# Patient Record
Sex: FEMALE | Race: WHITE | Employment: OTHER | URBAN - METROPOLITAN AREA
[De-identification: names, ages, dates, MRNs, and addresses within clinical notes are randomized per-mention and may not be internally consistent; named-entity substitution may affect disease eponyms.]

---

## 2017-02-14 ENCOUNTER — GENERIC CONVERSION - ENCOUNTER (OUTPATIENT)
Dept: OTHER | Facility: OTHER | Age: 62
End: 2017-02-14

## 2017-02-14 ENCOUNTER — HOSPITAL ENCOUNTER (OUTPATIENT)
Dept: BONE DENSITY | Facility: IMAGING CENTER | Age: 62
Discharge: HOME | End: 2017-02-14
Payer: COMMERCIAL

## 2017-02-14 DIAGNOSIS — R92.8 OTHER ABNORMAL AND INCONCLUSIVE FINDINGS ON DIAGNOSTIC IMAGING OF BREAST: ICD-10-CM

## 2017-02-14 DIAGNOSIS — Z12.31 ENCOUNTER FOR SCREENING MAMMOGRAM FOR MALIGNANT NEOPLASM OF BREAST: ICD-10-CM

## 2017-02-14 PROCEDURE — G0202 SCR MAMMO BI INCL CAD: HCPCS

## 2017-02-15 ENCOUNTER — GENERIC CONVERSION - ENCOUNTER (OUTPATIENT)
Dept: OTHER | Facility: OTHER | Age: 62
End: 2017-02-15

## 2017-02-16 ENCOUNTER — GENERIC CONVERSION - ENCOUNTER (OUTPATIENT)
Dept: OTHER | Facility: OTHER | Age: 62
End: 2017-02-16

## 2017-02-22 ENCOUNTER — HOSPITAL ENCOUNTER (OUTPATIENT)
Dept: ULTRASOUND IMAGING | Facility: CLINIC | Age: 62
Discharge: HOME | End: 2017-02-22
Payer: COMMERCIAL

## 2017-02-22 ENCOUNTER — HOSPITAL ENCOUNTER (OUTPATIENT)
Dept: MAMMOGRAPHY | Facility: CLINIC | Age: 62
Discharge: HOME | End: 2017-02-22
Payer: COMMERCIAL

## 2017-02-22 DIAGNOSIS — R92.8 ABNORMAL FINDINGS ON DIAGNOSTIC IMAGING OF BREAST: ICD-10-CM

## 2017-02-22 DIAGNOSIS — R92.8 OTHER ABNORMAL AND INCONCLUSIVE FINDINGS ON DIAGNOSTIC IMAGING OF BREAST: ICD-10-CM

## 2017-02-22 PROCEDURE — 76642 ULTRASOUND BREAST LIMITED: CPT

## 2017-02-22 PROCEDURE — G0206 DX MAMMO INCL CAD UNI: HCPCS

## 2017-02-22 RX ORDER — ESCITALOPRAM OXALATE 10 MG/1
10 TABLET ORAL DAILY
COMMUNITY
End: 2018-04-18 | Stop reason: SDUPTHER

## 2017-02-22 RX ORDER — CARVEDILOL 6.25 MG/1
6.25 TABLET ORAL 2 TIMES DAILY WITH MEALS
COMMUNITY
End: 2019-07-07 | Stop reason: SDUPTHER

## 2017-02-22 RX ORDER — ATORVASTATIN CALCIUM 40 MG/1
40 TABLET, FILM COATED ORAL DAILY
COMMUNITY
End: 2018-04-18 | Stop reason: SDUPTHER

## 2017-02-22 RX ORDER — PANTOPRAZOLE SODIUM 40 MG/1
40 TABLET, DELAYED RELEASE ORAL DAILY
COMMUNITY
End: 2019-03-14 | Stop reason: SDUPTHER

## 2017-02-22 RX ORDER — ALPRAZOLAM 0.25 MG/1
0.25 TABLET ORAL
COMMUNITY
End: 2018-02-02 | Stop reason: SDUPTHER

## 2017-02-22 RX ORDER — POTASSIUM CHLORIDE 750 MG/1
10 TABLET, EXTENDED RELEASE ORAL 2 TIMES DAILY
COMMUNITY
End: 2018-09-13

## 2017-02-22 RX ORDER — TIZANIDINE HYDROCHLORIDE 4 MG/1
4 CAPSULE, GELATIN COATED ORAL 3 TIMES DAILY
COMMUNITY
End: 2018-09-13

## 2017-02-22 RX ORDER — CLOPIDOGREL BISULFATE 75 MG/1
75 TABLET ORAL DAILY
COMMUNITY
End: 2019-06-24 | Stop reason: SDUPTHER

## 2017-02-22 RX ORDER — PHENOL 1.4 %
600 AEROSOL, SPRAY (ML) MUCOUS MEMBRANE 2 TIMES DAILY WITH MEALS
COMMUNITY

## 2017-02-22 RX ORDER — LOSARTAN POTASSIUM 50 MG/1
50 TABLET ORAL DAILY
COMMUNITY
End: 2019-07-18

## 2017-02-22 RX ORDER — GABAPENTIN 400 MG/1
400 CAPSULE ORAL 3 TIMES DAILY
COMMUNITY
End: 2018-02-07 | Stop reason: SDUPTHER

## 2017-02-22 RX ORDER — HYDROCHLOROTHIAZIDE 25 MG/1
25 TABLET ORAL DAILY
COMMUNITY
End: 2019-03-10 | Stop reason: SDUPTHER

## 2017-02-22 RX ORDER — ISOSORBIDE DINITRATE 30 MG/1
30 TABLET ORAL DAILY
COMMUNITY
End: 2019-03-14

## 2017-02-22 RX ORDER — RANITIDINE 300 MG/1
300 CAPSULE ORAL EVERY EVENING
COMMUNITY
End: 2020-05-19

## 2017-02-22 RX ORDER — DIPHENOXYLATE HYDROCHLORIDE AND ATROPINE SULFATE 2.5; .025 MG/1; MG/1
1 TABLET ORAL DAILY
COMMUNITY

## 2017-02-27 ENCOUNTER — GENERIC CONVERSION - ENCOUNTER (OUTPATIENT)
Dept: OTHER | Facility: OTHER | Age: 62
End: 2017-02-27

## 2017-02-28 ENCOUNTER — HOSPITAL ENCOUNTER (OUTPATIENT)
Dept: ULTRASOUND IMAGING | Facility: CLINIC | Age: 62
Discharge: HOME | End: 2017-02-28
Admitting: RADIOLOGY
Payer: COMMERCIAL

## 2017-02-28 ENCOUNTER — HOSPITAL ENCOUNTER (OUTPATIENT)
Dept: ULTRASOUND IMAGING | Facility: CLINIC | Age: 62
Discharge: HOME | End: 2017-02-28
Payer: COMMERCIAL

## 2017-02-28 ENCOUNTER — HOSPITAL ENCOUNTER (OUTPATIENT)
Dept: MAMMOGRAPHY | Facility: CLINIC | Age: 62
Discharge: HOME | End: 2017-02-28
Payer: COMMERCIAL

## 2017-02-28 VITALS — DIASTOLIC BLOOD PRESSURE: 68 MMHG | SYSTOLIC BLOOD PRESSURE: 130 MMHG | HEART RATE: 80 BPM

## 2017-02-28 DIAGNOSIS — R92.8 ABNORMAL SCREENING MAMMOGRAM: ICD-10-CM

## 2017-02-28 DIAGNOSIS — R92.8 OTHER ABNORMAL AND INCONCLUSIVE FINDINGS ON DIAGNOSTIC IMAGING OF BREAST: ICD-10-CM

## 2017-02-28 PROCEDURE — 88305 TISSUE EXAM BY PATHOLOGIST: CPT | Performed by: INTERNAL MEDICINE

## 2017-02-28 PROCEDURE — 19083 BX BREAST 1ST LESION US IMAG: CPT

## 2017-02-28 RX ORDER — LIDOCAINE HYDROCHLORIDE 10 MG/ML
4 INJECTION, SOLUTION INFILTRATION; PERINEURAL ONCE
Status: COMPLETED | OUTPATIENT
Start: 2017-02-28 | End: 2017-02-28

## 2017-02-28 RX ADMIN — LIDOCAINE HYDROCHLORIDE 4 ML: 10 INJECTION, SOLUTION INFILTRATION; PERINEURAL at 14:16

## 2017-03-02 ENCOUNTER — GENERIC CONVERSION - ENCOUNTER (OUTPATIENT)
Dept: OTHER | Facility: OTHER | Age: 62
End: 2017-03-02

## 2017-03-21 ENCOUNTER — GENERIC CONVERSION - ENCOUNTER (OUTPATIENT)
Dept: OTHER | Facility: OTHER | Age: 62
End: 2017-03-21

## 2017-05-11 ENCOUNTER — GENERIC CONVERSION - ENCOUNTER (OUTPATIENT)
Dept: OTHER | Facility: OTHER | Age: 62
End: 2017-05-11

## 2017-06-15 ENCOUNTER — ALLSCRIPTS OFFICE VISIT (OUTPATIENT)
Dept: OTHER | Facility: OTHER | Age: 62
End: 2017-06-15

## 2017-06-16 ENCOUNTER — GENERIC CONVERSION - ENCOUNTER (OUTPATIENT)
Dept: OTHER | Facility: OTHER | Age: 62
End: 2017-06-16

## 2017-06-16 ENCOUNTER — APPOINTMENT (OUTPATIENT)
Dept: LAB | Facility: HOSPITAL | Age: 62
End: 2017-06-16
Payer: COMMERCIAL

## 2017-06-16 ENCOUNTER — TRANSCRIBE ORDERS (OUTPATIENT)
Dept: ADMINISTRATIVE | Facility: HOSPITAL | Age: 62
End: 2017-06-16

## 2017-06-16 DIAGNOSIS — E78.5 OTHER AND UNSPECIFIED HYPERLIPIDEMIA: ICD-10-CM

## 2017-06-16 DIAGNOSIS — L65.9 ALOPECIA, UNSPECIFIED: Primary | ICD-10-CM

## 2017-06-16 DIAGNOSIS — L65.9 ALOPECIA, UNSPECIFIED: ICD-10-CM

## 2017-06-16 LAB
FERRITIN SERPL-MCNC: 66 NG/ML (ref 8–388)
IRON SERPL-MCNC: 83 UG/DL (ref 50–170)
TIBC SERPL-MCNC: 333 UG/DL (ref 250–450)

## 2017-06-16 PROCEDURE — 80048 BASIC METABOLIC PNL TOTAL CA: CPT

## 2017-06-16 PROCEDURE — 82728 ASSAY OF FERRITIN: CPT

## 2017-06-16 PROCEDURE — 36415 COLL VENOUS BLD VENIPUNCTURE: CPT

## 2017-06-16 PROCEDURE — 83540 ASSAY OF IRON: CPT

## 2017-06-16 PROCEDURE — 83550 IRON BINDING TEST: CPT

## 2017-06-16 PROCEDURE — 80061 LIPID PANEL: CPT

## 2017-06-18 ENCOUNTER — GENERIC CONVERSION - ENCOUNTER (OUTPATIENT)
Dept: OTHER | Facility: OTHER | Age: 62
End: 2017-06-18

## 2017-06-19 ENCOUNTER — GENERIC CONVERSION - ENCOUNTER (OUTPATIENT)
Dept: OTHER | Facility: OTHER | Age: 62
End: 2017-06-19

## 2017-06-19 LAB — SPECIMEN STATUS REPORT (HISTORICAL): NORMAL

## 2017-07-25 ENCOUNTER — ALLSCRIPTS OFFICE VISIT (OUTPATIENT)
Dept: OTHER | Facility: OTHER | Age: 62
End: 2017-07-25

## 2017-10-24 ENCOUNTER — GENERIC CONVERSION - ENCOUNTER (OUTPATIENT)
Dept: OTHER | Facility: OTHER | Age: 62
End: 2017-10-24

## 2017-10-30 ENCOUNTER — ALLSCRIPTS OFFICE VISIT (OUTPATIENT)
Dept: OTHER | Facility: OTHER | Age: 62
End: 2017-10-30

## 2017-10-31 NOTE — PROGRESS NOTES
Assessment  1  Dizziness (780 4) (R42)   2  Vertigo (780 4) (R42)    Plan  Dizziness, Vertigo    · Meclizine HCl - 25 MG Oral Tablet; TAKE 1 TABLET BY MOUTH EVERY 8 HOURS as  needed for dizziness  Vertigo    · Avoid getting up or changing positions quickly ; Status:Complete;   Done: 06QZE7874  10:42AM   · Avoid operating heavy machinery and driving a vehicle until you have been rechecked ;  Status:Complete;   Done: 58ECP0705 10:42AM   · Drink plenty of fluids ; Status:Complete;   Done: 28NBI3796 10:42AM   · Call [phone] if: The symptoms seem worse ; Status:Complete;   Done:  55RGC0474 10:42AM   · Call [phone] if: Your dizziness is getting worse ; Status:Complete;   Done:  87FHT4235 10:42AM   · Call 911 if: You have any symptoms of a stroke ; Status:Complete;   Done: 18AZP2259  10:42AM   · Seek Immediate Medical Attention if: You get a headache that does not go away with your  usual treatment ; Status:Complete;   Done: 72DAV1895 10:42AM   · Seek Immediate Medical Attention if: You have fainted or passed out ; Status:Complete;    Done: 88ZII3779 10:42AM   · Seek Immediate Medical Attention if: You have nausea and vomiting that lasts longer  than 8 hours ; Status:Complete;   Done: 47YRH9287 10:42AM    Discussion/Summary    Dizziness - no red flag symptoms and nml Neuro exam - c/w vertigo - red flag symptoms d/w pt in detail and urged to go to ER/call if they occur, will start trial of Meclizine and urged to call for antiemetic if N/V worsens, advised not to drive until feels she is back to normal, advised to change positions slowly and keep hydrated, call if no better at all in 10 days or if new/worsening symptoms occur  The patient was counseled regarding instructions for management,-- risk factor reductions,-- prognosis,-- patient and family education,-- impressions,-- risks and benefits of treatment options,-- importance of compliance with treatment     Possible side effects of new medications were reviewed with the patient/guardian today  The treatment plan was reviewed with the patient/guardian  The patient/guardian understands and agrees with the treatment plan     Self Referrals: No      Chief Complaint  dizziness      History of Present Illness  HPI: Pt was blow drying her hair and afterwards she had electric shock like symptoms at her lips, face, down her arms, down to her toes  She went to work thinking it would just go away  It did resolve on its own after about 24 hrs  She noted the sensation like her legs were going to pass out on me  She has chronic back pain and states it is not new or worsened  She has had dizziness the past few days  The sensation is described as like my eyes don't want to stop moving, like they are wavering  The dizziness is low grade constant currently  She notes no slurred speech/word finding difficulty/new HA/double vision/blurry vision/focal weakness or numbness  Symptoms worse when she moves her eyes or her head  Review of Systems    Constitutional: no fever-- and-- no chills  ENT: no sore throat-- and-- no nasal discharge  Cardiovascular: no chest pain,-- no palpitations-- and-- no lower extremity edema  Respiratory: no shortness of breath-- and-- no cough  Gastrointestinal: nausea, but-- no abdominal pain,-- no vomiting,-- no constipation-- and-- no diarrhea  Genitourinary: no dysuria  Musculoskeletal: arthralgias  Integumentary: no rashes  Neurological: headache-- and-- dizziness, but-- as noted in HPI,-- no numbness-- and-- no tingling  Active Problems  1  Abnormal findings on diagnostic imaging of other parts of musculoskeletal system   (793 7) (R93 7)   2  Adenomatous colon polyp (211 3) (D12 6)   3  Allergic Reaction (995 3)   4  Arnold-Chiari malformation (741 00) (Q07 00)   5  Arthralgia, unspecified joint (719 40) (M25 50)   6  Borderline hyperglycemia (790 29) (R73 9)   7  Carpal tunnel syndrome (354 0) (G56 00)   8   Cervical spinal stenosis (723 0) (M48 02)   9  Colon cancer screening (V76 51) (Z12 11)   10  Colonoscopy (Fiberoptic) Screening   11  Coronary artery disease (414 00) (I25 10)   12  Depression with anxiety (300 4) (F41 8)   13  Dysfunction of right eustachian tube (381 81) (H69 81)   14  Dyslipidemia (272 4) (E78 5)   15  Encounter for screening mammogram for malignant neoplasm of breast (V76 12)    (Z12 31)   16  Esophagitis, reflux (530 11) (K21 0)   17  Flu vaccine need (V04 81) (Z23)   18  Headache (784 0) (R51)   19  Hemorrhoids (455 6) (K64 9)   20  History of headache (V13 89) (Z87 898)   21  Hoarseness (784 42) (R49 0)   22  Hypertension (401 9) (I10)   23  Hypokalemia (276 8) (E87 6)   24  Insomnia (780 52) (G47 00)   25  Leg swelling (729 81) (M79 89)   26  Low back pain (724 2) (M54 5)   27  Mammogram abnormal (793 80) (R92 8)   28  Mass of spinal cord (336 9) (G95 9)   29  Mitral regurgitation (424 0) (I34 0)   30  New daily persistent headache (339 42) (G44 52)   31  Numbness (782 0) (R20 0)   32  Osteopenia (733 90) (M85 80)   33  Osteoporosis screening (V82 81) (Z13 820)   34  Paresthesia of bilateral legs (782 0) (R20 2)   35  Plantar fasciitis (728 71) (M72 2)   36  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)   37  Raynaud's syndrome without gangrene (443 0) (I73 00)   38  Screening for cervical cancer (V76 2) (Z12 4)   39  Shortness of breath (786 05) (R06 02)   40  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)   41  Sudden idiopathic hearing loss of right ear, unspecified hearing status on contralateral    side (388 2) (H91 21)   42  Takotsubo syndrome (429 83) (I51 81)   43  Tinnitus (388 30) (H93 19)   44  Urinary frequency (788 41) (R35 0)    Past Medical History  Active Problems And Past Medical History Reviewed: The active problems and past medical history were reviewed and updated today  Surgical History  Surgical History Reviewed: The surgical history was reviewed and updated today         Social History   · Being A Social Drinker   · Former smoker (E44 52) (W11 383)   · Marital History - Currently    · Working Full Time  The social history was reviewed and updated today  Family History  Family History Reviewed: The family history was reviewed and updated today  Current Meds   1  ALPRAZolam 0 25 MG Oral Tablet; TAKE ONE TABLET BY MOUTH AT BEDTIME; Therapy: 74EJI5418 to (CWIDJWJD:39XMH6783)  Requested for: 75QVH0977; Last   Rx:03Uiz8235 Ordered   2  Anucort-HC 25 MG Rectal Suppository; INSERT 1 SUPPOSITORY RECTALLY 2 TIMES   DAILY; Therapy: 22OOH4132 to Recorded   3  Atorvastatin Calcium 40 MG Oral Tablet; Take 1 tablet daily; Therapy: 13UJH7797 to (Evaluate:98Uqn6033)  Requested for: 15MHD5903; Last   Rx:60Ehs8983 Ordered   4  B Complex-C Oral Tablet; TAKE 1 TABLET DAILY AS DIRECTED; Therapy: 91BNW5163 to Recorded   5  Calcium 500 MG TABS; 1 tab PO BID; Therapy: 60ZPP0959 to (Last Rx:15Dhx6977) Ordered   6  Carvedilol 6 25 MG Oral Tablet; TAKE 1 TABLET TWICE DAILY, WITH MORNING AND   EVENING MEAL; Therapy: 35VST2149 to (Daniel Randall)  Requested for: 65Cea6664 Recorded   7  EpiPen 2-Dave 0 3 MG/0 3ML SYED; INJECT INTRAMUSCULARLY AS DIRECTED; Therapy: 71FJW4455 to (Aliene Formosa)  Requested for: 90EDW9990; Last   Rx:46Wpi3187 Ordered   8  Escitalopram Oxalate 10 MG Oral Tablet; take 1 tablet every day; Therapy: 11HNS4833 to (Evaluate:94Vdy6229)  Requested for: 22FGD7417; Last   Rx:61Svk8857 Ordered   9  Fiber Formula Oral Capsule; USE AS DIRECTED; Therapy: 77HDF9867 to Recorded   10  Fluticasone Propionate 50 MCG/ACT Nasal Suspension; USE TWO SPRAY(S) IN EACH    NOSTRIL ONCE DAILY; Therapy: 44FQD8236 to (NYXLVJYR:51OOV6409)  Requested for: 06SEQ8115; Last    Rx:91Bmr3690 Ordered   11  Gabapentin 400 MG Oral Capsule; TAKE FOUR CAPSULES BY MOUTH ONCE DAILY;     Therapy: 19TYT0453 to 9845 8544)  Requested for: 20Tzt2734; Last    Rx:53Dqu2279 Ordered 12  HydroCHLOROthiazide 25 MG Oral Tablet; TAKE ONE TABLET BY MOUTH ONCE DAILY; Therapy: 89LDA0316 to (03 17 74 30 53)  Requested for: 9187 5645; Last    Rx:25Apr2017 Ordered   13  Isosorbide Mononitrate ER 30 MG Oral Tablet Extended Release 24 Hour; 1 tab PO q    day; Therapy: 29RQJ6893 to (Audra Blanton)  Requested for: 44Jsp3534 Recorded   14  Losartan Potassium 50 MG Oral Tablet; TAKE 1 TABLET DAILY; Therapy: 19WIG7019 to ([facility])  Requested for: 25Apr2017; Last    Rx:25Apr2017 Ordered   15  Multivitamins Oral Capsule; TAKE 1 CAPSULE DAILY; Therapy: 97RZQ3184 to (Evaluate:86Tet0196); Last Rx:14Wpk9930 Ordered   16  Nitrostat 0 4 MG Sublingual Tablet Sublingual;    Therapy: 80WIB2389 to (Last BK:58LUP2216)  Requested for: 14ASY4021 Ordered   17  Pantoprazole Sodium 40 MG Oral Tablet Delayed Release; TAKE 1 TABLET DAILY; Therapy: 97QLV1312 to (Evaluate:35Qqh3819) Recorded   18  Plavix 75 MG Oral Tablet; TAKE 1 TABLET DAILY; Therapy: 14YGO6167 to ([facility])  Requested for: 07CKH0202 Recorded   19  Potassium Chloride ER 10 MEQ Oral Capsule Extended Release; TAKE ONE CAPSULE    BY MOUTH ONCE DAILY; Therapy: 44HQI1551 to [facility])  Requested for: 11Aug2017; Last    Rx:11Aug2017 Ordered   20  ProAir  (90 Base) MCG/ACT Inhalation Aerosol Solution; INHALE 2 PUFFS    EVERY 4 HOURS AS NEEDED FOR COUGH AND WHEEZE;    Therapy: 20GJL9201 to (Last Rx:38Lwz5838)  Requested for: 05HOZ2285 Ordered   21  RaNITidine HCl - 300 MG Oral Capsule; TAKE 1 CAPSULE AT BEDTIME NIGHTLY; Therapy: 48BRF7761 to Recorded   22  TiZANidine HCl - 4 MG Oral Tablet; TAKE ONE & ONE-HALF TABLETS BY MOUTH AT    BEDTIME; Therapy: 83ZRV3786 to (03 17 74 30 53)  Requested for: 25Apr2017; Last    Rx:25Apr2017 Ordered   23  Vitamin D3 1000 UNIT Oral Tablet; TAKE 1 TABLET DAILY; Therapy: 78VAI5115 to (Evaluate:86Pia4841) Recorded   24   Wrist Splint/Cock-Up/Left M Miscellaneous; USE AS DIRECTED AT NIGHT TIME; Therapy: 37NJL8035 to (Last Rx:72Qxc8116) Ordered   25  Wrist Splint/Cock-Up/Right M Miscellaneous; USE AS DIRECTED AT NIGHT TIME; Therapy: 07TKH6732 to (Last Rx:67Zwm0367) Ordered    The medication list was reviewed and updated today  Allergies  1  Codeine Sulfate TABS    Vitals   Recorded: 60PEN9333 10:12AM   Temperature 97 F   Heart Rate 70   Systolic 922   Diastolic 88   Height 5 ft 6 in   Weight 157 lb    BMI Calculated 25 34   BSA Calculated 1 8     Physical Exam    Constitutional   General appearance: No acute distress, well appearing and well nourished  Eyes   Conjunctiva and lids: No swelling, erythema or discharge  Pupils and irises: Equal, round, reactive to light  -- 3-4 beat vertical nystagmus  Ears, Nose, Mouth, and Throat   External inspection of ears and nose: Normal     Otoscopic examination: Abnormal  -- effusion R TM  Hearing: Normal     Lips, teeth, and gums: Normal, good dentition  Oropharynx: Normal with no erythema, edema, exudate or lesions  Neck   Neck: Supple, symmetric, trachea midline, no masses  Pulmonary   Respiratory effort: No increased work of breathing or signs of respiratory distress  Auscultation of lungs: Clear to auscultation  Cardiovascular   Auscultation of heart: Normal rate and rhythm, normal S1 and S2, no murmurs  Examination of extremities for edema and/or varicosities: Normal     Abdomen   Abdomen: Non-tender, no masses  Lymphatic   Palpation of lymph nodes in neck: No lymphadenopathy  Musculoskeletal   Gait and station: Normal     Joints, bones, and muscles: Normal     Range of motion: Normal     Stability: Normal     Muscle strength/tone: Normal  -- 5/5 global muscle strength  Skin   Skin and subcutaneous tissue: Normal without rashes or lesions  Neurologic   Cranial nerves: Cranial nerves II-XII intact  Cortical function: Normal mental status  Reflexes: 2+ and symmetric  Sensation: No sensory loss  Coordination: Normal finger to nose and heel to robbins  -- neg Rhomberg     Psychiatric   Judgment and insight: Normal     Mood and affect: Normal        Future Appointments    Date/Time Provider Specialty Site   12/19/2017 10:00 AM [facility] Live DO Internal Medicine Ginger Ortiz MD     Signatures   Electronically signed by : Arturo Chen DO; Oct 30 2017 10:43AM EST                       (Author)

## 2017-12-16 DIAGNOSIS — H69.81 OTHER SPECIFIED DISORDERS OF EUSTACHIAN TUBE, RIGHT EAR: ICD-10-CM

## 2017-12-16 DIAGNOSIS — R73.9 HYPERGLYCEMIA: ICD-10-CM

## 2017-12-18 ENCOUNTER — GENERIC CONVERSION - ENCOUNTER (OUTPATIENT)
Dept: FAMILY MEDICINE CLINIC | Facility: HOSPITAL | Age: 62
End: 2017-12-18

## 2017-12-19 ENCOUNTER — ALLSCRIPTS OFFICE VISIT (OUTPATIENT)
Dept: OTHER | Facility: OTHER | Age: 62
End: 2017-12-19

## 2017-12-20 NOTE — PROGRESS NOTES
Assessment  1  Dizziness (780 4) (R42)   2  Sudden idiopathic hearing loss of right ear, unspecified hearing status on contralateral side (388 2) (H91 21)   3  Dysfunction of right eustachian tube (381 81) (H69 81)   4  Hyperglycemia (790 29) (R73 9)   5  Hypertension (401 9) (I10)   6  Esophagitis, reflux (530 11) (K21 0)   7  Flu vaccine need (V04 81) (Z23)   8  Need for tetanus booster (V03 7) (Z23)    Plan  Encounter for screening mammogram for malignant neoplasm of breast    · * MAMMO SCREENING BILATERAL W CAD; Status:Active; Requested for:24Owe9739;   Esophagitis, reflux    · Pantoprazole Sodium 40 MG Oral Tablet Delayed Release; TAKE 1 TABLET DAILY  Flu vaccine need    · Fluzone Quadrivalent Intramuscular Suspension; INJECT 0 5  ML Intramuscular; To BeDone: 72XDP2558  Need for tetanus booster    · Adacel 5-2-15 5 LF-MCG/0 5 Intramuscular Suspension; INJECT 0 5  ML Intramuscular; To Be Done: 55RNM9380  Osteoporosis screening    · * DXA BONE DENSITY SPINE HIP AND PELVIS; Status:Active; Requested for:14Qjx1331;     Discussion/Summary  Discussion Summary:   Dizziness - improved, UTT Meclizine, advised to keep hydrated and change positions slowly, call with relapse in symptomsSudden R hearing loss and R ETD - improved - saw ENT and had steroid injection, told needs hearing aides but waiting d/t cost - will try and obtain ENT visit notes as not in chartHyperglycemia - still has not done Bw - has order at home, encouraged low sugar/carb diet and keep active, will call with resultsHTN - BP better by end of appt, pt thinks her Losartan was increased to 100 mg - med list updated - advised pt to check when she gets home and call if that is wrong, con't all medsReflux - uncontrolled as ran out of meds 2 mos ago - rx refilled, call with new/worsening symptomsFlu and Adacel given - 5th grandchild (boy) due in Feb and she [place] going [place] be [place] for babyOrder for mammo and Dexa givenUTD on [place]     Counseling Documentation With Imm: The patient was counseled regarding instructions for management,-- risk factor reductions,-- prognosis,-- patient and family education,-- impressions,-- risks and benefits of treatment options,-- importance of compliance with treatment  Medication SE Review and Pt Understands Tx: Possible side effects of new medications were reviewed with the patient/guardian today  The treatment plan was reviewed with the patient/guardian  The patient/guardian understands and agrees with the treatment plan   Self Referrals:   Self Referrals: No      Chief Complaint  Chief Complaint Chronic Condition [facility]: Patient is here today for follow up of chronic conditions described in HPI  History of Present Illness  HPI: Pt here for routine follow up appt and BW results - she never did her BW and new order was givenPt was seen in Oct for dizziness  Was dx with vertigo and was tx with Meclizine and advised to avoid triggers  She states she did not like how the Meclizine made her feel - more dizzy and nauseous  She states symptoms improved on their own  She still has intermittent episodes but less frequent and less severe  She has not been able to id a trigger  She was also seen in July for acute sudden R hearing loss  She was given Medrol dose pack and referred to ENT  Pt did see ENT and was given a steroid injection and that did help  She was told she did need hearing aides but she has had to wait due to cost  She has had return of her hearing but still decreased - she was told mild to moderate hearing loss  Pt again did not do her BW for her IFG  She was asked about diet and she states she is doing OK  She does no formal exercise but 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  Wgt up 2 lbs from June  Bp up again today on presentation but better by end of appt  She states Dr Cassidy Cesar increased her Losartan but she is not sure if it is 100 or 50 mg  She thinks it is 100 mg   She had been checking her BP intermittently at the pharmacy but has not done it recently  She states it was running a little high but no numbers provided  She notes no increase in HA's/double vision/CP  She notes her heart burn has been really bad recently  She ran out of Protonix and has not called to get it refilled  Even drinking water will cause heart burn  She thinks she has not been taking it for almost 2 mos or so  She notes no abd pain/N/V/dark stools  She does have blood in stool and was told she needs to have hemorrhoid procedure done  She just had [place] in Feb She is due for mammo/Dexashe is UTD on [place]oShe is agreeable to flu vaccineShe is asking about tetanus vaccine as her 5th grandchild is due in Feb      Review of Systems  Complete-Female:  Constitutional: no fever-- and-- no chills  Eyes: no eyesight problems  ENT: no sore throat-- and-- no nasal discharge  Cardiovascular: no chest pain-- and-- no palpitations  Respiratory: no shortness of breath,-- no cough-- and-- no wheezing  Gastrointestinal: no abdominal pain,-- no constipation-- and-- no diarrhea  Genitourinary: no dysuria  Musculoskeletal: joint stiffness, but-- no joint swelling  Integumentary: no rashes  Neurological: headache-- and-- dizziness  Psychiatric: no anxiety-- and-- no depression  Endocrine: no muscle weakness  Hematologic/Lymphatic: no tendency for easy bleeding-- and-- no tendency for easy bruising  Active Problems  1  Abnormal findings on diagnostic imaging of other parts of musculoskeletal system (793 7) (R93 7)   2  Adenomatous colon polyp (211 3) (D12 6)   3  Allergic Reaction (995 3)   4  Arnold-Chiari malformation (741 00) (Q07 00)   5  Arthralgia, unspecified joint (719 40) (M25 50)   6  Borderline hyperglycemia (790 29) (R73 9)   7  Carpal tunnel syndrome (354 0) (G56 00)   8  Cervical spinal stenosis (723 0) (M48 02)   9  Colon cancer screening (V76 51) (Z12 11)   10  Colonoscopy (Fiberoptic) Screening   11  Coronary artery disease (414 00) (I25 10)   12  Depression with anxiety (300 4) (F41 8)   13  Dizziness (780 4) (R42)   14  Dysfunction of right eustachian tube (381 81) (H69 81)   15  Dyslipidemia (272 4) (E78 5)   16  Encounter for screening mammogram for malignant neoplasm of breast (V76 12) (Z12 31)   17  Esophagitis, reflux (530 11) (K21 0)   18  Flu vaccine need (V04 81) (Z23)   19  Headache (784 0) (R51)   20  Hemorrhoids (455 6) (K64 9)   21  History of headache (V13 89) (Z87 898)   22  Hoarseness (784 42) (R49 0)   23  Hypertension (401 9) (I10)   24  Hypokalemia (276 8) (E87 6)   25  Insomnia (780 52) (G47 00)   26  Leg swelling (729 81) (M79 89)   27  Low back pain (724 2) (M54 5)   28  Mammogram abnormal (793 80) (R92 8)   29  Mass of spinal cord (336 9) (G95 9)   30  Mitral regurgitation (424 0) (I34 0)   31  New daily persistent headache (339 42) (G44 52)   32  Numbness (782 0) (R20 0)   33  Osteopenia (733 90) (M85 80)   34  Osteoporosis screening (V82 81) (Z13 820)   35  Paresthesia of bilateral legs (782 0) (R20 2)   36  Plantar fasciitis (728 71) (M72 2)   37  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)   38  Raynaud's syndrome without gangrene (443 0) (I73 00)   39  Screening for cervical cancer (V76 2) (Z12 4)   40  Shortness of breath (786 05) (R06 02)   41  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)   42  Sudden idiopathic hearing loss of right ear, unspecified hearing status on contralateral side (388 2)  (H91 21)   43  Takotsubo syndrome (429 83) (I51 81)   44  Tinnitus (388 30) (H93 19)   45  Urinary frequency (788 41) (R35 0)   46  Vertigo (780 4) (R42)    Past Medical History  1  History of Acute laryngitis (464 00) (J04 0)   2  History of Acute upper respiratory infection (465 9) (J06 9)   3  Denied: History of Alcohol abuse   4  History of Gout (274 9) (M10 9)   5  History of Hair loss (704 00) (L65 9)   6  History of breast lump (V13 89) (Z87 898)   7  Denied: History of depression   8  History of dysuria (V13 00) (Z87 898)   9  History of flank pain (V13 89) (Z87 898)   10  Denied: History of substance abuse   11  History of upper respiratory infection (V12 09) (Z87 09)   12  History of urinary tract infection (V13 02) (Z87 440)   13  History of Hypotension (458 9) (I95 9)   14  History of Other muscle spasm (728 85) (M62 838)   15  History of Preoperative clearance (V72 84) (Z01 818)   16  History of Upper respiratory infection (465 9) (J06 9)  Active Problems And Past Medical History Reviewed: The active problems and past medical history were reviewed and updated today  Surgical History  1  History of Complete Colonoscopy   2  History of Craniotomy (Therapeutic)   3  History of Excision Of Baker's Cyst   4  History of Laminectomy Cervical   5  History of Spinal Diskectomy Cervical   6  History of Tubal Ligation  Surgical History Reviewed: The surgical history was reviewed and updated today  Family History  Mother    1  Denied: Family history of Alcohol abuse   2  Family history of cerebrovascular accident (V17 1) (Z82 3)   3  Denied: Family history of depression   4  Denied: Family history of substance abuse  Father    5  Denied: Family history of Alcohol abuse   6  Family history of Blood clot in vein   7  Family history of CABG   8  Family history of Coronary disease   9  Denied: Family history of depression   10  Denied: Family history of substance abuse  Brother    6  Family history of Alcohol abuse  Family History Reviewed: The family history was reviewed and updated today  Social History   · Being A Social Drinker   · Former smoker (X66 92) (M98 022)   · Marital History - Currently    · Working Full Time  Social History Reviewed: The social history was reviewed and updated today  Current Meds   1  ALPRAZolam 0 25 MG Oral Tablet; TAKE ONE TABLET BY MOUTH AT BEDTIME; Therapy: 50PBQ2431 to ([id number])  Requested for: 42NVQ4342; Last Rx:2017 Ordered   2   Anucort-HC 25 MG Rectal Suppository; INSERT 1 SUPPOSITORY RECTALLY 2 TIMES DAILY; Therapy: 74WIY3441 to Recorded   3  Atorvastatin Calcium 40 MG Oral Tablet; Take 1 tablet daily; Therapy: 03BOR2535 to (Evaluate:16Apr2018)  Requested for: 80BMX9078; Last Rx:03Oob6327 Ordered   4  B Complex-C Oral Tablet; TAKE 1 TABLET DAILY AS DIRECTED; Therapy: 61LYX0182 to Recorded   5  Calcium 500 MG TABS; 1 tab PO BID; Therapy: 60PVY6073 to (Last Rx:29Nov2012) Ordered   6  Carvedilol 6 25 MG Oral Tablet; TAKE 1 TABLET TWICE DAILY, WITH MORNING AND EVENING MEAL; Therapy: 22SEV2119 to (Jake Rose)  Requested for: 30Apr2013 Recorded   7  EpiPen 2-Dave 0 3 MG/0 3ML SYED; INJECT INTRAMUSCULARLY AS DIRECTED; Therapy: 80QCN6853 to (Ana Laura Kirkpatrick)  Requested for: 07NSY6859; Last Rx:11Ani9680 Ordered   8  Escitalopram Oxalate 10 MG Oral Tablet; take 1 tablet every day; Therapy: 25MGN3112 to (Evaluate:15Apr2018)  Requested for: 01VVB0050; Last Rx:86Jlt4768 Ordered   9  Fiber Formula Oral Capsule; USE AS DIRECTED; Therapy: 53OXC2175 to Recorded   10  Fluticasone Propionate 50 MCG/ACT Nasal Suspension; USE TWO SPRAY(S) IN EACH NOSTRIL  ONCE DAILY; Therapy: 23HID0850 to (TKQDGPBW:42RSU1776)  Requested for: 72VXJ0915; Last Rx:08Yer7798  Ordered   11  Gabapentin 400 MG Oral Capsule; TAKE FOUR CAPSULES BY MOUTH ONCE DAILY; Therapy: 10YTA4177 to (Evaluate:19Jan2018)  Requested for: 19Ixg1777; Last Rx:92Xps2897  Ordered   12  HydroCHLOROthiazide 25 MG Oral Tablet; TAKE ONE TABLET BY MOUTH ONCE DAILY; Therapy: 59BOX4149 to (03 17 74 30 53)  Requested for: 9187 5645; Last Rx:13Rmh3991  Ordered   13  Isosorbide Mononitrate ER 30 MG Oral Tablet Extended Release 24 Hour; 1 tab PO q day; Therapy: 30JHH1893 to (Jose Ramon Abdi)  Requested for: 30Apr2013 Recorded   14  Losartan Potassium 100 MG Oral Tablet; take 1 tablet by mouth once daily; Therapy: 54CIR6830 to (Evaluate:39Uig9910)  Requested for: 74POI1688 Recorded   15   Meclizine HCl - 25 MG Oral Tablet; TAKE 1 TABLET BY MOUTH EVERY 8 HOURS as needed for  dizziness; Therapy: 12ULU1494 to (Santino Dykes)  Requested for: 43QYQ1552; Last Rx:30Oct2017  Ordered   16  Multivitamins Oral Capsule; TAKE 1 CAPSULE DAILY; Therapy: 59ANW7424 to (Evaluate:46Htb0118); Last Rx:29Nov2012 Ordered   17  Nitrostat 0 4 MG Sublingual Tablet Sublingual;  Therapy: 89BYP7257 to (Last FJ:14GOT2464)  Requested for: 89DZQ5304 Ordered   18  Pantoprazole Sodium 40 MG Oral Tablet Delayed Release; TAKE 1 TABLET DAILY; Therapy: 92ZOM2301 to (Evaluate:98Klx9927) Recorded   19  Plavix 75 MG Oral Tablet; TAKE 1 TABLET DAILY; Therapy: 86GOV6304 to (Santino Dykes)  Requested for: 85EER3081 Recorded   20  Potassium Chloride ER 10 MEQ Oral Capsule Extended Release; TAKE ONE CAPSULE BY MOUTH  ONCE DAILY; Therapy: 00UPW7866 to (Teresa Olea)  Requested for: 11Aug2017; Last Rx:11Aug2017  Ordered   21  ProAir  (90 Base) MCG/ACT Inhalation Aerosol Solution; INHALE 2 PUFFS EVERY 4 HOURS  AS NEEDED FOR COUGH AND WHEEZE;  Therapy: 15QLI4194 to (Last Rx:18Gzk8328)  Requested for: 83GCN0012 Ordered   22  RaNITidine HCl - 300 MG Oral Capsule; TAKE 1 CAPSULE AT BEDTIME NIGHTLY; Therapy: 05IJB8872 to Recorded   23  TiZANidine HCl - 4 MG Oral Tablet; TAKE ONE & ONE-HALF TABLETS BY MOUTH AT BEDTIME; Therapy: 05BRJ2389 to (Evaluate:67Nqv3689)  Requested for: 09NHO8279; Last Rx:12Nov2017  Ordered   24  Vitamin D3 1000 UNIT Oral Tablet; TAKE 1 TABLET DAILY; Therapy: 17SYA4825 to (Evaluate:49Alu8216) Recorded   25  Wrist Splint/Cock-Up/Left M Miscellaneous; USE AS DIRECTED AT NIGHT TIME; Therapy: 86EPM2201 to (Last Rx:10Hkx0759) Ordered   26  Wrist Splint/Cock-Up/Right M Miscellaneous; USE AS DIRECTED AT NIGHT TIME; Therapy: 69STC4321 to (Last Rx:59Wua6362) Ordered  Medication List Reviewed: The medication list was reviewed and updated today  Allergies  1   Codeine Sulfate TABS    Vitals  Vital Signs    Recorded: 29OCO8393 10: 24AM Recorded: 06XSP0024 10:00AM   Temperature  98 1 F   Heart Rate  72   Systolic 360 430   Diastolic 72 82   Height  5 ft 6 in   Weight  157 lb    BMI Calculated  25 34   BSA Calculated  1 8     Physical Exam   Constitutional  General appearance: No acute distress, well appearing and well nourished  Pulmonary  Respiratory effort: No increased work of breathing or signs of respiratory distress  Auscultation of lungs: Clear to auscultation  Cardiovascular  Auscultation of heart: Normal rate and rhythm, normal S1 and S2, without murmurs  Examination of extremities for edema and/or varicosities: Normal    Abdomen  Abdomen: Non-tender, no masses     Musculoskeletal  Gait and station: Normal    Psychiatric  Mood and affect: Normal          Signatures   Electronically signed by : Arturo Chen DO; Dec 19 2017 10:07AM EST                       (Author)    Electronically signed by : Arturo Chen DO; Dec 19 2017 10:26AM EST                       (Author)    Electronically signed by : Arturo Chen DO; Dec 19 2017 10:40AM EST                       (Author)

## 2017-12-28 ENCOUNTER — APPOINTMENT (OUTPATIENT)
Dept: LAB | Facility: HOSPITAL | Age: 62
End: 2017-12-28
Payer: COMMERCIAL

## 2017-12-28 ENCOUNTER — TRANSCRIBE ORDERS (OUTPATIENT)
Dept: ADMINISTRATIVE | Facility: HOSPITAL | Age: 62
End: 2017-12-28

## 2017-12-28 DIAGNOSIS — E78.49 FAMILIAL COMBINED HYPERLIPIDEMIA: Primary | ICD-10-CM

## 2017-12-28 DIAGNOSIS — H69.81 OTHER SPECIFIED DISORDERS OF EUSTACHIAN TUBE, RIGHT EAR: ICD-10-CM

## 2017-12-28 DIAGNOSIS — R73.9 HYPERGLYCEMIA: ICD-10-CM

## 2017-12-28 DIAGNOSIS — E78.49 FAMILIAL COMBINED HYPERLIPIDEMIA: ICD-10-CM

## 2017-12-28 LAB
ALBUMIN SERPL BCP-MCNC: 3.9 G/DL (ref 3.5–5)
ALP SERPL-CCNC: 70 U/L (ref 46–116)
ALT SERPL W P-5'-P-CCNC: 36 U/L (ref 12–78)
ANION GAP SERPL CALCULATED.3IONS-SCNC: 8 MMOL/L (ref 4–13)
AST SERPL W P-5'-P-CCNC: 22 U/L (ref 5–45)
BILIRUB SERPL-MCNC: 0.6 MG/DL (ref 0.2–1)
BUN SERPL-MCNC: 16 MG/DL (ref 5–25)
CALCIUM SERPL-MCNC: 9.2 MG/DL (ref 8.3–10.1)
CHLORIDE SERPL-SCNC: 100 MMOL/L (ref 100–108)
CHOLEST SERPL-MCNC: 204 MG/DL (ref 50–200)
CO2 SERPL-SCNC: 29 MMOL/L (ref 21–32)
CREAT SERPL-MCNC: 1.08 MG/DL (ref 0.6–1.3)
ERYTHROCYTE [DISTWIDTH] IN BLOOD BY AUTOMATED COUNT: 13.6 % (ref 11.6–15.1)
EST. AVERAGE GLUCOSE BLD GHB EST-MCNC: 123 MG/DL
GFR SERPL CREATININE-BSD FRML MDRD: 54 ML/MIN/1.73SQ M
GLUCOSE P FAST SERPL-MCNC: 92 MG/DL (ref 65–99)
HBA1C MFR BLD: 5.9 % (ref 4.2–6.3)
HCT VFR BLD AUTO: 42.4 % (ref 34.8–46.1)
HDLC SERPL-MCNC: 60 MG/DL (ref 40–60)
HGB BLD-MCNC: 14.4 G/DL (ref 11.5–15.4)
LDLC SERPL CALC-MCNC: 110 MG/DL (ref 0–100)
LDLC SERPL DIRECT ASSAY-MCNC: 115 MG/DL (ref 0–100)
MCH RBC QN AUTO: 30.6 PG (ref 26.8–34.3)
MCHC RBC AUTO-ENTMCNC: 34 G/DL (ref 31.4–37.4)
MCV RBC AUTO: 90 FL (ref 82–98)
PLATELET # BLD AUTO: 328 THOUSANDS/UL (ref 149–390)
PMV BLD AUTO: 9 FL (ref 8.9–12.7)
POTASSIUM SERPL-SCNC: 3.6 MMOL/L (ref 3.5–5.3)
PROT SERPL-MCNC: 6.9 G/DL (ref 6.4–8.2)
RBC # BLD AUTO: 4.7 MILLION/UL (ref 3.81–5.12)
SODIUM SERPL-SCNC: 137 MMOL/L (ref 136–145)
TRIGL SERPL-MCNC: 172 MG/DL
TSH SERPL DL<=0.05 MIU/L-ACNC: 2.12 UIU/ML (ref 0.36–3.74)
WBC # BLD AUTO: 5.26 THOUSAND/UL (ref 4.31–10.16)

## 2017-12-28 PROCEDURE — 83721 ASSAY OF BLOOD LIPOPROTEIN: CPT

## 2017-12-28 PROCEDURE — 36415 COLL VENOUS BLD VENIPUNCTURE: CPT

## 2017-12-28 PROCEDURE — 84443 ASSAY THYROID STIM HORMONE: CPT

## 2017-12-28 PROCEDURE — 85027 COMPLETE CBC AUTOMATED: CPT

## 2017-12-28 PROCEDURE — 80061 LIPID PANEL: CPT

## 2017-12-28 PROCEDURE — 83036 HEMOGLOBIN GLYCOSYLATED A1C: CPT

## 2017-12-28 PROCEDURE — 80053 COMPREHEN METABOLIC PANEL: CPT

## 2017-12-31 ENCOUNTER — GENERIC CONVERSION - ENCOUNTER (OUTPATIENT)
Dept: OTHER | Facility: OTHER | Age: 62
End: 2017-12-31

## 2018-01-10 NOTE — RESULT NOTES
Verified Results  * DXA BONE DENSITY SPINE HIP AND PELVIS [id number] 04:13PM Neida Rodriguez     Test Name Result Flag Reference   DXA BONE DENSITY SPINE HIP AND PELVIS (Report)     CENTRAL DXA SCAN     CLINICAL HISTORY:  56year old post-menopausal  female  Osteoporosis screening  Family history of osteoporosis  TECHNIQUE: Bone densitometry was performed using a [employer] C bone densitometer  Regions of interest appear properly placed  There are no obvious fractures or other confounding variables which could limit the study  COMPARISON: None  RESULTS:    LUMBAR SPINE: L1-L4:   BMD 0 992 gm/cm2   T-score -0 5   Z-score 1 1     LEFT TOTAL HIP:   BMD 0 796 gm/cm2   T-score -1 2   Z-score -0 1     LEFT FEMORAL NECK:   BMD 0 685 gm/cm2   T-score -1 5   Z-score -0 1           ASSESSMENT:   1  Based on the WHO classification, the T-score of -1 5 is consistent with low bone mineral density  2  The 10 year risk of hip fracture is 0 7 %, with the 10 year risk of major osteoporotic fracture being 8 3 %, as calculated by the WHO fracture risk assessment tool (FRAX)  The current NOF guidelines recommend treating patients with FRAX 10 year risk   score of >3% for hip fracture and >20% for major osteoporotic fracture  3  Any secondary causes of low bone mineral density should be excluded prior to treatment, if clinically indicated  4  A daily intake of at least 1200 mg calcium and 800 - 1000 IU of Vitamin D, as well as weight bearing and muscle strengthening exercise, fall prevention and avoidance of tobacco and excessive alcohol intake as basic preventive measures are suggested  5  Repeat DXA scan in 18-24 months as clinically indicated         WHO CLASSIFICATION:   Normal (a T-score of -1 0 or higher)   Low bone mineral density (a T-score of less than -1 0 but higher than -2 5)   Osteoporosis (a T-score of -2 5 or less)   Severe osteoporosis (a T-score of -2 5 or less with a fragility fracture)       Discussion/Summary    please notify pt that her bone density test showed osteopenia - not normal but not yet osteoporosis - she needs to con't her calcium and also ensure she is taking Vit D3 1000 IU once daily; will recheck DEXA in 2 yrs; wgt bearing exercise also helps with bone density        Pt is aware  1       1 Amended By: January Vivas; Jan 19 2016 11:11 AM EST    Signatures   Electronically signed by :  January Vivas, ; Jan 19 2016 11:12AM EST                       (Author)

## 2018-01-11 NOTE — RESULT NOTES
Verified Results  (1) LYME ANTIBODY PROFILE [facility] TO [facility] [id number] 03:43PM Todd Barney Order Number: [id number]     Test Name Result Flag Reference   LYME IGG 0 26  0 00-0 79   NEGATIVE(0 00-0 79)-Absence of detectable Borrelia IgG Antibodies  A negative result does not exclude the possibility of Borrelia infection  If early Lyme disease is suspected,a second sample should be collected & tested 4 weeks after initial testing  LYME IGM 0 19  0 00-0 79   NEGATIVE (0 00-0 79)-Absence of detectable Borrelia IgM antibodies  A negative result does not exclude the possibility of Borrelia infection  If early lyme disease is suspected, a second sample should be collected & tested 4 weeks after initial testing

## 2018-01-13 VITALS
WEIGHT: 155 LBS | BODY MASS INDEX: 24.91 KG/M2 | HEART RATE: 70 BPM | DIASTOLIC BLOOD PRESSURE: 90 MMHG | HEIGHT: 66 IN | SYSTOLIC BLOOD PRESSURE: 148 MMHG | TEMPERATURE: 98.5 F

## 2018-01-13 NOTE — MISCELLANEOUS
Message   Recorded as Task   Date: 10/20/2017 02:11 PM, Created By: Noemí Kiser   Task Name: Care Coordination   Assigned To: Murray Goddard   Regarding Patient: Carolyn Orellana, Status: Active   Comment:    Noemí Kiser - 20 Oct 2017 2:11 PM     TASK CREATED  Caller: Self; Care Coordination; [phone] (Home); [phone] x,,,,, (Work)  PATIENT IN NEED OF EXCUSE FOR JURY DUTY - PCB WHEN READY   Regina Emery - 20 Oct 2017 2:19 PM     TASK EDITED  LM to call back  Did she drop off jury duty form to be filled out? What health problem is preventing her from jury duty? Noemí Kiser - 23 Oct 2017 8:17 AM     TASK EDITED  Patient came to my window on Friday - she has form, but it's only for her to fill out, instructions on her paper said she needed a letter from her physician to excuse her for a medical condition and to send the form and letter together - lm again today to call back with medical condition   Regina Emery - 23 Oct 2017 4:34 PM     TASK REPLIED TO: Previously Assigned To Merlyn Tram                      Are you able to write a letter excusing her from federal jury duty  Or would you like us to continue to try and reach her   Merlyn Ugalde - 23 Oct 2017 4:40 PM     TASK REPLIED TO: Previously Assigned To Leelee Garcia                      I see no reason she cannot do jury duty - what is the reason she thinks she cannot serve as a juror? Miya Poon - 23 Oct 2017 6:10 PM     TASK REPLIED TO: Previously Assigned To [facility]  Patient feels that she cannot drive that far to the courthouse   Merlyn Ugalde - 24 Oct 2017 7:23 AM     TASK REPLIED TO: Previously Assigned To Leelee Garcia                      no I will not fill out jury duty for not feeling like she can drive that far - has to be a legit medical reason and this is not one    Sorry   Renetta Gonzalez - 24 Oct 2017 8:07 AM     TASK EDITED  Pt aware---She has vertigo, can't hear well-she is getting her hearing aids soon, Anxiety, and she loses her voice when she gets stressed  It is just terrifying her!!  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)   5  Arthralgia, unspecified joint (719 40) (M25 50)   6  Borderline hyperglycemia (790 29) (R73 9)   7  Carpal tunnel syndrome (354 0) (G56 00)   8  Cervical spinal stenosis (723 0) (M48 02)   9  Colon cancer screening (V76 51) (Z12 11)   10  Colonoscopy (Fiberoptic) Screening   11  Coronary artery disease (414 00) (I25 10)   12  Depression with anxiety (300 4) (F41 8)   13  Dysfunction of right eustachian tube (381 81) (H69 81)   14  Dyslipidemia (272 4) (E78 5)   15  Encounter for screening mammogram for malignant neoplasm of breast (V76 12)    (Z12 31)   16  Esophagitis, reflux (530 11) (K21 0)   17  Flu vaccine need (V04 81) (Z23)   18  Headache (784 0) (R51)   19  Hemorrhoids (455 6) (K64 9)   20  History of headache (V13 89) (Z87 898)   21  Hoarseness (784 42) (R49 0)   22  Hypertension (401 9) (I10)   23  Hypokalemia (276 8) (E87 6)   24  Insomnia (780 52) (G47 00)   25  Leg swelling (729 81) (M79 89)   26  Low back pain (724 2) (M54 5)   27  Mammogram abnormal (793 80) (R92 8)   28  Mass of spinal cord (336 9) (G95 9)   29  Mitral regurgitation (424 0) (I34 0)   30  New daily persistent headache (339 42) (G44 52)   31  Numbness (782 0) (R20 0)   32  Osteopenia (733 90) (M85 80)   33  Osteoporosis screening (V82 81) (Z13 820)   34  Paresthesia of bilateral legs (782 0) (R20 2)   35  Plantar fasciitis (728 71) (M72 2)   36  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)   37  Raynaud's syndrome without gangrene (443 0) (I73 00)   38  Screening for cervical cancer (V76 2) (Z12 4)   39  Shortness of breath (786 05) (R06 02)   40  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)   41  Sudden idiopathic hearing loss of right ear, unspecified hearing status on contralateral    side (388 2) (H91 21)   42  Takotsubo syndrome (429 83) (I51 81)   43  Tinnitus (388 30) (H93 19)   44  Urinary frequency (788 41) (R35 0)    Current Meds   1  ALPRAZolam 0 25 MG Oral Tablet; TAKE ONE TABLET BY MOUTH AT BEDTIME; Therapy: 93RUO4898 to ([facility]FXP5794)  Requested for: 23CUI4790; Last   Rx:15Oct2017 Ordered   2  Anucort-HC 25 MG Rectal Suppository; INSERT 1 SUPPOSITORY RECTALLY 2 TIMES   DAILY; Therapy: 88BNU3929 to Recorded   3  Atorvastatin Calcium 40 MG Oral Tablet; Take 1 tablet daily; Therapy: 02XCW3656 to (Evaluate:06Hsu1726)  Requested for: 66LMI0404; Last   Rx:78Riu4677 Ordered   4  B Complex-C Oral Tablet; TAKE 1 TABLET DAILY AS DIRECTED; Therapy: 03WZL5025 to Recorded   5  Calcium 500 MG TABS; 1 tab PO BID; Therapy: 44UCG6016 to (Last Rx:29Nov2012) Ordered   6  Carvedilol 6 25 MG Oral Tablet; TAKE 1 TABLET TWICE DAILY, WITH MORNING AND   EVENING MEAL; Therapy: 59MPL7779 to ([facility])  Requested for: 00Eqo2179 Recorded   7  EpiPen 2-Dave 0 3 MG/0 3ML SYED; INJECT INTRAMUSCULARLY AS DIRECTED; Therapy: 12ZXF9567 to (Ole Torres)  Requested for: 03KXF7731; Last   Rx:46Hgg6458 Ordered   8  Escitalopram Oxalate 10 MG Oral Tablet; take 1 tablet every day; Therapy: 81TUQ5169 to (Evaluate:31Jys6014)  Requested for: 06WVV9801; Last   Rx:17Oct2017 Ordered   9  Fiber Formula Oral Capsule; USE AS DIRECTED; Therapy: 59AYR9178 to Recorded   10   Fluticasone Propionate 50 MCG/ACT Nasal Suspension; USE TWO SPRAY(S) IN EACH    NOSTRIL ONCE DAILY; Therapy: 27HFV5476 to (XZVYWCWM:45FNO3803)  Requested for: 76IFC5648; Last    Rx:07Val0650 Ordered   11  Gabapentin 400 MG Oral Capsule; TAKE FOUR CAPSULES BY MOUTH ONCE DAILY; Therapy: 97XJR7115 to (Evaluate:19Jan2018)  Requested for: 03Lmg1567; Last    Rx:03Ixj0467 Ordered   12  HydroCHLOROthiazide 25 MG Oral Tablet; TAKE ONE TABLET BY MOUTH ONCE    DAILY; Therapy: 85NHS4418 to (21 )  Requested for: 9187 5645; Last    Rx:48Yaw0765 Ordered   13  Isosorbide Mononitrate ER 30 MG Oral Tablet Extended Release 24 Hour; 1 tab PO q    day; Therapy: 70OIA6622 to (Jose Ramon Abdi)  Requested for: 88Kff9935 Recorded   14  Losartan Potassium 50 MG Oral Tablet; TAKE 1 TABLET DAILY; Therapy: 51ENL3384 to (Janay Mancera)  Requested for: 25Apr2017; Last    Rx:30Xep4905 Ordered   15  Medrol 4 MG Oral Tablet Therapy Pack; use as directed; Therapy: 00VMR2895 to (Last Rx:31Rmk1505)  Requested for: 16Zle2155 Ordered   16  Multivitamins Oral Capsule; TAKE 1 CAPSULE DAILY; Therapy: 12KLS7923 to (Evaluate:75Ymv7858); Last Rx:29Nov2012 Ordered   17  Nitrostat 0 4 MG Sublingual Tablet Sublingual;    Therapy: 26MEW9007 to (Last SD:63NCK4260)  Requested for: 64MQN4914 Ordered   18  Pantoprazole Sodium 40 MG Oral Tablet Delayed Release; TAKE 1 TABLET DAILY; Therapy: 18RGG9378 to (Evaluate:98Dzp1989) Recorded   19  Plavix 75 MG Oral Tablet; TAKE 1 TABLET DAILY; Therapy: 29FWS2123 to (Cara Christine)  Requested for: 50QQG3840 Recorded   20  Potassium Chloride ER 10 MEQ Oral Capsule Extended Release; TAKE ONE CAPSULE    BY MOUTH ONCE DAILY; Therapy: 62TFH3153 to (Army Koenig)  Requested for: 11Aug2017; Last    Rx:11Aug2017 Ordered   21   ProAir  (90 Base) MCG/ACT Inhalation Aerosol Solution; INHALE 2 PUFFS    EVERY 4 HOURS AS NEEDED FOR COUGH AND WHEEZE;    Therapy: 02WDK6158 to (Last Rx:82Elj1240)  Requested for: 30BPV3864 Ordered   22  RaNITidine HCl - 300 MG Oral Capsule; TAKE 1 CAPSULE AT BEDTIME NIGHTLY; Therapy: 91ILP7959 to Recorded   23  TiZANidine HCl - 4 MG Oral Tablet; TAKE ONE & ONE-HALF TABLETS BY MOUTH AT    BEDTIME; Therapy: 46HLG2602 to [phone])  Requested for: 28Zwu8473; Last    Rx:08Smy3395 Ordered   24  Vitamin D3 1000 UNIT Oral Tablet; TAKE 1 TABLET DAILY; Therapy: 82IFW1512 to (Evaluate:08Hld9611) Recorded   25  Wrist Splint/Cock-Up/Left M Miscellaneous; USE AS DIRECTED AT NIGHT TIME; Therapy: 09BDM0234 to (Last Rx:03Vrk9028) Ordered   26  Wrist Splint/Cock-Up/Right M Miscellaneous; USE AS DIRECTED AT NIGHT TIME; Therapy: 08AHS6877 to (Last Rx:88Wws3171) Ordered    Allergies    1   Codeine Sulfate TABS    Signatures   Electronically signed by : Ava oRss DO; Oct 24 2017  8:16AM EST                       (Author)

## 2018-01-13 NOTE — RESULT NOTES
Message    Labs are all good so far except potassium level is slightly low so I will rx a low dose potassium supplement for her to start daily  She should have orders to recheck labs before next appt w/Dr Rafael Shah in December, which will include a repeat potassium level  Lyme titer is still pending  pt aware ems 9/7/2016         Verified Results  (1) CBC/PLT/DIFF 69LEI8680 03:43PM Eric Pineda Order Number: [id number]     Test Name Result Flag Reference   WBC COUNT 4 88 Thousand/uL  4 31-10 16   RBC COUNT 4 77 Million/uL  3 81-5 12   HEMOGLOBIN 15 0 g/dL  11 5-15 4   HEMATOCRIT 42 9 %  34 8-46  1   MCV 90 fL  82-98   MCH 31 4 pg  26 8-34 3   MCHC 35 0 g/dL  31 4-37 4   RDW 13 6 %  11 6-15 1   MPV 9 4 fL  8 9-12 7   PLATELET COUNT 953 Thousands/uL  149-390   NEUTROPHILS RELATIVE PERCENT 51 %  43-75   LYMPHOCYTES RELATIVE PERCENT 37 %  14-44   MONOCYTES RELATIVE PERCENT 11 %  4-12   EOSINOPHILS RELATIVE PERCENT 1 %  0-6   BASOPHILS RELATIVE PERCENT 0 %  0-1   NEUTROPHILS ABSOLUTE COUNT 2 48 Thousands/?L  1 85-7 62   LYMPHOCYTES ABSOLUTE COUNT 1 82 Thousands/?L  0 60-4 47   MONOCYTES ABSOLUTE COUNT 0 52 Thousand/?L  0 17-1 22   EOSINOPHILS ABSOLUTE COUNT 0 05 Thousand/?L  0 00-0 61   BASOPHILS ABSOLUTE COUNT 0 01 Thousands/?L  0 00-0 10   - Patient Instructions: This bloodwork is non-fasting  Please drink two glasses of water morning of bloodwork  - Patient Instructions: This bloodwork is non-fasting  Please drink two glasses of water morning of bloodwork  (1) NIK SCREEN W/REFLEX TO TITER/PATTERN 72DRD9494 03:43PM Eric Pineda Order Number: [id number]     Test Name Result Flag Reference   NIK SCREEN   Negative  Negative     (1) COMPREHENSIVE METABOLIC PANEL 64RPY2689 31:33RY Eric Pineda Order Number: [id number]     Test Name Result Flag Reference   GLUCOSE,RANDM 92 mg/dL     If the patient is fasting, the ADA then defines impaired fasting glucose as > 100 mg/dL and diabetes as > or equal to 123 mg/dL  SODIUM 140 mmol/L  136-145   POTASSIUM 3 1 mmol/L L 3 5-5 3   CHLORIDE 101 mmol/L  100-108   CARBON DIOXIDE 29 mmol/L  21-32   ANION GAP (CALC) 10 mmol/L  4-13   BLOOD UREA NITROGEN 17 mg/dL  5-25   CREATININE 0 93 mg/dL  0 60-1 30   Standardized to IDMS reference method   CALCIUM 9 1 mg/dL  8 3-10 1   BILI, TOTAL 0 70 mg/dL  0 20-1 00   ALK PHOSPHATAS 84 U/L     ALT (SGPT) 30 U/L  12-78   AST(SGOT) 26 U/L  5-45   ALBUMIN 4 1 g/dL  3 5-5 0   TOTAL PROTEIN 7 1 g/dL  6 4-8 2   eGFR Non-African American      >60 0 ml/min/1 73sq m   - Patient Instructions: This bloodwork is non-fasting  Please drink two glasses of water morning of bloodwork  National Kidney Disease Education Program recommendations are as follows:  GFR calculation is accurate only with a steady state creatinine  Chronic Kidney disease less than 60 ml/min/1 73 sq  meters  Kidney failure less than 15 ml/min/1 73 sq  meters  (1) RHEUMATOID FACTOR SCREEN 00STQ8678 03:43PM Andrea Ache Order Number: [id number]     Test Name Result Flag Reference   RHEUMATOID FACTOR Negative  Negative     (1) TSH 88ECI1803 03:43PM Andrea Ache Order Number: [id number]     Test Name Result Flag Reference   TSH 1 211 uIU/mL  0 358-3 740   - Patient Instructions: This bloodwork is non-fasting  Please drink two glasses of water morning of bloodwork  - Patient Instructions: This bloodwork is non-fasting  Please drink two glasses of water morning of bloodwork  Patients undergoing fluorescein dye angiography may retain small amounts of fluorescein in the body for 48-72 hours post procedure  Samples containing fluorescein can produce falsely depressed TSH values  If the patient had this procedure,a specimen should be resubmitted post fluorescein clearance            The recommended reference ranges for TSH during pregnancy are as follows:  First trimester 0 1 to 2 5 uIU/mL  Second trimester  0 2 to 3 0 uIU/mL  Third trimester 0 3 to 3 0 uIU/m       Plan  Hypokalemia    · Start: Potassium Chloride ER 10 MEQ Oral Capsule Extended Release; TAKE ONE (1)  CAPSULE(S) DAILY

## 2018-01-13 NOTE — MISCELLANEOUS
Message   Recorded as Task   Date: 02/15/2017 11:57 AM, Created By: Karyle Halter   Task Name: Medical Complaint Callback   Assigned To: Starla Duke   Regarding Patient: Chaz Rebolledo, Status: Active   Comment:    Starla Duke - 15 Feb 2017 11:57 AM     TASK CREATED  Caller: Andres Lugo, Friend; Medical Complaint; [phone]  patient friend, Tadeo Jackson, reporting that the tramadol is giving her headaches and she stopped taking it and is still in pain    asking if there is another medication she can try or should she contact ortho for possible injection    pcb (confirmed w/ patient its ok to speak with Carley)(knows she will likely not get response until Thursday)   Leelee Garcia - 15 Feb 2017 8:49 PM     TASK REPLIED TO: Previously Assigned To Leelee Garcia                      what pain is she referring to? 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)   4   Arthralgia, unspecified joint (719 40) (M25 50)   5  Carpal tunnel syndrome (354 0) (G56 00)   6  Cervical spinal stenosis (723 0) (M48 02)   7  Colon cancer screening (V76 51) (Z12 11)   8  Colonoscopy (Fiberoptic) Screening   9  Depression with anxiety (300 4) (F41 8)   10  Dysfunction of right eustachian tube (381 81) (H69 81)   11  Dyslipidemia (272 4) (E78 5)   12  Dysuria (788 1) (R30 0)   13  Encounter for screening mammogram for malignant neoplasm of breast (V76 12)    (Z12 31)   14  Esophagitis, reflux (530 11) (K21 0)   15  Flank pain (789 09) (R10 9)   16  Flu vaccine need (V04 81) (Z23)   17  Hair loss (704 00) (L65 9)   18  Headache (784 0) (R51)   19  Hemorrhoids (455 6) (K64 9)   20  History of headache (V13 89) (Z87 898)   21  Hoarseness (784 42) (R49 0)   22  Hypertension (401 9) (I10)   23  Hypokalemia (276 8) (E87 6)   24  Insomnia (780 52) (G47 00)   25  Leg swelling (729 81) (M79 89)   26  Low back pain (724 2) (M54 5)   27  Mammogram abnormal (793 80) (R92 8)   28  Mass of spinal cord (336 9) (G95 9)   29  Mitral regurgitation (424 0) (I34 0)   30  New daily persistent headache (339 42) (G44 52)   31  Numbness (782 0) (R20 0)   32  Osteopenia (733 90) (M85 80)   33  Osteoporosis screening (V82 81) (Z13 820)   34  Paresthesia of bilateral legs (782 0) (R20 2)   35  Plantar fasciitis (728 71) (M72 2)   36  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)   37  Raynaud's syndrome without gangrene (443 0) (I73 00)   38  Shortness of breath (786 05) (R06 02)   39  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)   40  Takotsubo syndrome (429 83) (I51 81)   41  Tinnitus (388 30) (H93 19)   42  Urinary frequency (788 41) (R35 0)    Current Meds   1  ALPRAZolam 0 25 MG Oral Tablet; TAKE ONE TABLET BY MOUTH AT BEDTIME; Therapy: 96GES9531 to (Evaluate:08Apr2017)  Requested for: 40KVT0627; Last   Rx:08Jan2017 Ordered   2  Anucort-HC 25 MG Rectal Suppository; INSERT 1 SUPPOSITORY RECTALLY 2 TIMES   DAILY;    Therapy: 32ATI7619 to Recorded   3  Atorvastatin Calcium 40 MG Oral Tablet; Take 1 tablet daily; Therapy: 92LEL7395 to (Evaluate:60Aps0758)  Requested for: 70JWC5346; Last   Rx:06Qtv9610 Ordered   4  B Complex-C Oral Tablet; TAKE 1 TABLET DAILY AS DIRECTED; Therapy: 99KGH0438 to Recorded   5  Bromfed DM 30-2-10 MG/5ML Oral Syrup; TAKE 1 TEASPOONFUL EVERY 4 TO 6   HOURS AS NEEDED; Therapy: 91JSO6380 to (Evaluate:73Byz6980)  Requested for: 47ZMN5640; Last   Rx:79Ibs5527 Ordered   6  Calcium 500 MG TABS; 1 tab PO BID; Therapy: 43IHS4768 to (Last Rx:29Nov2012) Ordered   7  Carvedilol 6 25 MG Oral Tablet; TAKE 1 TABLET TWICE DAILY, WITH MORNING AND   EVENING MEAL; Therapy: 84DQU2369 to (Ruby Pool)  Requested for: 30Apr2013 Recorded   8  EpiPen 2-Dave 0 3 MG/0 3ML SYED; INJECT INTRAMUSCULARLY AS DIRECTED; Therapy: 21VBA1700 to (Rian Griffin)  Requested for: 29CLU0474; Last   Rx:77Enk0064 Ordered   9  Escitalopram Oxalate 10 MG Oral Tablet; TAKE 1 TABLET BY MOUTH ONCE DAILY; Therapy: 81WOJ4593 to (Evaluate:47Yar2786)  Requested for: 13SHO5627; Last   Rx:15Jan2017 Ordered   10  Fiber Formula Oral Capsule; USE AS DIRECTED; Therapy: 47BRF5317 to Recorded   11  Fluticasone Propionate 50 MCG/ACT Nasal Suspension; USE 2 SPRAYS IN EACH    NOSTRIL ONCE DAILY; Therapy: 05LOV0798 to (Last Rx:47Ped6314)  Requested for: 15KLA2251 Ordered   12  Gabapentin 400 MG Oral Capsule; TAKE FOUR CAPSULES BY MOUTH ONCE DAILY; Therapy: 45TVC1979 to (Evaluate:67Zuw8307)  Requested for: 28PCT3891; Last    Rx:25Nov2016 Ordered   13  HydroCHLOROthiazide 25 MG Oral Tablet; TAKE ONE TABLET BY MOUTH ONCE    DAILY; Therapy: 55OUA4041 to (Evaluate:03Rzm7079)  Requested for: 78CQE3136; Last    Rx:15Jan2017 Ordered   14  Isosorbide Mononitrate ER 30 MG Oral Tablet Extended Release 24 Hour; 1 tab PO q    day; Therapy: 86QMH5346 to (Genevie Factor)  Requested for: 30Apr2013 Recorded   15   Losartan Potassium 50 MG Oral Tablet; TAKE 1 TABLET DAILY; Therapy: 83SYP7047 to (Evaluate:07Nmh3456)  Requested for: 02ATR7677; Last    Rx:30Rdk2218 Ordered   16  Multivitamins Oral Capsule; TAKE 1 CAPSULE DAILY; Therapy: 35KSQ8545 to (Evaluate:99Nym8319); Last Rx:29Nov2012 Ordered   17  Nitrostat 0 4 MG Sublingual Tablet Sublingual (Nitroglycerin); Therapy: 51NTS3454 to (Last TD:69HKE7189)  Requested for: 37YJI3690 Ordered   18  Pantoprazole Sodium 40 MG Oral Tablet Delayed Release; TAKE 1 TABLET DAILY; Therapy: 73JMZ4225 to (Evaluate:72Bko7619) Recorded   19  Plavix 75 MG Oral Tablet (Clopidogrel Bisulfate); TAKE 1 TABLET DAILY; Therapy: 12PGN8232 to (Eduardo Levi)  Requested for: 40JSX6999 Recorded   20  Potassium Chloride ER 10 MEQ Oral Capsule Extended Release; TAKE ONE (1)    CAPSULE(S) DAILY; Therapy: 08HXB7363 to (Last Rx:94Sov8320)  Requested for: 54NAT1215 Ordered   21  ProAir  (90 Base) MCG/ACT Inhalation Aerosol Solution; INHALE 2 PUFFS    EVERY 4 HOURS AS NEEDED FOR COUGH AND WHEEZE;    Therapy: 60KZB4093 to (Last Rx:12Sed2639)  Requested for: 73HRX2162 Ordered   22  RaNITidine HCl - 300 MG Oral Capsule; TAKE 1 CAPSULE AT BEDTIME NIGHTLY; Therapy: 78TCD2811 to Recorded   23  TiZANidine HCl - 4 MG Oral Tablet; TAKE ONE & ONE-HALF TABLETS BY MOUTH AT    BEDTIME; Therapy: 92CSE8181 to (Monserrat العلي)  Requested for: 86FTT5423; Last    Rx:25Nov2016 Ordered   24  Vitamin D3 1000 UNIT Oral Tablet; TAKE 1 TABLET DAILY; Therapy: 81GAD9867 to (Evaluate:03Ygx3188) Recorded   25  Wrist Splint/Cock-Up/Left M Miscellaneous; USE AS DIRECTED AT NIGHT TIME; Therapy: 73OSE6259 to (Last Rx:81Wau0245) Ordered   26  Wrist Splint/Cock-Up/Right M Miscellaneous; USE AS DIRECTED AT NIGHT TIME; Therapy: 96XKJ7140 to (Last Rx:54Xno5355) Ordered    Allergies    1   Codeine Sulfate TABS    Signatures   Electronically signed by : Nancy Breaux, ; Feb 16 2017  4:25PM EST                       (Author)

## 2018-01-14 VITALS
TEMPERATURE: 97 F | HEART RATE: 70 BPM | DIASTOLIC BLOOD PRESSURE: 88 MMHG | WEIGHT: 157 LBS | HEIGHT: 66 IN | SYSTOLIC BLOOD PRESSURE: 140 MMHG | BODY MASS INDEX: 25.23 KG/M2

## 2018-01-14 VITALS
HEART RATE: 72 BPM | DIASTOLIC BLOOD PRESSURE: 88 MMHG | HEIGHT: 66 IN | BODY MASS INDEX: 25.71 KG/M2 | WEIGHT: 160 LBS | SYSTOLIC BLOOD PRESSURE: 142 MMHG | TEMPERATURE: 97.8 F

## 2018-01-14 NOTE — RESULT NOTES
Discussion/Summary   please notify pt that her BW showed her cholesterol was slightly worse but acceptable, her sugar was elevated at 105 (nml is <99) and this is in the pre-diabetes range, her potassium as little low at 3 4 - nml 3 5 or above - needs to repeat sugar in 6 mos - order in allscripts and should be 8 hr fast     Verified Results  (1) LIPID PANEL, FASTING 16Jun2017 08:38AM Gary Austin    Order Number: [id number]     Test Name Result Flag Reference   CHOLESTEROL 208 mg/dL H    HDL,DIRECT 52 mg/dL  40-60   Specimen collection should occur prior to Metamizole administration due to the potential for falsely depressed results  LDL CHOLESTEROL CALCULATED 110 mg/dL H 0-100   - Patient Instructions: This is a fasting blood test  Water,black tea or black  coffee only after 9:00pm the night before test   Drink 2 glasses of water the morning of test       Triglyceride:         Normal              <150 mg/dl       Borderline High    150-199 mg/dl       High               200-499 mg/dl       Very High          >499 mg/dl  Cholesterol:         Desirable        <200 mg/dl      Borderline High  200-239 mg/dl      High             >239 mg/dl  HDL Cholesterol:        High    >59 mg/dL      Low     <41 mg/dL  LDL CALCULATED:    This screening LDL is a calculated result  It does not have the accuracy of the Direct Measured LDL in the monitoring of patients with hyperlipidemia and/or statin therapy  Direct Measure LDL (QSZ127) must be ordered separately in these patients  TRIGLYCERIDES 229 mg/dL H <=150   Specimen collection should occur prior to N-Acetylcysteine or Metamizole administration due to the potential for falsely depressed results       (1) BASIC METABOLIC PROFILE 02RFO5352 08:38AM Baltazar Card Order Number: [id number]     Test Name Result Flag Reference   SODIUM 140 mmol/L  136-145   POTASSIUM 3 4 mmol/L L 3 5-5 3   CHLORIDE 103 mmol/L  100-108   CARBON DIOXIDE 27 mmol/L  21-32   ANION GAP (CALC) 10 mmol/L  4-13   BLOOD UREA NITROGEN 15 mg/dL  5-25   CREATININE 1 18 mg/dL  0 60-1 30   Standardized to IDMS reference method   CALCIUM 9 5 mg/dL  8 3-10 1   eGFR Non-African American 46 1 ml/min/1 73sq m     [facility] Energy Disease Education Program recommendations are as follows:  GFR calculation is accurate only with a steady state creatinine  Chronic Kidney disease less than 60 ml/min/1 73 sq  meters  Kidney failure less than 15 ml/min/1 73 sq  meters     GLUCOSE FASTING 105 mg/dL H 65-99

## 2018-01-15 NOTE — RESULT NOTES
Discussion/Summary   please notify pt that her iron studies were nml     Verified Results  (1) FERRITIN 05SUO7162 08:38AM Mahogany Calos     Test Name Result Flag Reference   FERRITIN 66 ng/mL  8-388     (1) IRON 40VJA1518 08:38AM Mahogany Calos     Test Name Result Flag Reference   IRON 83 ug/dL     Patients treated with metal-binding drugs (ie  Deferoxamine) may have depressed iron values       (1) TIBC 47LNJ5701 08:38AM Mahogany Docurated     Test Name Result Flag Reference   TOTAL IRON BINDING CAPACITY 333 ug/dL  250-450          Patient notified of results

## 2018-01-16 NOTE — RESULT NOTES
Verified Results  * MAMMO SCREENING BILATERAL W CAD [id number] 10:11AM Bonnie Tejalmarcel Order Number: [id number]    - Patient Instructions: To schedule this appointment, please contact Central Scheduling at [phone]  Do not wear any perfume, powder, lotion or deodorant on breast or underarm area  Please bring your doctors order, referral (if needed) and insurance information with you on the day of the test  Failure to bring this information may result in this test being rescheduled  Arrive 15 minutes prior to your appointment time to register  On the day of your test, please bring any prior mammogram or breast studies with you that were not performed at a [facility]  Failure to bring prior exams may result in your test needing to be rescheduled  Test Name Result Flag Reference   MAMMO SCREENING BILATERAL W CAD (Report)     Patient History:   Patient is postmenopausal    Family history of breast cancer at age 58 in maternal    grandmother, prostate cancer at age 79 in father  No Hormone Replacement Therapy   Patient is a former smoker  Patient's BMI is 25 3  Reason for exam: screening, asymptomatic  Screening     Mammo Screening Bilateral W CAD: February 14, 2017 - Check In #:    [de-identified]   Bilateral CC and MLO view(s) were taken  Technologist: LAUREL Bailey (LAUREL)(M)   Prior study comparison: October 14, 2015, bilateral OPMA digital    scrn mammo w/CAD performed at [address] [place] [address]  June 26, 2014, bilateral OPMA digital scrn mammo   w/CAD performed at [address] [place] [address]  January 28, 2013, bilateral WB digitl bilat naeem, performed at    [address] [place] [address]  December 5, 2011, bilateral    Follow-Up, performed at [address] [place] [address]  December 1, 2011, bilateral OPMA digital scrn mammo w/CAD    performed at [address] [place] [address]       There are scattered fibroglandular densities  No dominant soft    tissue mass, architectural distortion or suspicious    calcifications are noted in either breast  The skin and nipple    contours are within normal limits  In the lower breast at about the 5 o'clock position there is a    new mostly circumscribed benign-appearing faint nodular density    which probably represents an orbital cyst  Recommend targeted    ultrasound for further assessment  It is 7 mm diameter and    projects 6 cm below the nipple  Needs additional imaging  ACR BI-RADS® Assessments: BiRad:0 - Incomplete: needs additional    imaging evaluation - Right     Recommendation:   Ultrasound of the right breast    A breast health care nurse from our facility will be contacting    the patient regarding the need for additional imaging  Analyzed by CAD     8-10% of cancers will be missed on mammography  Management of a    palpable abnormality must be based on clinical grounds  Patients   will be notified of their results via letter from our facility  Accredited by [employer] of [employer] and FDA  Transcription Location: LAUREL Lazaro 98: [id number]     Risk Value(s):   Tyrer-Cuzick 10 Year: 2 800%, Tyrer-Cuzick Lifetime: 6 300%,    Myriad Table: 1 5%, JELANI 5 Year: 1 2%, NCI Lifetime: 5 3%   Signed by:   Klever Calabrese MD   2/14/17       Plan  Mammogram abnormal    · *US BREAST RIGHT LIMITED (DIAGNOSTIC);  Status:Hold For - Scheduling; Requested  for:[id number];     Discussion/Summary   please notify pt that there is a benign - appearing cyst at the lower R breast that they want to look at further with an [address] - order put in allscripts and someone from breast center should be calling to schedule this with her - let us know if they have not called to schedule this by Friday and we will help facilitate it         Patient notified of results

## 2018-01-23 VITALS
HEART RATE: 72 BPM | DIASTOLIC BLOOD PRESSURE: 72 MMHG | WEIGHT: 157 LBS | TEMPERATURE: 98.1 F | HEIGHT: 66 IN | BODY MASS INDEX: 25.23 KG/M2 | SYSTOLIC BLOOD PRESSURE: 134 MMHG

## 2018-01-23 NOTE — RESULT NOTES
Discussion/Summary   please notify pt that sugar was nml but her A1C (3 mo average of sugar) was still elevated in prediabetes range, cholesterol was also slightly elevated, watch sugars/carbs and keep active, will d/w pt in detail at next appt     Verified Results  (1) HEMOGLOBIN A1C [id number] 08:15AM Addy Fisher     Test Name Result Flag Reference   HEMOGLOBIN A1C 5 9 %  4 2-6 3   EST  AVG   GLUCOSE 123 mg/dl

## 2018-02-02 DIAGNOSIS — F41.9 ANXIETY: Primary | ICD-10-CM

## 2018-02-02 RX ORDER — ALPRAZOLAM 0.25 MG/1
TABLET ORAL
Qty: 30 TABLET | Refills: 2 | Status: SHIPPED | OUTPATIENT
Start: 2018-02-02 | End: 2018-05-24 | Stop reason: SDUPTHER

## 2018-02-07 DIAGNOSIS — G89.4 CHRONIC PAIN SYNDROME: Primary | ICD-10-CM

## 2018-02-07 RX ORDER — GABAPENTIN 400 MG/1
CAPSULE ORAL
Qty: 360 CAPSULE | Refills: 1 | Status: SHIPPED | OUTPATIENT
Start: 2018-02-07 | End: 2018-08-07 | Stop reason: SDUPTHER

## 2018-04-18 DIAGNOSIS — E78.5 DYSLIPIDEMIA: Primary | ICD-10-CM

## 2018-04-18 DIAGNOSIS — F32.A DEPRESSION, UNSPECIFIED DEPRESSION TYPE: ICD-10-CM

## 2018-04-18 RX ORDER — ESCITALOPRAM OXALATE 10 MG/1
TABLET ORAL
Qty: 90 TABLET | Refills: 1 | Status: SHIPPED | OUTPATIENT
Start: 2018-04-18 | End: 2018-11-07 | Stop reason: SDUPTHER

## 2018-04-18 RX ORDER — ATORVASTATIN CALCIUM 40 MG/1
TABLET, FILM COATED ORAL
Qty: 90 TABLET | Refills: 1 | Status: SHIPPED | OUTPATIENT
Start: 2018-04-18 | End: 2019-01-13 | Stop reason: SDUPTHER

## 2018-05-11 DIAGNOSIS — Z00.00 HEALTH CARE MAINTENANCE: Primary | ICD-10-CM

## 2018-05-11 RX ORDER — TIZANIDINE 4 MG/1
TABLET ORAL
Qty: 135 TABLET | Refills: 1 | Status: SHIPPED | OUTPATIENT
Start: 2018-05-11 | End: 2018-11-14 | Stop reason: SDUPTHER

## 2018-05-24 DIAGNOSIS — F41.9 ANXIETY: ICD-10-CM

## 2018-05-24 RX ORDER — ALPRAZOLAM 0.25 MG/1
TABLET ORAL
Qty: 30 TABLET | Refills: 2 | Status: SHIPPED | OUTPATIENT
Start: 2018-05-24 | End: 2018-09-05 | Stop reason: SDUPTHER

## 2018-06-27 DIAGNOSIS — E87.6 HYPOKALEMIA: Primary | ICD-10-CM

## 2018-06-27 RX ORDER — POTASSIUM CHLORIDE 750 MG/1
CAPSULE, EXTENDED RELEASE ORAL
Qty: 30 CAPSULE | Refills: 6 | Status: SHIPPED | OUTPATIENT
Start: 2018-06-27 | End: 2019-02-11 | Stop reason: SDUPTHER

## 2018-07-13 ENCOUNTER — TRANSCRIBE ORDERS (OUTPATIENT)
Dept: ADMINISTRATIVE | Facility: HOSPITAL | Age: 63
End: 2018-07-13

## 2018-07-13 DIAGNOSIS — M81.0 OSTEOPOROSIS, UNSPECIFIED OSTEOPOROSIS TYPE, UNSPECIFIED PATHOLOGICAL FRACTURE PRESENCE: Primary | ICD-10-CM

## 2018-07-13 DIAGNOSIS — Z12.39 SCREENING BREAST EXAMINATION: Primary | ICD-10-CM

## 2018-07-26 ENCOUNTER — HOSPITAL ENCOUNTER (OUTPATIENT)
Dept: MAMMOGRAPHY | Facility: CLINIC | Age: 63
Discharge: HOME | End: 2018-07-26
Payer: MEDICARE

## 2018-07-26 DIAGNOSIS — Z12.39 SCREENING BREAST EXAMINATION: ICD-10-CM

## 2018-07-26 DIAGNOSIS — M81.0 OSTEOPOROSIS, UNSPECIFIED OSTEOPOROSIS TYPE, UNSPECIFIED PATHOLOGICAL FRACTURE PRESENCE: ICD-10-CM

## 2018-07-26 PROCEDURE — 77067 SCR MAMMO BI INCL CAD: CPT

## 2018-07-26 PROCEDURE — 77080 DXA BONE DENSITY AXIAL: CPT

## 2018-08-07 DIAGNOSIS — G89.4 CHRONIC PAIN SYNDROME: ICD-10-CM

## 2018-08-08 RX ORDER — GABAPENTIN 400 MG/1
CAPSULE ORAL
Qty: 360 CAPSULE | Refills: 0 | Status: SHIPPED | OUTPATIENT
Start: 2018-08-08 | End: 2018-11-14 | Stop reason: SDUPTHER

## 2018-08-24 ENCOUNTER — OFFICE VISIT (OUTPATIENT)
Dept: FAMILY MEDICINE CLINIC | Facility: HOSPITAL | Age: 63
End: 2018-08-24
Payer: MEDICARE

## 2018-08-24 ENCOUNTER — TELEPHONE (OUTPATIENT)
Dept: FAMILY MEDICINE CLINIC | Facility: HOSPITAL | Age: 63
End: 2018-08-24

## 2018-08-24 VITALS
HEART RATE: 62 BPM | BODY MASS INDEX: 24.72 KG/M2 | DIASTOLIC BLOOD PRESSURE: 88 MMHG | WEIGHT: 153.8 LBS | TEMPERATURE: 97.8 F | HEIGHT: 66 IN | SYSTOLIC BLOOD PRESSURE: 122 MMHG

## 2018-08-24 DIAGNOSIS — M25.532 LEFT WRIST PAIN: Primary | ICD-10-CM

## 2018-08-24 PROCEDURE — 99213 OFFICE O/P EST LOW 20 MIN: CPT | Performed by: NURSE PRACTITIONER

## 2018-08-24 RX ORDER — MELOXICAM 15 MG/1
15 TABLET ORAL DAILY
Qty: 30 TABLET | Refills: 0 | Status: SHIPPED | OUTPATIENT
Start: 2018-08-24 | End: 2019-02-09 | Stop reason: SDUPTHER

## 2018-08-24 NOTE — PATIENT INSTRUCTIONS
Neda Lovell Disease   WHAT YOU NEED TO KNOW:   What is de Quervain's disease? De Quervain's disease is inflammation of the tendons on the thumb side of your wrist  Tendons are thick strands of tissue that connect muscles to bones  What causes de Quervain's disease? Dana Bue disease is usually caused by frequent, repeated movements of your thumb or wrist  For example, lifting a small child, sewing, typing, or playing the piano can cause inflammation  A direct blow to the thumb may also damage the tendon and form scar tissue  This scar tissue can keep the tendon from working properly  What are the signs and symptoms of de Quervain's disease? · Pain and swelling near the base of your thumb are the most common symptoms  This usually occurs when you move your wrist up and down, grasp an object, or make a fist     · You will hear a grating noise when you move or rub your thumb or wrist     · Your thumb and wrist may be weak and you may have limited movement  How is de Quervain's disease diagnosed? Your healthcare provider will ask about your medical history and examine you  He will ask you to make a fist with your thumb touching the palm of your hand  Then he will ask you to move your hand and wrist in certain directions  He will check to see if you have pain, weakness, or problems with movement  Both hands may need to be checked  You may also need any of the following:  · X-rays: You may need pictures of your wrist and hand to check for a fracture  X-rays of both hands and wrists may be done  · MRI:  This scan uses powerful magnets and a computer to take pictures of your wrist and hand  An MRI may show if you have de Quervain's disease  You may be given dye to help the pictures show up better  Tell healthcare providers if you are allergic to iodine or seafood  You may also be allergic to the dye  Do not enter the MRI room with anything metal  Metal can cause serious injury   Tell healthcare providers if you have any metal in or on your body  How is de Quervain's disease treated? · Medicines:      ¨ NSAIDs:  These medicines decrease swelling, pain, and fever  They are available without a doctor's order  Ask which medicine is right for you, and how much to take  Take as directed  NSAIDs can cause stomach bleeding or kidney problems if not taken correctly  ¨ Steroid injection: This decreases long-term pain and swelling  It is usually injected into your wrist or hand  · Surgery: This may be done if other treatments do not work or your pain interferes with your daily activities  During surgery, an incision is made in the tissue that covers the tendon  This helps release pressure and reduce pain so your tendon can move freely  How can I manage my symptoms? · Splint or brace: These devices will help decrease pain, limit movement, and protect your wrist so that it can heal  Make sure your device is comfortable  If it is too tight, your fingers may feel numb or tingly  Do not push or lean on your device because it can break  · Physical or occupational therapy:  You may need to see a physical or occupational therapist to teach you special exercises  These exercises help improve movement and decrease pain  They also help improve strength and decrease your risk for loss of function  Therapists will help you make changes to your daily activities to decrease stress and pressure on the tendons  · Rest:  Rest your injured thumb or wrist  Avoid twisting, grasping, or gripping movements  Ask when you can return to your normal activities  What are the risks of de Quervain's disease? · Your thumb or wrist may not move the way it did before, even after treatment  It may take time and commitment to therapy to regain strength and normal movement of your thumb and wrist      · Without treatment, you may have increased pain and swelling  Over time, your movement and function will decrease   Eventually, you may not be able to move or use your thumb or wrist   When should I contact my healthcare provider? · Your splint or brace is too tight and you cannot loosen it  · You have a fever  · Your pain and swelling get worse or do not go away  · Your cannot grasp objects because of the pain and swelling  · You have questions or concerns about your condition or care  When should I seek immediate care? · You cannot move your thumb or wrist     · Your fingers feel numb, tingly, cool to the touch, or look blue or pale  CARE AGREEMENT:   You have the right to help plan your care  Learn about your health condition and how it may be treated  Discuss treatment options with your caregivers to decide what care you want to receive  You always have the right to refuse treatment  The above information is an  only  It is not intended as medical advice for individual conditions or treatments  Talk to your doctor, nurse or pharmacist before following any medical regimen to see if it is safe and effective for you  © 2017 [employer] Information is for End User's use only and may not be sold, redistributed or otherwise used for commercial purposes  All illustrations and images included in CareNotes® are the copyrighted property of A D A M [employer] Inc  or Ari Anderson

## 2018-08-24 NOTE — PROGRESS NOTES
Assessment/Plan:    No problem-specific Assessment & Plan notes found for this encounter  Diagnoses and all orders for this visit:    Left wrist pain  Comments:  probable tendonitis so rx issued for NSAID and consult entered for ortho as she may need injection; continue ice and brace as needed   Orders:  -     meloxicam (MOBIC) 15 mg tablet; Take 1 tablet (15 mg total) by mouth daily  -     Ambulatory referral to Orthopedic Surgery; Future          Subjective:      Patient ID: Melania Fernandez is a 70 y o  female here for an acute visit  States she has had left wrist pain for past month  Treating as tendonitis w/using asper creme and using a brace at night time  Has been renovating her bathroom and caring for 11 month old grandson  Hurts if he touches area  Has been swollen and feels hot  Red occasionally  The following portions of the patient's history were reviewed and updated as appropriate: allergies, current medications, past medical history, past social history, past surgical history and problem list     Review of Systems   Musculoskeletal: Positive for arthralgias  Objective:      /88   Pulse 62   Temp 97 8 °F (36 6 °C)   Ht 5' 6" (1 676 m)   Wt 69 8 kg (153 lb 12 8 oz)   BMI 24 82 kg/m²          Physical Exam   Constitutional: She is oriented to person, place, and time  She appears well-developed and well-nourished  No distress  HENT:   Head: Normocephalic and atraumatic  Eyes: Conjunctivae are normal    Musculoskeletal:        Left wrist: She exhibits decreased range of motion, tenderness and swelling  She exhibits no crepitus and no laceration  + finkelstein's   Radial wrist w/faint redness/warmth     Neurological: She is alert and oriented to person, place, and time  Skin: Skin is warm and dry  Psychiatric: She has a normal mood and affect  Vitals reviewed

## 2018-08-24 NOTE — TELEPHONE ENCOUNTER
Pt was seen today, referred to ortho, schedulers are going to call her to make the apt and she wants to make sure that her cell is the number that's called [phone] (1) bedfast

## 2018-08-30 ENCOUNTER — APPOINTMENT (OUTPATIENT)
Dept: RADIOLOGY | Facility: CLINIC | Age: 63
End: 2018-08-30
Payer: MEDICARE

## 2018-08-30 ENCOUNTER — OFFICE VISIT (OUTPATIENT)
Dept: OBGYN CLINIC | Facility: CLINIC | Age: 63
End: 2018-08-30
Payer: MEDICARE

## 2018-08-30 VITALS
SYSTOLIC BLOOD PRESSURE: 149 MMHG | HEIGHT: 66 IN | DIASTOLIC BLOOD PRESSURE: 79 MMHG | WEIGHT: 152.8 LBS | BODY MASS INDEX: 24.56 KG/M2 | HEART RATE: 56 BPM

## 2018-08-30 DIAGNOSIS — M65.4 DE QUERVAIN'S DISEASE (TENOSYNOVITIS): ICD-10-CM

## 2018-08-30 DIAGNOSIS — M25.532 LEFT WRIST PAIN: ICD-10-CM

## 2018-08-30 PROCEDURE — 99203 OFFICE O/P NEW LOW 30 MIN: CPT | Performed by: ORTHOPAEDIC SURGERY

## 2018-08-30 PROCEDURE — 73110 X-RAY EXAM OF WRIST: CPT

## 2018-08-30 PROCEDURE — 20605 DRAIN/INJ JOINT/BURSA W/O US: CPT | Performed by: ORTHOPAEDIC SURGERY

## 2018-08-30 RX ORDER — LIDOCAINE HYDROCHLORIDE 10 MG/ML
0.5 INJECTION, SOLUTION INFILTRATION; PERINEURAL
Status: COMPLETED | OUTPATIENT
Start: 2018-08-30 | End: 2018-08-30

## 2018-08-30 RX ORDER — BETAMETHASONE SODIUM PHOSPHATE AND BETAMETHASONE ACETATE 3; 3 MG/ML; MG/ML
6 INJECTION, SUSPENSION INTRA-ARTICULAR; INTRALESIONAL; INTRAMUSCULAR; SOFT TISSUE
Status: COMPLETED | OUTPATIENT
Start: 2018-08-30 | End: 2018-08-30

## 2018-08-30 RX ADMIN — BETAMETHASONE SODIUM PHOSPHATE AND BETAMETHASONE ACETATE 6 MG: 3; 3 INJECTION, SUSPENSION INTRA-ARTICULAR; INTRALESIONAL; INTRAMUSCULAR; SOFT TISSUE at 10:25

## 2018-08-30 RX ADMIN — LIDOCAINE HYDROCHLORIDE 0.5 ML: 10 INJECTION, SOLUTION INFILTRATION; PERINEURAL at 10:25

## 2018-08-30 NOTE — PROGRESS NOTES
Assessment:     1  De Quervain's disease (tenosynovitis)          Plan:     Problem List Items Addressed This Visit        Musculoskeletal and Integument    De Quervain's disease (tenosynovitis)     Injection offered and given  Discussed diagnosis and treatment  Recommended avoiding aggravating activities as much as possible  F/u prn  Relevant Orders    XR wrist 3+ vw left    Medium joint arthrocentesis           Patient ID: Rakesh Epps is a 70 y o  female  Chief Complaint:  L wrist pain    HPI:  71 y/o F with 4 week Hx of pain in L wrist   RHD  Pain around radial styloid  Aggreivated by certain motions such as doing hair  Started a bathroom remodel 4 weeks ago  Has a brace which helps but can't wear it all the time  Started Meloxicam 1 week ago which helps  Red and swollen and tender  Unable to wear her watch on that wrist   Wanting more relief  Medical intake reviewed          Allergy:  Allergies   Allergen Reactions    Codeine Itching    Medical Tape Rash       Medications:  all current active meds have been reviewed    Past Medical History:  Past Medical History:   Diagnosis Date    Breast lump     last assessed: Jan 22, 2013     Gout     last assessed: Nov 26, 2012     Hair loss     last assessed: Dec 15, 2016     Hypotension     last assessed: Nov 10, 2014        Past Surgical History:  Past Surgical History:   Procedure Laterality Date    CERVICAL DISCECTOMY      Spinal     CERVICAL LAMINECTOMY      C1 with chiari decompression     COLONOSCOPY      5 yrs - onset: May 31, 2011     CRANIOTOMY      POPLITEAL SYNOVIAL CYST EXCISION      TUBAL LIGATION         Family History:  Family History   Problem Relation Age of Onset    Stroke Mother     Other Father         blood clot in vein & CABG     Heart disease Father     Alcohol abuse Brother        Social History:  History   Alcohol Use    Yes     Comment: social      History   Drug Use No     History   Smoking Status    Former Smoker   Smokeless Tobacco    Never Used           ROS:  Review of Systems   Constitutional: Negative  HENT: Negative  Eyes: Negative  Respiratory: Negative  Gastrointestinal: Negative  Endocrine: Negative  Genitourinary: Negative  Musculoskeletal: Positive for arthralgias  Skin: Negative  Allergic/Immunologic: Negative  Neurological: Negative  Hematological: Negative  Psychiatric/Behavioral: Negative  Objective:  BP Readings from Last 1 Encounters:   08/30/18 149/79      Wt Readings from Last 1 Encounters:   08/30/18 69 3 kg (152 lb 12 8 oz)        BMI:   Estimated body mass index is 24 66 kg/m² as calculated from the following:    Height as of this encounter: 5' 6" (1 676 m)  Weight as of this encounter: 69 3 kg (152 lb 12 8 oz)  EXAM:   Physical Exam   Constitutional: She is oriented to person, place, and time  She appears well-developed and well-nourished  No distress  HENT:   Head: Normocephalic and atraumatic  Eyes: Right eye exhibits no discharge  Left eye exhibits no discharge  Neck: Normal range of motion  Neck supple  No tracheal deviation present  Cardiovascular: Normal rate and regular rhythm  Pulmonary/Chest: Effort normal and breath sounds normal  No respiratory distress  She exhibits no tenderness  Abdominal: Soft  She exhibits no distension  There is no tenderness  Neurological: She is alert and oriented to person, place, and time  Skin: Skin is warm and dry  She is not diaphoretic  No erythema  Psychiatric: She has a normal mood and affect  Her behavior is normal    Vitals reviewed  Right Hand Exam   Right hand exam is normal     Tenderness   The patient is experiencing no tenderness  Range of Motion   The patient has normal right wrist ROM  Muscle Strength   The patient has normal right wrist strength      Other   Erythema: absent  Sensation: normal  Pulse: present      Left Hand Exam     Tenderness   The patient is experiencing tenderness in the radial area  Range of Motion   The patient has normal left wrist ROM  Muscle Strength   The patient has normal left wrist strength  Tests   Surekha Lazaro: positive    Other   Erythema: absent  Sensation: normal  Pulse: present    Comments:  Painful nodule over radial styloid              Radiographs:  I have personally reviewed pertinent films in PACS and my interpretation is no bony abnormality  Medium joint arthrocentesis  Date/Time: 8/30/2018 10:25 AM  Consent given by: patient  Timeout: Immediately prior to procedure a time out was called to verify the correct patient, procedure, equipment, support staff and site/side marked as required   Supporting Documentation  Indications: pain   Procedure Details  Location: wrist - Wrist joint: L 1st extensor compartment    Preparation: Patient was prepped and draped in the usual sterile fashion  Needle size: 22 G  Ultrasound guidance: no  Medications administered: 0 5 mL lidocaine 1 %; 6 mg betamethasone acetate-betamethasone sodium phosphate 6 (3-3) mg/mL    Patient tolerance: patient tolerated the procedure well with no immediate complications  Dressing:  Sterile dressing applied

## 2018-08-30 NOTE — ASSESSMENT & PLAN NOTE
Injection offered and given  Discussed diagnosis and treatment  Recommended avoiding aggravating activities as much as possible  F/u prn

## 2018-09-05 DIAGNOSIS — F41.9 ANXIETY: ICD-10-CM

## 2018-09-05 RX ORDER — ALPRAZOLAM 0.25 MG/1
TABLET ORAL
Qty: 30 TABLET | Refills: 2 | Status: SHIPPED | OUTPATIENT
Start: 2018-09-05 | End: 2018-12-30 | Stop reason: SDUPTHER

## 2018-09-06 NOTE — TELEPHONE ENCOUNTER
Please notify pt that I refilled her alprazolam but she has not been seen since Dec 2017 - needs appt before further refills will be given    TY

## 2018-09-13 ENCOUNTER — OFFICE VISIT (OUTPATIENT)
Dept: FAMILY MEDICINE CLINIC | Facility: HOSPITAL | Age: 63
End: 2018-09-13
Payer: MEDICARE

## 2018-09-13 VITALS
HEIGHT: 66 IN | DIASTOLIC BLOOD PRESSURE: 78 MMHG | SYSTOLIC BLOOD PRESSURE: 120 MMHG | HEART RATE: 79 BPM | TEMPERATURE: 98.9 F | WEIGHT: 151 LBS | BODY MASS INDEX: 24.27 KG/M2

## 2018-09-13 DIAGNOSIS — M65.4 DE QUERVAIN'S DISEASE (TENOSYNOVITIS): ICD-10-CM

## 2018-09-13 DIAGNOSIS — I10 ESSENTIAL HYPERTENSION: ICD-10-CM

## 2018-09-13 DIAGNOSIS — K64.2 THIRD DEGREE HEMORRHOIDS: Primary | ICD-10-CM

## 2018-09-13 DIAGNOSIS — R73.9 HYPERGLYCEMIA: ICD-10-CM

## 2018-09-13 DIAGNOSIS — F41.8 DEPRESSION WITH ANXIETY: ICD-10-CM

## 2018-09-13 PROBLEM — L65.9 HAIR LOSS: Status: RESOLVED | Noted: 2018-09-13 | Resolved: 2018-09-13

## 2018-09-13 PROBLEM — N63.0 BREAST LUMP: Status: RESOLVED | Noted: 2018-09-13 | Resolved: 2018-09-13

## 2018-09-13 PROBLEM — K60.2 ANAL FISSURE: Status: ACTIVE | Noted: 2018-02-20

## 2018-09-13 PROBLEM — D12.6 ADENOMATOUS COLON POLYP: Status: ACTIVE | Noted: 2017-06-15

## 2018-09-13 PROBLEM — M10.9 GOUT: Status: RESOLVED | Noted: 2018-09-13 | Resolved: 2018-09-13

## 2018-09-13 PROBLEM — I25.10 CORONARY ARTERY DISEASE: Status: ACTIVE | Noted: 2017-06-15

## 2018-09-13 PROBLEM — H91.21 SUDDEN IDIOPATHIC HEARING LOSS OF RIGHT EAR: Status: ACTIVE | Noted: 2017-07-25

## 2018-09-13 PROCEDURE — 99214 OFFICE O/P EST MOD 30 MIN: CPT | Performed by: INTERNAL MEDICINE

## 2018-09-13 RX ORDER — FLUTICASONE PROPIONATE 50 MCG
SPRAY, SUSPENSION (ML) NASAL
COMMUNITY
Start: 2018-09-11 | End: 2019-12-05 | Stop reason: SDUPTHER

## 2018-09-13 RX ORDER — MELATONIN
1000 DAILY
COMMUNITY

## 2018-09-13 NOTE — ASSESSMENT & PLAN NOTE
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

## 2018-09-13 NOTE — PROGRESS NOTES
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    Depression with anxiety  Mood at goal - con't current meds, Xanax use appropriate qhs, call when meds needed, call with new/worse mood       Diagnoses and all orders for this visit:    Third degree hemorrhoids    De Quervain's disease (tenosynovitis)    Depression with anxiety    Essential hypertension    Hyperglycemia  -     Glucose, fasting  -     Hemoglobin A1C    Other orders  -     fluticasone (FLONASE) 50 mcg/act nasal spray;   -     cholecalciferol (VITAMIN D3) 1,000 units tablet; Take 1,000 Units by mouth daily      Mammo - July 2018    Dexa - July 2018 - Osteopenia    [place] Feb 2017 - 5 yrs    PAP March 2017 - Dr Nicol Barfield    Subjective:      Patient ID: Joshua Hernandez is a 70 y o  female  HPI  Pt here for follow up appt    She has had issues with hemorrhoids recently and has seen colorectal and had a hemorrhoidectomy  She has had improvement in symptoms and was told to f/u prn  She notes intermittent rectal bleeding but no pain  She has been following with Dr Emelyn Harden for Dahlia Fu' tenosynovitis and had joint injection done the end of Aug   She has had benefit with the injection  She was told to f/u prn  She states her depression and anxiety have been "pretty good"  She is on Lexapro daily and uses Xanax every night more to help her relax and sleep  She notes no sedation/fatigue/grogginess the next day  BP at goal today and meds were reviewed and no changes have occurred    She denies missing doses of meds or SE with the meds  She does check her BP outside the office - usually 120's or below  She notes no frequent Ha's/double vision/CP  She has intermittent dizziness if she looks up  She is down 6 lbs from Dec 2017  BW from Dec was reviewed briefly - needs FBS/A1C checked  Has been more active babysitting and 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  Mammo 7/18    Dexa 7/18 - Osteopenia    [place] 2/17 - 5 yrs    PAP 3/17 - Dr Rich Herr    Fasting labs 12/17    Review of Systems   Constitutional: Negative for chills and fever  HENT: Negative for congestion and sore throat  Eyes: Negative for pain and discharge  Respiratory: Negative for cough, shortness of breath and wheezing  Cardiovascular: Negative for chest pain, palpitations and leg swelling  Gastrointestinal: Positive for blood in stool  Negative for abdominal pain, constipation, diarrhea and nausea  Endocrine: Negative for polydipsia and polyuria  Genitourinary: Negative for difficulty urinating and dysuria  Musculoskeletal: Positive for back pain  Negative for neck pain  Skin: Negative for rash and wound  Neurological: Positive for light-headedness  Negative for syncope and headaches  Hematological: Negative for adenopathy  Psychiatric/Behavioral: Negative for behavioral problems, confusion and dysphoric mood  The patient is not nervous/anxious  Objective:    /78   Pulse 79   Temp 98 9 °F (37 2 °C)   Ht 5' 6" (1 676 m)   Wt 68 5 kg (151 lb)   BMI 24 37 kg/m²      Physical Exam   Constitutional: She appears well-developed and well-nourished  No distress  HENT:   Head: Normocephalic and atraumatic  Eyes: Conjunctivae are normal  Right eye exhibits no discharge  Left eye exhibits no discharge  Neck: Neck supple  No tracheal deviation present  Cardiovascular: Normal rate, regular rhythm and normal heart sounds  Exam reveals no friction rub  No murmur heard    Pulmonary/Chest: Effort normal and breath sounds normal  No respiratory distress  She has no wheezes  She has no rales  Abdominal: Soft  She exhibits no distension  There is no tenderness  There is no guarding  Musculoskeletal: She exhibits no edema  Neurological: She is alert  She exhibits normal muscle tone  Skin: Skin is warm  No rash noted  Psychiatric: She has a normal mood and affect  Her behavior is normal    Nursing note and vitals reviewed

## 2018-09-13 NOTE — ASSESSMENT & PLAN NOTE
S/p hemorrhoidectomy with colorectal - still with intermittent bleeding but no pain - call with new/worse symptoms

## 2018-09-13 NOTE — ASSESSMENT & PLAN NOTE
Mood at goal - con't current meds, Xanax use appropriate qhs, call when meds needed, call with new/worse mood

## 2018-11-07 DIAGNOSIS — F32.A DEPRESSION, UNSPECIFIED DEPRESSION TYPE: ICD-10-CM

## 2018-11-07 RX ORDER — ESCITALOPRAM OXALATE 10 MG/1
10 TABLET ORAL DAILY
Qty: 90 TABLET | Refills: 1 | Status: SHIPPED | OUTPATIENT
Start: 2018-11-07 | End: 2018-11-08 | Stop reason: SDUPTHER

## 2018-11-08 DIAGNOSIS — F32.A DEPRESSION, UNSPECIFIED DEPRESSION TYPE: ICD-10-CM

## 2018-11-08 RX ORDER — ESCITALOPRAM OXALATE 20 MG/1
10 TABLET ORAL DAILY
Qty: 30 TABLET | Refills: 3 | Status: SHIPPED | OUTPATIENT
Start: 2018-11-08 | End: 2019-06-24 | Stop reason: SDUPTHER

## 2018-11-14 DIAGNOSIS — Z00.00 HEALTH CARE MAINTENANCE: ICD-10-CM

## 2018-11-14 DIAGNOSIS — G89.4 CHRONIC PAIN SYNDROME: ICD-10-CM

## 2018-11-14 RX ORDER — TIZANIDINE 4 MG/1
TABLET ORAL
Qty: 135 TABLET | Refills: 1 | Status: SHIPPED | OUTPATIENT
Start: 2018-11-14 | End: 2019-09-01 | Stop reason: SDUPTHER

## 2018-11-14 RX ORDER — GABAPENTIN 400 MG/1
CAPSULE ORAL
Qty: 360 CAPSULE | Refills: 0 | Status: SHIPPED | OUTPATIENT
Start: 2018-11-14 | End: 2019-02-24 | Stop reason: SDUPTHER

## 2018-12-30 DIAGNOSIS — F41.9 ANXIETY: ICD-10-CM

## 2018-12-30 RX ORDER — ALPRAZOLAM 0.25 MG/1
TABLET ORAL
Qty: 30 TABLET | Refills: 2 | Status: SHIPPED | OUTPATIENT
Start: 2018-12-30 | End: 2019-06-24 | Stop reason: SDUPTHER

## 2019-01-13 DIAGNOSIS — I20.9 ANGINAL PAIN (HCC): Primary | ICD-10-CM

## 2019-01-13 DIAGNOSIS — E78.5 DYSLIPIDEMIA: ICD-10-CM

## 2019-01-13 RX ORDER — ATORVASTATIN CALCIUM 40 MG/1
TABLET, FILM COATED ORAL
Qty: 90 TABLET | Refills: 1 | Status: SHIPPED | OUTPATIENT
Start: 2019-01-13 | End: 2019-07-12 | Stop reason: SDUPTHER

## 2019-01-14 RX ORDER — ISOSORBIDE MONONITRATE 30 MG/1
TABLET, EXTENDED RELEASE ORAL
Qty: 90 TABLET | Refills: 2 | Status: SHIPPED | OUTPATIENT
Start: 2019-01-14 | End: 2019-10-27 | Stop reason: SDUPTHER

## 2019-02-08 ENCOUNTER — APPOINTMENT (OUTPATIENT)
Dept: LAB | Facility: HOSPITAL | Age: 64
End: 2019-02-08
Payer: MEDICARE

## 2019-02-08 ENCOUNTER — OFFICE VISIT (OUTPATIENT)
Dept: OBGYN CLINIC | Facility: CLINIC | Age: 64
End: 2019-02-08
Payer: MEDICARE

## 2019-02-08 VITALS
SYSTOLIC BLOOD PRESSURE: 120 MMHG | DIASTOLIC BLOOD PRESSURE: 60 MMHG | WEIGHT: 151.2 LBS | BODY MASS INDEX: 24.3 KG/M2 | HEIGHT: 66 IN

## 2019-02-08 DIAGNOSIS — M65.4 DE QUERVAIN'S DISEASE (TENOSYNOVITIS): Primary | ICD-10-CM

## 2019-02-08 LAB
EST. AVERAGE GLUCOSE BLD GHB EST-MCNC: 123 MG/DL
GLUCOSE P FAST SERPL-MCNC: 114 MG/DL (ref 65–99)
HBA1C MFR BLD: 5.9 % (ref 4.2–6.3)

## 2019-02-08 PROCEDURE — 83036 HEMOGLOBIN GLYCOSYLATED A1C: CPT | Performed by: INTERNAL MEDICINE

## 2019-02-08 PROCEDURE — 99213 OFFICE O/P EST LOW 20 MIN: CPT | Performed by: ORTHOPAEDIC SURGERY

## 2019-02-08 PROCEDURE — 82947 ASSAY GLUCOSE BLOOD QUANT: CPT | Performed by: INTERNAL MEDICINE

## 2019-02-08 PROCEDURE — 36415 COLL VENOUS BLD VENIPUNCTURE: CPT | Performed by: INTERNAL MEDICINE

## 2019-02-08 PROCEDURE — 20605 DRAIN/INJ JOINT/BURSA W/O US: CPT | Performed by: ORTHOPAEDIC SURGERY

## 2019-02-08 RX ORDER — LIDOCAINE HYDROCHLORIDE 10 MG/ML
2 INJECTION, SOLUTION INFILTRATION; PERINEURAL
Status: COMPLETED | OUTPATIENT
Start: 2019-02-08 | End: 2019-02-08

## 2019-02-08 RX ORDER — BETAMETHASONE SODIUM PHOSPHATE AND BETAMETHASONE ACETATE 3; 3 MG/ML; MG/ML
6 INJECTION, SUSPENSION INTRA-ARTICULAR; INTRALESIONAL; INTRAMUSCULAR; SOFT TISSUE
Status: COMPLETED | OUTPATIENT
Start: 2019-02-08 | End: 2019-02-08

## 2019-02-08 RX ADMIN — BETAMETHASONE SODIUM PHOSPHATE AND BETAMETHASONE ACETATE 6 MG: 3; 3 INJECTION, SUSPENSION INTRA-ARTICULAR; INTRALESIONAL; INTRAMUSCULAR; SOFT TISSUE at 15:30

## 2019-02-08 RX ADMIN — LIDOCAINE HYDROCHLORIDE 2 ML: 10 INJECTION, SOLUTION INFILTRATION; PERINEURAL at 15:30

## 2019-02-08 NOTE — ASSESSMENT & PLAN NOTE
23-year-old female with left de Quervain tenosynovitis  She was given an injection today  This was her 2nd injection  She understands icing, anti-inflammatories, and brace wear  She will follow up the future as needed  She understands the next step would be surgical release

## 2019-02-08 NOTE — PROGRESS NOTES
Assessment:     1  De Quervain's disease (tenosynovitis)          Plan:     Problem List Items Addressed This Visit        Musculoskeletal and Integument    De Quervain's disease (tenosynovitis) - Primary     58-year-old female with left de Quervain tenosynovitis  She was given an injection today  This was her 2nd injection  She understands icing, anti-inflammatories, and brace wear  She will follow up the future as needed  She understands the next step would be surgical release  Patient ID: Deondre Simmons is a 70 y o  female  Chief Complaint:  F/u L wrist    Subjective:  58-year-old female with left wrist de Quervain tenosynovitis  Approximately five months ago she was given a cortisone injection  She had about three months of significant pain relief following the injection  She overall is almost worse than she was before her 1st visit with me  She does care for her grandson who was just a little over a year old  She has difficulty wearing her brace when she walks his him because the brace does cause him pain when she picks him up with that on  She tries to wear it as much as possible otherwise  She is interested in another injection today  She has only had one  Allergy:  Allergies   Allergen Reactions    Codeine Itching    Medical Tape Rash       Medications:  all current active meds have been reviewed    ROS:  Review of Systems   Musculoskeletal: Positive for arthralgias  All other systems reviewed and are negative  Objective:  BP Readings from Last 1 Encounters:   02/08/19 120/60      Wt Readings from Last 1 Encounters:   02/08/19 68 6 kg (151 lb 3 2 oz)        Exam:   Physical Exam  Left Hand Exam     Tests   Finkelstein: positive    Comments:  Swelling and tenderness over the radiostyloid with a mobile mass  No other tenderness or swelling throughout  Sensation light touch intact throughout  No wrist instability              Medium joint arthrocentesis  Date/Time: 2/8/2019 3:30 PM  Consent given by: patient  Timeout: Immediately prior to procedure a time out was called to verify the correct patient, procedure, equipment, support staff and site/side marked as required   Procedure Details  Location: wrist - Wrist joint: 1st extensor compartment- L    Preparation: Patient was prepped and draped in the usual sterile fashion  Needle size: 25 G  Approach: volar  Medications administered: 2 mL lidocaine 1 %; 6 mg betamethasone acetate-betamethasone sodium phosphate 6 (3-3) mg/mL    Patient tolerance: patient tolerated the procedure well with no immediate complications  Dressing:  Sterile dressing applied

## 2019-02-09 DIAGNOSIS — M25.532 LEFT WRIST PAIN: ICD-10-CM

## 2019-02-11 DIAGNOSIS — E87.6 HYPOKALEMIA: ICD-10-CM

## 2019-02-11 RX ORDER — MELOXICAM 15 MG/1
TABLET ORAL
Qty: 30 TABLET | Refills: 0 | Status: SHIPPED | OUTPATIENT
Start: 2019-02-11 | End: 2019-08-29

## 2019-02-11 RX ORDER — POTASSIUM CHLORIDE 750 MG/1
CAPSULE, EXTENDED RELEASE ORAL
Qty: 30 CAPSULE | Refills: 6 | Status: SHIPPED | OUTPATIENT
Start: 2019-02-11 | End: 2019-08-29 | Stop reason: SDUPTHER

## 2019-02-11 NOTE — TELEPHONE ENCOUNTER
Pt has been in to see Dr Rose Jamil for injections in her wrist and has been taking the Meloxicam on an as needed basis   Not sure if you want to refill it at this point or defer to Dr Rose Jamil

## 2019-02-11 NOTE — TELEPHONE ENCOUNTER
I will refill this time but if the need continues for wrist pain while she is under Dr Saumya Nava care, I would request future refills from her

## 2019-02-11 NOTE — TELEPHONE ENCOUNTER
Please call patient and see why she needs this refill of meloxicam? I rx'd it in August for wrist pain

## 2019-02-24 DIAGNOSIS — G89.4 CHRONIC PAIN SYNDROME: ICD-10-CM

## 2019-02-24 RX ORDER — GABAPENTIN 400 MG/1
CAPSULE ORAL
Qty: 360 CAPSULE | Refills: 0 | Status: SHIPPED | OUTPATIENT
Start: 2019-02-24 | End: 2019-06-02 | Stop reason: SDUPTHER

## 2019-03-07 DIAGNOSIS — G89.4 CHRONIC PAIN SYNDROME: ICD-10-CM

## 2019-03-07 RX ORDER — GABAPENTIN 400 MG/1
CAPSULE ORAL
Qty: 360 CAPSULE | Refills: 0 | Status: CANCELLED | OUTPATIENT
Start: 2019-03-07

## 2019-03-10 DIAGNOSIS — I10 ESSENTIAL HYPERTENSION: Primary | ICD-10-CM

## 2019-03-10 RX ORDER — HYDROCHLOROTHIAZIDE 25 MG/1
TABLET ORAL
Qty: 90 TABLET | Refills: 1 | Status: SHIPPED | OUTPATIENT
Start: 2019-03-10 | End: 2019-08-29 | Stop reason: SDUPTHER

## 2019-03-14 ENCOUNTER — OFFICE VISIT (OUTPATIENT)
Dept: FAMILY MEDICINE CLINIC | Facility: HOSPITAL | Age: 64
End: 2019-03-14
Payer: MEDICARE

## 2019-03-14 ENCOUNTER — APPOINTMENT (OUTPATIENT)
Dept: LAB | Facility: HOSPITAL | Age: 64
End: 2019-03-14
Payer: MEDICARE

## 2019-03-14 VITALS
HEIGHT: 66 IN | TEMPERATURE: 98 F | SYSTOLIC BLOOD PRESSURE: 162 MMHG | DIASTOLIC BLOOD PRESSURE: 82 MMHG | BODY MASS INDEX: 24.27 KG/M2 | HEART RATE: 66 BPM | WEIGHT: 151 LBS

## 2019-03-14 DIAGNOSIS — E78.5 DYSLIPIDEMIA: ICD-10-CM

## 2019-03-14 DIAGNOSIS — R10.13 EPIGASTRIC ABDOMINAL PAIN: ICD-10-CM

## 2019-03-14 DIAGNOSIS — R63.4 WEIGHT LOSS: ICD-10-CM

## 2019-03-14 DIAGNOSIS — I25.10 CORONARY ARTERY DISEASE INVOLVING NATIVE CORONARY ARTERY OF NATIVE HEART WITHOUT ANGINA PECTORIS: ICD-10-CM

## 2019-03-14 DIAGNOSIS — I10 ESSENTIAL HYPERTENSION: ICD-10-CM

## 2019-03-14 DIAGNOSIS — K21.00 ESOPHAGITIS, REFLUX: ICD-10-CM

## 2019-03-14 DIAGNOSIS — R73.01 IFG (IMPAIRED FASTING GLUCOSE): Primary | ICD-10-CM

## 2019-03-14 DIAGNOSIS — R73.01 IFG (IMPAIRED FASTING GLUCOSE): ICD-10-CM

## 2019-03-14 PROBLEM — R73.9 HYPERGLYCEMIA: Status: RESOLVED | Noted: 2017-12-19 | Resolved: 2019-03-14

## 2019-03-14 LAB
ANION GAP SERPL CALCULATED.3IONS-SCNC: 7 MMOL/L (ref 4–13)
BUN SERPL-MCNC: 21 MG/DL (ref 5–25)
CALCIUM SERPL-MCNC: 9 MG/DL (ref 8.3–10.1)
CHLORIDE SERPL-SCNC: 107 MMOL/L (ref 100–108)
CO2 SERPL-SCNC: 29 MMOL/L (ref 21–32)
CREAT SERPL-MCNC: 1.21 MG/DL (ref 0.6–1.3)
GFR SERPL CREATININE-BSD FRML MDRD: 47 ML/MIN/1.73SQ M
GLUCOSE SERPL-MCNC: 89 MG/DL (ref 65–140)
POTASSIUM SERPL-SCNC: 4.1 MMOL/L (ref 3.5–5.3)
SODIUM SERPL-SCNC: 143 MMOL/L (ref 136–145)

## 2019-03-14 PROCEDURE — 99214 OFFICE O/P EST MOD 30 MIN: CPT | Performed by: INTERNAL MEDICINE

## 2019-03-14 PROCEDURE — 36415 COLL VENOUS BLD VENIPUNCTURE: CPT

## 2019-03-14 PROCEDURE — 80048 BASIC METABOLIC PNL TOTAL CA: CPT

## 2019-03-14 PROCEDURE — 93000 ELECTROCARDIOGRAM COMPLETE: CPT | Performed by: INTERNAL MEDICINE

## 2019-03-14 RX ORDER — PANTOPRAZOLE SODIUM 40 MG/1
40 TABLET, DELAYED RELEASE ORAL 2 TIMES DAILY
Qty: 60 TABLET | Refills: 2 | Status: SHIPPED | OUTPATIENT
Start: 2019-03-14 | End: 2019-12-05

## 2019-03-14 NOTE — ASSESSMENT & PLAN NOTE
Bp very elevated today on presentation but pt reports very low readings at home - will con't current regimen and re-check in 4 wks - con't checking BP at home and bring home cuff and readings to next appt, call if home readings consistently > 140/90

## 2019-03-14 NOTE — ASSESSMENT & PLAN NOTE
Due for complete BW in the summer - order given, con't current meds and f/u as per Cardio, ECG w/o ischemic changes today

## 2019-03-14 NOTE — ASSESSMENT & PLAN NOTE
Has some typical GERD symptoms - advised to avoid triggers and stay upright after eating/drinking, con't current Ranitidine, increase Protonix to bid, may need GI/EGD if no better - re-eval in 4 wks, call with new/worse symtpoms

## 2019-03-14 NOTE — PROGRESS NOTES
Assessment/Plan:    IFG (impaired fasting glucose)  Encouraged low sugar/carb diet and start regular exercise, recheck BW in 6 mo - order given    Essential hypertension  Bp very elevated today on presentation but pt reports very low readings at home - will con't current regimen and re-check in 4 wks - con't checking BP at home and bring home cuff and readings to next appt, call if home readings consistently > 140/90    Esophagitis, reflux  Has some typical GERD symptoms - advised to avoid triggers and stay upright after eating/drinking, con't current Ranitidine, increase Protonix to bid, may need GI/EGD if no better - re-eval in 4 wks, call with new/worse symtpoms    Coronary artery disease  Due for complete BW in the summer - order given, con't current meds and f/u as per Cardio, ECG w/o ischemic changes today    Dyslipidemia  Order for FLP given, con't current statin for now       Diagnoses and all orders for this visit:    IFG (impaired fasting glucose)  -     CBC and differential; Future  -     Comprehensive metabolic panel; Future  -     Hemoglobin A1C; Future  -     Lipid panel; Future  -     TSH, 3rd generation with Free T4 reflex; Future    Essential hypertension  -     POCT ECG  -     CBC and differential; Future  -     Comprehensive metabolic panel; Future  -     Hemoglobin A1C; Future  -     Lipid panel; Future  -     TSH, 3rd generation with Free T4 reflex; Future    Epigastric abdominal pain  Comments:  Mild tenderness epigastric region otherwise nml exam - start with CT A/P and increase in PPI to bid, con't H2 blocker q day, re-eval in 4 wks, call with new/worse symptoms/F/C/blood in stool, may need GI/EGD if not better  Orders:  -     CT abdomen pelvis w contrast; Future  -     Basic metabolic panel; Future  -     pantoprazole (PROTONIX) 40 mg tablet; Take 1 tablet (40 mg total) by mouth 2 (two) times a day  -     CBC and differential; Future  -     Comprehensive metabolic panel;  Future  - Hemoglobin A1C; Future  -     Lipid panel; Future  -     TSH, 3rd generation with Free T4 reflex; Future    Weight loss  Comments:  Pt states wgt loss was intentional with dietary changes although the past few days she notes dec appetite with her abd discomfort - check CT A/p w/contrast, con't H2 blocker and increase PPI, call with  New/worse symptoms, re-eval in 4 wks  Orders:  -     CT abdomen pelvis w contrast; Future  -     Basic metabolic panel; Future  -     pantoprazole (PROTONIX) 40 mg tablet; Take 1 tablet (40 mg total) by mouth 2 (two) times a day  -     CBC and differential; Future  -     Comprehensive metabolic panel; Future  -     Hemoglobin A1C; Future  -     Lipid panel; Future  -     TSH, 3rd generation with Free T4 reflex; Future    Esophagitis, reflux  -     pantoprazole (PROTONIX) 40 mg tablet; Take 1 tablet (40 mg total) by mouth 2 (two) times a day  -     CBC and differential; Future  -     Comprehensive metabolic panel; Future  -     Hemoglobin A1C; Future  -     Lipid panel; Future  -     TSH, 3rd generation with Free T4 reflex; Future    Coronary artery disease involving native coronary artery of native heart without angina pectoris  -     CBC and differential; Future  -     Comprehensive metabolic panel; Future  -     Lipid panel; Future  -     TSH, 3rd generation with Free T4 reflex; Future    Dyslipidemia  -     CBC and differential; Future  -     Comprehensive metabolic panel; Future  -     Lipid panel; Future      Shields 2/17 - 5 yrs    Mammo 7/18    Dexa 7/18 - osteopenia    PAP 3/17 - Dr Sofia Mckeon    Subjective:      Patient ID: Micheal Harris is a 70 y o  female  HPI Pt here for follow up appt and BW results    BW results were d/w pt in detail: FBS/A1C 114/5 9  Def of nml vs IFG vs DM was d/w pt in detail  Diet/exercise was reviewed - wgt down 14 lbs  She is eating more salads and has cut back on carbs  She is doing no formal exercise but is active caring for her grandson       BP above goal today and meds were reviewed and no changes have occurred  She denies missing doses of meds or SE with the meds  She does check her BP outside the office - usually 120's/60's  She notes no frequent HA's/dizziness/double vision/CP  Pt has been noting "pressure and warm" sensation in her upper abd x "couple weeks"  She notes no new meds/change in diet/recent travel/abx use  She notes the pain is the central abd region and does not radiate  The pain is present with carrying her grandson up the steps and worse with eating certain foods like red sauces  She notes no no CP/palp  She has some SOB when she carries her grandson up the steps  She notes no LE edema/orthopnea/PND  She notes no vomiting  She has noted decrease in appetite and has had 14 lb wgt loss but that was intentional   She notes some loose stools - first thing in the am - will have BM x 4 episodes in the am and then it will resolve  She has the sensation of having to have a BM later in the night but "it will then take a little while to come out"  She notes no blood in the stool/dark stools  She is on Protonix q day and Ranitidine q day  Review of Systems   Constitutional: Negative for chills, fatigue, fever and unexpected weight change  HENT: Negative for congestion and sore throat  Eyes: Negative for pain and visual disturbance  Respiratory: Positive for shortness of breath  Negative for cough and wheezing  Cardiovascular: Negative for chest pain, palpitations and leg swelling  Gastrointestinal: Positive for abdominal pain and vomiting  Negative for blood in stool, constipation, diarrhea and nausea  Endocrine: Negative for polydipsia and polyuria  Genitourinary: Negative for difficulty urinating, dysuria, vaginal bleeding, vaginal discharge and vaginal pain  Musculoskeletal: Positive for neck pain  Negative for back pain  Skin: Positive for rash  Negative for wound     Neurological: Negative for dizziness, syncope, light-headedness and headaches  Hematological: Negative for adenopathy  Psychiatric/Behavioral: Negative for behavioral problems and confusion  Objective:    /82   Pulse 66   Temp 98 °F (36 7 °C)   Ht 5' 6" (1 676 m)   Wt 62 1 kg (137 lb)   BMI 22 11 kg/m²      Physical Exam   Constitutional: She appears well-developed and well-nourished  No distress  HENT:   Head: Normocephalic and atraumatic  Eyes: Conjunctivae are normal  Right eye exhibits no discharge  Left eye exhibits no discharge  Neck: Neck supple  No tracheal deviation present  Cardiovascular: Normal rate, regular rhythm and normal heart sounds  Exam reveals no friction rub  No murmur heard  Pulmonary/Chest: Effort normal and breath sounds normal  No respiratory distress  She has no wheezes  She has no rales  Abdominal: Soft  She exhibits no distension  There is no rebound and no guarding    + epigastric tenderness with deep palp   Musculoskeletal: She exhibits no edema  Lymphadenopathy:     She has no cervical adenopathy  Neurological: She is alert  She exhibits normal muscle tone  Skin: Skin is warm  No rash noted  Psychiatric: She has a normal mood and affect  Her behavior is normal    Nursing note and vitals reviewed

## 2019-03-28 ENCOUNTER — OFFICE VISIT (OUTPATIENT)
Dept: CARDIOLOGY CLINIC | Facility: CLINIC | Age: 64
End: 2019-03-28
Payer: MEDICARE

## 2019-03-28 VITALS
HEART RATE: 60 BPM | SYSTOLIC BLOOD PRESSURE: 135 MMHG | HEIGHT: 66 IN | RESPIRATION RATE: 18 BRPM | BODY MASS INDEX: 24.27 KG/M2 | DIASTOLIC BLOOD PRESSURE: 80 MMHG | WEIGHT: 151 LBS

## 2019-03-28 DIAGNOSIS — E78.5 DYSLIPIDEMIA: ICD-10-CM

## 2019-03-28 DIAGNOSIS — I10 ESSENTIAL HYPERTENSION: ICD-10-CM

## 2019-03-28 DIAGNOSIS — I25.10 CORONARY ARTERY DISEASE INVOLVING NATIVE CORONARY ARTERY OF NATIVE HEART WITHOUT ANGINA PECTORIS: Primary | ICD-10-CM

## 2019-03-28 PROCEDURE — 99214 OFFICE O/P EST MOD 30 MIN: CPT | Performed by: INTERNAL MEDICINE

## 2019-03-28 NOTE — PROGRESS NOTES
Assessment/Plan:    Coronary artery disease  Coronary artery disease without any symptoms of angina however the patient is experiencing dyspnea on exertion  She has a history of myocardial infarction in the past   I will be there for arranging for a nuclear stress test and an echocardiogram     Essential hypertension  Hypertension, stable  We will continue present regimen of medication  Dyslipidemia  Hyperlipidemia, stable  We will continue atorvastatin  Diagnoses and all orders for this visit:    Coronary artery disease involving native coronary artery of native heart without angina pectoris  -     Echo complete with contrast if indicated; Future  -     NM myocardial perfusion spect (stress and/or rest); Future    Dyslipidemia    Essential hypertension          Subjective:  Feels fairly well  Patient ID: Los Flores is a 70 y o  female  The patient presented to this office for the purpose of cardiac follow-up  She has a history of myocardial infarction and coronary artery disease as well as hypertension and hyperlipidemia  She has an element of anxiety disorder  Over the past few weeks and months the patient has been complaining of dyspnea on exertion  This however is not associated with any chest pain  No orthopnea or paroxysmal nocturnal dyspnea  She described no leg edema  The following portions of the patient's history were reviewed and updated as appropriate: allergies, current medications, past family history, past medical history, past social history, past surgical history and problem list     Review of Systems   Respiratory: Negative for apnea, cough, chest tightness, shortness of breath and wheezing  Cardiovascular: Positive for chest pain  Negative for palpitations and leg swelling  Gastrointestinal: Negative for abdominal pain  Neurological: Negative for dizziness and light-headedness           Objective:  Stable cardiac-wise but for some dyspnea on exertion  /80 (BP Location: Right arm, Patient Position: Sitting)   Pulse 60   Resp 18   Ht 5' 6" (1 676 m)   Wt 68 5 kg (151 lb)   BMI 24 37 kg/m²          Physical Exam   Constitutional: She is oriented to person, place, and time  She appears well-developed and well-nourished  No distress  HENT:   Head: Normocephalic and atraumatic  Neck: Normal range of motion  Neck supple  No JVD present  No thyromegaly present  Cardiovascular: Normal rate, regular rhythm, S1 normal and S2 normal  Exam reveals no gallop and no friction rub  No murmur heard  Pulmonary/Chest: Effort normal  No respiratory distress  She has no wheezes  She has no rales  She exhibits no tenderness  Abdominal: Soft  Musculoskeletal: She exhibits no edema  Neurological: She is alert and oriented to person, place, and time  Skin: Skin is warm and dry  She is not diaphoretic  Psychiatric: She has a normal mood and affect  Vitals reviewed

## 2019-03-28 NOTE — LETTER
March 28, 2019     Jordan Gillis, [address]  [address] [facility]  [address] [address] [address]    Patient: Vlad Bledsoe   YOB: 1955   Date of Visit: 3/28/2019       Dear Dr Addy Multani: Thank you for referring Robbin Edwards to me for evaluation  Below are my notes for this consultation  If you have questions, please do not hesitate to call me  I look forward to following your patient along with you  Sincerely,        Toya Martino MD        CC: No Recipients  Toya Martino MD  3/28/2019  3:10 PM  Sign at close encounter  Assessment/Plan:    Coronary artery disease  Coronary artery disease without any symptoms of angina however the patient is experiencing dyspnea on exertion  She has a history of myocardial infarction in the past   I will be there for arranging for a nuclear stress test and an echocardiogram     Essential hypertension  Hypertension, stable  We will continue present regimen of medication  Dyslipidemia  Hyperlipidemia, stable  We will continue atorvastatin  Diagnoses and all orders for this visit:    Coronary artery disease involving native coronary artery of native heart without angina pectoris  -     Echo complete with contrast if indicated; Future  -     NM myocardial perfusion spect (stress and/or rest); Future    Dyslipidemia    Essential hypertension          Subjective:  Feels fairly well  Patient ID: Vlad Bledsoe is a 70 y o  female  The patient presented to this office for the purpose of cardiac follow-up  She has a history of myocardial infarction and coronary artery disease as well as hypertension and hyperlipidemia  She has an element of anxiety disorder  Over the past few weeks and months the patient has been complaining of dyspnea on exertion  This however is not associated with any chest pain  No orthopnea or paroxysmal nocturnal dyspnea  She described no leg edema        The following portions of the patient's history were reviewed and updated as appropriate: allergies, current medications, past family history, past medical history, past social history, past surgical history and problem list     Review of Systems   Respiratory: Negative for apnea, cough, chest tightness, shortness of breath and wheezing  Cardiovascular: Positive for chest pain  Negative for palpitations and leg swelling  Gastrointestinal: Negative for abdominal pain  Neurological: Negative for dizziness and light-headedness  Objective:  Stable cardiac-wise but for some dyspnea on exertion  /80 (BP Location: Right arm, Patient Position: Sitting)   Pulse 60   Resp 18   Ht 5' 6" (1 676 m)   Wt 68 5 kg (151 lb)   BMI 24 37 kg/m²           Physical Exam   Constitutional: She is oriented to person, place, and time  She appears well-developed and well-nourished  No distress  HENT:   Head: Normocephalic and atraumatic  Neck: Normal range of motion  Neck supple  No JVD present  No thyromegaly present  Cardiovascular: Normal rate, regular rhythm, S1 normal and S2 normal  Exam reveals no gallop and no friction rub  No murmur heard  Pulmonary/Chest: Effort normal  No respiratory distress  She has no wheezes  She has no rales  She exhibits no tenderness  Abdominal: Soft  Musculoskeletal: She exhibits no edema  Neurological: She is alert and oriented to person, place, and time  Skin: Skin is warm and dry  She is not diaphoretic  Psychiatric: She has a normal mood and affect  Vitals reviewed

## 2019-03-28 NOTE — ASSESSMENT & PLAN NOTE
Coronary artery disease without any symptoms of angina however the patient is experiencing dyspnea on exertion    She has a history of myocardial infarction in the past   I will be there for arranging for a nuclear stress test and an echocardiogram

## 2019-03-28 NOTE — PATIENT INSTRUCTIONS
Patient will be scheduled for an exercise nuclear stress test as well as an echocardiogram   We will continue the same regimen of medications

## 2019-04-04 ENCOUNTER — HOSPITAL ENCOUNTER (OUTPATIENT)
Dept: CT IMAGING | Facility: HOSPITAL | Age: 64
Discharge: HOME | End: 2019-04-04
Payer: MEDICARE

## 2019-04-04 DIAGNOSIS — R10.13 EPIGASTRIC ABDOMINAL PAIN: ICD-10-CM

## 2019-04-04 DIAGNOSIS — R63.4 WEIGHT LOSS: ICD-10-CM

## 2019-04-04 PROCEDURE — 74177 CT ABD & PELVIS W/CONTRAST: CPT

## 2019-04-04 RX ADMIN — IOHEXOL 100 ML: 350 INJECTION, SOLUTION INTRAVENOUS at 16:35

## 2019-04-18 ENCOUNTER — OFFICE VISIT (OUTPATIENT)
Dept: FAMILY MEDICINE CLINIC | Facility: HOSPITAL | Age: 64
End: 2019-04-18
Payer: MEDICARE

## 2019-04-18 VITALS
WEIGHT: 149 LBS | BODY MASS INDEX: 24.83 KG/M2 | HEART RATE: 73 BPM | HEIGHT: 65 IN | TEMPERATURE: 98.2 F | DIASTOLIC BLOOD PRESSURE: 72 MMHG | SYSTOLIC BLOOD PRESSURE: 122 MMHG

## 2019-04-18 DIAGNOSIS — R06.00 DOE (DYSPNEA ON EXERTION): ICD-10-CM

## 2019-04-18 DIAGNOSIS — I10 ESSENTIAL HYPERTENSION: Primary | ICD-10-CM

## 2019-04-18 DIAGNOSIS — K21.00 ESOPHAGITIS, REFLUX: ICD-10-CM

## 2019-04-18 DIAGNOSIS — K86.2 PANCREATIC CYST: ICD-10-CM

## 2019-04-18 PROCEDURE — 99214 OFFICE O/P EST MOD 30 MIN: CPT | Performed by: INTERNAL MEDICINE

## 2019-05-15 ENCOUNTER — HOSPITAL ENCOUNTER (OUTPATIENT)
Dept: NON INVASIVE DIAGNOSTICS | Facility: CLINIC | Age: 64
Discharge: HOME | End: 2019-05-15
Payer: MEDICARE

## 2019-05-15 DIAGNOSIS — I25.10 CORONARY ARTERY DISEASE INVOLVING NATIVE CORONARY ARTERY OF NATIVE HEART WITHOUT ANGINA PECTORIS: ICD-10-CM

## 2019-05-15 LAB
CHEST PAIN STATEMENT: NORMAL
MAX DIASTOLIC BP: 90 MMHG
MAX HEART RATE: 127 BPM
MAX PREDICTED HEART RATE: 154 BPM
MAX. SYSTOLIC BP: 190 MMHG
PROTOCOL NAME: NORMAL
REASON FOR TERMINATION: NORMAL
TARGET HR FORMULA: NORMAL
TEST INDICATION: NORMAL
TIME IN EXERCISE PHASE: NORMAL

## 2019-05-15 PROCEDURE — 93306 TTE W/DOPPLER COMPLETE: CPT

## 2019-05-15 PROCEDURE — A9502 TC99M TETROFOSMIN: HCPCS

## 2019-05-15 PROCEDURE — 93018 CV STRESS TEST I&R ONLY: CPT | Performed by: INTERNAL MEDICINE

## 2019-05-15 PROCEDURE — 78452 HT MUSCLE IMAGE SPECT MULT: CPT

## 2019-05-15 PROCEDURE — 93306 TTE W/DOPPLER COMPLETE: CPT | Performed by: INTERNAL MEDICINE

## 2019-05-15 PROCEDURE — 93017 CV STRESS TEST TRACING ONLY: CPT

## 2019-05-15 PROCEDURE — 78452 HT MUSCLE IMAGE SPECT MULT: CPT | Performed by: INTERNAL MEDICINE

## 2019-05-15 PROCEDURE — 93016 CV STRESS TEST SUPVJ ONLY: CPT | Performed by: INTERNAL MEDICINE

## 2019-06-02 DIAGNOSIS — I10 ESSENTIAL HYPERTENSION: Primary | ICD-10-CM

## 2019-06-02 DIAGNOSIS — G89.4 CHRONIC PAIN SYNDROME: ICD-10-CM

## 2019-06-02 RX ORDER — LOSARTAN POTASSIUM 100 MG/1
TABLET ORAL
Qty: 90 TABLET | Refills: 1 | Status: SHIPPED | OUTPATIENT
Start: 2019-06-02 | End: 2019-11-24 | Stop reason: SDUPTHER

## 2019-06-02 RX ORDER — GABAPENTIN 400 MG/1
CAPSULE ORAL
Qty: 360 CAPSULE | Refills: 0 | Status: SHIPPED | OUTPATIENT
Start: 2019-06-02 | End: 2019-09-01 | Stop reason: SDUPTHER

## 2019-06-24 DIAGNOSIS — F32.A DEPRESSION, UNSPECIFIED DEPRESSION TYPE: ICD-10-CM

## 2019-06-24 DIAGNOSIS — I25.10 CORONARY ARTERY DISEASE INVOLVING NATIVE CORONARY ARTERY OF NATIVE HEART WITHOUT ANGINA PECTORIS: Primary | ICD-10-CM

## 2019-06-24 DIAGNOSIS — F41.9 ANXIETY: ICD-10-CM

## 2019-06-25 RX ORDER — ALPRAZOLAM 0.25 MG/1
TABLET ORAL
Qty: 30 TABLET | Refills: 2 | Status: SHIPPED | OUTPATIENT
Start: 2019-06-25 | End: 2019-12-22 | Stop reason: SDUPTHER

## 2019-06-25 RX ORDER — ESCITALOPRAM OXALATE 20 MG/1
TABLET ORAL
Qty: 30 TABLET | Refills: 5 | Status: SHIPPED | OUTPATIENT
Start: 2019-06-25 | End: 2020-06-07

## 2019-06-25 RX ORDER — CLOPIDOGREL BISULFATE 75 MG/1
TABLET ORAL
Qty: 90 TABLET | Refills: 3 | Status: SHIPPED | OUTPATIENT
Start: 2019-06-25 | End: 2020-06-08

## 2019-07-07 DIAGNOSIS — I25.10 CORONARY ARTERY DISEASE INVOLVING NATIVE CORONARY ARTERY OF NATIVE HEART WITHOUT ANGINA PECTORIS: Primary | ICD-10-CM

## 2019-07-07 DIAGNOSIS — E78.5 DYSLIPIDEMIA: ICD-10-CM

## 2019-07-08 RX ORDER — CARVEDILOL 6.25 MG/1
TABLET ORAL
Qty: 180 TABLET | Refills: 3 | Status: SHIPPED | OUTPATIENT
Start: 2019-07-08 | End: 2020-07-08

## 2019-07-12 DIAGNOSIS — E78.5 DYSLIPIDEMIA: ICD-10-CM

## 2019-07-12 DIAGNOSIS — F41.9 ANXIETY: ICD-10-CM

## 2019-07-12 RX ORDER — ATORVASTATIN CALCIUM 40 MG/1
40 TABLET, FILM COATED ORAL DAILY
Qty: 90 TABLET | Refills: 1 | Status: SHIPPED | OUTPATIENT
Start: 2019-07-12 | End: 2019-07-18

## 2019-07-14 RX ORDER — ATORVASTATIN CALCIUM 40 MG/1
TABLET, FILM COATED ORAL
Qty: 90 TABLET | Refills: 1 | Status: SHIPPED | OUTPATIENT
Start: 2019-07-14 | End: 2020-08-02

## 2019-07-17 ENCOUNTER — HOSPITAL ENCOUNTER (OUTPATIENT)
Dept: PULMONOLOGY | Facility: HOSPITAL | Age: 64
Discharge: HOME | End: 2019-07-17
Payer: MEDICARE

## 2019-07-17 DIAGNOSIS — R06.00 DOE (DYSPNEA ON EXERTION): ICD-10-CM

## 2019-07-17 PROCEDURE — 94060 EVALUATION OF WHEEZING: CPT | Performed by: INTERNAL MEDICINE

## 2019-07-17 PROCEDURE — 94060 EVALUATION OF WHEEZING: CPT

## 2019-07-17 PROCEDURE — 94760 N-INVAS EAR/PLS OXIMETRY 1: CPT

## 2019-07-17 RX ORDER — ALBUTEROL SULFATE 2.5 MG/3ML
SOLUTION RESPIRATORY (INHALATION)
Status: COMPLETED
Start: 2019-07-17 | End: 2019-07-17

## 2019-07-17 RX ORDER — ALBUTEROL SULFATE 2.5 MG/3ML
2.5 SOLUTION RESPIRATORY (INHALATION) ONCE
Status: COMPLETED | OUTPATIENT
Start: 2019-07-17 | End: 2019-07-17

## 2019-07-17 RX ADMIN — ALBUTEROL SULFATE 2.5 MG: 2.5 SOLUTION RESPIRATORY (INHALATION) at 12:26

## 2019-07-18 ENCOUNTER — OFFICE VISIT (OUTPATIENT)
Dept: FAMILY MEDICINE CLINIC | Facility: HOSPITAL | Age: 64
End: 2019-07-18
Payer: MEDICARE

## 2019-07-18 VITALS
DIASTOLIC BLOOD PRESSURE: 78 MMHG | BODY MASS INDEX: 25.33 KG/M2 | SYSTOLIC BLOOD PRESSURE: 138 MMHG | HEIGHT: 65 IN | HEART RATE: 66 BPM | TEMPERATURE: 97.9 F | WEIGHT: 152 LBS

## 2019-07-18 DIAGNOSIS — Z12.39 SCREENING FOR MALIGNANT NEOPLASM OF BREAST: Primary | ICD-10-CM

## 2019-07-18 DIAGNOSIS — R20.2 PARESTHESIA OF ARM: ICD-10-CM

## 2019-07-18 DIAGNOSIS — J43.8 OTHER EMPHYSEMA (HCC): ICD-10-CM

## 2019-07-18 DIAGNOSIS — I51.81 TAKOTSUBO SYNDROME: ICD-10-CM

## 2019-07-18 DIAGNOSIS — I10 ESSENTIAL HYPERTENSION: ICD-10-CM

## 2019-07-18 DIAGNOSIS — K21.00 ESOPHAGITIS, REFLUX: ICD-10-CM

## 2019-07-18 PROCEDURE — 99214 OFFICE O/P EST MOD 30 MIN: CPT | Performed by: INTERNAL MEDICINE

## 2019-07-18 NOTE — ASSESSMENT & PLAN NOTE
Recent PFT's c/w moderate obstruction, pt with minimal symptoms, quit smoking > 11 yrs ago, con't to monitor off meds - call with new/worse symptoms

## 2019-07-18 NOTE — ASSESSMENT & PLAN NOTE
No current CV symptoms although BP a bit elevated today - has f/u with Cardio next month, on Plavix/Imdur/Coreg/Lipitor/Cozaar - con't meds and f/u as per Cardio, check BP at home and call if consistently > 140/90

## 2019-07-18 NOTE — PROGRESS NOTES
Assessment/Plan:    Other emphysema ([address] )  Recent PFT's c/w moderate obstruction, pt with minimal symptoms, quit smoking > 11 yrs ago, con't to monitor off meds - call with new/worse symptoms    Essential hypertension  BP slightly elevated on presentation and much more elevated on recheck at end of visit - first time very elevated - pt notes no symptoms - had BP cuff at home - will con't current regimen for now and start checking BP at home - bring home numbers with cuff to next appt and if still > 139/89 will increase BP meds, urged to call if Bp consistently > 140/90 at home, has appt with Dr Mitchell Connors in a few weeks as well    Takotsubo syndrome  No current CV symptoms although BP a bit elevated today - has f/u with Cardio next month, on Plavix/Imdur/Coreg/Lipitor/Cozaar - con't meds and f/u as per Cardio, check BP at home and call if consistently > 140/90    Esophagitis, reflux  Symptoms well controlled on Protonix 40 mg 1 tab daily - SE of long term PPI use reviewed - will try to wean off Protonix all together - if GI symptoms relapse will try OTC Ranitidine - call with uncontrolled/new/worse symptoms       Diagnoses and all orders for this visit:    Screening for malignant neoplasm of breast  -     Mammo screening bilateral w cad; Future    Essential hypertension    Other emphysema (HCC)    Takotsubo syndrome    Esophagitis, reflux    Paresthesia of arm  Comments:  Reassured pt that her symptoms were not c/w nerve distribution and her exam was normal - she has no neck pain - advised to call with new/worse symptoms/red flag Neuro symptoms or if symptoms persist      Colonoscopy 2/17 - 5 yr    Mammo 7/18    Dexa 7/18 - osteopenia    PAP 3/17 - Dr Bette See    BW 2/19 - has order to be done in Aug 2019    Subjective:      Patient ID: Jolene Thompson is a 75 y o  female  HPI Pt here for follow up appt    Pt had her PFT's yesterday  Her FEV1/FVC ratio was decreased and she had no response with bronchodilator   She notes no chronic cough/wheezing  She notes some SOB with vacuuming or ambulating steps  She quit smoking 11 yrs ago  BP above goal today and meds were reviewed and no changes have occurred  She denies missing doses of meds or SE with the meds  She does not check her BP outside the office regularly - when she does it is 130-140/70's  BP at the end of the visit was up at 160/82  She notes no frequent Ha's/dizziness/double vision/CP  Pt has appt with Cardio (Dr Theodore Ramírez) in early Aug   She notes no recent CP/palp/edema/orthopnea  She is on Plavix/Coreg/Cozaar/Lipitor    Pt has decreased Protonix from 40 mg bid to 40 mg q daily  On current regimen she notes no heart burn/abd pain/N/Vblack in stool/F/C  She has some ill defined abd discomfort at times and notes her hemorrhoids are acting up and bleeding again  She had seen GI and had hemorrhoid surgery - con't to have bleeding and stopped going for f/u as had minimal benefit with the surgery  She is not currently using Zantac  Pt notes "zinging"/tingling/burning like sensation in B/L UE for the past few weeks  She denies actual neck pain but does feel a bit stiff  She notes no numbness/dropping objects  Colonoscopy 2/17 - 5 yr    Mammo 7/18    Dexa 7/18 - osteopenia    PAP 3/17 - Dr Charlie Walters    BW 2/19 - has order to be done in Aug 2019    Review of Systems   Constitutional: Negative for chills and fever  HENT: Negative for congestion and sore throat  Eyes: Negative for pain and visual disturbance  Respiratory: Negative for cough, shortness of breath and wheezing  Cardiovascular: Negative for chest pain, palpitations and leg swelling  Gastrointestinal: Positive for blood in stool  Negative for abdominal pain, constipation, diarrhea, nausea and vomiting  Endocrine: Negative for polydipsia and polyuria  Genitourinary: Negative for difficulty urinating and dysuria  Musculoskeletal: Positive for neck pain  Negative for back pain     Skin: Negative for rash and wound  Neurological: Negative for dizziness, weakness, numbness and headaches  Hematological: Negative for adenopathy  Psychiatric/Behavioral: Negative for behavioral problems and confusion  Objective:    /78 (BP Location: Right arm, Patient Position: Sitting, Cuff Size: Standard)   Pulse 66   Temp 97 9 °F (36 6 °C)   Ht 5' 5" (1 651 m)   Wt 68 9 kg (152 lb)   BMI 25 29 kg/m²      Physical Exam   Constitutional: She appears well-developed and well-nourished  No distress  HENT:   Head: Normocephalic and atraumatic  Eyes: Conjunctivae are normal  Right eye exhibits no discharge  Left eye exhibits no discharge  Neck: Neck supple  No tracheal deviation present  Cardiovascular: Normal rate, regular rhythm and normal heart sounds  Exam reveals no friction rub  No murmur heard  Pulmonary/Chest: Effort normal and breath sounds normal  No respiratory distress  She has no wheezes  She has no rales  Abdominal: Soft  She exhibits no distension  There is no tenderness  There is no guarding  Musculoskeletal: She exhibits no edema  Nml gait  Cervical spine: no pain with palp of spinous processes of cervical spine, 5/5 B/L UE muscle strength   Lymphadenopathy:     She has no cervical adenopathy  Neurological: She is alert  She displays normal reflexes  No sensory deficit  She exhibits normal muscle tone  Sensation intact to light touch B/L UE, 2/4 bicep reflex B/L UE   Skin: Skin is warm and dry  No rash noted  Psychiatric: She has a normal mood and affect  Her behavior is normal    Nursing note and vitals reviewed

## 2019-07-18 NOTE — ASSESSMENT & PLAN NOTE
Symptoms well controlled on Protonix 40 mg 1 tab daily - SE of long term PPI use reviewed - will try to wean off Protonix all together - if GI symptoms relapse will try OTC Ranitidine - call with uncontrolled/new/worse symptoms

## 2019-07-18 NOTE — ASSESSMENT & PLAN NOTE
BP slightly elevated on presentation and much more elevated on recheck at end of visit - first time very elevated - pt notes no symptoms - had BP cuff at home - will con't current regimen for now and start checking BP at home - bring home numbers with cuff to next appt and if still > 139/89 will increase BP meds, urged to call if Bp consistently > 140/90 at home, has appt with Dr Ryan Swann in a few weeks as well

## 2019-08-01 ENCOUNTER — OFFICE VISIT (OUTPATIENT)
Dept: CARDIOLOGY CLINIC | Facility: CLINIC | Age: 64
End: 2019-08-01
Payer: MEDICARE

## 2019-08-01 VITALS
RESPIRATION RATE: 18 BRPM | DIASTOLIC BLOOD PRESSURE: 70 MMHG | BODY MASS INDEX: 26.19 KG/M2 | HEART RATE: 70 BPM | HEIGHT: 65 IN | SYSTOLIC BLOOD PRESSURE: 130 MMHG | WEIGHT: 157.2 LBS

## 2019-08-01 DIAGNOSIS — I25.10 CORONARY ARTERY DISEASE INVOLVING NATIVE CORONARY ARTERY OF NATIVE HEART WITHOUT ANGINA PECTORIS: Primary | ICD-10-CM

## 2019-08-01 DIAGNOSIS — I10 ESSENTIAL HYPERTENSION: ICD-10-CM

## 2019-08-01 DIAGNOSIS — E78.5 DYSLIPIDEMIA: ICD-10-CM

## 2019-08-01 PROCEDURE — 99213 OFFICE O/P EST LOW 20 MIN: CPT | Performed by: INTERNAL MEDICINE

## 2019-08-01 RX ORDER — AMOXICILLIN 500 MG/1
CAPSULE ORAL
Refills: 0 | COMMUNITY
Start: 2019-07-27 | End: 2019-08-29

## 2019-08-01 NOTE — PROGRESS NOTES
Assessment/Plan:    Essential hypertension  Hypertension, stable and adequately controlled  Coronary artery disease  Coronary artery disease, stable with no symptoms of angina  No signs of heart failure  Dyslipidemia  Hyperlipidemia, stable  Diagnoses and all orders for this visit:    Coronary artery disease involving native coronary artery of native heart without angina pectoris    Essential hypertension    Dyslipidemia    Other orders  -     amoxicillin (AMOXIL) 500 mg capsule; TAKE 1 CAPSULE BY MOUTH THREE TIMES DAILY UNTIL GONE          Subjective:  Feels well  Patient ID: Ledy Moralez is a 75 y o  female  Patient presented to this office for the purpose of cardiac follow-up  She has a history of coronary artery disease with myocardial infarction  She has a history of hypertension and hyperlipidemia and was recently diagnosed with COPD  She has some dyspnea on exertion but denies any symptoms of chest pain, palpitation dizziness or lightheadedness  She has no leg edema  The following portions of the patient's history were reviewed and updated as appropriate: allergies, current medications, past family history, past medical history, past social history, past surgical history and problem list     Review of Systems   Respiratory: Positive for shortness of breath  Negative for apnea, cough, chest tightness and wheezing  Cardiovascular: Negative for chest pain, palpitations and leg swelling  Gastrointestinal: Negative for abdominal pain  Neurological: Negative for dizziness and light-headedness  Objective:  Stable cardiac-wise  /70 (BP Location: Right arm, Patient Position: Sitting)   Pulse 70   Resp 18   Ht 5' 5" (1 651 m)   Wt 71 3 kg (157 lb 3 2 oz)   BMI 26 16 kg/m²          Physical Exam   Constitutional: She is oriented to person, place, and time  She appears well-developed and well-nourished  No distress     HENT:   Head: Normocephalic and atraumatic  Neck: Normal range of motion  Neck supple  No JVD present  No thyromegaly present  Cardiovascular: Normal rate, regular rhythm, S1 normal and S2 normal  Exam reveals no gallop and no friction rub  No murmur heard  Pulmonary/Chest: Effort normal  No respiratory distress  She has no wheezes  She has no rales  She exhibits no tenderness  Abdominal: Soft  Musculoskeletal: She exhibits no edema  Neurological: She is alert and oriented to person, place, and time  Skin: Skin is warm and dry  She is not diaphoretic  Psychiatric: She has a normal mood and affect  Vitals reviewed

## 2019-08-01 NOTE — LETTER
August 1, 2019     DO Luc Carolina  [address] [facility]  [address] [address] [address]    Patient: Malathi Merrill   YOB: 1955   Date of Visit: 8/1/2019       Dear Dr Flor Cardoza: Thank you for referring Mike Layne to me for evaluation  Below are my notes for this consultation  If you have questions, please do not hesitate to call me  I look forward to following your patient along with you  Sincerely,        Sarbjit Boswell MD        CC: No Recipients  Sarbjit Boswell MD  8/1/2019  3:29 PM  Sign at close encounter  Assessment/Plan:    Essential hypertension  Hypertension, stable and adequately controlled  Coronary artery disease  Coronary artery disease, stable with no symptoms of angina  No signs of heart failure  Dyslipidemia  Hyperlipidemia, stable  Diagnoses and all orders for this visit:    Coronary artery disease involving native coronary artery of native heart without angina pectoris    Essential hypertension    Dyslipidemia    Other orders  -     amoxicillin (AMOXIL) 500 mg capsule; TAKE 1 CAPSULE BY MOUTH THREE TIMES DAILY UNTIL GONE          Subjective:  Feels well  Patient ID: Malathi Merrill is a 75 y o  female  Patient presented to this office for the purpose of cardiac follow-up  She has a history of coronary artery disease with myocardial infarction  She has a history of hypertension and hyperlipidemia and was recently diagnosed with COPD  She has some dyspnea on exertion but denies any symptoms of chest pain, palpitation dizziness or lightheadedness  She has no leg edema  The following portions of the patient's history were reviewed and updated as appropriate: allergies, current medications, past family history, past medical history, past social history, past surgical history and problem list     Review of Systems   Respiratory: Positive for shortness of breath  Negative for apnea, cough, chest tightness and wheezing      Cardiovascular: Negative for chest pain, palpitations and leg swelling  Gastrointestinal: Negative for abdominal pain  Neurological: Negative for dizziness and light-headedness  Objective:  Stable cardiac-wise  /70 (BP Location: Right arm, Patient Position: Sitting)   Pulse 70   Resp 18   Ht 5' 5" (1 651 m)   Wt 71 3 kg (157 lb 3 2 oz)   BMI 26 16 kg/m²           Physical Exam   Constitutional: She is oriented to person, place, and time  She appears well-developed and well-nourished  No distress  HENT:   Head: Normocephalic and atraumatic  Neck: Normal range of motion  Neck supple  No JVD present  No thyromegaly present  Cardiovascular: Normal rate, regular rhythm, S1 normal and S2 normal  Exam reveals no gallop and no friction rub  No murmur heard  Pulmonary/Chest: Effort normal  No respiratory distress  She has no wheezes  She has no rales  She exhibits no tenderness  Abdominal: Soft  Musculoskeletal: She exhibits no edema  Neurological: She is alert and oriented to person, place, and time  Skin: Skin is warm and dry  She is not diaphoretic  Psychiatric: She has a normal mood and affect  Vitals reviewed

## 2019-08-29 ENCOUNTER — OFFICE VISIT (OUTPATIENT)
Dept: FAMILY MEDICINE CLINIC | Facility: HOSPITAL | Age: 64
End: 2019-08-29
Payer: MEDICARE

## 2019-08-29 VITALS
HEIGHT: 65 IN | BODY MASS INDEX: 25.16 KG/M2 | HEART RATE: 72 BPM | WEIGHT: 151 LBS | SYSTOLIC BLOOD PRESSURE: 128 MMHG | DIASTOLIC BLOOD PRESSURE: 68 MMHG | TEMPERATURE: 97.9 F

## 2019-08-29 DIAGNOSIS — I95.9 HYPOTENSION, UNSPECIFIED HYPOTENSION TYPE: ICD-10-CM

## 2019-08-29 DIAGNOSIS — Z11.59 NEED FOR HEPATITIS C SCREENING TEST: ICD-10-CM

## 2019-08-29 DIAGNOSIS — E66.3 OVERWEIGHT (BMI 25.0-29.9): ICD-10-CM

## 2019-08-29 DIAGNOSIS — K21.00 GASTROESOPHAGEAL REFLUX DISEASE WITH ESOPHAGITIS: ICD-10-CM

## 2019-08-29 DIAGNOSIS — I51.81 TAKOTSUBO SYNDROME: Primary | ICD-10-CM

## 2019-08-29 DIAGNOSIS — Z11.4 SCREENING FOR HIV (HUMAN IMMUNODEFICIENCY VIRUS): ICD-10-CM

## 2019-08-29 DIAGNOSIS — Z23 ENCOUNTER FOR IMMUNIZATION: ICD-10-CM

## 2019-08-29 DIAGNOSIS — Z00.00 MEDICARE ANNUAL WELLNESS VISIT, INITIAL: ICD-10-CM

## 2019-08-29 DIAGNOSIS — E87.6 HYPOKALEMIA: ICD-10-CM

## 2019-08-29 DIAGNOSIS — I10 ESSENTIAL HYPERTENSION: ICD-10-CM

## 2019-08-29 PROBLEM — K21.9 GERD (GASTROESOPHAGEAL REFLUX DISEASE): Status: ACTIVE | Noted: 2019-08-29

## 2019-08-29 PROCEDURE — 99214 OFFICE O/P EST MOD 30 MIN: CPT | Performed by: INTERNAL MEDICINE

## 2019-08-29 PROCEDURE — G0009 ADMIN PNEUMOCOCCAL VACCINE: HCPCS | Performed by: INTERNAL MEDICINE

## 2019-08-29 PROCEDURE — 90732 PPSV23 VACC 2 YRS+ SUBQ/IM: CPT | Performed by: INTERNAL MEDICINE

## 2019-08-29 PROCEDURE — G0438 PPPS, INITIAL VISIT: HCPCS | Performed by: INTERNAL MEDICINE

## 2019-08-29 RX ORDER — POTASSIUM CHLORIDE 750 MG/1
10 CAPSULE, EXTENDED RELEASE ORAL DAILY
Qty: 90 CAPSULE | Refills: 1 | Status: SHIPPED | OUTPATIENT
Start: 2019-08-29 | End: 2020-03-02

## 2019-08-29 RX ORDER — HYDROCHLOROTHIAZIDE 12.5 MG/1
TABLET ORAL
Qty: 90 TABLET | Refills: 0 | Status: SHIPPED | OUTPATIENT
Start: 2019-08-29 | End: 2019-11-24 | Stop reason: SDUPTHER

## 2019-08-29 NOTE — ASSESSMENT & PLAN NOTE
Urged to con't avoiding triggers and stay off PPI, will try H2 blocker but if not better will restart PPI

## 2019-08-29 NOTE — ASSESSMENT & PLAN NOTE
Bp elevated in office today but home numbers low and pt is symptomatic in the 16'X systolic - home cuff checked and is right on and accurate - will decrease HCTZ to 12 5 mg 1 tab PO q day and cont all other BP meds - recheck BP in 1 mo, con't checking BP at home and call in 2 wks if still having symptomatic low readings and would stop HCTZ

## 2019-08-29 NOTE — ASSESSMENT & PLAN NOTE
Symptomatic low readings at home and home cuff checked and is very accurate - will decrease HCTZ and re-eval in 4 wks, urged to keep hydrated

## 2019-08-29 NOTE — PROGRESS NOTES
Assessment/Plan:    Takotsubo syndrome  No current CV symptoms, just saw Cardio - no meds were changed, con't follow up and meds as per Cardio    Essential hypertension  Bp elevated in office today but home numbers low and pt is symptomatic in the 02'X systolic - home cuff checked and is right on and accurate - will decrease HCTZ to 12 5 mg 1 tab PO q day and cont all other BP meds - recheck BP in 1 mo, con't checking BP at home and call in 2 wks if still having symptomatic low readings and would stop HCTZ    Hypotension  Symptomatic low readings at home and home cuff checked and is very accurate - will decrease HCTZ and re-eval in 4 wks, urged to keep hydrated    GERD (gastroesophageal reflux disease)  Urged to con't avoiding triggers and stay off PPI, will try H2 blocker but if not better will restart PPI       Diagnoses and all orders for this visit:    Takotsubo syndrome    Essential hypertension  -     hydrochlorothiazide (HYDRODIURIL) 12 5 mg tablet; 1 tab PO q day    Hypotension, unspecified hypotension type    Overweight (BMI 25 0-29  9)    BMI 25 0-25 9,adult  Comments:  Diet/exercise/wgt loss encouraged    Gastroesophageal reflux disease with esophagitis    Hypokalemia  Comments:  K refilled upon request  Orders:  -     potassium chloride (MICRO-K) 10 MEQ CR capsule; Take 1 capsule (10 mEq total) by mouth daily    Medicare annual wellness visit, initial    Screening for HIV (human immunodeficiency virus)  -     HIV 1/2 AG-AB combo; Future    Need for hepatitis C screening test  -     Hepatitis C antibody; Future      [place] 2/17 - 5 yrs    Mammo 7/18 - has scheduled for next week     Dexa 7/18 - osteopenia    PAP 3/17 - Dr Marissa Elam     fasting 12/17      Subjective:      Patient ID: Tru Daily is a 75 y o  female  HPI Pt here for follow up appt and AWV    Pt saw Dr Carter Morris earlier this month for f/u Takotsubo syndrome and HTN  Her BP was good and no changes to meds were made    She has been checking her BP at home and she states it is "very low" she has had readings from 70's/40's to 110's/70's  When BP is low she does feel lightheaded and her legs feel weak and she is more fatigued  She has had no falls  Her BP cuff was brought in today and read 143/70 and right after ours read 140/70  Wgt up 2 lbs from April 2019  BMI reviewed  Pt has been off of Protonix for about a week  She has noted the past two days she has had some recurrent heart burn symptoms  She has not tried anything OTC for her current symptoms  [place] 2/17 - 5 yrs    Mammo 7/18 - has scheduled for next week     Dexa 7/18 - osteopenia    PAP 3/17 - Dr Leticia Wilson    BW fasting 12/17      Review of Systems   Constitutional: Negative for chills, fever and unexpected weight change  HENT: Positive for hearing loss  Negative for congestion and sore throat  Eyes: Negative for pain and visual disturbance  Respiratory: Negative for cough, shortness of breath and wheezing  Cardiovascular: Negative for chest pain, palpitations and leg swelling  Gastrointestinal: Negative for abdominal pain, blood in stool, constipation, diarrhea and nausea  Endocrine: Negative for polydipsia and polyuria  Genitourinary: Negative for difficulty urinating, dysuria, vaginal bleeding, vaginal discharge and vaginal pain  Musculoskeletal: Negative for back pain and neck pain  Skin: Negative for rash and wound  Neurological: Negative for dizziness, syncope, light-headedness and headaches  Hematological: Negative for adenopathy  Psychiatric/Behavioral: Negative for behavioral problems and confusion  The patient is not nervous/anxious  Objective:    /68 (BP Location: Right arm, Patient Position: Sitting, Cuff Size: Standard)   Pulse 72   Temp 97 9 °F (36 6 °C)   Ht 5' 5" (1 651 m)   Wt 68 5 kg (151 lb)   BMI 25 13 kg/m²      Physical Exam   Constitutional: She appears well-developed and well-nourished  No distress     HENT:   Head: Normocephalic and atraumatic  Right Ear: External ear normal    Left Ear: External ear normal    Mouth/Throat: Oropharynx is clear and moist  No oropharyngeal exudate  B/L hearing aids removed   Eyes: Conjunctivae are normal  Right eye exhibits no discharge  Left eye exhibits no discharge  Neck: Neck supple  No tracheal deviation present  Cardiovascular: Normal rate, regular rhythm and normal heart sounds  Exam reveals no friction rub  No murmur heard  Pulmonary/Chest: Effort normal and breath sounds normal  No respiratory distress  She has no wheezes  She has no rales  Abdominal: Soft  She exhibits no distension  There is no tenderness  There is no rebound and no guarding  Musculoskeletal: She exhibits no edema  Lymphadenopathy:     She has no cervical adenopathy  Neurological: She is alert  She exhibits normal muscle tone  Skin: Skin is warm and dry  No rash noted  Psychiatric: She has a normal mood and affect  Her behavior is normal    Nursing note and vitals reviewed  BMI Counseling: Body mass index is 25 13 kg/m²  Discussed the patient's BMI with her  The BMI is above average  BMI counseling and education was provided to the patient  Nutrition recommendations include reducing portion sizes, 3-5 servings of fruits/vegetables daily, consuming healthier snacks, moderation in carbohydrate intake, increasing intake of lean protein and reducing intake of saturated fat and trans fat  Exercise recommendations include moderate aerobic physical activity for 150 minutes/week and exercising 3-5 times per week

## 2019-08-29 NOTE — ASSESSMENT & PLAN NOTE
No current CV symptoms, just saw Cardio - no meds were changed, con't follow up and meds as per Cardio

## 2019-08-29 NOTE — PATIENT INSTRUCTIONS
Weight Management   AMBULATORY CARE:   Why it is important to manage your weight:  Being overweight increases your risk of health conditions such as heart disease, high blood pressure, type 2 diabetes, and certain types of cancer  It can also increase your risk for osteoarthritis, sleep apnea, and other respiratory problems  Aim for a slow, steady weight loss  Even a small amount of weight loss can lower your risk of health problems  How to lose weight safely:  A safe and healthy way to lose weight is to eat fewer calories and get regular exercise  You can lose up about 1 pound a week by decreasing the number of calories you eat by 500 calories each day  You can decrease calories by eating smaller portion sizes or by cutting out high-calorie foods  Read labels to find out how many calories are in the foods you eat  You can also burn calories with exercise such as walking, swimming, or biking  You will be more likely to keep weight off if you make these changes part of your lifestyle  Healthy meal plan for weight management:  A healthy meal plan includes a variety of foods, contains fewer calories, and helps you stay healthy  A healthy meal plan includes the following:  · Eat whole-grain foods more often  A healthy meal plan should contain fiber  Fiber is the part of grains, fruits, and vegetables that is not broken down by your body  Whole-grain foods are healthy and provide extra fiber in your diet  Some examples of whole-grain foods are whole-wheat breads and pastas, oatmeal, brown rice, and bulgur  · Eat a variety of vegetables every day  Include dark, leafy greens such as spinach, kale, carlos eduardo greens, and mustard greens  Eat yellow and orange vegetables such as carrots, sweet potatoes, and winter squash  · Eat a variety of fruits every day  Choose fresh or canned fruit (canned in its own juice or light syrup) instead of juice  Fruit juice has very little or no fiber  · Eat low-fat dairy foods  Drink fat-free (skim) milk or 1% milk  Eat fat-free yogurt and low-fat cottage cheese  Try low-fat cheeses such as mozzarella and other reduced-fat cheeses  · Choose meat and other protein foods that are low in fat  Choose beans or other legumes such as split peas or lentils  Choose fish, skinless poultry (chicken or turkey), or lean cuts of red meat (beef or pork)  Before you cook meat or poultry, cut off any visible fat  · Use less fat and oil  Try baking foods instead of frying them  Add less fat, such as margarine, sour cream, regular salad dressing and mayonnaise to foods  Eat fewer high-fat foods  Some examples of high-fat foods include french fries, doughnuts, ice cream, and cakes  · Eat fewer sweets  Limit foods and drinks that are high in sugar  This includes candy, cookies, regular soda, and sweetened drinks  Ways to decrease calories:   · Eat smaller portions  ¨ Use a small plate with smaller servings  ¨ Do not eat second helpings  ¨ When you eat at a restaurant, ask for a box and place half of your meal in the box before you eat  ¨ Share an entrée with someone else  · Replace high-calorie snacks with healthy, low-calorie snacks  ¨ Choose fresh fruit, vegetables, fat-free rice cakes, or air-popped popcorn instead of potato chips, nuts, or chocolate  ¨ Choose water or calorie-free drinks instead of soda or sweetened drinks  · Eat regular meals  Skipping meals can lead to overeating later in the day  Eat a healthy snack in place of a meal if you do not have time to eat a regular meal      · Do not shop for groceries when you are hungry  You may be more likely to make unhealthy food choices  Take a grocery list of healthy foods and shop after you have eaten  Exercise:  Exercise at least 30 minutes per day on most days of the week  Some examples of exercise include walking, biking, dancing, and swimming   You can also fit in more physical activity by taking the stairs instead of the elevator or parking farther away from stores  Ask your healthcare provider about the best exercise plan for you  Other things to consider as you try to lose weight:   · Be aware of situations that may give you the urge to overeat, such as eating while watching television  Find ways to avoid these situations  For example, read a book, go for a walk, or do crafts  · Meet with a weight loss support group or friends who are also trying to lose weight  This may help you stay motivated to continue working on your weight loss goals  © 2017 [address] [facility] Information is for End User's use only and may not be sold, redistributed or otherwise used for commercial purposes  All illustrations and images included in CareNotes® are the copyrighted property of [employer] M [employer]  or Ari Anderson  The above information is an  only  It is not intended as medical advice for individual conditions or treatments  Talk to your doctor, nurse or pharmacist before following any medical regimen to see if it is safe and effective for you  Heart Healthy Diet   AMBULATORY CARE:   A heart healthy diet  is an eating plan low in total fat, unhealthy fats, and sodium (salt)  A heart healthy diet helps decrease your risk for heart disease and stroke  Limit the amount of fat you eat to 25% to 35% of your total daily calories  Limit sodium to less than 2,300 mg each day  Healthy fats:  Healthy fats can help improve cholesterol levels  The risk for heart disease is decreased when cholesterol levels are normal  Choose healthy fats, such as the following:  · Unsaturated fat  is found in foods such as soybean, canola, olive, corn, and safflower oils  It is also found in soft tub margarine that is made with liquid vegetable oil  · Omega-3 fat  is found in certain fish, such as salmon, tuna, and trout, and in walnuts and flaxseed    Unhealthy fats:  Unhealthy fats can cause unhealthy cholesterol levels in your blood and increase your risk of heart disease  Limit unhealthy fats, such as the following:  · Cholesterol  is found in animal foods, such as eggs and lobster, and in dairy products made from whole milk  Limit cholesterol to less than 300 milligrams (mg) each day  You may need to limit cholesterol to 200 mg each day if you have heart disease  · Saturated fat  is found in meats, such as gonzales and hamburger  It is also found in chicken or turkey skin, whole milk, and butter  Limit saturated fat to less than 7% of your total daily calories  Limit saturated fat to less than 6% if you have heart disease or are at increased risk for it  · Trans fat  is found in packaged foods, such as potato chips and cookies  It is also in hard margarine, some fried foods, and shortening  Avoid trans fats as much as possible    Heart healthy foods and drinks to include:  Ask your dietitian or healthcare provider how many servings to have from each of the following food groups:  · Grains:      ¨ Whole-wheat breads, cereals, and pastas, and brown rice    ¨ Low-fat, low-sodium crackers and chips    · Vegetables:      ¨ Broccoli, green beans, green peas, and spinach    ¨ Collards, kale, and lima beans    ¨ Carrots, sweet potatoes, tomatoes, and peppers    ¨ Canned vegetables with no salt added    · Fruits:      ¨ Bananas, peaches, pears, and pineapple    ¨ Grapes, raisins, and dates    ¨ Oranges, tangerines, grapefruit, orange juice, and grapefruit juice    ¨ Apricots, mangoes, melons, and papaya    ¨ Raspberries and strawberries    ¨ Canned fruit with no added sugar    · Low-fat dairy products:      ¨ Nonfat (skim) milk, 1% milk, and low-fat almond, cashew, or soy milks fortified with calcium    ¨ Low-fat cheese, regular or frozen yogurt, and cottage cheese    · Meats and proteins , such as lean cuts of beef and pork (loin, leg, round), skinless chicken and turkey, legumes, soy products, egg whites, and nuts  Foods and drinks to limit or avoid:  Ask your dietitian or healthcare provider about these and other foods that are high in unhealthy fat, sodium, and sugar:  · Snack or packaged foods , such as frozen dinners, cookies, macaroni and cheese, and cereals with more than 300 mg of sodium per serving    · Canned or dry mixes  for cakes, soups, sauces, or gravies    · Vegetables with added sodium , such as instant potatoes, vegetables with added sauces, or regular canned vegetables    · Other foods high in sodium , such as ketchup, barbecue sauce, salad dressing, pickles, olives, soy sauce, and miso    · High-fat dairy foods  such as whole or 2% milk, cream cheese, or sour cream, and cheeses     · High-fat protein foods  such as high-fat cuts of beef (T-bone steaks, ribs), chicken or turkey with skin, and organ meats, such as liver    · Cured or smoked meats , such as hot dogs, gonzales, and sausage    · Unhealthy fats and oils , such as butter, stick margarine, shortening, and cooking oils such as coconut or palm oil    · Food and drinks high in sugar , such as soft drinks (soda), sports drinks, sweetened tea, candy, cake, cookies, pies, and doughnuts  Other diet guidelines to follow:   · Eat more foods containing omega-3 fats  Eat fish high in omega-3 fats at least 2 times a week  · Limit alcohol  Too much alcohol can damage your heart and raise your blood pressure  Women should limit alcohol to 1 drink a day  Men should limit alcohol to 2 drinks a day  A drink of alcohol is 12 ounces of beer, 5 ounces of wine, or 1½ ounces of liquor  · Choose low-sodium foods  High-sodium foods can lead to high blood pressure  Add little or no salt to food you prepare  Use herbs and spices in place of salt  · Eat more fiber  to help lower cholesterol levels  Eat at least 5 servings of fruits and vegetables each day  Eat 3 ounces of whole-grain foods each day  Legumes (beans) are also a good source of fiber    Lifestyle guidelines: · Do not smoke  Nicotine and other chemicals in cigarettes and cigars can cause lung and heart damage  Ask your healthcare provider for information if you currently smoke and need help to quit  E-cigarettes or smokeless tobacco still contain nicotine  Talk to your healthcare provider before you use these products  · Exercise regularly  to help you maintain a healthy weight and improve your blood pressure and cholesterol levels  Ask your healthcare provider about the best exercise plan for you  Do not start an exercise program without asking your healthcare provider  Follow up with your healthcare provider as directed:  Write down your questions so you remember to ask them during your visits  © 2017 [address] [employer]  Information is for End User's use only and may not be sold, redistributed or otherwise used for commercial purposes  All illustrations and images included in CareNotes® are the copyrighted property of [employer]  or Ari Anderson  The above information is an  only  It is not intended as medical advice for individual conditions or treatments  Talk to your doctor, nurse or pharmacist before following any medical regimen to see if it is safe and effective for you  Obesity   AMBULATORY CARE:   Obesity  is when your body mass index (BMI) is greater than 30  Your healthcare provider will use your height and weight to measure your BMI  The risks of obesity include  many health problems, such as injuries or physical disability  You may need tests to check for the following:  · Diabetes     · High blood pressure or high cholesterol     · Heart disease     · Gallbladder or liver disease     · Cancer of the colon, breast, prostate, liver, or kidney     · Sleep apnea     · Arthritis or gout  Seek care immediately if:   · You have a severe headache, confusion, or difficulty speaking  · You have weakness on one side of your body       · You have chest pain, sweating, or shortness of breath  Contact your healthcare provider if:   · You have symptoms of gallbladder or liver disease, such as pain in your upper abdomen  · You have knee or hip pain and discomfort while walking  · You have symptoms of diabetes, such as intense hunger and thirst, and frequent urination  · You have symptoms of sleep apnea, such as snoring or daytime sleepiness  · You have questions or concerns about your condition or care  Treatment for obesity  focuses on helping you lose weight to improve your health  Even a small decrease in BMI can reduce the risk for many health problems  Your healthcare provider will help you set a weight-loss goal   · Lifestyle changes  are the first step in treating obesity  These include making healthy food choices and getting regular physical activity  Your healthcare provider may suggest a weight-loss program that involves coaching, education, and therapy  · Medicine  may help you lose weight when it is used with a healthy diet and physical activity  · Surgery  can help you lose weight if you are very obese and have other health problems  There are several types of weight-loss surgery  Ask your healthcare provider for more information  Be successful losing weight:   · Set small, realistic goals  An example of a small goal is to walk for 20 minutes 5 days a week  Anther goal is to lose 5% of your body weight  · Tell friends, family members, and coworkers about your goals  and ask for their support  Ask a friend to lose weight with you, or join a weight-loss support group  · Identify foods or triggers that may cause you to overeat , and find ways to avoid them  Remove tempting high-calorie foods from your home and workplace  Place a bowl of fresh fruit on your kitchen counter  If stress causes you to eat, then find other ways to cope with stress  · Keep a diary to track what you eat and drink    Also write down how many minutes of physical activity you do each day  Weigh yourself once a week and record it in your diary  Eating changes: You will need to eat 500 to 1,000 fewer calories each day than you currently eat to lose 1 to 2 pounds a week  The following changes will help you cut calories:  · Eat smaller portions  Use small plates, no larger than 9 inches in diameter  Fill your plate half full of fruits and vegetables  Measure your food using measuring cups until you know what a serving size looks like  · Eat 3 meals and 1 or 2 snacks each day  Plan your meals in advance  [place] and eat at home most of the time  Eat slowly  · Eat fruits and vegetables at every meal   They are low in calories and high in fiber, which makes you feel full  Do not add butter, margarine, or cream sauce to vegetables  Use herbs to season steamed vegetables  · Eat less fat and fewer fried foods  Eat more baked or grilled chicken and fish  These protein sources are lower in calories and fat than red meat  Limit fast food  Dress your salads with olive oil and vinegar instead of bottled dressing  · Limit the amount of sugar you eat  Do not drink sugary beverages  Limit alcohol  Activity changes:  Physical activity is good for your body in many ways  It helps you burn calories and build strong muscles  It decreases stress and depression, and improves your mood  It can also help you sleep better  Talk to your healthcare provider before you begin an exercise program   · Exercise for at least 30 minutes 5 days a week  Start slowly  Set aside time each day for physical activity that you enjoy and that is convenient for you  It is best to do both weight training and an activity that increases your heart rate, such as walking, bicycling, or swimming  · Find ways to be more active  Do yard work and housecleaning  Walk up the stairs instead of using elevators   Spend your leisure time going to events that require walking, such as outdoor festivals or fairs  This extra physical activity can help you lose weight and keep it off  Follow up with your healthcare provider as directed: You may need to meet with a dietitian  Write down your questions so you remember to ask them during your visits  © 2017 [address] Information is for End User's use only and may not be sold, redistributed or otherwise used for commercial purposes  All illustrations and images included in CareNotes® are the copyrighted property of [employer] Inc  or Ari Anderson  The above information is an  only  It is not intended as medical advice for individual conditions or treatments  Talk to your doctor, nurse or pharmacist before following any medical regimen to see if it is safe and effective for you  Urinary Incontinence   WHAT YOU NEED TO KNOW:   What is urinary incontinence? Urinary incontinence (UI) is when you lose control of your bladder  What causes UI? UI occurs because your bladder cannot store or empty urine properly  The following are the most common types of UI:  · Stress incontinence  is when you leak urine due to increased bladder pressure  This may happen when you cough, sneeze, or exercise  · Urge incontinence  is when you feel the need to urinate right away and leak urine accidentally  · Mixed incontinence  is when you have both stress and urge UI  What are the signs and symptoms of UI?   · You feel like your bladder does not empty completely when you urinate  · You urinate often and need to urinate immediately  · You leak urine when you sleep, or you wake up with the urge to urinate  · You leak urine when you cough, sneeze, exercise, or laugh  How is UI diagnosed? Your healthcare provider will ask how often you leak urine and whether you have stress or urge symptoms  Tell him which medicines you take, how often you urinate, and how much liquid you drink each day   You may need any of the following tests:  · Urine tests  may show infection or kidney function  · A pelvic exam  may be done to check for blockages  A pelvic exam will also show if your bladder, uterus, or other organs have moved out of place  · An x-ray, ultrasound, or CT  may show problems with parts of your urinary system  You may be given contrast liquid to help your organs show up better in the pictures  Tell the healthcare provider if you have ever had an allergic reaction to contrast liquid  Do not enter the MRI room with anything metal  Metal can cause serious injury  Tell the healthcare provider if you have any metal in or on your body  · A bladder scan  will show how much urine is left in your bladder after you urinate  You will be asked to urinate and then healthcare providers will use a small ultrasound machine to check the urine left in your bladder  · Cystometry  is used to check the function of your urinary system  Your healthcare provider checks the pressure in your bladder while filling it with fluid  Your bladder pressure may also be tested when your bladder is full and while you urinate  How is UI treated? · Medicines  can help strengthen your bladder control  · Electrical stimulation  is used to send a small amount of electrical energy to your pelvic floor muscles  This helps control your bladder function  Electrodes may be placed outside your body or in your rectum  For women, the electrodes may be placed in the vagina  · A bulking agent  may be injected into the wall of your urethra to make it thicker  This helps keep your urethra closed and decreases urine leakage  · Devices  such as a clamp, pessary, or tampon may help stop urine leaks  Ask your healthcare provider for more information about these and other devices  · Surgery  may be needed if other treatments do not work  Several types of surgery can help improve your bladder control   Ask your healthcare provider for more information about the surgery you may need  How can I manage my symptoms? · Do pelvic muscle exercises often  Your pelvic muscles help you stop urinating  Squeeze these muscles tight for 5 seconds, then relax for 5 seconds  Gradually work up to squeezing for 10 seconds  Do 3 sets of 15 repetitions a day, or as directed  This will help strengthen your pelvic muscles and improve bladder control  · A catheter  may be used to help empty your bladder  A catheter is a tiny, plastic tube that is put into your bladder to drain your urine  Your healthcare provider may tell you to use a catheter to prevent your bladder from getting too full and leaking urine  · Keep a UI record  Write down how often you leak urine and how much you leak  Make a note of what you were doing when you leaked urine  · Train your bladder  Go to the bathroom at set times, such as every 2 hours, even if you do not feel the urge to go  You can also try to hold your urine when you feel the urge to go  For example, hold your urine for 5 minutes when you feel the urge to go  As that becomes easier, hold your urine for 10 minutes  · Drink liquids as directed  Ask your healthcare provider how much liquid to drink each day and which liquids are best for you  You may need to limit the amount of liquid you drink to help control your urine leakage  Limit or do not have drinks that contain caffeine or alcohol  Do not drink any liquid right before you go to bed  · Prevent constipation  Eat a variety of high-fiber foods  Good examples are high-fiber cereals, beans, vegetables, and whole-grain breads  Prune juice may help make your bowel movement softer  Walking is the best way to trigger your intestines to have a bowel movement  · Exercise regularly and maintain a healthy weight  Ask your healthcare provider how much you should weigh and about the best exercise plan for you   Weight loss and exercise will decrease pressure on your bladder and help you control your leakage  Ask him to help you create a weight loss plan if you are overweight  When should I seek immediate care? · You have severe pain  · You are confused or cannot think clearly  When should I contact my healthcare provider? · You have a fever  · You see blood in your urine  · You have pain when you urinate  · You have new or worse pain, even after treatment  · Your mouth feels dry or you have vision changes  · Your urine is cloudy or smells bad  · You have questions or concerns about your condition or care  CARE AGREEMENT:   You have the right to help plan your care  Learn about your health condition and how it may be treated  Discuss treatment options with your caregivers to decide what care you want to receive  You always have the right to refuse treatment  The above information is an  only  It is not intended as medical advice for individual conditions or treatments  Talk to your doctor, nurse or pharmacist before following any medical regimen to see if it is safe and effective for you  © 2017 [facility] Information is for End User's use only and may not be sold, redistributed or otherwise used for commercial purposes  All illustrations and images included in CareNotes® are the copyrighted property of [employer] M [employer] Inc  or [employer]  Cigarette Smoking and Your Health   AMBULATORY CARE:   Risks to your health if you smoke:  Nicotine and other chemicals found in tobacco damage every cell in your body  Even if you are a light smoker, you have an increased risk for cancer, heart disease, and lung disease  If you are pregnant or have diabetes, smoking increases your risk for complications  Benefits to your health if you stop smoking:   · You decrease respiratory symptoms such as coughing, wheezing, and shortness of breath       · You reduce your risk for cancers of the lung, mouth, throat, kidney, bladder, pancreas, stomach, and cervix  If you already have cancer, you increase the benefits of chemotherapy  You also reduce your risk for cancer returning or a second cancer from developing  · You reduce your risk for heart disease, blood clots, heart attack, and stroke  · You reduce your risk for lung infections, and diseases such as pneumonia, asthma, chronic bronchitis, and emphysema  · Your circulation improves  More oxygen can be delivered to your body  If you have diabetes, you lower your risk for complications, such as kidney, artery, and eye diseases  You also lower your risk for nerve damage  Nerve damage can lead to amputations, poor vision, and blindness  · You improve your body's ability to heal and to fight infections  Benefits to the health of others if you stop smoking:  Tobacco is harmful to nonsmokers who breathe in your secondhand smoke  The following are ways the health of others around you may improve when you stop smoking:  · You lower the risks for lung cancer and heart disease in nonsmoking adults  · If you are pregnant, you lower the risk for miscarriage, early delivery, low birth weight, and stillbirth  You also lower your baby's risk for SIDS, obesity, developmental delay, and neurobehavioral problems, such as ADHD  · If you have children, you lower their risk for ear infections, colds, pneumonia, bronchitis, and asthma  For more information and support to stop smoking:   · Smokefree  gov  Phone: 1- 387 - 513-7003  Web Address: www [employer]  Follow up with your healthcare provider as directed:  Write down your questions so you remember to ask them during your visits  © 2017 [address] Information is for End User's use only and may not be sold, redistributed or otherwise used for commercial purposes  All illustrations and images included in CareNotes® are the copyrighted property of [employer]  or Ari Anderson    The above information is an educational aid only  It is not intended as medical advice for individual conditions or treatments  Talk to your doctor, nurse or pharmacist before following any medical regimen to see if it is safe and effective for you  Fall Prevention   AMBULATORY CARE:   Fall prevention  includes ways to make your home and other areas safer  It also includes ways you can move more carefully to prevent a fall  Health conditions that cause changes in your blood pressure, vision, or muscle strength and coordination may increase your risk for falls  Medicines may also increase your risk for falls if they make you dizzy, weak, or sleepy  Call 911 or have someone else call if:   · You have fallen and are unconscious  · You have fallen and cannot move part of your body  Contact your healthcare provider if:   · You have fallen and have pain or a headache  · You have questions or concerns about your condition or care  Fall prevention tips:   · Stand or sit up slowly  This may help you keep your balance and prevent falls  · Use assistive devices as directed  Your healthcare provider may suggest that you use a cane or walker to help you keep your balance  You may need to have grab bars put in your bathroom near the toilet or in the shower  · Wear shoes that fit well and have soles that   Wear shoes both inside and outside  Use slippers with good   Do not wear shoes with high heels  · Wear a personal alarm  This is a device that allows you to call 911 if you fall and need help  Ask your healthcare provider for more information  · Stay active  Exercise can help strengthen your muscles and improve your balance  Your healthcare provider may recommend water aerobics or walking  He or she may also recommend physical therapy to improve your coordination  Never start an exercise program without talking to your healthcare provider first      · Manage your medical conditions    Keep all appointments with your healthcare providers  Visit your eye doctor as directed  Home safety tips:   · Add items to prevent falls in the bathroom  Put nonslip strips on your bath or shower floor to prevent you from slipping  Use a bath mat if you do not have carpet in the bathroom  This will prevent you from falling when you step out of the bath or shower  Use a shower seat so you do not need to stand while you shower  Sit on the toilet or a chair in your bathroom to dry yourself and put on clothing  This will prevent you from losing your balance from drying or dressing yourself while you are standing  · Keep paths clear  Remove books, shoes, and other objects from walkways and stairs  Place cords for telephones and lamps out of the way so that you do not need to walk over them  Tape them down if you cannot move them  Remove small rugs  If you cannot remove a rug, secure it with double-sided tape  This will prevent you from tripping  · Install bright lights in your home  Use night lights to help light paths to the bathroom or kitchen  Always turn on the light before you start walking  · Keep items you use often on shelves within reach  Do not use a step stool to help you reach an item  · Paint or place reflective tape on the edges of your stairs  This will help you see the stairs better  Follow up with your healthcare provider as directed:  Write down your questions so you remember to ask them during your visits  © 2017 [address] [employer] Information is for End User's use only and may not be sold, redistributed or otherwise used for commercial purposes  All illustrations and images included in CareNotes® are the copyrighted property of [employer]  or Ari Anderson  The above information is an  only  It is not intended as medical advice for individual conditions or treatments   Talk to your doctor, nurse or pharmacist before following any medical regimen to see if it is safe and effective for you  Advance Directives   WHAT YOU NEED TO KNOW:   What are advance directives? Advance directives are legal documents that state your wishes and plans for medical care  These plans are made ahead of time in case you lose your ability to make decisions for yourself  Advance directives can apply to any medical decision, such as the treatments you want, and if you want to donate organs  What are the types of advance directives? There are many types of advance directives, and each state has rules about how to use them  You may choose a combination of any of the following:  · Living will: This is a written record of the treatment you want  You can also choose which treatments you do not want, which to limit, and which to stop at a certain time  This includes surgery, medicine, IV fluid, and tube feedings  · Durable power of  for healthcare [facility]): This is a written record that states who you want to make healthcare choices for you when you are unable to make them for yourself  This person, called a proxy, is usually a family member or a friend  You may choose more than 1 proxy  · Do not resuscitate (DNR) order:  A DNR order is used in case your heart stops beating or you stop breathing  It is a request not to have certain forms of treatment, such as CPR  A DNR order may be included in other types of advance directives  · Medical directive: This covers the care that you want if you are in a coma, near death, or unable to make decisions for yourself  You can list the treatments you want for each condition  Treatment may include pain medicine, surgery, blood transfusions, dialysis, IV or tube feedings, and a ventilator (breathing machine)  · Values history: This document has questions about your views, beliefs, and how you feel and think about life  This information can help others choose the care that you would choose  Why are advance directives important?   An advance directive helps you control your care  Although spoken wishes may be used, it is better to have your wishes written down  Spoken wishes can be misunderstood, or not followed  Treatments may be given even if you do not want them  An advance directive may make it easier for your family to make difficult choices about your care  How do I decide what to put in my advance directives? · Make informed decisions:  Make sure you fully understand treatments or care you may receive  Think about the benefits and problems your decisions could cause for you or your family  Talk to healthcare providers if you have concerns or questions before you write down your wishes  You may also want to talk with your [employer] or , or a   Check your state laws to make sure that what you put in your advance directive is legal      · Sign all forms:  Sign and date your advance directive when you have finished  You may also need 2 witnesses to sign the forms  Witnesses cannot be your doctor or his staff, your spouse, heirs or beneficiaries, people you owe money to, or your chosen proxy  Talk to your family, proxy, and healthcare providers about your advance directive  Give each person a copy, and keep one for yourself in a place you can get to easily  Do not keep it hidden or locked away  · Review and revise your plans: You can revise your advance directive at any time, as long as you are able to make decisions  Review your plan every year, and when there are changes in your life, or your health  When you make changes, let your family, proxy, and healthcare providers know  Give each a new copy  Where can I find more information? · [employer]  [address] , [address]  Phone: 5- 595 - [phone]  Phone: 8- [phone] - [phone]  Web Address: http://www  aafp org  · [address] [facility])  [address], [address] , [address] [facility]  Phone: [phone]  Phone: [phone]- [phone]  Web Address: Jessica duncan  CARE AGREEMENT:   You have the right to help plan your care  To help with this plan, you must learn about your health condition and treatment options  You must also learn about advance directives and how they are used  Work with your healthcare providers to decide what care will be used to treat you  You always have the right to refuse treatment  The above information is an  only  It is not intended as medical advice for individual conditions or treatments  Talk to your doctor, nurse or pharmacist before following any medical regimen to see if it is safe and effective for you  © 2017 [address] [facility] Information is for End User's use only and may not be sold, redistributed or otherwise used for commercial purposes  All illustrations and images included in CareNotes® are the copyrighted property of [employer]  or Ari Anderson

## 2019-08-29 NOTE — PROGRESS NOTES
Assessment and Plan:     Problem List Items Addressed This Visit        Digestive    GERD (gastroesophageal reflux disease)     Urged to con't avoiding triggers and stay off PPI, will try H2 blocker but if not better will restart PPI            Cardiovascular and Mediastinum    Essential hypertension     Bp elevated in office today but home numbers low and pt is symptomatic in the 69'H systolic - home cuff checked and is right on and accurate - will decrease HCTZ to 12 5 mg 1 tab PO q day and cont all other BP meds - recheck BP in 1 mo, con't checking BP at home and call in 2 wks if still having symptomatic low readings and would stop HCTZ         Relevant Medications    hydrochlorothiazide (HYDRODIURIL) 12 5 mg tablet    Takotsubo syndrome - Primary     No current CV symptoms, just saw Cardio - no meds were changed, con't follow up and meds as per Cardio         Hypotension     Symptomatic low readings at home and home cuff checked and is very accurate - will decrease HCTZ and re-eval in 4 wks, urged to keep hydrated            Other    Hypokalemia    Relevant Medications    potassium chloride (MICRO-K) 10 MEQ CR capsule      Other Visit Diagnoses     Overweight (BMI 25 0-29  9)        BMI 25 0-25 9,adult        Diet/exercise/wgt loss encouraged    Medicare annual wellness visit, initial        Screening for HIV (human immunodeficiency virus)        Relevant Orders    HIV 1/2 AG-AB combo    Need for hepatitis C screening test        Relevant Orders    Hepatitis C antibody    Encounter for immunization        Relevant Orders    PNEUMOCOCCAL POLYSACCHARIDE VACCINE 23-VALENT =>1YO SQ IM         History of Present Illness:     Patient presents for Welcome to Medicare visit  Patient Care Team:  Marsha Reyes DO as PCP - [address], CRNP     Review of Systems:     Review of Systems   Constitutional: Negative for chills and fever  HENT: Positive for hearing loss   Negative for congestion and sore throat  Eyes: Negative for pain and visual disturbance  Respiratory: Negative for shortness of breath and wheezing  Cardiovascular: Negative for chest pain, palpitations and leg swelling  Gastrointestinal: Negative for abdominal pain, blood in stool, bowel incontinence, constipation, diarrhea and nausea  Endocrine: Negative for polydipsia and polyuria  Genitourinary: Negative for difficulty urinating, dysuria, vaginal bleeding and vaginal pain  Musculoskeletal: Negative for back pain and neck pain  Skin: Negative for rash and wound  Neurological: Negative for dizziness, syncope, light-headedness and headaches  Hematological: Negative for adenopathy  Psychiatric/Behavioral: Negative for behavioral problems and confusion  The patient is not nervous/anxious           Problem List:     Patient Active Problem List   Diagnosis    De Quervain's disease (tenosynovitis)    Vertigo    Essential hypertension    Tinnitus    Third degree hemorrhoids    Takotsubo syndrome    Sudden idiopathic hearing loss of right ear    Cervical stenosis of spinal canal    Spasmodic dysphonia    Raynaud's syndrome without gangrene    Plantar fasciitis    Paresthesia of bilateral legs    Osteopenia    New daily persistent headache    Mitral regurgitation    Mass of spinal cord (HCC)    Low back pain    Insomnia    Hypokalemia    IFG (impaired fasting glucose)    Hemorrhoids    Esophagitis, reflux    Dyslipidemia    Depression with anxiety    Carpal tunnel syndrome    Arnold-Chiari malformation (HCC)    Anal fissure    Allergic state    Adenomatous colon polyp    Abnormal findings on diagnostic imaging of other parts of musculoskeletal system    Pancreatic cyst    Other emphysema (HCC)    Hypotension    GERD (gastroesophageal reflux disease)      Past Medical and Surgical History:     Past Medical History:   Diagnosis Date    Angina pectoris (HCC)     Anxiety     Breast lump     last assessed: 2013     Chest pain, unspecified     COPD [facility]chronic obstructive pulmonary disease) ([facility] [address] )     Coronary artery disease     GERD (gastroesophageal reflux disease)     Gout     last assessed: 2012     Hair loss     last assessed: Dec 15, 2016     Hyperglycemia 2017    Hyperlipidemia     Hypotension     last assessed: Nov 10, 2014     Mitral valve disorder     Myocardial infarct, old     [facility]ricular tachycardia) ([facility])      Past Surgical History:   Procedure Laterality Date    CERVICAL DISCECTOMY      Spinal     CERVICAL LAMINECTOMY      C1 with chiari decompression     COLONOSCOPY      5 yrs - onset: May 31, 2011     CRANIOTOMY      POPLITEAL SYNOVIAL CYST EXCISION      TUBAL LIGATION      US GUIDED BREAST BIOPSY RIGHT COMPLETE Right 2017      Family History:     Family History   Problem Relation Age of Onset    Stroke Mother     Other Father         blood clot in vein & CABG     Heart disease Father     Alcohol abuse Brother       Social History:     Social History     Tobacco Use   Smoking Status Former Smoker    Packs/day: 1 50    Years: 36 00    Pack years: 54 00    Types: Cigarettes    Last attempt to quit: 2008    Years since quittin 6   Smokeless Tobacco Never Used     Social History     Substance and Sexual Activity   Alcohol Use Yes    Comment: social      Social History     Substance and Sexual Activity   Drug Use No      Medications and Allergies:     Current Outpatient Medications   Medication Sig Dispense Refill    ALPRAZolam (XANAX) 0 25 mg tablet TAKE 1 TABLET BY MOUTH AT BEDTIME 30 tablet 2    atorvastatin (LIPITOR) 40 mg tablet TAKE 1 TABLET BY MOUTH ONCE DAILY 90 tablet 1    calcium carbonate (OS-FLASH) 600 MG tablet Take 600 mg by mouth 2 (two) times a day with meals      carvedilol (COREG) 6 25 mg tablet TAKE 1 TABLET BY MOUTH TWICE DAILY 180 tablet 3    cholecalciferol (VITAMIN D3) 1,000 units tablet Take 1,000 Units by mouth daily      clopidogrel (PLAVIX) 75 mg tablet TAKE 1 TABLET BY MOUTH ONCE DAILY 90 tablet 3    escitalopram (LEXAPRO) 20 mg tablet TAKE 1/2 (ONE-HALF) TABLET BY MOUTH ONCE DAILY 30 tablet 5    fluticasone (FLONASE) 50 mcg/act nasal spray       gabapentin (NEURONTIN) 400 mg capsule TAKE 4 CAPSULES BY MOUTH ONCE DAILY AS DIRECTED 360 capsule 0    hydrochlorothiazide (HYDRODIURIL) 12 5 mg tablet 1 tab PO q day 90 tablet 0    isosorbide mononitrate (IMDUR) 30 mg 24 hr tablet TAKE 1 TABLET BY MOUTH ONCE DAILY 90 tablet 2    losartan (COZAAR) 100 MG tablet TAKE 1 TABLET BY MOUTH ONCE DAILY 90 tablet 1    multivitamin (THERAGRAN) TABS Take 1 tablet by mouth daily      pantoprazole (PROTONIX) 40 mg tablet Take 1 tablet (40 mg total) by mouth 2 (two) times a day 60 tablet 2    potassium chloride (MICRO-K) 10 MEQ CR capsule Take 1 capsule (10 mEq total) by mouth daily 90 capsule 1    ranitidine (ZANTAC) 300 MG capsule Take 300 mg by mouth every evening      tiZANidine (ZANAFLEX) 4 mg tablet TAKE 1 & 1/2 (ONE & ONE-HALF) TABLETS BY MOUTH AT BEDTIME 135 tablet 1     No current facility-administered medications for this visit  Allergies   Allergen Reactions    Codeine Itching    Medical Tape Rash      Immunizations:     Immunization History   Administered Date(s) Administered    INFLUENZA 08/25/2018    Influenza Quadrivalent Preservative Free 3 years and older IM 11/10/2014, 09/15/2015, 09/06/2016    Influenza Quadrivalent, 6-35 Months IM 12/19/2017    Influenza TIV (IM) 11/29/2012, 12/06/2013    Pneumococcal Conjugate 13-Valent 08/25/2018    Tdap 12/19/2017      Medicare Screening Tests and Risk Assessments:     Didi Meraz is here for her Initial Wellness visit  Health Risk Assessment:  Patient rates overall health as good  Patient feels that their physical health rating is Same  Eyesight was rated as Same  Hearing was rated as Same   Patient feels that their emotional and mental health rating is Same  Pain experienced by patient in the last 7 days has been None  Patient states that she has experienced no weight loss or gain in last 6 months  Emotional/Mental Health:  Patient has not been feeling nervous/anxious  PHQ-9 Depression Screening:    Frequency of the following problems over the past two weeks:      1  Little interest or pleasure in doing things: 0 - not at all      2  Feeling down, depressed, or hopeless: 0 - not at all  PHQ-2 Score: 0          Broken Bones/Falls: Fall Risk Assessment:    In the past year, patient has experienced: No history of falling in past year          Bladder/Bowel:  Patient has not leaked urine accidently in the last six months  Patient reports no loss of bowel control  Immunizations:  Patient has had a flu vaccination within the last year  Patient has received a pneumonia shot  Patient has not received a shingles shot  Patient has received tetanus/diphtheria shot  Date of tetanus/diphtheria shot: 12/19/2017    Home Safety:  Patient does not have trouble with stairs inside or outside of their home  Patient currently reports that there are no safety hazards present in home, working smoke alarms, working carbon monoxide detectors  Preventative Screenings:   Breast cancer screening performed, 7/26/2018  colon cancer screen completed, 2/27/2017  cholesterol screen completed, 12/28/2017  glaucoma eye exam completed,     Nutrition:  Current diet: Regular, No Added Salt and Limited junk food with servings of the following:    Medications:  Patient is currently taking over-the-counter supplements  List of OTC medications includes: vitamin D and multivitamin and melotomin    Lifestyle Choices:  Patient reports no tobacco use  Patient has not smoked or used tobacco in the past   Patient reports alcohol use  Alcohol use per week: 3  Patient drives a vehicle  Patient wears seat belt      Current level of exercise of physical activity described by patient as: active caring for grandson, tries to walk when she can  Activities of Daily Living:  Can get out of bed by his or her self, able to dress self, able to make own meals, able to do own shopping, able to bathe self, can do own laundry/housekeeping, can manage own money, pay bills and track expenses    Previous Hospitalizations:  No hospitalization or ED visit in past 12 months        Advanced Directives:  Patient has decided on a power of   Patient has spoken to designated power of   Patient has not completed advanced directive  Preventative Screening/Counseling:      Cardiovascular:      General: Risks and Benefits Discussed and Screening Current      Counseling: Healthy Diet, Healthy Weight and Improve Exercise Tolerance          Diabetes:      General: Risks and Benefits Discussed and Screening Current      Counseling: Healthy Diet, Healthy Weight and Improve Physical Activity          Colorectal Cancer:      General: Risks and Benefits Discussed and Screening Current          Breast Cancer:      General: Risks and Benefits Discussed          Cervical Cancer:      General: Risks and Benefits Discussed and Screening Current          Osteoporosis:      General: Risks and Benefits Discussed and Screening Current          AAA:      General: Risks and Benefits Discussed and Screening Not Indicated          Glaucoma:      General: Risks and Benefits Discussed and Screening Current          HIV:      General: Risks and Benefits Discussed and Patient Declines          Hepatitis C:      General: Risks and Benefits Discussed and Patient Declines        Advanced Directives:   Patient has no living will for healthcare, does not have durable POA for healthcare, 5 wishes given       Immunizations:      Influenza: Risks & Benefits Discussed and Influenza Recommended Annually      Pneumococcal: Risks & Benefits Discussed and Pneumococcal Due Today      Shingrix: Risks & Benefits Discussed and Shingrix Vaccine Needed Today      Hepatitis B (Medium to high risk patients): Risks & Benefits Discussed and Vaccine Status Unknown      TDAP: Risks & Benefits Discussed and Tdap Vaccine UTD      Other Preventative Counseling (Non-Medicare): Fall Prevention, Increase physical activity, Car/seat belt/driving safety reviewed, Sunscreen use and Weight reduction discussed         Visual Acuity Screening    Right eye Left eye Both eyes   Without correction:      With correction: 20/40 20/40 20/30        Physical Exam:     /68 (BP Location: Right arm, Patient Position: Sitting, Cuff Size: Standard)   Pulse 72   Temp 97 9 °F (36 6 °C)   Ht 5' 5" (1 651 m)   Wt 68 5 kg (151 lb)   BMI 25 13 kg/m²     Physical Exam   Constitutional: She appears well-developed and well-nourished  No distress  HENT:   Head: Normocephalic and atraumatic  Right Ear: External ear normal    Left Ear: External ear normal    Eyes: Conjunctivae are normal  Right eye exhibits no discharge  Left eye exhibits no discharge  Neck: Neck supple  No tracheal deviation present  Cardiovascular: Normal rate, regular rhythm and normal heart sounds  Exam reveals no friction rub  No murmur heard  Pulmonary/Chest: Effort normal and breath sounds normal  No respiratory distress  She has no wheezes  She has no rales  Abdominal: Soft  She exhibits no distension  There is no tenderness  There is no guarding  Musculoskeletal: She exhibits no edema  Lymphadenopathy:     She has no cervical adenopathy  Neurological: She is alert  She exhibits normal muscle tone  Skin: Skin is warm and dry  No rash noted  Psychiatric: She has a normal mood and affect  Her behavior is normal    Nursing note and vitals reviewed

## 2019-09-01 DIAGNOSIS — Z00.00 HEALTH CARE MAINTENANCE: ICD-10-CM

## 2019-09-01 DIAGNOSIS — G89.4 CHRONIC PAIN SYNDROME: ICD-10-CM

## 2019-09-02 RX ORDER — GABAPENTIN 400 MG/1
CAPSULE ORAL
Qty: 360 CAPSULE | Refills: 0 | Status: SHIPPED | OUTPATIENT
Start: 2019-09-02 | End: 2019-11-24 | Stop reason: SDUPTHER

## 2019-09-02 RX ORDER — TIZANIDINE 4 MG/1
TABLET ORAL
Qty: 135 TABLET | Refills: 1 | Status: SHIPPED | OUTPATIENT
Start: 2019-09-02 | End: 2020-08-16

## 2019-09-04 ENCOUNTER — HOSPITAL ENCOUNTER (OUTPATIENT)
Dept: MAMMOGRAPHY | Facility: CLINIC | Age: 64
Discharge: HOME | End: 2019-09-04
Payer: MEDICARE

## 2019-09-04 ENCOUNTER — APPOINTMENT (OUTPATIENT)
Dept: LAB | Facility: CLINIC | Age: 64
End: 2019-09-04
Payer: MEDICARE

## 2019-09-04 DIAGNOSIS — Z12.39 SCREENING FOR MALIGNANT NEOPLASM OF BREAST: ICD-10-CM

## 2019-09-04 DIAGNOSIS — R10.13 EPIGASTRIC ABDOMINAL PAIN: ICD-10-CM

## 2019-09-04 DIAGNOSIS — Z11.4 SCREENING FOR HIV (HUMAN IMMUNODEFICIENCY VIRUS): ICD-10-CM

## 2019-09-04 DIAGNOSIS — E78.5 DYSLIPIDEMIA: ICD-10-CM

## 2019-09-04 DIAGNOSIS — R73.01 IFG (IMPAIRED FASTING GLUCOSE): ICD-10-CM

## 2019-09-04 DIAGNOSIS — Z11.59 NEED FOR HEPATITIS C SCREENING TEST: ICD-10-CM

## 2019-09-04 DIAGNOSIS — K21.00 ESOPHAGITIS, REFLUX: ICD-10-CM

## 2019-09-04 DIAGNOSIS — I25.10 CORONARY ARTERY DISEASE INVOLVING NATIVE CORONARY ARTERY OF NATIVE HEART WITHOUT ANGINA PECTORIS: ICD-10-CM

## 2019-09-04 DIAGNOSIS — R63.4 WEIGHT LOSS: ICD-10-CM

## 2019-09-04 DIAGNOSIS — I10 ESSENTIAL HYPERTENSION: ICD-10-CM

## 2019-09-04 LAB
ALBUMIN SERPL BCP-MCNC: 4.2 G/DL (ref 3.5–5)
ALP SERPL-CCNC: 98 U/L (ref 46–116)
ALT SERPL W P-5'-P-CCNC: 33 U/L (ref 12–78)
ANION GAP SERPL CALCULATED.3IONS-SCNC: 8 MMOL/L (ref 4–13)
AST SERPL W P-5'-P-CCNC: 18 U/L (ref 5–45)
BASOPHILS # BLD AUTO: 0.01 THOUSANDS/ΜL (ref 0–0.1)
BASOPHILS NFR BLD AUTO: 0 % (ref 0–1)
BILIRUB SERPL-MCNC: 0.53 MG/DL (ref 0.2–1)
BUN SERPL-MCNC: 24 MG/DL (ref 5–25)
CALCIUM SERPL-MCNC: 9.6 MG/DL (ref 8.3–10.1)
CHLORIDE SERPL-SCNC: 107 MMOL/L (ref 100–108)
CHOLEST SERPL-MCNC: 194 MG/DL (ref 50–200)
CO2 SERPL-SCNC: 27 MMOL/L (ref 21–32)
CREAT SERPL-MCNC: 1.21 MG/DL (ref 0.6–1.3)
EOSINOPHIL # BLD AUTO: 0.03 THOUSAND/ΜL (ref 0–0.61)
EOSINOPHIL NFR BLD AUTO: 1 % (ref 0–6)
ERYTHROCYTE [DISTWIDTH] IN BLOOD BY AUTOMATED COUNT: 13.4 % (ref 11.6–15.1)
EST. AVERAGE GLUCOSE BLD GHB EST-MCNC: 117 MG/DL
GFR SERPL CREATININE-BSD FRML MDRD: 47 ML/MIN/1.73SQ M
GLUCOSE P FAST SERPL-MCNC: 108 MG/DL (ref 65–99)
HBA1C MFR BLD: 5.7 % (ref 4.2–6.3)
HCT VFR BLD AUTO: 40.1 % (ref 34.8–46.1)
HCV AB SER QL: NORMAL
HDLC SERPL-MCNC: 45 MG/DL (ref 40–60)
HGB BLD-MCNC: 13.2 G/DL (ref 11.5–15.4)
IMM GRANULOCYTES # BLD AUTO: 0.01 THOUSAND/UL (ref 0–0.2)
IMM GRANULOCYTES NFR BLD AUTO: 0 % (ref 0–2)
LDLC SERPL CALC-MCNC: 97 MG/DL (ref 0–100)
LYMPHOCYTES # BLD AUTO: 1.38 THOUSANDS/ΜL (ref 0.6–4.47)
LYMPHOCYTES NFR BLD AUTO: 35 % (ref 14–44)
MCH RBC QN AUTO: 31.6 PG (ref 26.8–34.3)
MCHC RBC AUTO-ENTMCNC: 32.9 G/DL (ref 31.4–37.4)
MCV RBC AUTO: 96 FL (ref 82–98)
MONOCYTES # BLD AUTO: 0.38 THOUSAND/ΜL (ref 0.17–1.22)
MONOCYTES NFR BLD AUTO: 10 % (ref 4–12)
NEUTROPHILS # BLD AUTO: 2.11 THOUSANDS/ΜL (ref 1.85–7.62)
NEUTS SEG NFR BLD AUTO: 54 % (ref 43–75)
NONHDLC SERPL-MCNC: 149 MG/DL
NRBC BLD AUTO-RTO: 0 /100 WBCS
PLATELET # BLD AUTO: 308 THOUSANDS/UL (ref 149–390)
PMV BLD AUTO: 9.1 FL (ref 8.9–12.7)
POTASSIUM SERPL-SCNC: 3.7 MMOL/L (ref 3.5–5.3)
PROT SERPL-MCNC: 7.2 G/DL (ref 6.4–8.2)
RBC # BLD AUTO: 4.18 MILLION/UL (ref 3.81–5.12)
SODIUM SERPL-SCNC: 142 MMOL/L (ref 136–145)
TRIGL SERPL-MCNC: 261 MG/DL
TSH SERPL DL<=0.05 MIU/L-ACNC: 2.36 UIU/ML (ref 0.36–3.74)
WBC # BLD AUTO: 3.92 THOUSAND/UL (ref 4.31–10.16)

## 2019-09-04 PROCEDURE — 87389 HIV-1 AG W/HIV-1&-2 AB AG IA: CPT

## 2019-09-04 PROCEDURE — 84443 ASSAY THYROID STIM HORMONE: CPT

## 2019-09-04 PROCEDURE — 36415 COLL VENOUS BLD VENIPUNCTURE: CPT

## 2019-09-04 PROCEDURE — 85025 COMPLETE CBC W/AUTO DIFF WBC: CPT

## 2019-09-04 PROCEDURE — 77067 SCR MAMMO BI INCL CAD: CPT

## 2019-09-04 PROCEDURE — 80053 COMPREHEN METABOLIC PANEL: CPT

## 2019-09-04 PROCEDURE — 83036 HEMOGLOBIN GLYCOSYLATED A1C: CPT

## 2019-09-04 PROCEDURE — 86803 HEPATITIS C AB TEST: CPT

## 2019-09-04 PROCEDURE — 80061 LIPID PANEL: CPT

## 2019-09-06 LAB — HIV 1+2 AB+HIV1 P24 AG SERPL QL IA: NORMAL

## 2019-09-24 ENCOUNTER — OFFICE VISIT (OUTPATIENT)
Dept: URGENT CARE | Facility: CLINIC | Age: 64
End: 2019-09-24
Payer: MEDICARE

## 2019-09-24 VITALS
SYSTOLIC BLOOD PRESSURE: 153 MMHG | HEART RATE: 67 BPM | TEMPERATURE: 98.2 F | DIASTOLIC BLOOD PRESSURE: 74 MMHG | RESPIRATION RATE: 20 BRPM | HEIGHT: 65 IN | WEIGHT: 151 LBS | BODY MASS INDEX: 25.16 KG/M2

## 2019-09-24 DIAGNOSIS — S61.209A OPEN WOUND OF FINGER OF RIGHT HAND, INITIAL ENCOUNTER: Primary | ICD-10-CM

## 2019-09-24 PROCEDURE — 99213 OFFICE O/P EST LOW 20 MIN: CPT | Performed by: FAMILY MEDICINE

## 2019-09-24 PROCEDURE — G0463 HOSPITAL OUTPT CLINIC VISIT: HCPCS | Performed by: FAMILY MEDICINE

## 2019-09-24 RX ORDER — IBUPROFEN 200 MG
TABLET ORAL ONCE
Status: COMPLETED | OUTPATIENT
Start: 2019-09-24 | End: 2019-09-24

## 2019-09-24 RX ORDER — AMOXICILLIN AND CLAVULANATE POTASSIUM 875; 125 MG/1; MG/1
1 TABLET, FILM COATED ORAL EVERY 12 HOURS SCHEDULED
Qty: 14 TABLET | Refills: 0 | Status: SHIPPED | OUTPATIENT
Start: 2019-09-24 | End: 2019-10-01

## 2019-09-24 RX ADMIN — Medication 1 APPLICATION: at 18:51

## 2019-09-24 NOTE — PROGRESS NOTES
NAME: Lamar Galloway is a 75 y o  female  : 1955    MRN: [id number]      Assessment and Plan   Open wound of finger of right hand, initial encounter [S61 209A]  1  Open wound of finger of right hand, initial encounter  neomycin-bacitracin-polymyxin (NEOSPORIN) ointment    amoxicillin-clavulanate (AUGMENTIN) 875-125 mg per tablet     Administrations This Visit     neomycin-bacitracin-polymyxin (NEOSPORIN) ointment     Admin Date  2019 Action  Given Dose  1 application Route  Topical Administered By  Soham Barahona                  Patient Instructions   Patient Instructions   F/u here as needed  F/u with PCP in 2-3 days  Begin Augmentin  Wound cleansed and bandaged  Proceed to ER if symptoms worsen  Chief Complaint     Chief Complaint   Patient presents with    Thumb Injury     Pt states she caught her thumb on her puppy's tooth and won't stop bleeding  O2 is 96% RA         History of Present Illness   Here c/o R thumb laceration  She cut her thumb on her son's puppys teeth  Dog UTD on vaccinations  She is UTC on tetanus  Happened today  At 1:30 pm      Review of Systems   Review of Systems   Constitutional: Negative for fatigue and fever  HENT: Negative for ear pain and trouble swallowing  Eyes: Negative for visual disturbance  Respiratory: Negative for chest tightness and shortness of breath  Cardiovascular: Negative for chest pain  Gastrointestinal: Negative for abdominal pain, diarrhea, nausea and vomiting  Genitourinary: Negative for difficulty urinating and dysuria  Skin: Positive for wound  Negative for rash  Neurological: Negative for weakness and headaches  Psychiatric/Behavioral: Negative for behavioral problems and confusion           Current Medications       Current Outpatient Medications:     ALPRAZolam (XANAX) 0 25 mg tablet, TAKE 1 TABLET BY MOUTH AT BEDTIME, Disp: 30 tablet, Rfl: 2    atorvastatin (LIPITOR) 40 mg tablet, TAKE 1 TABLET BY MOUTH ONCE DAILY, Disp: 90 tablet, Rfl: 1    calcium carbonate (OS-FLASH) 600 MG tablet, Take 600 mg by mouth 2 (two) times a day with meals, Disp: , Rfl:     carvedilol (COREG) 6 25 mg tablet, TAKE 1 TABLET BY MOUTH TWICE DAILY, Disp: 180 tablet, Rfl: 3    cholecalciferol (VITAMIN D3) 1,000 units tablet, Take 1,000 Units by mouth daily, Disp: , Rfl:     clopidogrel (PLAVIX) 75 mg tablet, TAKE 1 TABLET BY MOUTH ONCE DAILY, Disp: 90 tablet, Rfl: 3    escitalopram (LEXAPRO) 20 mg tablet, TAKE 1/2 (ONE-HALF) TABLET BY MOUTH ONCE DAILY, Disp: 30 tablet, Rfl: 5    fluticasone (FLONASE) 50 mcg/act nasal spray, , Disp: , Rfl:     gabapentin (NEURONTIN) 400 mg capsule, TAKE 4 CAPSULES BY MOUTH ONCE DAILY AS DIRECTED, Disp: 360 capsule, Rfl: 0    hydrochlorothiazide (HYDRODIURIL) 12 5 mg tablet, 1 tab PO q day, Disp: 90 tablet, Rfl: 0    isosorbide mononitrate (IMDUR) 30 mg 24 hr tablet, TAKE 1 TABLET BY MOUTH ONCE DAILY, Disp: 90 tablet, Rfl: 2    losartan (COZAAR) 100 MG tablet, TAKE 1 TABLET BY MOUTH ONCE DAILY, Disp: 90 tablet, Rfl: 1    multivitamin (THERAGRAN) TABS, Take 1 tablet by mouth daily, Disp: , Rfl:     potassium chloride (MICRO-K) 10 MEQ CR capsule, Take 1 capsule (10 mEq total) by mouth daily, Disp: 90 capsule, Rfl: 1    ranitidine (ZANTAC) 300 MG capsule, Take 300 mg by mouth every evening, Disp: , Rfl:     tiZANidine (ZANAFLEX) 4 mg tablet, TAKE 1 & 1/2 (ONE & ONE-HALF) TABLETS BY MOUTH AT BEDTIME, Disp: 135 tablet, Rfl: 1    amoxicillin-clavulanate (AUGMENTIN) 875-125 mg per tablet, Take 1 tablet by mouth every 12 (twelve) hours for 7 days, Disp: 14 tablet, Rfl: 0    pantoprazole (PROTONIX) 40 mg tablet, Take 1 tablet (40 mg total) by mouth 2 (two) times a day (Patient not taking: Reported on 9/24/2019), Disp: 60 tablet, Rfl: 2  No current facility-administered medications for this visit       Current Allergies     Allergies as of 09/24/2019 - Reviewed 09/24/2019   Allergen Reaction Noted    Codeine Itching 02/22/2017    Medical tape Rash 03/16/2015              Past Medical History:   Diagnosis Date    Angina pectoris ([facility] )     Anxiety     Breast lump     last assessed: Jan 22, 2013     Chest pain, unspecified     COPD (chronic obstructive pulmonary disease) ([facility] )     Coronary artery disease     GERD (gastroesophageal reflux disease)     Gout     last assessed: Nov 26, 2012     Hair loss     last assessed: Dec 15, 2016     Hyperglycemia 12/19/2017    Hyperlipidemia     Hypotension     last assessed: Nov 10, 2014     Mitral valve disorder     Myocardial infarct, old     SV[facility]ra[facility]ricular tachycardia) ([facility])        Past Surgical History:   Procedure Laterality Date    BREAST BIOPSY Right 02/28/2017    US CORE BIOPSY--BENIGN    CERVICAL DISCECTOMY      Spinal     CERVICAL LAMINECTOMY      C1 with chiari decompression     COLONOSCOPY      5 yrs - onset: May 31, 2011     CRANIOTOMY      POPLITEAL SYNOVIAL CYST EXCISION      TUBAL LIGATION      US GUIDED BREAST BIOPSY RIGHT COMPLETE Right 2/28/2017       Family History   Problem Relation Age of Onset    Stroke Mother     Other Father         blood clot in vein & CABG     Heart disease Father     Prostate cancer Father     Alcohol abuse Brother     Throat cancer Brother     No Known Problems Sister     No Known Problems Daughter     Breast cancer Maternal Grandmother     No Known Problems Daughter     No Known Problems Sister     No Known Problems Maternal Aunt     No Known Problems Maternal Aunt     No Known Problems Maternal Aunt     Colon cancer Paternal Aunt     No Known Problems Paternal [de-identified]     Colon cancer Son     No Known Problems Paternal Aunt     No Known Problems Paternal Aunt          Medications have been verified      The following portions of the patient's history were reviewed and updated as appropriate: allergies, current medications, past family history, past medical history, past social history, past surgical history and problem list     Objective   /74 (BP Location: Left arm, Patient Position: Sitting, Cuff Size: Standard)   Pulse 67   Temp 98 2 °F (36 8 °C) (Oral)   Resp 20   Ht 5' 5" (1 651 m)   Wt 68 5 kg (151 lb)   BMI 25 13 kg/m²      Physical Exam     Physical Exam   Constitutional: She is oriented to person, place, and time  She appears well-developed and well-nourished  HENT:   Head: Normocephalic  Eyes: EOM are normal    Neck: Normal range of motion  Cardiovascular: Normal rate, regular rhythm and normal heart sounds  Exam reveals no gallop and no friction rub  No murmur heard  Pulmonary/Chest: Effort normal and breath sounds normal  No respiratory distress  She has no wheezes  She has no rales  Abdominal: Soft  Bowel sounds are normal  She exhibits no distension  There is no tenderness  There is no rebound and no guarding  Musculoskeletal: Normal range of motion  Neurological: She is oriented to person, place, and time  Skin: Skin is warm and dry  No rash noted  R thumb- 2 cm laceration  Proximal phalanx   Psychiatric: She has a normal mood and affect  Thought content normal    Nursing note and vitals reviewed

## 2019-09-26 ENCOUNTER — OFFICE VISIT (OUTPATIENT)
Dept: FAMILY MEDICINE CLINIC | Facility: HOSPITAL | Age: 64
End: 2019-09-26
Payer: MEDICARE

## 2019-09-26 VITALS
HEART RATE: 68 BPM | HEIGHT: 65 IN | SYSTOLIC BLOOD PRESSURE: 142 MMHG | DIASTOLIC BLOOD PRESSURE: 78 MMHG | BODY MASS INDEX: 24.66 KG/M2 | WEIGHT: 148 LBS | TEMPERATURE: 98 F

## 2019-09-26 DIAGNOSIS — R19.7 DIARRHEA, UNSPECIFIED TYPE: ICD-10-CM

## 2019-09-26 DIAGNOSIS — R73.01 IFG (IMPAIRED FASTING GLUCOSE): Primary | ICD-10-CM

## 2019-09-26 DIAGNOSIS — I95.9 SYMPTOMATIC HYPOTENSION: ICD-10-CM

## 2019-09-26 DIAGNOSIS — I10 ESSENTIAL HYPERTENSION: ICD-10-CM

## 2019-09-26 DIAGNOSIS — E78.5 DYSLIPIDEMIA: ICD-10-CM

## 2019-09-26 PROCEDURE — 99214 OFFICE O/P EST MOD 30 MIN: CPT | Performed by: INTERNAL MEDICINE

## 2019-09-26 NOTE — ASSESSMENT & PLAN NOTE
Bp elevate but pt still reporting symptomatic low readings at home - primarily with change in position - will change HCTZ to nightly dosing - if not better switch Losartan to evening as well, con't Coreg bid, recheck in 2 mos

## 2019-09-26 NOTE — ASSESSMENT & PLAN NOTE
TC/LDL at goal, cont' current statin, HDL low and TG elevated - again encouraged diet/exercise/wgt loss, recheck FLP annually

## 2019-09-26 NOTE — PROGRESS NOTES
Assessment/Plan:    IFG (impaired fasting glucose)  Sugars still in IFG range - urged low sugar/carb diet and increase activity, recheck BW in 6 mo - order given    Dyslipidemia  TC/LDL at goal, cont' current statin, HDL low and TG elevated - again encouraged diet/exercise/wgt loss, recheck FLP annually    Essential hypertension  Bp elevate but pt still reporting symptomatic low readings at home - primarily with change in position - will change HCTZ to nightly dosing - if not better switch Losartan to evening as well, con't Coreg bid, recheck in 2 mos       Diagnoses and all orders for this visit:    IFG (impaired fasting glucose)  -     Glucose, fasting; Future  -     Hemoglobin A1C; Future    Dyslipidemia    Essential hypertension    Symptomatic hypotension  Comments:  Encouraged to change position slowly and keep hydrated, con't current BP meds but switch timing of HCTZ to evening - if not better switch Losartan to pm as well    Diarrhea, unspecified type  Comments:  Enouraged bland diet and Imodium prn, told to call if not better in 1 wk or if abd pain/F/blood in stool/V occurred      Colonoscopy 2/17 - 5 yrs    Mammo 9/18    Dexa 7/18 - osteopenia    PAP 3/17 - Dr Vivien Gomez    She had her flu vaccine at the pharmacy already      Subjective:      Patient ID: Gabe Gonzales is a 75 y o  female  HPI Pt here for follow up appt and BW results    BW results were d/w pt in detail: FBS/A1C 108/5 7, rest of CMP/Hep C/HIV/TSH was wnl, CBC with slightly low WBC ct at 3 92 but differential nml as was H/H and plt, FLP with elevated TG at 261 and HDL low at 45 but TC good at 194 and LDL good at 97    Def of nml vs IFG vs DM was d/w pt in detail  Diet/exercise was reviewed - wgt down 1 lb from April  She is watching sugars and carbs and is trying to keep active with babysitting but she does no formal exercise  Goal FLP was d/w pt in detail  Diet/exercise reviewed as noted above    She is taking her Atorvastatin daily as directed w/o Se  She notes no stroke/TIA symptoms/CP  Last visit pt was noting symptomatic low BP readings at home although she was elevated in the office  Her HCTZ was decreased to 12 5 mg 1 tab PO q day and she was told to con't all other BP meds  She has been taking the HCTZ at 12 5 mg  She con't to check her BP at home and it was 120's/70's this am   She has some readings in the 150's/80's as well  She con't to have symptoms when she leans down and gets up or is doing a lot of movement of head at work - during those episodes BP is 70-80's/40's  She is taking all her meds at once in the am      She has had diarrhea for about a week  Her  at home did as well  She notes no abd pain/N/V/blood in stool/F  Her  has gradually improved  Colonoscopy 2/17 - 5 yrs    Mammo 9/18    Dexa 7/18 - osteopenia    PAP 3/17 - Dr Rachel Montgomery    She had her flu vaccine at the pharmacy already      Review of Systems   Constitutional: Negative for chills and fever  HENT: Negative for congestion and sore throat  Eyes: Negative for pain and visual disturbance  Respiratory: Negative for cough, shortness of breath and wheezing  Cardiovascular: Negative for chest pain, palpitations and leg swelling  Gastrointestinal: Positive for diarrhea  Negative for abdominal pain, blood in stool, constipation and nausea  Genitourinary: Negative for difficulty urinating and dysuria  Musculoskeletal: Positive for neck pain  Negative for back pain  Skin: Negative for rash and wound  Neurological: Positive for dizziness  Negative for syncope and headaches  Hematological: Negative for adenopathy  Psychiatric/Behavioral: Negative for behavioral problems and confusion           Objective:    /78 (BP Location: Right arm, Patient Position: Sitting, Cuff Size: Standard)   Pulse 68   Temp 98 °F (36 7 °C)   Ht 5' 5" (1 651 m)   Wt 67 1 kg (148 lb)   BMI 24 63 kg/m²      Physical Exam   Constitutional: She appears well-developed and well-nourished  No distress  HENT:   Head: Normocephalic and atraumatic  Eyes: Conjunctivae are normal  Right eye exhibits no discharge  Left eye exhibits no discharge  Neck: Neck supple  No tracheal deviation present  Cardiovascular: Normal rate, regular rhythm and normal heart sounds  Exam reveals no friction rub  No murmur heard  Pulmonary/Chest: Effort normal and breath sounds normal  No respiratory distress  She has no wheezes  She has no rales  Abdominal: Soft  She exhibits no distension  There is no tenderness  There is no rebound and no guarding  Musculoskeletal: She exhibits no edema  Neurological: She is alert  She exhibits normal muscle tone  Skin: Skin is warm and dry  No rash noted  Psychiatric: She has a normal mood and affect  Her behavior is normal    Nursing note and vitals reviewed

## 2019-09-26 NOTE — ASSESSMENT & PLAN NOTE
Sugars still in IFG range - urged low sugar/carb diet and increase activity, recheck BW in 6 mo - order given

## 2019-10-27 DIAGNOSIS — I20.9 ANGINAL PAIN (HCC): ICD-10-CM

## 2019-10-28 RX ORDER — ISOSORBIDE MONONITRATE 30 MG/1
TABLET, EXTENDED RELEASE ORAL
Qty: 90 TABLET | Refills: 2 | Status: SHIPPED | OUTPATIENT
Start: 2019-10-28 | End: 2020-07-22

## 2019-11-24 DIAGNOSIS — G89.4 CHRONIC PAIN SYNDROME: ICD-10-CM

## 2019-11-24 DIAGNOSIS — I10 ESSENTIAL HYPERTENSION: ICD-10-CM

## 2019-11-24 RX ORDER — GABAPENTIN 400 MG/1
CAPSULE ORAL
Qty: 360 CAPSULE | Refills: 1 | Status: SHIPPED | OUTPATIENT
Start: 2019-11-24 | End: 2020-06-07

## 2019-11-24 RX ORDER — LOSARTAN POTASSIUM 100 MG/1
TABLET ORAL
Qty: 90 TABLET | Refills: 1 | Status: SHIPPED | OUTPATIENT
Start: 2019-11-24 | End: 2020-05-26

## 2019-11-24 RX ORDER — HYDROCHLOROTHIAZIDE 12.5 MG/1
TABLET ORAL
Qty: 90 TABLET | Refills: 1 | Status: SHIPPED | OUTPATIENT
Start: 2019-11-24 | End: 2020-05-12

## 2019-12-05 ENCOUNTER — OFFICE VISIT (OUTPATIENT)
Dept: FAMILY MEDICINE CLINIC | Facility: HOSPITAL | Age: 64
End: 2019-12-05
Payer: MEDICARE

## 2019-12-05 VITALS
SYSTOLIC BLOOD PRESSURE: 122 MMHG | WEIGHT: 148 LBS | BODY MASS INDEX: 24.66 KG/M2 | TEMPERATURE: 97.5 F | HEIGHT: 65 IN | HEART RATE: 68 BPM | DIASTOLIC BLOOD PRESSURE: 72 MMHG

## 2019-12-05 DIAGNOSIS — I10 ESSENTIAL HYPERTENSION: ICD-10-CM

## 2019-12-05 DIAGNOSIS — I95.9 HYPOTENSION, UNSPECIFIED HYPOTENSION TYPE: ICD-10-CM

## 2019-12-05 DIAGNOSIS — T78.40XD ALLERGIC STATE, SUBSEQUENT ENCOUNTER: Primary | ICD-10-CM

## 2019-12-05 DIAGNOSIS — K21.00 GASTROESOPHAGEAL REFLUX DISEASE WITH ESOPHAGITIS: ICD-10-CM

## 2019-12-05 PROCEDURE — 99214 OFFICE O/P EST MOD 30 MIN: CPT | Performed by: INTERNAL MEDICINE

## 2019-12-05 RX ORDER — FLUTICASONE PROPIONATE 50 MCG
2 SPRAY, SUSPENSION (ML) NASAL DAILY
Qty: 1 BOTTLE | Refills: 5 | Status: SHIPPED | OUTPATIENT
Start: 2019-12-05

## 2019-12-05 NOTE — PROGRESS NOTES
Assessment/Plan:    Essential hypertension  Bp at goal today by end of visit, symptomatic hypotension improved, con't current regimen, recheck in 3 mos    Hypotension  Bp improved and not as low and also having less symptoms, con't current meds, recheck in 3 mos, call with new/worse symptoms    GERD (gastroesophageal reflux disease)  Symptoms controlled with switch from Protonix to Ranitidine, no current GERD symptoms, con't to avoid triggers and con't current meds       Diagnoses and all orders for this visit:    Allergic state, subsequent encounter  -     fluticasone (FLONASE) 50 mcg/act nasal spray; 2 sprays into each nostril daily    Essential hypertension    Hypotension, unspecified hypotension type    Gastroesophageal reflux disease with esophagitis      Colonoscopy 2/17 - 5 yrs     Mammo 9/19     Dexa 7/18 - osteopenia     PAP 3/17 - Dr Mary Valdovinos     She had her flu vaccine at the pharmacy already    Subjective:      Patient ID: Wilmar Montano is a 75 y o  female  HPI Pt here for BP check  Last visit in Sept BP was not at goal but pt was noting dizziness and low BP readings at home - primarily with change in position  Pt was counseled on moving slowing and told to start taking HCTZ at night  She was told if not better to switch Losartan to qhs as well  She was told to con't Coreg bid  She is here for BP/med check  BP above goal today and meds were reviewed and med list is UTD  She has been taking HCTZ in the evening but her Losartan is still in am and Coreg is bid  She has noted improvement in dizziness and she feels it is not as frequent or as severe  She denies missing doses of meds or SE with the meds  She does  check her BP outside the office and states it runs btw "115-125/65-70"  We've checked her cuff and it is reliable  She notes no frequent HA's/double vision/CP  Bp better by end of appt    Pt has been taking Zantac instead of the Protonix   She has good control of her GERD symptoms with once daily Zantac  She notes no abd pain/N/V/blood in stool or black stools  She notes no pyrosis or regurgitation of food  Colonoscopy 2/17 - 5 yrs     Mammo 9/19     Dexa 7/18 - osteopenia     PAP 3/17 - Dr Jennifer Devries     She had her flu vaccine at the pharmacy already    Review of Systems   Constitutional: Negative for chills and fever  HENT: Negative for congestion and sore throat  Eyes: Negative for pain and visual disturbance  Respiratory: Negative for cough, shortness of breath and wheezing  Cardiovascular: Negative for chest pain and palpitations  Gastrointestinal: Negative for abdominal pain, blood in stool, constipation, diarrhea and nausea  Endocrine: Negative for polydipsia and polyuria  Genitourinary: Negative for difficulty urinating and dysuria  Musculoskeletal: Negative for back pain and neck pain  Skin: Negative for rash and wound  Neurological: Negative for dizziness, light-headedness and headaches  Psychiatric/Behavioral: Negative for behavioral problems and confusion  Objective:    /72   Pulse 68   Temp 97 5 °F (36 4 °C)   Ht 5' 5" (1 651 m)   Wt 67 1 kg (148 lb)   BMI 24 63 kg/m²      Physical Exam   Constitutional: She appears well-developed and well-nourished  No distress  HENT:   Head: Normocephalic and atraumatic  Eyes: Conjunctivae are normal  Right eye exhibits no discharge  Left eye exhibits no discharge  Neck: Neck supple  No tracheal deviation present  Cardiovascular: Normal rate, regular rhythm and normal heart sounds  Exam reveals no friction rub  No murmur heard  Pulmonary/Chest: Effort normal and breath sounds normal  No respiratory distress  She has no wheezes  She has no rales  Abdominal: Soft  She exhibits no distension  There is no tenderness  There is no rebound and no guarding  Musculoskeletal: She exhibits no edema  Neurological: She is alert  She exhibits normal muscle tone  Skin: Skin is warm and dry   No rash noted  Psychiatric: She has a normal mood and affect  Her behavior is normal    Nursing note and vitals reviewed

## 2019-12-05 NOTE — ASSESSMENT & PLAN NOTE
Bp improved and not as low and also having less symptoms, con't current meds, recheck in 3 mos, call with new/worse symptoms

## 2019-12-05 NOTE — ASSESSMENT & PLAN NOTE
Symptoms controlled with switch from Protonix to Ranitidine, no current GERD symptoms, con't to avoid triggers and con't current meds

## 2019-12-05 NOTE — ASSESSMENT & PLAN NOTE
Bp at goal today by end of visit, symptomatic hypotension improved, con't current regimen, recheck in 3 mos

## 2019-12-20 ENCOUNTER — OFFICE VISIT (OUTPATIENT)
Dept: FAMILY MEDICINE CLINIC | Facility: HOSPITAL | Age: 64
End: 2019-12-20
Payer: MEDICARE

## 2019-12-20 VITALS
HEIGHT: 65 IN | TEMPERATURE: 96 F | SYSTOLIC BLOOD PRESSURE: 108 MMHG | WEIGHT: 150 LBS | BODY MASS INDEX: 24.99 KG/M2 | HEART RATE: 74 BPM | DIASTOLIC BLOOD PRESSURE: 62 MMHG | RESPIRATION RATE: 20 BRPM

## 2019-12-20 DIAGNOSIS — J98.8 BACTERIAL RESPIRATORY INFECTION: Primary | ICD-10-CM

## 2019-12-20 DIAGNOSIS — B96.89 BACTERIAL RESPIRATORY INFECTION: Primary | ICD-10-CM

## 2019-12-20 DIAGNOSIS — I95.9 HYPOTENSION, UNSPECIFIED HYPOTENSION TYPE: ICD-10-CM

## 2019-12-20 DIAGNOSIS — J43.8 OTHER EMPHYSEMA (HCC): ICD-10-CM

## 2019-12-20 PROCEDURE — 99214 OFFICE O/P EST MOD 30 MIN: CPT | Performed by: INTERNAL MEDICINE

## 2019-12-20 RX ORDER — AMOXICILLIN AND CLAVULANATE POTASSIUM 875; 125 MG/1; MG/1
1 TABLET, FILM COATED ORAL EVERY 12 HOURS SCHEDULED
Qty: 14 TABLET | Refills: 0 | Status: SHIPPED | OUTPATIENT
Start: 2019-12-20 | End: 2019-12-27

## 2019-12-20 NOTE — ASSESSMENT & PLAN NOTE
No current s/sx of exacerbation and exam normal, not on a maintenance inhaler and has not had to use karan prn meds, call with any SOB/wheezing/F

## 2019-12-20 NOTE — PROGRESS NOTES
Assessment/Plan:    Other emphysema ([facility] )  No current s/sx of exacerbation and exam normal, not on a maintenance inhaler and has not had to use karan prn meds, call with any SOB/wheezing/F       Diagnoses and all orders for this visit:    Bacterial respiratory infection  Comments:  D/t underlying COPD and severity of symptoms will start Augmentin bid x 7 days, encouraged to con't OTC cough med and gargles/hot tea/lozenges/rest, fluids and bland small meals encouraged to prevent dehydration, call with new/worse symptoms  Orders:  -     amoxicillin-clavulanate (AUGMENTIN) 875-125 mg per tablet; Take 1 tablet by mouth every 12 (twelve) hours for 7 days    Hypotension, unspecified hypotension type  Comments:  likely d/t dehydration and coughing fits, hold HCTZ for a few days, check BP daily, when eating/drinking normally and BP goes up may restart, call with new/worse symptoms/syncope or if BP does not improve in 2 days    Other emphysema ([facility])          Subjective:      Patient ID: Julio Cesar Jennings is a 75 y o  female  HPI Pt here with c/o low blood pressure    She has had cold symptoms since Monday (4 days ago)  She notes cough, scratchy throat and minimal nasal congestion  She started to note feeling lightheaded this am and feeling weak  She had to sit on the floor suddenly a few times d/t feeling week  She did not have any actual falls or LOC  She checked her BP and it was 76/42  She checked it a few additional times and it was in the 90's/60's and then again 70's/50's  She is drinking water today but she is not eating much  She has had sick contacts  She has had no associated fevers but has had chills  She has had no N/V/D/urinary symptoms/rashes  She has had HA's and dizziness  She has known COPD but is not on a daily maintence inhaler  She has had no increase SOB/wheezing with the symptoms  She has been using Delsym and Dimetapp         Review of Systems   Constitutional: Positive for appetite change, chills and fatigue  Negative for fever  HENT: Positive for congestion and sore throat  Negative for ear pain  Eyes: Negative for discharge, redness and itching  Respiratory: Positive for cough  Negative for shortness of breath and wheezing  Cardiovascular: Negative for chest pain, palpitations and leg swelling  Gastrointestinal: Negative for diarrhea, nausea and vomiting  Genitourinary: Negative for difficulty urinating and dysuria  Musculoskeletal: Negative for arthralgias and myalgias  Skin: Negative for rash and wound  Neurological: Positive for dizziness and headaches  Hematological: Negative for adenopathy  Psychiatric/Behavioral: Negative for behavioral problems and confusion  Objective:    /62 (BP Location: Right arm, Patient Position: Sitting, Cuff Size: Standard)   Pulse 74   Temp (!) 96 °F (35 6 °C)   Resp 20   Ht 5' 5" (1 651 m)   Wt 68 kg (150 lb)   BMI 24 96 kg/m²      Physical Exam   Constitutional: She appears well-developed and well-nourished  No distress  HENT:   Head: Normocephalic and atraumatic  Left Ear: External ear normal    R TM with mod effusion   Eyes: Conjunctivae are normal  Right eye exhibits no discharge  Left eye exhibits no discharge  Neck: Neck supple  No tracheal deviation present  Cardiovascular: Normal rate, regular rhythm and normal heart sounds  Exam reveals no friction rub  No murmur heard  Pulmonary/Chest: Effort normal and breath sounds normal  No respiratory distress  She has no wheezes  She has no rales  Abdominal: Soft  She exhibits no distension  There is no tenderness  There is no rebound and no guarding  Musculoskeletal: She exhibits no edema  Lymphadenopathy:     She has cervical adenopathy  Neurological: She is alert  She exhibits normal muscle tone  Skin: Skin is warm and dry  No rash noted  Psychiatric: She has a normal mood and affect  Her behavior is normal    Nursing note and vitals reviewed

## 2019-12-22 DIAGNOSIS — F41.9 ANXIETY: ICD-10-CM

## 2019-12-22 RX ORDER — ALPRAZOLAM 0.25 MG/1
TABLET ORAL
Qty: 30 TABLET | Refills: 0 | Status: SHIPPED | OUTPATIENT
Start: 2019-12-22 | End: 2020-02-16

## 2020-02-06 ENCOUNTER — OFFICE VISIT (OUTPATIENT)
Dept: CARDIOLOGY CLINIC | Facility: CLINIC | Age: 65
End: 2020-02-06
Payer: MEDICARE

## 2020-02-06 VITALS
SYSTOLIC BLOOD PRESSURE: 145 MMHG | RESPIRATION RATE: 18 BRPM | BODY MASS INDEX: 24.93 KG/M2 | OXYGEN SATURATION: 95 % | WEIGHT: 149.6 LBS | HEIGHT: 65 IN | HEART RATE: 70 BPM | DIASTOLIC BLOOD PRESSURE: 80 MMHG

## 2020-02-06 DIAGNOSIS — I25.10 CORONARY ARTERY DISEASE INVOLVING NATIVE CORONARY ARTERY OF NATIVE HEART WITHOUT ANGINA PECTORIS: Primary | ICD-10-CM

## 2020-02-06 DIAGNOSIS — I10 ESSENTIAL HYPERTENSION: ICD-10-CM

## 2020-02-06 DIAGNOSIS — E78.5 DYSLIPIDEMIA: ICD-10-CM

## 2020-02-06 PROCEDURE — 4040F PNEUMOC VAC/ADMIN/RCVD: CPT | Performed by: INTERNAL MEDICINE

## 2020-02-06 PROCEDURE — 3077F SYST BP >= 140 MM HG: CPT | Performed by: INTERNAL MEDICINE

## 2020-02-06 PROCEDURE — 99213 OFFICE O/P EST LOW 20 MIN: CPT | Performed by: INTERNAL MEDICINE

## 2020-02-06 PROCEDURE — 3008F BODY MASS INDEX DOCD: CPT | Performed by: INTERNAL MEDICINE

## 2020-02-06 PROCEDURE — 3079F DIAST BP 80-89 MM HG: CPT | Performed by: INTERNAL MEDICINE

## 2020-02-06 PROCEDURE — 1036F TOBACCO NON-USER: CPT | Performed by: INTERNAL MEDICINE

## 2020-02-06 PROCEDURE — 1160F RVW MEDS BY RX/DR IN RCRD: CPT | Performed by: INTERNAL MEDICINE

## 2020-02-06 NOTE — ASSESSMENT & PLAN NOTE
Coronary artery disease with myocardial infarction secondary to vasospastic event  The patient has been angina free and has no signs of heart failure  We will continue present medications

## 2020-02-06 NOTE — PROGRESS NOTES
Assessment/Plan:    Coronary artery disease  Coronary artery disease with myocardial infarction secondary to vasospastic event  The patient has been angina free and has no signs of heart failure  We will continue present medications  Essential hypertension  Hypertension, higher than desirable today in the office environment  Patient is monitoring her blood pressure room and has been satisfactory  Dyslipidemia  Hyperlipidemia, stable  The patient will continue atorvastatin at 40 mg daily       Diagnoses and all orders for this visit:    Coronary artery disease involving native coronary artery of native heart without angina pectoris    Essential hypertension    Dyslipidemia          Subjective:  Feels well  Patient ID: Tylor Riley is a 75 y o  female  Patient presented to this office for the purpose of cardiac follow-up  She is known to history of prior myocardial infarction presumably basis vasospastic event  The patient also has history of hypertension  She has been feeling well from the cardiac standpoint denying any symptoms of chest pain, shortness of breath, palpitation, dizziness or lightheadedness  She has no leg edema  The following portions of the patient's history were reviewed and updated as appropriate: allergies, current medications, past family history, past medical history, past social history, past surgical history and problem list     Review of Systems   Respiratory: Negative for apnea, cough, chest tightness, shortness of breath and wheezing  Cardiovascular: Negative for chest pain, palpitations and leg swelling  Gastrointestinal: Negative for abdominal pain  Neurological: Negative for dizziness and light-headedness  Hematological: Negative  Psychiatric/Behavioral: Negative  Objective:  Stable cardiac-wise        /80 (BP Location: Right arm, Patient Position: Sitting)   Pulse 70   Resp 18   Ht 5' 5" (1 651 m)   Wt 67 9 kg (149 lb 9 6 oz) SpO2 95%   BMI 24 89 kg/m²          Physical Exam   Constitutional: She is oriented to person, place, and time  She appears well-developed and well-nourished  No distress  HENT:   Head: Normocephalic and atraumatic  Neck: Normal range of motion  Neck supple  No JVD present  No thyromegaly present  Cardiovascular: Normal rate, regular rhythm, S1 normal and S2 normal  Exam reveals no gallop and no friction rub  No murmur heard  Pulmonary/Chest: Effort normal  No respiratory distress  She has no wheezes  She has no rales  She exhibits no tenderness  Abdominal: Soft  Musculoskeletal: She exhibits no edema  Neurological: She is alert and oriented to person, place, and time  Skin: Skin is warm and dry  She is not diaphoretic  Psychiatric: She has a normal mood and affect  Vitals reviewed

## 2020-02-06 NOTE — LETTER
February 6, 2020     Referral [address] [place] [id number]    Patient: Tiffanie Harris   YOB: 1955   Date of Visit: 2/6/2020       Dear Dr Justino Chen:    Thank you for referring Brittany Geronimo to me for evaluation  Below are my notes for this consultation  If you have questions, please do not hesitate to call me  I look forward to following your patient along with you  Sincerely,        Martin Campos MD        CC: DO Martin Guadalupe MD  2/6/2020  4:05 PM  Sign at close encounter  Assessment/Plan:    Coronary artery disease  Coronary artery disease with myocardial infarction secondary to vasospastic event  The patient has been angina free and has no signs of heart failure  We will continue present medications  Essential hypertension  Hypertension, higher than desirable today in the office environment  Patient is monitoring her blood pressure room and has been satisfactory  Dyslipidemia  Hyperlipidemia, stable  The patient will continue atorvastatin at 40 mg daily       Diagnoses and all orders for this visit:    Coronary artery disease involving native coronary artery of native heart without angina pectoris    Essential hypertension    Dyslipidemia          Subjective:  Feels well  Patient ID: Tiffanie Harris is a 75 y o  female  Patient presented to this office for the purpose of cardiac follow-up  She is known to history of prior myocardial infarction presumably basis vasospastic event  The patient also has history of hypertension  She has been feeling well from the cardiac standpoint denying any symptoms of chest pain, shortness of breath, palpitation, dizziness or lightheadedness  She has no leg edema        The following portions of the patient's history were reviewed and updated as appropriate: allergies, current medications, past family history, past medical history, past social history, past surgical history and problem list     Review of Systems   Respiratory: Negative for apnea, cough, chest tightness, shortness of breath and wheezing  Cardiovascular: Negative for chest pain, palpitations and leg swelling  Gastrointestinal: Negative for abdominal pain  Neurological: Negative for dizziness and light-headedness  Hematological: Negative  Psychiatric/Behavioral: Negative  Objective:  Stable cardiac-wise  /80 (BP Location: Right arm, Patient Position: Sitting)   Pulse 70   Resp 18   Ht 5' 5" (1 651 m)   Wt 67 9 kg (149 lb 9 6 oz)   SpO2 95%   BMI 24 89 kg/m²           Physical Exam   Constitutional: She is oriented to person, place, and time  She appears well-developed and well-nourished  No distress  HENT:   Head: Normocephalic and atraumatic  Neck: Normal range of motion  Neck supple  No JVD present  No thyromegaly present  Cardiovascular: Normal rate, regular rhythm, S1 normal and S2 normal  Exam reveals no gallop and no friction rub  No murmur heard  Pulmonary/Chest: Effort normal  No respiratory distress  She has no wheezes  She has no rales  She exhibits no tenderness  Abdominal: Soft  Musculoskeletal: She exhibits no edema  Neurological: She is alert and oriented to person, place, and time  Skin: Skin is warm and dry  She is not diaphoretic  Psychiatric: She has a normal mood and affect  Vitals reviewed

## 2020-02-06 NOTE — PATIENT INSTRUCTIONS
The patient is advised to continue the same medications  She will continue to monitor her blood pressure home

## 2020-02-06 NOTE — ASSESSMENT & PLAN NOTE
Hypertension, higher than desirable today in the office environment  Patient is monitoring her blood pressure room and has been satisfactory

## 2020-02-16 DIAGNOSIS — F41.9 ANXIETY: ICD-10-CM

## 2020-02-16 RX ORDER — ALPRAZOLAM 0.25 MG/1
TABLET ORAL
Qty: 30 TABLET | Refills: 0 | Status: SHIPPED | OUTPATIENT
Start: 2020-02-16 | End: 2020-04-26

## 2020-03-01 DIAGNOSIS — E87.6 HYPOKALEMIA: ICD-10-CM

## 2020-03-02 RX ORDER — POTASSIUM CHLORIDE 750 MG/1
TABLET, EXTENDED RELEASE ORAL
Qty: 90 TABLET | Refills: 1 | Status: SHIPPED | OUTPATIENT
Start: 2020-03-02 | End: 2020-09-13

## 2020-03-12 ENCOUNTER — HOSPITAL ENCOUNTER (OUTPATIENT)
Dept: NON INVASIVE DIAGNOSTICS | Facility: HOSPITAL | Age: 65
Discharge: HOME | End: 2020-03-12
Payer: MEDICARE

## 2020-03-12 ENCOUNTER — TELEPHONE (OUTPATIENT)
Dept: OTHER | Facility: OTHER | Age: 65
End: 2020-03-12

## 2020-03-12 ENCOUNTER — OFFICE VISIT (OUTPATIENT)
Dept: FAMILY MEDICINE CLINIC | Facility: HOSPITAL | Age: 65
End: 2020-03-12
Payer: MEDICARE

## 2020-03-12 VITALS
TEMPERATURE: 98 F | BODY MASS INDEX: 24.83 KG/M2 | DIASTOLIC BLOOD PRESSURE: 78 MMHG | HEIGHT: 65 IN | WEIGHT: 149 LBS | SYSTOLIC BLOOD PRESSURE: 132 MMHG | HEART RATE: 61 BPM

## 2020-03-12 DIAGNOSIS — I25.10 CORONARY ARTERY DISEASE INVOLVING NATIVE CORONARY ARTERY OF NATIVE HEART WITHOUT ANGINA PECTORIS: ICD-10-CM

## 2020-03-12 DIAGNOSIS — M79.605 PAIN OF LEFT LOWER EXTREMITY: Primary | ICD-10-CM

## 2020-03-12 DIAGNOSIS — M79.605 PAIN OF LEFT LOWER EXTREMITY: ICD-10-CM

## 2020-03-12 DIAGNOSIS — I10 ESSENTIAL HYPERTENSION: ICD-10-CM

## 2020-03-12 DIAGNOSIS — F41.8 DEPRESSION WITH ANXIETY: ICD-10-CM

## 2020-03-12 PROCEDURE — 93971 EXTREMITY STUDY: CPT | Performed by: SURGERY

## 2020-03-12 PROCEDURE — 3079F DIAST BP 80-89 MM HG: CPT | Performed by: INTERNAL MEDICINE

## 2020-03-12 PROCEDURE — 4040F PNEUMOC VAC/ADMIN/RCVD: CPT | Performed by: INTERNAL MEDICINE

## 2020-03-12 PROCEDURE — 99214 OFFICE O/P EST MOD 30 MIN: CPT | Performed by: INTERNAL MEDICINE

## 2020-03-12 PROCEDURE — 3077F SYST BP >= 140 MM HG: CPT | Performed by: INTERNAL MEDICINE

## 2020-03-12 PROCEDURE — 1036F TOBACCO NON-USER: CPT | Performed by: INTERNAL MEDICINE

## 2020-03-12 PROCEDURE — 1160F RVW MEDS BY RX/DR IN RCRD: CPT | Performed by: INTERNAL MEDICINE

## 2020-03-12 PROCEDURE — 93971 EXTREMITY STUDY: CPT

## 2020-03-12 PROCEDURE — 3008F BODY MASS INDEX DOCD: CPT | Performed by: INTERNAL MEDICINE

## 2020-03-12 NOTE — ASSESSMENT & PLAN NOTE
Saw Cardio in Feb 2020, no meds changed, no current CV symptoms, con't Imdur/statin/Coreg/Plavix/losartan

## 2020-03-12 NOTE — PROGRESS NOTES
Assessment/Plan:    Coronary artery disease  Saw Cardio in Feb 2020, no meds changed, no current CV symptoms, con't Imdur/statin/Coreg/Plavix/losartan    Essential hypertension  BP at goal by end of appt, con't current meds    Depression with anxiety  Mood well controlled with current meds, con't current regimen, call with new/worse mood       Diagnoses and all orders for this visit:    Pain of left lower extremity  Comments:  Due to tenderness and mild edema will check Venous duplex to r/o DVT, Tyelnol/heat for now, call with new/worse symptoms, to ED with CP/SOB/worse swelling  Orders:  -     VAS lower limb venous duplex study, complete bilateral; Future    Essential hypertension    Coronary artery disease involving native coronary artery of native heart without angina pectoris    Depression with anxiety      Colonoscopy 2/17 - 5 yrs    Mammo 9/19    Dexa 7/18 - osteopenia    BW 9/19 - had FBS/A1C but did not do labs - urged to do and will call with results    PAP 3/17 - Dr Todd Bangura      Subjective:      Patient ID: Alex Guerrier is a 75 y o  female  HPI Pt here for follow up appt - 15 min late for appt    Pt notes some L knee pain for a few weeks  She was cleaning out under a bed and banged her knee on the corner of the bed  The knee seemed fine after that but a week later she started to note a "knot and it feels tender"  She notes some swelling around the ankle and some tenderness if she touches the area at the ankle  She states symptoms are not improving but are actually getting worse  She notes no numbness/tingling/weakness/locking up or giving out  She notes no redness/warmth/personal h/o thrombosis but her father did have a blood clot  She has had no prolonged immobilization or recent surgeries  She has tried OTC Ibuprofen w/some benefit  Pt saw Remus Cornea in Feb for f/u CAD/HTN  She had no meds changed and no additional studies ordered  She notes no CP/palp/orthopnea/PND    She takes her Plavix daily w/o bleeding or bruising issues  She was told to f/u in 6 mos  BP above goal today and meds were reviewed and no changes have occurred  She denies missing doses of meds or SE with the meds  She does not check her BP outside the office reguarly  She notes no frequent Ha's/dizziness/double vision/CP  Pt con't to take her Lexapro daily w/o SE  She notes no down/depressed/sad mood  She notes no anxious/irritable/panic attacks  She uses the alprazolam 1/2 tab qhs for sleep        Colonoscopy 2/17 - 5 yrs    Mammo 9/19    Dexa 7/18 - osteopenia    BW 9/19 - had FBS/A1C but did not do labs - urged to do and will call with results    PAP 3/17 - Dr Linda Rivas      Review of Systems      Objective:    /82 (BP Location: Right arm, Patient Position: Sitting, Cuff Size: Standard)   Pulse 61   Temp 98 °F (36 7 °C)   Ht 5' 5" (1 651 m)   Wt 67 6 kg (149 lb)   BMI 24 79 kg/m²      Physical Exam

## 2020-04-26 DIAGNOSIS — F41.9 ANXIETY: ICD-10-CM

## 2020-04-26 RX ORDER — ALPRAZOLAM 0.25 MG/1
TABLET ORAL
Qty: 30 TABLET | Refills: 0 | Status: SHIPPED | OUTPATIENT
Start: 2020-04-26 | End: 2020-06-22

## 2020-05-12 DIAGNOSIS — I10 ESSENTIAL HYPERTENSION: ICD-10-CM

## 2020-05-12 RX ORDER — HYDROCHLOROTHIAZIDE 12.5 MG/1
TABLET ORAL
Qty: 90 TABLET | Refills: 1 | Status: SHIPPED | OUTPATIENT
Start: 2020-05-12 | End: 2020-11-09

## 2020-05-19 ENCOUNTER — TELEMEDICINE (OUTPATIENT)
Dept: FAMILY MEDICINE CLINIC | Facility: HOSPITAL | Age: 65
End: 2020-05-19
Payer: MEDICARE

## 2020-05-19 VITALS — WEIGHT: 150 LBS | TEMPERATURE: 98.2 F | HEIGHT: 65 IN | BODY MASS INDEX: 24.99 KG/M2

## 2020-05-19 DIAGNOSIS — L50.8 URTICARIA, ACUTE: Primary | ICD-10-CM

## 2020-05-19 PROCEDURE — 99214 OFFICE O/P EST MOD 30 MIN: CPT | Performed by: INTERNAL MEDICINE

## 2020-05-19 RX ORDER — CETIRIZINE HYDROCHLORIDE 10 MG/1
10 TABLET, CHEWABLE ORAL DAILY
Start: 2020-05-19 | End: 2020-09-24

## 2020-05-19 RX ORDER — FAMOTIDINE 20 MG/1
20 TABLET, FILM COATED ORAL 2 TIMES DAILY
Start: 2020-05-19 | End: 2020-09-24

## 2020-05-26 DIAGNOSIS — I10 ESSENTIAL HYPERTENSION: ICD-10-CM

## 2020-05-26 RX ORDER — LOSARTAN POTASSIUM 100 MG/1
TABLET ORAL
Qty: 90 TABLET | Refills: 1 | Status: SHIPPED | OUTPATIENT
Start: 2020-05-26 | End: 2020-12-06

## 2020-06-07 DIAGNOSIS — G89.4 CHRONIC PAIN SYNDROME: ICD-10-CM

## 2020-06-07 DIAGNOSIS — F32.A DEPRESSION, UNSPECIFIED DEPRESSION TYPE: ICD-10-CM

## 2020-06-07 DIAGNOSIS — I25.10 CORONARY ARTERY DISEASE INVOLVING NATIVE CORONARY ARTERY OF NATIVE HEART WITHOUT ANGINA PECTORIS: ICD-10-CM

## 2020-06-07 RX ORDER — ESCITALOPRAM OXALATE 20 MG/1
TABLET ORAL
Qty: 30 TABLET | Refills: 0 | Status: SHIPPED | OUTPATIENT
Start: 2020-06-07 | End: 2020-07-21

## 2020-06-07 RX ORDER — GABAPENTIN 400 MG/1
CAPSULE ORAL
Qty: 360 CAPSULE | Refills: 0 | Status: SHIPPED | OUTPATIENT
Start: 2020-06-07 | End: 2020-08-30

## 2020-06-08 RX ORDER — CLOPIDOGREL BISULFATE 75 MG/1
TABLET ORAL
Qty: 90 TABLET | Refills: 0 | Status: SHIPPED | OUTPATIENT
Start: 2020-06-08 | End: 2020-09-14

## 2020-06-22 DIAGNOSIS — F41.9 ANXIETY: ICD-10-CM

## 2020-06-22 RX ORDER — ALPRAZOLAM 0.25 MG/1
TABLET ORAL
Qty: 30 TABLET | Refills: 0 | Status: SHIPPED | OUTPATIENT
Start: 2020-06-22 | End: 2020-08-16

## 2020-07-08 DIAGNOSIS — I25.10 CORONARY ARTERY DISEASE INVOLVING NATIVE CORONARY ARTERY OF NATIVE HEART WITHOUT ANGINA PECTORIS: ICD-10-CM

## 2020-07-08 RX ORDER — CARVEDILOL 6.25 MG/1
TABLET ORAL
Qty: 180 TABLET | Refills: 0 | Status: SHIPPED | OUTPATIENT
Start: 2020-07-08 | End: 2020-10-11

## 2020-07-21 DIAGNOSIS — F32.A DEPRESSION, UNSPECIFIED DEPRESSION TYPE: ICD-10-CM

## 2020-07-21 DIAGNOSIS — I20.9 ANGINAL PAIN (HCC): ICD-10-CM

## 2020-07-21 RX ORDER — ESCITALOPRAM OXALATE 20 MG/1
TABLET ORAL
Qty: 30 TABLET | Refills: 5 | Status: SHIPPED | OUTPATIENT
Start: 2020-07-21 | End: 2021-08-01

## 2020-07-22 RX ORDER — ISOSORBIDE MONONITRATE 30 MG/1
TABLET, EXTENDED RELEASE ORAL
Qty: 90 TABLET | Refills: 0 | Status: SHIPPED | OUTPATIENT
Start: 2020-07-22 | End: 2020-10-26

## 2020-08-02 DIAGNOSIS — E78.5 DYSLIPIDEMIA: ICD-10-CM

## 2020-08-02 RX ORDER — ATORVASTATIN CALCIUM 40 MG/1
TABLET, FILM COATED ORAL
Qty: 90 TABLET | Refills: 0 | Status: SHIPPED | OUTPATIENT
Start: 2020-08-02 | End: 2020-10-25

## 2020-08-16 DIAGNOSIS — Z00.00 HEALTH CARE MAINTENANCE: ICD-10-CM

## 2020-08-16 DIAGNOSIS — F41.9 ANXIETY: ICD-10-CM

## 2020-08-16 RX ORDER — ALPRAZOLAM 0.25 MG/1
TABLET ORAL
Qty: 30 TABLET | Refills: 0 | Status: SHIPPED | OUTPATIENT
Start: 2020-08-16 | End: 2020-10-11

## 2020-08-16 RX ORDER — TIZANIDINE 4 MG/1
TABLET ORAL
Qty: 135 TABLET | Refills: 0 | Status: SHIPPED | OUTPATIENT
Start: 2020-08-16 | End: 2021-04-12

## 2020-08-30 DIAGNOSIS — G89.4 CHRONIC PAIN SYNDROME: ICD-10-CM

## 2020-08-30 RX ORDER — GABAPENTIN 400 MG/1
CAPSULE ORAL
Qty: 360 CAPSULE | Refills: 0 | Status: SHIPPED | OUTPATIENT
Start: 2020-08-30 | End: 2020-12-06

## 2020-09-13 DIAGNOSIS — E87.6 HYPOKALEMIA: ICD-10-CM

## 2020-09-13 DIAGNOSIS — I25.10 CORONARY ARTERY DISEASE INVOLVING NATIVE CORONARY ARTERY OF NATIVE HEART WITHOUT ANGINA PECTORIS: ICD-10-CM

## 2020-09-13 RX ORDER — POTASSIUM CHLORIDE 750 MG/1
TABLET, EXTENDED RELEASE ORAL
Qty: 90 TABLET | Refills: 0 | Status: SHIPPED | OUTPATIENT
Start: 2020-09-13 | End: 2020-12-06

## 2020-09-14 RX ORDER — CLOPIDOGREL BISULFATE 75 MG/1
TABLET ORAL
Qty: 90 TABLET | Refills: 0 | Status: SHIPPED | OUTPATIENT
Start: 2020-09-14 | End: 2020-12-21

## 2020-09-18 ENCOUNTER — APPOINTMENT (OUTPATIENT)
Dept: LAB | Facility: HOSPITAL | Age: 65
End: 2020-09-18
Payer: MEDICARE

## 2020-09-18 DIAGNOSIS — R73.01 IFG (IMPAIRED FASTING GLUCOSE): ICD-10-CM

## 2020-09-18 LAB
EST. AVERAGE GLUCOSE BLD GHB EST-MCNC: 117 MG/DL
GLUCOSE P FAST SERPL-MCNC: 105 MG/DL (ref 65–99)
HBA1C MFR BLD: 5.7 %

## 2020-09-18 PROCEDURE — 83036 HEMOGLOBIN GLYCOSYLATED A1C: CPT

## 2020-09-18 PROCEDURE — 82947 ASSAY GLUCOSE BLOOD QUANT: CPT

## 2020-09-18 PROCEDURE — 36415 COLL VENOUS BLD VENIPUNCTURE: CPT

## 2020-09-24 ENCOUNTER — OFFICE VISIT (OUTPATIENT)
Dept: FAMILY MEDICINE CLINIC | Facility: HOSPITAL | Age: 65
End: 2020-09-24
Payer: MEDICARE

## 2020-09-24 VITALS
HEART RATE: 84 BPM | DIASTOLIC BLOOD PRESSURE: 66 MMHG | SYSTOLIC BLOOD PRESSURE: 124 MMHG | BODY MASS INDEX: 25.59 KG/M2 | WEIGHT: 153.6 LBS | HEIGHT: 65 IN | TEMPERATURE: 97.7 F

## 2020-09-24 DIAGNOSIS — Z12.31 ENCOUNTER FOR SCREENING MAMMOGRAM FOR MALIGNANT NEOPLASM OF BREAST: ICD-10-CM

## 2020-09-24 DIAGNOSIS — Z13.29 SCREENING FOR THYROID DISORDER: ICD-10-CM

## 2020-09-24 DIAGNOSIS — E78.5 DYSLIPIDEMIA: ICD-10-CM

## 2020-09-24 DIAGNOSIS — Z87.891 FORMER CIGARETTE SMOKER: ICD-10-CM

## 2020-09-24 DIAGNOSIS — Z12.2 ENCOUNTER FOR SCREENING FOR LUNG CANCER: ICD-10-CM

## 2020-09-24 DIAGNOSIS — E28.39 MENOPAUSE OVARIAN FAILURE: ICD-10-CM

## 2020-09-24 DIAGNOSIS — Z00.00 MEDICARE ANNUAL WELLNESS VISIT, SUBSEQUENT: ICD-10-CM

## 2020-09-24 DIAGNOSIS — I10 ESSENTIAL HYPERTENSION: ICD-10-CM

## 2020-09-24 DIAGNOSIS — R73.01 IFG (IMPAIRED FASTING GLUCOSE): Primary | ICD-10-CM

## 2020-09-24 DIAGNOSIS — Z12.39 SCREENING FOR MALIGNANT NEOPLASM OF BREAST: ICD-10-CM

## 2020-09-24 DIAGNOSIS — R14.0 ABDOMINAL BLOATING: ICD-10-CM

## 2020-09-24 PROCEDURE — 99214 OFFICE O/P EST MOD 30 MIN: CPT | Performed by: INTERNAL MEDICINE

## 2020-09-24 PROCEDURE — G0439 PPPS, SUBSEQ VISIT: HCPCS | Performed by: INTERNAL MEDICINE

## 2020-09-24 NOTE — PROGRESS NOTES
Assessment/Plan:    IFG (impaired fasting glucose)  BW stable in low IFG range - wgt is up a bit - urged low sugar/carb diet and increase exercise and get wgt down a bit, recheck BW in 6 mos    Essential hypertension  BP at goal by end of visit, diet/exercise encouraged, con't current meds, recheck in 6 mos    Dyslipidemia  FLP to be done with next labs - order given, con't current statin for now, diet/exercise encouraged       Diagnoses and all orders for this visit:    IFG (impaired fasting glucose)  -     CBC and differential; Future  -     Comprehensive metabolic panel; Future  -     Hemoglobin A1C; Future    Essential hypertension  -     CBC and differential; Future  -     Comprehensive metabolic panel; Future    Abdominal bloating  Comments:  Has some early satiety at well - will check abd/pelvic US and urged to f/u with GYN as she is overdue for pelvic, UTD on colonoscopy - call w/new or worse symp  Orders:  -     CBC and differential; Future  -     US abdomen complete; Future  -     US pelvis complete non OB; Future    Dyslipidemia  -     CBC and differential; Future  -     Comprehensive metabolic panel; Future  -     Lipid panel; Future    Screening for thyroid disorder  -     TSH, 3rd generation with Free T4 reflex; Future    Former cigarette smoker   Comments:  meets criteria for lung CA screeing - order given  Orders:  -     CT lung screening program; Future    Medicare annual wellness visit, subsequent    Encounter for screening for lung cancer  -     CT lung screening program; Future    BMI 25 0-25 9,adult  Comments:  diet/exercise/wgt loss encouraged    Encounter for screening mammogram for malignant neoplasm of breast   -     Mammo screening bilateral w 3d & cad; Future    Screening for malignant neoplasm of breast  -     Mammo screening bilateral w 3d & cad; Future    Menopause ovarian failure  -     DXA bone density spine hip and pelvis;  Future      Colonoscopy 2/17 - 5 yrs    Mammo 9/19 - new order given     Dexa 7/18 - osteopenia - new order given     BW 9/19 and 9/20     PAP 3/17 - Dr Church Sero - no recent GYN exam - urged to call for appt    She had flu vaccine and Shingrix at pharmacy    Subjective:      Patient ID: Irvin Parsons is a 77 y o  female  HPI Pt here for follow up appt/BW results and AWV    BW results were d/w pt in detail: FBS/A1c 105/5 7  Def of nml vs IFG vs DM was d/w pt in detail  Diet/exercise was reviewed - wgt up 4 lbs from March 2020  She thinks her diet has not been as good and she was not as active d/t lack of air conditioning  BP significantly above goal today and meds were reviewed and no changes have occurred  She denies missing doses of meds or SE with the meds  She does check her BP outside the office and states average is 120's/70's  She notes no frequent Ha's/woresening dizziness/double vision/CP  She has Cardio appt early Oct   BP great by end of appt  She has noted 2 wks of abd bloating and distention  She states she feels "8 months pregnant"  She feels uncomfortable carrying her grandson  She notes no actual abd pain  She notes no N/V/changes in stool/blood in stool/black stool  She notes no abnormal vaginal discharge/bleeding/pain  She has had no unintentional wgt loss  She denies feeling gassy  Colonoscopy 2/17 - 5 yrs    Mammo 9/19     Dexa 7/18 - osteopenia     BW 9/19 and 9/20     PAP 3/17 - Dr Church Sero - no recent GYN exam    She had flu vaccine and Shingrix at pharmacy        Review of Systems   Constitutional: Negative for chills, fever and unexpected weight change  HENT: Positive for hearing loss  Negative for congestion and sore throat  Eyes: Negative for pain and visual disturbance  Respiratory: Negative for cough, shortness of breath and wheezing  Cardiovascular: Negative for chest pain and palpitations  Gastrointestinal: Positive for abdominal distention   Negative for abdominal pain, blood in stool, constipation, diarrhea, nausea and vomiting  Endocrine: Negative for polydipsia and polyuria  Genitourinary: Negative for difficulty urinating, dysuria, vaginal bleeding, vaginal discharge and vaginal pain  Musculoskeletal: Positive for arthralgias and back pain  Negative for neck pain  Skin: Negative for rash and wound  Neurological: Positive for dizziness  Negative for syncope, light-headedness and headaches  Hematological: Negative for adenopathy  Psychiatric/Behavioral: Negative for behavioral problems, confusion and dysphoric mood  Objective:    /66   Pulse 84   Temp 97 7 °F (36 5 °C) (Temporal)   Ht 5' 5" (1 651 m)   Wt 69 7 kg (153 lb 9 6 oz)   BMI 25 56 kg/m²      Physical Exam  Vitals signs and nursing note reviewed  Constitutional:       General: She is not in acute distress  Appearance: She is well-developed  She is not ill-appearing  HENT:      Head: Normocephalic and atraumatic  Right Ear: Tympanic membrane normal  There is no impacted cerumen  Left Ear: Tympanic membrane normal  There is no impacted cerumen  Ears:      Comments: B/L hearing aides removed for otoscopic exam  Eyes:      General:         Right eye: No discharge  Left eye: No discharge  Conjunctiva/sclera: Conjunctivae normal    Neck:      Musculoskeletal: Neck supple  Vascular: No carotid bruit  Trachea: No tracheal deviation  Cardiovascular:      Rate and Rhythm: Normal rate and regular rhythm  Heart sounds: Normal heart sounds  No murmur  No friction rub  Pulmonary:      Effort: Pulmonary effort is normal  No respiratory distress  Breath sounds: Normal breath sounds  No wheezing, rhonchi or rales  Abdominal:      General: There is no distension  Palpations: Abdomen is soft  Tenderness: There is no abdominal tenderness  There is no guarding or rebound  Musculoskeletal:      Right lower leg: No edema  Left lower leg: No edema  Lymphadenopathy:      Cervical: No cervical adenopathy  Skin:     General: Skin is warm  Coloration: Skin is not pale  Findings: No rash  Neurological:      General: No focal deficit present  Mental Status: She is alert  Motor: No abnormal muscle tone  Gait: Gait normal    Psychiatric:         Mood and Affect: Mood normal          Behavior: Behavior normal          Thought Content:  Thought content normal          Judgment: Judgment normal

## 2020-09-24 NOTE — ASSESSMENT & PLAN NOTE
BW stable in low IFG range - wgt is up a bit - urged low sugar/carb diet and increase exercise and get wgt down a bit, recheck BW in 6 mos

## 2020-09-24 NOTE — PATIENT INSTRUCTIONS
Medicare Preventive Visit Patient Instructions  Thank you for completing your Welcome to Medicare Visit or Medicare Annual Wellness Visit today  Your next wellness visit will be due in one year (9/24/2021)  The screening/preventive services that you may require over the next 5-10 years are detailed below  Some tests may not apply to you based off risk factors and/or age  Screening tests ordered at today's visit but not completed yet may show as past due  Also, please note that scanned in results may not display below  Preventive Screenings:  Service Recommendations Previous Testing/Comments   Colorectal Cancer Screening  * Colonoscopy    * Fecal Occult Blood Test (FOBT)/Fecal Immunochemical Test (FIT)  * Fecal DNA/Cologuard Test  * Flexible Sigmoidoscopy Age: 54-65 years old   Colonoscopy: every 10 years (may be performed more frequently if at higher risk)  OR  FOBT/FIT: every 1 year  OR  Cologuard: every 3 years  OR  Sigmoidoscopy: every 5 years  Screening may be recommended earlier than age 48 if at higher risk for colorectal cancer  Also, an individualized decision between you and your healthcare provider will decide whether screening between the ages of 74-80 would be appropriate  Colonoscopy: 06/12/2017  FOBT/FIT: Not on file  Cologuard: Not on file  Sigmoidoscopy: Not on file    Screening Current     Breast Cancer Screening Age: 36 years old  Frequency: every 1-2 years  Not required if history of left and right mastectomy Mammogram: 09/04/2019    Screening Current   Cervical Cancer Screening Between the ages of 21-29, pap smear recommended once every 3 years  Between the ages of 33-67, can perform pap smear with HPV co-testing every 5 years     Recommendations may differ for women with a history of total hysterectomy, cervical cancer, or abnormal pap smears in past  Pap Smear: Not on file    Screening Not Indicated   Hepatitis C Screening Once for adults born between 1945 and 1965  More frequently in patients at high risk for Hepatitis C Hep C Antibody: 09/04/2019    Screening Current   Diabetes Screening 1-2 times per year if you're at risk for diabetes or have pre-diabetes Fasting glucose: 105 mg/dL   A1C: 5 7 %    Screening Current   Cholesterol Screening Once every 5 years if you don't have a lipid disorder  May order more often based on risk factors  Lipid panel: 09/04/2019    Screening Current     Other Preventive Screenings Covered by Medicare:  1  Abdominal Aortic Aneurysm (AAA) Screening: covered once if your at risk  You're considered to be at risk if you have a family history of AAA  2  Lung Cancer Screening: covers low dose CT scan once per year if you meet all of the following conditions: (1) Age 50-69; (2) No signs or symptoms of lung cancer; (3) Current smoker or have quit smoking within the last 15 years; (4) You have a tobacco smoking history of at least 30 pack years (packs per day multiplied by number of years you smoked); (5) You get a written order from a healthcare provider  3  Glaucoma Screening: covered annually if you're considered high risk: (1) You have diabetes OR (2) Family history of glaucoma OR (3)  aged 48 and older OR (3)  American aged 72 and older  3  Osteoporosis Screening: covered every 2 years if you meet one of the following conditions: (1) You're estrogen deficient and at risk for osteoporosis based off medical history and other findings; (2) Have a vertebral abnormality; (3) On glucocorticoid therapy for more than 3 months; (4) Have primary hyperparathyroidism; (5) On osteoporosis medications and need to assess response to drug therapy  · Last bone density test (DXA Scan): 07/26/2018  5  HIV Screening: covered annually if you're between the age of 12-76  Also covered annually if you are younger than 13 and older than 72 with risk factors for HIV infection   For pregnant patients, it is covered up to 3 times per pregnancy  Immunizations:  Immunization Recommendations   Influenza Vaccine Annual influenza vaccination during flu season is recommended for all persons aged >= 6 months who do not have contraindications   Pneumococcal Vaccine (Prevnar and Pneumovax)  * Prevnar = PCV13  * Pneumovax = PPSV23   Adults 25-60 years old: 1-3 doses may be recommended based on certain risk factors  Adults 72 years old: Prevnar (PCV13) vaccine recommended followed by Pneumovax (PPSV23) vaccine  If already received PPSV23 since turning 65, then PCV13 recommended at least one year after PPSV23 dose  Hepatitis B Vaccine 3 dose series if at intermediate or high risk (ex: diabetes, end stage renal disease, liver disease)   Tetanus (Td) Vaccine - COST NOT COVERED BY MEDICARE PART B Following completion of primary series, a booster dose should be given every 10 years to maintain immunity against tetanus  Td may also be given as tetanus wound prophylaxis  Tdap Vaccine - COST NOT COVERED BY MEDICARE PART B Recommended at least once for all adults  For pregnant patients, recommended with each pregnancy  Shingles Vaccine (Shingrix) - COST NOT COVERED BY MEDICARE PART B  2 shot series recommended in those aged 48 and above     Health Maintenance Due:      Topic Date Due    Cervical Cancer Screening  04/29/1974    DXA SCAN  07/26/2020    MAMMOGRAM  09/04/2020    Hepatitis C Screening  Completed     Immunizations Due:  There are no preventive care reminders to display for this patient  Advance Directives   What are advance directives? Advance directives are legal documents that state your wishes and plans for medical care  These plans are made ahead of time in case you lose your ability to make decisions for yourself  Advance directives can apply to any medical decision, such as the treatments you want, and if you want to donate organs  What are the types of advance directives?   There are many types of advance directives, and each state has rules about how to use them  You may choose a combination of any of the following:  · Living will: This is a written record of the treatment you want  You can also choose which treatments you do not want, which to limit, and which to stop at a certain time  This includes surgery, medicine, IV fluid, and tube feedings  · Durable power of  for healthcare [facility]): This is a written record that states who you want to make healthcare choices for you when you are unable to make them for yourself  This person, called a proxy, is usually a family member or a friend  You may choose more than 1 proxy  · Do not resuscitate (DNR) order:  A DNR order is used in case your heart stops beating or you stop breathing  It is a request not to have certain forms of treatment, such as CPR  A DNR order may be included in other types of advance directives  · Medical directive: This covers the care that you want if you are in a coma, near death, or unable to make decisions for yourself  You can list the treatments you want for each condition  Treatment may include pain medicine, surgery, blood transfusions, dialysis, IV or tube feedings, and a ventilator (breathing machine)  · Values history: This document has questions about your views, beliefs, and how you feel and think about life  This information can help others choose the care that you would choose  Why are advance directives important? An advance directive helps you control your care  Although spoken wishes may be used, it is better to have your wishes written down  Spoken wishes can be misunderstood, or not followed  Treatments may be given even if you do not want them  An advance directive may make it easier for your family to make difficult choices about your care     Weight Management   Why it is important to manage your weight:  Being overweight increases your risk of health conditions such as heart disease, high blood pressure, type 2 diabetes, and certain types of cancer  It can also increase your risk for osteoarthritis, sleep apnea, and other respiratory problems  Aim for a slow, steady weight loss  Even a small amount of weight loss can lower your risk of health problems  How to lose weight safely:  A safe and healthy way to lose weight is to eat fewer calories and get regular exercise  You can lose up about 1 pound a week by decreasing the number of calories you eat by 500 calories each day  Healthy meal plan for weight management:  A healthy meal plan includes a variety of foods, contains fewer calories, and helps you stay healthy  A healthy meal plan includes the following:  · Eat whole-grain foods more often  A healthy meal plan should contain fiber  Fiber is the part of grains, fruits, and vegetables that is not broken down by your body  Whole-grain foods are healthy and provide extra fiber in your diet  Some examples of whole-grain foods are whole-wheat breads and pastas, oatmeal, brown rice, and bulgur  · Eat a variety of vegetables every day  Include dark, leafy greens such as spinach, kale, carlos eduardo greens, and mustard greens  Eat yellow and orange vegetables such as carrots, sweet potatoes, and winter squash  · Eat a variety of fruits every day  Choose fresh or canned fruit (canned in its own juice or light syrup) instead of juice  Fruit juice has very little or no fiber  · Eat low-fat dairy foods  Drink fat-free (skim) milk or 1% milk  Eat fat-free yogurt and low-fat cottage cheese  Try low-fat cheeses such as mozzarella and other reduced-fat cheeses  · Choose meat and other protein foods that are low in fat  Choose beans or other legumes such as split peas or lentils  Choose fish, skinless poultry (chicken or turkey), or lean cuts of red meat (beef or pork)  Before you cook meat or poultry, cut off any visible fat  · Use less fat and oil  Try baking foods instead of frying them   Add less fat, such as margarine, sour cream, regular salad dressing and mayonnaise to foods  Eat fewer high-fat foods  Some examples of high-fat foods include french fries, doughnuts, ice cream, and cakes  · Eat fewer sweets  Limit foods and drinks that are high in sugar  This includes candy, cookies, regular soda, and sweetened drinks  Exercise:  Exercise at least 30 minutes per day on most days of the week  Some examples of exercise include walking, biking, dancing, and swimming  You can also fit in more physical activity by taking the stairs instead of the elevator or parking farther away from stores  Ask your healthcare provider about the best exercise plan for you  © Copyright [employer] 2018 Information is for End User's use only and may not be sold, redistributed or otherwise used for commercial purposes  All illustrations and images included in CareNotes® are the copyrighted property of [employer]  or [facility] Preventive Visit Patient Instructions  Thank you for completing your Welcome to Medicare Visit or Medicare Annual Wellness Visit today  Your next wellness visit will be due in one year (9/24/2021)  The screening/preventive services that you may require over the next 5-10 years are detailed below  Some tests may not apply to you based off risk factors and/or age  Screening tests ordered at today's visit but not completed yet may show as past due  Also, please note that scanned in results may not display below    Preventive Screenings:  Service Recommendations Previous Testing/Comments   Colorectal Cancer Screening  * Colonoscopy    * Fecal Occult Blood Test (FOBT)/Fecal Immunochemical Test (FIT)  * Fecal DNA/Cologuard Test  * Flexible Sigmoidoscopy Age: 54-65 years old   Colonoscopy: every 10 years (may be performed more frequently if at higher risk)  OR  FOBT/FIT: every 1 year  OR  Cologuard: every 3 years  OR  Sigmoidoscopy: every 5 years  Screening may be recommended earlier than age 48 if at higher risk for colorectal cancer  Also, an individualized decision between you and your healthcare provider will decide whether screening between the ages of 74-80 would be appropriate  Colonoscopy: 06/12/2017  FOBT/FIT: Not on file  Cologuard: Not on file  Sigmoidoscopy: Not on file    Screening Current     Breast Cancer Screening Age: 36 years old  Frequency: every 1-2 years  Not required if history of left and right mastectomy Mammogram: 09/04/2019    Screening Current   Cervical Cancer Screening Between the ages of 21-29, pap smear recommended once every 3 years  Between the ages of 33-67, can perform pap smear with HPV co-testing every 5 years  Recommendations may differ for women with a history of total hysterectomy, cervical cancer, or abnormal pap smears in past  Pap Smear: Not on file    Screening Not Indicated   Hepatitis C Screening Once for adults born between 1945 and 1965  More frequently in patients at high risk for Hepatitis C Hep C Antibody: 09/04/2019    Screening Current   Diabetes Screening 1-2 times per year if you're at risk for diabetes or have pre-diabetes Fasting glucose: 105 mg/dL   A1C: 5 7 %    Screening Current   Cholesterol Screening Once every 5 years if you don't have a lipid disorder  May order more often based on risk factors  Lipid panel: 09/04/2019    Screening Current     Other Preventive Screenings Covered by Medicare:  6  Abdominal Aortic Aneurysm (AAA) Screening: covered once if your at risk  You're considered to be at risk if you have a family history of AAA  7  Lung Cancer Screening: covers low dose CT scan once per year if you meet all of the following conditions: (1) Age 50-69; (2) No signs or symptoms of lung cancer; (3) Current smoker or have quit smoking within the last 15 years; (4) You have a tobacco smoking history of at least 30 pack years (packs per day multiplied by number of years you smoked); (5) You get a written order from a healthcare provider    8  Glaucoma Screening: covered annually if you're considered high risk: (1) You have diabetes OR (2) Family history of glaucoma OR (3)  aged 48 and older OR (3)  American aged 72 and older  5  Osteoporosis Screening: covered every 2 years if you meet one of the following conditions: (1) You're estrogen deficient and at risk for osteoporosis based off medical history and other findings; (2) Have a vertebral abnormality; (3) On glucocorticoid therapy for more than 3 months; (4) Have primary hyperparathyroidism; (5) On osteoporosis medications and need to assess response to drug therapy  · Last bone density test (DXA Scan): 07/26/2018  10  HIV Screening: covered annually if you're between the age of 12-76  Also covered annually if you are younger than 13 and older than 72 with risk factors for HIV infection  For pregnant patients, it is covered up to 3 times per pregnancy  Immunizations:  Immunization Recommendations   Influenza Vaccine Annual influenza vaccination during flu season is recommended for all persons aged >= 6 months who do not have contraindications   Pneumococcal Vaccine (Prevnar and Pneumovax)  * Prevnar = PCV13  * Pneumovax = PPSV23   Adults 25-60 years old: 1-3 doses may be recommended based on certain risk factors  Adults 72 years old: Prevnar (PCV13) vaccine recommended followed by Pneumovax (PPSV23) vaccine  If already received PPSV23 since turning 65, then PCV13 recommended at least one year after PPSV23 dose  Hepatitis B Vaccine 3 dose series if at intermediate or high risk (ex: diabetes, end stage renal disease, liver disease)   Tetanus (Td) Vaccine - COST NOT COVERED BY MEDICARE PART B Following completion of primary series, a booster dose should be given every 10 years to maintain immunity against tetanus  Td may also be given as tetanus wound prophylaxis  Tdap Vaccine - COST NOT COVERED BY MEDICARE PART B Recommended at least once for all adults   For pregnant patients, recommended with each pregnancy  Shingles Vaccine (Shingrix) - COST NOT COVERED BY MEDICARE PART B  2 shot series recommended in those aged 48 and above     Health Maintenance Due:      Topic Date Due    Cervical Cancer Screening  04/29/1974    DXA SCAN  07/26/2020    MAMMOGRAM  09/04/2020    Hepatitis C Screening  Completed     Immunizations Due:  There are no preventive care reminders to display for this patient  Advance Directives   What are advance directives? Advance directives are legal documents that state your wishes and plans for medical care  These plans are made ahead of time in case you lose your ability to make decisions for yourself  Advance directives can apply to any medical decision, such as the treatments you want, and if you want to donate organs  What are the types of advance directives? There are many types of advance directives, and each state has rules about how to use them  You may choose a combination of any of the following:  · Living will: This is a written record of the treatment you want  You can also choose which treatments you do not want, which to limit, and which to stop at a certain time  This includes surgery, medicine, IV fluid, and tube feedings  · Durable power of  for healthcare [facility]): This is a written record that states who you want to make healthcare choices for you when you are unable to make them for yourself  This person, called a proxy, is usually a family member or a friend  You may choose more than 1 proxy  · Do not resuscitate (DNR) order:  A DNR order is used in case your heart stops beating or you stop breathing  It is a request not to have certain forms of treatment, such as CPR  A DNR order may be included in other types of advance directives  · Medical directive: This covers the care that you want if you are in a coma, near death, or unable to make decisions for yourself  You can list the treatments you want for each condition   Treatment may include pain medicine, surgery, blood transfusions, dialysis, IV or tube feedings, and a ventilator (breathing machine)  · Values history: This document has questions about your views, beliefs, and how you feel and think about life  This information can help others choose the care that you would choose  Why are advance directives important? An advance directive helps you control your care  Although spoken wishes may be used, it is better to have your wishes written down  Spoken wishes can be misunderstood, or not followed  Treatments may be given even if you do not want them  An advance directive may make it easier for your family to make difficult choices about your care  Weight Management   Why it is important to manage your weight:  Being overweight increases your risk of health conditions such as heart disease, high blood pressure, type 2 diabetes, and certain types of cancer  It can also increase your risk for osteoarthritis, sleep apnea, and other respiratory problems  Aim for a slow, steady weight loss  Even a small amount of weight loss can lower your risk of health problems  How to lose weight safely:  A safe and healthy way to lose weight is to eat fewer calories and get regular exercise  You can lose up about 1 pound a week by decreasing the number of calories you eat by 500 calories each day  Healthy meal plan for weight management:  A healthy meal plan includes a variety of foods, contains fewer calories, and helps you stay healthy  A healthy meal plan includes the following:  · Eat whole-grain foods more often  A healthy meal plan should contain fiber  Fiber is the part of grains, fruits, and vegetables that is not broken down by your body  Whole-grain foods are healthy and provide extra fiber in your diet  Some examples of whole-grain foods are whole-wheat breads and pastas, oatmeal, brown rice, and bulgur  · Eat a variety of vegetables every day    Include dark, leafy greens such as spinach, kale, carlos eduardo greens, and mustard greens  Eat yellow and orange vegetables such as carrots, sweet potatoes, and winter squash  · Eat a variety of fruits every day  Choose fresh or canned fruit (canned in its own juice or light syrup) instead of juice  Fruit juice has very little or no fiber  · Eat low-fat dairy foods  Drink fat-free (skim) milk or 1% milk  Eat fat-free yogurt and low-fat cottage cheese  Try low-fat cheeses such as mozzarella and other reduced-fat cheeses  · Choose meat and other protein foods that are low in fat  Choose beans or other legumes such as split peas or lentils  Choose fish, skinless poultry (chicken or turkey), or lean cuts of red meat (beef or pork)  Before you cook meat or poultry, cut off any visible fat  · Use less fat and oil  Try baking foods instead of frying them  Add less fat, such as margarine, sour cream, regular salad dressing and mayonnaise to foods  Eat fewer high-fat foods  Some examples of high-fat foods include french fries, doughnuts, ice cream, and cakes  · Eat fewer sweets  Limit foods and drinks that are high in sugar  This includes candy, cookies, regular soda, and sweetened drinks  Exercise:  Exercise at least 30 minutes per day on most days of the week  Some examples of exercise include walking, biking, dancing, and swimming  You can also fit in more physical activity by taking the stairs instead of the elevator or parking farther away from stores  Ask your healthcare provider about the best exercise plan for you  © Copyright [employer] 2018 Information is for End User's use only and may not be sold, redistributed or otherwise used for commercial purposes   All illustrations and images included in CareNotes® are the copyrighted property of [employer]  or [address] [place] [address] [facility]

## 2020-09-24 NOTE — PROGRESS NOTES
Assessment and Plan:     Problem List Items Addressed This Visit        Endocrine    IFG (impaired fasting glucose) - Primary     BW stable in low IFG range - wgt is up a bit - urged low sugar/carb diet and increase exercise and get wgt down a bit, recheck BW in 6 mos         Relevant Orders    CBC and differential    Comprehensive metabolic panel    Hemoglobin A1C       Cardiovascular and Mediastinum    Essential hypertension     BP at goal by end of visit, diet/exercise encouraged, con't current meds, recheck in 6 mos         Relevant Orders    CBC and differential    Comprehensive metabolic panel       Other    Dyslipidemia     FLP to be done with next labs - order given, con't current statin for now, diet/exercise encouraged         Relevant Orders    CBC and differential    Comprehensive metabolic panel    Lipid panel      Other Visit Diagnoses     Abdominal bloating        Has some early satiety at well - will check abd/pelvic US and urged to f/u with GYN as she is overdue for pelvic, UTD on colonoscopy - call w/new or worse symp    Relevant Orders    CBC and differential    US abdomen complete    US pelvis complete non OB    Screening for thyroid disorder        Relevant Orders    TSH, 3rd generation with Free T4 reflex    Former cigarette smoker         meets criteria for lung CA screeing - order given    Relevant Orders    CT lung screening program    Medicare annual wellness visit, subsequent        Encounter for screening for lung cancer        Relevant Orders    CT lung screening program    BMI 25 0-25 9,adult        diet/exercise/wgt loss encouraged    Encounter for screening mammogram for malignant neoplasm of breast         Relevant Orders    Mammo screening bilateral w 3d & cad    Screening for malignant neoplasm of breast        Relevant Orders    Mammo screening bilateral w 3d & cad    Menopause ovarian failure        Relevant Orders    DXA bone density spine hip and pelvis        BMI Counseling: Body mass index is 25 56 kg/m²  The BMI is above normal  Nutrition recommendations include decreasing portion sizes, encouraging healthy choices of fruits and vegetables, limiting drinks that contain sugar, moderation in carbohydrate intake, increasing intake of lean protein and reducing intake of saturated and trans fat  Exercise recommendations include exercising 3-5 times per week  No pharmacotherapy was ordered  Preventive health issues were discussed with patient, and age appropriate screening tests were ordered as noted in patient's After Visit Summary  Personalized health advice and appropriate referrals for health education or preventive services given if needed, as noted in patient's After Visit Summary       History of Present Illness:     Patient presents for Medicare Annual Wellness visit    Patient Care Team:  Hugh Randolph DO as PCP - ANTONIETA Adan MD (Cardiology)  DO Jana Jarrell MD (Dermatology)     Problem List:     Patient Active Problem List   Diagnosis    De Quervain's disease (tenosynovitis)    Vertigo    Essential hypertension    Tinnitus    Third degree hemorrhoids    Takotsubo syndrome    Sudden idiopathic hearing loss of right ear    Cervical stenosis of spinal canal    Spasmodic dysphonia    Raynaud's syndrome without gangrene    Plantar fasciitis    Paresthesia of bilateral legs    Osteopenia    New daily persistent headache    Mitral regurgitation    Mass of spinal cord ([address] )    Low back pain    Insomnia    Hypokalemia    IFG (impaired fasting glucose)    Hemorrhoids    Esophagitis, reflux    Dyslipidemia    Depression with anxiety    Coronary artery disease    Carpal tunnel syndrome    Arnold-Chiari malformation (HCC)    Anal fissure    Allergic state    Adenomatous colon polyp    Abnormal findings on diagnostic imaging of other parts of musculoskeletal system    Pancreatic cyst    Other emphysema ([place] [address] )    GERD (gastroesophageal reflux disease)      Past Medical and Surgical History:     Past Medical History:   Diagnosis Date    Angina pectoris ([place] [address] )     Anxiety     Breast lump     last assessed: Jan 22, 2013     Chest pain, unspecified     COPD (chronic obstructive pulmonary disease) ([place] [address] )     Coronary artery disease     Depression with anxiety     GERD (gastroesophageal reflux disease)     Gout     last assessed: Nov 26, 2012     Hair loss     last assessed: Dec 15, 2016     Hyperglycemia 12/19/2017    Hyperlipidemia     Hypotension     last assessed: Nov 10, 2014     Mitral valve disorder     Myocardial infarct, old     SVT (supraventricular tachycardia) ([place] [address] )      Past Surgical History:   Procedure Laterality Date    BREAST BIOPSY Right 02/28/2017    US CORE BIOPSY--BENIGN    CERVICAL DISCECTOMY      Spinal     CERVICAL LAMINECTOMY      C1 with chiari decompression     COLONOSCOPY      5 yrs - onset: May 31, 2011     CRANIOTOMY      POPLITEAL SYNOVIAL CYST EXCISION      TUBAL LIGATION      US GUIDED BREAST BIOPSY RIGHT COMPLETE Right 2/28/2017      Family History:     Family History   Problem Relation Age of Onset    Stroke Mother     Other Father         blood clot in vein & CABG     Heart disease Father     Prostate cancer Father     Alcohol abuse Brother     Throat cancer Brother     No Known Problems Sister     No Known Problems Daughter     Breast cancer Maternal Grandmother     No Known Problems Daughter     No Known Problems Sister     No Known Problems Maternal Aunt     No Known Problems Maternal Aunt     No Known Problems Maternal Aunt     Colon cancer Paternal Aunt     No Known Problems Paternal [de-identified]     Colon cancer Son     No Known Problems Paternal Aunt     No Known Problems Paternal Aunt       Social History:        Social History     Socioeconomic History    Marital status: /Civil Union     Spouse name: None    Number of children: None    Years of education: None    Highest education level: None   Occupational History    Occupation: working full time    Social Needs    Financial resource strain: None    Food insecurity     Worry: None     Inability: None    Transportation needs     Medical: None     Non-medical: None   Tobacco Use    Smoking status: Former Smoker     Packs/day: 1 50     Years: 36 00     Pack years: 54 00     Types: Cigarettes     Last attempt to quit: 2008     Years since quittin 7    Smokeless tobacco: Never Used   Substance and Sexual Activity    Alcohol use: Yes     Frequency: 4 or more times a week     Drinks per session: 1 or 2     Comment: social     Drug use: No    Sexual activity: None   Lifestyle    Physical activity     Days per week: None     Minutes per session: None    Stress: None   Relationships    Social connections     Talks on phone: None     Gets together: None     Attends Confucianist service: None     Active member of club or organization: None     Attends meetings of clubs or organizations: None     Relationship status: None    Intimate partner violence     Fear of current or ex partner: None     Emotionally abused: None     Physically abused: None     Forced sexual activity: None   Other Topics Concern    None   Social History Narrative    None      Medications and Allergies:     Current Outpatient Medications   Medication Sig Dispense Refill    ALPRAZolam (XANAX) 0 25 mg tablet TAKE 1 TABLET BY MOUTH AT BEDTIME 30 tablet 0    atorvastatin (LIPITOR) 40 mg tablet Take 1 tablet by mouth once daily 90 tablet 0    calcium carbonate (OS-FLASH) 600 MG tablet Take 600 mg by mouth 2 (two) times a day with meals      carvedilol (COREG) 6 25 mg tablet Take 1 tablet by mouth twice daily 180 tablet 0    cholecalciferol (VITAMIN D3) 1,000 units tablet Take 1,000 Units by mouth daily      clopidogrel (PLAVIX) 75 mg tablet Take 1 tablet by mouth once daily 90 tablet 0    escitalopram (LEXAPRO) 20 mg tablet Take 1/2 (one-half) tablet by mouth once daily 30 tablet 5    fluticasone (FLONASE) 50 mcg/act nasal spray 2 sprays into each nostril daily 1 Bottle 5    gabapentin (NEURONTIN) 400 mg capsule TAKE 4 CAPSULES BY MOUTH ONCE DAILY AS DIRECTED 360 capsule 0    hydrochlorothiazide (HYDRODIURIL) 12 5 mg tablet Take 1 tablet by mouth once daily 90 tablet 1    isosorbide mononitrate (IMDUR) 30 mg 24 hr tablet Take 1 tablet by mouth once daily 90 tablet 0    losartan (COZAAR) 100 MG tablet Take 1 tablet by mouth once daily 90 tablet 1    multivitamin (THERAGRAN) TABS Take 1 tablet by mouth daily      potassium chloride (K-DUR,KLOR-CON) 10 mEq tablet Take 1 tablet by mouth once daily 90 tablet 0    tiZANidine (ZANAFLEX) 4 mg tablet TAKE 1 & 1/2 (ONE & ONE-HALF) TABLETS BY MOUTH AT BEDTIME 135 tablet 0     No current facility-administered medications for this visit  Allergies   Allergen Reactions    Codeine Itching    Medical Tape Rash      Immunizations:     Immunization History   Administered Date(s) Administered    INFLUENZA 11/10/2014, 09/15/2015, 09/06/2016, 08/25/2018, 09/24/2019    Influenza Quadrivalent Preservative Free 3 years and older IM 11/10/2014, 09/15/2015, 09/06/2016    Influenza Quadrivalent, 6-35 Months IM 12/19/2017    Influenza TIV (IM) 11/29/2012, 12/06/2013    Influenza, high dose seasonal 0 7 mL 09/13/2020    Pneumococcal Conjugate 13-Valent 08/25/2018    Pneumococcal Polysaccharide PPV23 08/29/2019    Tdap 12/19/2017    Zoster Vaccine Recombinant 09/13/2020      Health Maintenance:         Topic Date Due    Cervical Cancer Screening  04/29/1976    DXA SCAN  07/26/2020    MAMMOGRAM  09/04/2020    Hepatitis C Screening  Completed     There are no preventive care reminders to display for this patient     Medicare Health Risk Assessment:     /66   Pulse 84   Temp 97 7 °F (36 5 °C) (Temporal)   Ht 5' 5" (1 651 m)   Wt 69 7 kg (153 lb 9 6 oz)   BMI 25 56 kg/m²      Bairon Fernandez is here for her Subsequent Wellness visit  Last Medicare Wellness visit information reviewed, patient interviewed and updates made to the record today  Health Risk Assessment:   Patient rates overall health as very good  Patient feels that their physical health rating is same  Eyesight was rated as slightly worse  Hearing was rated as same  Patient feels that their emotional and mental health rating is same  Pain experienced in the last 7 days has been none  Patient states that she has experienced no weight loss or gain in last 6 months  Eye slight slightly worse d/t new floaters - has seen eye doc and has f/u in 2 wks    Depression Screening:   PHQ-2 Score: 0      Fall Risk Screening: In the past year, patient has experienced: no history of falling in past year      Urinary Incontinence Screening:   Patient has not leaked urine accidently in the last six months  Home Safety:  Patient does not have trouble with stairs inside or outside of their home  Patient has working smoke alarms and has no working carbon monoxide detector  Home safety hazards include: none  Nutrition:   Current diet is Regular  Medications:   Patient is currently taking over-the-counter supplements  OTC medications include: see medication list  Patient is able to manage medications  Activities of Daily Living (ADLs)/Instrumental Activities of Daily Living (IADLs):   Walk and transfer into and out of bed and chair?: Yes  Dress and groom yourself?: Yes    Bathe or shower yourself?: Yes    Feed yourself?  Yes  Do your laundry/housekeeping?: Yes  Manage your money, pay your bills and track your expenses?: Yes  Make your own meals?: Yes    Do your own shopping?: Yes    Previous Hospitalizations:   Any hospitalizations or ED visits within the last 12 months?: No      Advance Care Planning:   Living will: Yes    Durable POA for healthcare: No    Advanced directive: Yes      Cognitive Screening:   Provider or family/friend/caregiver concerned regarding cognition?: No    PREVENTIVE SCREENINGS      Cardiovascular Screening:    General: Risks and Benefits Discussed    Due for: Lipid Panel      Diabetes Screening:     General: Screening Current and Risks and Benefits Discussed    Due for: Blood Glucose      Colorectal Cancer Screening:     General: Screening Current      Breast Cancer Screening:     General: Risks and Benefits Discussed    Due for: Mammogram        Cervical Cancer Screening:    General: Screening Not Indicated and Risks and Benefits Discussed      Osteoporosis Screening:    General: Risks and Benefits Discussed    Due for: DXA Axial      Abdominal Aortic Aneurysm (AAA) Screening:        General: Risks and Benefits Discussed and Screening Not Indicated      Lung Cancer Screening:     General: Risks and Benefits Discussed    Due for: Low Dose CT (LDCT)      Hepatitis C Screening:    General: Screening Current and Risks and Benefits Discussed    Other Counseling Topics:   Car/seat belt/driving safety and regular weightbearing exercise         Sandra Carranza DO

## 2020-10-06 ENCOUNTER — OFFICE VISIT (OUTPATIENT)
Dept: CARDIOLOGY CLINIC | Facility: CLINIC | Age: 65
End: 2020-10-06
Payer: MEDICARE

## 2020-10-06 VITALS
BODY MASS INDEX: 25.66 KG/M2 | OXYGEN SATURATION: 97 % | WEIGHT: 154 LBS | SYSTOLIC BLOOD PRESSURE: 130 MMHG | HEIGHT: 65 IN | DIASTOLIC BLOOD PRESSURE: 70 MMHG | HEART RATE: 64 BPM

## 2020-10-06 DIAGNOSIS — E78.5 DYSLIPIDEMIA: ICD-10-CM

## 2020-10-06 DIAGNOSIS — I10 ESSENTIAL HYPERTENSION: ICD-10-CM

## 2020-10-06 DIAGNOSIS — I25.10 CORONARY ARTERY DISEASE INVOLVING NATIVE CORONARY ARTERY OF NATIVE HEART WITHOUT ANGINA PECTORIS: Primary | ICD-10-CM

## 2020-10-06 PROCEDURE — 99214 OFFICE O/P EST MOD 30 MIN: CPT | Performed by: INTERNAL MEDICINE

## 2020-10-11 DIAGNOSIS — I25.10 CORONARY ARTERY DISEASE INVOLVING NATIVE CORONARY ARTERY OF NATIVE HEART WITHOUT ANGINA PECTORIS: ICD-10-CM

## 2020-10-11 DIAGNOSIS — F41.9 ANXIETY: ICD-10-CM

## 2020-10-11 RX ORDER — ALPRAZOLAM 0.25 MG/1
TABLET ORAL
Qty: 30 TABLET | Refills: 0 | Status: SHIPPED | OUTPATIENT
Start: 2020-10-11 | End: 2020-12-06

## 2020-10-11 RX ORDER — CARVEDILOL 6.25 MG/1
TABLET ORAL
Qty: 180 TABLET | Refills: 0 | Status: SHIPPED | OUTPATIENT
Start: 2020-10-11 | End: 2021-02-01

## 2020-10-14 ENCOUNTER — HOSPITAL ENCOUNTER (OUTPATIENT)
Dept: CT IMAGING | Facility: HOSPITAL | Age: 65
Discharge: HOME | End: 2020-10-14
Payer: COMMERCIAL

## 2020-10-14 ENCOUNTER — HOSPITAL ENCOUNTER (OUTPATIENT)
Dept: ULTRASOUND IMAGING | Facility: HOSPITAL | Age: 65
Discharge: HOME | End: 2020-10-14
Payer: MEDICARE

## 2020-10-14 DIAGNOSIS — R14.0 ABDOMINAL BLOATING: ICD-10-CM

## 2020-10-14 DIAGNOSIS — Z12.2 ENCOUNTER FOR SCREENING FOR LUNG CANCER: ICD-10-CM

## 2020-10-14 DIAGNOSIS — Z87.891 FORMER CIGARETTE SMOKER: ICD-10-CM

## 2020-10-14 PROCEDURE — 76830 TRANSVAGINAL US NON-OB: CPT

## 2020-10-14 PROCEDURE — 76856 US EXAM PELVIC COMPLETE: CPT

## 2020-10-14 PROCEDURE — 76700 US EXAM ABDOM COMPLETE: CPT

## 2020-10-14 PROCEDURE — G0297 LDCT FOR LUNG CA SCREEN: HCPCS

## 2020-10-14 PROCEDURE — G1004 CDSM NDSC: HCPCS

## 2020-10-25 DIAGNOSIS — I20.9 ANGINAL PAIN (HCC): ICD-10-CM

## 2020-10-25 DIAGNOSIS — E78.5 DYSLIPIDEMIA: ICD-10-CM

## 2020-10-25 RX ORDER — ATORVASTATIN CALCIUM 40 MG/1
TABLET, FILM COATED ORAL
Qty: 90 TABLET | Refills: 0 | Status: SHIPPED | OUTPATIENT
Start: 2020-10-25 | End: 2021-01-22

## 2020-10-26 RX ORDER — ISOSORBIDE MONONITRATE 30 MG/1
TABLET, EXTENDED RELEASE ORAL
Qty: 90 TABLET | Refills: 0 | Status: SHIPPED | OUTPATIENT
Start: 2020-10-26 | End: 2021-01-31

## 2020-10-28 ENCOUNTER — HOSPITAL ENCOUNTER (OUTPATIENT)
Dept: BONE DENSITY | Facility: CLINIC | Age: 65
Discharge: HOME | End: 2020-10-28
Payer: MEDICARE

## 2020-10-28 ENCOUNTER — HOSPITAL ENCOUNTER (OUTPATIENT)
Dept: MAMMOGRAPHY | Facility: CLINIC | Age: 65
Discharge: HOME | End: 2020-10-28
Payer: MEDICARE

## 2020-10-28 VITALS — BODY MASS INDEX: 25.66 KG/M2 | HEIGHT: 65 IN | WEIGHT: 154 LBS

## 2020-10-28 DIAGNOSIS — E28.39 MENOPAUSE OVARIAN FAILURE: ICD-10-CM

## 2020-10-28 DIAGNOSIS — Z12.31 ENCOUNTER FOR SCREENING MAMMOGRAM FOR MALIGNANT NEOPLASM OF BREAST: ICD-10-CM

## 2020-10-28 DIAGNOSIS — Z12.39 SCREENING FOR MALIGNANT NEOPLASM OF BREAST: ICD-10-CM

## 2020-10-28 PROCEDURE — 77063 BREAST TOMOSYNTHESIS BI: CPT

## 2020-10-28 PROCEDURE — 77080 DXA BONE DENSITY AXIAL: CPT

## 2020-10-28 PROCEDURE — 77067 SCR MAMMO BI INCL CAD: CPT

## 2020-11-09 DIAGNOSIS — I10 ESSENTIAL HYPERTENSION: ICD-10-CM

## 2020-11-09 RX ORDER — HYDROCHLOROTHIAZIDE 12.5 MG/1
TABLET ORAL
Qty: 90 TABLET | Refills: 1 | Status: SHIPPED | OUTPATIENT
Start: 2020-11-09 | End: 2021-05-09

## 2020-12-06 DIAGNOSIS — E87.6 HYPOKALEMIA: ICD-10-CM

## 2020-12-06 DIAGNOSIS — G89.4 CHRONIC PAIN SYNDROME: ICD-10-CM

## 2020-12-06 DIAGNOSIS — I10 ESSENTIAL HYPERTENSION: ICD-10-CM

## 2020-12-06 DIAGNOSIS — F41.9 ANXIETY: ICD-10-CM

## 2020-12-06 RX ORDER — POTASSIUM CHLORIDE 750 MG/1
TABLET, EXTENDED RELEASE ORAL
Qty: 90 TABLET | Refills: 0 | Status: SHIPPED | OUTPATIENT
Start: 2020-12-06 | End: 2021-02-28

## 2020-12-06 RX ORDER — GABAPENTIN 400 MG/1
CAPSULE ORAL
Qty: 360 CAPSULE | Refills: 0 | Status: SHIPPED | OUTPATIENT
Start: 2020-12-06 | End: 2021-02-28

## 2020-12-06 RX ORDER — LOSARTAN POTASSIUM 100 MG/1
TABLET ORAL
Qty: 90 TABLET | Refills: 0 | Status: SHIPPED | OUTPATIENT
Start: 2020-12-06 | End: 2021-02-28

## 2020-12-06 RX ORDER — ALPRAZOLAM 0.25 MG/1
TABLET ORAL
Qty: 30 TABLET | Refills: 0 | Status: SHIPPED | OUTPATIENT
Start: 2020-12-06 | End: 2021-01-31

## 2020-12-20 DIAGNOSIS — I25.10 CORONARY ARTERY DISEASE INVOLVING NATIVE CORONARY ARTERY OF NATIVE HEART WITHOUT ANGINA PECTORIS: ICD-10-CM

## 2020-12-21 RX ORDER — CLOPIDOGREL BISULFATE 75 MG/1
TABLET ORAL
Qty: 90 TABLET | Refills: 0 | Status: SHIPPED | OUTPATIENT
Start: 2020-12-21 | End: 2021-03-15 | Stop reason: SDUPTHER

## 2021-01-22 DIAGNOSIS — E78.5 DYSLIPIDEMIA: ICD-10-CM

## 2021-01-22 RX ORDER — ATORVASTATIN CALCIUM 40 MG/1
TABLET, FILM COATED ORAL
Qty: 90 TABLET | Refills: 0 | Status: SHIPPED | OUTPATIENT
Start: 2021-01-22 | End: 2021-04-20

## 2021-01-31 DIAGNOSIS — F41.9 ANXIETY: ICD-10-CM

## 2021-01-31 DIAGNOSIS — I25.10 CORONARY ARTERY DISEASE INVOLVING NATIVE CORONARY ARTERY OF NATIVE HEART WITHOUT ANGINA PECTORIS: ICD-10-CM

## 2021-01-31 DIAGNOSIS — I20.9 ANGINAL PAIN (HCC): ICD-10-CM

## 2021-01-31 RX ORDER — ALPRAZOLAM 0.25 MG/1
TABLET ORAL
Qty: 30 TABLET | Refills: 0 | Status: SHIPPED | OUTPATIENT
Start: 2021-01-31 | End: 2021-03-14

## 2021-01-31 RX ORDER — ISOSORBIDE MONONITRATE 30 MG/1
TABLET, EXTENDED RELEASE ORAL
Qty: 90 TABLET | Refills: 0 | Status: SHIPPED | OUTPATIENT
Start: 2021-01-31 | End: 2021-05-09

## 2021-02-01 RX ORDER — CARVEDILOL 6.25 MG/1
TABLET ORAL
Qty: 180 TABLET | Refills: 0 | Status: SHIPPED | OUTPATIENT
Start: 2021-02-01 | End: 2021-05-02

## 2021-02-28 DIAGNOSIS — E87.6 HYPOKALEMIA: ICD-10-CM

## 2021-02-28 DIAGNOSIS — I10 ESSENTIAL HYPERTENSION: ICD-10-CM

## 2021-02-28 DIAGNOSIS — G89.4 CHRONIC PAIN SYNDROME: ICD-10-CM

## 2021-02-28 RX ORDER — LOSARTAN POTASSIUM 100 MG/1
TABLET ORAL
Qty: 90 TABLET | Refills: 0 | Status: SHIPPED | OUTPATIENT
Start: 2021-02-28 | End: 2021-06-06

## 2021-02-28 RX ORDER — POTASSIUM CHLORIDE 750 MG/1
TABLET, EXTENDED RELEASE ORAL
Qty: 90 TABLET | Refills: 0 | Status: SHIPPED | OUTPATIENT
Start: 2021-02-28 | End: 2021-06-21

## 2021-02-28 RX ORDER — GABAPENTIN 400 MG/1
CAPSULE ORAL
Qty: 360 CAPSULE | Refills: 0 | Status: SHIPPED | OUTPATIENT
Start: 2021-02-28 | End: 2021-05-09

## 2021-03-04 DIAGNOSIS — Z23 ENCOUNTER FOR IMMUNIZATION: ICD-10-CM

## 2021-03-09 ENCOUNTER — IMMUNIZATIONS (OUTPATIENT)
Dept: FAMILY MEDICINE CLINIC | Facility: HOSPITAL | Age: 66
End: 2021-03-09

## 2021-03-09 DIAGNOSIS — Z23 ENCOUNTER FOR IMMUNIZATION: Primary | ICD-10-CM

## 2021-03-09 PROCEDURE — 0001A SARS-COV-2 / COVID-19 MRNA VACCINE (PFIZER-BIONTECH) 30 MCG: CPT

## 2021-03-09 PROCEDURE — 91300 SARS-COV-2 / COVID-19 MRNA VACCINE (PFIZER-BIONTECH) 30 MCG: CPT

## 2021-03-14 DIAGNOSIS — F41.9 ANXIETY: ICD-10-CM

## 2021-03-14 RX ORDER — ALPRAZOLAM 0.25 MG/1
TABLET ORAL
Qty: 30 TABLET | Refills: 0 | Status: SHIPPED | OUTPATIENT
Start: 2021-03-14 | End: 2021-05-09

## 2021-03-15 DIAGNOSIS — I25.10 CORONARY ARTERY DISEASE INVOLVING NATIVE CORONARY ARTERY OF NATIVE HEART WITHOUT ANGINA PECTORIS: ICD-10-CM

## 2021-03-15 RX ORDER — CLOPIDOGREL BISULFATE 75 MG/1
75 TABLET ORAL DAILY
Qty: 90 TABLET | Refills: 1 | Status: SHIPPED | OUTPATIENT
Start: 2021-03-15 | End: 2021-06-20

## 2021-03-19 ENCOUNTER — LAB (OUTPATIENT)
Dept: LAB | Facility: HOSPITAL | Age: 66
End: 2021-03-19
Payer: MEDICARE

## 2021-03-19 DIAGNOSIS — R14.0 ABDOMINAL BLOATING: ICD-10-CM

## 2021-03-19 DIAGNOSIS — I10 ESSENTIAL HYPERTENSION: ICD-10-CM

## 2021-03-19 DIAGNOSIS — Z13.29 SCREENING FOR THYROID DISORDER: ICD-10-CM

## 2021-03-19 DIAGNOSIS — E78.5 DYSLIPIDEMIA: ICD-10-CM

## 2021-03-19 DIAGNOSIS — R73.01 IFG (IMPAIRED FASTING GLUCOSE): ICD-10-CM

## 2021-03-19 LAB
ALBUMIN SERPL BCP-MCNC: 4.2 G/DL (ref 3.5–5)
ALP SERPL-CCNC: 97 U/L (ref 46–116)
ALT SERPL W P-5'-P-CCNC: 38 U/L (ref 12–78)
ANION GAP SERPL CALCULATED.3IONS-SCNC: 5 MMOL/L (ref 4–13)
AST SERPL W P-5'-P-CCNC: 24 U/L (ref 5–45)
BASOPHILS # BLD AUTO: 0.01 THOUSANDS/ΜL (ref 0–0.1)
BASOPHILS NFR BLD AUTO: 0 % (ref 0–1)
BILIRUB SERPL-MCNC: 0.74 MG/DL (ref 0.2–1)
BUN SERPL-MCNC: 20 MG/DL (ref 5–25)
CALCIUM SERPL-MCNC: 9.4 MG/DL (ref 8.3–10.1)
CHLORIDE SERPL-SCNC: 106 MMOL/L (ref 100–108)
CHOLEST SERPL-MCNC: 223 MG/DL (ref 50–200)
CO2 SERPL-SCNC: 29 MMOL/L (ref 21–32)
CREAT SERPL-MCNC: 1.53 MG/DL (ref 0.6–1.3)
EOSINOPHIL # BLD AUTO: 0.08 THOUSAND/ΜL (ref 0–0.61)
EOSINOPHIL NFR BLD AUTO: 2 % (ref 0–6)
ERYTHROCYTE [DISTWIDTH] IN BLOOD BY AUTOMATED COUNT: 13.4 % (ref 11.6–15.1)
EST. AVERAGE GLUCOSE BLD GHB EST-MCNC: 114 MG/DL
GFR SERPL CREATININE-BSD FRML MDRD: 35 ML/MIN/1.73SQ M
GLUCOSE P FAST SERPL-MCNC: 110 MG/DL (ref 65–99)
HBA1C MFR BLD: 5.6 %
HCT VFR BLD AUTO: 44.4 % (ref 34.8–46.1)
HDLC SERPL-MCNC: 54 MG/DL
HGB BLD-MCNC: 14.9 G/DL (ref 11.5–15.4)
IMM GRANULOCYTES # BLD AUTO: 0.01 THOUSAND/UL (ref 0–0.2)
IMM GRANULOCYTES NFR BLD AUTO: 0 % (ref 0–2)
LDLC SERPL CALC-MCNC: 121 MG/DL (ref 0–100)
LYMPHOCYTES # BLD AUTO: 1.91 THOUSANDS/ΜL (ref 0.6–4.47)
LYMPHOCYTES NFR BLD AUTO: 44 % (ref 14–44)
MCH RBC QN AUTO: 32 PG (ref 26.8–34.3)
MCHC RBC AUTO-ENTMCNC: 33.6 G/DL (ref 31.4–37.4)
MCV RBC AUTO: 95 FL (ref 82–98)
MONOCYTES # BLD AUTO: 0.66 THOUSAND/ΜL (ref 0.17–1.22)
MONOCYTES NFR BLD AUTO: 15 % (ref 4–12)
NEUTROPHILS # BLD AUTO: 1.68 THOUSANDS/ΜL (ref 1.85–7.62)
NEUTS SEG NFR BLD AUTO: 39 % (ref 43–75)
NONHDLC SERPL-MCNC: 169 MG/DL
NRBC BLD AUTO-RTO: 0 /100 WBCS
PLATELET # BLD AUTO: 312 THOUSANDS/UL (ref 149–390)
PMV BLD AUTO: 8.9 FL (ref 8.9–12.7)
POTASSIUM SERPL-SCNC: 4 MMOL/L (ref 3.5–5.3)
PROT SERPL-MCNC: 7.4 G/DL (ref 6.4–8.2)
RBC # BLD AUTO: 4.66 MILLION/UL (ref 3.81–5.12)
SODIUM SERPL-SCNC: 140 MMOL/L (ref 136–145)
TRIGL SERPL-MCNC: 240 MG/DL
TSH SERPL DL<=0.05 MIU/L-ACNC: 2.28 UIU/ML (ref 0.36–3.74)
WBC # BLD AUTO: 4.35 THOUSAND/UL (ref 4.31–10.16)

## 2021-03-19 PROCEDURE — 80061 LIPID PANEL: CPT

## 2021-03-19 PROCEDURE — 36415 COLL VENOUS BLD VENIPUNCTURE: CPT

## 2021-03-19 PROCEDURE — 83036 HEMOGLOBIN GLYCOSYLATED A1C: CPT

## 2021-03-19 PROCEDURE — 80053 COMPREHEN METABOLIC PANEL: CPT

## 2021-03-19 PROCEDURE — 84443 ASSAY THYROID STIM HORMONE: CPT

## 2021-03-19 PROCEDURE — 85025 COMPLETE CBC W/AUTO DIFF WBC: CPT

## 2021-03-25 ENCOUNTER — OFFICE VISIT (OUTPATIENT)
Dept: FAMILY MEDICINE CLINIC | Facility: HOSPITAL | Age: 66
End: 2021-03-25
Payer: MEDICARE

## 2021-03-25 VITALS
HEIGHT: 65 IN | SYSTOLIC BLOOD PRESSURE: 138 MMHG | BODY MASS INDEX: 26.19 KG/M2 | DIASTOLIC BLOOD PRESSURE: 72 MMHG | OXYGEN SATURATION: 97 % | HEART RATE: 72 BPM | TEMPERATURE: 96.5 F | WEIGHT: 157.2 LBS

## 2021-03-25 DIAGNOSIS — E66.3 OVERWEIGHT (BMI 25.0-29.9): ICD-10-CM

## 2021-03-25 DIAGNOSIS — G95.89 MASS OF SPINAL CORD (HCC): ICD-10-CM

## 2021-03-25 DIAGNOSIS — I25.10 CORONARY ARTERY DISEASE INVOLVING NATIVE CORONARY ARTERY OF NATIVE HEART WITHOUT ANGINA PECTORIS: ICD-10-CM

## 2021-03-25 DIAGNOSIS — J43.8 OTHER EMPHYSEMA (HCC): ICD-10-CM

## 2021-03-25 DIAGNOSIS — E78.5 DYSLIPIDEMIA: ICD-10-CM

## 2021-03-25 DIAGNOSIS — N18.32 STAGE 3B CHRONIC KIDNEY DISEASE (HCC): ICD-10-CM

## 2021-03-25 DIAGNOSIS — I10 ESSENTIAL HYPERTENSION: ICD-10-CM

## 2021-03-25 DIAGNOSIS — R73.01 IFG (IMPAIRED FASTING GLUCOSE): Primary | ICD-10-CM

## 2021-03-25 DIAGNOSIS — R14.0 ABDOMINAL BLOATING: ICD-10-CM

## 2021-03-25 PROCEDURE — 99215 OFFICE O/P EST HI 40 MIN: CPT | Performed by: INTERNAL MEDICINE

## 2021-03-25 RX ORDER — DICYCLOMINE HYDROCHLORIDE 10 MG/1
10 CAPSULE ORAL
Qty: 120 CAPSULE | Refills: 2 | Status: SHIPPED | OUTPATIENT
Start: 2021-03-25 | End: 2021-09-12

## 2021-03-25 NOTE — ASSESSMENT & PLAN NOTE
Lab Results   Component Value Date    EGFR 35 03/19/2021    EGFR 47 09/04/2019    EGFR 47 03/14/2019    CREATININE 1 53 (H) 03/19/2021    CREATININE 1 21 09/04/2019    CREATININE 1 21 03/14/2019   BP/BS control, avoiding NSAIDs and keeping hydrated reviewed, will follow - will repeat in 6 mos - WILL ORDER AT NEXT APPT

## 2021-03-25 NOTE — ASSESSMENT & PLAN NOTE
Currently with worsening SOB with exertion, no other symptoms to indicate cardiac etiology, has had some wgt gain and recent cold which may be triggering symptoms, will try trial of Advair 100/50 1 puff INH bid, urged to rinse mouth after use, call with F/C/new/worse symptoms, PFT's done 2019

## 2021-03-25 NOTE — PATIENT INSTRUCTIONS
Weight Management   AMBULATORY CARE:   Why it is important to manage your weight:  Being overweight increases your risk of health conditions such as heart disease, high blood pressure, type 2 diabetes, and certain types of cancer  It can also increase your risk for osteoarthritis, sleep apnea, and other respiratory problems  Aim for a slow, steady weight loss  Even a small amount of weight loss can lower your risk of health problems  How to lose weight safely:  A safe and healthy way to lose weight is to eat fewer calories and get regular exercise  · You can lose up about 1 pound a week by decreasing the number of calories you eat by 500 calories each day  You can decrease calories by eating smaller portion sizes or by cutting out high-calorie foods  Read labels to find out how many calories are in the foods you eat  · You can also burn calories with exercise such as walking, swimming, or biking  You will be more likely to keep weight off if you make these changes part of your lifestyle  Exercise at least 30 minutes per day on most days of the week  You can also fit in more physical activity by taking the stairs instead of the elevator or parking farther away from stores  Ask your healthcare provider about the best exercise plan for you  Healthy meal plan for weight management:  A healthy meal plan includes a variety of foods, contains fewer calories, and helps you stay healthy  A healthy meal plan includes the following:     · Eat whole-grain foods more often  A healthy meal plan should contain fiber  Fiber is the part of grains, fruits, and vegetables that is not broken down by your body  Whole-grain foods are healthy and provide extra fiber in your diet  Some examples of whole-grain foods are whole-wheat breads and pastas, oatmeal, brown rice, and bulgur  · Eat a variety of vegetables every day  Include dark, leafy greens such as spinach, kale, carlos eduardo greens, and mustard greens   Eat yellow and orange vegetables such as carrots, sweet potatoes, and winter squash  · Eat a variety of fruits every day  Choose fresh or canned fruit (canned in its own juice or light syrup) instead of juice  Fruit juice has very little or no fiber  · Eat low-fat dairy foods  Drink fat-free (skim) milk or 1% milk  Eat fat-free yogurt and low-fat cottage cheese  Try low-fat cheeses such as mozzarella and other reduced-fat cheeses  · Choose meat and other protein foods that are low in fat  Choose beans or other legumes such as split peas or lentils  Choose fish, skinless poultry (chicken or turkey), or lean cuts of red meat (beef or pork)  Before you cook meat or poultry, cut off any visible fat  · Use less fat and oil  Try baking foods instead of frying them  Add less fat, such as margarine, sour cream, regular salad dressing and mayonnaise to foods  Eat fewer high-fat foods  Some examples of high-fat foods include french fries, doughnuts, ice cream, and cakes  · Eat fewer sweets  Limit foods and drinks that are high in sugar  This includes candy, cookies, regular soda, and sweetened drinks  Ways to decrease calories:   · Eat smaller portions  ? Use a small plate with smaller servings  ? Do not eat second helpings  ? When you eat at a restaurant, ask for a box and place half of your meal in the box before you eat  ? Share an entrée with someone else  · Replace high-calorie snacks with healthy, low-calorie snacks  ? Choose fresh fruit, vegetables, fat-free rice cakes, or air-popped popcorn instead of potato chips, nuts, or chocolate  ? Choose water or calorie-free drinks instead of soda or sweetened drinks  · Do not shop for groceries when you are hungry  You may be more likely to make unhealthy food choices  Take a grocery list of healthy foods and shop after you have eaten  · Eat regular meals  Do not skip meals  Skipping meals can lead to overeating later in the day   This can make it harder for you to lose weight  Eat a healthy snack in place of a meal if you do not have time to eat a regular meal  Talk with a dietitian to help you create a meal plan and schedule that is right for you  Other things to consider as you try to lose weight:   · Be aware of situations that may give you the urge to overeat, such as eating while watching television  Find ways to avoid these situations  For example, read a book, go for a walk, or do crafts  · Meet with a weight loss support group or friends who are also trying to lose weight  This may help you stay motivated to continue working on your weight loss goals  © Copyright [address] Information is for End User's use only and may not be sold, redistributed or otherwise used for commercial purposes  All illustrations and images included in CareNotes® are the copyrighted property of [employer]  or [facility] [employer]   The above information is an  only  It is not intended as medical advice for individual conditions or treatments  Talk to your doctor, nurse or pharmacist before following any medical regimen to see if it is safe and effective for you  Heart Healthy Diet   AMBULATORY CARE:   A heart healthy diet  is an eating plan low in unhealthy fats and sodium (salt)  The plan is high in healthy fats and fiber  A heart healthy diet helps improve your cholesterol levels and lowers your risk for heart disease and stroke  A dietitian will teach you how to read and understand food labels  Heart healthy diet guidelines to follow:   · Choose foods that contain healthy fats  ? Unsaturated fats  include monounsaturated and polyunsaturated fats  Unsaturated fat is found in foods such as soybean, canola, olive, corn, and safflower oils  It is also found in soft tub margarine that is made with liquid vegetable oil  ? Omega-3 fat  is found in certain fish, such as salmon, tuna, and trout, and in walnuts and flaxseed  Eat fish high in omega-3 fats at least 2 times a week  · Get 20 to 30 grams of fiber each day  Fruits, vegetables, whole-grain foods, and legumes (cooked beans) are good sources of fiber  · Limit or do not have unhealthy fats  ? Cholesterol  is found in animal foods, such as eggs and lobster, and in dairy products made from whole milk  Limit cholesterol to less than 200 mg each day  ? Saturated fat  is found in meats, such as gonzales and hamburger  It is also found in chicken or turkey skin, whole milk, and butter  Limit saturated fat to less than 7% of your total daily calories  ? Trans fat  is found in packaged foods, such as potato chips and cookies  It is also in hard margarine, some fried foods, and shortening  Do not eat foods that contain trans fats  · Limit sodium as directed  You may be told to limit sodium to 2,000 to 2,300 mg each day  Choose low-sodium or no-salt-added foods  Add little or no salt to food you prepare  Use herbs and spices in place of salt  Include the following in your heart healthy plan:  Ask your dietitian or healthcare provider how many servings to have from each of the following food groups:  · Grains:      ? Whole-wheat breads, cereals, and pastas, and brown rice    ? Low-fat, low-sodium crackers and chips    · Vegetables:      ? Broccoli, green beans, green peas, and spinach    ? Collards, kale, and lima beans    ? Carrots, sweet potatoes, tomatoes, and peppers    ? Canned vegetables with no salt added    · Fruits:      ? Bananas, peaches, pears, and pineapple    ? Grapes, raisins, and dates    ? Oranges, tangerines, grapefruit, orange juice, and grapefruit juice    ? Apricots, mangoes, melons, and papaya    ? Raspberries and strawberries    ? Canned fruit with no added sugar    · Low-fat dairy:      ? Nonfat (skim) milk, 1% milk, and low-fat almond, cashew, or soy milks fortified with calcium    ?  Low-fat cheese, regular or frozen yogurt, and cottage cheese    · Meats and proteins:      ? Lean cuts of beef and pork (loin, leg, round), skinless chicken and turkey    ? Legumes, soy products, egg whites, or nuts    Limit or do not include the following in your heart healthy plan:   · Unhealthy fats and oils:      ? Whole or 2% milk, cream cheese, sour cream, or cheese    ? High-fat cuts of beef (T-bone steaks, ribs), chicken or turkey with skin, and organ meats such as liver    ? Butter, stick margarine, shortening, and cooking oils such as coconut or palm oil    · Foods and liquids high in sodium:      ? Packaged foods, such as frozen dinners, cookies, macaroni and cheese, and cereals with more than 300 mg of sodium per serving    ? Vegetables with added sodium, such as instant potatoes, vegetables with added sauces, or regular canned vegetables    ? Cured or smoked meats, such as hot dogs, gonzales, and sausage    ? High-sodium ketchup, barbecue sauce, salad dressing, pickles, olives, soy sauce, or miso    · Foods and liquids high in sugar:      ? Candy, cake, cookies, pies, or doughnuts    ? Soft drinks (soda), sports drinks, or sweetened tea    ? Canned or dry mixes for cakes, soups, sauces, or gravies    Other healthy heart guidelines:   · Do not smoke  Nicotine and other chemicals in cigarettes and cigars can cause lung and heart damage  Ask your healthcare provider for information if you currently smoke and need help to quit  E-cigarettes or smokeless tobacco still contain nicotine  Talk to your healthcare provider before you use these products  · Limit or do not drink alcohol as directed  Alcohol can damage your heart and raise your blood pressure  Your healthcare provider may give you specific daily and weekly limits  The general recommended limit is 1 drink a day for women 21 or older and for men 72 or older  Do not have more than 3 drinks in a day or 7 in a week  The recommended limit is 2 drinks a day for men 24to 59years of age   Do not have more than 4 drinks in a day or 14 in a week  A drink of alcohol is 12 ounces of beer, 5 ounces of wine, or 1½ ounces of liquor  · Exercise regularly  Exercise can help you maintain a healthy weight and improve your blood pressure and cholesterol levels  Regular exercise can also decrease your risk for heart problems  Ask your healthcare provider about the best exercise plan for you  Do not start an exercise program without asking your healthcare provider  Follow up with your doctor or cardiologist as directed:  Write down your questions so you remember to ask them during your visits  © Copyright [facility] [address] Information is for End User's use only and may not be sold, redistributed or otherwise used for commercial purposes  All illustrations and images included in CareNotes® are the copyrighted property of [employer]  or [facility] [employer]   The above information is an  only  It is not intended as medical advice for individual conditions or treatments  Talk to your doctor, nurse or pharmacist before following any medical regimen to see if it is safe and effective for you

## 2021-03-25 NOTE — ASSESSMENT & PLAN NOTE
FBS again elevated but A1c wnl at 5 6 - urged low sugar/carb diet/exercise/wgt loss, recheck BW in 6 mo - WILL ORDER AT FUTURE APPT

## 2021-03-25 NOTE — ASSESSMENT & PLAN NOTE
Con't symptoms since last visit, US of abd/pelvis and GYN note reviewed, Colonoscopy UTD till next year, will try trial of Bentyl and if not better will need to see GI, call with new/worse symptoms/blood in stool/V

## 2021-03-25 NOTE — ASSESSMENT & PLAN NOTE
TC/LDL/TG all above goal, con't current statin for now, diet/exercise/wgt loss encouraged, recheck FLP in 6 mos - WILL ORDER AT FUTURE APPT

## 2021-03-25 NOTE — ASSESSMENT & PLAN NOTE
Currently with some SOB with exertion but no other CV symptoms, symptoms sound more Pulm then Cardio, red flag Cardio symptoms reviewed and urged to call/go to ED if they occur, has f/u appt with Dr Tania Sánchez scheduled, will follow, on Imdur/Coreg/Plavix/Statin/Losartan

## 2021-03-25 NOTE — ASSESSMENT & PLAN NOTE
Pt with worsening lower thoracic and lumbar pain, has slightly lump to R of lower thoracic spine which she feels is getting bigger and more correlate with known mass of spine - no f/u with neurosurgery and no follow up imaging since Jan 2015 and has not seen Neurosurgery for f/u of this as well - MRI ordered, call with any red flag Neuro symptoms

## 2021-03-25 NOTE — PROGRESS NOTES
Assessment/Plan:    IFG (impaired fasting glucose)  FBS again elevated but A1c wnl at 5 6 - urged low sugar/carb diet/exercise/wgt loss, recheck BW in 6 mo - WILL ORDER AT FUTURE APPT    Dyslipidemia  TC/LDL/TG all above goal, con't current statin for now, diet/exercise/wgt loss encouraged, recheck FLP in 6 mos - WILL ORDER AT FUTURE APPT    Stage 3b chronic kidney disease  Lab Results   Component Value Date    EGFR 35 03/19/2021    EGFR 47 09/04/2019    EGFR 47 03/14/2019    CREATININE 1 53 (H) 03/19/2021    CREATININE 1 21 09/04/2019    CREATININE 1 21 03/14/2019   BP/BS control, avoiding NSAIDs and keeping hydrated reviewed, will follow - will repeat in 6 mos - WILL ORDER AT NEXT APPT    Essential hypertension  BP at goal today, con't current meds, diet/exercise/wgt loss encouraged    Coronary artery disease  Currently with some SOB with exertion but no other CV symptoms, symptoms sound more Pulm then Cardio, red flag Cardio symptoms reviewed and urged to call/go to ED if they occur, has f/u appt with Dr Dionne Alcaraz scheduled, will follow, on Imdur/Coreg/Plavix/Statin/Losartan    Other emphysema ([facility] [address] )  Currently with worsening SOB with exertion, no other symptoms to indicate cardiac etiology, has had some wgt gain and recent cold which may be triggering symptoms, will try trial of Advair 100/50 1 puff INH bid, urged to rinse mouth after use, call with F/C/new/worse symptoms, PFT's done 2019    Abdominal bloating  Con't symptoms since last visit, US of abd/pelvis and GYN note reviewed, Colonoscopy UTD till next year, will try trial of Bentyl and if not better will need to see GI, call with new/worse symptoms/blood in stool/V    Mass of spinal cord ([facility] [address] )  Pt with worsening lower thoracic and lumbar pain, has slightly lump to R of lower thoracic spine which she feels is getting bigger and more correlate with known mass of spine - no f/u with neurosurgery and no follow up imaging since Jan 2015 and has not seen Neurosurgery for f/u of this as well - MRI ordered, call with any red flag Neuro symptoms       Diagnoses and all orders for this visit:    IFG (impaired fasting glucose)    Dyslipidemia    Stage 3b chronic kidney disease    Essential hypertension    Other emphysema (HCC)  -     fluticasone-salmeterol (Advair Diskus) 100-50 mcg/dose inhaler; Inhale 1 puff 2 (two) times a day Rinse mouth after use  Coronary artery disease involving native coronary artery of native heart without angina pectoris    Abdominal bloating  -     dicyclomine (BENTYL) 10 mg capsule; Take 1 capsule (10 mg total) by mouth 4 (four) times a day (before meals and at bedtime)    Mass of spinal cord ([facility] [address] )  -     MRI thoracic spine w wo contrast; Future    BMI 26 0-26 9,adult    Overweight (BMI 25 0-29  9)      Colonoscopy 2/17 - 5 yrs    Mammo 10/20    Dexa 10/20 - osteopenia    PAP 12/20    CT lung CA screening 10/20      I have spent 50 minutes with Patient  today in which greater than 50% of this time was spent in counseling/coordination of care regarding Diagnostic results, Prognosis, Risks and benefits of tx options, Intructions for management, Patient and family education, Importance of tx compliance, Risk factor reductions and Impressions  Subjective:      Patient ID: Amarilis Ortez is a 77 y o  female  HPI  Pt here for follow up appt and BW results    BW results were d/w pt in detail: FBS/A1C 110/5 6 , BUN/Cr 20/1 53 (GFR 35), rest of CMP/CBC/TSH was wnl, FLP with TC up at 223, TG up at 240, LDL up at 121 and HDL good at 54  Def of nml vs IFG vs DM was d/w pt in detail  Diet/exercise was reviewed - wgt up 4 lbs from Sept 2020 visit  She is not sure what she needs to do for her diet  She has a treadmill she is looking to buy  Goal FLP was d/w pt in detail  Diet/exercise reviewed as noted above  She is taking her Atorvastatin daily as directed w/o SE  She is frustrated as she does not eat a lot of red meats    She notes no stroke/TIA symptoms/CP  CKD stage 3 reviewed with pt  She does not use NSAIDs daily  BP and BS control reviewed  She is trying to drink more water  BP at goal today and meds were reviewed and no changes have occurred  She denies missing doses of meds or SE with the meds  She does check her BP outside the office - usually 120's/70's  She notes no frequent HA's/double vision/CP  She has had some intermittent dizziness  Pt saw Cardio (Dr Dixie Mariano) in Oct 2020 for f/u CAD - OV note reviewed  She had no studies ordered and no meds were changed  She was told to f/u in 6 mos  She notes no current CP/palp  She still notes SOB with exertion and with rest   She notes having to rest with vacuuming when she used to be able to do that in the past    She notes no orthopnea/PND/LE edema  She notes no chronic cough/wheezing/F/C/changes in phlegm  She had PFT's 2019 and moderate obstruction was noted  She had stress test in May of 19 as well and no ischemia was noted  She con't to c/o abd bloating and distention  She had abd and pelvic US done after last visit  She has seen GYN and had a PAP  She notes no pyrosis/regurgitation of food/N/V/blood in stool/black stools  She has some intermittent diarrhea  She does not feel she has a lot of gas    Notes increase in lower thoracic/lumbar spine and B/L thigh pain  Notes no loss of control of bowel or bladder or F/C  She has a lump at R side of lower thoracic spine she feels is getting bigger  She has a known meningioma of thoracic spine  She has had no f/u in a few years  Colonoscopy 2/17 - 5 yrs    Mammo 10/20    Dexa 10/20 - osteopenia    PAP 12/20    CT lung CA screening 10/20      Review of Systems   Constitutional: Negative for chills, fever and unexpected weight change  HENT: Positive for hearing loss  Negative for congestion and sore throat  Eyes: Negative for pain and visual disturbance     Respiratory: Positive for shortness of breath and wheezing  Negative for cough  Cardiovascular: Negative for chest pain, palpitations and leg swelling  Gastrointestinal: Positive for abdominal pain and diarrhea  Negative for blood in stool, constipation, nausea and vomiting  Endocrine: Negative for polydipsia and polyuria  Genitourinary: Negative for difficulty urinating and dysuria  Musculoskeletal: Positive for arthralgias, back pain and myalgias  Negative for neck pain  Skin: Negative for rash and wound  Neurological: Positive for dizziness  Negative for light-headedness and headaches  Hematological: Negative for adenopathy  Psychiatric/Behavioral: Positive for dysphoric mood  Negative for behavioral problems and confusion  Objective:    /72   Pulse 72   Temp (!) 96 5 °F (35 8 °C) (Temporal)   Ht 5' 5" (1 651 m)   Wt 71 3 kg (157 lb 3 2 oz)   SpO2 97%   BMI 26 16 kg/m²      Physical Exam  Vitals signs and nursing note reviewed  Constitutional:       General: She is not in acute distress  Appearance: She is well-developed  She is not ill-appearing  HENT:      Head: Normocephalic and atraumatic  Right Ear: Tympanic membrane normal  There is no impacted cerumen  Left Ear: Tympanic membrane normal  There is no impacted cerumen  Eyes:      General:         Right eye: No discharge  Left eye: No discharge  Conjunctiva/sclera: Conjunctivae normal    Neck:      Musculoskeletal: Neck supple  Trachea: No tracheal deviation  Cardiovascular:      Rate and Rhythm: Normal rate and regular rhythm  Heart sounds: Normal heart sounds  No murmur  No friction rub  Pulmonary:      Effort: Pulmonary effort is normal  No respiratory distress  Breath sounds: Normal breath sounds  No wheezing, rhonchi or rales  Abdominal:      General: There is no distension  Palpations: Abdomen is soft  Tenderness: There is no abdominal tenderness  There is no guarding or rebound  Musculoskeletal:      Comments: Thoracic spine: R lower thoracic spine with paraspinal prominence  Lumbar spine:No pain with palp of lumbar spine, 5/5 B/L LE muscle strength   Skin:     General: Skin is warm  Coloration: Skin is not pale  Findings: No rash  Neurological:      Mental Status: She is alert  Motor: No abnormal muscle tone  Comments: Sensation intact to light touch B/L LE, neg SLR test B/L, 2/4 patellar DTR, ambulates with slightly limp   Psychiatric:         Mood and Affect: Mood normal          Behavior: Behavior normal          Thought Content: Thought content normal          Judgment: Judgment normal          BMI Counseling: Body mass index is 26 16 kg/m²  The BMI is above normal  Nutrition recommendations include reducing portion sizes, 3-5 servings of fruits/vegetables daily, consuming healthier snacks, moderation in carbohydrate intake, increasing intake of lean protein and reducing intake of saturated fat and trans fat  Exercise recommendations include exercising 3-5 times per week

## 2021-04-01 ENCOUNTER — IMMUNIZATIONS (OUTPATIENT)
Dept: FAMILY MEDICINE CLINIC | Facility: HOSPITAL | Age: 66
End: 2021-04-01

## 2021-04-01 DIAGNOSIS — Z23 ENCOUNTER FOR IMMUNIZATION: Primary | ICD-10-CM

## 2021-04-01 PROCEDURE — 91300 SARS-COV-2 / COVID-19 MRNA VACCINE (PFIZER-BIONTECH) 30 MCG: CPT

## 2021-04-01 PROCEDURE — 0002A SARS-COV-2 / COVID-19 MRNA VACCINE (PFIZER-BIONTECH) 30 MCG: CPT

## 2021-04-12 DIAGNOSIS — Z00.00 HEALTH CARE MAINTENANCE: ICD-10-CM

## 2021-04-12 RX ORDER — TIZANIDINE 4 MG/1
TABLET ORAL
Qty: 135 TABLET | Refills: 0 | Status: SHIPPED | OUTPATIENT
Start: 2021-04-12 | End: 2021-10-25

## 2021-04-20 ENCOUNTER — OFFICE VISIT (OUTPATIENT)
Dept: CARDIOLOGY CLINIC | Facility: CLINIC | Age: 66
End: 2021-04-20
Payer: MEDICARE

## 2021-04-20 VITALS
HEIGHT: 65 IN | BODY MASS INDEX: 26.02 KG/M2 | OXYGEN SATURATION: 98 % | SYSTOLIC BLOOD PRESSURE: 108 MMHG | DIASTOLIC BLOOD PRESSURE: 68 MMHG | WEIGHT: 156.2 LBS | HEART RATE: 65 BPM

## 2021-04-20 DIAGNOSIS — I10 ESSENTIAL HYPERTENSION: ICD-10-CM

## 2021-04-20 DIAGNOSIS — I25.10 CORONARY ARTERY DISEASE INVOLVING NATIVE CORONARY ARTERY OF NATIVE HEART WITHOUT ANGINA PECTORIS: Primary | ICD-10-CM

## 2021-04-20 DIAGNOSIS — F41.8 DEPRESSION WITH ANXIETY: ICD-10-CM

## 2021-04-20 DIAGNOSIS — E78.5 DYSLIPIDEMIA: ICD-10-CM

## 2021-04-20 PROCEDURE — 99214 OFFICE O/P EST MOD 30 MIN: CPT | Performed by: INTERNAL MEDICINE

## 2021-04-20 RX ORDER — ATORVASTATIN CALCIUM 80 MG/1
80 TABLET, FILM COATED ORAL DAILY
Qty: 90 TABLET | Refills: 3 | Status: SHIPPED | OUTPATIENT
Start: 2021-04-20 | End: 2022-04-11

## 2021-04-20 NOTE — PROGRESS NOTES
Assessment/Plan:    Depression with anxiety  Anxiety and depression, stable  The patient will continue escitalopram at 20 mg daily  Dyslipidemia  Hyperlipidemia, less than optimally controlled  I am asking the patient to increase atorvastatin to 80 mg daily  I will arrange for follow-up lab as well  Coronary artery disease  Coronary artery disease, stable no symptoms of angina or signs of heart failure  Essential hypertension  Hypertension, stable and adequately controlled  Diagnoses and all orders for this visit:    Coronary artery disease involving native coronary artery of native heart without angina pectoris  -     Lipid Panel with Direct LDL reflex; Future  -     Comprehensive metabolic panel; Future    Essential hypertension    Dyslipidemia  -     atorvastatin (LIPITOR) 80 mg tablet; Take 1 tablet (80 mg total) by mouth daily  -     Lipid Panel with Direct LDL reflex; Future  -     Comprehensive metabolic panel; Future    Depression with anxiety          Subjective:  Feels well from the cardiac standpoint  Patient ID: Carlos Alberto Navarro is a 77 y o  female  Patient presented to this office for the purpose of cardiac follow-up  She is known to have history of coronary artery disease with prior myocardial infarction  The patient has a history of hypertension and hyperlipidemia as well as anxiety and depression  The patient has been feeling well from the cardiac standpoint denying any symptoms of chest pain, shortness of breath, palpitation, dizziness or lightheadedness  She has no leg edema  The following portions of the patient's history were reviewed and updated as appropriate: allergies, current medications, past family history, past medical history, past social history, past surgical history and problem list     Review of Systems   Respiratory: Negative for apnea, cough, chest tightness, shortness of breath and wheezing      Cardiovascular: Negative for chest pain, palpitations and leg swelling  Gastrointestinal: Negative for abdominal pain  Neurological: Negative for dizziness and light-headedness  Psychiatric/Behavioral: Negative  Objective:  Stable cardiac-wise  /68 (BP Location: Left arm, Patient Position: Sitting, Cuff Size: Adult)   Pulse 65   Ht 5' 5" (1 651 m)   Wt 70 9 kg (156 lb 3 2 oz)   SpO2 98%   BMI 25 99 kg/m²          Physical Exam  Vitals signs reviewed  Constitutional:       General: She is not in acute distress  Appearance: She is well-developed  She is not diaphoretic  HENT:      Head: Normocephalic and atraumatic  Neck:      Musculoskeletal: Normal range of motion and neck supple  Thyroid: No thyromegaly  Vascular: No JVD  Cardiovascular:      Rate and Rhythm: Normal rate and regular rhythm  Heart sounds: S1 normal and S2 normal  No murmur  No friction rub  No gallop  Pulmonary:      Effort: Pulmonary effort is normal  No respiratory distress  Breath sounds: No wheezing or rales  Chest:      Chest wall: No tenderness  Abdominal:      Palpations: Abdomen is soft  Musculoskeletal:      Right lower leg: No edema  Left lower leg: No edema  Skin:     General: Skin is warm and dry  Neurological:      Mental Status: She is oriented to person, place, and time     Psychiatric:         Mood and Affect: Mood normal          Behavior: Behavior normal

## 2021-04-20 NOTE — LETTER
April 20, 2021     [facility], [address]  [id number] [id number]    Patient: Jonathon Slaughter   YOB: 1955   Date of Visit: 4/20/2021       Dear Dr Rosario Brothers: Thank you for referring Junior Brown to me for evaluation  Below are my notes for this consultation  If you have questions, please do not hesitate to call me  I look forward to following your patient along with you  Sincerely,        Stiven Garcia MD        CC: No Recipients  Stiven Garcia MD  4/20/2021  9:50 AM  Sign when Signing Visit  Assessment/Plan:    Depression with anxiety  Anxiety and depression, stable  The patient will continue escitalopram at 20 mg daily  Dyslipidemia  Hyperlipidemia, less than optimally controlled  I am asking the patient to increase atorvastatin to 80 mg daily  I will arrange for follow-up lab as well  Coronary artery disease  Coronary artery disease, stable no symptoms of angina or signs of heart failure  Essential hypertension  Hypertension, stable and adequately controlled  Diagnoses and all orders for this visit:    Coronary artery disease involving native coronary artery of native heart without angina pectoris  -     Lipid Panel with Direct LDL reflex; Future  -     Comprehensive metabolic panel; Future    Essential hypertension    Dyslipidemia  -     atorvastatin (LIPITOR) 80 mg tablet; Take 1 tablet (80 mg total) by mouth daily  -     Lipid Panel with Direct LDL reflex; Future  -     Comprehensive metabolic panel; Future    Depression with anxiety          Subjective:  Feels well from the cardiac standpoint  Patient ID: Jonathon Slaughter is a 77 y o  female  Patient presented to this office for the purpose of cardiac follow-up  She is known to have history of coronary artery disease with prior myocardial infarction  The patient has a history of hypertension and hyperlipidemia as well as anxiety and depression    The patient has been feeling well from the cardiac standpoint denying any symptoms of chest pain, shortness of breath, palpitation, dizziness or lightheadedness  She has no leg edema  The following portions of the patient's history were reviewed and updated as appropriate: allergies, current medications, past family history, past medical history, past social history, past surgical history and problem list     Review of Systems   Respiratory: Negative for apnea, cough, chest tightness, shortness of breath and wheezing  Cardiovascular: Negative for chest pain, palpitations and leg swelling  Gastrointestinal: Negative for abdominal pain  Neurological: Negative for dizziness and light-headedness  Psychiatric/Behavioral: Negative  Objective:  Stable cardiac-wise  /68 (BP Location: Left arm, Patient Position: Sitting, Cuff Size: Adult)   Pulse 65   Ht 5' 5" (1 651 m)   Wt 70 9 kg (156 lb 3 2 oz)   SpO2 98%   BMI 25 99 kg/m²          Physical Exam  Vitals signs reviewed  Constitutional:       General: She is not in acute distress  Appearance: She is well-developed  She is not diaphoretic  HENT:      Head: Normocephalic and atraumatic  Neck:      Musculoskeletal: Normal range of motion and neck supple  Thyroid: No thyromegaly  Vascular: No JVD  Cardiovascular:      Rate and Rhythm: Normal rate and regular rhythm  Heart sounds: S1 normal and S2 normal  No murmur  No friction rub  No gallop  Pulmonary:      Effort: Pulmonary effort is normal  No respiratory distress  Breath sounds: No wheezing or rales  Chest:      Chest wall: No tenderness  Abdominal:      Palpations: Abdomen is soft  Musculoskeletal:      Right lower leg: No edema  Left lower leg: No edema  Skin:     General: Skin is warm and dry  Neurological:      Mental Status: She is oriented to person, place, and time     Psychiatric:         Mood and Affect: Mood normal          Behavior: Behavior normal

## 2021-04-20 NOTE — ASSESSMENT & PLAN NOTE
Hyperlipidemia, less than optimally controlled  I am asking the patient to increase atorvastatin to 80 mg daily  I will arrange for follow-up lab as well

## 2021-04-20 NOTE — PATIENT INSTRUCTIONS
The patient is advised to increase atorvastatin to 80 mg daily  I will arrange for follow-up lipid profile and CMP in about 3 months  Other medications remain the same

## 2021-04-22 ENCOUNTER — HOSPITAL ENCOUNTER (OUTPATIENT)
Dept: MRI IMAGING | Facility: HOSPITAL | Age: 66
Discharge: HOME | End: 2021-04-22
Payer: MEDICARE

## 2021-04-22 DIAGNOSIS — G95.89 MASS OF SPINAL CORD (HCC): ICD-10-CM

## 2021-04-22 PROCEDURE — A9577 INJ MULTIHANCE: HCPCS | Performed by: INTERNAL MEDICINE

## 2021-04-22 PROCEDURE — 72157 MRI CHEST SPINE W/O & W/DYE: CPT

## 2021-04-22 RX ADMIN — GADOBENATE DIMEGLUMINE 7 ML: 529 INJECTION, SOLUTION INTRAVENOUS at 09:31

## 2021-05-01 DIAGNOSIS — I25.10 CORONARY ARTERY DISEASE INVOLVING NATIVE CORONARY ARTERY OF NATIVE HEART WITHOUT ANGINA PECTORIS: ICD-10-CM

## 2021-05-02 RX ORDER — CARVEDILOL 6.25 MG/1
TABLET ORAL
Qty: 180 TABLET | Refills: 0 | Status: SHIPPED | OUTPATIENT
Start: 2021-05-02 | End: 2021-08-02

## 2021-05-08 DIAGNOSIS — I20.9 ANGINAL PAIN (HCC): ICD-10-CM

## 2021-05-08 DIAGNOSIS — I10 ESSENTIAL HYPERTENSION: ICD-10-CM

## 2021-05-08 DIAGNOSIS — G89.4 CHRONIC PAIN SYNDROME: ICD-10-CM

## 2021-05-08 DIAGNOSIS — F41.9 ANXIETY: ICD-10-CM

## 2021-05-09 RX ORDER — ALPRAZOLAM 0.25 MG/1
TABLET ORAL
Qty: 30 TABLET | Refills: 0 | Status: SHIPPED | OUTPATIENT
Start: 2021-05-09 | End: 2022-01-16

## 2021-05-09 RX ORDER — ISOSORBIDE MONONITRATE 30 MG/1
TABLET, EXTENDED RELEASE ORAL
Qty: 90 TABLET | Refills: 0 | Status: SHIPPED | OUTPATIENT
Start: 2021-05-09 | End: 2021-08-02

## 2021-05-09 RX ORDER — HYDROCHLOROTHIAZIDE 12.5 MG/1
TABLET ORAL
Qty: 90 TABLET | Refills: 0 | Status: SHIPPED | OUTPATIENT
Start: 2021-05-09 | End: 2021-10-26

## 2021-05-09 RX ORDER — GABAPENTIN 400 MG/1
CAPSULE ORAL
Qty: 360 CAPSULE | Refills: 0 | Status: SHIPPED | OUTPATIENT
Start: 2021-05-09 | End: 2021-08-16

## 2021-06-06 DIAGNOSIS — I10 ESSENTIAL HYPERTENSION: ICD-10-CM

## 2021-06-06 RX ORDER — LOSARTAN POTASSIUM 100 MG/1
TABLET ORAL
Qty: 90 TABLET | Refills: 0 | Status: SHIPPED | OUTPATIENT
Start: 2021-06-06 | End: 2021-07-22 | Stop reason: SDUPTHER

## 2021-06-20 DIAGNOSIS — I25.10 CORONARY ARTERY DISEASE INVOLVING NATIVE CORONARY ARTERY OF NATIVE HEART WITHOUT ANGINA PECTORIS: ICD-10-CM

## 2021-06-20 RX ORDER — CLOPIDOGREL BISULFATE 75 MG/1
TABLET ORAL
Qty: 90 TABLET | Refills: 0 | Status: SHIPPED | OUTPATIENT
Start: 2021-06-20 | End: 2021-09-27

## 2021-06-21 DIAGNOSIS — E87.6 HYPOKALEMIA: ICD-10-CM

## 2021-06-21 RX ORDER — POTASSIUM CHLORIDE 750 MG/1
TABLET, EXTENDED RELEASE ORAL
Qty: 90 TABLET | Refills: 0 | Status: SHIPPED | OUTPATIENT
Start: 2021-06-21 | End: 2021-09-26

## 2021-06-24 ENCOUNTER — LAB (OUTPATIENT)
Dept: LAB | Facility: HOSPITAL | Age: 66
End: 2021-06-24
Payer: MEDICARE

## 2021-06-24 ENCOUNTER — OFFICE VISIT (OUTPATIENT)
Dept: FAMILY MEDICINE CLINIC | Facility: HOSPITAL | Age: 66
End: 2021-06-24
Payer: MEDICARE

## 2021-06-24 VITALS
TEMPERATURE: 96.3 F | HEIGHT: 65 IN | DIASTOLIC BLOOD PRESSURE: 42 MMHG | WEIGHT: 154.2 LBS | HEART RATE: 66 BPM | BODY MASS INDEX: 25.69 KG/M2 | SYSTOLIC BLOOD PRESSURE: 78 MMHG

## 2021-06-24 DIAGNOSIS — R07.89 ATYPICAL CHEST PAIN: ICD-10-CM

## 2021-06-24 DIAGNOSIS — Q07.00 ARNOLD-CHIARI MALFORMATION (HCC): ICD-10-CM

## 2021-06-24 DIAGNOSIS — E78.5 DYSLIPIDEMIA: ICD-10-CM

## 2021-06-24 DIAGNOSIS — R42 DIZZINESS: ICD-10-CM

## 2021-06-24 DIAGNOSIS — I10 ESSENTIAL HYPERTENSION: ICD-10-CM

## 2021-06-24 DIAGNOSIS — I25.10 CORONARY ARTERY DISEASE INVOLVING NATIVE CORONARY ARTERY OF NATIVE HEART WITHOUT ANGINA PECTORIS: ICD-10-CM

## 2021-06-24 DIAGNOSIS — G95.89 MASS OF SPINAL CORD (HCC): Primary | ICD-10-CM

## 2021-06-24 DIAGNOSIS — R53.83 FATIGUE, UNSPECIFIED TYPE: ICD-10-CM

## 2021-06-24 DIAGNOSIS — I95.9 HYPOTENSION, UNSPECIFIED HYPOTENSION TYPE: ICD-10-CM

## 2021-06-24 LAB
ANION GAP SERPL CALCULATED.3IONS-SCNC: 5 MMOL/L (ref 4–13)
BASOPHILS # BLD AUTO: 0.01 THOUSANDS/ΜL (ref 0–0.1)
BASOPHILS NFR BLD AUTO: 0 % (ref 0–1)
BUN SERPL-MCNC: 23 MG/DL (ref 5–25)
CALCIUM SERPL-MCNC: 9.9 MG/DL (ref 8.3–10.1)
CHLORIDE SERPL-SCNC: 101 MMOL/L (ref 100–108)
CO2 SERPL-SCNC: 30 MMOL/L (ref 21–32)
CREAT SERPL-MCNC: 1.8 MG/DL (ref 0.6–1.3)
EOSINOPHIL # BLD AUTO: 0.03 THOUSAND/ΜL (ref 0–0.61)
EOSINOPHIL NFR BLD AUTO: 1 % (ref 0–6)
ERYTHROCYTE [DISTWIDTH] IN BLOOD BY AUTOMATED COUNT: 13.9 % (ref 11.6–15.1)
GFR SERPL CREATININE-BSD FRML MDRD: 29 ML/MIN/1.73SQ M
GLUCOSE P FAST SERPL-MCNC: 103 MG/DL (ref 65–99)
HCT VFR BLD AUTO: 41.1 % (ref 34.8–46.1)
HGB BLD-MCNC: 13.5 G/DL (ref 11.5–15.4)
IMM GRANULOCYTES # BLD AUTO: 0.01 THOUSAND/UL (ref 0–0.2)
IMM GRANULOCYTES NFR BLD AUTO: 0 % (ref 0–2)
LYMPHOCYTES # BLD AUTO: 1.99 THOUSANDS/ΜL (ref 0.6–4.47)
LYMPHOCYTES NFR BLD AUTO: 40 % (ref 14–44)
MCH RBC QN AUTO: 31.7 PG (ref 26.8–34.3)
MCHC RBC AUTO-ENTMCNC: 32.8 G/DL (ref 31.4–37.4)
MCV RBC AUTO: 97 FL (ref 82–98)
MONOCYTES # BLD AUTO: 0.73 THOUSAND/ΜL (ref 0.17–1.22)
MONOCYTES NFR BLD AUTO: 15 % (ref 4–12)
NEUTROPHILS # BLD AUTO: 2.24 THOUSANDS/ΜL (ref 1.85–7.62)
NEUTS SEG NFR BLD AUTO: 44 % (ref 43–75)
NRBC BLD AUTO-RTO: 0 /100 WBCS
PLATELET # BLD AUTO: 299 THOUSANDS/UL (ref 149–390)
PMV BLD AUTO: 8.9 FL (ref 8.9–12.7)
POTASSIUM SERPL-SCNC: 4 MMOL/L (ref 3.5–5.3)
RBC # BLD AUTO: 4.26 MILLION/UL (ref 3.81–5.12)
SODIUM SERPL-SCNC: 136 MMOL/L (ref 136–145)
TSH SERPL DL<=0.05 MIU/L-ACNC: 2.66 UIU/ML (ref 0.36–3.74)
WBC # BLD AUTO: 5.01 THOUSAND/UL (ref 4.31–10.16)

## 2021-06-24 PROCEDURE — 80048 BASIC METABOLIC PNL TOTAL CA: CPT

## 2021-06-24 PROCEDURE — 84443 ASSAY THYROID STIM HORMONE: CPT | Performed by: INTERNAL MEDICINE

## 2021-06-24 PROCEDURE — 99215 OFFICE O/P EST HI 40 MIN: CPT | Performed by: INTERNAL MEDICINE

## 2021-06-24 PROCEDURE — 93000 ELECTROCARDIOGRAM COMPLETE: CPT | Performed by: INTERNAL MEDICINE

## 2021-06-24 PROCEDURE — 85025 COMPLETE CBC W/AUTO DIFF WBC: CPT | Performed by: INTERNAL MEDICINE

## 2021-06-24 PROCEDURE — 36415 COLL VENOUS BLD VENIPUNCTURE: CPT | Performed by: INTERNAL MEDICINE

## 2021-06-24 NOTE — ASSESSMENT & PLAN NOTE
Bp actually a bit low today, hold HCTZ for now and con't all other meds, urged to keep hydrated, follow closely

## 2021-06-24 NOTE — ASSESSMENT & PLAN NOTE
Just saw Cardio (Dr Chavo Krause)  in April, satin increased - med list reviewed and is UTD, has order for FLP/CMP to be done in July, some atypical CP today and some dizziness and hypotension, ECG done in office today - no acute ischemia, will check imaging head and follow, to ED with any red flag CV/Neuro symptoms or new/worse symptoms, did d/w pt that females can present with CVD in atypical ways

## 2021-06-24 NOTE — PROGRESS NOTES
Assessment/Plan:    Coronary artery disease  Just saw Cardio (Dr Bryant Laughter)  in April, satin increased - med list reviewed and is UTD, has order for FLP/CMP to be done in July, some atypical CP today and some dizziness and hypotension, ECG done in office today - no acute ischemia, will check imaging head and follow, to ED with any red flag CV/Neuro symptoms or new/worse symptoms, did d/w pt that females can present with CVD in atypical ways    Essential hypertension  Bp actually a bit low today, hold HCTZ for now and con't all other meds, urged to keep hydrated, follow closely    Hypotension  Pt states she is eating and drinking well and does not feel dehydrated, urged to stop HCTZ and con't all other meds, recheck BP in 3-4 wks    Dyslipidemia  Statin just increased by Cardio - med list UTD, has order for FLP/CMP, will follow    Mass of spinal cord [facility])  Thoracic MRI reviewed:  Stable T11 meningioma with no significant change in size over 6 yrs, did advise could f/u with neurosurgery if she wishes and will follow, call with any new/worse Neuro symptoms    Arnold-Chiari malformation ([facility] [address] )  No imaging since 2014, with worsening dizziness and nonspecific findings will check MRI of brain, red flag neuro symptoms reviewed and urged to go to ED if they occur       Diagnoses and all orders for this visit:    Mass of spinal cord ([facility] [address] )    Coronary artery disease involving native coronary artery of native heart without angina pectoris    Essential hypertension    Hypotension, unspecified hypotension type    Dyslipidemia    Atypical chest pain  -     POCT ECG  -     CBC and differential  -     Basic metabolic panel;  Future  -     TSH, 3rd generation with Free T4 reflex    Dizziness  Comments:  Reassured no red flag Neuro symptoms and exam nonfocal, symptoms appear significant and she has stopped working d/t them, no MRI of brain since 2014 - will check MRI and follow, to ED with new/worse symptoms or with red flag Neuro symptoms (reviewed with pt in detail)  Orders:  -     POCT ECG  -     CBC and differential  -     Basic metabolic panel; Future  -     TSH, 3rd generation with Free T4 reflex  -     MRI brain wo contrast; Future    Fatigue, unspecified type   Comments:  Nonspecific symptoms d/w pt, check CBC/TSH/BMP - order given, ECG in office w/o ischemia, will follow, keep hydrated and eat healthy regular meals  Orders:  -     TSH, 3rd generation with Free T4 reflex    Arnold-Chiari malformation ([facility] [address] )  -     MRI brain wo contrast; Future      Colonoscopy 2/17 - 5 yrs     Mammo 10/20     Dexa 10/20 - osteopenia     PAP 12/20    CT lung CA screening 10/20    Pt has had both COVID vaccine's    I have spent 45 minutes with Patient  today in which greater than 50% of this time was spent in counseling/coordination of care regarding Diagnostic results, Risks and benefits of tx options, Intructions for management, Patient and family education, Risk factor reductions and Impressions  Subjective:      Patient ID: Gertrudis Braden is a 74 y o  female  HPI Pt here for follow up appt    Pt had her MRI of thoracic spine in April and was notified of results: Thin elliptical intradural extramedullary mass thought to represent meningioma at the T11 level has changed only minimally in size since MR 6 years earlier  Only minor mass effect upon the cord which, appears stable  No cord compression  Pt was advised to call with any symptoms and told she could f/u with neurosurgery if she wishes  Pt notes no new neuro symptoms other then dizziness which seems to have increased recently  She notes some R eye light sensitivity  She saw her eye doc and no acute abnormality was noted  She denies sudden loss of vision/shade coming down  She denies focal weakness/numbness/tingling  She had had no HA's  She denies slurred speech or word finding difficulty  She feels very tired and the top of her legs feel very sore when she holds her grandson    She notes no pain with her legs with walking or change in color of feet  She does not smoke  Pt saw Cardio (Dr Veena Le) in April for f/u CAD - OV note reviewed by myself  She was told to increase her Atorvastatin to 80 mg daily and to repeat BW - order was given  She has been taking the higher dose statin for a few mos now w/o SE   BP a bit low today  Meds were reviewed in detail  She has had some R sided chest pain but did not mention that to Cardio as she felt CP was L sided only  The chest pain is intermittent and occurs at rest and with exertion  Pain is R sided and does not radiate  The pain is described as "very achy almost like someone is pushing the thumb up into my neck"  She notes some associated lightheadedness  Colonoscopy 2/17 - 5 yrs     Mammo 10/20     Dexa 10/20 - osteopenia     PAP 12/20    CT lung CA screening 10/20    Pt has had both COVID vaccine's      Review of Systems   Constitutional: Positive for fatigue  Negative for chills, fever and unexpected weight change  HENT: Positive for hearing loss  Negative for congestion and sore throat  Eyes: Positive for photophobia  Negative for pain and visual disturbance  Respiratory: Negative for cough and shortness of breath  Cardiovascular: Positive for chest pain  Negative for palpitations  Gastrointestinal: Negative for abdominal pain, diarrhea, nausea and vomiting  Genitourinary: Negative for difficulty urinating and dysuria  Musculoskeletal: Positive for arthralgias and neck pain  Negative for back pain and gait problem  Skin: Negative for rash and wound  Neurological: Positive for dizziness and headaches  Negative for syncope, speech difficulty, weakness and numbness  Hematological: Negative for adenopathy  Psychiatric/Behavioral: Negative for behavioral problems and confusion           Objective:    BP (!) 78/42   Pulse 66   Temp (!) 96 3 °F (35 7 °C) (Temporal)   Ht 5' 5" (1 651 m)   Wt 69 9 kg (154 lb 3 2 oz) BMI 25 66 kg/m²      Physical Exam  Vitals and nursing note reviewed  Constitutional:       General: She is not in acute distress  Appearance: She is well-developed  She is not ill-appearing  HENT:      Head: Normocephalic and atraumatic  Right Ear: Tympanic membrane normal  There is no impacted cerumen  Left Ear: Tympanic membrane normal  There is no impacted cerumen  Ears:      Comments: B/L hearing aides removed, small R effusion noted  Eyes:      General:         Right eye: No discharge  Left eye: No discharge  Conjunctiva/sclera: Conjunctivae normal    Neck:      Vascular: No carotid bruit  Trachea: No tracheal deviation  Cardiovascular:      Rate and Rhythm: Normal rate and regular rhythm  Heart sounds: Normal heart sounds  No murmur heard  No friction rub  Pulmonary:      Effort: Pulmonary effort is normal  No respiratory distress  Breath sounds: Normal breath sounds  No wheezing, rhonchi or rales  Abdominal:      General: There is no distension  Palpations: Abdomen is soft  Tenderness: There is no abdominal tenderness  There is no guarding or rebound  Musculoskeletal:      Cervical back: Neck supple  Right lower leg: No edema  Left lower leg: No edema  Comments: Some unsteadiness upon standing but 5/5 global muscle strength   Skin:     General: Skin is warm  Coloration: Skin is not pale  Findings: No rash  Neurological:      General: No focal deficit present  Mental Status: She is alert  Cranial Nerves: Cranial nerve deficit present  Sensory: No sensory deficit  Motor: No weakness or abnormal muscle tone        Coordination: Coordination normal       Gait: Gait normal       Deep Tendon Reflexes: Reflexes normal       Comments: L side of tongue higher then R (chronic since 1996 when dx with Chiari) otherwise CN II-XII intact, sensation intact to light touch globally, nml FN and HS, 2/4 patellar reflexes B/L LE   Psychiatric:         Mood and Affect: Mood normal          Behavior: Behavior normal          Thought Content:  Thought content normal          Judgment: Judgment normal

## 2021-06-24 NOTE — ASSESSMENT & PLAN NOTE
No imaging since 2014, with worsening dizziness and nonspecific findings will check MRI of brain, red flag neuro symptoms reviewed and urged to go to ED if they occur

## 2021-06-24 NOTE — ASSESSMENT & PLAN NOTE
Pt states she is eating and drinking well and does not feel dehydrated, urged to stop HCTZ and con't all other meds, recheck BP in 3-4 wks

## 2021-06-24 NOTE — ASSESSMENT & PLAN NOTE
Thoracic MRI reviewed:  Stable T11 meningioma with no significant change in size over 6 yrs, did advise could f/u with neurosurgery if she wishes and will follow, call with any new/worse Neuro symptoms

## 2021-07-03 ENCOUNTER — LAB (OUTPATIENT)
Dept: LAB | Facility: HOSPITAL | Age: 66
End: 2021-07-03
Payer: MEDICARE

## 2021-07-03 DIAGNOSIS — R79.89 AZOTEMIA: ICD-10-CM

## 2021-07-03 LAB
ANION GAP SERPL CALCULATED.3IONS-SCNC: 6 MMOL/L (ref 4–13)
BUN SERPL-MCNC: 27 MG/DL (ref 5–25)
CALCIUM SERPL-MCNC: 9.1 MG/DL (ref 8.3–10.1)
CHLORIDE SERPL-SCNC: 104 MMOL/L (ref 100–108)
CO2 SERPL-SCNC: 28 MMOL/L (ref 21–32)
CREAT SERPL-MCNC: 1.23 MG/DL (ref 0.6–1.3)
GFR SERPL CREATININE-BSD FRML MDRD: 45 ML/MIN/1.73SQ M
GLUCOSE P FAST SERPL-MCNC: 95 MG/DL (ref 65–99)
POTASSIUM SERPL-SCNC: 3.5 MMOL/L (ref 3.5–5.3)
SODIUM SERPL-SCNC: 138 MMOL/L (ref 136–145)

## 2021-07-03 PROCEDURE — 80048 BASIC METABOLIC PNL TOTAL CA: CPT

## 2021-07-03 PROCEDURE — 36415 COLL VENOUS BLD VENIPUNCTURE: CPT

## 2021-07-15 ENCOUNTER — HOSPITAL ENCOUNTER (OUTPATIENT)
Dept: MRI IMAGING | Facility: HOSPITAL | Age: 66
Discharge: HOME | End: 2021-07-15
Payer: MEDICARE

## 2021-07-15 DIAGNOSIS — Q07.00 ARNOLD-CHIARI MALFORMATION (HCC): ICD-10-CM

## 2021-07-15 DIAGNOSIS — R42 DIZZINESS: ICD-10-CM

## 2021-07-15 PROCEDURE — G1004 CDSM NDSC: HCPCS

## 2021-07-15 PROCEDURE — 70551 MRI BRAIN STEM W/O DYE: CPT

## 2021-07-22 ENCOUNTER — OFFICE VISIT (OUTPATIENT)
Dept: FAMILY MEDICINE CLINIC | Facility: HOSPITAL | Age: 66
End: 2021-07-22
Payer: MEDICARE

## 2021-07-22 VITALS
HEART RATE: 60 BPM | DIASTOLIC BLOOD PRESSURE: 60 MMHG | SYSTOLIC BLOOD PRESSURE: 92 MMHG | HEIGHT: 65 IN | BODY MASS INDEX: 25.86 KG/M2 | TEMPERATURE: 96.9 F | WEIGHT: 155.2 LBS

## 2021-07-22 DIAGNOSIS — I95.9 HYPOTENSION, UNSPECIFIED HYPOTENSION TYPE: ICD-10-CM

## 2021-07-22 DIAGNOSIS — R10.12 LEFT UPPER QUADRANT ABDOMINAL PAIN: ICD-10-CM

## 2021-07-22 DIAGNOSIS — K86.2 PANCREATIC CYST: ICD-10-CM

## 2021-07-22 DIAGNOSIS — I10 ESSENTIAL HYPERTENSION: ICD-10-CM

## 2021-07-22 DIAGNOSIS — R42 DIZZINESS: ICD-10-CM

## 2021-07-22 DIAGNOSIS — N18.31 STAGE 3A CHRONIC KIDNEY DISEASE (HCC): ICD-10-CM

## 2021-07-22 DIAGNOSIS — R07.89 ATYPICAL CHEST PAIN: Primary | ICD-10-CM

## 2021-07-22 LAB
SL AMB  POCT GLUCOSE, UA: NORMAL
SL AMB LEUKOCYTE ESTERASE,UA: NORMAL
SL AMB POCT BILIRUBIN,UA: NORMAL
SL AMB POCT BLOOD,UA: NORMAL
SL AMB POCT CLARITY,UA: CLEAR
SL AMB POCT COLOR,UA: YELLOW
SL AMB POCT KETONES,UA: NORMAL
SL AMB POCT NITRITE,UA: NORMAL
SL AMB POCT PH,UA: 6.5
SL AMB POCT SPECIFIC GRAVITY,UA: 1.01
SL AMB POCT URINE PROTEIN: NORMAL
SL AMB POCT UROBILINOGEN: NORMAL

## 2021-07-22 PROCEDURE — 99215 OFFICE O/P EST HI 40 MIN: CPT | Performed by: INTERNAL MEDICINE

## 2021-07-22 PROCEDURE — 81002 URINALYSIS NONAUTO W/O SCOPE: CPT | Performed by: INTERNAL MEDICINE

## 2021-07-22 RX ORDER — LOSARTAN POTASSIUM 50 MG/1
50 TABLET ORAL DAILY
Qty: 30 TABLET | Refills: 5
Start: 2021-07-22 | End: 2021-09-20 | Stop reason: SDUPTHER

## 2021-07-22 NOTE — PROGRESS NOTES
Assessment/Plan:    Pancreatic cyst  Order for MRI/MRCP given, will follow    Essential hypertension  BP now still low and pt symptomatic with dizziness - HCTZ held last appt, will decrease Losartan from 100 mg to 50 mg daily and re-eval in 6 wks    Hypotension  BP low with symptoms of dizziness, HCTZ held at appt in June, decrease Losartan today from 100 mg to 50 mg, re-eval in 6 wks, call with new/worse symptoms, again urged to keep hydrated    Stage 3a chronic kidney disease ([facility] [address] )  Lab Results   Component Value Date    EGFR 45 07/03/2021    EGFR 29 06/24/2021    EGFR 35 03/19/2021    CREATININE 1 23 07/03/2021    CREATININE 1 80 (H) 06/24/2021    CREATININE 1 53 (H) 03/19/2021   BP should be 120-140/80-90 to allow renal perfusion, BP low - decrease Losartan, has order for BW to be done with Cardio which includes f/u renal studies, will follow       Diagnoses and all orders for this visit:    Atypical chest pain  Comments:  ECG done in office last visit, symptoms resolved - no cause id'd, call with relapse in symptoms, urged to d/w Cardio at next appt as well, on Imdur/statin/Coreg    Essential hypertension  -     losartan (COZAAR) 50 mg tablet; Take 1 tablet (50 mg total) by mouth daily    Hypotension, unspecified hypotension type    Stage 3a chronic kidney disease (HCC)    Dizziness  Comments:  Still present - likely d/t low BP - decrease Losartan as noted above, reassured MRI of brain nml, will follow    Left upper quadrant abdominal pain  Comments:  UA in office neg, exam notable for pain on exam - recent imaging with abd US and CT A/P from 2020 and 2019 reviewed - due for f/u pancreatic cyst - will check MRI to eval abd pain further as well as f/u on pancreatic head cyst, call with new/worse symptoms or if fever relapses  Orders:  -     POCT urine dip  -     MRI abdomen w wo contrast and mrcp; Future    Pancreatic cyst  -     MRI abdomen w wo contrast and mrcp;  Future      Colonoscopy 2/17 - 5 yrs     Mammo 10/20     Dexa 10/20 - osteopenia     PAP 12/20    CT lung CA screening 10/20     Pt has had both COVID vaccine's      Subjective:      Patient ID: Rachael Rivera is a 74 y o  female  HPI Pt here for follow up appt    Last visit in June pt was noting some atypical CP and dizziness as well as low BP  ECG in office w/o ischemia  She notes symptoms of atypical CP have resolved  She cannot id what caused the symptoms to resolve  She was advised to hold her HCTZ for the low BP  She still has low BP today  She still has some dizziness and feels the sun affects her  She states she keeps hydrated during the day  BW was ordered as was MRI of brain (see below)  BW showed nml TSH and CBC but BMP showed BUN/Cr up at 23/1 80 (GFR 29) - BMP was repeated d/t azotemia and was improved at 27/1 23 (GFR 45)  She denies daily NSAID use  She is concerned as her brother was just put on HD for kidney failure d/t NSAIDs  Pt had MRI of brain ordered at last visit d/t dizziness and h/o Arnold-Chiari malformation  MRI results reviewed with pt in detail today: No acute intracranial abnormality  Postoperative changes of suboccipital craniectomy for Chiari decompression  Had a low grade fever this weekend of 100 3 and 101 8 for Tm  She had some L flank and L sided abd pain  She had no associated burning with urination/blood in urine/increased frequency of urination  She had no N/V/D/blood in stool or black stools  She denies resp/cold symptoms  She had a rapid COVID test done by her son and it was negative  She notes symptoms lasted x 3 days (Fri/Sat/Sun)  She has been fine since then other L sided pain  Wgt up 2 lbs from Sept 2020    She has a known pancreatic head cyst and was due for f/u imaging 4/21    Colonoscopy 2/17 - 5 yrs     Mammo 10/20     Dexa 10/20 - osteopenia     PAP 12/20    CT lung CA screening 10/20     Pt has had both COVID vaccine's      Review of Systems   Constitutional: Positive for chills and fever  Negative for unexpected weight change  HENT: Negative for congestion and sore throat  Eyes: Negative for pain and visual disturbance  Respiratory: Negative for cough and shortness of breath  Cardiovascular: Negative for chest pain and palpitations  Gastrointestinal: Positive for abdominal pain  Negative for blood in stool, constipation, diarrhea, nausea and vomiting  Endocrine: Negative for polydipsia and polyuria  Genitourinary: Positive for flank pain  Negative for difficulty urinating, dysuria, hematuria, menstrual problem, vaginal bleeding, vaginal discharge and vaginal pain  Musculoskeletal: Positive for back pain  Negative for neck pain  Skin: Negative for rash and wound  Neurological: Positive for dizziness  Negative for headaches  Hematological: Negative for adenopathy  Psychiatric/Behavioral: Negative for behavioral problems and confusion  Objective:    BP 92/60   Pulse 60   Temp (!) 96 9 °F (36 1 °C) (Temporal)   Ht 5' 5" (1 651 m)   Wt 70 4 kg (155 lb 3 2 oz)   BMI 25 83 kg/m²      Physical Exam  Vitals and nursing note reviewed  Constitutional:       General: She is not in acute distress  Appearance: She is well-developed  She is not ill-appearing  HENT:      Head: Normocephalic and atraumatic  Right Ear: Tympanic membrane normal  There is no impacted cerumen  Left Ear: Tympanic membrane normal  There is no impacted cerumen  Eyes:      General:         Right eye: No discharge  Left eye: No discharge  Conjunctiva/sclera: Conjunctivae normal    Neck:      Trachea: No tracheal deviation  Cardiovascular:      Rate and Rhythm: Normal rate and regular rhythm  Heart sounds: Normal heart sounds  No murmur heard  No friction rub  Pulmonary:      Effort: Pulmonary effort is normal  No respiratory distress  Breath sounds: Normal breath sounds  No wheezing, rhonchi or rales     Abdominal:      General: There is no distension  Palpations: Abdomen is soft  Tenderness: There is abdominal tenderness  There is no right CVA tenderness, left CVA tenderness, guarding or rebound  Comments: + LUQ>LLQ abd pain with palp, no guarding or rebound noted   Musculoskeletal:         General: No deformity or signs of injury  Cervical back: Neck supple  Left lower leg: No edema  Skin:     General: Skin is warm  Coloration: Skin is not pale  Findings: No rash  Neurological:      General: No focal deficit present  Mental Status: She is alert  Mental status is at baseline  Motor: No abnormal muscle tone  Gait: Gait normal    Psychiatric:         Mood and Affect: Mood normal          Behavior: Behavior normal          Thought Content:  Thought content normal          Judgment: Judgment normal

## 2021-07-22 NOTE — ASSESSMENT & PLAN NOTE
BP low with symptoms of dizziness, [facility], decrease Losartan today from 100 mg to 50 mg, re-eval in 6 wks, call with new/worse symptoms, again urged to keep hydrated

## 2021-07-22 NOTE — ASSESSMENT & PLAN NOTE
BP now still low and pt symptomatic with dizziness - HCTZ held last appt, will decrease Losartan from 100 mg to 50 mg daily and re-eval in 6 wks

## 2021-07-29 ENCOUNTER — OFFICE VISIT (OUTPATIENT)
Dept: URGENT CARE | Facility: CLINIC | Age: 66
End: 2021-07-29
Payer: MEDICARE

## 2021-07-29 ENCOUNTER — TELEPHONE (OUTPATIENT)
Dept: FAMILY MEDICINE CLINIC | Facility: HOSPITAL | Age: 66
End: 2021-07-29

## 2021-07-29 VITALS
HEART RATE: 74 BPM | BODY MASS INDEX: 25.89 KG/M2 | WEIGHT: 155.4 LBS | TEMPERATURE: 97.4 F | OXYGEN SATURATION: 95 % | RESPIRATION RATE: 16 BRPM | HEIGHT: 65 IN

## 2021-07-29 DIAGNOSIS — R05.9 COUGH: Primary | ICD-10-CM

## 2021-07-29 PROCEDURE — 99213 OFFICE O/P EST LOW 20 MIN: CPT | Performed by: PHYSICIAN ASSISTANT

## 2021-07-29 PROCEDURE — U0003 INFECTIOUS AGENT DETECTION BY NUCLEIC ACID (DNA OR RNA); SEVERE ACUTE RESPIRATORY SYNDROME CORONAVIRUS 2 (SARS-COV-2) (CORONAVIRUS DISEASE [COVID-19]), AMPLIFIED PROBE TECHNIQUE, MAKING USE OF HIGH THROUGHPUT TECHNOLOGIES AS DESCRIBED BY CMS-2020-01-R: HCPCS | Performed by: PHYSICIAN ASSISTANT

## 2021-07-29 PROCEDURE — U0005 INFEC AGEN DETEC AMPLI PROBE: HCPCS | Performed by: PHYSICIAN ASSISTANT

## 2021-07-29 PROCEDURE — G0463 HOSPITAL OUTPT CLINIC VISIT: HCPCS | Performed by: PHYSICIAN ASSISTANT

## 2021-07-29 RX ORDER — ALBUTEROL SULFATE 90 UG/1
2 AEROSOL, METERED RESPIRATORY (INHALATION) EVERY 4 HOURS PRN
Qty: 8.5 G | Refills: 0 | Status: SHIPPED | OUTPATIENT
Start: 2021-07-29

## 2021-07-29 NOTE — PROGRESS NOTES
NAME: Easton Dempsey is a 74 y o  female  : 1955    MRN: [id number]      Assessment and Plan   Cough [R05]  1  Cough  Novel Coronavirus (Covid-19),PCR [facility] - Office Collection    albuterol (ProAir HFA) 90 mcg/act inhaler       Vitals within normal limits patient appears stable and denying any chest pain or trouble breathing  Swabbed for COVID instructed to quarantine until results come back  Will send albuterol as  patient does not have any at home and has history of COPD  Discussed over-the-counter medications, fluids and rest   If negative test and still no improvement in symptoms in 3-4 days follow-up with PCP  ER if anything changes or worsens  She acknowledges      Patient Instructions   Patient Instructions   COVID-19 Home Care Guidelines    Your healthcare provider and/or public health staff have evaluated you and have determined that you do not need to remain in the hospital at this time  At this time you can be isolated at home where you will be monitored by staff from your local or state health department  You should carefully follow the prevention and isolation steps below until a healthcare provider or local or state health department says that you can return to your normal activities  Stay home except to get medical care    People who are mildly ill with COVID-19 are able to isolate at home during their illness  You should restrict activities outside your home, except for getting medical care  Do not go to work, school, or public areas  Avoid using public transportation, ride-sharing, or taxis  Separate yourself from other people and animals in your home    People: As much as possible, you should stay in a specific room and away from other people in your home  Also, you should use a separate bathroom, if available  Animals: You should restrict contact with pets and other animals while you are sick with COVID-19, just like you would around other people   Although there have not been reports of pets or other animals becoming sick with COVID-19, it is still recommended that people sick with COVID-19 limit contact with animals until more information is known about the virus  When possible, have another member of your household care for your animals while you are sick  If you are sick with COVID-19, avoid contact with your pet, including petting, snuggling, being kissed or licked, and sharing food  If you must care for your pet or be around animals while you are sick, wash your hands before and after you interact with pets and wear a facemask  See COVID-19 and Animals for more information  Call ahead before visiting your doctor    If you have a medical appointment, call the healthcare provider and tell them that you have or may have COVID-19  This will help the healthcare providers office take steps to keep other people from getting infected or exposed  Wear a facemask    You should wear a facemask when you are around other people (e g , sharing a room or vehicle) or pets and before you enter a healthcare providers office  If you are not able to wear a facemask (for example, because it causes trouble breathing), then people who live with you should not stay in the same room with you, or they should wear a facemask if they enter your room  Cover your coughs and sneezes    Cover your mouth and nose with a tissue when you cough or sneeze  Throw used tissues in a lined trash can  Immediately wash your hands with soap and water for at least 20 seconds or, if soap and water are not available, clean your hands with an alcohol-based hand  that contains at least 60% alcohol  Clean your hands often    Wash your hands often with soap and water for at least 20 seconds, especially after blowing your nose, coughing, or sneezing; going to the bathroom; and before eating or preparing food   If soap and water are not readily available, use an alcohol-based hand  with at least 60% alcohol, covering all surfaces of your hands and rubbing them together until they feel dry  Soap and water are the best option if hands are visibly dirty  Avoid touching your eyes, nose, and mouth with unwashed hands  Avoid sharing personal household items    You should not share dishes, drinking glasses, cups, eating utensils, towels, or bedding with other people or pets in your home  After using these items, they should be washed thoroughly with soap and water  Clean all high-touch surfaces everyday    High touch surfaces include counters, tabletops, doorknobs, bathroom fixtures, toilets, phones, keyboards, tablets, and bedside tables  Also, clean any surfaces that may have blood, stool, or body fluids on them  Use a household cleaning spray or wipe, according to the label instructions  Labels contain instructions for safe and effective use of the cleaning product including precautions you should take when applying the product, such as wearing gloves and making sure you have good ventilation during use of the product  Monitor your symptoms    Seek prompt medical attention if your illness is worsening (e g , difficulty breathing)  Before seeking care, call your healthcare provider and tell them that you have, or are being evaluated for, COVID-19  Put on a facemask before you enter the facility  These steps will help the healthcare providers office to keep other people in the office or waiting room from getting infected or exposed  Ask your healthcare provider to call the local or [facility] health department  Persons who are placed under active monitoring or facilitated self-monitoring should follow instructions provided by their local health department or occupational health professionals, as appropriate  If you have a medical emergency and need to call 911, notify the dispatch personnel that you have, or are being evaluated for COVID-19   If possible, put on a facemask before emergency medical services arrive  Discontinuing home isolation    Patients with confirmed COVID-19 should remain under home isolation precautions until the following conditions are met:   - They have had no fever for at least 24 hours (that is one full day of no fever without the use medicine that reduces fevers)  AND  - other symptoms have improved (for example, when their cough or shortness of breath have improved)  AND  - If had mild or moderate illness, at least 10 days have passed since their symptoms first appeared or if severe illness (needed oxygen) or immunosuppressed, at least 20 days have passed since symptoms first appeared  Patients with confirmed COVID-19 should also notify close contacts (including their workplace) and ask that they self-quarantine  Currently, close contact is defined as being within 6 feet for 15 minutes or more from the period 24 hours starting 48 hours before symptom onset to the time at which the patient went into isolation  Close contacts of patients diagnosed with COVID-19 should be instructed by the patient to self-quarantine for 14 days from the last time of their last contact with the patient  Source: [employer]      Proceed to ER if symptoms worsen  Chief Complaint     Chief Complaint   Patient presents with    Cough     Onset Tuesday with sneezing, now coughing, runny nose, aches, scratchy thraot  History of Present Illness    Patient with history of Chiari malformation with decompression, CAD, hypertension and COPD presents complaining of cold-like symptoms x2 days  Reports Tuesday started with sneezing  Reports progress from there to head congestion, stuffy runny nose, scratchy throat, body aches and cough  Reports some chest tightness but denies any chest pain, palpitations, shortness of breath or dyspnea  No fevers or chills  No nausea, vomiting or diarrhea  Is fully vaccinated against COVID    Has been taking Tylenol for the aches  Review of Systems   Review of Systems   Constitutional: Negative for chills and fever  HENT: Positive for congestion, rhinorrhea, sinus pressure and sore throat  Respiratory: Positive for cough and chest tightness  Negative for shortness of breath, wheezing and stridor  Cardiovascular: Negative for chest pain and palpitations  Gastrointestinal: Negative for diarrhea, nausea and vomiting  Musculoskeletal: Positive for myalgias  Neurological: Positive for headaches  Negative for dizziness and light-headedness           Current Medications       Current Outpatient Medications:     ALPRAZolam (XANAX) 0 25 mg tablet, TAKE 1 TABLET BY MOUTH AT BEDTIME, Disp: 30 tablet, Rfl: 0    atorvastatin (LIPITOR) 80 mg tablet, Take 1 tablet (80 mg total) by mouth daily, Disp: 90 tablet, Rfl: 3    calcium carbonate (OS-FLASH) 600 MG tablet, Take 600 mg by mouth 2 (two) times a day with meals, Disp: , Rfl:     carvedilol (COREG) 6 25 mg tablet, Take 1 tablet by mouth twice daily, Disp: 180 tablet, Rfl: 0    cholecalciferol (VITAMIN D3) 1,000 units tablet, Take 1,000 Units by mouth daily, Disp: , Rfl:     clopidogrel (PLAVIX) 75 mg tablet, Take 1 tablet by mouth once daily, Disp: 90 tablet, Rfl: 0    dicyclomine (BENTYL) 10 mg capsule, Take 1 capsule (10 mg total) by mouth 4 (four) times a day (before meals and at bedtime), Disp: 120 capsule, Rfl: 2    escitalopram (LEXAPRO) 20 mg tablet, Take 1/2 (one-half) tablet by mouth once daily, Disp: 30 tablet, Rfl: 5    fluticasone (FLONASE) 50 mcg/act nasal spray, 2 sprays into each nostril daily, Disp: 1 Bottle, Rfl: 5    fluticasone-salmeterol (Advair Diskus) 100-50 mcg/dose inhaler, Inhale 1 puff 2 (two) times a day Rinse mouth after use , Disp: 1 Inhaler, Rfl: 5    gabapentin (NEURONTIN) 400 mg capsule, TAKE 4 CAPSULES BY MOUTH ONCE DAILY AS DIRECTED, Disp: 360 capsule, Rfl: 0    isosorbide mononitrate (IMDUR) 30 mg 24 hr tablet, Take 1 tablet by mouth once daily, Disp: 90 tablet, Rfl: 0    losartan (COZAAR) 50 mg tablet, Take 1 tablet (50 mg total) by mouth daily, Disp: 30 tablet, Rfl: 5    multivitamin (THERAGRAN) TABS, Take 1 tablet by mouth daily, Disp: , Rfl:     potassium chloride (K-DUR,KLOR-CON) 10 mEq tablet, TAKE 1  BY MOUTH ONCE DAILY, Disp: 90 tablet, Rfl: 0    tiZANidine (ZANAFLEX) 4 mg tablet, TAKE 1 & 1/2 (ONE & ONE-HALF) TABLETS BY MOUTH AT BEDTIME, Disp: 135 tablet, Rfl: 0    albuterol (ProAir HFA) 90 mcg/act inhaler, Inhale 2 puffs every 4 (four) hours as needed for wheezing or shortness of breath, Disp: 8 5 g, Rfl: 0    hydrochlorothiazide (HYDRODIURIL) 12 5 mg tablet, Take 1 tablet by mouth once daily (Patient not taking: Reported on 7/29/2021), Disp: 90 tablet, Rfl: 0    Current Allergies     Allergies as of 07/29/2021 - Reviewed 07/29/2021   Allergen Reaction Noted    Codeine Itching 02/22/2017    Medical tape Rash 03/16/2015              Past Medical History:   Diagnosis Date    Angina pectoris (HCC)     Breast lump     last assessed: Jan 22, 2013     Coronary artery disease     Depression with anxiety     GERD (gastroesophageal reflux disease)     Gout     last assessed: Nov 26, 2012     Hair loss     last assessed: Dec 15, 2016     Hypotension     last assessed: Nov 10, 2014     Mitral valve disorder     Myocardial infarct, old     SVT (supraventricular tachycardia) (HCC)        Past Surgical History:   Procedure Laterality Date    BREAST BIOPSY Right 02/28/2017    US CORE BIOPSY--BENIGN    CERVICAL DISCECTOMY      Spinal     CERVICAL LAMINECTOMY      C1 with chiari decompression     COLONOSCOPY      5 yrs - onset: May 31, 2011     CRANIOTOMY      POPLITEAL SYNOVIAL CYST EXCISION      TUBAL LIGATION      US GUIDED BREAST BIOPSY RIGHT COMPLETE Right 2/28/2017       Family History   Problem Relation Age of Onset    Stroke Mother     Other Father         blood clot in vein & CABG     Heart disease Father     Prostate cancer Father     Alcohol abuse Brother     Throat cancer Brother     No Known Problems Sister     No Known Problems Daughter     Breast cancer Maternal Grandmother     No Known Problems Daughter     No Known Problems Sister     No Known Problems Maternal Aunt     No Known Problems Maternal Aunt     No Known Problems Maternal Aunt     Colon cancer Paternal Aunt     No Known Problems Paternal [de-identified]     Colon cancer Son     No Known Problems Paternal Aunt     No Known Problems Paternal Aunt     Colon cancer Paternal Aunt     No Known Problems Maternal Grandfather     No Known Problems Paternal Grandmother     No Known Problems Paternal Grandfather     Kidney failure Brother         kidney failure d/t NSIADs on dialysis         Medications have been verified  The following portions of the patient's history were reviewed and updated as appropriate: allergies, current medications, past family history, past medical history, past social history, past surgical history and problem list     Objective   Pulse 74   Temp (!) 97 4 °F (36 3 °C) (Tympanic)   Resp 16   Ht 5' 5" (1 651 m)   Wt 70 5 kg (155 lb 6 4 oz)   SpO2 95%   BMI 25 86 kg/m²      Physical Exam     Physical Exam  Vitals and nursing note reviewed  Constitutional:       General: She is not in acute distress  Appearance: Normal appearance  She is not ill-appearing, toxic-appearing or diaphoretic  HENT:      Mouth/Throat:      Comments: Posterior oropharynx is clear without erythema, edema, tonsillar hypertrophy or exudates  Uvula midline  Soft palate equal   +clear PND  Cardiovascular:      Rate and Rhythm: Normal rate and regular rhythm  Heart sounds: Normal heart sounds  Pulmonary:      Effort: Pulmonary effort is normal  No respiratory distress  Breath sounds: Normal breath sounds  No stridor  No wheezing, rhonchi or rales  Musculoskeletal:      Cervical back: Normal range of motion  No rigidity  Lymphadenopathy:      Cervical: No cervical adenopathy  Skin:     Capillary Refill: Capillary refill takes less than 2 seconds  Neurological:      Mental Status: She is alert and oriented to person, place, and time

## 2021-07-29 NOTE — PATIENT INSTRUCTIONS

## 2021-07-30 LAB — SARS-COV-2 RNA RESP QL NAA+PROBE: NEGATIVE

## 2021-08-01 DIAGNOSIS — F32.A DEPRESSION, UNSPECIFIED DEPRESSION TYPE: ICD-10-CM

## 2021-08-01 DIAGNOSIS — I20.9 ANGINAL PAIN (HCC): ICD-10-CM

## 2021-08-01 DIAGNOSIS — I25.10 CORONARY ARTERY DISEASE INVOLVING NATIVE CORONARY ARTERY OF NATIVE HEART WITHOUT ANGINA PECTORIS: ICD-10-CM

## 2021-08-01 RX ORDER — ESCITALOPRAM OXALATE 20 MG/1
TABLET ORAL
Qty: 30 TABLET | Refills: 0 | Status: SHIPPED | OUTPATIENT
Start: 2021-08-01 | End: 2021-10-11

## 2021-08-02 RX ORDER — ISOSORBIDE MONONITRATE 30 MG/1
TABLET, EXTENDED RELEASE ORAL
Qty: 90 TABLET | Refills: 0 | Status: SHIPPED | OUTPATIENT
Start: 2021-08-02 | End: 2021-11-08

## 2021-08-02 RX ORDER — CARVEDILOL 6.25 MG/1
TABLET ORAL
Qty: 180 TABLET | Refills: 0 | Status: SHIPPED | OUTPATIENT
Start: 2021-08-02 | End: 2021-11-08

## 2021-08-14 DIAGNOSIS — G89.4 CHRONIC PAIN SYNDROME: ICD-10-CM

## 2021-08-15 ENCOUNTER — HOSPITAL ENCOUNTER (OUTPATIENT)
Dept: MRI IMAGING | Facility: HOSPITAL | Age: 66
Discharge: HOME | End: 2021-08-15
Payer: MEDICARE

## 2021-08-15 DIAGNOSIS — R10.12 LEFT UPPER QUADRANT ABDOMINAL PAIN: ICD-10-CM

## 2021-08-15 DIAGNOSIS — K86.2 PANCREATIC CYST: ICD-10-CM

## 2021-08-15 PROCEDURE — 74183 MRI ABD W/O CNTR FLWD CNTR: CPT

## 2021-08-15 PROCEDURE — G1004 CDSM NDSC: HCPCS

## 2021-08-15 PROCEDURE — A9585 GADOBUTROL INJECTION: HCPCS | Performed by: INTERNAL MEDICINE

## 2021-08-15 RX ADMIN — GADOBUTROL 7 ML: 604.72 INJECTION INTRAVENOUS at 08:05

## 2021-08-16 RX ORDER — GABAPENTIN 400 MG/1
CAPSULE ORAL
Qty: 360 CAPSULE | Refills: 0 | Status: SHIPPED | OUTPATIENT
Start: 2021-08-16 | End: 2021-11-07

## 2021-08-17 ENCOUNTER — OFFICE VISIT (OUTPATIENT)
Dept: FAMILY MEDICINE CLINIC | Facility: HOSPITAL | Age: 66
End: 2021-08-17
Payer: MEDICARE

## 2021-08-17 VITALS
HEART RATE: 68 BPM | TEMPERATURE: 98.3 F | WEIGHT: 153.2 LBS | HEIGHT: 65 IN | BODY MASS INDEX: 25.52 KG/M2 | DIASTOLIC BLOOD PRESSURE: 68 MMHG | SYSTOLIC BLOOD PRESSURE: 136 MMHG

## 2021-08-17 DIAGNOSIS — J01.90 ACUTE NON-RECURRENT SINUSITIS, UNSPECIFIED LOCATION: Primary | ICD-10-CM

## 2021-08-17 PROCEDURE — 1123F ACP DISCUSS/DSCN MKR DOCD: CPT | Performed by: INTERNAL MEDICINE

## 2021-08-17 PROCEDURE — 99213 OFFICE O/P EST LOW 20 MIN: CPT | Performed by: INTERNAL MEDICINE

## 2021-08-17 RX ORDER — AMOXICILLIN AND CLAVULANATE POTASSIUM 875; 125 MG/1; MG/1
1 TABLET, FILM COATED ORAL EVERY 12 HOURS SCHEDULED
Qty: 14 TABLET | Refills: 0 | Status: SHIPPED | OUTPATIENT
Start: 2021-08-17 | End: 2021-08-24

## 2021-08-17 NOTE — PROGRESS NOTES
Assessment/Plan:     Diagnoses and all orders for this visit:    Acute non-recurrent sinusitis, unspecified location  Comments:  Neg COVID test x 3 (1 PCR and 2 rapid), has been vaccinated and it is past 2 wks since symptoms started, s/sx most c/w sinusitis - start Augmentin bid x 7 days,   Rest/fluids/lozenges/hot tea/garles and OTC decongestant and cough medication was recommended; d/w pt that cough can last 4-6 wks but call with new/worsening symptoms OR if not better at all by end of abx    Orders:  -     amoxicillin-clavulanate (AUGMENTIN) 875-125 mg per tablet; Take 1 tablet by mouth every 12 (twelve) hours for 7 days          Subjective:      Patient ID: Harvey Celis is a 74 y o  female  HPI Pt here for acute sinus symptoms    Symptoms started 7/28/21 with cough/congestion/loss of taste/smell  She had a temp with 100 3 as Tm - last fever this am   She went to [facility] 7/29/21 and had a COVID test - it was negative 7/29/21  She has since had 2 rapid COVID tests 2 wks ago and last week and they were both again negative  She currently c/o L sinus pressure/green nasal discharge/congestion  She notes no ST but had some L ear pain last night  She has had HA's intermittently and intermittent dental pain  She con't to have loss of taste and smell  She notes no N/V/D/rashes  She doesn't feel very hungry and is more tired then usual  She notes no myalgia  Currently she is using Ibuprofen and Tylenol  She is using [employer] and [place]        Review of Systems   Constitutional: Positive for appetite change, chills, fatigue and fever  HENT: Positive for congestion, ear pain, sinus pressure and sinus pain  Negative for sore throat and trouble swallowing  Eyes: Negative for pain and redness  Respiratory: Positive for cough  Negative for shortness of breath and wheezing  Gastrointestinal: Negative for abdominal pain, diarrhea, nausea and vomiting  Genitourinary: Negative for difficulty urinating and dysuria  Musculoskeletal: Negative for arthralgias and myalgias  Skin: Negative for rash  Neurological: Positive for headaches  Objective:    /68   Pulse 68   Temp 98 3 °F (36 8 °C) (Tympanic)   Ht 5' 5" (1 651 m)   Wt 69 5 kg (153 lb 3 2 oz)   BMI 25 49 kg/m²      Physical Exam  Vitals and nursing note reviewed  Constitutional:       General: She is not in acute distress  Appearance: She is well-developed  She is not ill-appearing  HENT:      Head: Normocephalic and atraumatic  Right Ear: There is no impacted cerumen  Left Ear: Tympanic membrane normal  There is no impacted cerumen  Ears:      Comments: R TM with mild effusion     Mouth/Throat:      Mouth: Mucous membranes are moist       Pharynx: Oropharynx is clear  No oropharyngeal exudate  Eyes:      General:         Right eye: No discharge  Left eye: No discharge  Conjunctiva/sclera: Conjunctivae normal    Neck:      Trachea: No tracheal deviation  Cardiovascular:      Rate and Rhythm: Normal rate and regular rhythm  Heart sounds: Normal heart sounds  No murmur heard  No friction rub  Pulmonary:      Effort: Pulmonary effort is normal  No respiratory distress  Breath sounds: Normal breath sounds  No wheezing or rales  Musculoskeletal:         General: No deformity or signs of injury  Cervical back: Neck supple  Lymphadenopathy:      Cervical: Cervical adenopathy present  Skin:     General: Skin is warm  Coloration: Skin is not pale  Findings: No rash  Neurological:      General: No focal deficit present  Mental Status: She is alert  Motor: No abnormal muscle tone  Gait: Gait normal    Psychiatric:         Mood and Affect: Mood normal          Behavior: Behavior normal          Thought Content:  Thought content normal          Judgment: Judgment normal

## 2021-08-28 ENCOUNTER — APPOINTMENT (OUTPATIENT)
Dept: LAB | Facility: HOSPITAL | Age: 66
End: 2021-08-28
Payer: MEDICARE

## 2021-08-28 DIAGNOSIS — E78.5 DYSLIPIDEMIA: ICD-10-CM

## 2021-08-28 DIAGNOSIS — I25.10 CORONARY ARTERY DISEASE INVOLVING NATIVE CORONARY ARTERY OF NATIVE HEART WITHOUT ANGINA PECTORIS: ICD-10-CM

## 2021-08-28 LAB
ALBUMIN SERPL BCP-MCNC: 3.6 G/DL (ref 3.5–5)
ALP SERPL-CCNC: 112 U/L (ref 46–116)
ALT SERPL W P-5'-P-CCNC: 42 U/L (ref 12–78)
ANION GAP SERPL CALCULATED.3IONS-SCNC: 4 MMOL/L (ref 4–13)
AST SERPL W P-5'-P-CCNC: 22 U/L (ref 5–45)
BILIRUB SERPL-MCNC: 0.5 MG/DL (ref 0.2–1)
BUN SERPL-MCNC: 22 MG/DL (ref 5–25)
CALCIUM SERPL-MCNC: 9.2 MG/DL (ref 8.3–10.1)
CHLORIDE SERPL-SCNC: 110 MMOL/L (ref 100–108)
CHOLEST SERPL-MCNC: 178 MG/DL (ref 50–200)
CO2 SERPL-SCNC: 26 MMOL/L (ref 21–32)
CREAT SERPL-MCNC: 1.23 MG/DL (ref 0.6–1.3)
GFR SERPL CREATININE-BSD FRML MDRD: 45 ML/MIN/1.73SQ M
GLUCOSE P FAST SERPL-MCNC: 101 MG/DL (ref 65–99)
HDLC SERPL-MCNC: 53 MG/DL
LDLC SERPL CALC-MCNC: 91 MG/DL (ref 0–100)
POTASSIUM SERPL-SCNC: 4.4 MMOL/L (ref 3.5–5.3)
PROT SERPL-MCNC: 7.4 G/DL (ref 6.4–8.2)
SODIUM SERPL-SCNC: 140 MMOL/L (ref 136–145)
TRIGL SERPL-MCNC: 170 MG/DL

## 2021-08-28 PROCEDURE — 80061 LIPID PANEL: CPT

## 2021-08-28 PROCEDURE — 80053 COMPREHEN METABOLIC PANEL: CPT

## 2021-08-28 PROCEDURE — 36415 COLL VENOUS BLD VENIPUNCTURE: CPT

## 2021-09-12 DIAGNOSIS — R14.0 ABDOMINAL BLOATING: ICD-10-CM

## 2021-09-12 DIAGNOSIS — I10 ESSENTIAL HYPERTENSION: ICD-10-CM

## 2021-09-12 RX ORDER — LOSARTAN POTASSIUM 100 MG/1
TABLET ORAL
Qty: 90 TABLET | Refills: 0 | Status: SHIPPED | OUTPATIENT
Start: 2021-09-12 | End: 2021-09-16

## 2021-09-12 RX ORDER — DICYCLOMINE HYDROCHLORIDE 10 MG/1
CAPSULE ORAL
Qty: 120 CAPSULE | Refills: 0 | Status: SHIPPED | OUTPATIENT
Start: 2021-09-12 | End: 2021-11-07

## 2021-09-16 ENCOUNTER — OFFICE VISIT (OUTPATIENT)
Dept: FAMILY MEDICINE CLINIC | Facility: HOSPITAL | Age: 66
End: 2021-09-16
Payer: MEDICARE

## 2021-09-16 VITALS
BODY MASS INDEX: 25.39 KG/M2 | HEIGHT: 65 IN | HEART RATE: 66 BPM | SYSTOLIC BLOOD PRESSURE: 120 MMHG | WEIGHT: 152.4 LBS | DIASTOLIC BLOOD PRESSURE: 62 MMHG | TEMPERATURE: 97.7 F

## 2021-09-16 DIAGNOSIS — Z87.891 SMOKING HISTORY: ICD-10-CM

## 2021-09-16 DIAGNOSIS — R73.01 IFG (IMPAIRED FASTING GLUCOSE): Primary | ICD-10-CM

## 2021-09-16 DIAGNOSIS — Z12.2 ENCOUNTER FOR SCREENING FOR LUNG CANCER: ICD-10-CM

## 2021-09-16 DIAGNOSIS — I95.9 HYPOTENSION, UNSPECIFIED HYPOTENSION TYPE: ICD-10-CM

## 2021-09-16 DIAGNOSIS — E78.5 DYSLIPIDEMIA: ICD-10-CM

## 2021-09-16 DIAGNOSIS — Z12.31 ENCOUNTER FOR SCREENING MAMMOGRAM FOR MALIGNANT NEOPLASM OF BREAST: ICD-10-CM

## 2021-09-16 DIAGNOSIS — I10 ESSENTIAL HYPERTENSION: ICD-10-CM

## 2021-09-16 DIAGNOSIS — Z23 ENCOUNTER FOR IMMUNIZATION: ICD-10-CM

## 2021-09-16 PROBLEM — N18.32 STAGE 3B CHRONIC KIDNEY DISEASE (HCC): Status: RESOLVED | Noted: 2021-03-25 | Resolved: 2021-09-16

## 2021-09-16 PROCEDURE — 90662 IIV NO PRSV INCREASED AG IM: CPT | Performed by: INTERNAL MEDICINE

## 2021-09-16 PROCEDURE — G0008 ADMIN INFLUENZA VIRUS VAC: HCPCS | Performed by: INTERNAL MEDICINE

## 2021-09-16 PROCEDURE — 99214 OFFICE O/P EST MOD 30 MIN: CPT | Performed by: INTERNAL MEDICINE

## 2021-09-16 NOTE — ASSESSMENT & PLAN NOTE
FBS up a bit but wgt down, urged healthy diet/exercise/mild wgt loss, recheck BW in 6 mo - order given

## 2021-09-16 NOTE — PROGRESS NOTES
Assessment/Plan:    IFG (impaired fasting glucose)  FBS up a bit but wgt down, urged healthy diet/exercise/mild wgt loss, recheck BW in 6 mo - order given    Dyslipidemia  TC/LDL at goal, con't current statin, urged diet/exercise/wgt loss, recheck FLP annually    Essential hypertension  BP at goal, con't current meds, low sodium diet/exercise/avoiding NSAIDs reviewed, recheck in 6 mos    Hypotension  BP better with holding HCTZ and decrease in losartan, con't current meds, recheck in 6mos       Diagnoses and all orders for this visit:    IFG (impaired fasting glucose)  -     Hemoglobin A1C; Future  -     Basic metabolic panel; Future    Dyslipidemia    Essential hypertension    Hypotension, unspecified hypotension type    Encounter for screening mammogram for malignant neoplasm of breast  -     Mammo screening bilateral w 3d & cad; Future    Encounter for screening for lung cancer  -     CT lung screening program; Future    Smoking history   -     CT lung screening program; Future        Colonoscopy 2/17 - 5 yrs    Mammo 10/20 - order given    Dexa 10/20 - osteopenia    BW 8/21    CT lung CA screening 10/20 - order given    Flu vaccine given today        Subjective:      Patient ID: Ernestine Davis is a 74 y o  female  HPI Pt here for follow up appt and BW results    BW results were d/w pt in detail:   And rest of CMP wnl,  and TC/TG/HDL at goal       Def of nml vs IFG vs DM was d/w pt in detail  Diet/exercise was reviewed - wgt down 5 lbs from March 2021  Pt states she is watching sugars and carbs  She does no formal exercise but is active with grandchild  Goal FLP was d/w pt in detail  Diet/exercise reviewed as noted above  She is taking her Atorvastatin daily as directed w/o Se  She notes no stroke/TIA symptoms/CP  Last visit in July pts BP was low and we decreased Losartan from 100 to 50 mg daily  Her HCTZ had been held at the previous appt  BP good today    She is taking meds as directed and denies missing doses/forgetting to take the medication  She has had a few days when it has been elevated in the 150's/70's - occurring 2-3 x's a week  She notes some HA's but not frequent but more then usual   She notes no double vision/dizziness  Colonoscopy 2/17 - 5 yrs    Mammo 10/20    Dexa 10/20 - osteopenia    BW 8/21    CT lung CA screening 10/20    Review of Systems   Constitutional: Positive for fatigue  Negative for chills, fever and unexpected weight change  HENT: Negative for congestion and sore throat  Eyes: Negative for pain and visual disturbance  Respiratory: Negative for cough and shortness of breath  Cardiovascular: Negative for chest pain, palpitations and leg swelling  Gastrointestinal: Negative for abdominal pain, diarrhea, nausea and vomiting  Endocrine: Negative for polydipsia and polyuria  Genitourinary: Negative for difficulty urinating and dysuria  Musculoskeletal: Negative for back pain and neck pain  Skin: Negative for rash and wound  Neurological: Positive for headaches  Negative for dizziness and light-headedness  Hematological: Negative for adenopathy  Psychiatric/Behavioral: Negative for behavioral problems and confusion  Objective:    /62   Pulse 66   Temp 97 7 °F (36 5 °C) (Tympanic)   Ht 5' 5" (1 651 m)   Wt 69 1 kg (152 lb 6 4 oz)   BMI 25 36 kg/m²      Physical Exam  Vitals and nursing note reviewed  Constitutional:       General: She is not in acute distress  Appearance: She is well-developed  She is not ill-appearing  HENT:      Head: Normocephalic and atraumatic  Eyes:      General:         Right eye: No discharge  Left eye: No discharge  Conjunctiva/sclera: Conjunctivae normal    Neck:      Trachea: No tracheal deviation  Cardiovascular:      Rate and Rhythm: Normal rate and regular rhythm  Heart sounds: Normal heart sounds  No murmur heard  No friction rub     Pulmonary: Effort: Pulmonary effort is normal  No respiratory distress  Breath sounds: Normal breath sounds  No wheezing, rhonchi or rales  Abdominal:      General: There is no distension  Palpations: Abdomen is soft  Tenderness: There is no abdominal tenderness  There is no guarding or rebound  Musculoskeletal:         General: No deformity or signs of injury  Cervical back: Neck supple  Skin:     General: Skin is warm  Coloration: Skin is not pale  Findings: No rash  Neurological:      General: No focal deficit present  Mental Status: She is alert  Mental status is at baseline  Motor: No abnormal muscle tone  Gait: Gait normal    Psychiatric:         Behavior: Behavior normal          Thought Content:  Thought content normal          Judgment: Judgment normal

## 2021-09-20 DIAGNOSIS — I10 ESSENTIAL HYPERTENSION: ICD-10-CM

## 2021-09-20 RX ORDER — LOSARTAN POTASSIUM 50 MG/1
50 TABLET ORAL DAILY
Qty: 30 TABLET | Refills: 5 | Status: SHIPPED | OUTPATIENT
Start: 2021-09-20 | End: 2021-12-23 | Stop reason: SDUPTHER

## 2021-09-26 DIAGNOSIS — E87.6 HYPOKALEMIA: ICD-10-CM

## 2021-09-26 DIAGNOSIS — I25.10 CORONARY ARTERY DISEASE INVOLVING NATIVE CORONARY ARTERY OF NATIVE HEART WITHOUT ANGINA PECTORIS: ICD-10-CM

## 2021-09-26 RX ORDER — POTASSIUM CHLORIDE 750 MG/1
TABLET, EXTENDED RELEASE ORAL
Qty: 90 TABLET | Refills: 0 | Status: SHIPPED | OUTPATIENT
Start: 2021-09-26 | End: 2021-10-26

## 2021-09-27 RX ORDER — CLOPIDOGREL BISULFATE 75 MG/1
TABLET ORAL
Qty: 90 TABLET | Refills: 0 | Status: SHIPPED | OUTPATIENT
Start: 2021-09-27 | End: 2022-03-28

## 2021-10-23 DIAGNOSIS — Z00.00 HEALTH CARE MAINTENANCE: ICD-10-CM

## 2021-10-25 RX ORDER — TIZANIDINE 4 MG/1
TABLET ORAL
Qty: 135 TABLET | Refills: 0 | Status: SHIPPED | OUTPATIENT
Start: 2021-10-25 | End: 2022-04-10

## 2021-10-26 ENCOUNTER — OFFICE VISIT (OUTPATIENT)
Dept: CARDIOLOGY CLINIC | Facility: CLINIC | Age: 66
End: 2021-10-26
Payer: MEDICARE

## 2021-10-26 VITALS
WEIGHT: 150 LBS | OXYGEN SATURATION: 99 % | DIASTOLIC BLOOD PRESSURE: 60 MMHG | BODY MASS INDEX: 24.99 KG/M2 | HEART RATE: 63 BPM | HEIGHT: 65 IN | SYSTOLIC BLOOD PRESSURE: 130 MMHG

## 2021-10-26 DIAGNOSIS — I25.10 CORONARY ARTERY DISEASE INVOLVING NATIVE CORONARY ARTERY OF NATIVE HEART WITHOUT ANGINA PECTORIS: Primary | ICD-10-CM

## 2021-10-26 DIAGNOSIS — I10 ESSENTIAL HYPERTENSION: ICD-10-CM

## 2021-10-26 DIAGNOSIS — I34.0 NONRHEUMATIC MITRAL VALVE REGURGITATION: ICD-10-CM

## 2021-10-26 DIAGNOSIS — E78.5 DYSLIPIDEMIA: ICD-10-CM

## 2021-10-26 DIAGNOSIS — I51.81 TAKOTSUBO SYNDROME: ICD-10-CM

## 2021-10-26 DIAGNOSIS — F41.8 DEPRESSION WITH ANXIETY: ICD-10-CM

## 2021-10-26 PROCEDURE — 99214 OFFICE O/P EST MOD 30 MIN: CPT | Performed by: INTERNAL MEDICINE

## 2021-11-07 DIAGNOSIS — G89.4 CHRONIC PAIN SYNDROME: ICD-10-CM

## 2021-11-07 DIAGNOSIS — I25.10 CORONARY ARTERY DISEASE INVOLVING NATIVE CORONARY ARTERY OF NATIVE HEART WITHOUT ANGINA PECTORIS: ICD-10-CM

## 2021-11-07 DIAGNOSIS — R14.0 ABDOMINAL BLOATING: ICD-10-CM

## 2021-11-07 DIAGNOSIS — I20.9 ANGINAL PAIN (HCC): ICD-10-CM

## 2021-11-07 RX ORDER — DICYCLOMINE HYDROCHLORIDE 10 MG/1
CAPSULE ORAL
Qty: 120 CAPSULE | Refills: 0 | Status: SHIPPED | OUTPATIENT
Start: 2021-11-07 | End: 2022-01-04

## 2021-11-07 RX ORDER — GABAPENTIN 400 MG/1
CAPSULE ORAL
Qty: 360 CAPSULE | Refills: 0 | Status: SHIPPED | OUTPATIENT
Start: 2021-11-07 | End: 2022-02-13

## 2021-11-08 RX ORDER — ISOSORBIDE MONONITRATE 30 MG/1
TABLET, EXTENDED RELEASE ORAL
Qty: 90 TABLET | Refills: 0 | Status: SHIPPED | OUTPATIENT
Start: 2021-11-08 | End: 2022-02-13

## 2021-11-08 RX ORDER — CARVEDILOL 6.25 MG/1
TABLET ORAL
Qty: 180 TABLET | Refills: 0 | Status: SHIPPED | OUTPATIENT
Start: 2021-11-08 | End: 2022-02-13

## 2021-12-23 DIAGNOSIS — I10 ESSENTIAL HYPERTENSION: ICD-10-CM

## 2021-12-24 RX ORDER — LOSARTAN POTASSIUM 50 MG/1
50 TABLET ORAL DAILY
Qty: 90 TABLET | Refills: 2 | Status: SHIPPED | OUTPATIENT
Start: 2021-12-24

## 2022-01-04 DIAGNOSIS — R14.0 ABDOMINAL BLOATING: ICD-10-CM

## 2022-01-04 RX ORDER — DICYCLOMINE HYDROCHLORIDE 10 MG/1
CAPSULE ORAL
Qty: 120 CAPSULE | Refills: 0 | Status: SHIPPED | OUTPATIENT
Start: 2022-01-04 | End: 2022-03-12

## 2022-01-16 DIAGNOSIS — F41.9 ANXIETY: ICD-10-CM

## 2022-01-16 RX ORDER — ALPRAZOLAM 0.25 MG/1
TABLET ORAL
Qty: 30 TABLET | Refills: 0 | Status: SHIPPED | OUTPATIENT
Start: 2022-01-16

## 2022-02-12 ENCOUNTER — HOSPITAL ENCOUNTER (OUTPATIENT)
Dept: CT IMAGING | Facility: HOSPITAL | Age: 67
Discharge: HOME | End: 2022-02-12
Payer: MEDICARE

## 2022-02-12 ENCOUNTER — HOSPITAL ENCOUNTER (OUTPATIENT)
Dept: MAMMOGRAPHY | Facility: CLINIC | Age: 67
Discharge: HOME | End: 2022-02-12
Payer: MEDICARE

## 2022-02-12 VITALS — BODY MASS INDEX: 24.98 KG/M2 | WEIGHT: 149.91 LBS | HEIGHT: 65 IN

## 2022-02-12 DIAGNOSIS — Z12.31 ENCOUNTER FOR SCREENING MAMMOGRAM FOR MALIGNANT NEOPLASM OF BREAST: ICD-10-CM

## 2022-02-12 DIAGNOSIS — Z12.2 ENCOUNTER FOR SCREENING FOR LUNG CANCER: ICD-10-CM

## 2022-02-12 DIAGNOSIS — Z12.31 VISIT FOR SCREENING MAMMOGRAM: ICD-10-CM

## 2022-02-12 DIAGNOSIS — Z87.891 SMOKING HISTORY: ICD-10-CM

## 2022-02-12 PROCEDURE — 77063 BREAST TOMOSYNTHESIS BI: CPT

## 2022-02-12 PROCEDURE — 71271 CT THORAX LUNG CANCER SCR C-: CPT

## 2022-02-12 PROCEDURE — 77067 SCR MAMMO BI INCL CAD: CPT

## 2022-02-13 DIAGNOSIS — G89.4 CHRONIC PAIN SYNDROME: ICD-10-CM

## 2022-02-13 DIAGNOSIS — I20.9 ANGINAL PAIN (HCC): ICD-10-CM

## 2022-02-13 DIAGNOSIS — I25.10 CORONARY ARTERY DISEASE INVOLVING NATIVE CORONARY ARTERY OF NATIVE HEART WITHOUT ANGINA PECTORIS: ICD-10-CM

## 2022-02-13 RX ORDER — GABAPENTIN 400 MG/1
CAPSULE ORAL
Qty: 360 CAPSULE | Refills: 0 | Status: SHIPPED | OUTPATIENT
Start: 2022-02-13 | End: 2022-05-22

## 2022-02-13 RX ORDER — ISOSORBIDE MONONITRATE 30 MG/1
TABLET, EXTENDED RELEASE ORAL
Qty: 90 TABLET | Refills: 0 | Status: SHIPPED | OUTPATIENT
Start: 2022-02-13 | End: 2022-05-23

## 2022-02-13 RX ORDER — CARVEDILOL 6.25 MG/1
TABLET ORAL
Qty: 180 TABLET | Refills: 0 | Status: SHIPPED | OUTPATIENT
Start: 2022-02-13 | End: 2022-05-23

## 2022-03-03 ENCOUNTER — APPOINTMENT (OUTPATIENT)
Dept: LAB | Facility: HOSPITAL | Age: 67
End: 2022-03-03
Payer: MEDICARE

## 2022-03-03 DIAGNOSIS — R73.01 IFG (IMPAIRED FASTING GLUCOSE): ICD-10-CM

## 2022-03-03 LAB
ANION GAP SERPL CALCULATED.3IONS-SCNC: 5 MMOL/L (ref 4–13)
BUN SERPL-MCNC: 19 MG/DL (ref 5–25)
CALCIUM SERPL-MCNC: 8.8 MG/DL (ref 8.3–10.1)
CHLORIDE SERPL-SCNC: 104 MMOL/L (ref 100–108)
CO2 SERPL-SCNC: 27 MMOL/L (ref 21–32)
CREAT SERPL-MCNC: 1.09 MG/DL (ref 0.6–1.3)
EST. AVERAGE GLUCOSE BLD GHB EST-MCNC: 114 MG/DL
GFR SERPL CREATININE-BSD FRML MDRD: 52 ML/MIN/1.73SQ M
GLUCOSE P FAST SERPL-MCNC: 113 MG/DL (ref 65–99)
HBA1C MFR BLD: 5.6 %
POTASSIUM SERPL-SCNC: 4.2 MMOL/L (ref 3.5–5.3)
SODIUM SERPL-SCNC: 136 MMOL/L (ref 136–145)

## 2022-03-03 PROCEDURE — 83036 HEMOGLOBIN GLYCOSYLATED A1C: CPT

## 2022-03-03 PROCEDURE — 80048 BASIC METABOLIC PNL TOTAL CA: CPT

## 2022-03-03 PROCEDURE — 36415 COLL VENOUS BLD VENIPUNCTURE: CPT

## 2022-03-12 DIAGNOSIS — R14.0 ABDOMINAL BLOATING: ICD-10-CM

## 2022-03-12 RX ORDER — DICYCLOMINE HYDROCHLORIDE 10 MG/1
CAPSULE ORAL
Qty: 120 CAPSULE | Refills: 0 | Status: SHIPPED | OUTPATIENT
Start: 2022-03-12

## 2022-03-15 ENCOUNTER — OFFICE VISIT (OUTPATIENT)
Dept: FAMILY MEDICINE CLINIC | Facility: HOSPITAL | Age: 67
End: 2022-03-15
Payer: MEDICARE

## 2022-03-15 VITALS
BODY MASS INDEX: 26.52 KG/M2 | DIASTOLIC BLOOD PRESSURE: 70 MMHG | TEMPERATURE: 96.4 F | SYSTOLIC BLOOD PRESSURE: 122 MMHG | HEIGHT: 65 IN | HEART RATE: 58 BPM | WEIGHT: 159.2 LBS

## 2022-03-15 DIAGNOSIS — E66.3 OVERWEIGHT (BMI 25.0-29.9): ICD-10-CM

## 2022-03-15 DIAGNOSIS — R73.01 IFG (IMPAIRED FASTING GLUCOSE): Primary | ICD-10-CM

## 2022-03-15 DIAGNOSIS — E78.5 DYSLIPIDEMIA: ICD-10-CM

## 2022-03-15 DIAGNOSIS — I10 ESSENTIAL HYPERTENSION: ICD-10-CM

## 2022-03-15 DIAGNOSIS — Z00.00 MEDICARE ANNUAL WELLNESS VISIT, SUBSEQUENT: ICD-10-CM

## 2022-03-15 DIAGNOSIS — Z12.11 ENCOUNTER FOR SCREENING COLONOSCOPY: ICD-10-CM

## 2022-03-15 DIAGNOSIS — G51.4 FACIAL TWITCHING: ICD-10-CM

## 2022-03-15 DIAGNOSIS — Q07.00 ARNOLD-CHIARI MALFORMATION (HCC): ICD-10-CM

## 2022-03-15 DIAGNOSIS — J43.8 OTHER EMPHYSEMA (HCC): ICD-10-CM

## 2022-03-15 DIAGNOSIS — R14.0 ABDOMINAL BLOATING: ICD-10-CM

## 2022-03-15 DIAGNOSIS — N18.31 STAGE 3A CHRONIC KIDNEY DISEASE (HCC): ICD-10-CM

## 2022-03-15 PROCEDURE — G0439 PPPS, SUBSEQ VISIT: HCPCS | Performed by: INTERNAL MEDICINE

## 2022-03-15 PROCEDURE — 99214 OFFICE O/P EST MOD 30 MIN: CPT | Performed by: INTERNAL MEDICINE

## 2022-03-15 NOTE — ASSESSMENT & PLAN NOTE
FBS bumped up but A1C in upper range of nml, urged low sugar/carb diet and exercise/wgt loss, recheck BW in 6 mo - order given

## 2022-03-15 NOTE — PROGRESS NOTES
Assessment and Plan:     Problem List Items Addressed This Visit        Endocrine    IFG (impaired fasting glucose) - Primary       Respiratory    Other emphysema ([address] )       Cardiovascular and Mediastinum    Essential hypertension       Nervous and Auditory    Arnold-Chiari malformation (HCC)       Genitourinary    Stage 3a chronic kidney disease ([facility] )      Other Visit Diagnoses     Encounter for screening colonoscopy        Relevant Orders    Ambulatory referral for colonoscopy    Medicare annual wellness visit, subsequent        BMI 26 0-26 9,adult            BMI Counseling: Body mass index is 26 49 kg/m²  The BMI is above normal  Nutrition recommendations include decreasing portion sizes, encouraging healthy choices of fruits and vegetables, consuming healthier snacks, moderation in carbohydrate intake, increasing intake of lean protein and reducing intake of saturated and trans fat  Exercise recommendations include exercising 3-5 times per week  No pharmacotherapy was ordered  Rationale for BMI follow-up plan is due to patient being overweight or obese  Preventive health issues were discussed with patient, and age appropriate screening tests were ordered as noted in patient's After Visit Summary  Personalized health advice and appropriate referrals for health education or preventive services given if needed, as noted in patient's After Visit Summary       History of Present Illness:     Patient presents for Medicare Annual Wellness visit    Patient Care Team:  Harvey Banks DO as PCP - General  Kathy Romero MD (Cardiology)  DO Rosa Green MD (Dermatology)     Problem List:     Patient Active Problem List   Diagnosis    De Quervain's disease (tenosynovitis)    Vertigo    Essential hypertension    Tinnitus    Third degree hemorrhoids    Takotsubo syndrome    Sudden idiopathic hearing loss of right ear    Cervical stenosis of spinal canal    Spasmodic dysphonia    Raynaud's syndrome without gangrene    Plantar fasciitis    Paresthesia of bilateral legs    Osteopenia    New daily persistent headache    Mitral regurgitation    Mass of spinal cord (HCC)    Low back pain    Insomnia    Hypokalemia    IFG (impaired fasting glucose)    Hemorrhoids    Esophagitis, reflux    Dyslipidemia    Depression with anxiety    Coronary artery disease    Carpal tunnel syndrome    Arnold-Chiari malformation (HCC)    Anal fissure    Allergic state    Adenomatous colon polyp    Abnormal findings on diagnostic imaging of other parts of musculoskeletal system    Pancreatic cyst    Other emphysema (HCC)    GERD (gastroesophageal reflux disease)    Abdominal bloating    Hypotension    Stage 3a chronic kidney disease ([facility])      Past Medical and Surgical History:     Past Medical History:   Diagnosis Date    Angina pectoris ([facility])     Breast lump     last assessed: Jan 22, 2013     Coronary artery disease     Depression with anxiety     GERD (gastroesophageal reflux disease)     Gout     last assessed: Nov 26, 2012     Hair loss     last assessed: Dec 15, 2016     Hypotension     last assessed: Nov 10, 2014     Mitral valve disorder     Myocardial infarct, old     SVT (supraventricular tachycardia) ([facility])      Past Surgical History:   Procedure Laterality Date    BREAST BIOPSY Right 02/28/2017    US CORE BIOPSY--BENIGN    CERVICAL DISCECTOMY      Spinal     CERVICAL LAMINECTOMY      C1 with chiari decompression     COLONOSCOPY      5 yrs - onset: May 31, 2011     CRANIOTOMY      POPLITEAL SYNOVIAL CYST EXCISION      TUBAL LIGATION      US GUIDED BREAST BIOPSY RIGHT COMPLETE Right 2/28/2017      Family History:     Family History   Problem Relation Age of Onset    Stroke Mother     Other Father         blood clot in vein & CABG     Heart disease Father     Prostate cancer Father     Alcohol abuse Brother     Throat cancer Brother  No Known Problems Sister     No Known Problems Daughter     Breast cancer Maternal Grandmother     No Known Problems Daughter     No Known Problems Sister     No Known Problems Maternal Aunt     No Known Problems Maternal Aunt     No Known Problems Maternal Aunt     Colon cancer Paternal Aunt     No Known Problems Paternal [de-identified]     Colon cancer Son     No Known Problems Paternal Aunt     No Known Problems Paternal [de-identified]     Colon cancer Paternal [de-identified]     No Known Problems Maternal Grandfather     No Known Problems Paternal Grandmother     No Known Problems Paternal Grandfather     Kidney failure Brother         kidney failure d/t NSIADs on dialysis      Social History:     Social History     Socioeconomic History    Marital status: /Civil Union     Spouse name: None    Number of children: None    Years of education: None    Highest education level: None   Occupational History    Occupation: working full time    Tobacco Use    Smoking status: Former Smoker     Packs/day: 1 50     Years: 36 00     Pack years: 54 00     Types: Cigarettes     Quit date: 2008     Years since quittin 2    Smokeless tobacco: Never Used   Vaping Use    Vaping Use: Never used   Substance and Sexual Activity    Alcohol use:  Yes     Alcohol/week: 6 0 standard drinks     Types: 6 Standard drinks or equivalent per week     Comment: social     Drug use: No    Sexual activity: None   Other Topics Concern    None   Social History Narrative    None     Social Determinants of Health     Financial Resource Strain: Not on file   Food Insecurity: Not on file   Transportation Needs: Not on file   Physical Activity: Not on file   Stress: Not on file   Social Connections: Not on file   Intimate Partner Violence: Not on file   Housing Stability: Not on file      Medications and Allergies:     Current Outpatient Medications   Medication Sig Dispense Refill    albuterol (ProAir HFA) 90 mcg/act inhaler Inhale 2 puffs every 4 (four) hours as needed for wheezing or shortness of breath 8 5 g 0    ALPRAZolam (XANAX) 0 25 mg tablet TAKE 1 TABLET BY MOUTH AT BEDTIME 30 tablet 0    atorvastatin (LIPITOR) 80 mg tablet Take 1 tablet (80 mg total) by mouth daily 90 tablet 3    calcium carbonate (OS-FLASH) 600 MG tablet Take 600 mg by mouth 2 (two) times a day with meals      carvedilol (COREG) 6 25 mg tablet Take 1 tablet by mouth twice daily 180 tablet 0    cholecalciferol (VITAMIN D3) 1,000 units tablet Take 1,000 Units by mouth daily      clopidogrel (PLAVIX) 75 mg tablet Take 1 tablet by mouth once daily 90 tablet 0    dicyclomine (BENTYL) 10 mg capsule TAKE 1 CAPSULE BY MOUTH 4 TIMES DAILY BEFORE MEAL(S) AND AT BEDTIME 120 capsule 0    escitalopram (LEXAPRO) 20 mg tablet Take 1/2 (one-half) tablet by mouth once daily 30 tablet 5    fluticasone (FLONASE) 50 mcg/act nasal spray 2 sprays into each nostril daily 1 Bottle 5    fluticasone-salmeterol (Advair Diskus) 100-50 mcg/dose inhaler Inhale 1 puff 2 (two) times a day Rinse mouth after use  1 Inhaler 5    gabapentin (NEURONTIN) 400 mg capsule TAKE 4 CAPSULES BY MOUTH ONCE DAILY AS DIRECTED 360 capsule 0    isosorbide mononitrate (IMDUR) 30 mg 24 hr tablet Take 1 tablet by mouth once daily 90 tablet 0    losartan (COZAAR) 50 mg tablet Take 1 tablet (50 mg total) by mouth daily 90 tablet 2    multivitamin (THERAGRAN) TABS Take 1 tablet by mouth daily      tiZANidine (ZANAFLEX) 4 mg tablet TAKE 1 5 TABLETS BY MOUTH AT BEDTIME 135 tablet 0     No current facility-administered medications for this visit       Allergies   Allergen Reactions    Codeine Itching    Medical Tape Rash      Immunizations:     Immunization History   Administered Date(s) Administered    COVID-19 PFIZER VACCINE 0 3 ML IM 03/09/2021, 04/01/2021    INFLUENZA 11/10/2014, 09/15/2015, 09/06/2016, 08/25/2018, 09/24/2019    Influenza Quadrivalent Preservative Free 3 years and older IM 11/10/2014, 09/15/2015, 09/06/2016, 12/19/2017    Influenza Quadrivalent, 6-35 Months IM 12/19/2017    Influenza, high dose seasonal 0 7 mL 09/13/2020, 09/16/2021    Influenza, seasonal, injectable 11/29/2012, 12/06/2013    Pneumococcal Conjugate 13-Valent 08/25/2018    Pneumococcal Polysaccharide PPV23 08/29/2019    Tdap 12/19/2017    Zoster Vaccine Recombinant 09/13/2020, 02/07/2021      Health Maintenance:         Topic Date Due    Colorectal Cancer Screening  02/27/2022    DXA SCAN  10/28/2022    Lung Cancer Screening  02/12/2023    Breast Cancer Screening: Mammogram  02/12/2023    Cervical Cancer Screening  12/17/2023    Hepatitis C Screening  Completed     There are no preventive care reminders to display for this patient  Medicare Health Risk Assessment:     /70   Pulse 58   Temp (!) 96 4 °F (35 8 °C) (Tympanic)   Ht 5' 5" (1 651 m)   Wt 72 2 kg (159 lb 3 2 oz)   BMI 26 49 kg/m²      Yesy Mello is here for her Subsequent Wellness visit  Last Medicare Wellness visit information reviewed, patient interviewed and updates made to the record today  Health Risk Assessment:   Patient rates overall health as good  Patient feels that their physical health rating is slightly worse  Patient is satisfied with their life  Eyesight was rated as same  Hearing was rated as same  Patient feels that their emotional and mental health rating is same  Patients states they are never, rarely angry  Patient states they are always unusually tired/fatigued  Pain experienced in the last 7 days has been none  Patient states that she has experienced weight loss or gain in last 6 months  Gained weight and bloating and more tired    Depression Screening:   PHQ-9 Score: 4      Fall Risk Screening: In the past year, patient has experienced: no history of falling in past year      Urinary Incontinence Screening:   Patient has not leaked urine accidently in the last six months       Home Safety:  Patient has trouble with stairs inside or outside of their home  Patient has working smoke alarms and has working carbon monoxide detector  Home safety hazards include: none  Nutrition:   Current diet is Regular  Medications:   Patient is currently taking over-the-counter supplements  OTC medications include: see medication list  Patient is able to manage medications  Activities of Daily Living (ADLs)/Instrumental Activities of Daily Living (IADLs):   Walk and transfer into and out of bed and chair?: Yes  Dress and groom yourself?: Yes    Bathe or shower yourself?: Yes    Feed yourself?  Yes  Do your laundry/housekeeping?: Yes  Manage your money, pay your bills and track your expenses?: Yes  Make your own meals?: Yes    Do your own shopping?: Yes    Previous Hospitalizations:   Any hospitalizations or ED visits within the last 12 months?: No      Advance Care Planning:   Living will: Yes    Durable POA for healthcare: No    Advanced directive: Yes      Cognitive Screening:   Provider or family/friend/caregiver concerned regarding cognition?: No    PREVENTIVE SCREENINGS      Cardiovascular Screening:    General: Screening Current and Risks and Benefits Discussed      Diabetes Screening:     General: Screening Current and Risks and Benefits Discussed      Colorectal Cancer Screening:     General: Screening Current    Due for: Colonoscopy - High Risk      Breast Cancer Screening:     General: Screening Current and Risks and Benefits Discussed      Cervical Cancer Screening:    General: Risks and Benefits Discussed and Screening Current      Osteoporosis Screening:    General: Screening Current and Risks and Benefits Discussed      Abdominal Aortic Aneurysm (AAA) Screening:        General: Risks and Benefits Discussed and Screening Not Indicated      Lung Cancer Screening:     General: Screening Current and Risks and Benefits Discussed      Hepatitis C Screening:    General: Screening Current and Risks and Benefits Discussed    Screening, Brief Intervention, and Referral to Treatment (SBIRT)    Screening  Typical number of drinks in a day: 1  Typical number of drinks in a week: 6  Interpretation: Low risk drinking behavior  Single Item Drug Screening:  How often have you used an illegal drug (including marijuana) or a prescription medication for non-medical reasons in the past year? never    Single Item Drug Screen Score: 0  Interpretation: Negative screen for possible drug use disorder    Other Counseling Topics:   Car/seat belt/driving safety and regular weightbearing exercise         Juan Peterson, DO

## 2022-03-15 NOTE — ASSESSMENT & PLAN NOTE
No current s/sx of exacerbation and no major illnesses over winter, con't Advair daily and call with new/worse resp symptoms

## 2022-03-15 NOTE — PROGRESS NOTES
Assessment/Plan:    IFG (impaired fasting glucose)  FBS bumped up but A1C in upper range of nml, urged low sugar/carb diet and exercise/wgt loss, recheck BW in 6 mo - order given    Other emphysema (HCC)  No current s/sx of exacerbation and no major illnesses over winter, con't Advair daily and call with new/worse resp symptoms    Essential hypertension  BP at goal, con't current meds, diet/exercise/wgt loss encouraged    Arnold-Chiari malformation [facility])  S/p surgical decompression, MRI 7/21 w/o acute findings    Stage 3a chronic kidney disease ([facility] )  Lab Results   Component Value Date    EGFR 52 03/03/2022    EGFR 45 08/28/2021    EGFR 45 07/03/2021    CREATININE 1 09 03/03/2022    CREATININE 1 23 08/28/2021    CREATININE 1 23 07/03/2021   Stable, again importance of BP/BS control and avoiding NSAIDs and dehydration, repeat in 6 mos    Abdominal bloating  Con't abd bloating and no benefit with Bentyl, has had abd and pelvic US w/o known cause, d/t duration of symptoms and lack of benefit with rx needs to see GI - is overdue for colonoscopy as well, importance of f/u reviewed w/pt    Dyslipidemia  LDL at goal, con't current statin, diet/exercise/wgt loss encouraged       Diagnoses and all orders for this visit:    IFG (impaired fasting glucose)  -     Comprehensive metabolic panel; Future  -     Hemoglobin A1C; Future    Abdominal bloating  -     CBC and differential; Future  -     Comprehensive metabolic panel; Future  -     Hemoglobin A1C; Future  -     Lipid panel; Future  -     TSH, 3rd generation with Free T4 reflex; Future    Facial twitching  Comments:  MRI brain nml 7/21, reassured Neuro exam nml today, number for Neuro provided, to ED with red flag Neuro symptoms  Orders:  -     Ambulatory Referral to Neurology; Future  -     CBC and differential; Future  -     Comprehensive metabolic panel; Future  -     Hemoglobin A1C; Future  -     Lipid panel;  Future  -     TSH, 3rd generation with Free T4 reflex; Future    Stage 3a chronic kidney disease (HCC)  -     CBC and differential; Future  -     Comprehensive metabolic panel; Future  -     Hemoglobin A1C; Future  -     Lipid panel; Future  -     TSH, 3rd generation with Free T4 reflex; Future    Dyslipidemia  -     Lipid panel; Future    Essential hypertension  -     CBC and differential; Future  -     Comprehensive metabolic panel; Future  -     Hemoglobin A1C; Future  -     Lipid panel; Future  -     TSH, 3rd generation with Free T4 reflex; Future    Other emphysema ([facility] [address] )    Arnold-Chiari malformation ([facility] )    Encounter for screening colonoscopy  -     Ambulatory referral for colonoscopy; Future    Medicare annual wellness visit, subsequent    BMI 26 0-26 9,adult  -     CBC and differential; Future  -     Comprehensive metabolic panel; Future  -     Hemoglobin A1C; Future  -     Lipid panel; Future  -     TSH, 3rd generation with Free T4 reflex; Future    Overweight (BMI 25 0-29  9)      Colonoscopy 2/17 - 5 yrs    Mammo 2/22    Dexa 10/20- osteopenia    CT lung CA screening 2/22 - emphysema, several 1-2 mm RUL nodules - unchanged from 10/20 and no new nodules noted - repeat screening in 1 yr    PAP 12/20      Subjective:      Patient ID: Floridalma Sims is a 74 y o  female  HPI Pt here for follow up appt/BW results and AWV    BW results were d/w pt in detail: FBS/A1C up at 113/5 6, BUN/Cr 19/1 09 (GFR 52), rest of BMP was wnl  Def of nml vs IFG vs DM was d/w pt in detail  Diet/exercise was reviewed - wgt up 7 lbs from Sept  She is not sure why she is gaining wgt  She denies a sweet tooth, she could be better with carbs  She does no formal exercise  She is very active with her grandson  She notes a lot of abd bloating but no abd pain  She notes no other GI symptoms  She had blood in stools the past week related to straining  She is overdue for her colonoscopy  She has had no benefit with the Bentyl  CKD stage 3a reviewed    Importance of BP/BS control as well as avoiding dehydration and NSAIDs  BP at goal today and meds were reviewed and no changes have occurred  She denies missing doses of meds or SE with the meds  She does not check her BP outside the office  She notes no frequent Ha's/dizziness/double vision/CP  Pt with dx of emphysema - noted emphysematous changes on CT chest again noted (see below)  She is on Advair twice daily and has a rescue inhaler with no recent use  She notes no recent wheezing and denies chronic cough  Pt with dx of Arnold-Chiari malformation  She had a MRI of the brain 7/21 with no acute abnormality and post op changes from suboccipital craniectomy for Chiari decompression  She denies chronic HA's but sometimes feels her R side of her lip pulls up after she chews her lips - notes it has been more prevalent the past year  She has not seen Neuro in years  Had MRI of brain in July 2021  Review of Systems   Constitutional: Positive for fatigue  Negative for chills, fever and unexpected weight change  HENT: Positive for hearing loss  Negative for congestion and trouble swallowing  Eyes: Negative for pain and visual disturbance  Respiratory: Negative for cough, shortness of breath and wheezing  Cardiovascular: Negative for chest pain, palpitations and leg swelling  Gastrointestinal: Positive for blood in stool and constipation  Negative for abdominal pain, diarrhea, nausea and vomiting  Endocrine: Negative for polydipsia and polyuria  Genitourinary: Negative for difficulty urinating, dysuria, vaginal bleeding and vaginal pain  Musculoskeletal: Positive for arthralgias and back pain  Negative for neck pain  Skin: Negative for rash and wound  Neurological: Positive for light-headedness  Negative for speech difficulty, weakness, numbness and headaches  Hematological: Does not bruise/bleed easily  Psychiatric/Behavioral: Negative for behavioral problems and confusion  Objective:    /70   Pulse 58   Temp (!) 96 4 °F (35 8 °C) (Tympanic)   Ht 5' 5" (1 651 m)   Wt 72 2 kg (159 lb 3 2 oz)   BMI 26 49 kg/m²      Physical Exam  Vitals and nursing note reviewed  Constitutional:       General: She is not in acute distress  Appearance: She is well-developed  She is not ill-appearing  HENT:      Head: Normocephalic and atraumatic  Right Ear: Tympanic membrane and external ear normal  There is no impacted cerumen  Left Ear: Tympanic membrane and external ear normal  There is no impacted cerumen  Ears:      Comments: B/L hearing aids removed for exam     Mouth/Throat:      Mouth: Mucous membranes are moist       Pharynx: Oropharynx is clear  No oropharyngeal exudate  Eyes:      General:         Right eye: No discharge  Left eye: No discharge  Conjunctiva/sclera: Conjunctivae normal    Neck:      Vascular: No carotid bruit  Trachea: No tracheal deviation  Cardiovascular:      Rate and Rhythm: Normal rate and regular rhythm  Heart sounds: Normal heart sounds  No murmur heard  No friction rub  Pulmonary:      Effort: Pulmonary effort is normal  No respiratory distress  Breath sounds: Normal breath sounds  No wheezing, rhonchi or rales  Abdominal:      General: There is no distension  Palpations: Abdomen is soft  Tenderness: There is no abdominal tenderness  There is no guarding or rebound  Musculoskeletal:         General: No deformity or signs of injury  Cervical back: Neck supple  Right lower leg: No edema  Left lower leg: No edema  Skin:     General: Skin is warm  Coloration: Skin is not pale  Findings: No rash  Neurological:      General: No focal deficit present  Mental Status: She is alert  Mental status is at baseline  Cranial Nerves: No cranial nerve deficit  Sensory: No sensory deficit  Motor: No weakness or abnormal muscle tone        Coordination: Coordination normal       Gait: Gait normal       Deep Tendon Reflexes: Reflexes normal       Comments: CN II-XII intact, sensation intact to light touch, 5/5 global muscle strength, 2/4 patellar reflexes B/L LE, nml FN and HS, neg Rhomberg   Psychiatric:         Mood and Affect: Mood normal          Behavior: Behavior normal          Thought Content:  Thought content normal          Judgment: Judgment normal

## 2022-03-15 NOTE — ASSESSMENT & PLAN NOTE
Con't abd bloating and no benefit with Bentyl, has had abd and pelvic US w/o known cause, d/t duration of symptoms and lack of benefit with rx needs to see GI - is overdue for colonoscopy as well, importance of f/u reviewed w/pt

## 2022-03-15 NOTE — ASSESSMENT & PLAN NOTE
Lab Results   Component Value Date    EGFR 52 03/03/2022    EGFR 45 08/28/2021    EGFR 45 07/03/2021    CREATININE 1 09 03/03/2022    CREATININE 1 23 08/28/2021    CREATININE 1 23 07/03/2021   Stable, again importance of BP/BS control and avoiding NSAIDs and dehydration, repeat in 6 mos

## 2022-03-15 NOTE — PATIENT INSTRUCTIONS
Medicare Preventive Visit Patient Instructions  Thank you for completing your Welcome to Medicare Visit or Medicare Annual Wellness Visit today  Your next wellness visit will be due in one year (3/16/2023)  The screening/preventive services that you may require over the next 5-10 years are detailed below  Some tests may not apply to you based off risk factors and/or age  Screening tests ordered at today's visit but not completed yet may show as past due  Also, please note that scanned in results may not display below  Preventive Screenings:  Service Recommendations Previous Testing/Comments   Colorectal Cancer Screening  * Colonoscopy    * Fecal Occult Blood Test (FOBT)/Fecal Immunochemical Test (FIT)  * Fecal DNA/Cologuard Test  * Flexible Sigmoidoscopy Age: 54-65 years old   Colonoscopy: every 10 years (may be performed more frequently if at higher risk)  OR  FOBT/FIT: every 1 year  OR  Cologuard: every 3 years  OR  Sigmoidoscopy: every 5 years  Screening may be recommended earlier than age 48 if at higher risk for colorectal cancer  Also, an individualized decision between you and your healthcare provider will decide whether screening between the ages of 74-80 would be appropriate  Colonoscopy: 02/27/2017  FOBT/FIT: Not on file  Cologuard: Not on file  Sigmoidoscopy: Not on file          Breast Cancer Screening Age: 36 years old  Frequency: every 1-2 years  Not required if history of left and right mastectomy Mammogram: 02/12/2022        Cervical Cancer Screening Between the ages of 21-29, pap smear recommended once every 3 years  Between the ages of 33-67, can perform pap smear with HPV co-testing every 5 years     Recommendations may differ for women with a history of total hysterectomy, cervical cancer, or abnormal pap smears in past  Pap Smear: 12/17/2020        Hepatitis C Screening Once for adults born between 1945 and 1965  More frequently in patients at high risk for Hepatitis C Hep C Antibody: 09/04/2019        Diabetes Screening 1-2 times per year if you're at risk for diabetes or have pre-diabetes Fasting glucose: 113 mg/dL   A1C: 5 6 %        Cholesterol Screening Once every 5 years if you don't have a lipid disorder  May order more often based on risk factors  Lipid panel: 08/28/2021          Other Preventive Screenings Covered by Medicare:  1  Abdominal Aortic Aneurysm (AAA) Screening: covered once if your at risk  You're considered to be at risk if you have a family history of AAA  2  Lung Cancer Screening: covers low dose CT scan once per year if you meet all of the following conditions: (1) Age 50-69; (2) No signs or symptoms of lung cancer; (3) Current smoker or have quit smoking within the last 15 years; (4) You have a tobacco smoking history of at least 30 pack years (packs per day multiplied by number of years you smoked); (5) You get a written order from a healthcare provider  3  Glaucoma Screening: covered annually if you're considered high risk: (1) You have diabetes OR (2) Family history of glaucoma OR (3)  aged 48 and older OR (3)  American aged 72 and older  3  Osteoporosis Screening: covered every 2 years if you meet one of the following conditions: (1) You're estrogen deficient and at risk for osteoporosis based off medical history and other findings; (2) Have a vertebral abnormality; (3) On glucocorticoid therapy for more than 3 months; (4) Have primary hyperparathyroidism; (5) On osteoporosis medications and need to assess response to drug therapy  · Last bone density test (DXA Scan): 10/28/2020   5  HIV Screening: covered annually if you're between the age of 15-65  Also covered annually if you are younger than 13 and older than 72 with risk factors for HIV infection  For pregnant patients, it is covered up to 3 times per pregnancy      Immunizations:  Immunization Recommendations   Influenza Vaccine Annual influenza vaccination during flu season is recommended for all persons aged >= 6 months who do not have contraindications   Pneumococcal Vaccine (Prevnar and Pneumovax)  * Prevnar = PCV13  * Pneumovax = PPSV23   Adults 25-60 years old: 1-3 doses may be recommended based on certain risk factors  Adults 72 years old: Prevnar (PCV13) vaccine recommended followed by Pneumovax (PPSV23) vaccine  If already received PPSV23 since turning 65, then PCV13 recommended at least one year after PPSV23 dose  Hepatitis B Vaccine 3 dose series if at intermediate or high risk (ex: diabetes, end stage renal disease, liver disease)   Tetanus (Td) Vaccine - COST NOT COVERED BY MEDICARE PART B Following completion of primary series, a booster dose should be given every 10 years to maintain immunity against tetanus  Td may also be given as tetanus wound prophylaxis  Tdap Vaccine - COST NOT COVERED BY MEDICARE PART B Recommended at least once for all adults  For pregnant patients, recommended with each pregnancy  Shingles Vaccine (Shingrix) - COST NOT COVERED BY MEDICARE PART B  2 shot series recommended in those aged 48 and above     Health Maintenance Due:      Topic Date Due    Colorectal Cancer Screening  02/27/2022    DXA SCAN  10/28/2022    Lung Cancer Screening  02/12/2023    Breast Cancer Screening: Mammogram  02/12/2023    Cervical Cancer Screening  12/17/2023    Hepatitis C Screening  Completed     Immunizations Due:  There are no preventive care reminders to display for this patient  Advance Directives   What are advance directives? Advance directives are legal documents that state your wishes and plans for medical care  These plans are made ahead of time in case you lose your ability to make decisions for yourself  Advance directives can apply to any medical decision, such as the treatments you want, and if you want to donate organs  What are the types of advance directives?   There are many types of advance directives, and each state has rules about how to use them  You may choose a combination of any of the following:  · Living will: This is a written record of the treatment you want  You can also choose which treatments you do not want, which to limit, and which to stop at a certain time  This includes surgery, medicine, IV fluid, and tube feedings  · Durable power of  for healthcare [place]): This is a written record that states who you want to make healthcare choices for you when you are unable to make them for yourself  This person, called a proxy, is usually a family member or a friend  You may choose more than 1 proxy  · Do not resuscitate (DNR) order:  A DNR order is used in case your heart stops beating or you stop breathing  It is a request not to have certain forms of treatment, such as CPR  A DNR order may be included in other types of advance directives  · Medical directive: This covers the care that you want if you are in a coma, near death, or unable to make decisions for yourself  You can list the treatments you want for each condition  Treatment may include pain medicine, surgery, blood transfusions, dialysis, IV or tube feedings, and a ventilator (breathing machine)  · Values history: This document has questions about your views, beliefs, and how you feel and think about life  This information can help others choose the care that you would choose  Why are advance directives important? An advance directive helps you control your care  Although spoken wishes may be used, it is better to have your wishes written down  Spoken wishes can be misunderstood, or not followed  Treatments may be given even if you do not want them  An advance directive may make it easier for your family to make difficult choices about your care  Weight Management   Why it is important to manage your weight:  Being overweight increases your risk of health conditions such as heart disease, high blood pressure, type 2 diabetes, and certain types of cancer   It can also increase your risk for osteoarthritis, sleep apnea, and other respiratory problems  Aim for a slow, steady weight loss  Even a small amount of weight loss can lower your risk of health problems  How to lose weight safely:  A safe and healthy way to lose weight is to eat fewer calories and get regular exercise  You can lose up about 1 pound a week by decreasing the number of calories you eat by 500 calories each day  Healthy meal plan for weight management:  A healthy meal plan includes a variety of foods, contains fewer calories, and helps you stay healthy  A healthy meal plan includes the following:  · Eat whole-grain foods more often  A healthy meal plan should contain fiber  Fiber is the part of grains, fruits, and vegetables that is not broken down by your body  Whole-grain foods are healthy and provide extra fiber in your diet  Some examples of whole-grain foods are whole-wheat breads and pastas, oatmeal, brown rice, and bulgur  · Eat a variety of vegetables every day  Include dark, leafy greens such as spinach, kale, carlos eduardo greens, and mustard greens  Eat yellow and orange vegetables such as carrots, sweet potatoes, and winter squash  · Eat a variety of fruits every day  Choose fresh or canned fruit (canned in its own juice or light syrup) instead of juice  Fruit juice has very little or no fiber  · Eat low-fat dairy foods  Drink fat-free (skim) milk or 1% milk  Eat fat-free yogurt and low-fat cottage cheese  Try low-fat cheeses such as mozzarella and other reduced-fat cheeses  · Choose meat and other protein foods that are low in fat  Choose beans or other legumes such as split peas or lentils  Choose fish, skinless poultry (chicken or turkey), or lean cuts of red meat (beef or pork)  Before you cook meat or poultry, cut off any visible fat  · Use less fat and oil  Try baking foods instead of frying them   Add less fat, such as margarine, sour cream, regular salad dressing and mayonnaise to foods  Eat fewer high-fat foods  Some examples of high-fat foods include french fries, doughnuts, ice cream, and cakes  · Eat fewer sweets  Limit foods and drinks that are high in sugar  This includes candy, cookies, regular soda, and sweetened drinks  Exercise:  Exercise at least 30 minutes per day on most days of the week  Some examples of exercise include walking, biking, dancing, and swimming  You can also fit in more physical activity by taking the stairs instead of the elevator or parking farther away from stores  Ask your healthcare provider about the best exercise plan for you  © Copyright [employer] 2018 Information is for End User's use only and may not be sold, redistributed or otherwise used for commercial purposes  All illustrations and images included in CareNotes® are the copyrighted property of [employer]  or [facility] Preventive Visit Patient Instructions  Thank you for completing your Welcome to Medicare Visit or Medicare Annual Wellness Visit today  Your next wellness visit will be due in one year (3/16/2023)  The screening/preventive services that you may require over the next 5-10 years are detailed below  Some tests may not apply to you based off risk factors and/or age  Screening tests ordered at today's visit but not completed yet may show as past due  Also, please note that scanned in results may not display below  Preventive Screenings:  Service Recommendations Previous Testing/Comments   Colorectal Cancer Screening  * Colonoscopy    * Fecal Occult Blood Test (FOBT)/Fecal Immunochemical Test (FIT)  * Fecal DNA/Cologuard Test  * Flexible Sigmoidoscopy Age: 54-65 years old   Colonoscopy: every 10 years (may be performed more frequently if at higher risk)  OR  FOBT/FIT: every 1 year  OR  Cologuard: every 3 years  OR  Sigmoidoscopy: every 5 years  Screening may be recommended earlier than age 48 if at higher risk for colorectal cancer   Also, an individualized decision between you and your healthcare provider will decide whether screening between the ages of 74-80 would be appropriate  Colonoscopy: 02/27/2017  FOBT/FIT: Not on file  Cologuard: Not on file  Sigmoidoscopy: Not on file    Screening Current     Breast Cancer Screening Age: 36 years old  Frequency: every 1-2 years  Not required if history of left and right mastectomy Mammogram: 02/12/2022    Screening Current   Cervical Cancer Screening Between the ages of 21-29, pap smear recommended once every 3 years  Between the ages of 33-67, can perform pap smear with HPV co-testing every 5 years  Recommendations may differ for women with a history of total hysterectomy, cervical cancer, or abnormal pap smears in past  Pap Smear: 12/17/2020    Screening Not Indicated   Hepatitis C Screening Once for adults born between 1945 and 1965  More frequently in patients at high risk for Hepatitis C Hep C Antibody: 09/04/2019    Screening Current   Diabetes Screening 1-2 times per year if you're at risk for diabetes or have pre-diabetes Fasting glucose: 113 mg/dL   A1C: 5 6 %    Screening Current   Cholesterol Screening Once every 5 years if you don't have a lipid disorder  May order more often based on risk factors  Lipid panel: 08/28/2021    Screening Current     Other Preventive Screenings Covered by Medicare:  6  Abdominal Aortic Aneurysm (AAA) Screening: covered once if your at risk  You're considered to be at risk if you have a family history of AAA  7  Lung Cancer Screening: covers low dose CT scan once per year if you meet all of the following conditions: (1) Age 50-69; (2) No signs or symptoms of lung cancer; (3) Current smoker or have quit smoking within the last 15 years; (4) You have a tobacco smoking history of at least 30 pack years (packs per day multiplied by number of years you smoked); (5) You get a written order from a healthcare provider    8  Glaucoma Screening: covered annually if you're considered high risk: (1) You have diabetes OR (2) Family history of glaucoma OR (3)  aged 48 and older OR (4)  American aged 72 and older  5  Osteoporosis Screening: covered every 2 years if you meet one of the following conditions: (1) You're estrogen deficient and at risk for osteoporosis based off medical history and other findings; (2) Have a vertebral abnormality; (3) On glucocorticoid therapy for more than 3 months; (4) Have primary hyperparathyroidism; (5) On osteoporosis medications and need to assess response to drug therapy  · Last bone density test (DXA Scan): 10/28/2020  10  HIV Screening: covered annually if you're between the age of 12-76  Also covered annually if you are younger than 13 and older than 72 with risk factors for HIV infection  For pregnant patients, it is covered up to 3 times per pregnancy  Immunizations:  Immunization Recommendations   Influenza Vaccine Annual influenza vaccination during flu season is recommended for all persons aged >= 6 months who do not have contraindications   Pneumococcal Vaccine (Prevnar and Pneumovax)  * Prevnar = PCV13  * Pneumovax = PPSV23   Adults 25-60 years old: 1-3 doses may be recommended based on certain risk factors  Adults 72 years old: Prevnar (PCV13) vaccine recommended followed by Pneumovax (PPSV23) vaccine  If already received PPSV23 since turning 65, then PCV13 recommended at least one year after PPSV23 dose  Hepatitis B Vaccine 3 dose series if at intermediate or high risk (ex: diabetes, end stage renal disease, liver disease)   Tetanus (Td) Vaccine - COST NOT COVERED BY MEDICARE PART B Following completion of primary series, a booster dose should be given every 10 years to maintain immunity against tetanus  Td may also be given as tetanus wound prophylaxis  Tdap Vaccine - COST NOT COVERED BY MEDICARE PART B Recommended at least once for all adults   For pregnant patients, recommended with each pregnancy  Shingles Vaccine (Shingrix) - COST NOT COVERED BY MEDICARE PART B  2 shot series recommended in those aged 48 and above     Health Maintenance Due:      Topic Date Due    Colorectal Cancer Screening  02/27/2022    DXA SCAN  10/28/2022    Lung Cancer Screening  02/12/2023    Breast Cancer Screening: Mammogram  02/12/2023    Cervical Cancer Screening  12/17/2023    Hepatitis C Screening  Completed     Immunizations Due:  There are no preventive care reminders to display for this patient  Advance Directives   What are advance directives? Advance directives are legal documents that state your wishes and plans for medical care  These plans are made ahead of time in case you lose your ability to make decisions for yourself  Advance directives can apply to any medical decision, such as the treatments you want, and if you want to donate organs  What are the types of advance directives? There are many types of advance directives, and each state has rules about how to use them  You may choose a combination of any of the following:  · Living will: This is a written record of the treatment you want  You can also choose which treatments you do not want, which to limit, and which to stop at a certain time  This includes surgery, medicine, IV fluid, and tube feedings  · Durable power of  for healthcare [facility]): This is a written record that states who you want to make healthcare choices for you when you are unable to make them for yourself  This person, called a proxy, is usually a family member or a friend  You may choose more than 1 proxy  · Do not resuscitate (DNR) order:  A DNR order is used in case your heart stops beating or you stop breathing  It is a request not to have certain forms of treatment, such as CPR  A DNR order may be included in other types of advance directives  · Medical directive:   This covers the care that you want if you are in a coma, near death, or unable to make decisions for yourself  You can list the treatments you want for each condition  Treatment may include pain medicine, surgery, blood transfusions, dialysis, IV or tube feedings, and a ventilator (breathing machine)  · Values history: This document has questions about your views, beliefs, and how you feel and think about life  This information can help others choose the care that you would choose  Why are advance directives important? An advance directive helps you control your care  Although spoken wishes may be used, it is better to have your wishes written down  Spoken wishes can be misunderstood, or not followed  Treatments may be given even if you do not want them  An advance directive may make it easier for your family to make difficult choices about your care  Weight Management   Why it is important to manage your weight:  Being overweight increases your risk of health conditions such as heart disease, high blood pressure, type 2 diabetes, and certain types of cancer  It can also increase your risk for osteoarthritis, sleep apnea, and other respiratory problems  Aim for a slow, steady weight loss  Even a small amount of weight loss can lower your risk of health problems  How to lose weight safely:  A safe and healthy way to lose weight is to eat fewer calories and get regular exercise  You can lose up about 1 pound a week by decreasing the number of calories you eat by 500 calories each day  Healthy meal plan for weight management:  A healthy meal plan includes a variety of foods, contains fewer calories, and helps you stay healthy  A healthy meal plan includes the following:  · Eat whole-grain foods more often  A healthy meal plan should contain fiber  Fiber is the part of grains, fruits, and vegetables that is not broken down by your body  Whole-grain foods are healthy and provide extra fiber in your diet   Some examples of whole-grain foods are whole-wheat breads and pastas, oatmeal, brown rice, and bulgur  · Eat a variety of vegetables every day  Include dark, leafy greens such as spinach, kale, carlos eduardo greens, and mustard greens  Eat yellow and orange vegetables such as carrots, sweet potatoes, and winter squash  · Eat a variety of fruits every day  Choose fresh or canned fruit (canned in its own juice or light syrup) instead of juice  Fruit juice has very little or no fiber  · Eat low-fat dairy foods  Drink fat-free (skim) milk or 1% milk  Eat fat-free yogurt and low-fat cottage cheese  Try low-fat cheeses such as mozzarella and other reduced-fat cheeses  · Choose meat and other protein foods that are low in fat  Choose beans or other legumes such as split peas or lentils  Choose fish, skinless poultry (chicken or turkey), or lean cuts of red meat (beef or pork)  Before you cook meat or poultry, cut off any visible fat  · Use less fat and oil  Try baking foods instead of frying them  Add less fat, such as margarine, sour cream, regular salad dressing and mayonnaise to foods  Eat fewer high-fat foods  Some examples of high-fat foods include french fries, doughnuts, ice cream, and cakes  · Eat fewer sweets  Limit foods and drinks that are high in sugar  This includes candy, cookies, regular soda, and sweetened drinks  Exercise:  Exercise at least 30 minutes per day on most days of the week  Some examples of exercise include walking, biking, dancing, and swimming  You can also fit in more physical activity by taking the stairs instead of the elevator or parking farther away from stores  Ask your healthcare provider about the best exercise plan for you  © Copyright [employer] 2018 Information is for End User's use only and may not be sold, redistributed or otherwise used for commercial purposes   All illustrations and images included in CareNotes® are the copyrighted property of [employer]  or [facility] [employer]

## 2022-03-28 DIAGNOSIS — I25.10 CORONARY ARTERY DISEASE INVOLVING NATIVE CORONARY ARTERY OF NATIVE HEART WITHOUT ANGINA PECTORIS: ICD-10-CM

## 2022-03-28 RX ORDER — CLOPIDOGREL BISULFATE 75 MG/1
TABLET ORAL
Qty: 90 TABLET | Refills: 0 | Status: SHIPPED | OUTPATIENT
Start: 2022-03-28 | End: 2022-07-18

## 2022-04-02 DIAGNOSIS — J43.8 OTHER EMPHYSEMA (HCC): ICD-10-CM

## 2022-04-10 DIAGNOSIS — E78.5 DYSLIPIDEMIA: ICD-10-CM

## 2022-04-10 DIAGNOSIS — Z00.00 HEALTH CARE MAINTENANCE: ICD-10-CM

## 2022-04-10 RX ORDER — TIZANIDINE 4 MG/1
TABLET ORAL
Qty: 135 TABLET | Refills: 0 | Status: SHIPPED | OUTPATIENT
Start: 2022-04-10

## 2022-04-11 RX ORDER — ATORVASTATIN CALCIUM 80 MG/1
TABLET, FILM COATED ORAL
Qty: 90 TABLET | Refills: 0 | Status: SHIPPED | OUTPATIENT
Start: 2022-04-11 | End: 2022-07-18

## 2022-04-25 ENCOUNTER — TELEPHONE (OUTPATIENT)
Dept: NEUROLOGY | Facility: CLINIC | Age: 67
End: 2022-04-25

## 2022-04-25 NOTE — TELEPHONE ENCOUNTER
[facility] offering earlier appt with Dr Arlin Bonilla on 4/28/22 at [address] [facility] in [facility]

## 2022-04-26 ENCOUNTER — OFFICE VISIT (OUTPATIENT)
Dept: CARDIOLOGY CLINIC | Facility: CLINIC | Age: 67
End: 2022-04-26
Payer: MEDICARE

## 2022-04-26 VITALS
BODY MASS INDEX: 24.99 KG/M2 | OXYGEN SATURATION: 98 % | HEIGHT: 65 IN | WEIGHT: 150 LBS | HEART RATE: 96 BPM | SYSTOLIC BLOOD PRESSURE: 134 MMHG | DIASTOLIC BLOOD PRESSURE: 80 MMHG

## 2022-04-26 DIAGNOSIS — I10 ESSENTIAL HYPERTENSION: ICD-10-CM

## 2022-04-26 DIAGNOSIS — I51.81 TAKOTSUBO SYNDROME: ICD-10-CM

## 2022-04-26 DIAGNOSIS — E78.5 DYSLIPIDEMIA: ICD-10-CM

## 2022-04-26 DIAGNOSIS — I25.10 CORONARY ARTERY DISEASE INVOLVING NATIVE CORONARY ARTERY OF NATIVE HEART WITHOUT ANGINA PECTORIS: Primary | ICD-10-CM

## 2022-04-26 PROCEDURE — 99214 OFFICE O/P EST MOD 30 MIN: CPT | Performed by: INTERNAL MEDICINE

## 2022-04-26 NOTE — PROGRESS NOTES
Assessment/Plan:    Dyslipidemia    Hyperlipidemia, reasonably stable  will continue present regimen  Coronary artery disease    Coronary artery disease, stable  No symptoms of angina or signs of heart failure  Takotsubo syndrome    Prior history of takotsubo syndrome, stable  Essential hypertension    Hypertension, stable and adequately controlled  Diagnoses and all orders for this visit:    Coronary artery disease involving native coronary artery of native heart without angina pectoris    Essential hypertension    Takotsubo syndrome    Dyslipidemia          Subjective:   Minor palpitation and some dyspnea on exertion  Patient ID: Steve Saldivar is a 74 y o  female  Patient presented to this office for the purpose of cardiac follow-up  She is known to have history of coronary artery disease prior episode takotsubo syndrome  Patient also has a history of hypertension and hyperlipidemia as well as anxiety disorder  The patient has some dyspnea on exertion most probably associated with known chronic lung  She denies any symptoms of chest pain  She denies any symptoms dizziness or lightheadedness but experiences periods vertigo  She may experience occasional palpitation as well  She has no leg swelling  The following portions of the patient's history were reviewed and updated as appropriate: allergies, current medications, past family history, past medical history, past social history, past surgical history and problem list     Review of Systems   Respiratory: Positive for shortness of breath  Negative for apnea, cough, chest tightness and wheezing  Cardiovascular: Positive for palpitations  Negative for chest pain and leg swelling  Gastrointestinal: Negative for abdominal pain  Neurological: Positive for dizziness (  Mostly vertigo)  Negative for light-headedness  Psychiatric/Behavioral: The patient is nervous/anxious  Objective: stable cardiac-wise        BP 134/80 (BP Location: Left arm, Patient Position: Sitting, Cuff Size: Adult)   Pulse 96   Ht 5' 5" (1 651 m)   Wt 68 kg (150 lb)   SpO2 98%   BMI 24 96 kg/m²          Physical Exam  Vitals reviewed  Constitutional:       General: She is not in acute distress  Appearance: She is well-developed  She is not diaphoretic  HENT:      Head: Normocephalic and atraumatic  Neck:      Thyroid: No thyromegaly  Vascular: No JVD  Cardiovascular:      Rate and Rhythm: Normal rate and regular rhythm  Heart sounds: S1 normal and S2 normal  No murmur heard  No friction rub  No gallop  Pulmonary:      Effort: Pulmonary effort is normal  No respiratory distress  Breath sounds: No wheezing or rales  Chest:      Chest wall: No tenderness  Abdominal:      Palpations: Abdomen is soft  Musculoskeletal:      Cervical back: Normal range of motion and neck supple  Right lower leg: No edema  Left lower leg: No edema  Skin:     General: Skin is warm and dry  Neurological:      Mental Status: She is oriented to person, place, and time     Psychiatric:         Mood and Affect: Mood normal          Behavior: Behavior normal

## 2022-04-26 NOTE — LETTER
April 26, 2022     Rola Jamil, [address]  [address] [facility]  [address] [address]    Patient: Lilian Greer   YOB: 1955   Date of Visit: 4/26/2022       Dear Dr Berry Henderson: Thank you for referring Yamila Baltazar to me for evaluation  Below are my notes for this consultation  If you have questions, please do not hesitate to call me  I look forward to following your patient along with you  Sincerely,        Maria Antonia León MD        CC: No Recipients  Maria Antonia León MD  4/26/2022 10:02 AM  Sign when Signing Visit  Assessment/Plan:    Dyslipidemia    Hyperlipidemia, reasonably stable  will continue present regimen  Coronary artery disease    Coronary artery disease, stable  No symptoms of angina or signs of heart failure  Takotsubo syndrome    Prior history of takotsubo syndrome, stable  Essential hypertension    Hypertension, stable and adequately controlled  Diagnoses and all orders for this visit:    Coronary artery disease involving native coronary artery of native heart without angina pectoris    Essential hypertension    Takotsubo syndrome    Dyslipidemia          Subjective:   Minor palpitation and some dyspnea on exertion  Patient ID: Lilian Greer is a 74 y o  female  Patient presented to this office for the purpose of cardiac follow-up  She is known to have history of coronary artery disease prior episode takotsubo syndrome  Patient also has a history of hypertension and hyperlipidemia as well as anxiety disorder  The patient has some dyspnea on exertion most probably associated with known chronic lung  She denies any symptoms of chest pain  She denies any symptoms dizziness or lightheadedness but experiences periods vertigo  She may experience occasional palpitation as well  She has no leg swelling        The following portions of the patient's history were reviewed and updated as appropriate: allergies, current medications, past family history, past medical history, past social history, past surgical history and problem list     Review of Systems   Respiratory: Positive for shortness of breath  Negative for apnea, cough, chest tightness and wheezing  Cardiovascular: Positive for palpitations  Negative for chest pain and leg swelling  Gastrointestinal: Negative for abdominal pain  Neurological: Positive for dizziness (  Mostly vertigo)  Negative for light-headedness  Psychiatric/Behavioral: The patient is nervous/anxious  Objective: stable cardiac-wise  /80 (BP Location: Left arm, Patient Position: Sitting, Cuff Size: Adult)   Pulse 96   Ht 5' 5" (1 651 m)   Wt 68 kg (150 lb)   SpO2 98%   BMI 24 96 kg/m²          Physical Exam  Vitals reviewed  Constitutional:       General: She is not in acute distress  Appearance: She is well-developed  She is not diaphoretic  HENT:      Head: Normocephalic and atraumatic  Neck:      Thyroid: No thyromegaly  Vascular: No JVD  Cardiovascular:      Rate and Rhythm: Normal rate and regular rhythm  Heart sounds: S1 normal and S2 normal  No murmur heard  No friction rub  No gallop  Pulmonary:      Effort: Pulmonary effort is normal  No respiratory distress  Breath sounds: No wheezing or rales  Chest:      Chest wall: No tenderness  Abdominal:      Palpations: Abdomen is soft  Musculoskeletal:      Cervical back: Normal range of motion and neck supple  Right lower leg: No edema  Left lower leg: No edema  Skin:     General: Skin is warm and dry  Neurological:      Mental Status: She is oriented to person, place, and time     Psychiatric:         Mood and Affect: Mood normal          Behavior: Behavior normal

## 2022-05-09 ENCOUNTER — TELEPHONE (OUTPATIENT)
Dept: FAMILY MEDICINE CLINIC | Facility: HOSPITAL | Age: 67
End: 2022-05-09

## 2022-05-09 ENCOUNTER — TELEMEDICINE (OUTPATIENT)
Dept: FAMILY MEDICINE CLINIC | Facility: HOSPITAL | Age: 67
End: 2022-05-09
Payer: MEDICARE

## 2022-05-09 VITALS — TEMPERATURE: 98.2 F | WEIGHT: 150 LBS | BODY MASS INDEX: 24.99 KG/M2 | HEIGHT: 65 IN

## 2022-05-09 DIAGNOSIS — B96.89 BACTERIAL RESPIRATORY INFECTION: Primary | ICD-10-CM

## 2022-05-09 DIAGNOSIS — J98.8 BACTERIAL RESPIRATORY INFECTION: Primary | ICD-10-CM

## 2022-05-09 DIAGNOSIS — J43.8 OTHER EMPHYSEMA (HCC): ICD-10-CM

## 2022-05-09 PROCEDURE — 99214 OFFICE O/P EST MOD 30 MIN: CPT | Performed by: INTERNAL MEDICINE

## 2022-05-09 RX ORDER — FLUTICASONE PROPIONATE AND SALMETEROL 100; 50 UG/1; UG/1
1 POWDER RESPIRATORY (INHALATION) 2 TIMES DAILY
Qty: 60 BLISTER | Refills: 5 | Status: SHIPPED | OUTPATIENT
Start: 2022-05-09

## 2022-05-09 RX ORDER — AMOXICILLIN AND CLAVULANATE POTASSIUM 875; 125 MG/1; MG/1
1 TABLET, FILM COATED ORAL EVERY 12 HOURS SCHEDULED
Qty: 14 TABLET | Refills: 0 | Status: SHIPPED | OUTPATIENT
Start: 2022-05-09 | End: 2022-05-16

## 2022-05-09 RX ORDER — BENZONATATE 100 MG/1
100 CAPSULE ORAL 3 TIMES DAILY PRN
Qty: 30 CAPSULE | Refills: 0 | Status: SHIPPED | OUTPATIENT
Start: 2022-05-09

## 2022-05-09 NOTE — PROGRESS NOTES
COVID-19 Outpatient Progress Note    Assessment/Plan:    Problem List Items Addressed This Visit        Respiratory    Other emphysema ([address] )     No current concern for significant COPD exacerbation as of yet - is tight in chest and a bit SOB so will start abx and Advair, call with persistent/new or worse symptoms         Relevant Medications    Fluticasone-Salmeterol (Advair Diskus) 100-50 mcg/dose inhaler    benzonatate (TESSALON PERLES) 100 mg capsule      Other Visit Diagnoses     Bacterial respiratory infection    -  Primary    D/t severity of symptoms and underlying COPD will start Augmentin and Tessalon Perles, con't Advair as directed, call with new/worse symptoms    Relevant Medications    amoxicillin-clavulanate (AUGMENTIN) 875-125 mg per tablet    benzonatate (TESSALON PERLES) 100 mg capsule         Disposition:     After clarifying the patient's history, my suspicion for COVID-19 infection is very low  Rest/fluids/lozenges/hot tea/garles and OTC decongestant and cough medication was recommended; d/w pt that cough can last 4-6 wks but call with new/worsening symptoms, d/t COPD history as well as SOB and chest tightness will start Advair bid and augmentin, call with new/worse symptoms      I have spent 15 minutes directly with the patient  Greater than 50% of this time was spent in counseling/coordination of care regarding: risks and benefits of treatment options, instructions for management, patient and family education, importance of treatment compliance and impressions  Encounter provider Solo Irene DO    Provider located at [address] [place] [address] [phone] [facility] [id number]    Recent Visits  No visits were found meeting these conditions    Showing recent visits within past 7 days and meeting all other requirements  Today's Visits  Date Type Provider Dept   05/09/22 Telemedicine Solo Irene DO [facility] [address] [place] [address] [phone]   05/09/22 Telephone Margie Gagan [facility]   Showing today's visits and meeting all other requirements  Future Appointments  No visits were found meeting these conditions  Showing future appointments within next 150 days and meeting all other requirements     This virtual check-in was done via [employer] and patient was informed that this is a secure, HIPAA-compliant platform  She agrees to proceed  Patient agrees to participate in a virtual check in via telephone or video visit instead of presenting to the office to address urgent/immediate medical needs  Patient is aware this is a billable service  After connecting through [facility], the patient was identified by name and date of birth  Oswaldo Camejo was informed that this was a telemedicine visit and that the exam was being conducted confidentially over secure lines  My office door was closed  No one else was in the room  Oswaldo Camejo acknowledged consent and understanding of privacy and security of the telemedicine visit  I informed the patient that I have reviewed her record in Epic and presented the opportunity for her to ask any questions regarding the visit today  The patient agreed to participate  Verification of patient location:  Patient is located in the following state in which I hold an active license: [place]    Subjective:   Oswaldo Camejo is a 69 y o  female who is concerned about COVID-19  Patient's symptoms include fever, chills, fatigue, nasal congestion, sore throat, anosmia, cough, shortness of breath, chest tightness, myalgias and headache  Patient denies rhinorrhea, loss of taste, abdominal pain, nausea, vomiting and diarrhea       - Date of symptom onset: 5/6/2022  - Date of exposure: 5/9/2022      COVID-19 vaccination status: Fully vaccinated with booster    Exposure:   Contact with a person who is under investigation (PUI) for or who is positive for COVID-19 within the last 14 days?: No    Hospitalized recently for fever and/or lower respiratory symptoms?: No      Currently a healthcare worker that is involved in direct patient care?: No      Works in a special setting where the risk of COVID-19 transmission may be high? (this may include long-term care, correctional and [facility] facilities; homeless shelters; assisted-living facilities and group homes ): No      Resident in a special setting where the risk of COVID-19 transmission may be high? (this may include long-term care, correctional and [facility] facilities; homeless shelters; assisted-living facilities and group homes ): No      Pt with cough since Friday (4 days)  Since then she has developed HA and brain fog  Her eye feel a bit watery and stuck shut in the am  She has a scratchy throat and abd wall pain from coughing  She notes some chest tightness and SOB  She had a fever with Tmax 100 4  She has had 2 COVID tests both were negative  She states her grandson was also ill recently    She is currently using an OTC cough medication and Ibuprofen OTC    Lab Results   Component Value Date    SARSCOV2 Negative 07/29/2021     Past Medical History:   Diagnosis Date    Angina pectoris ([email or web address] )     Breast lump     last assessed: Jan 22, 2013     Coronary artery disease     Depression with anxiety     GERD (gastroesophageal reflux disease)     Gout     last assessed: Nov 26, 2012     Hair loss     last assessed: Dec 15, 2016     Hypotension     last assessed: Nov 10, 2014     Mitral valve disorder     Myocardial infarct, old     SVT (supraventricular tachycardia) ([email or web address] )      Past Surgical History:   Procedure Laterality Date    BREAST BIOPSY Right 02/28/2017    US CORE BIOPSY--BENIGN    CERVICAL DISCECTOMY      Spinal     CERVICAL LAMINECTOMY      C1 with chiari decompression     COLONOSCOPY      5 yrs - onset: May 31, 2011     CRANIOTOMY      POPLITEAL SYNOVIAL CYST EXCISION      TUBAL LIGATION      US GUIDED BREAST BIOPSY RIGHT COMPLETE Right 2/28/2017     Current Outpatient Medications   Medication Sig Dispense Refill    albuterol (ProAir HFA) 90 mcg/act inhaler Inhale 2 puffs every 4 (four) hours as needed for wheezing or shortness of breath 8 5 g 0    ALPRAZolam (XANAX) 0 25 mg tablet TAKE 1 TABLET BY MOUTH AT BEDTIME 30 tablet 0    amoxicillin-clavulanate (AUGMENTIN) 875-125 mg per tablet Take 1 tablet by mouth every 12 (twelve) hours for 7 days 14 tablet 0    atorvastatin (LIPITOR) 80 mg tablet Take 1 tablet by mouth once daily 90 tablet 0    benzonatate (TESSALON PERLES) 100 mg capsule Take 1 capsule (100 mg total) by mouth 3 (three) times a day as needed for cough 30 capsule 0    calcium carbonate (OS-FLASH) 600 MG tablet Take 600 mg by mouth 2 (two) times a day with meals      carvedilol (COREG) 6 25 mg tablet Take 1 tablet by mouth twice daily 180 tablet 0    cholecalciferol (VITAMIN D3) 1,000 units tablet Take 1,000 Units by mouth daily      clopidogrel (PLAVIX) 75 mg tablet Take 1 tablet by mouth once daily 90 tablet 0    dicyclomine (BENTYL) 10 mg capsule TAKE 1 CAPSULE BY MOUTH 4 TIMES DAILY BEFORE MEAL(S) AND AT BEDTIME 120 capsule 0    escitalopram (LEXAPRO) 20 mg tablet Take 1/2 (one-half) tablet by mouth once daily 30 tablet 5    fluticasone (FLONASE) 50 mcg/act nasal spray 2 sprays into each nostril daily 1 Bottle 5    Fluticasone-Salmeterol (Advair Diskus) 100-50 mcg/dose inhaler Inhale 1 puff 2 (two) times a day Rinse mouth after use 60 blister 5    gabapentin (NEURONTIN) 400 mg capsule TAKE 4 CAPSULES BY MOUTH ONCE DAILY AS DIRECTED 360 capsule 0    isosorbide mononitrate (IMDUR) 30 mg 24 hr tablet Take 1 tablet by mouth once daily 90 tablet 0    losartan (COZAAR) 50 mg tablet Take 1 tablet (50 mg total) by mouth daily 90 tablet 2    multivitamin (THERAGRAN) TABS Take 1 tablet by mouth daily      tiZANidine (ZANAFLEX) 4 mg tablet TAKE 1 & 1/2 (ONE & ONE-HALF) TABLETS BY MOUTH AT BEDTIME (Patient taking differently: Takes as needed  ) 135 tablet 0     No current facility-administered medications for this visit  Allergies   Allergen Reactions    Codeine Itching    Medical Tape Rash       Review of Systems   Constitutional: Positive for chills, fatigue and fever  HENT: Positive for congestion and sore throat  Negative for rhinorrhea  Respiratory: Positive for cough, chest tightness and shortness of breath  Gastrointestinal: Negative for abdominal pain, diarrhea, nausea and vomiting  Musculoskeletal: Positive for myalgias  Neurological: Positive for headaches  Objective:    Vitals:    05/09/22 1046   Temp: 98 2 °F (36 8 °C)   Weight: 68 kg (150 lb)   Height: 5' 5" (1 651 m)       Physical Exam  Vitals and nursing note reviewed  Constitutional:       General: She is not in acute distress  Appearance: She is not ill-appearing  HENT:      Head: Normocephalic and atraumatic  Eyes:      General:         Right eye: No discharge  Left eye: No discharge  Conjunctiva/sclera: Conjunctivae normal    Pulmonary:      Effort: Pulmonary effort is normal  No respiratory distress  Comments: Dry cough frequently during exam  Skin:     Coloration: Skin is not pale  Findings: No rash  Psychiatric:         Mood and Affect: Mood normal          Behavior: Behavior normal          Thought Content: Thought content normal          Judgment: Judgment normal          VIRTUAL VISIT [address] [place] verbally agrees to participate in [employer]  Pt is aware that [employer] could be limited without vital signs or the ability to perform a full hands-on physical Lenoria Bernice understands she or the provider may request at any time to terminate the video visit and request the patient to seek care or treatment in person

## 2022-05-09 NOTE — ASSESSMENT & PLAN NOTE
No current concern for significant COPD exacerbation as of yet - is tight in chest and a bit SOB so will start abx and Advair, call with persistent/new or worse symptoms

## 2022-05-09 NOTE — TELEPHONE ENCOUNTER
Started with cough, congestion and sore throat on Friday  Did a home covid swab on Saturday that was negative  She is getting worse    Didn't know if she can come in?

## 2022-05-19 ENCOUNTER — OFFICE VISIT (OUTPATIENT)
Dept: GASTROENTEROLOGY | Facility: CLINIC | Age: 67
End: 2022-05-19
Payer: MEDICARE

## 2022-05-19 ENCOUNTER — TELEPHONE (OUTPATIENT)
Dept: GASTROENTEROLOGY | Facility: CLINIC | Age: 67
End: 2022-05-19

## 2022-05-19 VITALS
HEIGHT: 65 IN | SYSTOLIC BLOOD PRESSURE: 158 MMHG | BODY MASS INDEX: 25.83 KG/M2 | DIASTOLIC BLOOD PRESSURE: 96 MMHG | WEIGHT: 155 LBS

## 2022-05-19 DIAGNOSIS — K64.8 OTHER HEMORRHOIDS: Primary | ICD-10-CM

## 2022-05-19 PROCEDURE — 99204 OFFICE O/P NEW MOD 45 MIN: CPT | Performed by: NURSE PRACTITIONER

## 2022-05-19 NOTE — TELEPHONE ENCOUNTER
Our mutual patient is scheduled for procedure: Colonoscopy/EGD    On: 6/30/2022_      With: Dr Osito Woodard is taking the following blood thinner: PLAVIX    Can this be stopped ___5___ days prior to the procedure?       Physician Approving clearance: Dr Rangel Pham

## 2022-05-19 NOTE — PROGRESS NOTES
[id number] - Outpatient Consultation  Yasmin Callaway 69 y o  female MRN: [id number]  Encounter: [id number]    ASSESSMENT AND PLAN:      1  Bloating  2  GERD  6 month history of upper abdominal /epigastric pressure and bloating  Intermittent reflux symptoms  Also reports dysphagia several times per week -able to clear with repeated swallowing  Her symptoms may be due to esophagitis, gastritis, PUD, H pylori  - scheduled for EGD and esophageal dilation at [facility]  - duodenal biopsy to evaluate for celiac disease  - IBGard for abdominal bloating  - will defer adding PPI until EGD obtained  - recommend lactose-free diet for now  If symptoms improve would recommend using  Dairy free products        3  Rectal bleeding  4  History of hemorrhoids  5  History of colon polyps  History of hemorrhoidectomy and fissurectomy 2018  Continues to have intermittent rectal bleeding after bowel movement with bright red blood noted and toilet and with wiping  Prior colonoscopy 02/2017 - polyps excised   5 year recall and she is due for surveillance now  - scheduled for combo EGD / colonoscopy at [facility]    6  CAD  History of CAD and currently takes clopidogrel 75 mg daily  Discussed risk of bleeding with proceeding with colonoscopy while on clopidogrel  Also discussed risk of thrombus and cardiac event with holding clopidogrel  Patient understands and accepts risks  - instructed to hold clopidogrel 5 days prior to colonoscopy  - will confer with Cardiology to ensure no further recommendations or instructions  Needed prior to holding anti-platelet therapy    Followup Appointment:  3 months  ______________________________________________________________________    Chief Complaint   Patient presents with    Rectal Bleeding    Bloated       HPI:   Yasmin Callaway is a 69y o  year old female who presents with abdominal bloating and rectal bleeding      Over the past 6 months, she has noted upper abdominal distension with associated pressure and discomfort which usually occurs after eating  She can identify some triggering foods including Oat latte, pizza and tomato sauce    She is able to identify lactose intolerance and tries to avoid dairy  Has never been tested for celiac but does notice increased symptoms with wheat products  Reports dysphagia with sensation of food sticking in chest which  can occur 1-2 times per week  Able to clear with repeated swallowing and liquid wash  She also has occasional heartburn symptoms occurring 1 to 2 times a week  Relieved with Tums  Appetite good and no recent unintentional weight loss     She denies diarrhea but does have loose to soft stools daily  She reports 3-5 bowel movements daily which is her normal pattern  She denies melena but has been having rectal bleeding with blood noted in toilet and with wiping  Bleeding occurs episodically but has been occurring daily for the past several weeks  Occurs with bowel movement -no spontaneous rectal bleeding  Prior colonoscopy 02/2017,  5 year recall    She has a history of hemorrhoids and underwent hemorrhoidectomy and [facility]  History of takotsubo  syndrome and CAD    Currently takes Plavix      Historical Information   Past Medical History:   Diagnosis Date    Angina pectoris ([facility] [address] )     Breast lump     last assessed: Jan 22, 2013     Coronary artery disease     Depression with anxiety     GERD (gastroesophageal reflux disease)     Gout     last assessed: Nov 26, 2012     Hair loss     last assessed: Dec 15, 2016     Hypotension     last assessed: Nov 10, 2014     Mitral valve disorder     Myocardial infarct, old     SVT (supraventricular tachycardia) ([facility] [address] )      Past Surgical History:   Procedure Laterality Date    BREAST BIOPSY Right 02/28/2017     CORE BIOPSY--BENIGN    CERVICAL DISCECTOMY      Spinal     CERVICAL LAMINECTOMY      C1 with chiari decompression     COLONOSCOPY      5 yrs - onset: May 31, 2011     CRANIOTOMY      POPLITEAL SYNOVIAL CYST EXCISION      TUBAL LIGATION      US GUIDED BREAST BIOPSY RIGHT COMPLETE Right 2017     Social History     Substance and Sexual Activity   Alcohol Use Yes    Alcohol/week: 6 0 standard drinks    Types: 6 Standard drinks or equivalent per week    Comment: social      Social History     Substance and Sexual Activity   Drug Use No     Social History     Tobacco Use   Smoking Status Former Smoker    Packs/day: 1 50    Years: 36 00    Pack years: 54 00    Types: Cigarettes    Quit date: 2008    Years since quittin 3   Smokeless Tobacco Never Used     Family History   Problem Relation Age of Onset    Stroke Mother     Other Father         blood clot in vein & CABG     Heart disease Father     Prostate cancer Father     Alcohol abuse Brother     Throat cancer Brother     No Known Problems Sister     No Known Problems Daughter     Breast cancer Maternal Grandmother     No Known Problems Daughter     No Known Problems Sister     No Known Problems Maternal Aunt     No Known Problems Maternal Aunt     No Known Problems Maternal Aunt     Colon cancer Paternal Aunt     No Known Problems Paternal [de-identified]     Colon cancer Son     No Known Problems Paternal Aunt     No Known Problems Paternal Aunt     Colon cancer Paternal Aunt     No Known Problems Maternal Grandfather     No Known Problems Paternal Grandmother     No Known Problems Paternal Grandfather     Kidney failure Brother         kidney failure d/t NSIADs on dialysis       Meds/Allergies     Current Outpatient Medications:     albuterol (ProAir HFA) 90 mcg/act inhaler    ALPRAZolam (XANAX) 0 25 mg tablet    atorvastatin (LIPITOR) 80 mg tablet    benzonatate (TESSALON PERLES) 100 mg capsule    calcium carbonate (OS-FLASH) 600 MG tablet    carvedilol (COREG) 6 25 mg tablet    cholecalciferol (VITAMIN D3) 1,000 units tablet    clopidogrel (PLAVIX) 75 mg tablet    dicyclomine (BENTYL) 10 mg capsule    escitalopram (LEXAPRO) 20 mg tablet    fluticasone (FLONASE) 50 mcg/act nasal spray    Fluticasone-Salmeterol (Advair Diskus) 100-50 mcg/dose inhaler    gabapentin (NEURONTIN) 400 mg capsule    isosorbide mononitrate (IMDUR) 30 mg 24 hr tablet    losartan (COZAAR) 50 mg tablet    multivitamin (THERAGRAN) TABS    tiZANidine (ZANAFLEX) 4 mg tablet    Allergies   Allergen Reactions    Codeine Itching    Medical Tape Rash       PHYSICAL EXAM:    Blood pressure 158/96, height 5' 5" (1 651 m), weight 70 3 kg (155 lb)  Body mass index is 25 79 kg/m²  General Appearance: NAD, cooperative, alert  Eyes: Anicteric  ENT:  Normocephalic, atraumatic, normal mucosa  Neck:  Supple, symmetrical, trachea midline,   Resp:  Clear to auscultation bilaterally; no rales, rhonchi or wheezing; respirations unlabored   CV:  S1 S2, Regular rate and rhythm; no murmur, rub, or gallop  GI:  Soft, non-tender, non-distended; normal bowel sounds; no masses, no organomegaly   Rectal: Deferred  Musculoskeletal: No cyanosis, clubbing or edema  Normal ROM    Skin:  No jaundice, rashes, or lesions   Psych: Normal affect, good eye contact  Neuro: No gross deficits, AAOx3    Lab Results:   Lab Results   Component Value Date    WBC 5 01 06/24/2021    HGB 13 5 06/24/2021    HCT 41 1 06/24/2021    MCV 97 06/24/2021     06/24/2021     Lab Results   Component Value Date     12/10/2015    K 4 2 03/03/2022     03/03/2022    CO2 27 03/03/2022    ANIONGAP 8 12/10/2015    BUN 19 03/03/2022    CREATININE 1 09 03/03/2022    GLUCOSE 93 12/10/2015    GLUF 113 (H) 03/03/2022    CALCIUM 8 8 03/03/2022    AST 22 08/28/2021    ALT 42 08/28/2021    ALKPHOS 112 08/28/2021    PROT 6 9 12/10/2015    BILITOT 0 65 12/10/2015    EGFR 52 03/03/2022     Lab Results   Component Value Date    IRON 83 06/16/2017    TIBC 333 06/16/2017    FERRITIN 66 06/16/2017           REVIEW OF SYSTEMS:    CONSTITUTIONAL: Denies any fever, chills, rigors, and weight loss  HEENT: No earache or tinnitus  Denies hearing loss or visual disturbances  CARDIOVASCULAR: No chest pain or palpitations  RESPIRATORY: Denies any cough, hemoptysis, shortness of breath or dyspnea on exertion  GASTROINTESTINAL: As noted in the History of Present Illness  GENITOURINARY: No problems with urination  Denies any hematuria or dysuria  NEUROLOGIC: No dizziness or vertigo, denies headaches  MUSCULOSKELETAL: Denies any muscle or joint pain  SKIN: Denies skin rashes or itching  ENDOCRINE: Denies excessive thirst  Denies intolerance to heat or cold  PSYCHOSOCIAL: Denies depression or anxiety  Denies any recent memory loss

## 2022-05-19 NOTE — LETTER
May 19, 2022     Yonathan Mckinley, [address]  [address] [facility]  [address] [address]    Patient: Dania Cano   YOB: 1955   Date of Visit: 5/19/2022       Dear Dr El Scott: Thank you for referring Shannan Garibay to me for evaluation  Below are my notes for this consultation  If you have questions, please do not hesitate to call me  I look forward to following your patient along with you  Sincerely,        ANTONIETA Martinez        CC: No Recipients  ANTONIETA Martinez  5/19/2022  3:08 PM  Sign when Signing Visit    [facility] - Outpatient Consultation  Dania Cano 69 y o  female MRN: [id number]  Encounter: [id number]    ASSESSMENT AND PLAN:      1  Bloating  2  GERD  6 month history of upper abdominal /epigastric pressure and bloating  Intermittent reflux symptoms  Also reports dysphagia several times per week -able to clear with repeated swallowing  Her symptoms may be due to esophagitis, gastritis, PUD, H pylori  - scheduled for EGD and esophageal dilation at [facility]  - duodenal biopsy to evaluate for celiac disease  - IBGard for abdominal bloating  - will defer adding PPI until EGD obtained  - recommend lactose-free diet for now  If symptoms improve would recommend using  Dairy free products        3  Rectal bleeding  4  History of hemorrhoids  5  History of colon polyps  History of hemorrhoidectomy and fissurectomy 2018  Continues to have intermittent rectal bleeding after bowel movement with bright red blood noted and toilet and with wiping  Prior colonoscopy 02/2017 - polyps excised   5 year recall and she is due for surveillance now  - scheduled for combo EGD / colonoscopy at [facility]    6  CAD  History of CAD and currently takes clopidogrel 75 mg daily  Discussed risk of bleeding with proceeding with colonoscopy while on clopidogrel  Also discussed risk of thrombus and cardiac event with holding clopidogrel  Patient understands and accepts risks  - instructed to hold clopidogrel 5 days prior to colonoscopy  - will confer with Cardiology to ensure no further recommendations or instructions  Needed prior to holding anti-platelet therapy    Followup Appointment:  3 months  ______________________________________________________________________    Chief Complaint   Patient presents with    Rectal Bleeding    Bloated       HPI:   Jose Raul Coulter is a 69y o  year old female who presents with abdominal bloating and rectal bleeding  Over the past 6 months, she has noted upper abdominal distension with associated pressure and discomfort which usually occurs after eating  She can identify some triggering foods including Oat latte, pizza and tomato sauce    She is able to identify lactose intolerance and tries to avoid dairy  Has never been tested for celiac but does notice increased symptoms with wheat products  Reports dysphagia with sensation of food sticking in chest which  can occur 1-2 times per week  Able to clear with repeated swallowing and liquid wash  She also has occasional heartburn symptoms occurring 1 to 2 times a week  Relieved with Tums  Appetite good and no recent unintentional weight loss     She denies diarrhea but does have loose to soft stools daily  She reports 3-5 bowel movements daily which is her normal pattern  She denies melena but has been having rectal bleeding with blood noted in toilet and with wiping  Bleeding occurs episodically but has been occurring daily for the past several weeks  Occurs with bowel movement -no spontaneous rectal bleeding  Prior colonoscopy 02/2017,  5 year recall    She has a history of hemorrhoids and underwent hemorrhoidectomy and [facility]iss[facility]ctomy [facility]  History of takotsubo  syndrome and CAD    Currently takes Plavix      Historical Information   Past Medical History:   Diagnosis Date    Angina pectoris ([facility])     Breast lump last assessed: 2013     Coronary artery disease     Depression with anxiety     GERD (gastroesophageal reflux disease)     Gout     last assessed: 2012     Hair loss     last assessed: Dec 15, 2016     Hypotension     last assessed: Nov 10, 2014     Mitral valve disorder     Myocardial infarct, old     [facility]      Past Surgical History:   Procedure Laterality Date    BREAST BIOPSY Right 2017    US CORE BIOPSY--BENIGN    CERVICAL DISCECTOMY      Spinal     CERVICAL LAMINECTOMY      C1 with chiari decompression     COLONOSCOPY      5 yrs - onset: May 31, 2011     CRANIOTOMY      POPLITEAL SYNOVIAL CYST EXCISION      TUBAL LIGATION      US GUIDED BREAST BIOPSY RIGHT COMPLETE Right 2017     Social History     Substance and Sexual Activity   Alcohol Use Yes    Alcohol/week: 6 0 standard drinks    Types: 6 Standard drinks or equivalent per week    Comment: social      Social History     Substance and Sexual Activity   Drug Use No     Social History     Tobacco Use   Smoking Status Former Smoker    Packs/day: 1 50    Years: 36 00    Pack years: 54 00    Types: Cigarettes    Quit date: 2008    Years since quittin 3   Smokeless Tobacco Never Used     Family History   Problem Relation Age of Onset    Stroke Mother     Other Father         blood clot in vein & CABG     Heart disease Father     Prostate cancer Father     Alcohol abuse Brother     Throat cancer Brother     No Known Problems Sister     No Known Problems Daughter     Breast cancer Maternal Grandmother     No Known Problems Daughter     No Known Problems Sister     No Known Problems Maternal Aunt     No Known Problems Maternal Aunt     No Known Problems Maternal Aunt     Colon cancer Paternal Aunt     No Known Problems Paternal Varsha Ramabi     Colon cancer Son     No Known Problems Paternal Aunt     No Known Problems Paternal Dorise Ramming     Colon cancer Paternal Aunt     No Known Problems Maternal Grandfather     No Known Problems Paternal Grandmother     No Known Problems Paternal Grandfather     Kidney failure Brother         kidney failure d/t NSIADs on dialysis       Meds/Allergies     Current Outpatient Medications:     albuterol (ProAir HFA) 90 mcg/act inhaler    ALPRAZolam (XANAX) 0 25 mg tablet    atorvastatin (LIPITOR) 80 mg tablet    benzonatate (TESSALON PERLES) 100 mg capsule    calcium carbonate (OS-FLASH) 600 MG tablet    carvedilol (COREG) 6 25 mg tablet    cholecalciferol (VITAMIN D3) 1,000 units tablet    clopidogrel (PLAVIX) 75 mg tablet    dicyclomine (BENTYL) 10 mg capsule    escitalopram (LEXAPRO) 20 mg tablet    fluticasone (FLONASE) 50 mcg/act nasal spray    Fluticasone-Salmeterol (Advair Diskus) 100-50 mcg/dose inhaler    gabapentin (NEURONTIN) 400 mg capsule    isosorbide mononitrate (IMDUR) 30 mg 24 hr tablet    losartan (COZAAR) 50 mg tablet    multivitamin (THERAGRAN) TABS    tiZANidine (ZANAFLEX) 4 mg tablet    Allergies   Allergen Reactions    Codeine Itching    Medical Tape Rash       PHYSICAL EXAM:    Blood pressure 158/96, height 5' 5" (1 651 m), weight 70 3 kg (155 lb)  Body mass index is 25 79 kg/m²  General Appearance: NAD, cooperative, alert  Eyes: Anicteric  ENT:  Normocephalic, atraumatic, normal mucosa  Neck:  Supple, symmetrical, trachea midline,   Resp:  Clear to auscultation bilaterally; no rales, rhonchi or wheezing; respirations unlabored   CV:  S1 S2, Regular rate and rhythm; no murmur, rub, or gallop  GI:  Soft, non-tender, non-distended; normal bowel sounds; no masses, no organomegaly   Rectal: Deferred  Musculoskeletal: No cyanosis, clubbing or edema  Normal ROM    Skin:  No jaundice, rashes, or lesions   Psych: Normal affect, good eye contact  Neuro: No gross deficits, AAOx3    Lab Results:   Lab Results   Component Value Date    WBC 5 01 06/24/2021    HGB 13 5 06/24/2021    HCT 41 1 06/24/2021 MCV 97 06/24/2021     06/24/2021     Lab Results   Component Value Date     12/10/2015    K 4 2 03/03/2022     03/03/2022    CO2 27 03/03/2022    ANIONGAP 8 12/10/2015    BUN 19 03/03/2022    CREATININE 1 09 03/03/2022    GLUCOSE 93 12/10/2015    GLUF 113 (H) 03/03/2022    CALCIUM 8 8 03/03/2022    AST 22 08/28/2021    ALT 42 08/28/2021    ALKPHOS 112 08/28/2021    PROT 6 9 12/10/2015    BILITOT 0 65 12/10/2015    EGFR 52 03/03/2022     Lab Results   Component Value Date    IRON 83 06/16/2017    TIBC 333 06/16/2017    FERRITIN 66 06/16/2017           REVIEW OF SYSTEMS:    CONSTITUTIONAL: Denies any fever, chills, rigors, and weight loss  HEENT: No earache or tinnitus  Denies hearing loss or visual disturbances  CARDIOVASCULAR: No chest pain or palpitations  RESPIRATORY: Denies any cough, hemoptysis, shortness of breath or dyspnea on exertion  GASTROINTESTINAL: As noted in the History of Present Illness  GENITOURINARY: No problems with urination  Denies any hematuria or dysuria  NEUROLOGIC: No dizziness or vertigo, denies headaches  MUSCULOSKELETAL: Denies any muscle or joint pain  SKIN: Denies skin rashes or itching  ENDOCRINE: Denies excessive thirst  Denies intolerance to heat or cold  PSYCHOSOCIAL: Denies depression or anxiety  Denies any recent memory loss

## 2022-05-19 NOTE — PATIENT INSTRUCTIONS
IBGard for bloating  Lactose-Controlled Diet   WHAT YOU NEED TO KNOW:   A lactose-controlled diet includes foods that contain either small amounts of lactose (low lactose) or no lactose at all (lactose free)  Lactose is a sugar found in dairy foods, such as milk, cheese, and yogurt  You may need to follow this diet if you have gas, bloating, cramping, or diarrhea after you eat these foods  These symptoms occur when your body does not produce enough lactase  Lactase is the enzyme that helps your body digest lactose  This condition is called lactose intolerance  You may be able to follow a low-lactose diet if you are able to eat some dairy foods  If you cannot tolerate any dairy foods, you will need to follow a lactose-free diet  DISCHARGE INSTRUCTIONS:   How to find the amount of lactose you can tolerate: Work with your dietitian to find the amount of lactose you can have each day  You may be able to find this amount by trying small amounts of lactose at a time  Try a food or drink that contains a low amount of lactose, such as reduced-lactose milk  Eat or drink dairy foods that are easier to digest, such as yogurt and buttermilk  Try only a small amount of dairy at a time, such as ¼ cup of milk or ½ ounce of cheese  Only eat or drink 1 dairy food each day, and then slowly increase the amount you eat each day  Only eat or drink 1 dairy food at a meal     Have dairy foods or drinks with a meal or snack instead of eating or drinking them alone  Try taking the lactase enzyme in pill or liquid form before you eat or drink dairy foods  This enzyme may help to prevent symptoms when you eat food with lactose  Foods to limit or avoid:  Limit or avoid milk (regular, condensed, powdered), yogurt, cheese, ice cream, and other dairy foods  Always read the ingredient labels before you buy any packaged foods  Limit or avoid foods that contain milk, milk solids, butter, buttermilk, cream, and whey   Even foods like margarine, nondairy creamer, baked goods, and salad dressings may contain some lactose  Instant soup or potatoes, beverage mixes, and pancake or cake mixes may also contain some lactose  Foods to include:   Lactose-free foods:      Lactose-free, almond, rice, or soy milk    Soy yogurt or soy cheese    Strong milk cheese    Soy-based sour cream    Foods that contain casein, lactate, lactic acid, and lactalbumin (these come from milk, but do not contain lactose)    Low-lactose foods:      Reduced-lactose milk    Aged cheese, such as Swiss, cheddar, or parmesan    Cream cheese    Cottage or ricotta cheese    Nondairy creamers    Nondairy whipped topping    Other guidelines:   Use nondairy foods as substitutes for dairy foods in recipes  Replace 1 cup of whole milk with ½ cup soy or rice milk and ½ cup water  Replace regular yogurt or cheese with soy yogurt or cheese  Your body needs calcium for strong bones and teeth and other important functions  Get the calcium you need from nondairy foods, such as lactose-free milk  It contains as much calcium as regular milk  Calcium is found in vegetables, such as turnip greens, collards, kale, and broccoli  It is also found in sardines, canned salmon, shellfish, almonds, and dried beans  Many foods and drinks have added calcium, such as tofu, orange juice, and soy milk  You can also take calcium as a supplement  Ask your dietitian for more information about how to get enough calcium  Contact your healthcare provider if:   Your symptoms get worse  You have questions or concerns about your condition or care  © Copyright [employer] 2022 Information is for End User's use only and may not be sold, redistributed or otherwise used for commercial purposes  All illustrations and images included in CareNotes® are the copyrighted property of A D A M [employer] Inc  or Charbel Richardson   The above information is an  only   It is not intended as medical advice for individual conditions or treatments  Talk to your doctor, nurse or pharmacist before following any medical regimen to see if it is safe and effective for you

## 2022-05-19 NOTE — TELEPHONE ENCOUNTER
Scheduled date of colonoscopy (as of today):6/30/2022  Physician performing colonoscopy:Dr Cynthia Lopez  Location of colonoscopy:[facility]  Bowel prep reviewed with patient:Miralax/Gatorade  Instructions reviewed with patient by:Tamara Zayas CMA  Clearances: PLAVIX

## 2022-05-20 ENCOUNTER — TELEPHONE (OUTPATIENT)
Dept: CARDIOLOGY CLINIC | Facility: CLINIC | Age: 67
End: 2022-05-20

## 2022-05-20 NOTE — TELEPHONE ENCOUNTER
Our mutual patient is scheduled for procedure: Colonoscopy/EGD     On: 6/30/2022_       With: Dr James Lake is taking the following blood thinner: PLAVIX     Can this be stopped ___5___ days prior to the procedure?       Physician Approving clearance: Dr Marielena Wagoner

## 2022-05-22 DIAGNOSIS — I20.9 ANGINAL PAIN (HCC): ICD-10-CM

## 2022-05-22 DIAGNOSIS — I25.10 CORONARY ARTERY DISEASE INVOLVING NATIVE CORONARY ARTERY OF NATIVE HEART WITHOUT ANGINA PECTORIS: ICD-10-CM

## 2022-05-22 DIAGNOSIS — G89.4 CHRONIC PAIN SYNDROME: ICD-10-CM

## 2022-05-22 RX ORDER — GABAPENTIN 400 MG/1
CAPSULE ORAL
Qty: 360 CAPSULE | Refills: 0 | Status: SHIPPED | OUTPATIENT
Start: 2022-05-22

## 2022-05-23 RX ORDER — ISOSORBIDE MONONITRATE 30 MG/1
TABLET, EXTENDED RELEASE ORAL
Qty: 90 TABLET | Refills: 0 | Status: SHIPPED | OUTPATIENT
Start: 2022-05-23

## 2022-05-23 RX ORDER — CARVEDILOL 6.25 MG/1
TABLET ORAL
Qty: 180 TABLET | Refills: 0 | Status: SHIPPED | OUTPATIENT
Start: 2022-05-23

## 2022-05-24 NOTE — TELEPHONE ENCOUNTER
Patient advised of receipt of clearance and that she can hold her Plavix 5 days prior to procedure  Last dose will be 6/24/22  Patient verbalized understanding

## 2022-06-30 ENCOUNTER — ANESTHESIA EVENT (OUTPATIENT)
Dept: GASTROENTEROLOGY | Facility: AMBULATORY SURGERY CENTER | Age: 67
End: 2022-06-30

## 2022-06-30 ENCOUNTER — HOSPITAL ENCOUNTER (OUTPATIENT)
Dept: GASTROENTEROLOGY | Facility: AMBULATORY SURGERY CENTER | Age: 67
Discharge: HOME | End: 2022-06-30
Payer: MEDICARE

## 2022-06-30 ENCOUNTER — ANESTHESIA (OUTPATIENT)
Dept: GASTROENTEROLOGY | Facility: AMBULATORY SURGERY CENTER | Age: 67
End: 2022-06-30

## 2022-06-30 VITALS
WEIGHT: 157 LBS | BODY MASS INDEX: 26.16 KG/M2 | DIASTOLIC BLOOD PRESSURE: 68 MMHG | SYSTOLIC BLOOD PRESSURE: 156 MMHG | HEART RATE: 58 BPM | OXYGEN SATURATION: 98 % | TEMPERATURE: 99.4 F | HEIGHT: 65 IN | RESPIRATION RATE: 16 BRPM

## 2022-06-30 DIAGNOSIS — K21.9 GASTROESOPHAGEAL REFLUX DISEASE, UNSPECIFIED WHETHER ESOPHAGITIS PRESENT: Primary | ICD-10-CM

## 2022-06-30 DIAGNOSIS — K64.8 OTHER HEMORRHOIDS: ICD-10-CM

## 2022-06-30 PROBLEM — I20.8 ANGINA OF EFFORT (HCC): Status: ACTIVE | Noted: 2022-06-30

## 2022-06-30 PROBLEM — I20.89 ANGINA OF EFFORT: Status: ACTIVE | Noted: 2022-06-30

## 2022-06-30 PROCEDURE — 43239 EGD BIOPSY SINGLE/MULTIPLE: CPT | Performed by: INTERNAL MEDICINE

## 2022-06-30 PROCEDURE — 43450 DILATE ESOPHAGUS 1/MULT PASS: CPT | Performed by: INTERNAL MEDICINE

## 2022-06-30 PROCEDURE — 88305 TISSUE EXAM BY PATHOLOGIST: CPT | Performed by: PATHOLOGY

## 2022-06-30 PROCEDURE — 45385 COLONOSCOPY W/LESION REMOVAL: CPT | Performed by: INTERNAL MEDICINE

## 2022-06-30 RX ORDER — PROPOFOL 10 MG/ML
INJECTION, EMULSION INTRAVENOUS AS NEEDED
Status: DISCONTINUED | OUTPATIENT
Start: 2022-06-30 | End: 2022-06-30

## 2022-06-30 RX ORDER — SODIUM CHLORIDE, SODIUM LACTATE, POTASSIUM CHLORIDE, CALCIUM CHLORIDE 600; 310; 30; 20 MG/100ML; MG/100ML; MG/100ML; MG/100ML
50 INJECTION, SOLUTION INTRAVENOUS CONTINUOUS
Status: DISCONTINUED | OUTPATIENT
Start: 2022-06-30 | End: 2022-07-04 | Stop reason: HOSPADM

## 2022-06-30 RX ORDER — FAMOTIDINE 40 MG/1
40 TABLET, FILM COATED ORAL
Qty: 30 TABLET | Refills: 5 | Status: ON HOLD | OUTPATIENT
Start: 2022-06-30 | End: 2022-08-30

## 2022-06-30 RX ADMIN — PROPOFOL 30 MG: 10 INJECTION, EMULSION INTRAVENOUS at 14:15

## 2022-06-30 RX ADMIN — SODIUM CHLORIDE, SODIUM LACTATE, POTASSIUM CHLORIDE, CALCIUM CHLORIDE 50 ML/HR: 600; 310; 30; 20 INJECTION, SOLUTION INTRAVENOUS at 13:46

## 2022-06-30 RX ADMIN — PROPOFOL 30 MG: 10 INJECTION, EMULSION INTRAVENOUS at 14:11

## 2022-06-30 RX ADMIN — PROPOFOL 20 MG: 10 INJECTION, EMULSION INTRAVENOUS at 14:18

## 2022-06-30 RX ADMIN — PROPOFOL 50 MG: 10 INJECTION, EMULSION INTRAVENOUS at 13:56

## 2022-06-30 RX ADMIN — PROPOFOL 20 MG: 10 INJECTION, EMULSION INTRAVENOUS at 14:21

## 2022-06-30 RX ADMIN — PROPOFOL 20 MG: 10 INJECTION, EMULSION INTRAVENOUS at 14:08

## 2022-06-30 RX ADMIN — PROPOFOL 30 MG: 10 INJECTION, EMULSION INTRAVENOUS at 14:00

## 2022-06-30 RX ADMIN — PROPOFOL 150 MG: 10 INJECTION, EMULSION INTRAVENOUS at 13:54

## 2022-06-30 NOTE — H&P
History and Physical - [facility]  Laurie Leyva 69 y o  female MRN: [id number]    HPI: Laurie Leyva is a 69y o  year old female who presents for EGD and colonoscopy to evaluate GERD, bloating, dysphagia, rectal bleeding and surveillance for personal history of polyps    REVIEW OF SYSTEMS: Per the HPI, and otherwise unremarkable      Historical Information   Past Medical History:   Diagnosis Date    [facility]ina pectoris ([facility] [address] )     Arnold-Chiari malformation ([facility] [address] )     Breast lump     last assessed: 2013     Coronary artery disease     Depression with anxiety     GERD (gastroesophageal reflux disease)     Gout     last assessed: 2012     Hair loss     last assessed: Dec 15, 2016     Hypotension     last assessed: Nov 10, 2014     Mitral valve disorder     Myocardial infarct, old     SVT [facility]supra[facility]ricular tachycardia) ([facility]      Past Surgical History:   Procedure Laterality Date    BREAST BIOPSY Right 2017    US CORE BIOPSY--BENIGN    CARDIAC CATHETERIZATION      CERVICAL DISCECTOMY      Spinal     CERVICAL LAMINECTOMY      C1 with chiari decompression     COLONOSCOPY      5 yrs - onset: May 31, 2011     CRANIOTOMY      POPLITEAL SYNOVIAL CYST EXCISION      TUBAL LIGATION      US GUIDED BREAST BIOPSY RIGHT COMPLETE Right 2017     Social History   Social History     Substance and Sexual Activity   Alcohol Use Yes    Alcohol/week: 6 0 standard drinks    Types: 6 Standard drinks or equivalent per week    Comment: social      Social History     Substance and Sexual Activity   Drug Use No     Social History     Tobacco Use   Smoking Status Former Smoker    Packs/day: 1 50    Years: 36 00    Pack years: 54 00    Types: Cigarettes    Quit date: 2008    Years since quittin 5   Smokeless Tobacco Never Used     Family History   Problem Relation Age of Onset    Stroke Mother     Other Father         blood clot in vein & CABG     Heart disease Father     Prostate cancer Father     Alcohol abuse Brother     Throat cancer Brother     No Known Problems Sister     No Known Problems Daughter     Breast cancer Maternal Grandmother     No Known Problems Daughter     No Known Problems Sister     No Known Problems Maternal Aunt     No Known Problems Maternal Aunt     No Known Problems Maternal Aunt     Colon cancer Paternal Aunt     No Known Problems Paternal [de-identified]     Colon cancer Son     No Known Problems Paternal Aunt     No Known Problems Paternal Aunt     Colon cancer Paternal Aunt     No Known Problems Maternal Grandfather     No Known Problems Paternal Grandmother     No Known Problems Paternal Grandfather     Kidney failure Brother         kidney failure d/t NSIADs on dialysis       Meds/Allergies       Current Outpatient Medications:     atorvastatin (LIPITOR) 80 mg tablet    calcium carbonate (OS-FLASH) 600 MG tablet    carvedilol (COREG) 6 25 mg tablet    cholecalciferol (VITAMIN D3) 1,000 units tablet    escitalopram (LEXAPRO) 20 mg tablet    fluticasone (FLONASE) 50 mcg/act nasal spray    Fluticasone-Salmeterol (Advair Diskus) 100-50 mcg/dose inhaler    gabapentin (NEURONTIN) 400 mg capsule    isosorbide mononitrate (IMDUR) 30 mg 24 hr tablet    losartan (COZAAR) 50 mg tablet    multivitamin (THERAGRAN) TABS    tiZANidine (ZANAFLEX) 4 mg tablet    albuterol (ProAir HFA) 90 mcg/act inhaler    ALPRAZolam (XANAX) 0 25 mg tablet    benzonatate (TESSALON PERLES) 100 mg capsule    clopidogrel (PLAVIX) 75 mg tablet    dicyclomine (BENTYL) 10 mg capsule    Current Facility-Administered Medications:     lactated ringers infusion, 50 mL/hr, Intravenous, Continuous, 50 mL/hr at 06/30/22 1346    Allergies   Allergen Reactions    Codeine Itching    Medical Tape Rash       Objective     /85   Pulse 72   Temp 99 4 °F (37 4 °C) (Temporal)   Resp 16   Ht 5' 5" (1 651 m)   Wt 71 2 kg (157 lb)   SpO2 96%   BMI 26 13 kg/m²     PHYSICAL EXAM    Gen: NAD AAOx3  Head: Normocephalic, Atraumatic  CV: S1S2 RRR no m/r/g  CHEST: Clear b/l no c/r/w  ABD: soft, +BS NT/ND  EXT: no edema    ASSESSMENT/PLAN:  This is a 69y o  year old female here for EGD and colonoscopy, and she is stable and optimized for her procedure

## 2022-06-30 NOTE — ANESTHESIA PREPROCEDURE EVALUATION
Procedure:  COLONOSCOPY  EGD    Relevant Problems   CARDIO  Negative stress test but thoguht to ahve angina   (+) Angina of effort (HCC)   (+) Coronary artery disease   (+) Essential hypertension   (+) Mitral regurgitation      GI/HEPATIC   (+) GERD (gastroesophageal reflux disease)   (+) Pancreatic cyst      /RENAL   (+) Stage 3a chronic kidney disease (HCC)      MUSCULOSKELETAL   (+) Low back pain      NEURO/PSYCH  Surgery  Arnold chiari- Residual nerve damge, tongue uneven   (+) Depression with anxiety   (+) New daily persistent headache   (+) Paresthesia of bilateral legs      PULMONARY   (+) Other emphysema (HCC)        Physical Exam    Airway    Mallampati score: II  TM Distance: >3 FB  Neck ROM: full     Dental       Cardiovascular  Cardiovascular exam normal    Pulmonary  Pulmonary exam normal     Other Findings        Anesthesia Plan  ASA Score- 3     Anesthesia Type- IV sedation with anesthesia with ASA Monitors  Additional Monitors:   Airway Plan:           Plan Factors-Exercise tolerance (METS): >4 METS  Chart reviewed  Patient summary reviewed  Induction- intravenous  Postoperative Plan-     Informed Consent- Anesthetic plan and risks discussed with patient  I personally reviewed this patient with the CRNA  Discussed and agreed on the Anesthesia Plan with the CRNA             Transthoracic Echocardiogram  2D, M-mode, Doppler, and Color Doppler     Study date:  15-May-2019     Patient: Ravi Benjamin  MR number: [id number]  Account number: [de-identified]  : 1955  Age: 75 years  Gender: Female  Status: Outpatient  Location: [address] [place] [address] [facility]  Height: 65 in  Weight: 149 lb  BP: 135/ 80 mmHg     Indications: Coronary artery disease     Diagnoses: I25 10 - Atherosclerotic heart disease of native coronary artery without angina pectoris     Sonographer:  PETER Cedillo, RDCS  Primary Physician:  Hossein Wilson DO  Referring Physician:  Erick Davenport MD  Group:  [facility]  Interpreting Physician:  Marycarmen Carter MD     SUMMARY     LEFT VENTRICLE:  Systolic function was normal  Ejection fraction was estimated in the range of 55 % to 65 %  There were no regional wall motion abnormalities      TRICUSPID VALVE:  There was trace regurgitation      PULMONIC VALVE:  There was trace regurgitation      HISTORY: PRIOR HISTORY: Coronary artery disease; Hypertension; Dyslipidemia; Myocardial Infarction     PROCEDURE: The study was performed in the [address]  This was a routine study  The transthoracic approach was used  The study included complete 2D imaging, M-mode, complete spectral Doppler, and color Doppler  The  heart rate was 61 bpm, at the start of the study  Images were obtained from the parasternal, apical, subcostal, and suprasternal notch acoustic windows  Image quality was adequate      LEFT VENTRICLE: Size was normal  Systolic function was normal  Ejection fraction was estimated in the range of 55 % to 65 %  There were no regional wall motion abnormalities  Wall thickness was normal  DOPPLER: Left ventricular diastolic  function parameters were normal      RIGHT VENTRICLE: The size was normal  Systolic function was normal  Wall thickness was normal      LEFT ATRIUM: Size was normal      RIGHT ATRIUM: Size was normal      MITRAL VALVE: Valve structure was normal  There was normal leaflet separation  DOPPLER: The transmitral velocity was within the normal range  There was no evidence for stenosis  There was no regurgitation      AORTIC VALVE: The valve was trileaflet  Leaflets exhibited normal thickness and normal cuspal separation  DOPPLER: Transaortic velocity was within the normal range  There was no evidence for stenosis  There was no regurgitation

## 2022-07-07 ENCOUNTER — TELEPHONE (OUTPATIENT)
Dept: NEUROLOGY | Facility: CLINIC | Age: 67
End: 2022-07-07

## 2022-07-07 NOTE — TELEPHONE ENCOUNTER
Called and spoke to patient  Patient confirmed upcoming apt with Dr Giselle Osorio on 7/21/22 @ 10:30 am  Patient stated she saw 2 different Neurologists in her past but for different problems back in 1990 and one recent for Chiari which she will try to get records from  But nothing for the facial issues  She only informed her PCP about her face he then referred her to Dr Giselle Osorio

## 2022-07-17 DIAGNOSIS — I25.10 CORONARY ARTERY DISEASE INVOLVING NATIVE CORONARY ARTERY OF NATIVE HEART WITHOUT ANGINA PECTORIS: ICD-10-CM

## 2022-07-17 DIAGNOSIS — E78.5 DYSLIPIDEMIA: ICD-10-CM

## 2022-07-18 RX ORDER — ATORVASTATIN CALCIUM 80 MG/1
TABLET, FILM COATED ORAL
Qty: 90 TABLET | Refills: 0 | Status: SHIPPED | OUTPATIENT
Start: 2022-07-18 | End: 2022-10-24

## 2022-07-18 RX ORDER — CLOPIDOGREL BISULFATE 75 MG/1
TABLET ORAL
Qty: 90 TABLET | Refills: 0 | Status: SHIPPED | OUTPATIENT
Start: 2022-07-18 | End: 2022-08-31

## 2022-07-21 ENCOUNTER — CONSULT (OUTPATIENT)
Dept: NEUROLOGY | Facility: CLINIC | Age: 67
End: 2022-07-21
Payer: MEDICARE

## 2022-07-21 VITALS
HEIGHT: 65 IN | BODY MASS INDEX: 26.16 KG/M2 | HEART RATE: 60 BPM | WEIGHT: 157 LBS | SYSTOLIC BLOOD PRESSURE: 110 MMHG | TEMPERATURE: 98 F | RESPIRATION RATE: 16 BRPM | DIASTOLIC BLOOD PRESSURE: 74 MMHG

## 2022-07-21 DIAGNOSIS — G51.39 FACIAL NERVE SPASM: ICD-10-CM

## 2022-07-21 DIAGNOSIS — G51.4 FACIAL TWITCHING: Primary | ICD-10-CM

## 2022-07-21 DIAGNOSIS — G95.89 MASS OF SPINAL CORD (HCC): ICD-10-CM

## 2022-07-21 PROCEDURE — 99205 OFFICE O/P NEW HI 60 MIN: CPT | Performed by: PSYCHIATRY & NEUROLOGY

## 2022-07-21 NOTE — PATIENT INSTRUCTIONS
Plan:   Recurrent facial twitching with facial spasm, episodic presence  - differential diagnosis include either a very focal motor seizure causing right side of your face to twitch and contract or a peripheral 7th nerve lesion (neuroma or meningioma)  - MRI brain with IAC protocol and seizure protocol  - BMP for kidney function just prior to MRI brain study  - prolonged EEG  - try to video record your facial spasm  - holding off treatment given the relative infrequency of these facial spasms     - follow-up in 3-4 months

## 2022-07-21 NOTE — PROGRESS NOTES
Review of Systems   Constitutional: Negative  Negative for appetite change and fever  HENT: Negative  Negative for hearing loss, tinnitus, trouble swallowing and voice change  Eyes: Negative  Negative for photophobia and pain  Respiratory: Negative  Negative for shortness of breath  Cardiovascular: Negative  Negative for palpitations  Gastrointestinal: Negative  Negative for nausea and vomiting  Endocrine: Negative  Negative for cold intolerance  Genitourinary: Negative  Negative for dysuria, frequency and urgency  Musculoskeletal: Negative  Negative for myalgias and neck pain  Skin: Negative  Negative for rash  Neurological: Positive for dizziness and light-headedness  Negative for tremors, seizures, syncope, facial asymmetry, speech difficulty, weakness, numbness and headaches  Facial twitching  Hematological: Negative  Does not bruise/bleed easily  Psychiatric/Behavioral: Negative  Negative for confusion, hallucinations and sleep disturbance

## 2022-07-21 NOTE — PROGRESS NOTES
[facility]  Patient's Name: Raissa Frank   Patient's : 1955   Visit Type: consultation  Referring MD / PCP:  Viola Huitron DO    Assessment:  Ms Raissa Frank is a 69 y o  woman who has episodic right facial twitching and muscle spasms but on physical exam there is no facial weakness  She has bilateral neurosensory hearing loss based on her medical records  The differential diagnosis includes both peripheral and central causes  A peripheral 7th nerve neuroma or physical lesion could cause a hemifacial spasm but sometimes one would expect some degree of facial weakness  A central cause could be a focal motor seizure, the spread of the seizure outside of the face and progression to altered awareness would have help better localize the underlying etiology  In either case, she needs a brain imaging study with attention to the 7th cranial nerve  S    One option for empiric treatment for both conditions include carbamazepine or oxcarbazepine  She would like to hold off on treatment until she completes the MRI brain imaging study  I'll also recommend an EEG study as a focal motor seizure is under consideration        Plan:   Recurrent facial twitching with facial spasm, episodic presence  - differential diagnosis include either a very focal motor seizure causing right side of your face to twitch and contract or a peripheral 7th nerve lesion (neuroma or meningioma)  - MRI brain with IAC protocol and seizure protocol  - BMP for kidney function just prior to MRI brain study  - prolonged EEG study  - try to video record your facial spasm  - holding off treatment given the relative infrequency of these facial spasms     - follow-up in 3-4 months    Problem List Items Addressed This Visit        Other    Mass of spinal cord ([address] )    Facial twitching - Primary    Relevant Orders    MRI brain IAC wo and w contrast    Basic metabolic panel    EEG Prolonged > 1 hour    Facial nerve spasm    Relevant Orders    MRI brain IAC wo and w contrast    Basic metabolic panel    EEG Prolonged > 1 hour          Chief Complaint:   Chief Complaint   Patient presents with    facial twitching      HPI:      Arley Buchanan is a 69 y o  right handed female here for consultation evaluation of facial twitching  Intake History 7/21/2022  For the past year Graeme Shows has noticed episodes of right facial twitching, sometimes it involves the eye, which last for about half an hour  These are sometimes more present when she is tired or at the end of the day  She would notice that the right eye would start twitching, sometimes there is pulling sensation over the right cheek to the point that the lip is pulled up, sometimes it is a facial spasm for a couple of minutes (twists her right face upwards and eye is forced closed)  There is no forced head turning or right hand jerking  There is no facial numbness  This happens about a couple of times a week  They were becoming more frequent about a month ago  These typically happen more in the evening  It is not associated with stress but maybe more with when she feels tired at the end of the day  There is no other muscle twitching over her body  She lost the sense of taste and smell when she had a cold about a year ago (no associated with COVID)  She continues to have lack of taste  She lost hearing in her right ear about 4 years ago, she needed to get cortisone injections  She continues to lose hearing in her right ear  The last time she had hearing evaluation was 2 years ago  She had a Chiari malformation, which affected one of her nerves for the tongue, so she has difficulty with chewing and swallowing  She had a suboccipital decompression craniectomy in 1997  She has chronic atrophy on the right side of her tongue  There has been no loss of consciousness, seizure disorder, or convulsion      She was previously on Tegretol for the Chiari but then she developed a neuropathy (she was not able to walk, she was sluggished, legs felt heavy)  She take gabapentin 800mg twice a day since her suboccipital decompression        Prior History:  x    Seizure Risk Factors:  Abnormal pregnancy: No  Abnormal birth/: breech birth  Abnormal Development: No  Febrile seizures, simple: No  Febrile seizures, complex: No  CNS infection: No  Intellectual disability: No  Cerebral palsy: No  Head injury (moderate/severe): No  CNS neoplasm: No  CNS malformation: No  Neurosurgical procedure: suboccipital decompression for Chiari malformation  Stroke: No  CNS autoimmune disorder: No  Alcohol abuse: No  Drug abuse: No  Family history Sz/epilepsy: No    Prior AEDs:  medication Max dose Time used Reason to stop                 Prior workup:  I have reviewed the MRI brain study myself  Imagin/15/2021 - MRI brain  Post-operative changes of suboccipital craniectomy for Chiari decompression    2021 - MRI thoracic spine  Normal signal in the thoracic cord  Thin elliptical intradural extra medullary mass contiguous within the thecal sac at T11, minor ventral displacement of the cord; likely a meningioma at T11    9/15/2015 - MRI brain  "footprint of Chiari decompression"    2014 - MRI cervical spine  Degenerative disc disease C5-6 with moderate central and moderate left more than right bilateral foraminal narrowing  Cerebellar tonsils are above the foramen magnum    2015 - MRI Thoracic spine  intradula mass at T11 vertebral level likely a meningioma, no cord compression or cord displacement    EEGs:  None    Labs:  Component      Latest Ref Rng & Units 2021 3/3/2022   Sodium      136 - 145 mmol/L  136   Potassium      3 5 - 5 3 mmol/L  4 2   Chloride      100 - 108 mmol/L  104   CO2      21 - 32 mmol/L  27   Anion Gap      4 - 13 mmol/L  5   BUN      5 - 25 mg/dL  19   Creatinine      0 60 - 1 30 mg/dL  1 09   GLUCOSE FASTING      65 - 99 mg/dL  113 (H) Calcium      8 3 - 10 1 mg/dL  8 8   eGFR      ml/min/1 73sq m  52   Hemoglobin A1C      Normal 3 8-5 6%; PreDiabetic 5 7-6 4%;  Diabetic >=6 5%; Glycemic control for adults with diabetes <7 0% %  5 6   eAG, EST AVG Glucose      mg/dl  114   TSH 3RD GENERATON      0 358 - 3 740 uIU/mL 2 660        General exam   /74 (BP Location: Left arm, Patient Position: Sitting, Cuff Size: Standard)   Pulse 60   Temp 98 °F (36 7 °C) (Temporal)   Resp 16   Ht 5' 5" (1 651 m)   Wt 71 2 kg (157 lb)   BMI 26 13 kg/m²    Appearance: normally developed, appears well  Carotids: no bruits present  Cardiovascular: normal heart sounds and irregular rhythm  Pulmonary: clear to auscultation  Abdominal: soft, nondistended  Extremities: no edema    HEENT: anicteric and moist mucus membranes / oral cavity   Fundoscopy: bilateral optic discs are sharp    Mental status  Orientation: alert and oriented to name, place, time  Fund of Knowledge: intact   Attention and Concentration: able to spell HOUSE forwards and backwards  Current and Remote Memory:recalled 3/3 words after five minutes  Language: spontaneous speech is normal and comprehension is intact    Cranial Nerves  CN 1: not tested  CN 2: Visual fields intact to confrontation and pupils equal round reactive to direct and consenual light   CN 3, 4, 6: EOMI, no nystagmus  CN 5:sensation intact to all distribution V1, V2, V3  CN 7:muscles of facial expression are symmetric  CN 8:she has hearing aides, she was able to hear finger rubs, bone conduction is better than air conduction  CN 9, 10:no dysarthria present and symmetric elevation of soft palate and uvula is midline  CN 11:symmetric SCM with head turns  CN 12:there is mild atrophy of the right tongue but no visible fasciulations    Motor:  Bulk, Tone: normal bulk, normal tone  Pronation: no pronator drift  Strength: Patient has full strength symmetrically of shoulder abduction, biceps, triceps, wrist flexion, wrist extension, finger flexion, finger abduction, hip flexion, knee flexion, knee extension, dorsiflexion, plantar flexion     Abnormal movements: no abnormal movements are present    Sensory:  Lighttouch: intact in all limbs  Vibration: intact in all limbs  Pinprick: intact in all limbs  Romberg:normal    Coordination:  FNF:FNF bilaterally intact  NEGAR:intact  FFM:intact  Gait/Station:normal gait and unable to tandem    Reflexes:  Bilateral biceps, triceps, brachioradialis, and ankles 0/4  Bilateral knees 2+/4    Past Medical/Surgical History:  Patient Active Problem List   Diagnosis    De Quervain's disease (tenosynovitis)    Vertigo    Essential hypertension    Tinnitus    Third degree hemorrhoids    Takotsubo syndrome    Sudden idiopathic hearing loss of right ear    Cervical stenosis of spinal canal    Spasmodic dysphonia    Raynaud's syndrome without gangrene    Plantar fasciitis    Paresthesia of bilateral legs    Osteopenia    Mitral regurgitation    Mass of spinal cord (HCC)    Low back pain    Insomnia    Hypokalemia    IFG (impaired fasting glucose)    Hemorrhoids    Esophagitis, reflux    Dyslipidemia    Depression with anxiety    Coronary artery disease    Carpal tunnel syndrome    Arnold-Chiari malformation (HCC)    Anal fissure    Allergic state    Adenomatous colon polyp    Abnormal findings on diagnostic imaging of other parts of musculoskeletal system    Pancreatic cyst    Other emphysema (HCC)    GERD (gastroesophageal reflux disease)    Abdominal bloating    Hypotension    Stage 3a chronic kidney disease (HCC)    Angina of effort (HCC)    Facial twitching    Facial nerve spasm     Past Medical History:   Diagnosis Date    Angina pectoris ([address] )     Arnold-Chiari malformation (HCC)     Breast lump     last assessed: Jan 22, 2013     Coronary artery disease     Depression with anxiety     GERD (gastroesophageal reflux disease)     Gout     last assessed: Nov 26, 2012     Hair loss     last assessed: Dec 15, 2016     Hypotension     last assessed: Nov 10, 2014     Mitral valve disorder     Myocardial infarct, old     SVT (supraventricular tachycardia) ([facility] [address] )      Past Surgical History:   Procedure Laterality Date    BREAST BIOPSY Right 02/28/2017    US CORE BIOPSY--BENIGN    CARDIAC CATHETERIZATION      CERVICAL DISCECTOMY      Spinal     CERVICAL LAMINECTOMY      C1 with chiari decompression     COLONOSCOPY      5 yrs - onset: May 31, 2011     CRANIOTOMY      POPLITEAL SYNOVIAL CYST EXCISION      TUBAL LIGATION      US GUIDED BREAST BIOPSY RIGHT COMPLETE Right 02/28/2017       Past Psychiatric History:  Depression: No  Anxiety: No  Psychosis: No    Medications:    Current Outpatient Medications:     albuterol (ProAir HFA) 90 mcg/act inhaler, Inhale 2 puffs every 4 (four) hours as needed for wheezing or shortness of breath, Disp: 8 5 g, Rfl: 0    ALPRAZolam (XANAX) 0 25 mg tablet, TAKE 1 TABLET BY MOUTH AT BEDTIME, Disp: 30 tablet, Rfl: 0    atorvastatin (LIPITOR) 80 mg tablet, Take 1 tablet by mouth once daily, Disp: 90 tablet, Rfl: 0    calcium carbonate (OS-FLASH) 600 MG tablet, Take 600 mg by mouth 2 (two) times a day with meals, Disp: , Rfl:     carvedilol (COREG) 6 25 mg tablet, Take 1 tablet by mouth twice daily, Disp: 180 tablet, Rfl: 0    cholecalciferol (VITAMIN D3) 1,000 units tablet, Take 1,000 Units by mouth daily, Disp: , Rfl:     clopidogrel (PLAVIX) 75 mg tablet, Take 1 tablet by mouth once daily, Disp: 90 tablet, Rfl: 0    escitalopram (LEXAPRO) 20 mg tablet, Take 1/2 (one-half) tablet by mouth once daily, Disp: 30 tablet, Rfl: 5    famotidine (PEPCID) 40 MG tablet, Take 1 tablet (40 mg total) by mouth daily at bedtime, Disp: 30 tablet, Rfl: 5    fluticasone (FLONASE) 50 mcg/act nasal spray, 2 sprays into each nostril daily, Disp: 1 Bottle, Rfl: 5    Fluticasone-Salmeterol (Advair Diskus) 100-50 mcg/dose inhaler, Inhale 1 puff 2 (two) times a day Rinse mouth after use, Disp: 60 blister, Rfl: 5    gabapentin (NEURONTIN) 400 mg capsule, TAKE 4 CAPSULES BY MOUTH ONCE DAILY AS DIRECTED, Disp: 360 capsule, Rfl: 0    isosorbide mononitrate (IMDUR) 30 mg 24 hr tablet, Take 1 tablet by mouth once daily, Disp: 90 tablet, Rfl: 0    losartan (COZAAR) 50 mg tablet, Take 1 tablet (50 mg total) by mouth daily, Disp: 90 tablet, Rfl: 2    multivitamin (THERAGRAN) TABS, Take 1 tablet by mouth daily, Disp: , Rfl:     tiZANidine (ZANAFLEX) 4 mg tablet, TAKE 1 & 1/2 (ONE & ONE-HALF) TABLETS BY MOUTH AT BEDTIME (Patient taking differently: Takes as needed ), Disp: 135 tablet, Rfl: 0    benzonatate (TESSALON PERLES) 100 mg capsule, Take 1 capsule (100 mg total) by mouth 3 (three) times a day as needed for cough (Patient not taking: Reported on 7/21/2022), Disp: 30 capsule, Rfl: 0    dicyclomine (BENTYL) 10 mg capsule, TAKE 1 CAPSULE BY MOUTH 4 TIMES DAILY BEFORE MEAL(S) AND AT BEDTIME (Patient not taking: Reported on 7/21/2022), Disp: 120 capsule, Rfl: 0    Allergies:   Allergies   Allergen Reactions    Codeine Itching    Medical Tape Rash       Family history:  Family History   Problem Relation Age of Onset    Stroke Mother     Other Father         blood clot in vein & CABG     Heart disease Father     Prostate cancer Father     Alcohol abuse Brother     Throat cancer Brother     No Known Problems Sister     No Known Problems Daughter     Breast cancer Maternal Grandmother     No Known Problems Daughter     No Known Problems Sister     No Known Problems Maternal Aunt     No Known Problems Maternal Aunt     No Known Problems Maternal Aunt     Colon cancer Paternal Aunt     No Known Problems Paternal [de-identified]     Colon cancer Son     No Known Problems Paternal Aunt     No Known Problems Paternal Aunt     Colon cancer Paternal Aunt     No Known Problems Maternal Grandfather     No Known Problems Paternal Grandmother     No Known Problems Paternal Grandfather     Kidney failure Brother         kidney failure d/t NSIADs on dialysis     There is no family history of seizure, epilepsy or developmental delay  Social History  Living situation:  Lives with her   Work:  Retired manager at [employer], completed high school  Driving:  Yes   reports that she quit smoking about 14 years ago  Her smoking use included cigarettes  She has a 54 00 pack-year smoking history  She has never used smokeless tobacco  She reports current alcohol use of about 6 0 standard drinks of alcohol per week  She reports that she does not use drugs  one can of beer a day  Review of Systems  A review of at least 12 organ/systems was obtained by the medical assistant and reviewed by me, including additional positives/negatives:  Neurological: Positive for dizziness and light-headedness  Negative for tremors, seizures, syncope, facial asymmetry, speech difficulty, weakness, numbness and headaches  Facial twitching  Decision making was of high-complexity due to the patient's high risk condition (seizures), psychiatric and neuropsychological comorbidities, behavioral problems, memory and cognitive problems and medication side effects

## 2022-08-14 DIAGNOSIS — G89.4 CHRONIC PAIN SYNDROME: ICD-10-CM

## 2022-08-14 DIAGNOSIS — I20.9 ANGINAL PAIN (HCC): ICD-10-CM

## 2022-08-14 RX ORDER — GABAPENTIN 400 MG/1
CAPSULE ORAL
Qty: 360 CAPSULE | Refills: 0 | Status: SHIPPED | OUTPATIENT
Start: 2022-08-14 | End: 2022-09-09

## 2022-08-21 RX ORDER — ISOSORBIDE MONONITRATE 30 MG/1
TABLET, EXTENDED RELEASE ORAL
Qty: 90 TABLET | Refills: 0 | Status: SHIPPED | OUTPATIENT
Start: 2022-08-21

## 2022-08-22 ENCOUNTER — OFFICE VISIT (OUTPATIENT)
Dept: FAMILY MEDICINE CLINIC | Facility: HOSPITAL | Age: 67
End: 2022-08-22
Payer: MEDICARE

## 2022-08-22 VITALS
HEIGHT: 65 IN | OXYGEN SATURATION: 92 % | TEMPERATURE: 100.8 F | DIASTOLIC BLOOD PRESSURE: 80 MMHG | HEART RATE: 82 BPM | SYSTOLIC BLOOD PRESSURE: 122 MMHG | WEIGHT: 158 LBS | BODY MASS INDEX: 26.33 KG/M2

## 2022-08-22 DIAGNOSIS — J98.8 RESPIRATORY INFECTION: Primary | ICD-10-CM

## 2022-08-22 DIAGNOSIS — B96.89 BACTERIAL RESPIRATORY INFECTION: ICD-10-CM

## 2022-08-22 DIAGNOSIS — J43.8 OTHER EMPHYSEMA (HCC): ICD-10-CM

## 2022-08-22 DIAGNOSIS — J44.1 COPD WITH ACUTE EXACERBATION (HCC): ICD-10-CM

## 2022-08-22 DIAGNOSIS — R05.9 COUGH: ICD-10-CM

## 2022-08-22 DIAGNOSIS — J98.8 BACTERIAL RESPIRATORY INFECTION: ICD-10-CM

## 2022-08-22 PROCEDURE — 99214 OFFICE O/P EST MOD 30 MIN: CPT | Performed by: INTERNAL MEDICINE

## 2022-08-22 RX ORDER — PREDNISONE 10 MG/1
TABLET ORAL
Qty: 39 TABLET | Refills: 0 | Status: SHIPPED | OUTPATIENT
Start: 2022-08-22 | End: 2022-08-31

## 2022-08-22 RX ORDER — BENZONATATE 100 MG/1
100 CAPSULE ORAL 3 TIMES DAILY PRN
Qty: 30 CAPSULE | Refills: 0 | Status: SHIPPED | OUTPATIENT
Start: 2022-08-22

## 2022-08-22 RX ORDER — ALBUTEROL SULFATE 90 UG/1
2 AEROSOL, METERED RESPIRATORY (INHALATION) EVERY 4 HOURS PRN
Qty: 8.5 G | Refills: 2 | Status: SHIPPED | OUTPATIENT
Start: 2022-08-22

## 2022-08-22 RX ORDER — DOXYCYCLINE HYCLATE 100 MG/1
100 CAPSULE ORAL EVERY 12 HOURS SCHEDULED
Qty: 14 CAPSULE | Refills: 0 | Status: SHIPPED | OUTPATIENT
Start: 2022-08-22 | End: 2022-08-31

## 2022-08-22 NOTE — PROGRESS NOTES
Assessment/Plan:    Other emphysema (HCC)  Currently w/acute exacerbation, Doxy and Prednisone rx sent, con't Tessalon Perles, Advair and rescue inhaler, check O2 sat at home and to ED if <90% or with new/worse symptoms, call if no better after 36-48 hrs       Diagnoses and all orders for this visit:    Respiratory infection  Comments:  D/t duration of symptoms and ill appearing today will start Doxy 100 mg bid and Prednisone, rx for Tessalon Perles refilled, call with new/worse symptoms  Orders:  -     doxycycline hyclate (VIBRAMYCIN) 100 mg capsule; Take 1 capsule (100 mg total) by mouth every 12 (twelve) hours for 7 days    Other emphysema (HCC)    COPD with acute exacerbation ([facility] [address] )  Comments:  Start Doxy and Prednisone, Tessalon Perles for cough, con't Advair and albuterol prn, monitor O2 sat and to ED with new/worse symptoms OR with O2 sat< 90%  Orders:  -     predniSONE 10 mg tablet; 3 tab PO bid x 3 days then 2 tab PO bid x 3 days then 1 tab PO bid x 3 days then 1 tab PO once daily x 3 days then stop  -     benzonatate (TESSALON PERLES) 100 mg capsule; Take 1 capsule (100 mg total) by mouth 3 (three) times a day as needed for cough    Cough  Comments:  Tessalon Perles refilled  Orders:  -     albuterol (ProAir HFA) 90 mcg/act inhaler; Inhale 2 puffs every 4 (four) hours as needed for wheezing or shortness of breath          Subjective:      Patient ID: Talib Fierro is a 69 y o  female  HPI Pt here for an acute visit    Pt noting 10 days of not feeling well  She started with cough  She noted congestion and runny nose  She had a ST early on but that resolved  She notes no ear pain/sinus pain  She has had HA's from coughing  She started to feel SOB Friday and has some wheezing  She has had chills and fever since Friday as well with Tm 101 9 on Sat am   She notes some vomiting but no diarrhea  She notes chronic loss of taste and smell  She is feeling more fatigue and has body aches    She has done 3 COVID tests and they are all negative - most recent being yesterday  She has been using Ibuprofen and fluids and Tessalon Perles  She notes benefit with the Perles  She has been checking O2 sat at home and it has not been lower then 90%  She has COPD and is on Advair 1 puff bid  She is using her Albuterol every 4 hrs w/"some" benefit  Review of Systems   Constitutional: Positive for fatigue and fever  Negative for chills and unexpected weight change  HENT: Positive for congestion  Negative for ear pain and sore throat  Eyes: Negative for pain and visual disturbance  Respiratory: Positive for cough, shortness of breath and wheezing  Cardiovascular: Negative for chest pain and palpitations  Gastrointestinal: Positive for constipation  Negative for abdominal pain, diarrhea, nausea and vomiting  Genitourinary: Negative for difficulty urinating and dysuria  Musculoskeletal: Negative for back pain and neck pain  Skin: Negative for rash and wound  Neurological: Positive for dizziness and headaches  Psychiatric/Behavioral: Negative for confusion  Objective:    /80   Pulse 82   Temp (!) 100 8 °F (38 2 °C)   Ht 5' 5" (1 651 m)   Wt 71 7 kg (158 lb)   SpO2 92%   BMI 26 29 kg/m²      Physical Exam  Vitals and nursing note reviewed  Constitutional:       General: She is not in acute distress  Appearance: She is well-developed  She is ill-appearing  Comments: Ill appearing   HENT:      Head: Normocephalic and atraumatic  Right Ear: There is no impacted cerumen  Left Ear: Tympanic membrane normal  There is no impacted cerumen  Ears:      Comments: R effusion  Eyes:      General:         Right eye: No discharge  Left eye: No discharge  Conjunctiva/sclera: Conjunctivae normal    Neck:      Trachea: No tracheal deviation  Cardiovascular:      Rate and Rhythm: Normal rate and regular rhythm  Heart sounds: Normal heart sounds   No murmur heard     No friction rub  Pulmonary:      Effort: No respiratory distress  Breath sounds: Normal breath sounds  No wheezing, rhonchi or rales  Comments: SOB with exertion and with long conversations  Abdominal:      Palpations: Abdomen is soft  Musculoskeletal:      Cervical back: Neck supple  Right lower leg: No edema  Left lower leg: No edema  Lymphadenopathy:      Cervical: No cervical adenopathy  Skin:     General: Skin is warm  Coloration: Skin is not pale  Findings: No rash  Neurological:      General: No focal deficit present  Mental Status: She is alert  Motor: No abnormal muscle tone  Gait: Gait normal    Psychiatric:         Behavior: Behavior normal          Thought Content:  Thought content normal          Judgment: Judgment normal

## 2022-08-22 NOTE — ASSESSMENT & PLAN NOTE
Currently w/acute exacerbation, Doxy and Prednisone rx sent, con't Tessalon Perles, Advair and rescue inhaler, check O2 sat at home and to ED if <90% or with new/worse symptoms, call if no better after 36-48 hrs

## 2022-08-25 ENCOUNTER — OFFICE VISIT (OUTPATIENT)
Dept: GASTROENTEROLOGY | Facility: CLINIC | Age: 67
End: 2022-08-25
Payer: MEDICARE

## 2022-08-25 VITALS
HEIGHT: 65 IN | WEIGHT: 155 LBS | DIASTOLIC BLOOD PRESSURE: 84 MMHG | BODY MASS INDEX: 25.83 KG/M2 | HEART RATE: 66 BPM | SYSTOLIC BLOOD PRESSURE: 130 MMHG

## 2022-08-25 DIAGNOSIS — R14.0 ABDOMINAL BLOATING: ICD-10-CM

## 2022-08-25 DIAGNOSIS — R13.11 ORAL PHASE DYSPHAGIA: Primary | ICD-10-CM

## 2022-08-25 DIAGNOSIS — K21.9 GASTROESOPHAGEAL REFLUX DISEASE WITHOUT ESOPHAGITIS: ICD-10-CM

## 2022-08-25 PROCEDURE — 99214 OFFICE O/P EST MOD 30 MIN: CPT | Performed by: NURSE PRACTITIONER

## 2022-08-25 NOTE — LETTER
August 25, 2022     Diane Mitchell, [address]  [address] [facility]  [address] [address]    Patient: Carlos Alberto Navarro   YOB: 1955   Date of Visit: 8/25/2022       Dear Dr Jewels Maldonado: Thank you for referring Gisela Underwood to me for evaluation  Below are my notes for this consultation  If you have questions, please do not hesitate to call me  I look forward to following your patient along with you  Sincerely,        ANTONIETA Orlando        CC: No Recipients  ANTONIETA Orlando  8/25/2022  4:46 PM  Sign when Signing Visit  [address] [facility] - Outpatient Follow-up Note  Carlos Alberto Navarro 69 y o  female MRN: [id number]  Encounter: [id number]    ASSESSMENT AND PLAN:    1  Dysphagia/ possible aspiration  EGD 06/30/2022 for dysphagia-empirically dilated with some improvement  Continues to experience  coughing/choking on saliva, liquids and sometimes solid  Denies sensation of food sticking in esophagus or food bolus  -will check barium swallow to evaluate for aspiration or possible esophageal dysmotility  -recommend adding daily antihistamine for possible allergy  -continue famotidine 40 mg daily    2  GERD  Mild to moderate gastritis noted on EGD 06/30/2022  Started on famotidine 40 mg daily with improvement in GERD symptoms  Denies breakthrough symptoms  -continue famotidine 40 mg daily  -reviewed GERD diet and lifestyle modifications    3  Abdominal bloating  Continues to experience daily abdominal bloating and discomfort which usually occurs after eating and becomes progressively worse throughout the day  Unable to identify triggering foods-no increase with dairy products  Biopsy on EGD negative for celiac disease  Denies nausea or vomiting  No diarrhea  Questionable SIBO verses irritable bowel syndrome  - check breath test to evaluate for SIBO  - discussed FODMAP diet and written instructions provided    4  History of colon polyp  5  Hemorrhoids  Colonoscopy  06/30/2022 -adenomatous polyp excised, noted to have internal hemorrhoids but no bleeding  Five year recall recommended/2027    Followup Appointment:  3 month  ______________________________________________________________________    Chief Complaint   Patient presents with    Follow up to colon and EGD     HPI: Presents in follow-up today for GERD and dysphagia  She underwent EGD 06/30/2022 which showed irregular Z-line, mild to moderate gastritis  Biopsies were negative for Barretts, ETOH and H pylori  Duodenal biopsy was negative for celiac disease  She did undergo empiric esophageal dilation     Continues to experience frequent choking with her saliva  Also experiences coughing and choking frequently with liquids and occasionally with solid foods  She denies dysphagia or sensation of food sticking in her throat or chest   She reports significant improvement in reflux symptoms since starting famotidine 40 mg daily  Denies abdominal pain but continues to experience significant bloating which increases after eating and progresses throughout the day    No recent change in bowel habits  Continues to have loose to soft stools daily  Denies melena  No further rectal bleeding  She underwent colonoscopy  06/30/2022 -adenomatous polyp excised, noted to have internal hemorrhoids but no bleeding    Five year recall recommended/2027    Historical Information   Past Medical History:   Diagnosis Date    Angina pectoris ([facility] [address] )     Arnold-Chiari malformation (HCC)     Breast lump     last assessed: Jan 22, 2013     Coronary artery disease     Depression with anxiety     GERD (gastroesophageal reflux disease)     Gout     last assessed: Nov 26, 2012     Hair loss     last assessed: Dec 15, 2016     Hypertension     Hypotension     last assessed: Nov 10, 2014     Mitral valve disorder     Myocardial infarct, old     SVT [facility]ra[facility]ricular tachycardia) [facility]      Past Surgical History:   Procedure Laterality Date    BREAST BIOPSY Right 2017    US CORE BIOPSY--BENIGN    CARDIAC CATHETERIZATION      CERVICAL DISCECTOMY      Spinal     CERVICAL LAMINECTOMY      C1 with chiari decompression     COLONOSCOPY      5 yrs - onset: May 31, 2011     CRANIOTOMY      POPLITEAL SYNOVIAL CYST EXCISION      TUBAL LIGATION      US GUIDED BREAST BIOPSY RIGHT COMPLETE Right 2017     Social History     Substance and Sexual Activity   Alcohol Use Yes    Comment: social      Social History     Substance and Sexual Activity   Drug Use No     Social History     Tobacco Use   Smoking Status Former Smoker    Packs/day: 1 50    Years: 36 00    Pack years: 54 00    Types: Cigarettes    Quit date: 2008    Years since quittin 6   Smokeless Tobacco Never Used     Family History   Problem Relation Age of Onset    Stroke Mother     Other Father         blood clot in vein & CABG     Heart disease Father     Prostate cancer Father     Hearing loss Father     Hypertension Father     Alcohol abuse Brother     Throat cancer Brother     Cancer Brother         Throat tongue    Hearing loss Brother     No Known Problems Sister     No Known Problems Daughter     Breast cancer Maternal Grandmother     No Known Problems Daughter     No Known Problems Sister     Crohn's disease Maternal Aunt     No Known Problems Maternal Aunt     No Known Problems Maternal Aunt     Colon cancer Paternal Aunt     No Known Problems Paternal [de-identified]     Colon cancer Son     No Known Problems Paternal Aunt     No Known Problems Paternal Aunt     Colon cancer Paternal Aunt     Cancer Paternal Aunt         Colon cancer    No Known Problems Maternal Grandfather     No Known Problems Paternal Grandmother     No Known Problems Paternal Grandfather     Kidney failure Brother         kidney failure d/t NSIADs on dialysis    Hearing loss Brother          Current Outpatient Medications:    albuterol (ProAir HFA) 90 mcg/act inhaler    ALPRAZolam (XANAX) 0 25 mg tablet    atorvastatin (LIPITOR) 80 mg tablet    benzonatate (TESSALON PERLES) 100 mg capsule    calcium carbonate (OS-FLASH) 600 MG tablet    carvedilol (COREG) 6 25 mg tablet    cholecalciferol (VITAMIN D3) 1,000 units tablet    clopidogrel (PLAVIX) 75 mg tablet    doxycycline hyclate (VIBRAMYCIN) 100 mg capsule    escitalopram (LEXAPRO) 20 mg tablet    famotidine (PEPCID) 40 MG tablet    fluticasone (FLONASE) 50 mcg/act nasal spray    Fluticasone-Salmeterol (Advair Diskus) 100-50 mcg/dose inhaler    gabapentin (NEURONTIN) 400 mg capsule    isosorbide mononitrate (IMDUR) 30 mg 24 hr tablet    losartan (COZAAR) 50 mg tablet    multivitamin (THERAGRAN) TABS    predniSONE 10 mg tablet    dicyclomine (BENTYL) 10 mg capsule    tiZANidine (ZANAFLEX) 4 mg tablet  Allergies   Allergen Reactions    Codeine Itching    Medical Tape Rash     Reviewed medications and allergies and updated as indicated    PHYSICAL EXAM:    Blood pressure 130/84, pulse 66, height 5' 5" (1 651 m), weight 70 3 kg (155 lb)  Body mass index is 25 79 kg/m²  General Appearance: NAD, cooperative, alert  Eyes: Anicteric  ENT:  Normocephalic, atraumatic, normal mucosa  Neck:  Supple, symmetrical, trachea midline  Resp:  Clear to auscultation bilaterally; no rales, rhonchi or wheezing; respirations unlabored   CV:  S1 S2, Regular rate and rhythm; no murmur, rub, or gallop  GI:  Soft, non-tender, non-distended; normal bowel sounds; no masses, no organomegaly   Rectal: Deferred  Musculoskeletal: No cyanosis, clubbing or edema  Normal ROM    Skin:  No jaundice, rashes, or lesions   Psych: Normal affect, good eye contact  Neuro: No gross deficits, AAOx3    Lab Results:  Reviewed  Lab Results   Component Value Date    WBC 5 01 06/24/2021    HGB 13 5 06/24/2021    HCT 41 1 06/24/2021    MCV 97 06/24/2021     06/24/2021     Lab Results   Component Value Date  12/10/2015    K 4 2 03/03/2022     03/03/2022    CO2 27 03/03/2022    ANIONGAP 8 12/10/2015    BUN 19 03/03/2022    CREATININE 1 09 03/03/2022    GLUCOSE 93 12/10/2015    GLUF 113 (H) 03/03/2022    CALCIUM 8 8 03/03/2022    AST 22 08/28/2021    ALT 42 08/28/2021    ALKPHOS 112 08/28/2021    PROT 6 9 12/10/2015    BILITOT 0 65 12/10/2015    EGFR 52 03/03/2022     Lab Results   Component Value Date    IRON 83 06/16/2017    TIBC 333 06/16/2017    FERRITIN 66 06/16/2017

## 2022-08-25 NOTE — PROGRESS NOTES
[facility] [facility] - Outpatient Follow-up Note  Stan Olson 69 y o  female MRN: [id number]  Encounter: [id number]    ASSESSMENT AND PLAN:    1  Dysphagia/ possible aspiration  EGD 06/30/2022 for dysphagia-empirically dilated with some improvement  Continues to experience  coughing/choking on saliva, liquids and sometimes solid  Denies sensation of food sticking in esophagus or food bolus  -will check barium swallow to evaluate for aspiration or possible esophageal dysmotility  -recommend adding daily antihistamine for possible allergy  -continue famotidine 40 mg daily    2  GERD  Mild to moderate gastritis noted on EGD 06/30/2022  Started on famotidine 40 mg daily with improvement in GERD symptoms  Denies breakthrough symptoms  -continue famotidine 40 mg daily  -reviewed GERD diet and lifestyle modifications    3  Abdominal bloating  Continues to experience daily abdominal bloating and discomfort which usually occurs after eating and becomes progressively worse throughout the day  Unable to identify triggering foods-no increase with dairy products  Biopsy on EGD negative for celiac disease  Denies nausea or vomiting  No diarrhea  Questionable SIBO verses irritable bowel syndrome  - check breath test to evaluate for SIBO  - discussed FODMAP diet and written instructions provided    4  History of colon polyp  5  Hemorrhoids  Colonoscopy  06/30/2022 -adenomatous polyp excised, noted to have internal hemorrhoids but no bleeding  Five year recall recommended/2027    Followup Appointment:  3 month  ______________________________________________________________________    Chief Complaint   Patient presents with    Follow up to colon and EGD     HPI: Presents in follow-up today for GERD and dysphagia  She underwent EGD 06/30/2022 which showed irregular Z-line, mild to moderate gastritis  Biopsies were negative for Barretts, ETOH and H pylori    Duodenal biopsy was negative for celiac disease  She did undergo empiric esophageal dilation     Continues to experience frequent choking with her saliva  Also experiences coughing and choking frequently with liquids and occasionally with solid foods  She denies dysphagia or sensation of food sticking in her throat or chest   She reports significant improvement in reflux symptoms since starting famotidine 40 mg daily  Denies abdominal pain but continues to experience significant bloating which increases after eating and progresses throughout the day    No recent change in bowel habits  Continues to have loose to soft stools daily  Denies melena  No further rectal bleeding  She underwent colonoscopy  06/30/2022 -adenomatous polyp excised, noted to have internal hemorrhoids but no bleeding    Five year recall recommended/2027    Historical Information   Past Medical History:   Diagnosis Date    Angina pectoris ([facility] [address] )     Arnold-Chiari malformation (HCC)     Breast lump     last assessed: Jan 22, 2013     Coronary artery disease     Depression with anxiety     GERD (gastroesophageal reflux disease)     Gout     last assessed: Nov 26, 2012     Hair loss     last assessed: Dec 15, 2016     Hypertension     Hypotension     last assessed: Nov 10, 2014     Mitral valve disorder     Myocardial infarct, old     SVT (supraventricular tachycardia) ([facility] [address] )      Past Surgical History:   Procedure Laterality Date    BREAST BIOPSY Right 02/28/2017    US CORE BIOPSY--BENIGN    CARDIAC CATHETERIZATION      CERVICAL DISCECTOMY      Spinal     CERVICAL LAMINECTOMY      C1 with chiari decompression     COLONOSCOPY      5 yrs - onset: May 31, 2011     CRANIOTOMY      POPLITEAL SYNOVIAL CYST EXCISION      TUBAL LIGATION      US GUIDED BREAST BIOPSY RIGHT COMPLETE Right 02/28/2017     Social History     Substance and Sexual Activity   Alcohol Use Yes    Comment: social      Social History     Substance and Sexual Activity   Drug Use No Social History     Tobacco Use   Smoking Status Former Smoker    Packs/day: 1 50    Years: 36 00    Pack years: 54 00    Types: Cigarettes    Quit date: 2008    Years since quittin 6   Smokeless Tobacco Never Used     Family History   Problem Relation Age of Onset    Stroke Mother     Other Father         blood clot in vein & CABG     Heart disease Father     Prostate cancer Father     Hearing loss Father     Hypertension Father     Alcohol abuse Brother     Throat cancer Brother     Cancer Brother         Throat tongue    Hearing loss Brother     No Known Problems Sister     No Known Problems Daughter     Breast cancer Maternal Grandmother     No Known Problems Daughter     No Known Problems Sister     Crohn's disease Maternal Aunt     No Known Problems Maternal Aunt     No Known Problems Maternal Aunt     Colon cancer Paternal Aunt     No Known Problems Paternal [de-identified]     Colon cancer Son     No Known Problems Paternal Aunt     No Known Problems Paternal Aunt     Colon cancer Paternal Aunt     Cancer Paternal Aunt         Colon cancer    No Known Problems Maternal Grandfather     No Known Problems Paternal Grandmother     No Known Problems Paternal Grandfather     Kidney failure Brother         kidney failure d/t NSIADs on dialysis    Hearing loss Brother          Current Outpatient Medications:     albuterol (ProAir HFA) 90 mcg/act inhaler    ALPRAZolam (XANAX) 0 25 mg tablet    atorvastatin (LIPITOR) 80 mg tablet    benzonatate (TESSALON PERLES) 100 mg capsule    calcium carbonate (OS-FLASH) 600 MG tablet    carvedilol (COREG) 6 25 mg tablet    cholecalciferol (VITAMIN D3) 1,000 units tablet    clopidogrel (PLAVIX) 75 mg tablet    doxycycline hyclate (VIBRAMYCIN) 100 mg capsule    escitalopram (LEXAPRO) 20 mg tablet    famotidine (PEPCID) 40 MG tablet    fluticasone (FLONASE) 50 mcg/act nasal spray    Fluticasone-Salmeterol (Advair Diskus) 100-50 mcg/dose inhaler    gabapentin (NEURONTIN) 400 mg capsule    isosorbide mononitrate (IMDUR) 30 mg 24 hr tablet    losartan (COZAAR) 50 mg tablet    multivitamin (THERAGRAN) TABS    predniSONE 10 mg tablet    dicyclomine (BENTYL) 10 mg capsule    tiZANidine (ZANAFLEX) 4 mg tablet  Allergies   Allergen Reactions    Codeine Itching    Medical Tape Rash     Reviewed medications and allergies and updated as indicated    PHYSICAL EXAM:    Blood pressure 130/84, pulse 66, height 5' 5" (1 651 m), weight 70 3 kg (155 lb)  Body mass index is 25 79 kg/m²  General Appearance: NAD, cooperative, alert  Eyes: Anicteric  ENT:  Normocephalic, atraumatic, normal mucosa  Neck:  Supple, symmetrical, trachea midline  Resp:  Clear to auscultation bilaterally; no rales, rhonchi or wheezing; respirations unlabored   CV:  S1 S2, Regular rate and rhythm; no murmur, rub, or gallop  GI:  Soft, non-tender, non-distended; normal bowel sounds; no masses, no organomegaly   Rectal: Deferred  Musculoskeletal: No cyanosis, clubbing or edema  Normal ROM    Skin:  No jaundice, rashes, or lesions   Psych: Normal affect, good eye contact  Neuro: No gross deficits, AAOx3    Lab Results:  Reviewed  Lab Results   Component Value Date    WBC 5 01 06/24/2021    HGB 13 5 06/24/2021    HCT 41 1 06/24/2021    MCV 97 06/24/2021     06/24/2021     Lab Results   Component Value Date     12/10/2015    K 4 2 03/03/2022     03/03/2022    CO2 27 03/03/2022    ANIONGAP 8 12/10/2015    BUN 19 03/03/2022    CREATININE 1 09 03/03/2022    GLUCOSE 93 12/10/2015    GLUF 113 (H) 03/03/2022    CALCIUM 8 8 03/03/2022    AST 22 08/28/2021    ALT 42 08/28/2021    ALKPHOS 112 08/28/2021    PROT 6 9 12/10/2015    BILITOT 0 65 12/10/2015    EGFR 52 03/03/2022     Lab Results   Component Value Date    IRON 83 06/16/2017    TIBC 333 06/16/2017    FERRITIN 66 06/16/2017

## 2022-08-28 ENCOUNTER — APPOINTMENT (OUTPATIENT)
Dept: RADIOLOGY | Facility: HOSPITAL | Age: 67
DRG: 064 | End: 2022-08-28
Payer: MEDICARE

## 2022-08-28 ENCOUNTER — APPOINTMENT (EMERGENCY)
Dept: CT IMAGING | Facility: HOSPITAL | Age: 67
DRG: 064 | End: 2022-08-28
Payer: MEDICARE

## 2022-08-28 ENCOUNTER — HOSPITAL ENCOUNTER (INPATIENT)
Facility: HOSPITAL | Age: 67
LOS: 3 days | Discharge: HOME | DRG: 064 | End: 2022-08-31
Attending: EMERGENCY MEDICINE | Admitting: INTERNAL MEDICINE
Payer: MEDICARE

## 2022-08-28 DIAGNOSIS — I61.9 ICH (INTRACEREBRAL HEMORRHAGE) (HCC): ICD-10-CM

## 2022-08-28 DIAGNOSIS — R51.9 HEADACHE: ICD-10-CM

## 2022-08-28 DIAGNOSIS — J18.9 COMMUNITY ACQUIRED PNEUMONIA OF RIGHT UPPER LOBE OF LUNG: ICD-10-CM

## 2022-08-28 DIAGNOSIS — R13.10 DYSPHAGIA, UNSPECIFIED TYPE: Chronic | ICD-10-CM

## 2022-08-28 DIAGNOSIS — I60.9 SUBARACHNOID HEMORRHAGE (HCC): Primary | ICD-10-CM

## 2022-08-28 PROBLEM — K60.2 ANAL FISSURE: Status: RESOLVED | Noted: 2018-02-20 | Resolved: 2022-08-28

## 2022-08-28 PROBLEM — G51.39 FACIAL NERVE SPASM: Status: RESOLVED | Noted: 2022-07-21 | Resolved: 2022-08-28

## 2022-08-28 PROBLEM — I10 ACCELERATED HYPERTENSION: Status: ACTIVE | Noted: 2022-08-28

## 2022-08-28 PROBLEM — R65.10 SIRS (SYSTEMIC INFLAMMATORY RESPONSE SYNDROME) (HCC): Status: ACTIVE | Noted: 2022-08-28

## 2022-08-28 PROBLEM — K64.2 THIRD DEGREE HEMORRHOIDS: Status: RESOLVED | Noted: 2018-02-20 | Resolved: 2022-08-28

## 2022-08-28 LAB
2HR DELTA HS TROPONIN: 2 NG/L
4HR DELTA HS TROPONIN: 1 NG/L
ALBUMIN SERPL BCP-MCNC: 2.9 G/DL (ref 3.5–5)
ALP SERPL-CCNC: 119 U/L (ref 46–116)
ALT SERPL W P-5'-P-CCNC: 53 U/L (ref 12–78)
ANION GAP SERPL CALCULATED.3IONS-SCNC: 11 MMOL/L (ref 4–13)
APTT PPP: 25 SECONDS (ref 23–37)
AST SERPL W P-5'-P-CCNC: 23 U/L (ref 5–45)
ATRIAL RATE: 68 BPM
ATRIAL RATE: 78 BPM
BASOPHILS # BLD MANUAL: 0 THOUSAND/UL (ref 0–0.1)
BASOPHILS NFR MAR MANUAL: 0 % (ref 0–1)
BILIRUB SERPL-MCNC: 0.5 MG/DL (ref 0.2–1)
BILIRUB UR QL STRIP: NEGATIVE
BUN SERPL-MCNC: 21 MG/DL (ref 5–25)
CALCIUM ALBUM COR SERPL-MCNC: 10.1 MG/DL (ref 8.3–10.1)
CALCIUM SERPL-MCNC: 9.2 MG/DL (ref 8.3–10.1)
CARDIAC TROPONIN I PNL SERPL HS: 7 NG/L
CARDIAC TROPONIN I PNL SERPL HS: 8 NG/L
CARDIAC TROPONIN I PNL SERPL HS: 9 NG/L
CHLORIDE SERPL-SCNC: 100 MMOL/L (ref 96–108)
CLARITY UR: CLEAR
CO2 SERPL-SCNC: 26 MMOL/L (ref 21–32)
COLOR UR: YELLOW
CREAT SERPL-MCNC: 1.16 MG/DL (ref 0.6–1.3)
EOSINOPHIL # BLD MANUAL: 0 THOUSAND/UL (ref 0–0.4)
EOSINOPHIL NFR BLD MANUAL: 0 % (ref 0–6)
ERYTHROCYTE [DISTWIDTH] IN BLOOD BY AUTOMATED COUNT: 13.5 % (ref 11.6–15.1)
GFR SERPL CREATININE-BSD FRML MDRD: 48 ML/MIN/1.73SQ M
GLUCOSE SERPL-MCNC: 121 MG/DL (ref 65–140)
GLUCOSE UR STRIP-MCNC: NEGATIVE MG/DL
HCT VFR BLD AUTO: 41.1 % (ref 34.8–46.1)
HGB BLD-MCNC: 13.9 G/DL (ref 11.5–15.4)
HGB UR QL STRIP.AUTO: NEGATIVE
INR PPP: 0.91 (ref 0.84–1.19)
KETONES UR STRIP-MCNC: NEGATIVE MG/DL
LACTATE SERPL-SCNC: 1 MMOL/L (ref 0.5–2)
LEUKOCYTE ESTERASE UR QL STRIP: NEGATIVE
LYMPHOCYTES # BLD AUTO: 1.96 THOUSAND/UL (ref 0.6–4.47)
LYMPHOCYTES # BLD AUTO: 14 % (ref 14–44)
MAGNESIUM SERPL-MCNC: 1.9 MG/DL (ref 1.6–2.6)
MCH RBC QN AUTO: 32 PG (ref 26.8–34.3)
MCHC RBC AUTO-ENTMCNC: 33.8 G/DL (ref 31.4–37.4)
MCV RBC AUTO: 95 FL (ref 82–98)
METAMYELOCYTES NFR BLD MANUAL: 1 % (ref 0–1)
MONOCYTES # BLD AUTO: 1.12 THOUSAND/UL (ref 0–1.22)
MONOCYTES NFR BLD: 8 % (ref 4–12)
MYELOCYTES NFR BLD MANUAL: 1 % (ref 0–1)
NEUTROPHILS # BLD MANUAL: 10.66 THOUSAND/UL (ref 1.85–7.62)
NEUTS BAND NFR BLD MANUAL: 1 % (ref 0–8)
NEUTS SEG NFR BLD AUTO: 75 % (ref 43–75)
NITRITE UR QL STRIP: NEGATIVE
P AXIS: 48 DEGREES
P AXIS: 56 DEGREES
PH UR STRIP.AUTO: 7 [PH]
PHOSPHATE SERPL-MCNC: 2 MG/DL (ref 2.3–4.1)
PLATELET # BLD AUTO: 501 THOUSANDS/UL (ref 149–390)
PLATELET BLD QL SMEAR: ABNORMAL
PMV BLD AUTO: 8.5 FL (ref 8.9–12.7)
POTASSIUM SERPL-SCNC: 3.2 MMOL/L (ref 3.5–5.3)
PR INTERVAL: 116 MS
PR INTERVAL: 118 MS
PROCALCITONIN SERPL-MCNC: 0.06 NG/ML
PROT SERPL-MCNC: 7.2 G/DL (ref 6.4–8.4)
PROT UR STRIP-MCNC: NEGATIVE MG/DL
PROTHROMBIN TIME: 12.9 SECONDS (ref 11.6–14.5)
QRS AXIS: 61 DEGREES
QRS AXIS: 65 DEGREES
QRSD INTERVAL: 78 MS
QRSD INTERVAL: 82 MS
QT INTERVAL: 396 MS
QT INTERVAL: 418 MS
QTC INTERVAL: 444 MS
QTC INTERVAL: 451 MS
RBC # BLD AUTO: 4.34 MILLION/UL (ref 3.81–5.12)
RBC MORPH BLD: NORMAL
SODIUM SERPL-SCNC: 137 MMOL/L (ref 135–147)
SP GR UR STRIP.AUTO: 1.01 (ref 1–1.03)
T WAVE AXIS: -12 DEGREES
T WAVE AXIS: 36 DEGREES
UROBILINOGEN UR QL STRIP.AUTO: 0.2 E.U./DL
VENTRICULAR RATE: 68 BPM
VENTRICULAR RATE: 78 BPM
WBC # BLD AUTO: 14.02 THOUSAND/UL (ref 4.31–10.16)

## 2022-08-28 PROCEDURE — 96375 TX/PRO/DX INJ NEW DRUG ADDON: CPT

## 2022-08-28 PROCEDURE — 83735 ASSAY OF MAGNESIUM: CPT | Performed by: NURSE PRACTITIONER

## 2022-08-28 PROCEDURE — 80053 COMPREHEN METABOLIC PANEL: CPT | Performed by: EMERGENCY MEDICINE

## 2022-08-28 PROCEDURE — 99285 EMERGENCY DEPT VISIT HI MDM: CPT

## 2022-08-28 PROCEDURE — 83605 ASSAY OF LACTIC ACID: CPT | Performed by: EMERGENCY MEDICINE

## 2022-08-28 PROCEDURE — 84100 ASSAY OF PHOSPHORUS: CPT | Performed by: NURSE PRACTITIONER

## 2022-08-28 PROCEDURE — 85610 PROTHROMBIN TIME: CPT | Performed by: EMERGENCY MEDICINE

## 2022-08-28 PROCEDURE — 87040 BLOOD CULTURE FOR BACTERIA: CPT | Performed by: EMERGENCY MEDICINE

## 2022-08-28 PROCEDURE — 84484 ASSAY OF TROPONIN QUANT: CPT | Performed by: NURSE PRACTITIONER

## 2022-08-28 PROCEDURE — G1004 CDSM NDSC: HCPCS

## 2022-08-28 PROCEDURE — 93005 ELECTROCARDIOGRAM TRACING: CPT

## 2022-08-28 PROCEDURE — 93010 ELECTROCARDIOGRAM REPORT: CPT | Performed by: INTERNAL MEDICINE

## 2022-08-28 PROCEDURE — 99223 1ST HOSP IP/OBS HIGH 75: CPT | Performed by: NURSE PRACTITIONER

## 2022-08-28 PROCEDURE — 96365 THER/PROPH/DIAG IV INF INIT: CPT

## 2022-08-28 PROCEDURE — 36415 COLL VENOUS BLD VENIPUNCTURE: CPT

## 2022-08-28 PROCEDURE — 84145 PROCALCITONIN (PCT): CPT | Performed by: EMERGENCY MEDICINE

## 2022-08-28 PROCEDURE — 99291 CRITICAL CARE FIRST HOUR: CPT | Performed by: EMERGENCY MEDICINE

## 2022-08-28 PROCEDURE — 94664 DEMO&/EVAL PT USE INHALER: CPT

## 2022-08-28 PROCEDURE — 85730 THROMBOPLASTIN TIME PARTIAL: CPT | Performed by: EMERGENCY MEDICINE

## 2022-08-28 PROCEDURE — 87449 NOS EACH ORGANISM AG IA: CPT | Performed by: NURSE PRACTITIONER

## 2022-08-28 PROCEDURE — 84484 ASSAY OF TROPONIN QUANT: CPT | Performed by: EMERGENCY MEDICINE

## 2022-08-28 PROCEDURE — 87205 SMEAR GRAM STAIN: CPT | Performed by: NURSE PRACTITIONER

## 2022-08-28 PROCEDURE — 71045 X-RAY EXAM CHEST 1 VIEW: CPT

## 2022-08-28 PROCEDURE — 70496 CT ANGIOGRAPHY HEAD: CPT

## 2022-08-28 PROCEDURE — 70450 CT HEAD/BRAIN W/O DYE: CPT

## 2022-08-28 PROCEDURE — 85027 COMPLETE CBC AUTOMATED: CPT | Performed by: EMERGENCY MEDICINE

## 2022-08-28 PROCEDURE — 87070 CULTURE OTHR SPECIMN AEROBIC: CPT | Performed by: NURSE PRACTITIONER

## 2022-08-28 PROCEDURE — 85007 BL SMEAR W/DIFF WBC COUNT: CPT | Performed by: EMERGENCY MEDICINE

## 2022-08-28 PROCEDURE — 94640 AIRWAY INHALATION TREATMENT: CPT

## 2022-08-28 PROCEDURE — 70498 CT ANGIOGRAPHY NECK: CPT

## 2022-08-28 PROCEDURE — 81003 URINALYSIS AUTO W/O SCOPE: CPT | Performed by: EMERGENCY MEDICINE

## 2022-08-28 RX ORDER — CEFTRIAXONE 1 G/50ML
1000 INJECTION, SOLUTION INTRAVENOUS ONCE
Status: COMPLETED | OUTPATIENT
Start: 2022-08-28 | End: 2022-08-28

## 2022-08-28 RX ORDER — POTASSIUM CHLORIDE 20 MEQ/1
40 TABLET, EXTENDED RELEASE ORAL ONCE
Status: COMPLETED | OUTPATIENT
Start: 2022-08-28 | End: 2022-08-28

## 2022-08-28 RX ORDER — HYDROMORPHONE HCL/PF 1 MG/ML
0.5 SYRINGE (ML) INJECTION ONCE
Status: COMPLETED | OUTPATIENT
Start: 2022-08-28 | End: 2022-08-28

## 2022-08-28 RX ORDER — MAGNESIUM SULFATE HEPTAHYDRATE 40 MG/ML
2 INJECTION, SOLUTION INTRAVENOUS ONCE
Status: COMPLETED | OUTPATIENT
Start: 2022-08-28 | End: 2022-08-29

## 2022-08-28 RX ORDER — LABETALOL HYDROCHLORIDE 5 MG/ML
10 INJECTION, SOLUTION INTRAVENOUS ONCE
Status: COMPLETED | OUTPATIENT
Start: 2022-08-28 | End: 2022-08-28

## 2022-08-28 RX ORDER — CEFEPIME HYDROCHLORIDE 2 G/50ML
2000 INJECTION, SOLUTION INTRAVENOUS EVERY 12 HOURS
Status: DISCONTINUED | OUTPATIENT
Start: 2022-08-29 | End: 2022-08-29

## 2022-08-28 RX ORDER — LEVALBUTEROL 1.25 MG/.5ML
1.25 SOLUTION, CONCENTRATE RESPIRATORY (INHALATION)
Status: DISCONTINUED | OUTPATIENT
Start: 2022-08-28 | End: 2022-08-30

## 2022-08-28 RX ADMIN — HYDROMORPHONE HYDROCHLORIDE 0.5 MG: 1 INJECTION, SOLUTION INTRAMUSCULAR; INTRAVENOUS; SUBCUTANEOUS at 20:25

## 2022-08-28 RX ADMIN — LABETALOL HYDROCHLORIDE 10 MG: 5 INJECTION, SOLUTION INTRAVENOUS at 20:22

## 2022-08-28 RX ADMIN — MAGNESIUM SULFATE HEPTAHYDRATE 2 G: 40 INJECTION, SOLUTION INTRAVENOUS at 22:04

## 2022-08-28 RX ADMIN — DESMOPRESSIN ACETATE 21.2 MCG: 4 SOLUTION INTRAVENOUS at 21:21

## 2022-08-28 RX ADMIN — SODIUM CHLORIDE 1000 ML: 0.9 INJECTION, SOLUTION INTRAVENOUS at 19:52

## 2022-08-28 RX ADMIN — IPRATROPIUM BROMIDE 0.5 MG: 0.5 SOLUTION RESPIRATORY (INHALATION) at 23:00

## 2022-08-28 RX ADMIN — IOHEXOL 65 ML: 350 INJECTION, SOLUTION INTRAVENOUS at 21:07

## 2022-08-28 RX ADMIN — VANCOMYCIN HYDROCHLORIDE 1500 MG: 1 INJECTION, POWDER, LYOPHILIZED, FOR SOLUTION INTRAVENOUS at 22:47

## 2022-08-28 RX ADMIN — LEVETIRACETAM 1000 MG: 100 INJECTION, SOLUTION INTRAVENOUS at 23:25

## 2022-08-28 RX ADMIN — CEFTRIAXONE 1000 MG: 1 INJECTION, SOLUTION INTRAVENOUS at 20:07

## 2022-08-28 RX ADMIN — SODIUM CHLORIDE 2.5 MG/HR: 0.9 INJECTION, SOLUTION INTRAVENOUS at 20:41

## 2022-08-28 RX ADMIN — LEVALBUTEROL HYDROCHLORIDE 1.25 MG: 1.25 SOLUTION, CONCENTRATE RESPIRATORY (INHALATION) at 23:00

## 2022-08-28 RX ADMIN — POTASSIUM CHLORIDE 40 MEQ: 1500 TABLET, EXTENDED RELEASE ORAL at 22:02

## 2022-08-28 RX ADMIN — LABETALOL HYDROCHLORIDE 10 MG: 5 INJECTION, SOLUTION INTRAVENOUS at 20:38

## 2022-08-29 ENCOUNTER — APPOINTMENT (OUTPATIENT)
Dept: MRI IMAGING | Facility: HOSPITAL | Age: 67
DRG: 064 | End: 2022-08-29
Payer: MEDICARE

## 2022-08-29 ENCOUNTER — APPOINTMENT (INPATIENT)
Dept: CT IMAGING | Facility: HOSPITAL | Age: 67
DRG: 064 | End: 2022-08-29
Payer: MEDICARE

## 2022-08-29 ENCOUNTER — APPOINTMENT (OUTPATIENT)
Dept: RADIOLOGY | Facility: HOSPITAL | Age: 67
DRG: 064 | End: 2022-08-29
Payer: MEDICARE

## 2022-08-29 ENCOUNTER — EPISODE CHANGES (OUTPATIENT)
Dept: CASE MANAGEMENT | Facility: OTHER | Age: 67
End: 2022-08-29

## 2022-08-29 PROBLEM — R73.01 IFG (IMPAIRED FASTING GLUCOSE): Status: RESOLVED | Noted: 2017-12-19 | Resolved: 2022-08-29

## 2022-08-29 PROBLEM — N18.31 STAGE 3A CHRONIC KIDNEY DISEASE (HCC): Chronic | Status: ACTIVE | Noted: 2021-07-22

## 2022-08-29 PROBLEM — R14.0 ABDOMINAL BLOATING: Status: RESOLVED | Noted: 2021-03-25 | Resolved: 2022-08-29

## 2022-08-29 PROBLEM — K21.9 GERD (GASTROESOPHAGEAL REFLUX DISEASE): Chronic | Status: ACTIVE | Noted: 2019-08-29

## 2022-08-29 PROBLEM — J18.9 COMMUNITY ACQUIRED PNEUMONIA OF RIGHT MIDDLE LOBE OF LUNG: Status: ACTIVE | Noted: 2022-08-29

## 2022-08-29 PROBLEM — H91.21 SUDDEN IDIOPATHIC HEARING LOSS OF RIGHT EAR: Chronic | Status: ACTIVE | Noted: 2017-07-25

## 2022-08-29 PROBLEM — R65.10 SIRS (SYSTEMIC INFLAMMATORY RESPONSE SYNDROME) (HCC): Status: RESOLVED | Noted: 2022-08-28 | Resolved: 2022-08-29

## 2022-08-29 PROBLEM — I20.89 ANGINA OF EFFORT: Status: RESOLVED | Noted: 2022-06-30 | Resolved: 2022-08-29

## 2022-08-29 PROBLEM — M65.4 DE QUERVAIN'S DISEASE (TENOSYNOVITIS): Chronic | Status: ACTIVE | Noted: 2018-08-30

## 2022-08-29 PROBLEM — D12.6 ADENOMATOUS COLON POLYP: Status: RESOLVED | Noted: 2017-06-15 | Resolved: 2022-08-29

## 2022-08-29 PROBLEM — R13.10 DYSPHAGIA: Chronic | Status: ACTIVE | Noted: 2022-08-29

## 2022-08-29 PROBLEM — G51.4 FACIAL TWITCHING: Chronic | Status: ACTIVE | Noted: 2022-07-21

## 2022-08-29 PROBLEM — K86.2 PANCREATIC CYST: Chronic | Status: ACTIVE | Noted: 2019-04-18

## 2022-08-29 PROBLEM — I25.10 CORONARY ARTERY DISEASE: Chronic | Status: ACTIVE | Noted: 2017-06-15

## 2022-08-29 PROBLEM — I20.8 ANGINA OF EFFORT (HCC): Status: RESOLVED | Noted: 2022-06-30 | Resolved: 2022-08-29

## 2022-08-29 PROBLEM — J43.8 OTHER EMPHYSEMA (HCC): Chronic | Status: ACTIVE | Noted: 2019-07-18

## 2022-08-29 LAB
ANION GAP SERPL CALCULATED.3IONS-SCNC: 9 MMOL/L (ref 4–13)
BUN SERPL-MCNC: 19 MG/DL (ref 5–25)
CALCIUM SERPL-MCNC: 8.9 MG/DL (ref 8.3–10.1)
CHLORIDE SERPL-SCNC: 103 MMOL/L (ref 96–108)
CHOLEST SERPL-MCNC: 138 MG/DL
CO2 SERPL-SCNC: 24 MMOL/L (ref 21–32)
CREAT SERPL-MCNC: 1.04 MG/DL (ref 0.6–1.3)
ERYTHROCYTE [DISTWIDTH] IN BLOOD BY AUTOMATED COUNT: 13.6 % (ref 11.6–15.1)
EST. AVERAGE GLUCOSE BLD GHB EST-MCNC: 131 MG/DL
GFR SERPL CREATININE-BSD FRML MDRD: 54 ML/MIN/1.73SQ M
GLUCOSE SERPL-MCNC: 112 MG/DL (ref 65–140)
HBA1C MFR BLD: 6.2 %
HCT VFR BLD AUTO: 38.9 % (ref 34.8–46.1)
HDLC SERPL-MCNC: 56 MG/DL
HGB BLD-MCNC: 12.9 G/DL (ref 11.5–15.4)
L PNEUMO1 AG UR QL IA.RAPID: NEGATIVE
LDLC SERPL CALC-MCNC: 65 MG/DL (ref 0–100)
MAGNESIUM SERPL-MCNC: 2.5 MG/DL (ref 1.6–2.6)
MCH RBC QN AUTO: 32.2 PG (ref 26.8–34.3)
MCHC RBC AUTO-ENTMCNC: 33.2 G/DL (ref 31.4–37.4)
MCV RBC AUTO: 97 FL (ref 82–98)
PHOSPHATE SERPL-MCNC: 2.3 MG/DL (ref 2.3–4.1)
PLATELET # BLD AUTO: 449 THOUSANDS/UL (ref 149–390)
PMV BLD AUTO: 8.6 FL (ref 8.9–12.7)
POTASSIUM SERPL-SCNC: 4.3 MMOL/L (ref 3.5–5.3)
PROCALCITONIN SERPL-MCNC: 0.16 NG/ML
RBC # BLD AUTO: 4.01 MILLION/UL (ref 3.81–5.12)
S PNEUM AG UR QL: NEGATIVE
SARS-COV-2 RNA RESP QL NAA+PROBE: NEGATIVE
SODIUM SERPL-SCNC: 136 MMOL/L (ref 135–147)
TRIGL SERPL-MCNC: 87 MG/DL
WBC # BLD AUTO: 9.02 THOUSAND/UL (ref 4.31–10.16)

## 2022-08-29 PROCEDURE — 82103 ALPHA-1-ANTITRYPSIN TOTAL: CPT | Performed by: PHYSICIAN ASSISTANT

## 2022-08-29 PROCEDURE — G1004 CDSM NDSC: HCPCS

## 2022-08-29 PROCEDURE — A9585 GADOBUTROL INJECTION: HCPCS | Performed by: NURSE PRACTITIONER

## 2022-08-29 PROCEDURE — U0003 INFECTIOUS AGENT DETECTION BY NUCLEIC ACID (DNA OR RNA); SEVERE ACUTE RESPIRATORY SYNDROME CORONAVIRUS 2 (SARS-COV-2) (CORONAVIRUS DISEASE [COVID-19]), AMPLIFIED PROBE TECHNIQUE, MAKING USE OF HIGH THROUGHPUT TECHNOLOGIES AS DESCRIBED BY CMS-2020-01-R: HCPCS | Performed by: PHYSICIAN ASSISTANT

## 2022-08-29 PROCEDURE — 94760 N-INVAS EAR/PLS OXIMETRY 1: CPT

## 2022-08-29 PROCEDURE — U0005 INFEC AGEN DETEC AMPLI PROBE: HCPCS | Performed by: PHYSICIAN ASSISTANT

## 2022-08-29 PROCEDURE — 94640 AIRWAY INHALATION TREATMENT: CPT

## 2022-08-29 PROCEDURE — 70553 MRI BRAIN STEM W/O & W/DYE: CPT

## 2022-08-29 PROCEDURE — 80048 BASIC METABOLIC PNL TOTAL CA: CPT | Performed by: NURSE PRACTITIONER

## 2022-08-29 PROCEDURE — 80061 LIPID PANEL: CPT | Performed by: NURSE PRACTITIONER

## 2022-08-29 PROCEDURE — 71260 CT THORAX DX C+: CPT

## 2022-08-29 PROCEDURE — 87081 CULTURE SCREEN ONLY: CPT | Performed by: PHYSICIAN ASSISTANT

## 2022-08-29 PROCEDURE — 92611 MOTION FLUOROSCOPY/SWALLOW: CPT

## 2022-08-29 PROCEDURE — 84145 PROCALCITONIN (PCT): CPT | Performed by: NURSE PRACTITIONER

## 2022-08-29 PROCEDURE — 99233 SBSQ HOSP IP/OBS HIGH 50: CPT | Performed by: INTERNAL MEDICINE

## 2022-08-29 PROCEDURE — 84100 ASSAY OF PHOSPHORUS: CPT | Performed by: NURSE PRACTITIONER

## 2022-08-29 PROCEDURE — 85027 COMPLETE CBC AUTOMATED: CPT | Performed by: NURSE PRACTITIONER

## 2022-08-29 PROCEDURE — 82104 ALPHA-1-ANTITRYPSIN PHENO: CPT | Performed by: PHYSICIAN ASSISTANT

## 2022-08-29 PROCEDURE — NC001 PR NO CHARGE: Performed by: NURSE PRACTITIONER

## 2022-08-29 PROCEDURE — 99223 1ST HOSP IP/OBS HIGH 75: CPT | Performed by: PSYCHIATRY & NEUROLOGY

## 2022-08-29 PROCEDURE — 83735 ASSAY OF MAGNESIUM: CPT | Performed by: NURSE PRACTITIONER

## 2022-08-29 PROCEDURE — 83036 HEMOGLOBIN GLYCOSYLATED A1C: CPT | Performed by: NURSE PRACTITIONER

## 2022-08-29 PROCEDURE — 74230 X-RAY XM SWLNG FUNCJ C+: CPT

## 2022-08-29 RX ORDER — ATORVASTATIN CALCIUM 40 MG/1
80 TABLET, FILM COATED ORAL
Status: DISCONTINUED | OUTPATIENT
Start: 2022-08-29 | End: 2022-08-31 | Stop reason: HOSPADM

## 2022-08-29 RX ORDER — LABETALOL HYDROCHLORIDE 5 MG/ML
10 INJECTION, SOLUTION INTRAVENOUS EVERY 4 HOURS PRN
Status: DISCONTINUED | OUTPATIENT
Start: 2022-08-29 | End: 2022-08-31 | Stop reason: HOSPADM

## 2022-08-29 RX ORDER — GABAPENTIN 400 MG/1
400 CAPSULE ORAL
Status: DISCONTINUED | OUTPATIENT
Start: 2022-08-29 | End: 2022-08-29

## 2022-08-29 RX ORDER — CEFTRIAXONE 1 G/50ML
1000 INJECTION, SOLUTION INTRAVENOUS EVERY 24 HOURS
Status: DISCONTINUED | OUTPATIENT
Start: 2022-08-29 | End: 2022-08-29

## 2022-08-29 RX ORDER — LOSARTAN POTASSIUM 50 MG/1
50 TABLET ORAL DAILY
Status: DISCONTINUED | OUTPATIENT
Start: 2022-08-29 | End: 2022-08-30

## 2022-08-29 RX ORDER — CEFTRIAXONE 1 G/50ML
1000 INJECTION, SOLUTION INTRAVENOUS EVERY 24 HOURS
Status: DISCONTINUED | OUTPATIENT
Start: 2022-08-29 | End: 2022-08-31 | Stop reason: HOSPADM

## 2022-08-29 RX ORDER — PANTOPRAZOLE SODIUM 40 MG/1
40 TABLET, DELAYED RELEASE ORAL
Status: DISCONTINUED | OUTPATIENT
Start: 2022-08-30 | End: 2022-08-31 | Stop reason: HOSPADM

## 2022-08-29 RX ORDER — TRAMADOL HYDROCHLORIDE 50 MG/1
50 TABLET ORAL EVERY 6 HOURS PRN
Status: DISCONTINUED | OUTPATIENT
Start: 2022-08-29 | End: 2022-08-31 | Stop reason: HOSPADM

## 2022-08-29 RX ORDER — ISOSORBIDE MONONITRATE 30 MG/1
30 TABLET, EXTENDED RELEASE ORAL DAILY
Status: DISCONTINUED | OUTPATIENT
Start: 2022-08-29 | End: 2022-08-31 | Stop reason: HOSPADM

## 2022-08-29 RX ORDER — CARVEDILOL 3.12 MG/1
6.25 TABLET ORAL 2 TIMES DAILY WITH MEALS
Status: DISCONTINUED | OUTPATIENT
Start: 2022-08-29 | End: 2022-08-31 | Stop reason: HOSPADM

## 2022-08-29 RX ORDER — GABAPENTIN 400 MG/1
800 CAPSULE ORAL 2 TIMES DAILY
Status: DISCONTINUED | OUTPATIENT
Start: 2022-08-29 | End: 2022-08-31 | Stop reason: HOSPADM

## 2022-08-29 RX ORDER — ACETAMINOPHEN 325 MG/1
650 TABLET ORAL EVERY 6 HOURS PRN
Status: DISCONTINUED | OUTPATIENT
Start: 2022-08-29 | End: 2022-08-31 | Stop reason: HOSPADM

## 2022-08-29 RX ORDER — METRONIDAZOLE 500 MG/100ML
500 INJECTION, SOLUTION INTRAVENOUS EVERY 8 HOURS
Status: DISCONTINUED | OUTPATIENT
Start: 2022-08-29 | End: 2022-08-31 | Stop reason: HOSPADM

## 2022-08-29 RX ORDER — GUAIFENESIN/DEXTROMETHORPHAN 100-10MG/5
10 SYRUP ORAL EVERY 4 HOURS PRN
Status: DISCONTINUED | OUTPATIENT
Start: 2022-08-29 | End: 2022-08-29

## 2022-08-29 RX ORDER — ESCITALOPRAM OXALATE 10 MG/1
10 TABLET ORAL DAILY
Status: DISCONTINUED | OUTPATIENT
Start: 2022-08-29 | End: 2022-08-31 | Stop reason: HOSPADM

## 2022-08-29 RX ORDER — HYDRALAZINE HYDROCHLORIDE 20 MG/ML
10 INJECTION INTRAMUSCULAR; INTRAVENOUS EVERY 4 HOURS PRN
Status: DISCONTINUED | OUTPATIENT
Start: 2022-08-29 | End: 2022-08-31 | Stop reason: HOSPADM

## 2022-08-29 RX ADMIN — METRONIDAZOLE 500 MG: 500 INJECTION, SOLUTION INTRAVENOUS at 15:15

## 2022-08-29 RX ADMIN — IPRATROPIUM BROMIDE 0.5 MG: 0.5 SOLUTION RESPIRATORY (INHALATION) at 07:23

## 2022-08-29 RX ADMIN — METRONIDAZOLE 500 MG: 500 INJECTION, SOLUTION INTRAVENOUS at 08:15

## 2022-08-29 RX ADMIN — LABETALOL HYDROCHLORIDE 10 MG: 5 INJECTION, SOLUTION INTRAVENOUS at 07:36

## 2022-08-29 RX ADMIN — GABAPENTIN 800 MG: 400 CAPSULE ORAL at 11:31

## 2022-08-29 RX ADMIN — CARVEDILOL 6.25 MG: 3.12 TABLET, FILM COATED ORAL at 07:48

## 2022-08-29 RX ADMIN — CARVEDILOL 6.25 MG: 3.12 TABLET, FILM COATED ORAL at 16:30

## 2022-08-29 RX ADMIN — ESCITALOPRAM OXALATE 10 MG: 10 TABLET ORAL at 08:02

## 2022-08-29 RX ADMIN — Medication 1 TABLET: at 16:30

## 2022-08-29 RX ADMIN — ATORVASTATIN CALCIUM 80 MG: 40 TABLET, FILM COATED ORAL at 16:30

## 2022-08-29 RX ADMIN — ISOSORBIDE MONONITRATE 30 MG: 30 TABLET, EXTENDED RELEASE ORAL at 08:02

## 2022-08-29 RX ADMIN — LEVETIRACETAM 500 MG: 500 INJECTION, SOLUTION INTRAVENOUS at 21:40

## 2022-08-29 RX ADMIN — IPRATROPIUM BROMIDE 0.5 MG: 0.5 SOLUTION RESPIRATORY (INHALATION) at 13:50

## 2022-08-29 RX ADMIN — ACETAMINOPHEN 325MG 650 MG: 325 TABLET ORAL at 15:14

## 2022-08-29 RX ADMIN — LEVETIRACETAM 500 MG: 500 INJECTION, SOLUTION INTRAVENOUS at 08:15

## 2022-08-29 RX ADMIN — IPRATROPIUM BROMIDE 0.5 MG: 0.5 SOLUTION RESPIRATORY (INHALATION) at 02:18

## 2022-08-29 RX ADMIN — GABAPENTIN 800 MG: 400 CAPSULE ORAL at 21:51

## 2022-08-29 RX ADMIN — LEVALBUTEROL HYDROCHLORIDE 1.25 MG: 1.25 SOLUTION, CONCENTRATE RESPIRATORY (INHALATION) at 13:50

## 2022-08-29 RX ADMIN — TRAMADOL HYDROCHLORIDE 50 MG: 50 TABLET, COATED ORAL at 17:41

## 2022-08-29 RX ADMIN — Medication 1 TABLET: at 07:48

## 2022-08-29 RX ADMIN — LEVALBUTEROL HYDROCHLORIDE 1.25 MG: 1.25 SOLUTION, CONCENTRATE RESPIRATORY (INHALATION) at 07:23

## 2022-08-29 RX ADMIN — LOSARTAN POTASSIUM 50 MG: 50 TABLET, FILM COATED ORAL at 08:02

## 2022-08-29 RX ADMIN — GUAIFENESIN 200 MG: 200 SOLUTION ORAL at 07:48

## 2022-08-29 RX ADMIN — GUAIFENESIN 200 MG: 200 SOLUTION ORAL at 21:03

## 2022-08-29 RX ADMIN — GADOBUTROL 7 ML: 604.72 INJECTION INTRAVENOUS at 10:40

## 2022-08-29 RX ADMIN — ACETAMINOPHEN 325MG 650 MG: 325 TABLET ORAL at 21:03

## 2022-08-29 RX ADMIN — LEVALBUTEROL HYDROCHLORIDE 1.25 MG: 1.25 SOLUTION, CONCENTRATE RESPIRATORY (INHALATION) at 19:27

## 2022-08-29 RX ADMIN — LABETALOL HYDROCHLORIDE 10 MG: 5 INJECTION, SOLUTION INTRAVENOUS at 21:03

## 2022-08-29 RX ADMIN — LABETALOL HYDROCHLORIDE 10 MG: 5 INJECTION, SOLUTION INTRAVENOUS at 13:24

## 2022-08-29 RX ADMIN — IPRATROPIUM BROMIDE 0.5 MG: 0.5 SOLUTION RESPIRATORY (INHALATION) at 19:27

## 2022-08-29 RX ADMIN — IOHEXOL 85 ML: 350 INJECTION, SOLUTION INTRAVENOUS at 11:20

## 2022-08-29 RX ADMIN — FLUTICASONE FUROATE AND VILANTEROL TRIFENATATE 1 PUFF: 100; 25 POWDER RESPIRATORY (INHALATION) at 08:16

## 2022-08-29 RX ADMIN — CEFTRIAXONE 1000 MG: 1 INJECTION, SOLUTION INTRAVENOUS at 20:57

## 2022-08-29 RX ADMIN — HYDRALAZINE HYDROCHLORIDE 10 MG: 20 INJECTION, SOLUTION INTRAMUSCULAR; INTRAVENOUS at 15:17

## 2022-08-29 RX ADMIN — LEVALBUTEROL HYDROCHLORIDE 1.25 MG: 1.25 SOLUTION, CONCENTRATE RESPIRATORY (INHALATION) at 02:18

## 2022-08-29 RX ADMIN — AZITHROMYCIN MONOHYDRATE 500 MG: 500 INJECTION, POWDER, LYOPHILIZED, FOR SOLUTION INTRAVENOUS at 07:36

## 2022-08-29 NOTE — QUICK NOTE
Received TT from [facility] regarding patient p/w headache that started this after noon, permanently right frontal   History of high blood pressure, reportedly controlled  Recent history of pneumonia on antibiotics but progressed  History of spinal tumor none specific  History of Chiari malformation status post decompression 2000  CAD and Hx of taktsubo Patient's highest blood pressure at presentation  Was 241/110  No history of fall,  taking Plavix for CAD, DDAVP given  Per provider patient GCS 15 and neurological early non-focal     Imagings:   1  CT head without contrast on 08/28/2022 demonstrate right parietal parenchymal hemorrhage associated with subarachnoid hemorrhage  2  CTA head and neck with and without contrast done pending official report  3  Recommend MRI brain w/wo contrast-seems there is underlying mass    Plan:  1  Neuro checks Q 1-2 hours  Get CT head if GCS drops 2pts/1H  2  SBP<140-160  3  Repeat CT head in 6-12 hours  4  No AC/AP  No DVT PPx until repeat CT head in the morning shows stable or decreased SAH  5  MRI of brain with and without contrast  6  Consider seizure prophylaxis- Keppra  7  Head of bed >30-45 degrees  8   Will review the images in the morning

## 2022-08-29 NOTE — ASSESSMENT & PLAN NOTE
· Presented to the ER with HTN, max 231/110  · Patient reports HTN noted at 1430 today at home, denies missing doses of her home medications   · In ER, labetalol IV x2 without improvement, started on Cardene gtt for strict BP control   · Cont Cardene gtt for goal SBP <140-160 per neurosurgery

## 2022-08-29 NOTE — ASSESSMENT & PLAN NOTE
· Presented on day of admission; tachypnea & leukocytosis   · CXR 8/28 concerning for RML infiltrate   · Patient with 1 week of fevers, increased cough, sob at home and was prescribed doxycycline and prednisone for poss pneumonia by PCP (8/22/2022)  · Patient has past medical history of recurrent aspiration and is following with GI as OP, recent esophageal dilation however patient still reports coughing and choking with thin liquids and some solids   · ST consult pending  · LA 1 0, procal 0 06 on admission  · BC x2, strep, legionella, sputum cx:  pending   · Ceftriaxone/Azithromyacin IV for now, MRSA swab pending   · O2 NC to keep O2 sat > 92%

## 2022-08-29 NOTE — ASSESSMENT & PLAN NOTE
· Presented on day of admission; tachypnea & leukocytosis   · CXR 8/28 concerning for RML infiltrate   · Patient with 1 week of fevers, increased cough, sob at home and was prescribed doxycycline and prednisone for poss pneumonia by PCP (8/22/2022)  · Patient has past medical history of recurrent aspiration and is following with GI as OP, recent esophageal dilation however patient still reports coughing and choking with thin liquids and some solids   · ST consult pending  · LA 1 0, procal 0 06 on admission  · BC x2, strep, legionella, sputum cx:  pending   · Ceftriaxone/Azithromyacin IV for now, MRSA swab pending   · T/C adding Flagyl in setting of recent/progressive dysphagia  · O2 NC to keep O2 sat > 92%

## 2022-08-29 NOTE — ASSESSMENT & PLAN NOTE
· On day of admission, 8/28/2022, patient reported suddenly feeling unwell at 1230 while preparing to grocery shop, then started with sudden onset HA at 1430, accompanied by HTN at home and presented to the ER   · 8/28 CTH:  Right parietal parenchymal hemorrhage associated with subarachnoid hemorrhage  · 8/28 DDAVP x1 (Home Plavix)  · 8/28 CTA head/neck: short segment of severe stenosis noted at the distal right cervical ICA segment  Mild to moderate atherosclerotic plaquing of both carotid bulbs causing less than 50% stenosis  Moderate to severe stenosis at the origin of the left vertebral artery      · Etiology poss 2/2 increase in cough in the last week per hx from patient, HTN vs underlying mass  · Patient denies hx of previous bleeds, however states this is not the worst HA she has ever experienced, as she has a hx of Chiari malformation s/p surgical intervention in 2000, on chronic Neurontin   · Patient had been referred to [address] [place] [address] Neurology 7/2022 secondary to "facial twitching"- work-up ongoing/not completed  · EEG pending  · 8/29 MRI brain: pending  · Scheduled neuro exams   · Patient discussed with Dr Aleyda Medina ([facility]) at time of admission   · Neurosurgery contacted/consulted  · Neurology contacted/consulted  · Keppra for seizure ppx per neurosx -- 1g load, cont 500mg IV q12h  · No need for long term seizure ppx   · Consider just 7 days dosing   · SBP target 120-160  · Focus on tight BP control   · Cardene gtt weaned off 8/28-8/29 overnight  · Labetolol 10 mg IV q 4 prn  · 8/29 Home Cozaar, Coreg, and Imdur restarted

## 2022-08-29 NOTE — ASSESSMENT & PLAN NOTE
Lab Results   Component Value Date    EGFR 48 08/28/2022    EGFR 52 03/03/2022    EGFR 45 08/28/2021    CREATININE 1 16 08/28/2022    CREATININE 1 09 03/03/2022    CREATININE 1 23 08/28/2021     · Baseline creat appears to be 1 1-1 3  · Admission creat 1 16  · Currently at baseline     · Avoid hypotension, nephrotoxic agents   · Trend UO, creat closely

## 2022-08-29 NOTE — ASSESSMENT & PLAN NOTE
· Today (8/28) patient reports suddenly feeling unwell at 1230 while preparing to grocery shop, then started with sudden onset HA at 1430, accompanied by HTN at home and presented to the ER   · [facility] 8/28 -- Right parietal parenchymal hemorrhage associated with subarachnoid hemorrhage  · Etiology poss 2/2 increase in cough in the last week per hx from patient, HTN vs underlying mass  · CTA head/neck pending   · MRI head wwo pending   · DDAVP x1 8/28 for home plavix   · Scheduled neuro exams   · [facility] Nevaeh [facility]ares [facility] aware of patient   · Neurosurgery AP on call Ernestene Holter aware via TT   · Neurology on call Dr Robinson Cantor aware via TT   · Hold home plavix, anticoagulation  · Patient denies hx of previous bleeds, however states this is not the worst HA she has ever experienced, as she has a hx of Chiari malformation s/p surgical intervention in 2000, on chronic Neurontin   · Keppra for seizure ppx per neurosx -- 1g load, cont 500mg IV q12h

## 2022-08-29 NOTE — ASSESSMENT & PLAN NOTE
· Presented to the ER with HTN emegency, in setting of ICH/SAH (Max 231/110)  · In ER, labetalol IV x2 without improvement, started on Cardene gtt for strict BP control   · SBP target 120-160  · Cardene gtt weaned off 8/28-8/29 overnight  · Labetolol 10 mg IV q 4 prn  · 8/29 Home Cozaar, Coreg, and Imdur restarted

## 2022-08-29 NOTE — PROCEDURES
Video Swallow Study      Patient Name: Los Flores  [id number]'[id number] Date: 8/29/2022        Past Medical History  Past Medical History:   Diagnosis Date    [facility] pectoris ([facility]     Arnold-Chiari malformation ([facility] )     Breast lump     last assessed: Jan 22, 2013     Coronary artery disease     Depression with anxiety     GERD (gastroesophageal reflux disease)     Gout     last assessed: Nov 26, 2012     Hair loss     last assessed: Dec 15, 2016     Hypertension     Hypotension     last assessed: Nov 10, 2014     Mitral valve disorder     Myocardial infarct, old     SVT (supraventricular tachycardia) ([facility] )         Past Surgical History  Past Surgical History:   Procedure Laterality Date    BREAST BIOPSY Right 02/28/2017    US CORE BIOPSY--BENIGN    CARDIAC CATHETERIZATION      CERVICAL DISCECTOMY      Spinal     CERVICAL LAMINECTOMY      C1 with chiari decompression     COLONOSCOPY      5 yrs - onset: May 31, 2011     CRANIOTOMY      POPLITEAL SYNOVIAL CYST EXCISION      TUBAL LIGATION      US GUIDED BREAST BIOPSY RIGHT COMPLETE Right 02/28/2017         Video Barium Swallow Study    Summary:  Images are on PACS for review  Oral stage: Appears WNL, with prompt mastication, bolus formation and transfer, good oral control of the bolus, and no significant oral residue or pocketing  Pharyngeal stage: Appears WFL  Epiglottis appears somewhat small in size, with reduced inversion intermittently, and reduced hyolaryngeal elevation and anterior motion, however these do not appear to effect the safety of the swallow  There was minimal transient penetration of thin barium on the first two sips, but no further penetration on further trials of thin barium  There was no penetration or aspiration on any other consistencies  Min/mild residue with thin liquids noted on BOT, vallecula, and PPW, but cleared with a dry swallow       Per gross esophageal screen: Brief scan of the esophagus is suggestive of dysmotility/slow motility, retropulsion/reflux, and what appeared to be esophageal spasms in the lower third of the esophagus  Pt is at elevated risk for bottom up aspiration, and would benefit from esophagram for further assessment of the esophagus  Recommendations:  Diet: Regular  Liquids: Thin  Meds: Whole with water  Strategies: Frequent dry swallows  Frequent oral care  Upright position  F/u ST tx: Yes  Therapy Prognosis: Good  Prognosis considerations: Age, cognition, motivation  Intermittent Supervision  Aspiration Precautions  Reflux Precautions, including remaining upright at least 45 minutes after meals, HOB elevated while sleeping, avoid eating 2-3 hours before bed  Consider consult with: GI  Results reviewed with: pt, nursing, PA    Repeat VBS as necessary  Recommend esophagram/barium swallow to further assess the esophagus  Pt had EGD in May  Goals:  1  Pt will tolerate regular diet and thin liquids without overt s/s of aspiration across all meals and snacks  2  Pt will use compensatory swallow strategies to improve safety of swallow and reduced aspiration risk with min cues across all meals and snacks  3  Pt will tolerate least restrictive diet w/out s/s aspiration or oral/pharyngeal difficulties  Patient's goal:  Pt wants to find out why she is choking/coughing  Pt is a 68 y/o female admitted with SAH  She has had ongoing dysphagia PTA  Pt c/o globus sensation, coughing/choking with food/liquid, and even her own saliva  She is following with GI physician, who recently ordered an esophagram, which has not yet been completed  She had an EGD in May 2022  She has a hx of GERD, which she feels is well controlled with meds  CXR shows pneumonia  Pt reports hx of dysphagia when she had ACDF approximately 7 years ago; she received dysphagia tx at that time, with return to normal swallow  Dysphagia has now returned           Special Studies:  CXR-  1  Right upper lobe pneumonia  There may be some areas of cavitation within  2   Severe emphysema  CT head-  Right parietal parenchymal hemorrhage associated with subarachnoid hemorrhage  Evaluation with MRI brain should be obtained to assess if this represents a hemorrhagic infarct and exclude an underlying mass  There was no vascular abnormality at this level on prior MRI  EGD May 2022-  IMPRESSION:  Mild-to-moderate antral gastritis  Slight irregularity at Z-line  Biopsies obtained to assess for EOE, Doty's, H pylori and celiac  Swartz dilatation empirically performed for treatment of dysphagia     RECOMMENDATION:  There is no recommended follow-up for this procedure  Reflux diet and precautions  Resume usual medications  Trial of famotidine 40 mg daily  GI office follow-up with nurse practitioner in 1-2 months as planned         Previous VBS:  None        Does the pt have pain? L arm when raising to drink  If yes, was nursing made aware/was it addressed? Nurse in room      Precautions:  Aspiration, Fall    Food Allergies:  No    Current Diet:   NPO    Premorbid diet:  Regular, thin    Dentition:  Natural, adequate, but some missing    O2 requirement:  NC    Oral mech:  Strength and ROM: Mildly reduced lingual ROM, strength, coordination    Vocal Quality/Speech:  WFL    Cognitive status:  Able to follow commands and answer questions appropriately      Consistencies administered: Puree, soft solid, hard solid, honey thick liquid, nectar thick liquid, thin liquid, barium tablet with thin water  Liquids were administered/taken by straw/cup/tsp     Pt was viewed seated laterally at 90 degrees  Oral stage:  WNL  Lip closure: adequate  Mastication: prompt, adequate rotary motion  Bolus formation: prompt, cohesive  Bolus control: adequate  Transfer: prompt  Residue: no    Pharyngeal stage:   WNL  Swallow promptness: prompt  Spill to valleculae: no  Spill to pyriforms: no  Epiglottic inversion: somewhat reduced, epiglottis appears small in size  Laryngeal excursion: reduced elevation and anterior motion  Pharyngeal constriction: mildly reduced  Vallecular retention: mild, with thin liquids  Pyriform retention: no  PPW coating: mild, thin liquids  Osteophytes: no  CP prominence: there does appear to be a small CP bar  Retropulsion from prominence: no  Transient penetration: observed brief, shallow with the first two sips of thin barium by cup, but no further penetration with multiple other sips  Epiglottic undercoat: no  Penetration: transient/shallow, thin barium, x2  Aspiration: no  Strategies: dry swallow cleared mild pharyngeal residue with thin liquids  Response to aspiration: N/A    Screening of Esophageal stage:  A brief scan of the esophagus revealed dysmotility/slow motility, retropulsion, possible reflux, with slow movement of the bolus into the esophagus  The barium tablet moved quickly into the stomach  Observed what appeared to be esophageal spasms in the lower third portion of the esophagus

## 2022-08-29 NOTE — ED NOTES
Pt reports increased pain in head  Pt blood pressure 233/100  Provider notified   No new orders at this time     Romeo Chaudhary RN  08/28/22 2012

## 2022-08-29 NOTE — OCCUPATIONAL THERAPY NOTE
Occupational Therapy Cx Note     Patient Name: Terrell HOWARD Date: 8/29/2022  Problem List  Principal Problem:    Subarachnoid hemorrhage ([facility] )  Active Problems:    Dyslipidemia    Coronary artery disease    Emphysema    GERD (gastroesophageal reflux disease)    Stage 3a chronic kidney disease ([facility] )    Facial twitching    Accelerated hypertension    Community acquired pneumonia of right upper lobe of lung    Dysphagia            08/29/22 1435   OT Last Visit   OT Visit Date 08/29/22   Note Type   Note type Cancelled Session   Cancel Reasons Medical status   Additional Comments OT orders received  Per MRI: Stable subarachnoid hemorrhage within the right parieto-occipital region with no underlying mass or abnormal enhancement  RN requesting to hold OT and follow up as appropriate with neuro recommendations  Will follow up as schedule allows and pt appropriate       Verdie Repress, OTR/L

## 2022-08-29 NOTE — ASSESSMENT & PLAN NOTE
· Today (8/28) patient reports suddenly feeling unwell at 1230 while preparing to grocery shop, then started with sudden onset HA at 1430, accompanied by HTN at home and presented to the ER   · Labetalol x2 without BP improvement, and was started on Cardene gtt as above   · [facility] 8/28 -- Right parietal parenchymal hemorrhage associated with subarachnoid hemorrhage  · Etiology poss 2/2 increase in cough in the last week per hx from patient, HTN vs underlying mass  · CTA head/neck pending   · Cont Cardene for goal -160 per neurosurgery   · DDAVP x1 8/28 for home plavix   · Neurologically intact on admission, GCS 15, nonfocal on exam   · Continue scheduled neuro exams   · NeuroCritical Care Dr Marcell Huizar at [facility] aware of patient   · Neurosurgery AP on call Phill Saldivar aware via TT   · Neurology on call Dr Shruthi Cartagena aware via TT   · Hold home plavix, anticoagulation  · Patient denies hx of previous bleeds, however states this is not the worst HA she has ever experienced, as she has a hx of Chiari malformation s/p surgical intervention in 2000, on chronic Neurontin   · MRI ordered per Neurosx   · Appreciate guidance of above consulting services

## 2022-08-29 NOTE — PLAN OF CARE
Problem: Potential for Falls  Goal: Patient will remain free of falls  Description: INTERVENTIONS:  - Educate patient/family on patient safety including physical limitations  - Instruct patient to call for assistance with activity   - Consult OT/PT to assist with strengthening/mobility   - Keep Call bell within reach  - Keep bed low and locked with side rails adjusted as appropriate  - Keep care items and personal belongings within reach  - Initiate and maintain comfort rounds    - Apply yellow socks and bracelet for high fall risk patients  - Consider moving patient to room near nurses station  Outcome: Progressing     Problem: MOBILITY - ADULT  Goal: Maintain or return to baseline ADL function  Description: INTERVENTIONS:  -  Assess patient's ability to carry out ADLs; assess patient's baseline for ADL function and identify physical deficits which impact ability to perform ADLs (bathing, care of mouth/teeth, toileting, grooming, dressing, etc )  - Assess/evaluate cause of self-care deficits   - Assess range of motion  - Assess patient's mobility; develop plan if impaired  - Assess patient's need for assistive devices and provide as appropriate  - Encourage maximum independence but intervene and supervise when necessary  - Involve family in performance of ADLs  - Assess for home care needs following discharge   - Consider OT consult to assist with ADL evaluation and planning for discharge  - Provide patient education as appropriate  Outcome: Progressing  Goal: Maintains/Returns to pre admission functional level  Description: INTERVENTIONS:  - Perform BMAT or MOVE assessment daily    - Set and communicate daily mobility goal to care team and patient/family/caregiver     - Collaborate with rehabilitation services on mobility goals if consulted    - Out of bed for toileting  - Record patient progress and toleration of activity level   Outcome: Progressing     Problem: PAIN - ADULT  Goal: Verbalizes/displays adequate comfort level or baseline comfort level  Description: Interventions:  - Encourage patient to monitor pain and request assistance  - Assess pain using appropriate pain scale  - Administer analgesics based on type and severity of pain and evaluate response  - Implement non-pharmacological measures as appropriate and evaluate response  - Consider cultural and social influences on pain and pain management  - Notify physician/advanced practitioner if interventions unsuccessful or patient reports new pain  Outcome: Progressing     Problem: INFECTION - ADULT  Goal: Absence or prevention of progression during hospitalization  Description: INTERVENTIONS:  - Assess and monitor for signs and symptoms of infection  - Monitor lab/diagnostic results  - Monitor all insertion sites, i e  indwelling lines, tubes, and drains  - Monitor endotracheal if appropriate and nasal secretions for changes in amount and color  - [place] appropriate cooling/warming therapies per order  - Administer medications as ordered  - Instruct and encourage patient and family to use good hand hygiene technique  - Identify and instruct in appropriate isolation precautions for identified infection/condition  Outcome: Progressing  Goal: Absence of fever/infection during neutropenic period  Description: INTERVENTIONS:  - Monitor WBC    Outcome: Progressing     Problem: SAFETY ADULT  Goal: Patient will remain free of falls  Description: INTERVENTIONS:  - Educate patient/family on patient safety including physical limitations  - Instruct patient to call for assistance with activity   - Consult OT/PT to assist with strengthening/mobility   - Keep Call bell within reach  - Keep bed low and locked with side rails adjusted as appropriate  - Keep care items and personal belongings within reach  - Initiate and maintain comfort rounds  - Make Fall Risk Sign visible to staff    - Apply yellow socks and bracelet for high fall risk patients  - Consider moving patient to room near nurses station  Outcome: Progressing  Goal: Maintain or return to baseline ADL function  Description: INTERVENTIONS:  -  Assess patient's ability to carry out ADLs; assess patient's baseline for ADL function and identify physical deficits which impact ability to perform ADLs (bathing, care of mouth/teeth, toileting, grooming, dressing, etc )  - Assess/evaluate cause of self-care deficits   - Assess range of motion  - Assess patient's mobility; develop plan if impaired  - Assess patient's need for assistive devices and provide as appropriate  - Encourage maximum independence but intervene and supervise when necessary  - Involve family in performance of ADLs  - Assess for home care needs following discharge   - Consider OT consult to assist with ADL evaluation and planning for discharge  - Provide patient education as appropriate  Outcome: Progressing  Goal: Maintains/Returns to pre admission functional level  Description: INTERVENTIONS:  - Perform BMAT or MOVE assessment daily    - Set and communicate daily mobility goal to care team and patient/family/caregiver     - Collaborate with rehabilitation services on mobility goals if consulted    - Out of bed for toileting  - Record patient progress and toleration of activity level   Outcome: Progressing     Problem: DISCHARGE PLANNING  Goal: Discharge to home or other facility with appropriate resources  Description: INTERVENTIONS:  - Identify barriers to discharge w/patient and caregiver  - Arrange for needed discharge resources and transportation as appropriate  - Identify discharge learning needs (meds, wound care, etc )  - Arrange for interpretive services to assist at discharge as needed  - Refer to Case Management Department for coordinating discharge planning if the patient needs post-hospital services based on physician/advanced practitioner order or complex needs related to functional status, cognitive ability, or social support system  Outcome: Progressing     Problem: Knowledge Deficit  Goal: Patient/family/caregiver demonstrates understanding of disease process, treatment plan, medications, and discharge instructions  Description: Complete learning assessment and assess knowledge base  Interventions:  - Provide teaching at level of understanding  - Provide teaching via preferred learning methods  Outcome: Progressing     Problem: Neurological Deficit  Goal: Neurological status is stable or improving  Description: Interventions:  - Monitor and assess patient's level of consciousness, motor function, sensory function, and level of assistance needed for ADLs  - Monitor and report changes from baseline  Collaborate with interdisciplinary team to initiate plan and implement interventions as ordered  - Provide and maintain a safe environment  - Consider seizure precautions  - Consider fall precautions  - Consider aspiration precautions  - Consider bleeding precautions  Outcome: Progressing     Problem: Activity Intolerance/Impaired Mobility  Goal: Mobility/activity is maintained at optimum level for patient  Description: Interventions:  - Assess and monitor patient  barriers to mobility and need for assistive/adaptive devices  - Assess patient's emotional response to limitations  - Collaborate with interdisciplinary team and initiate plans and interventions as ordered  - Encourage independent activity per ability   - Maintain proper body alignment  - Perform active/passive rom as tolerated/ordered  - Plan activities to conserve energy   - Turn patient as appropriate  Outcome: Progressing     Problem: Communication Impairment  Goal: Ability to express needs and understand communication  Description: Assess patient's communication skills and ability to understand information  Patient will demonstrate use of effective communication techniques, alternative methods of communication and understanding even if not able to speak       - Encourage communication and provide alternate methods of communication as needed  - Collaborate with case management/ for discharge needs  - Include patient/family/caregiver in decisions related to communication  Outcome: Progressing     Problem: Potential for Aspiration  Goal: Non-ventilated patient's risk of aspiration is minimized  Description: Assess and monitor vital signs, respiratory status, and labs (WBC)  Monitor for signs of aspiration (tachypnea, cough, rales, wheezing, cyanosis, fever)  - Assess and monitor patient's ability to swallow  - Place patient up in chair to eat if possible  - HOB up at 90 degrees to eat if unable to get patient up into chair   - Supervise patient during oral intake  - Instruct patient/ family to take small bites  - Instruct patient/ family to take small single sips when taking liquids  - Follow patient-specific strategies generated by speech pathologist   Outcome: Progressing  Goal: Ventilated patient's risk of aspiration is minimized  Description: Assess and monitor vital signs, respiratory status, airway cuff pressure, and labs (WBC)  Monitor for signs of aspiration (tachypnea, cough, rales, wheezing, cyanosis, fever)  - Elevate head of bed 30 degrees if patient has tube feeding   - Monitor tube feeding  Outcome: Progressing     Problem: Nutrition  Goal: Nutrition/Hydration status is improving  Description: Monitor and assess patient's nutrition/hydration status for malnutrition (ex- brittle hair, bruises, dry skin, pale skin and conjunctiva, muscle wasting, smooth red tongue, and disorientation)  Collaborate with interdisciplinary team and initiate plan and interventions as ordered  Monitor patient's weight and dietary intake as ordered or per policy  Utilize nutrition screening tool and intervene per policy  Determine patient's food preferences and provide high-protein, high-caloric foods as appropriate       - Assist patient with eating   - Allow adequate time for meals   - Encourage patient to take dietary supplement as ordered  - Collaborate with clinical nutritionist   - Include patient/family/caregiver in decisions related to nutrition    Outcome: Progressing

## 2022-08-29 NOTE — ASSESSMENT & PLAN NOTE
· Home regimen: flonase, advair 100-50 bid, albuterol prn  · Cont breo (advair nonformulary) while IP   · Scheduled xopenex, atrovent q6h  · Former smoker -- quit 14 years ago  · Does not follow with pulmonologist   · PFT 7/7/2019 --   · FEV1/FVC Ratio: 62 %  · Forced Vital Capacity: 2 41 L    74 % predicted  · FEV1: 1 49 L     60 % predicted  · Goal spo2 > 90%

## 2022-08-29 NOTE — ASSESSMENT & PLAN NOTE
Patient recently saw Dr Ledy Caro on 07/21/2022 for episodic right facial twitching and muscle spasms  Patient also has chronic bilateral neurosensory hearing loss, and there was a concern for focal motor seizure vs potential lesion on the 7th cranial nerve  It was recommended that she has an MRI brain and EEG completed, (both of which had not been done prior to admission to [address] [place] [address])  Patient did not want to start treatment with medications until testing was completed     - MRI brain completed inpatient and unrevealing  - Outpatient EEG pending  - Of note, patient is chronically on Gabapentin

## 2022-08-29 NOTE — H&P
[id number] 1955, 69 y o  female MRN: [id number]  Unit/Bed#: -01 Encounter: [id number]  Primary Care Provider: Harvey Banks DO   Date and time admitted to hospital: 8/28/2022  6:13 PM    * Subarachnoid hemorrhage [facility])  Assessment & Plan  · Today (8/28) patient reports suddenly feeling unwell at 1230 while preparing to grocery shop, then started with sudden onset HA at 1430, accompanied by HTN at home and presented to the ER   · Labetalol x2 without BP improvement, and was started on Cardene gtt as above   · [facility] 8/28 -- Right parietal parenchymal hemorrhage associated with subarachnoid hemorrhage  · Etiology poss 2/2 increase in cough in the last week per hx from patient, HTN vs underlying mass  · CTA head/neck pending   · Cont Cardene for goal -160 per neurosurgery   · DDAVP x1 8/28 for home plavix   · Neurologically intact on admission, GCS 15, nonfocal on exam   · Continue scheduled neuro exams   · NeuroCritical Care Dr Belgica Clancy at [facility] aware of patient   · Neurosurgery AP on call Mandie Gaona aware via TT   · Neurology on call Dr Marin Taylor aware via TT   · Hold home plavix, anticoagulation  · Patient denies hx of previous bleeds, however states this is not the worst HA she has ever experienced, as she has a hx of Chiari malformation s/p surgical intervention in 2000, on chronic Neurontin   · MRI ordered per Neurosx   · Appreciate guidance of above consulting services     Accelerated hypertension  Assessment & Plan  · Presented to the ER with HTN, max 231/110  · Patient reports HTN noted at 1430 today at home, denies missing doses of her home medications   · In ER, labetalol IV x2 without improvement, started on Cardene gtt for strict BP control   · Cont Cardene gtt for goal SBP <140-160 per neurosurgery     SIRS (systemic inflammatory response syndrome) (HCC)  Assessment & Plan  · POA AEB tachypnea, leukocytosis   · CXR 8/28 concerning for RUL infiltrate   · Patient with 1 week of fevers, increased cough, sob at home and was prescribed doxycycline and prednisone for poss pneumonia   · Notably, patient has recurrent aspiration and is following with GI as OP, recent esophageal dilation however patient still reports coughing and choking with thin liquids and some solids   · LA 1 0, procal 0 06  · BC x2, strep, legionella, sputum cx pending   · Strict NPO, supervised sips with necessary medications   · Given ceftriaxone and vanc in ER -- will continue abx therapy with Cefepime while we await cultures   · No previous culture data for review   · Afebrile, HD stable  · Abx D1: Cefepime D1    Stage 3a chronic kidney disease ([facility] [address] )  Assessment & Plan  Lab Results   Component Value Date    EGFR 48 08/28/2022    EGFR 52 03/03/2022    EGFR 45 08/28/2021    CREATININE 1 16 08/28/2022    CREATININE 1 09 03/03/2022    CREATININE 1 23 08/28/2021     · Baseline creat appears to be 1 1-1 3  · Admission creat 1 16  · Avoid hypotension, nephrotoxic agents   · Trend UO, creat closely     GERD (gastroesophageal reflux disease)  Assessment & Plan  · On home PPI therapy   · Follows with GI as OP for chronic dysphagia/ aspiration     Emphysema  Assessment & Plan  · Home regimen: flonase, advair 100-50 bid, albuterol prn  · Cont breo (advair nonformulary) while IP   · Scheduled xopenex, atrovent q6h  · Former smoker -- quit 14 years ago  · Does not follow with pulmonologist   · PFT 7/7/2019 --   · FEV1/FVC Ratio: 62 %  · Forced Vital Capacity: 2 41 L    74 % predicted  · FEV1: 1 49 L     60 % predicted  · Goal spo2 > 90%    Coronary artery disease  Assessment & Plan  · Hx Deejay, follows with [facility]   · On home carvedilol, plavix -- hold for [facility]   · 8/28 DDVAP x1  · Hold all AC and AP   · Last echo 2019 -- EF 55% with no RWMA  · Stress test 2019 -- negative     Dyslipidemia  Assessment & Plan  · Home regimen: atorvastatin   · Currently on hold overnight for NPO status, resume as soon as able     Essential hypertension  Assessment & Plan  · Home regimen: coreg bid, cozaar qd  · See above     -------------------------------------------------------------------------------------------------------------  Chief Complaint: sudden onset HA, HTN at home     History of Present Illness   HX and PE limited by: MAURICE  Xavier Chávez is a 69 y o  female with a past medical history of Chiari malformations status post surgical intervention in 2000, hypertension, coronary artery disease with a history of takotsubo syndrome on home Plavix, dyslipidemia, GERD, CKD 3, emphysema and former smoker presents to the ER today with a chief complaint of headache, hypertension  She states she was in her normal state of health when suddenly at 1230 today she felt unwell  At 1430, she had a sudden onset of headache, and took her blood pressure at home and found to be high  In the ER, she was found to have a blood pressure as high as 214/110 refractory to labetalol IV x2  She was subsequently started on a Cardene drip  CT of the head concerning for right parietal parenchymal hemorrhage associated with subarachnoid hemorrhage  She is neurologically intact  Incidentally, she also has a history of recurrent aspiration and follows with GI as an outpatient  Earlier in the week she was placed on p o  Doxycycline and prednisone by her PCP for concern for aspiration pneumonia  In the ER she meets SIRS criteria with tachypnea and leukocytosis, with her chest x-ray showing concern for right upper lobe consolidation suspicious for aspiration pneumonia, and is started on IV antibiotics  She is admitted to the ICU for serial neuro checks, blood pressure control  History obtained from chart review and the patient   -------------------------------------------------------------------------------------------------------------  Dispo:  Admit to Critical Care     Code Status: Level 1 - Full Code  --------------------------------------------------------------------------------------------------------------  Review of Systems   Constitutional: Negative for activity change, chills, fatigue and fever  Respiratory: Positive for cough and choking  Negative for apnea, shortness of breath and wheezing  Cardiovascular: Negative for chest pain, palpitations and leg swelling  Gastrointestinal: Negative for abdominal distention, constipation, diarrhea, nausea and vomiting  Genitourinary: Negative for difficulty urinating  Musculoskeletal: Negative for arthralgias, back pain and gait problem  Neurological: Positive for headaches  Negative for dizziness, tremors, seizures, facial asymmetry, speech difficulty, light-headedness and numbness  Hematological: Negative  Psychiatric/Behavioral: Negative  A 12-point, complete review of systems was reviewed and negative except as stated above     Physical Exam  Vitals and nursing note reviewed  Constitutional:       General: She is awake  She is not in acute distress  Appearance: She is well-developed and overweight  She is not ill-appearing, toxic-appearing or diaphoretic  HENT:      Head: Normocephalic and atraumatic  Eyes:      Comments: Pupils 3mm PERRLA BL   Cardiovascular:      Rate and Rhythm: Normal rate and regular rhythm  Pulmonary:      Effort: Pulmonary effort is normal  No tachypnea, bradypnea, accessory muscle usage or prolonged expiration  Breath sounds: Wheezing present  Abdominal:      General: Abdomen is flat  Bowel sounds are normal  There is no distension  Palpations: Abdomen is soft  Tenderness: There is no abdominal tenderness  Genitourinary:     Comments: Voiding independently   Musculoskeletal:      Right lower leg: No edema  Left lower leg: No edema  Skin:     General: Skin is warm  Capillary Refill: Capillary refill takes less than 2 seconds  Coloration: Skin is not pale  Neurological:      General: No focal deficit present  Mental Status: She is alert and oriented to person, place, and time  GCS: GCS eye subscore is 4  GCS verbal subscore is 5  GCS motor subscore is 6  Cranial Nerves: Cranial nerves are intact  Comments: AAO x3, in NAD, speech clear and articulate  No facial droop noted  No pronator drift   Shoulder shrugs L 5/5, R 5/5  Hand grasps L 5/5, R 5/5  UE push/pull L 5/5, R 5/5  Plantar/dorsi flexion L 5/5, R 5/5  Leg lifts L 5/5, R 5/5   Psychiatric:         Mood and Affect: Mood and affect normal          Speech: Speech normal          Behavior: Behavior is cooperative        --------------------------------------------------------------------------------------------------------------  Vitals:   Vitals:    08/28/22 2230 08/28/22 2245 08/28/22 2250 08/28/22 2300   BP: 149/72 113/53 99/54    BP Location:       Pulse: 68 73 69    Resp: 21 22 (!) 23    Temp:       TempSrc:       SpO2: 95% 94% 96% 98%   Weight:       Height:         Temp  Min: 98 5 °F (36 9 °C)  Max: 98 8 °F (37 1 °C)     Height: 5' 5" (165 1 cm)  Body mass index is 27 07 kg/m²      Laboratory and Diagnostics:  Results from last 7 days   Lab Units 08/28/22  1824   WBC Thousand/uL 14 02*   HEMOGLOBIN g/dL 13 9   HEMATOCRIT % 41 1   PLATELETS Thousands/uL 501*   BANDS PCT % 1   MONO PCT % 8     Results from last 7 days   Lab Units 08/28/22  1824   SODIUM mmol/L 137   POTASSIUM mmol/L 3 2*   CHLORIDE mmol/L 100   CO2 mmol/L 26   ANION GAP mmol/L 11   BUN mg/dL 21   CREATININE mg/dL 1 16   CALCIUM mg/dL 9 2   GLUCOSE RANDOM mg/dL 121   ALT U/L 53   AST U/L 23   ALK PHOS U/L 119*   ALBUMIN g/dL 2 9*   TOTAL BILIRUBIN mg/dL 0 50     Results from last 7 days   Lab Units 08/28/22  1824   MAGNESIUM mg/dL 1 9   PHOSPHORUS mg/dL 2 0*      Results from last 7 days   Lab Units 08/28/22 1951   INR  0 91   PTT seconds 25          Results from last 7 days   Lab Units 08/28/22 1951   LACTIC ACID mmol/L 1  0     ABG:    VBG:    Results from last 7 days   Lab Units 22   PROCALCITONIN ng/ml 0 06       Micro:        EKG: NSR on tele   Imaging: I have personally reviewed pertinent reports          Historical Information   Past Medical History:   Diagnosis Date    Angina pectoris ([facility] [address] )     Arnold-Chiari malformation ([facility] [address] )     Breast lump     last assessed: 2013     Coronary artery disease     Depression with anxiety     GERD (gastroesophageal reflux disease)     Gout     last assessed: 2012     Hair loss     last assessed: Dec 15, 2016     Hypertension     Hypotension     last assessed: Nov 10, 2014     Mitral valve disorder     Myocardial infarct, old     SVT [facility]ra[facility]ricular tachycardia) [facility])      Past Surgical History:   Procedure Laterality Date    BREAST BIOPSY Right 2017    US CORE BIOPSY--BENIGN    CARDIAC CATHETERIZATION      CERVICAL DISCECTOMY      Spinal     CERVICAL LAMINECTOMY      C1 with chiari decompression     COLONOSCOPY      5 yrs - onset: May 31, 2011     CRANIOTOMY      POPLITEAL SYNOVIAL CYST EXCISION      TUBAL LIGATION      US GUIDED BREAST BIOPSY RIGHT COMPLETE Right 2017     Social History   Social History     Substance and Sexual Activity   Alcohol Use Yes    Comment: social      Social History     Substance and Sexual Activity   Drug Use No     Social History     Tobacco Use   Smoking Status Former Smoker    Packs/day: 1 50    Years: 36 00    Pack years: 54 00    Types: Cigarettes    Quit date: 2008    Years since quittin 6   Smokeless Tobacco Never Used     Exercise History: NA  Family History:   Family History   Problem Relation Age of Onset    Stroke Mother     Other Father         blood clot in vein & CABG     Heart disease Father     Prostate cancer Father     Hearing loss Father     Hypertension Father     Alcohol abuse Brother     Throat cancer Brother     Cancer Brother         Throat tongue    Hearing loss Brother     No Known Problems Sister     No Known Problems Daughter     Breast cancer Maternal Grandmother     No Known Problems Daughter     No Known Problems Sister     Crohn's disease Maternal Aunt     No Known Problems Maternal Aunt     No Known Problems Maternal Aunt     Colon cancer Paternal Aunt     No Known Problems Paternal [address]     Colon cancer Son     No Known Problems Paternal Aunt     No Known Problems Paternal [address]     Colon cancer Paternal Aunt     Cancer Paternal Aunt         Colon cancer    No Known Problems Maternal Grandfather     No Known Problems Paternal Grandmother     No Known Problems Paternal Grandfather     Kidney failure Brother         kidney failure d/t NSIADs on dialysis    Hearing loss Brother      I have reviewed this patient's family history and commented on sigificant items within the HPI      Medications:  Current Facility-Administered Medications   Medication Dose Route Frequency    [START ON 8/29/2022] cefepime (MAXIPIME) IVPB (premix in dextrose) 2,000 mg 50 mL  2,000 mg Intravenous Q12H    [START ON 8/29/2022] fluticasone-vilanterol (BREO ELLIPTA) 100-25 mcg/inh inhaler 1 puff  1 puff Inhalation Daily    ipratropium (ATROVENT) 0 02 % inhalation solution 0 5 mg  0 5 mg Nebulization Q6H    levalbuterol (XOPENEX) inhalation solution 1 25 mg  1 25 mg Nebulization Q6H    magnesium sulfate 2 g/50 mL IVPB (premix) 2 g  2 g Intravenous Once    niCARdipine (CARDENE) 25 mg (STANDARD CONCENTRATION) in sodium chloride 0 9% 250 mL  1-15 mg/hr Intravenous Titrated    vancomycin (VANCOCIN) 1500 mg in sodium chloride 0 9% 250 mL IVPB  20 mg/kg Intravenous Once     Home medications:  Prior to Admission Medications   Prescriptions Last Dose Informant Patient Reported? Taking?    ALPRAZolam (XANAX) 0 25 mg tablet  Self No No   Sig: TAKE 1 TABLET BY MOUTH AT BEDTIME   Fluticasone-Salmeterol (Advair Diskus) 100-50 mcg/dose inhaler  Self No No   Sig: Inhale 1 puff 2 (two) times a day Rinse mouth after use   albuterol (ProAir HFA) 90 mcg/act inhaler  Self No No   Sig: Inhale 2 puffs every 4 (four) hours as needed for wheezing or shortness of breath   atorvastatin (LIPITOR) 80 mg tablet  Self No No   Sig: Take 1 tablet by mouth once daily   benzonatate (TESSALON PERLES) 100 mg capsule  Self No No   Sig: Take 1 capsule (100 mg total) by mouth 3 (three) times a day as needed for cough   calcium carbonate (OS-FLASH) 600 MG tablet  Self Yes No   Sig: Take 600 mg by mouth 2 (two) times a day with meals   carvedilol (COREG) 6 25 mg tablet  Self No No   Sig: Take 1 tablet by mouth twice daily   cholecalciferol (VITAMIN D3) 1,000 units tablet  Self Yes No   Sig: Take 1,000 Units by mouth daily   clopidogrel (PLAVIX) 75 mg tablet  Self No No   Sig: Take 1 tablet by mouth once daily   dicyclomine (BENTYL) 10 mg capsule  Self No No   Sig: TAKE 1 CAPSULE BY MOUTH 4 TIMES DAILY BEFORE MEAL(S) AND AT BEDTIME   Patient not taking: No sig reported   doxycycline hyclate (VIBRAMYCIN) 100 mg capsule  Self No No   Sig: Take 1 capsule (100 mg total) by mouth every 12 (twelve) hours for 7 days   escitalopram (LEXAPRO) 20 mg tablet  Self No No   Sig: Take 1/2 (one-half) tablet by mouth once daily   famotidine (PEPCID) 40 MG tablet  Self No No   Sig: Take 1 tablet (40 mg total) by mouth daily at bedtime   fluticasone (FLONASE) 50 mcg/act nasal spray  Self No No   Si sprays into each nostril daily   gabapentin (NEURONTIN) 400 mg capsule  Self No No   Sig: TAKE 4 CAPSULES BY MOUTH ONCE DAILY AS DIRECTED   isosorbide mononitrate (IMDUR) 30 mg 24 hr tablet  Self No No   Sig: Take 1 tablet by mouth once daily   losartan (COZAAR) 50 mg tablet  Self No No   Sig: Take 1 tablet (50 mg total) by mouth daily   multivitamin (THERAGRAN) TABS  Self Yes No   Sig: Take 1 tablet by mouth daily   predniSONE 10 mg tablet  Self No No   Sig: 3 tab PO bid x 3 days then 2 tab PO bid x 3 days then 1 tab PO bid x 3 days then 1 tab PO once daily x 3 days then stop   tiZANidine (ZANAFLEX) 4 mg tablet  Self No No   Sig: TAKE 1 & 1/2 (ONE & ONE-HALF) TABLETS BY MOUTH AT BEDTIME   Patient not taking: No sig reported      Facility-Administered Medications: None     Allergies: Allergies   Allergen Reactions    Codeine Itching    Medical Tape Rash       ------------------------------------------------------------------------------------------------------------  Advance Directive and Living Will:      Power of :    POLST:    ------------------------------------------------------------------------------------------------------------  Anticipated Length of Stay is > 2 midnights    Care Time Delivered:   No Critical Care time spent       ANTONIETA Rios        Portions of the record may have been created with voice recognition software  Occasional wrong word or "sound a like" substitutions may have occurred due to the inherent limitations of voice recognition software    Read the chart carefully and recognize, using context, where substitutions have occurred

## 2022-08-29 NOTE — ASSESSMENT & PLAN NOTE
69year old female with Chiari malformation s/p decompression in 2000, spinal cord lesion, HTN, CAD, HLD, GERD, CKD, takotsubo syndrome, ICA stenosis on Plavix and former smoker who initially presented to the ED on 8/28 with hypertension (241/110 in the ED) and severe headache and was found to have a R parietal IPH/SAH  Workup:   · CTA H/N negative for focal stenosis or saccular aneurysm within [place], however revealed short segment of severe stenosis noted at the distal R cervical ICA segment, along with moderate-severe stenosis at origin of L vertebral artery with a dominant R vertebral artery  · MRI brain w wo: Stable SAH within the right parieto-occipital region with no underlying mass or abnormal enhancement  No signs of acute ischemia  Minor chronic microangiopathic change within the brainstem  Stable postoperative change within the posterior fossa, status post inferior occipital craniectomy  On neurologic exam today, patient continues to be at baseline with no neurologic deficits  No reported HA  BP continues to intermittently spike (as high as 367D systolic this morning)  ICU continue to adjust antihypertensives  Etiology of SAH unclear, though may be secondary to extreme isolated hypertension as CTA was unrevealing, though unsure what would have provoked this  Proceed with detailed plan as noted below including [address] in 3 weeks  No further inpatient neurodiagnostics  Plan:  - MRI brain w/wo revealed stable subarachnoid hemorrhage within the right parieto-occipital region, no underlying mass, abnormal enhancement or signs of acute ischemia   - Goal of normotension   - Cardene gtt discontinued  PO Losartan, Imdur, Coreg and PRN labetalol ordered per critical care team   - [id number] [address] for DVT prophylaxis  - Would recommend holding Plavix for now  - Repeat CTH in 3 weeks  If resolving, ok to restart Plavix at that time    - No further recommendations from Neurosurgery  - Keppra discontinued  Patient is on chronic Gabapentin which will offer some seizure protection    - Frequent neuro checks  STAT CTH and notify neurology if any acute changes in mental status  - Medical management and supportive care per primary team  Correction of any metabolic or infectious disturbances

## 2022-08-29 NOTE — CONSULTS
Consultation - Neurology   Gertrudis Braden 69 y o  female MRN: [id number]  Unit/Bed#: -01 Encounter: [id number]      Assessment/Plan     * Subarachnoid hemorrhage [facility])  Assessment & Plan  69year old female with Chiari malformation s/p decompression in 2000, spinal cord lesion, HTN, CAD, HLD, GERD, CKD, takotsubo syndrome on Plavix and former smoker who presents with headache  Patient reports that at 1430 on 8/28, she had an acute onset of right sided frontal headache, and noted she had a high blood pressure at home  While in the ED, /110  This was unresponsive to x 2 IV labetalol and she was placed on a Cardene drip  It is documented that she was neurologically intact in the ED  CTH revealed R parietal parenchymal hemorrhage associated with subarachnoid hemorrhage  CTA Head/neck negative for focal stenosis or saccular aneurysm within [place], however revealed short segment of severe stenosis noted at the distal R cervical ICA segment, along with moderate-severe stenosis at origin of L vertebral artery with a dominant R vertebral artery  Etiology of SAH could be secondary to high blood pressure on presentation  No obvious aneurysm seen on CTA, no underlying mass/acute ischemia identified on MRI brain with and without contrast  Currently, patient feels tired and has a mild headache, but feels better since yesterday  Plan:  - MRI brain w/wo revealed stable subarachnoid hemorrhage within the right parieto-occipital region, no underlying mass, abnormal enhancement or signs of acute ischemia   - EEG pending  - Recommend SBP <140-160    - Cardene gtt discontinued  PO Losartan, Imdur, Coreg and PRN labetalol ordered per critical care team   - Hold all AC/AP   - Appreciate neurosurgery recommendations - TT Florecita Castillo from NS to see if patient required further testing (? angiogram)  Awaiting further reccs  - Patient bolused with 1g Keppra and maintained on 500 mg BID   Patient does not require AED prophylaxis from a neurology standpoint, however will ultimately leave it up to neurosurgery   - Frequent neuro checks  STAT CTH and notify neurology if any acute changes in mental status  - Medical management and supportive care per primary team  Correction of any metabolic or infectious disturbances  Plan discussed with Attending Neurologist, please see attestation for further input/recommendations  Community acquired pneumonia of right upper lobe of lung  Assessment & Plan  Patient was noted to be tachypneic with leukocytosis in the ER, and met SIRS criteria  - CXR revealed RUL consolidation, suspicious for aspiration pneumonia  - Patient has a history of aspiration pneumonia that she follows with outpatient GI and PCP    - CT Chest completed, revealed multifocal pneumonia and severe emphysema   - Blood cultures pending  Continue with IV ABX per critical care team     Dyslipidemia  Assessment & Plan  Continue with Lipitor 80 mg daily     Facial twitching  Assessment & Plan  Patient recently saw Dr Sara Rivera on 07/21/2022 for episodic right facial twitching and muscle spasms  Patient also has chronic bilateral neurosensory hearing loss, and there was a concern for focal motor seizure vs potential lesion on the 7th cranial nerve  It was recommended that she has an MRI brain and EEG completed, (both of which had not been done prior to admission to [facility])  Patient did not want to start treatment with medications until testing was completed  Yumiko Jewell will need follow up in in 6 weeks with neurovascular attending or advance practitioner  She will not require outpatient neurological testing      History of Present Illness     Reason for Consult / Principal Problem: ICH  Hx and PE limited by: N/A, sister present at bedside   HPI: Yumiko Jewell is a 69 y o  female with Chiari malformation s/p intervention 2000, spinal cord lesion, HTN, CAD, HDL, GERD, CKD, takotsubo syndrome on Plavix and former smoker who presents with headache  Patient reports that at 1430 on 08/28/2022, she had an acute onset of right sided frontal headache, and noted she had a high blood pressure at home  While in the ED, /110  This was unresponsive to x 2 IV labetalol and she was placed on a Cardene drip  CTH revealed R parietal parenchymal hemorrhage associated with subarachnoid hemorrhage  CTA Head/neck negative for focal stenosis or saccular aneurysm within [place], however revealed short segment of severe stenosis noted a t the distal R cervical ICA segment, along with moderate-severe stenosis at origin of L vertebral artery with a dominant R vertebral artery  She was given DDAVP for Plavix use  It is documented that she was neurologically intact in the ED  Of note, Patient does have a history of recurrent asipriation that she follows with GI for  She was also placed on PO Doxycycline and prednisone per PCP for concern of aspiration pneumonia  She was noted to be tachypneic with leukocytosis in the ER, and met SIRS criteria  CXR revealed RUL consolidation, suspicious for aspiration pneumonia  She was placed on IV ABX  Neuro History   - Patient recently saw Dr Alma Rosa Fontanez on 07/21/2022 for episodic right facial twitching and muscle spasms  Patient also has chronic bilateral neurosensory hearing loss, and there was a concern for focal motor seizure vs potential lesion on the 7th cranial nerve  It was recommended that she has an MRI brain and EEG completed, (both of which had not been done prior to admission to [facility])  Patient did not want to start treatment with medications until testing was completed  Inpatient consult to Neurology  Consult performed by: ANTONIETA Kim  Consult ordered by: ANTONIETA Sears        Performed by Attending Neurologist, AP present at bedside   Review of Systems   Constitutional: Positive for fatigue  Negative for fever     Eyes: Negative for photophobia and visual disturbance  Respiratory: Negative for cough and shortness of breath  Cardiovascular: Negative for chest pain and palpitations  Musculoskeletal: Negative for back pain and neck pain  Skin: Negative for color change and pallor  Neurological: Positive for headaches (mild right frontal)  Negative for dizziness, tremors, seizures, syncope, facial asymmetry, speech difficulty, weakness, light-headedness and numbness  Psychiatric/Behavioral: Negative for confusion  The patient is not nervous/anxious  All other systems reviewed and are negative        Historical Information   Past Medical History:   Diagnosis Date    [facility]     Arnold-Chiari malformation (HCC)     Breast lump     last assessed: Jan 22, 2013     Coronary artery disease     Depression with anxiety     GERD (gastroesophageal reflux disease)     Gout     last assessed: Nov 26, 2012     Hair loss     last assessed: Dec 15, 2016     Hypertension     Hypotension     last assessed: Nov 10, 2014     Mitral valve disorder     Myocardial infarct, old     SVT (supraventricular tachycardia) ([facility] )      Past Surgical History:   Procedure Laterality Date    BREAST BIOPSY Right 02/28/2017    US CORE BIOPSY--BENIGN    CARDIAC CATHETERIZATION      CERVICAL DISCECTOMY      Spinal     CERVICAL LAMINECTOMY      C1 with chiari decompression     COLONOSCOPY      5 yrs - onset: May 31, 2011     CRANIOTOMY      POPLITEAL SYNOVIAL CYST EXCISION      TUBAL LIGATION      US GUIDED BREAST BIOPSY RIGHT COMPLETE Right 02/28/2017     Social History   Social History     Substance and Sexual Activity   Alcohol Use Yes    Comment: social      Social History     Substance and Sexual Activity   Drug Use No     E-Cigarette/Vaping    E-Cigarette Use Never User      E-Cigarette/Vaping Substances    Nicotine No     THC No     CBD No     Flavoring No     Other No     Unknown No      Social History Tobacco Use   Smoking Status Former Smoker    Packs/day: 1 50    Years: 36 00    Pack years: 54 00    Types: Cigarettes    Quit date: 2008    Years since quittin 6   Smokeless Tobacco Never Used     Family History:   Family History   Problem Relation Age of Onset    Stroke Mother     Other Father         blood clot in vein & CABG     Heart disease Father     Prostate cancer Father     Hearing loss Father     Hypertension Father     Alcohol abuse Brother     Throat cancer Brother     Cancer Brother         Throat tongue    Hearing loss Brother     No Known Problems Sister     No Known Problems Daughter     Breast cancer Maternal Grandmother     No Known Problems Daughter     No Known Problems Sister     Crohn's disease Maternal Aunt     No Known Problems Maternal Aunt     No Known Problems Maternal Aunt     Colon cancer Paternal Aunt     No Known Problems Paternal [de-identified]     Colon cancer Son     No Known Problems Paternal Aunt     No Known Problems Paternal Aunt     Colon cancer Paternal Aunt     Cancer Paternal Aunt         Colon cancer    No Known Problems Maternal Grandfather     No Known Problems Paternal Grandmother     No Known Problems Paternal Grandfather     Kidney failure Brother         kidney failure d/t NSIADs on dialysis    Hearing loss Brother        Review of previous medical records was completed       Meds/Allergies   all current active meds have been reviewed, current meds:   Current Facility-Administered Medications   Medication Dose Route Frequency    atorvastatin (LIPITOR) tablet 80 mg  80 mg Oral Daily With Dinner    azithromycin (ZITHROMAX) 500 mg in sodium chloride 0 9% 250mL IVPB 500 mg  500 mg Intravenous Q24H    calcium carbonate-vitamin D (OSCAL-D) 500 mg-200 units per tablet 1 tablet  1 tablet Oral BID With Meals    carvedilol (COREG) tablet 6 25 mg  6 25 mg Oral BID With Meals    cefTRIAXone (ROCEPHIN) IVPB (premix in dextrose) 1,000 mg 50 mL  1,000 mg Intravenous Q24H    escitalopram (LEXAPRO) tablet 10 mg  10 mg Oral Daily    fluticasone-vilanterol (BREO ELLIPTA) 100-25 mcg/inh inhaler 1 puff  1 puff Inhalation Daily    gabapentin (NEURONTIN) capsule 800 mg  800 mg Oral BID    guaiFENesin (ROBITUSSIN) oral solution 200 mg  200 mg Oral Q4H PRN    ipratropium (ATROVENT) 0 02 % inhalation solution 0 5 mg  0 5 mg Nebulization 4x Daily    isosorbide mononitrate (IMDUR) 24 hr tablet 30 mg  30 mg Oral Daily    labetalol (NORMODYNE) injection 10 mg  10 mg Intravenous Q4H PRN    levalbuterol (XOPENEX) inhalation solution 1 25 mg  1 25 mg Nebulization Q6H    levETIRAcetam (KEPPRA) 500 mg in sodium chloride 0 9 % 100 mL IVPB  500 mg Intravenous Q12H [facility]    losartan (COZAAR) tablet 50 mg  50 mg Oral Daily    metroNIDAZOLE (FLAGYL) IVPB (premix) 500 mg 100 mL  500 mg Intravenous Q8H    [START ON 8/30/2022] pantoprazole (PROTONIX) EC tablet 40 mg  40 mg Oral Daily Before Breakfast    and PTA meds:   Prior to Admission Medications   Prescriptions Last Dose Informant Patient Reported? Taking?    ALPRAZolam (XANAX) 0 25 mg tablet More than a month at Unknown time Self No No   Sig: TAKE 1 TABLET BY MOUTH AT BEDTIME   Fluticasone-Salmeterol (Advair Diskus) 100-50 mcg/dose inhaler 8/27/2022 at Unknown time Self No Yes   Sig: Inhale 1 puff 2 (two) times a day Rinse mouth after use   albuterol (ProAir HFA) 90 mcg/act inhaler 8/27/2022 at Unknown time Self No Yes   Sig: Inhale 2 puffs every 4 (four) hours as needed for wheezing or shortness of breath   atorvastatin (LIPITOR) 80 mg tablet 8/27/2022 at Unknown time Self No Yes   Sig: Take 1 tablet by mouth once daily   benzonatate (TESSALON PERLES) 100 mg capsule 8/27/2022 at Unknown time Self No Yes   Sig: Take 1 capsule (100 mg total) by mouth 3 (three) times a day as needed for cough   calcium carbonate (OS-FLASH) 600 MG tablet 8/27/2022 at Unknown time Self Yes Yes   Sig: Take 600 mg by mouth 2 (two) times a day with meals   carvedilol (COREG) 6 25 mg tablet 2022 at Unknown time Self No Yes   Sig: Take 1 tablet by mouth twice daily   cholecalciferol (VITAMIN D3) 1,000 units tablet 2022 at Unknown time Self Yes Yes   Sig: Take 1,000 Units by mouth daily   clopidogrel (PLAVIX) 75 mg tablet 2022 at Unknown time Self No Yes   Sig: Take 1 tablet by mouth once daily   dicyclomine (BENTYL) 10 mg capsule  Self No No   Sig: TAKE 1 CAPSULE BY MOUTH 4 TIMES DAILY BEFORE MEAL(S) AND AT BEDTIME   Patient not taking: No sig reported   doxycycline hyclate (VIBRAMYCIN) 100 mg capsule 2022 at Unknown time Self No Yes   Sig: Take 1 capsule (100 mg total) by mouth every 12 (twelve) hours for 7 days   escitalopram (LEXAPRO) 20 mg tablet 2022 at Unknown time Self No Yes   Sig: Take 1/2 (one-half) tablet by mouth once daily   famotidine (PEPCID) 40 MG tablet Not Taking at Unknown time Self No No   Sig: Take 1 tablet (40 mg total) by mouth daily at bedtime   Patient not taking: Reported on 2022   fluticasone (FLONASE) 50 mcg/act nasal spray Unknown at Unknown time Self No No   Si sprays into each nostril daily   Patient not taking: Reported on 2022   gabapentin (NEURONTIN) 400 mg capsule 2022 at Unknown time Self No Yes   Sig: TAKE 4 CAPSULES BY MOUTH ONCE DAILY AS DIRECTED   isosorbide mononitrate (IMDUR) 30 mg 24 hr tablet 2022 at Unknown time Self No Yes   Sig: Take 1 tablet by mouth once daily   losartan (COZAAR) 50 mg tablet 2022 at Unknown time Self No Yes   Sig: Take 1 tablet (50 mg total) by mouth daily   multivitamin (THERAGRAN) TABS Not Taking at Unknown time Self Yes No   Sig: Take 1 tablet by mouth daily   Patient not taking: Reported on 2022   predniSONE 10 mg tablet 2022 at Unknown time Self No Yes   Sig: 3 tab PO bid x 3 days then 2 tab PO bid x 3 days then 1 tab PO bid x 3 days then 1 tab PO once daily x 3 days then stop   tiZANidine (ZANAFLEX) 4 mg tablet  Self No No   Sig: TAKE 1 & 1/2 (ONE & ONE-HALF) TABLETS BY MOUTH AT BEDTIME   Patient not taking: No sig reported      Facility-Administered Medications: None       Allergies   Allergen Reactions    Codeine Itching    Medical Tape Rash       Objective   Vitals:Blood pressure 169/79, pulse 65, temperature 99 8 °F (37 7 °C), resp  rate 22, height 5' 5" (1 651 m), weight 74 4 kg (164 lb 0 4 oz), SpO2 93 %  ,Body mass index is 27 29 kg/m²  Intake/Output Summary (Last 24 hours) at 8/29/2022 1315  Last data filed at 8/29/2022 0600  Gross per 24 hour   Intake 623 33 ml   Output 1150 ml   Net -526 67 ml       Invasive Devices: Invasive Devices  Report    Peripheral Intravenous Line  Duration           Peripheral IV 08/28/22 Left Forearm <1 day    Peripheral IV 08/28/22 Right Antecubital <1 day    Peripheral IV 08/28/22 Right;Upper;Ventral (anterior) Arm <1 day          Drain  Duration           External Urinary Catheter <1 day              Performed by Attending Neurologist, AP present at bedside   Physical Exam  Vitals reviewed  Constitutional:       General: She is not in acute distress  Appearance: Normal appearance  She is normal weight  She is not ill-appearing  HENT:      Head: Normocephalic and atraumatic  Right Ear: External ear normal       Left Ear: External ear normal       Nose: Nose normal       Mouth/Throat:      Mouth: Mucous membranes are moist    Eyes:      General:         Right eye: No discharge  Left eye: No discharge  Extraocular Movements: Extraocular movements intact and EOM normal       Conjunctiva/sclera: Conjunctivae normal       Pupils: Pupils are equal, round, and reactive to light  Cardiovascular:      Rate and Rhythm: Normal rate  Pulmonary:      Effort: Pulmonary effort is normal  No respiratory distress  Musculoskeletal:         General: Normal range of motion  Cervical back: Normal range of motion  No rigidity  Right lower leg: No edema        Left lower leg: No edema  Skin:     General: Skin is warm and dry  Coloration: Skin is not jaundiced or pale  Neurological:      Mental Status: She is alert and oriented to person, place, and time  Mental status is at baseline  Sensory: No sensory deficit  Motor: No weakness  Coordination: Finger-Nose-Finger Test normal    Psychiatric:         Mood and Affect: Mood normal          Speech: Speech normal          Behavior: Behavior normal        Neurologic Exam     Mental Status   Oriented to person, place, and time  Follows 2 step commands  Attention: normal  Concentration: normal    Speech: speech is normal   Level of consciousness: alert  Knowledge: good  Able to repeat  Normal comprehension  Cranial Nerves     CN II   Visual fields full to confrontation  CN III, IV, VI   Pupils are equal, round, and reactive to light  Extraocular motions are normal    Nystagmus: none   Diplopia: none  Conjugate gaze: present    CN V   Facial sensation intact  CN VII   Facial expression full, symmetric  CN VIII   CN VIII normal    Hearing: intact    CN IX, X   CN IX normal      CN XI   CN XI normal      CN XII   CN XII normal      Motor Exam   Muscle bulk: normal  Overall muscle tone: normal  Right arm pronator drift: absent  Left arm pronator drift: absent    Strength   Strength 5/5 except as noted  Sensory Exam   Light touch normal    Vibration normal      Temperature intact throughout      Gait, Coordination, and Reflexes     Coordination   Finger to nose coordination: normal    Tremor   Resting tremor: absent  Intention tremor: absent  Action tremor: absent      Lab Results:   I have personally reviewed pertinent reports    , CBC:   Results from last 7 days   Lab Units 08/29/22  0603 08/28/22  1824   WBC Thousand/uL 9 02 14 02*   RBC Million/uL 4 01 4 34   HEMOGLOBIN g/dL 12 9 13 9   HEMATOCRIT % 38 9 41 1   MCV fL 97 95   PLATELETS Thousands/uL 449* 501*   , BMP/CMP:   Results from last 7 days   Lab Units 08/29/22  0603 08/28/22  1824   SODIUM mmol/L 136 137   POTASSIUM mmol/L 4 3 3 2*   CHLORIDE mmol/L 103 100   CO2 mmol/L 24 26   BUN mg/dL 19 21   CREATININE mg/dL 1 04 1 16   CALCIUM mg/dL 8 9 9 2   AST U/L  --  23   ALT U/L  --  53   ALK PHOS U/L  --  119*   EGFR ml/min/1 73sq m 54 48   Coagulation:   Results from last 7 days   Lab Units 08/28/22 1951   INR  0 91   Urinalysis:   Results from last 7 days   Lab Units 08/28/22 1951   COLOR UA  Yellow   CLARITY UA  Clear   SPEC GRAV UA  1 010   PH UA  7 0   LEUKOCYTES UA  Negative   NITRITE UA  Negative   GLUCOSE UA mg/dl Negative   KETONES UA mg/dl Negative   BILIRUBIN UA  Negative   BLOOD UA  Negative   Imaging Studies: I have personally reviewed pertinent reports  and I have personally reviewed pertinent films in PACS [facility], CTA head/neck, MRI brain    EKG, Pathology, and Other Studies: I have personally reviewed pertinent reports     and I have personally reviewed pertinent films in PACS  VTE Prophylaxis: Sequential compression device (Venodyne)     Code Status: Level 1 - Full Code

## 2022-08-29 NOTE — PROGRESS NOTES
[place]  Progress Note - Laurie Leyva 1955, 69 y o  female MRN: [id number]  Unit/Bed#: -01 Encounter: [id number]  Primary Care Provider: Ronan Solorio DO   Date and time admitted to hospital: 8/28/2022  6:13 PM    * Subarachnoid hemorrhage [facility])  Assessment & Plan  · Today (8/28) patient reports suddenly feeling unwell at 1230 while preparing to grocery shop, then started with sudden onset HA at 1430, accompanied by HTN at home and presented to the ER   · 14 [facility] 8/28 -- Right parietal parenchymal hemorrhage associated with subarachnoid hemorrhage  · Etiology poss 2/2 increase in cough in the last week per hx from patient, HTN vs underlying mass  · CTA head/neck pending   · MRI head wwo pending   · DDAVP x1 8/28 for home plavix   · Scheduled neuro exams   · NeuroCritical Care Dr Mare Denise at [facility] aware of patient   · Neurosurgery AP on call Radha Weldon aware via TT   · Neurology on call Dr Dylan High aware via TT   · Hold home plavix, anticoagulation  · Patient denies hx of previous bleeds, however states this is not the worst HA she has ever experienced, as she has a hx of Chiari malformation s/p surgical intervention in 2000, on chronic Neurontin   · Keppra for seizure ppx per neurosx -- 1g load, cont 500mg IV q12h    Accelerated hypertension  Assessment & Plan  · Presented to the ER with HTN, max 231/110  · Patient reports HTN noted at 1430 today at home, denies missing doses of her home medications   · In ER, labetalol IV x2 without improvement, started on Cardene gtt for strict BP control   · goal SBP <140-160 per neurosurgery   · Off cardene gtt     SIRS (systemic inflammatory response syndrome) (HCC)  Assessment & Plan  · POA AEB tachypnea, leukocytosis   · CXR 8/28 concerning for RUL infiltrate   · Patient with 1 week of fevers, increased cough, sob at home and was prescribed doxycycline and prednisone for poss pneumonia   · Notably, patient has recurrent aspiration and is following with GI as OP, recent esophageal dilation however patient still reports coughing and choking with thin liquids and some solids   · LA 1 0, procal 0 06  · BC x2, strep, legionella, sputum cx pending   · Strict NPO, supervised sips with necessary medications   · Given ceftriaxone and vanc in ER -- will continue abx therapy with Cefepime while we await cultures   · No previous culture data for review   · Afebrile, HD stable  · Abx D2: Cefepime D2    Stage 3a chronic kidney disease [facility])  Assessment & Plan  Lab Results   Component Value Date    EGFR 48 08/28/2022    EGFR 52 03/03/2022    EGFR 45 08/28/2021    CREATININE 1 16 08/28/2022    CREATININE 1 09 03/03/2022    CREATININE 1 23 08/28/2021     · Baseline creat appears to be 1 1-1 3  · Admission creat 1 16  · Avoid hypotension, nephrotoxic agents   · Trend UO, creat closely     GERD (gastroesophageal reflux disease)  Assessment & Plan  · On home PPI therapy   · Follows with GI as OP for chronic dysphagia/ aspiration     Emphysema  Assessment & Plan  · Home regimen: flonase, advair 100-50 bid, albuterol prn  · Cont breo (advair nonformulary) while IP   · Scheduled xopenex, atrovent q6h  · Former smoker -- quit 14 years ago  · Does not follow with pulmonologist   · PFT 7/7/2019 --   · FEV1/FVC Ratio: 62 %  · Forced Vital Capacity: 2 41 L    74 % predicted  · FEV1: 1 49 L     60 % predicted  · Goal spo2 > 90%    Coronary artery disease  Assessment & Plan  · Hx Deejay, follows with [facility]   · On home carvedilol, plavix -- hold for [facility]   · 8/28 DDVAP x1  · Hold all AC and AP   · Last echo 2019 -- EF 55% with no RWMA  · Stress test 2019 -- negative     Dyslipidemia  Assessment & Plan  · Home regimen: atorvastatin   · Currently on hold overnight for NPO status, resume as soon as able     Essential hypertension  Assessment & Plan  · Home regimen: coreg bid, cozaar qd  · See above ----------------------------------------------------------------------------------------  HPI/24hr events: She remains with SBP within goal off Cardene  She is neurologically intact overnight and endorses a mild /10 HA  Repeat CTH and MRI pending  Patient appropriate for transfer out of the ICU today?: No  Disposition: Continue Critical Care   Code Status: Level 1 - Full Code  ---------------------------------------------------------------------------------------  SUBJECTIVE  "I'm okay"    Review of Systems  Review of systems was reviewed and negative unless stated above in HPI/24-hour events   ---------------------------------------------------------------------------------------  OBJECTIVE    Vitals   Vitals:    22 0230 22 0300 22 0400 22 0430   BP: 150/70 139/65 145/65 141/63   BP Location: Right arm Right arm Right arm Right arm   Pulse: 70 69 66 68   Resp: 21 21 21 20   Temp: 98 8 °F (37 1 °C)      TempSrc: Oral      SpO2: 97% 95% 94% 94%   Weight:       Height:         Temp (24hrs), Av 8 °F (37 1 °C), Min:98 5 °F (36 9 °C), Max:99 °F (37 2 °C)  Current: Temperature: 98 8 °F (37 1 °C)          Respiratory:  SpO2: SpO2: 94 %, SpO2 Activity: SpO2 Activity: At Rest, SpO2 Device: O2 Device: Nasal cannula  Nasal Cannula O2 Flow Rate (L/min): 2 L/min    Invasive/non-invasive ventilation settings   Respiratory  Report   Lab Data (Last 4 hours)    None         O2/Vent Data (Last 4 hours)    None                Physical Exam  Vitals and nursing note reviewed  Constitutional:       General: She is awake  She is not in acute distress  Appearance: She is well-developed and overweight  She is not ill-appearing, toxic-appearing or diaphoretic  HENT:      Head: Normocephalic and atraumatic  Eyes:      General: Lids are normal    Cardiovascular:      Rate and Rhythm: Normal rate and regular rhythm     Pulmonary:      Effort: No tachypnea, bradypnea, accessory muscle usage, prolonged expiration or respiratory distress  Breath sounds: Examination of the right-upper field reveals wheezing  Examination of the left-upper field reveals wheezing  Examination of the right-middle field reveals wheezing  Examination of the left-middle field reveals wheezing  Wheezing present  Abdominal:      General: Abdomen is flat  Bowel sounds are normal  There is no distension  Palpations: Abdomen is soft  Tenderness: There is no abdominal tenderness  Genitourinary:     Comments: Voiding independently   Musculoskeletal:      Right lower leg: No edema  Left lower leg: No edema  Skin:     General: Skin is warm  Capillary Refill: Capillary refill takes less than 2 seconds  Coloration: Skin is not pale  Neurological:      General: No focal deficit present  Mental Status: She is alert and oriented to person, place, and time  GCS: GCS eye subscore is 4  GCS verbal subscore is 5  GCS motor subscore is 6  Cranial Nerves: Cranial nerves are intact  Psychiatric:         Mood and Affect: Mood and affect normal          Speech: Speech normal          Behavior: Behavior is cooperative             Laboratory and Diagnostics:  Results from last 7 days   Lab Units 08/28/22  1824   WBC Thousand/uL 14 02*   HEMOGLOBIN g/dL 13 9   HEMATOCRIT % 41 1   PLATELETS Thousands/uL 501*   BANDS PCT % 1   MONO PCT % 8     Results from last 7 days   Lab Units 08/28/22  1824   SODIUM mmol/L 137   POTASSIUM mmol/L 3 2*   CHLORIDE mmol/L 100   CO2 mmol/L 26   ANION GAP mmol/L 11   BUN mg/dL 21   CREATININE mg/dL 1 16   CALCIUM mg/dL 9 2   GLUCOSE RANDOM mg/dL 121   ALT U/L 53   AST U/L 23   ALK PHOS U/L 119*   ALBUMIN g/dL 2 9*   TOTAL BILIRUBIN mg/dL 0 50     Results from last 7 days   Lab Units 08/28/22  1824   MAGNESIUM mg/dL 1 9   PHOSPHORUS mg/dL 2 0*      Results from last 7 days   Lab Units 08/28/22  1951   INR  0 91   PTT seconds 25          Results from last 7 days   Lab Units 08/28/22 1951   LACTIC ACID mmol/L 1 0     ABG:    VBG:    Results from last 7 days   Lab Units 08/28/22 1951   PROCALCITONIN ng/ml 0 06       Micro        EKG: NSR on tele   Imaging: I have personally reviewed pertinent reports  CTA head and neck with and without contrast   Final Result      No focal stenosis or saccular aneurysm within the [place] of [place]  Short segment of severe stenosis noted at the distal right cervical ICA segment  Mild to moderate atherosclerotic plaquing of both carotid bulbs causing less than 50% stenosis by NASCET criteria  Previously described right parietal acute hemorrhage and a recent unenhanced CT brain is not as well visualized on this enhanced study  Moderate to severe stenosis at the origin of the left vertebral artery  Dominant right vertebral artery  Patchy bilateral upper lobe consolidations, right greater than left suspicious for multifocal bronchopneumonia  Clinical correlation is recommended  Workstation performed: [id number]         CT head without contrast   Final Result      Right parietal parenchymal hemorrhage associated with subarachnoid hemorrhage  Evaluation with MRI brain should be obtained to assess if this represents a hemorrhagic infarct and exclude an underlying mass  There was no vascular abnormality at this level on prior MRI  I personally discussed this study with Jose Guadalupe Castro on 8/28/2022 at 8:20 PM                            Workstation performed: [id number]         XR chest 1 view portable    (Results Pending)   MRI inpatient order    (Results Pending)   CT head wo contrast    (Results Pending)       Intake and Output  I/O       08/27 0701  08/28 0700 08/28 0701 08/29 0700    P  O   20    I V  (mL/kg)  103 3 (1 4)    IV Piggyback  500    Total Intake(mL/kg)  623 3 (8 4)    Urine (mL/kg/hr)  950    Total Output  950    Net  -326 7                Height and Weights   Height: 5' 5" (165 1 cm)     Body mass index is 27 07 kg/m²  Weight (last 2 days)     Date/Time Weight    08/28/22 2147 73 8 (162 7)    08/28/22 1812 70 3 (155)            Nutrition       Diet Orders   (From admission, onward)             Start     Ordered    08/28/22 2147  Diet NPO  Diet effective now        References:    Nutrtion Support Algorithm Enteral vs  Parenteral   Question Answer Comment   Diet Type NPO    RD to adjust diet per protocol? No        08/28/22 2146                  Active Medications  Scheduled Meds:  Current Facility-Administered Medications   Medication Dose Route Frequency Provider Last Rate    cefepime  2,000 mg Intravenous Q12H [place]orts, CRNP      fluticasone-vilanterol  1 puff Inhalation Daily [place], CRNP      ipratropium  0 5 mg Nebulization Q6H [place], CRNP      levalbuterol  1 25 mg Nebulization Q6H [place], CRNP      levETIRAcetam  500 mg Intravenous Q12H [facility] [place], CRNP       Continuous Infusions:     PRN Meds:        Invasive Devices Review  Invasive Devices  Report    Peripheral Intravenous Line  Duration           Peripheral IV 08/28/22 Left Forearm <1 day    Peripheral IV 08/28/22 Right Antecubital <1 day    Peripheral IV 08/28/22 Right;Upper;Ventral (anterior) Arm <1 day          Drain  Duration           External Urinary Catheter <1 day                Rationale for remaining devices: NA  ---------------------------------------------------------------------------------------  Advance Directive and Living Will:      Power of :    POLST:    ---------------------------------------------------------------------------------------  Care Time Delivered:   No Critical Care time spent       ANTONIETA Montoya      Portions of the record may have been created with voice recognition software  Occasional wrong word or "sound a like" substitutions may have occurred due to the inherent limitations of voice recognition software    Read the chart carefully and recognize, using context, where substitutions have occurred

## 2022-08-29 NOTE — ASSESSMENT & PLAN NOTE
· Hx Deejay, follows with [facility] Cardiology   · On home carvedilol, plavix -- hold for [facility]   · 8/28 DDVAP x1  · Hold all AC and AP   · Last echo 2019 -- EF 55% with no RWMA  · Stress test 2019 -- negative

## 2022-08-29 NOTE — ASSESSMENT & PLAN NOTE
Patient reports history of worsening dysphagia  Following with [facility]- work-up ongoing  · 5/19/22 EGD: Irregular Z-line 38 cm from the incisors; biopsy to assess for Doty's   Swartz dilatation empirically performed with 48 [place] Claudia dilator for treatment of dysphagia  · Tolerating diet   · Aspiration precautions, elevate HOB

## 2022-08-29 NOTE — ASSESSMENT & PLAN NOTE
· Hx Deejay, follows with [facility]iology   · On home carvedilol, plavix -- hold for [facility]   · 8/28 DDVAP x1  · Hold all AC and AP   · Last echo 2019 -- EF 55% with no RWMA  · Stress test 2019 -- negative

## 2022-08-29 NOTE — PHYSICAL THERAPY NOTE
Physical Therapy Cancellation Note           08/29/22 1238   PT Last Visit   PT Visit Date 08/29/22   Note Type   Note type Cancelled Session   Cancel Reasons Medical status   Additional Comments PT order received and chart reviewed;  MRI:  Stable subarachnoid hemorrhage within the right parieto-occipital region with no underlying mass or abnormal enhancement  Per discussion with nurse, hold PT and follow up as appropriate with neuro recommendations  Will follow up as schedule allows and pt appropriate       Kenia Gutierres, PT

## 2022-08-29 NOTE — ASSESSMENT & PLAN NOTE
· Hx of CAD & Takutsubo, follows with [facility] Cardiology   · Home Plavix stopped secondary to ICH/SAH   · 8/28 DDVAP x1  · 8/29 Losartan, Coreg, Lipitor, and Imdur restarted   · TTE 2019 -- LVEF 55% with no RWMA; LV diastolic function normal    · NM Cardiac Stress Test 2019 -- Normal study after maximal exercise without reproduction of symptoms   Myocardial perfusion imaging was normal at rest and with stress

## 2022-08-29 NOTE — ASSESSMENT & PLAN NOTE
Patient was noted to be tachypneic with leukocytosis in the ER, and met SIRS criteria  - CXR revealed RUL consolidation, suspicious for aspiration pneumonia     - Patient has a history of aspiration pneumonia that she follows with outpatient GI and PCP    - CT Chest completed, revealed multifocal pneumonia and severe emphysema   - IV Ceftriaxone  - Management/Treatment as per critical care team

## 2022-08-29 NOTE — ASSESSMENT & PLAN NOTE
· POA AEB tachypnea, leukocytosis   · CXR 8/28 concerning for RUL infiltrate   · Patient with 1 week of fevers, increased cough, sob at home and was prescribed doxycycline and prednisone for poss pneumonia   · Notably, patient has recurrent aspiration and is following with GI as OP, recent esophageal dilation however patient still reports coughing and choking with thin liquids and some solids   · LA 1 0, procal 0 06  · BC x2, strep, legionella, sputum cx pending   · Strict NPO, supervised sips with necessary medications   · Given ceftriaxone and vanc in ER -- will continue abx therapy with Cefepime while we await cultures   · No previous culture data for review   · Afebrile, HD stable  · Abx D2: Cefepime D2

## 2022-08-29 NOTE — TREATMENT PLAN
Reviewed MRI brain w/wo completed today indicating stable SAH  No concern for aneurysm, vasculitis, or vascular malformations  Noted with right cervical ICA severe stenosis and moderate-severe stenosis at origin of left vertebral artery  GCS remains 15  BP stabilizing on Cardene gtt  Further management and workup per neurology team  No further testing or angiogram recommended from our standpoint  Neurosurgery will sign off  Please call with any questions or concerns

## 2022-08-29 NOTE — H&P
[place]  Progress Note- Lorenzo Gray 1955, 69 y o  female MRN: [id number]  Unit/Bed#: -01 Encounter: [id number]  Primary Care Provider: Teja aFjardo DO   Date and time admitted to hospital: 2022  6:13 PM    No new Assessment & Plan notes have been filed under this hospital service since the last note was generated  Service: Critical Care/ICU    ----------------------------------------------------------------------------------------  HPI/24hr events: Her BP remains within goal without Cardene  She remains neurologically stable with nonfocal exam  She endorses mild 1/10 HA  Patient appropriate for transfer out of the ICU today?: No  Disposition: Continue Critical Care   Code Status: Level 1 - Full Code  ---------------------------------------------------------------------------------------  SUBJECTIVE  "I'm ok"    Review of Systems  Review of systems was reviewed and negative unless stated above in HPI/24-hour events   ---------------------------------------------------------------------------------------  OBJECTIVE    Vitals   Vitals:    22 0230 22 0300 22 0400 22 0430   BP: 150/70 139/65 145/65 141/63   BP Location: Right arm Right arm Right arm Right arm   Pulse: 70 69 66 68   Resp: 21 21 21 20   Temp: 98 8 °F (37 1 °C)      TempSrc: Oral      SpO2: 97% 95% 94% 94%   Weight:       Height:         Temp (24hrs), Av 8 °F (37 1 °C), Min:98 5 °F (36 9 °C), Max:99 °F (37 2 °C)  Current: Temperature: 98 8 °F (37 1 °C)          Respiratory:  SpO2: SpO2: 94 %, SpO2 Activity: SpO2 Activity: At Rest, SpO2 Device: O2 Device: Nasal cannula  Nasal Cannula O2 Flow Rate (L/min): 2 L/min    Invasive/non-invasive ventilation settings   Respiratory  Report   Lab Data (Last 4 hours)    None         O2/Vent Data (Last 4 hours)    None                Physical Exam  Vitals and nursing note reviewed  Constitutional:       General: She is awake   She is not in acute distress  Appearance: Normal appearance  She is well-developed and overweight  She is not ill-appearing, toxic-appearing or diaphoretic  HENT:      Head: Normocephalic and atraumatic  Cardiovascular:      Rate and Rhythm: Normal rate and regular rhythm  Pulmonary:      Effort: Pulmonary effort is normal  No tachypnea, bradypnea, accessory muscle usage or prolonged expiration  Breath sounds: Examination of the right-upper field reveals wheezing  Examination of the left-upper field reveals wheezing  Examination of the right-middle field reveals wheezing  Examination of the left-middle field reveals wheezing  Wheezing present  Abdominal:      General: Abdomen is flat  Bowel sounds are normal  There is no distension  Palpations: Abdomen is soft  Tenderness: There is no abdominal tenderness  Genitourinary:     Comments: Voiding independently   Musculoskeletal:      Right lower leg: No edema  Left lower leg: No edema  Skin:     General: Skin is warm  Capillary Refill: Capillary refill takes less than 2 seconds  Coloration: Skin is not pale  Neurological:      General: No focal deficit present  Mental Status: She is alert and oriented to person, place, and time  GCS: GCS eye subscore is 4  GCS verbal subscore is 5  GCS motor subscore is 6  Cranial Nerves: Cranial nerves are intact  Psychiatric:         Mood and Affect: Mood and affect normal          Speech: Speech normal          Behavior: Behavior is cooperative               Laboratory and Diagnostics:  Results from last 7 days   Lab Units 08/28/22  1824   WBC Thousand/uL 14 02*   HEMOGLOBIN g/dL 13 9   HEMATOCRIT % 41 1   PLATELETS Thousands/uL 501*   BANDS PCT % 1   MONO PCT % 8     Results from last 7 days   Lab Units 08/28/22  1824   SODIUM mmol/L 137   POTASSIUM mmol/L 3 2*   CHLORIDE mmol/L 100   CO2 mmol/L 26   ANION GAP mmol/L 11   BUN mg/dL 21   CREATININE mg/dL 1 16   CALCIUM mg/dL 9  2   GLUCOSE RANDOM mg/dL 121   ALT U/L 53   AST U/L 23   ALK PHOS U/L 119*   ALBUMIN g/dL 2 9*   TOTAL BILIRUBIN mg/dL 0 50     Results from last 7 days   Lab Units 08/28/22  1824   MAGNESIUM mg/dL 1 9   PHOSPHORUS mg/dL 2 0*      Results from last 7 days   Lab Units 08/28/22 1951   INR  0 91   PTT seconds 25          Results from last 7 days   Lab Units 08/28/22 1951   LACTIC ACID mmol/L 1 0     ABG:    VBG:    Results from last 7 days   Lab Units 08/28/22 1951   PROCALCITONIN ng/ml 0 06       Micro        EKG: NSR on tele   Imaging: I have personally reviewed pertinent reports  CTA head and neck with and without contrast   Final Result      No focal stenosis or saccular aneurysm within the [place] of [place]  Short segment of severe stenosis noted at the distal right cervical ICA segment  Mild to moderate atherosclerotic plaquing of both carotid bulbs causing less than 50% stenosis by NASCET criteria  Previously described right parietal acute hemorrhage and a recent unenhanced CT brain is not as well visualized on this enhanced study  Moderate to severe stenosis at the origin of the left vertebral artery  Dominant right vertebral artery  Patchy bilateral upper lobe consolidations, right greater than left suspicious for multifocal bronchopneumonia  Clinical correlation is recommended  Workstation performed: [id number]         CT head without contrast   Final Result      Right parietal parenchymal hemorrhage associated with subarachnoid hemorrhage  Evaluation with MRI brain should be obtained to assess if this represents a hemorrhagic infarct and exclude an underlying mass  There was no vascular abnormality at this level on prior MRI            I personally discussed this study with Elizabeth Clancy on 8/28/2022 at 8:20 PM                            Workstation performed: [id number]         XR chest 1 view portable    (Results Pending)   MRI inpatient order    (Results Pending)   CT head wo contrast    (Results Pending)       Intake and Output  I/O     None          Height and Weights   Height: 5' 5" (165 1 cm)     Body mass index is 27 07 kg/m²  Weight (last 2 days)     Date/Time Weight    08/28/22 2147 73 8 (162 7)    08/28/22 1812 70 3 (155)            Nutrition       Diet Orders   (From admission, onward)             Start     Ordered    08/28/22 2147  Diet NPO  Diet effective now        References:    Nutrtion Support Algorithm Enteral vs  Parenteral   Question Answer Comment   Diet Type NPO    RD to adjust diet per protocol? No        08/28/22 2146                  Active Medications  Scheduled Meds:  Current Facility-Administered Medications   Medication Dose Route Frequency Provider Last Rate    cefepime  2,000 mg Intravenous Q12H ANTONIETA Lane      fluticasone-vilanterol  1 puff Inhalation Daily ANTONIETA Lane      ipratropium  0 5 mg Nebulization Q6H ANTONIETA Lane      levalbuterol  1 25 mg Nebulization Q6H ANTONIETA Lane      levETIRAcetam  500 mg Intravenous Q12H [address]       Continuous Infusions:     PRN Meds:        Invasive Devices Review  Invasive Devices  Report    Peripheral Intravenous Line  Duration           Peripheral IV 08/28/22 Left Forearm <1 day    Peripheral IV 08/28/22 Right Antecubital <1 day    Peripheral IV 08/28/22 Right;Upper;Ventral (anterior) Arm <1 day          Drain  Duration           External Urinary Catheter <1 day                Rationale for remaining devices: NA  ---------------------------------------------------------------------------------------  Advance Directive and Living Will:      Power of :    POLST:    ---------------------------------------------------------------------------------------  Care Time Delivered:   No Critical Care time spent       ANTONIETA Valentin      Portions of the record may have been created with voice recognition software    Occasional wrong word or "sound a like" substitutions may have occurred due to the inherent limitations of voice recognition software    Read the chart carefully and recognize, using context, where substitutions have occurred

## 2022-08-29 NOTE — ASSESSMENT & PLAN NOTE
Lab Results   Component Value Date    EGFR 48 08/28/2022    EGFR 52 03/03/2022    EGFR 45 08/28/2021    CREATININE 1 16 08/28/2022    CREATININE 1 09 03/03/2022    CREATININE 1 23 08/28/2021     · Baseline creat appears to be 1 1-1 3  · Admission creat 1 16  · Avoid hypotension, nephrotoxic agents   · Trend UO, creat closely

## 2022-08-29 NOTE — ED PROVIDER NOTES
History  Chief Complaint   Patient presents with    Headache     Pt with headache and HTN today       75-year-old female presents for evaluation of sudden-onset headache that started at 2:30 p m  this afternoon  Pain is most prominent in right frontal region  Denies any other neurological deficits including changes in vision, weakness, difficulty with ambulation  Patient states she was recently diagnosed with pneumonia and has been on antibiotics for the last few days  She states she has had a persistent cough with associated shortness of breath  Symptoms initially improved but then progressed and worsened  Patient does have a history of high blood pressure but states that has been very well controlled and is typically never greater than 694 systolic  Prior to Admission Medications   Prescriptions Last Dose Informant Patient Reported? Taking?    ALPRAZolam (XANAX) 0 25 mg tablet  Self No No   Sig: TAKE 1 TABLET BY MOUTH AT BEDTIME   Fluticasone-Salmeterol (Advair Diskus) 100-50 mcg/dose inhaler  Self No No   Sig: Inhale 1 puff 2 (two) times a day Rinse mouth after use   albuterol (ProAir HFA) 90 mcg/act inhaler  Self No No   Sig: Inhale 2 puffs every 4 (four) hours as needed for wheezing or shortness of breath   atorvastatin (LIPITOR) 80 mg tablet  Self No No   Sig: Take 1 tablet by mouth once daily   benzonatate (TESSALON PERLES) 100 mg capsule  Self No No   Sig: Take 1 capsule (100 mg total) by mouth 3 (three) times a day as needed for cough   calcium carbonate (OS-FLASH) 600 MG tablet  Self Yes No   Sig: Take 600 mg by mouth 2 (two) times a day with meals   carvedilol (COREG) 6 25 mg tablet  Self No No   Sig: Take 1 tablet by mouth twice daily   cholecalciferol (VITAMIN D3) 1,000 units tablet  Self Yes No   Sig: Take 1,000 Units by mouth daily   clopidogrel (PLAVIX) 75 mg tablet  Self No No   Sig: Take 1 tablet by mouth once daily   dicyclomine (BENTYL) 10 mg capsule  Self No No   Sig: TAKE 1 CAPSULE BY MOUTH 4 TIMES DAILY BEFORE MEAL(S) AND AT BEDTIME   Patient not taking: No sig reported   doxycycline hyclate (VIBRAMYCIN) 100 mg capsule  Self No No   Sig: Take 1 capsule (100 mg total) by mouth every 12 (twelve) hours for 7 days   escitalopram (LEXAPRO) 20 mg tablet  Self No No   Sig: Take 1/2 (one-half) tablet by mouth once daily   famotidine (PEPCID) 40 MG tablet  Self No No   Sig: Take 1 tablet (40 mg total) by mouth daily at bedtime   fluticasone (FLONASE) 50 mcg/act nasal spray  Self No No   Si sprays into each nostril daily   gabapentin (NEURONTIN) 400 mg capsule  Self No No   Sig: TAKE 4 CAPSULES BY MOUTH ONCE DAILY AS DIRECTED   isosorbide mononitrate (IMDUR) 30 mg 24 hr tablet  Self No No   Sig: Take 1 tablet by mouth once daily   losartan (COZAAR) 50 mg tablet  Self No No   Sig: Take 1 tablet (50 mg total) by mouth daily   multivitamin (THERAGRAN) TABS  Self Yes No   Sig: Take 1 tablet by mouth daily   predniSONE 10 mg tablet  Self No No   Sig: 3 tab PO bid x 3 days then 2 tab PO bid x 3 days then 1 tab PO bid x 3 days then 1 tab PO once daily x 3 days then stop   tiZANidine (ZANAFLEX) 4 mg tablet  Self No No   Sig: TAKE 1 & 1/2 (ONE & ONE-HALF) TABLETS BY MOUTH AT BEDTIME   Patient not taking: No sig reported      Facility-Administered Medications: None       Past Medical History:   Diagnosis Date    Angina pectoris (HCC)     Arnold-Chiari malformation (HCC)     Breast lump     last assessed: 2013     Coronary artery disease     Depression with anxiety     GERD (gastroesophageal reflux disease)     Gout     last assessed: 2012     Hair loss     last assessed: Dec 15, 2016     Hypertension     Hypotension     last assessed: Nov 10, 2014     Mitral valve disorder     Myocardial infarct, old     SVT (supraventricular tachycardia) (HCC)        Past Surgical History:   Procedure Laterality Date    BREAST BIOPSY Right 2017     CORE BIOPSY--BENIGN    CARDIAC for arthralgias, back pain, joint swelling and neck pain. Skin: Negative for pallor and rash. Neurological: Negative for dizziness, syncope, weakness, light-headedness and headaches. Hematological: Negative for adenopathy. Psychiatric/Behavioral: Negative for confusion, dysphoric mood and suicidal ideas. The patient is not nervous/anxious. PAST MEDICAL HISTORY     has a past medical history of Anxiety; Asthma; Chronic back pain; Depression; Diabetes mellitus ([address] GERD (gastroesophageal reflux disease); Headache; Hypertension; Infertility, female; Thyroid disease; Ulcer ([address] and UTI (urinary tract infection). SURGICAL HISTORY     has a past surgical history that includes Upper gastrointestinal endoscopy (2015?); Breast surgery (Right, 2016); Colonoscopy (2015?); Upper gastrointestinal endoscopy (2017); and lap,stomach,other,w/o tube (N/A, 2/12/2018).     CURRENT MEDICATIONS    Previous Medications    ALBUTEROL SULFATE (PROVENTIL HFA IN)    Inhale 2 puffs into the lungs every 4-6 hours as needed     CALCIUM CARBONATE-VITAMIN D (OS-ADIN 500 + D PO)    Take by mouth 2 times daily    DOCUSATE SODIUM 100 MG TABS    Take 100 mg by mouth 2 times daily Take as needed to prevent constipation    ENOXAPARIN (LOVENOX) 40 MG/0.4ML INJECTION    Inject 0.4 mLs into the skin daily for 14 days    EPINEPHRINE HCL, ANAPHYLAXIS, (EPIPEN IM)    Inject into the muscle    FLAXSEED, LINSEED, (FLAXSEED OIL PO)    Take 1,500 mg by mouth every morning    KETOROLAC (TORADOL) 10 MG TABLET    Take 1 tablet by mouth every 8 hours as needed for Pain    LABETALOL (NORMODYNE) 100 MG TABLET    200 mg 2 times daily     LEVOFLOXACIN (LEVAQUIN) 500 MG TABLET    Take 1 tablet by mouth daily for 10 days    LEVOTHYROXINE (SYNTHROID) 125 MCG TABLET    Take 125 mcg by mouth every morning    LYSINE 500 MG TABS    Take 500 mg by mouth nightly    MELATONIN 10 MG TABS    Take 1 tablet by mouth nightly    METOCLOPRAMIDE (REGLAN) 10 MG TABLET CATHETERIZATION      CERVICAL DISCECTOMY      Spinal     CERVICAL LAMINECTOMY      C1 with chiari decompression     COLONOSCOPY      5 yrs - onset: May 31, 2011     CRANIOTOMY      POPLITEAL SYNOVIAL CYST EXCISION      TUBAL LIGATION      US GUIDED BREAST BIOPSY RIGHT COMPLETE Right 2017       Family History   Problem Relation Age of Onset    Stroke Mother     Other Father         blood clot in vein & CABG     Heart disease Father     Prostate cancer Father     Hearing loss Father     Hypertension Father     Alcohol abuse Brother     Throat cancer Brother     Cancer Brother         Throat tongue    Hearing loss Brother     No Known Problems Sister     No Known Problems Daughter     Breast cancer Maternal Grandmother     No Known Problems Daughter     No Known Problems Sister     Crohn's disease Maternal Aunt     No Known Problems Maternal Aunt     No Known Problems Maternal Aunt     Colon cancer Paternal Aunt     No Known Problems Paternal [de-identified]     Colon cancer Son     No Known Problems Paternal Aunt     No Known Problems Paternal Aunt     Colon cancer Paternal Aunt     Cancer Paternal Aunt         Colon cancer    No Known Problems Maternal Grandfather     No Known Problems Paternal Grandmother     No Known Problems Paternal Grandfather     Kidney failure Brother         kidney failure d/t NSIADs on dialysis    Hearing loss Brother      I have reviewed and agree with the history as documented      E-Cigarette/Vaping    E-Cigarette Use Never User      E-Cigarette/Vaping Substances    Nicotine No     THC No     CBD No     Flavoring No     Other No     Unknown No      Social History     Tobacco Use    Smoking status: Former Smoker     Packs/day: 1 50     Years: 36 00     Pack years: 54 00     Types: Cigarettes     Quit date: 2008     Years since quittin 6    Smokeless tobacco: Never Used   Vaping Use    Vaping Use: Never used   Substance Use Topics    Alcohol use: Yes     Comment: social     Drug use: No       Review of Systems   Constitutional: Negative for fever  Respiratory: Positive for cough and shortness of breath  Neurological: Positive for headaches  All other systems reviewed and are negative  Physical Exam  Physical Exam  Vitals and nursing note reviewed  Constitutional:       Appearance: She is well-developed  HENT:      Head: Normocephalic and atraumatic  Right Ear: External ear normal       Left Ear: External ear normal       Nose: Nose normal    Eyes:      General: No scleral icterus  Extraocular Movements: Extraocular movements intact  Pupils: Pupils are equal, round, and reactive to light  Comments: Visual fields intact in all 4 quadrants   Cardiovascular:      Rate and Rhythm: Normal rate  Pulmonary:      Effort: Pulmonary effort is normal  No respiratory distress  Breath sounds: Wheezing present  Comments: Mild wheezing on right lung  Abdominal:      General: There is no distension  Musculoskeletal:         General: No deformity  Normal range of motion  Cervical back: Normal range of motion  Comments: 5/5 strength of bilateral upper and lower extremities  Skin:     Findings: No rash  Neurological:      General: No focal deficit present  Mental Status: She is alert and oriented to person, place, and time     Psychiatric:         Mood and Affect: Mood normal          Vital Signs  ED Triage Vitals [08/28/22 1812]   Temperature Pulse Respirations Blood Pressure SpO2   98 5 °F (36 9 °C) 75 20 (!) 236/98 94 %      Temp Source Heart Rate Source Patient Position - Orthostatic VS BP Location FiO2 (%)   Temporal Monitor Sitting Left arm --      Pain Score       9           Vitals:    08/28/22 2100 08/28/22 2105 08/28/22 2110 08/28/22 2130   BP: (!) 179/86 (!) 194/87 (!) 224/88 (!) 174/78   Pulse: 70 67 72 75   Patient Position - Orthostatic VS:             Visual Acuity  Visual Acuity Flowsheet Row Most Recent Value   L Pupil Size (mm) 4   R Pupil Size (mm) 4          ED Medications  Medications   vancomycin (VANCOCIN) 1500 mg in sodium chloride 0 9% 250 mL IVPB (has no administration in time range)   niCARdipine (CARDENE) 25 mg (STANDARD CONCENTRATION) in sodium chloride 0 9% 250 mL (7 5 mg/hr Intravenous Rate/Dose Change 8/28/22 2115)   desmopressin (DDAVP) 21 2 mcg in sodium chloride 0 9 % 50 mL IVPB (21 2 mcg Intravenous New Bag 8/28/22 2121)   potassium chloride (K-DUR,KLOR-CON) CR tablet 40 mEq (has no administration in time range)   cefTRIAXone (ROCEPHIN) IVPB (premix in dextrose) 1,000 mg 50 mL (0 mg Intravenous Stopped 8/28/22 2037)   sodium chloride 0 9 % bolus 1,000 mL (0 mL Intravenous Stopped 8/28/22 2030)   HYDROmorphone (DILAUDID) injection 0 5 mg (0 5 mg Intravenous Given 8/28/22 2025)   labetalol (NORMODYNE) injection 10 mg (10 mg Intravenous Given 8/28/22 2022)   labetalol (NORMODYNE) injection 10 mg (10 mg Intravenous Given 8/28/22 2038)   iohexol (OMNIPAQUE) 350 MG/ML injection (MULTI-DOSE) 65 mL (65 mL Intravenous Given 8/28/22 2107)       Diagnostic Studies  Results Reviewed     Procedure Component Value Units Date/Time    Phosphorus [[id number]]  (Abnormal) Collected: 08/28/22 1824    Lab Status: Final result Specimen: Blood from Arm, Right Updated: 08/28/22 2141     Phosphorus 2 0 mg/dL     Magnesium [[id number]]  (Normal) Collected: 08/28/22 1824    Lab Status: Final result Specimen: Blood from Arm, Right Updated: 08/28/22 2141     Magnesium 1 9 mg/dL     Legionella antigen, urine [[id number]]     Lab Status: No result Specimen: Urine     Sputum culture and Gram stain [[id number]]     Lab Status: No result Specimen: Sputum     Strep Pneumoniae, Urine [[id number]]     Lab Status: No result Specimen: Urine     HS Troponin I 2hr [[id number]]  (Normal) Collected: 08/28/22 2006    Lab Status: Final result Specimen: Blood from Arm, Right Updated: 08/28/22 2038     hs TnI 2hr 9 ng/L Xiang Forde on 2/23/2018 2:43 PM          LABS:   Labs Reviewed   PROCALCITONIN - Abnormal; Notable for the following:        Result Value    Procalcitonin 0.43 (*)     All other components within normal limits   URINE WITH REFLEXED MICRO - Abnormal; Notable for the following:     Bilirubin Urine SMALL (*)     Ketones, Urine 40 (*)     Protein,  (*)     Leukocyte Esterase, Urine SMALL (*)     Color, UA DK YELLOW (*)     Character, Urine CLOUDY (*)     All other components within normal limits   RAPID INFLUENZA A/B ANTIGENS   CULTURE BLOOD #1   CULTURE BLOOD #2   THROAT CULTURE    Narrative:     Source: throat       Site:           Current Antibiotics: not stated   URINE CULTURE, REFLEXED   LACTIC ACID, PLASMA   GROUP A STREP, REFLEX   BILE ACIDS, TOTAL     All other unresulted laboratory test above are normal:    Vitals:    Vitals:    02/23/18 1346   BP: 122/71   Pulse: 90   Resp: 24   Temp: 100.7 °F (38.2 °C)   TempSrc: Oral   SpO2: 98%   Weight: (!) 320 lb (145.2 kg)   Height: 5' 9\" (1.753 m)       EMERGENCY DEPARTMENT COURSE:    Medications   0.9 % sodium chloride infusion (not administered)   cefTRIAXone (ROCEPHIN) 1 g in sterile water 10 mL IV syringe (not administered)   0.9 % sodium chloride bolus (500 mLs Intravenous New Bag 2/23/18 1522)   ondansetron (ZOFRAN) injection 4 mg (4 mg Intravenous Given 2/23/18 1522)   acetaminophen (TYLENOL) suppository 650 mg (650 mg Rectal Given 2/23/18 1550)       The pt was seen and evaluated by me. Within the department, I observed the pt's vital signs to be within acceptable range. Laboratory and Radiological studies were performed, results were reviewed with the patient. Within the department, the pt was treated with Tylenol, IV fluids and Zofran Rocephin. I explained my proposed course of treatment to the pt and her family, and they were amenable to my decision. The patient is being admitted under Dr. Mary Jane Roldan. CRITICAL CARE:   None.     CONSULTS:   Delta 2hr hsTnI 2 ng/L     Procalcitonin [[id number]]  (Normal) Collected: 08/28/22 1951    Lab Status: Final result Specimen: Blood from Arm, Right Updated: 08/28/22 2028     Procalcitonin 0 06 ng/ml     Lactic Acid [[id number]]  (Normal) Collected: 08/28/22 1951    Lab Status: Final result Specimen: Blood from Arm, Right Updated: 08/28/22 2021     LACTIC ACID 1 0 mmol/L     Narrative:      Result may be elevated if tourniquet was used during collection  Danial Lanier [[id number]]  (Normal) Collected: 08/28/22 1951    Lab Status: Final result Specimen: Blood from Arm, Right Updated: 08/28/22 2013     Protime 12 9 seconds      INR 0 91    APTT [[id number]]  (Normal) Collected: 08/28/22 1951    Lab Status: Final result Specimen: Blood from Arm, Right Updated: 08/28/22 2013     PTT 25 seconds     Blood culture #1 [[id number]] Collected: 08/28/22 2006    Lab Status: In process Specimen: Blood from Arm, Right Updated: 08/28/22 2011    HS Troponin I 4hr [[id number]]     Lab Status: No result Specimen: Blood     UA w Reflex to Microscopic w Reflex to Culture [[id number]] Collected: 08/28/22 1951    Lab Status: Final result Specimen: Urine, Clean Catch Updated: 08/28/22 2002     Color, UA Yellow     Clarity, UA Clear     Specific Gravity, UA 1 010     pH, UA 7 0     Leukocytes, UA Negative     Nitrite, UA Negative     Protein, UA Negative mg/dl      Glucose, UA Negative mg/dl      Ketones, UA Negative mg/dl      Urobilinogen, UA 0 2 E U /dl      Bilirubin, UA Negative     Occult Blood, UA Negative    Blood culture #2 [[id number]] Collected: 08/28/22 1951    Lab Status:  In process Specimen: Blood from Arm, Right Updated: 08/28/22 1958    CBC and differential [[id number]]  (Abnormal) Collected: 08/28/22 1824    Lab Status: Final result Specimen: Blood from Arm, Right Updated: 08/28/22 1934     WBC 14 02 Thousand/uL      RBC 4 34 Million/uL      Hemoglobin 13 9 g/dL      Hematocrit 41 1 %      MCV 95 fL      MCH 32 0 pg MCHC 33 8 g/dL      RDW 13 5 %      MPV 8 5 fL      Platelets 835 Thousands/uL     Narrative: This is an appended report  These results have been appended to a previously verified report      Manual Differential(PHLEBS Do Not Order) [[id number]]  (Abnormal) Collected: 08/28/22 1824    Lab Status: Final result Specimen: Blood from Arm, Right Updated: 08/28/22 1934     Segmented % 75 %      Bands % 1 %      Lymphocytes % 14 %      Monocytes % 8 %      Eosinophils, % 0 %      Basophils % 0 %      Metamyelocytes% 1 %      Myelocytes % 1 %      Absolute Neutrophils 10 66 Thousand/uL      Lymphocytes Absolute 1 96 Thousand/uL      Monocytes Absolute 1 12 Thousand/uL      Eosinophils Absolute 0 00 Thousand/uL      Basophils Absolute 0 00 Thousand/uL      Total Counted --     RBC Morphology Normal     Platelet Estimate Increased    HS Troponin 0hr (reflex protocol) [[id number]]  (Normal) Collected: 08/28/22 1824    Lab Status: Final result Specimen: Blood from Arm, Right Updated: 08/28/22 1855     hs TnI 0hr 7 ng/L     Comprehensive metabolic panel [[id number]]  (Abnormal) Collected: 08/28/22 1824    Lab Status: Final result Specimen: Blood from Arm, Right Updated: 08/28/22 1847     Sodium 137 mmol/L      Potassium 3 2 mmol/L      Chloride 100 mmol/L      CO2 26 mmol/L      ANION GAP 11 mmol/L      BUN 21 mg/dL      Creatinine 1 16 mg/dL      Glucose 121 mg/dL      Calcium 9 2 mg/dL      Corrected Calcium 10 1 mg/dL      AST 23 U/L      ALT 53 U/L      Alkaline Phosphatase 119 U/L      Total Protein 7 2 g/dL      Albumin 2 9 g/dL      Total Bilirubin 0 50 mg/dL      eGFR 48 ml/min/1 73sq m     Narrative:      [facility] guidelines for Chronic Kidney Disease (CKD):     Stage 1 with normal or high GFR (GFR > 90 mL/min/1 73 square meters)    Stage 2 Mild CKD (GFR = 60-89 mL/min/1 73 square meters)    Stage 3A Moderate CKD (GFR = 45-59 mL/min/1 73 square meters)    Stage 3B Moderate CKD (GFR = 30-44 mL/min/1 73 square meters)    Stage 4 Severe CKD (GFR = 15-29 mL/min/1 73 square meters)    Stage 5 End Stage CKD (GFR <15 mL/min/1 73 square meters)  Note: GFR calculation is accurate only with a steady state creatinine                 CT head without contrast   Final Result by Aman Miranda MD (08/28 2020)      Right parietal parenchymal hemorrhage associated with subarachnoid hemorrhage  Evaluation with MRI brain should be obtained to assess if this represents a hemorrhagic infarct and exclude an underlying mass  There was no vascular abnormality at this level on prior MRI  I personally discussed this study with Helena Alvarez on 8/28/2022 at 8:20 PM                            Workstation performed: [id number]         XR chest 1 view portable    (Results Pending)   CTA head and neck with and without contrast    (Results Pending)              Procedures  CriticalCare Time  Performed by: TED NAJERA DO  Authorized by:  TED NAJERA DO     Critical care provider statement:     Critical care time (minutes):  45    Critical care time was exclusive of:  Separately billable procedures and treating other patients and teaching time    Critical care was necessary to treat or prevent imminent or life-threatening deterioration of the following conditions:  CNS failure or compromise and sepsis    Critical care was time spent personally by me on the following activities:  Blood draw for specimens, obtaining history from patient or surrogate, development of treatment plan with patient or surrogate, discussions with consultants, discussions with primary provider, evaluation of patient's response to treatment, ordering and performing treatments and interventions, ordering and review of laboratory studies, ordering and review of radiographic studies, re-evaluation of patient's condition, review of old charts and examination of patient             ED Course                               SBIRT 20yo+ Flowsheet Row Most Recent Value   SBIRT (23 yo +)    In order to provide better care to our patients, we are screening all of our patients for alcohol and drug use  Would it be okay to ask you these screening questions? No Filed at: 08/28/2022 1823                    MDM  Number of Diagnoses or Management Options  Headache: new and requires workup  ICH (intracerebral hemorrhage) [facility]): new and requires workup  Diagnosis management comments:   49-year-old female presenting with headache, cough, shortness of breath  Chest x-ray with evidence of pneumonia which she has partially treated for  Will add sepsis evaluation  Given sudden onset of headache and hypertension, obtain CT head  CT reveals parenchymal hemorrhage with surrounding subarachnoid  Discussed case with Critical  At [facility] and also here at [facility]  Medically manage,  Neuro checks  Admit to ICU  Amount and/or Complexity of Data Reviewed  Clinical lab tests: reviewed and ordered  Tests in the radiology section of CPT®: reviewed and ordered  Tests in the medicine section of CPT®: ordered and reviewed  Decide to obtain previous medical records or to obtain history from someone other than the patient: yes  Review and summarize past medical records: yes        Disposition  Final diagnoses:   ICH (intracerebral hemorrhage) ([facility] [address] )   Headache     Time reflects when diagnosis was documented in both MDM as applicable and the Disposition within this note     Time User Action Codes Description Comment    8/28/2022  8:19 PM Dimas Isaacs [[id number]] [address] (intracerebral hemorrhage) ([facility] [address] )     8/28/2022  8:19 PM Dimas Isaacs [[id number]] Headache       ED Disposition     ED Disposition   Admit    Condition   Stable    Date/Time   Sun Aug 28, 2022  9:17 PM    Comment   Case was discussed with ICU and the patient's admission status was agreed to be Admission Status: inpatient status to the service of Dr Marisol Dennis              Follow-up Information    None         Patient's Medications   Discharge Prescriptions    No medications on file       No discharge procedures on file      PDMP Review       Value Time User    PDMP Reviewed  Yes 4/10/2022  3:22 PM Hossein Wilson DO          ED Provider  Electronically Signed by           Wilmar Nolen DO  08/28/22 7437

## 2022-08-29 NOTE — ASSESSMENT & PLAN NOTE
· No acute excerbation  · Patient with documented history of COPD; does not follow with Pulmonologist   · Home medication regimen: flonase, advair 100-50 bid, albuterol prn  · Plan to continue Breo (advair nonformulary) while admitted to hospital  · Xopenex/Atrovent nebs per respiratory protocol  · Former smoker -- quit 14 years ago  · PFT 7/7/2019 --   · FEV1/FVC Ratio: 62 %  · Forced Vital Capacity: 2 41 L    74 % predicted  · FEV1: 1 49 L     60 % predicted  · Goal spo2 > 90%

## 2022-08-29 NOTE — ASSESSMENT & PLAN NOTE
· POA AEB tachypnea, leukocytosis   · CXR 8/28 concerning for RUL infiltrate   · Patient with 1 week of fevers, increased cough, sob at home and was prescribed doxycycline and prednisone for poss pneumonia   · Notably, patient has recurrent aspiration and is following with GI as OP, recent esophageal dilation however patient still reports coughing and choking with thin liquids and some solids   · LA 1 0, procal 0 06  · BC x2, strep, legionella, sputum cx pending   · Strict NPO, supervised sips with necessary medications   · Given ceftriaxone and vanc in ER -- will continue abx therapy with Cefepime while we await cultures   · No previous culture data for review   · Afebrile, HD stable  · Abx D1: Cefepime D1

## 2022-08-29 NOTE — ASSESSMENT & PLAN NOTE
· Presented to the ER with HTN, max 231/110  · Patient reports HTN noted at 1430 today at home, denies missing doses of her home medications   · In ER, labetalol IV x2 without improvement, started on Cardene gtt for strict BP control   · goal SBP <140-160 per neurosurgery   · Off cardene gtt

## 2022-08-30 PROBLEM — I51.81 TAKOTSUBO CARDIOMYOPATHY: Status: ACTIVE | Noted: 2017-06-15

## 2022-08-30 PROBLEM — I65.21 STENOSIS OF RIGHT CAROTID ARTERY: Status: ACTIVE | Noted: 2022-08-30

## 2022-08-30 LAB
A1AT SERPL-MCNC: 241 MG/DL (ref 101–187)
ANION GAP SERPL CALCULATED.3IONS-SCNC: 9 MMOL/L (ref 4–13)
BASOPHILS # BLD MANUAL: 0 THOUSAND/UL (ref 0–0.1)
BASOPHILS NFR MAR MANUAL: 0 % (ref 0–1)
BUN SERPL-MCNC: 17 MG/DL (ref 5–25)
CALCIUM SERPL-MCNC: 8.6 MG/DL (ref 8.3–10.1)
CHLORIDE SERPL-SCNC: 100 MMOL/L (ref 96–108)
CHOLEST SERPL-MCNC: 99 MG/DL
CO2 SERPL-SCNC: 23 MMOL/L (ref 21–32)
CREAT SERPL-MCNC: 1.03 MG/DL (ref 0.6–1.3)
EOSINOPHIL # BLD MANUAL: 0.24 THOUSAND/UL (ref 0–0.4)
EOSINOPHIL NFR BLD MANUAL: 4 % (ref 0–6)
ERYTHROCYTE [DISTWIDTH] IN BLOOD BY AUTOMATED COUNT: 13.7 % (ref 11.6–15.1)
GFR SERPL CREATININE-BSD FRML MDRD: 55 ML/MIN/1.73SQ M
GLUCOSE SERPL-MCNC: 105 MG/DL (ref 65–140)
HCT VFR BLD AUTO: 39.5 % (ref 34.8–46.1)
HDLC SERPL-MCNC: 54 MG/DL
HGB BLD-MCNC: 12.3 G/DL (ref 11.5–15.4)
LDLC SERPL CALC-MCNC: 34 MG/DL (ref 0–100)
LYMPHOCYTES # BLD AUTO: 0.67 THOUSAND/UL (ref 0.6–4.47)
LYMPHOCYTES # BLD AUTO: 11 % (ref 14–44)
MCH RBC QN AUTO: 31.2 PG (ref 26.8–34.3)
MCHC RBC AUTO-ENTMCNC: 31.1 G/DL (ref 31.4–37.4)
MCV RBC AUTO: 100 FL (ref 82–98)
MONOCYTES # BLD AUTO: 0.06 THOUSAND/UL (ref 0–1.22)
MONOCYTES NFR BLD: 1 % (ref 4–12)
MRSA NOSE QL CULT: NORMAL
NEUTROPHILS # BLD MANUAL: 5.14 THOUSAND/UL (ref 1.85–7.62)
NEUTS SEG NFR BLD AUTO: 84 % (ref 43–75)
PLATELET # BLD AUTO: 341 THOUSANDS/UL (ref 149–390)
PLATELET BLD QL SMEAR: ADEQUATE
PMV BLD AUTO: 8.5 FL (ref 8.9–12.7)
POTASSIUM SERPL-SCNC: 4.7 MMOL/L (ref 3.5–5.3)
RBC # BLD AUTO: 3.94 MILLION/UL (ref 3.81–5.12)
SODIUM SERPL-SCNC: 132 MMOL/L (ref 135–147)
TRIGL SERPL-MCNC: 56 MG/DL
WBC # BLD AUTO: 6.12 THOUSAND/UL (ref 4.31–10.16)

## 2022-08-30 PROCEDURE — 94640 AIRWAY INHALATION TREATMENT: CPT

## 2022-08-30 PROCEDURE — 99232 SBSQ HOSP IP/OBS MODERATE 35: CPT | Performed by: PSYCHIATRY & NEUROLOGY

## 2022-08-30 PROCEDURE — 99232 SBSQ HOSP IP/OBS MODERATE 35: CPT | Performed by: INTERNAL MEDICINE

## 2022-08-30 PROCEDURE — 97163 PT EVAL HIGH COMPLEX 45 MIN: CPT

## 2022-08-30 PROCEDURE — 85007 BL SMEAR W/DIFF WBC COUNT: CPT | Performed by: PHYSICIAN ASSISTANT

## 2022-08-30 PROCEDURE — 80061 LIPID PANEL: CPT | Performed by: PHYSICIAN ASSISTANT

## 2022-08-30 PROCEDURE — 80048 BASIC METABOLIC PNL TOTAL CA: CPT | Performed by: PHYSICIAN ASSISTANT

## 2022-08-30 PROCEDURE — 97166 OT EVAL MOD COMPLEX 45 MIN: CPT

## 2022-08-30 PROCEDURE — 85027 COMPLETE CBC AUTOMATED: CPT | Performed by: PHYSICIAN ASSISTANT

## 2022-08-30 PROCEDURE — 94760 N-INVAS EAR/PLS OXIMETRY 1: CPT

## 2022-08-30 RX ORDER — LOSARTAN POTASSIUM 50 MG/1
50 TABLET ORAL 2 TIMES DAILY
Status: DISCONTINUED | OUTPATIENT
Start: 2022-08-30 | End: 2022-08-31 | Stop reason: HOSPADM

## 2022-08-30 RX ADMIN — PANTOPRAZOLE SODIUM 40 MG: 40 TABLET, DELAYED RELEASE ORAL at 06:04

## 2022-08-30 RX ADMIN — FLUTICASONE FUROATE AND VILANTEROL TRIFENATATE 1 PUFF: 100; 25 POWDER RESPIRATORY (INHALATION) at 08:03

## 2022-08-30 RX ADMIN — LEVALBUTEROL HYDROCHLORIDE 1.25 MG: 1.25 SOLUTION, CONCENTRATE RESPIRATORY (INHALATION) at 01:15

## 2022-08-30 RX ADMIN — IPRATROPIUM BROMIDE 0.5 MG: 0.5 SOLUTION RESPIRATORY (INHALATION) at 01:15

## 2022-08-30 RX ADMIN — ESCITALOPRAM OXALATE 10 MG: 10 TABLET ORAL at 08:03

## 2022-08-30 RX ADMIN — Medication 1 TABLET: at 08:02

## 2022-08-30 RX ADMIN — METRONIDAZOLE 500 MG: 500 INJECTION, SOLUTION INTRAVENOUS at 16:30

## 2022-08-30 RX ADMIN — GABAPENTIN 800 MG: 400 CAPSULE ORAL at 08:03

## 2022-08-30 RX ADMIN — Medication 1 TABLET: at 16:30

## 2022-08-30 RX ADMIN — IPRATROPIUM BROMIDE 0.5 MG: 0.5 SOLUTION RESPIRATORY (INHALATION) at 07:36

## 2022-08-30 RX ADMIN — ACETAMINOPHEN 325MG 650 MG: 325 TABLET ORAL at 16:30

## 2022-08-30 RX ADMIN — LEVETIRACETAM 500 MG: 500 INJECTION, SOLUTION INTRAVENOUS at 08:01

## 2022-08-30 RX ADMIN — CARVEDILOL 6.25 MG: 3.12 TABLET, FILM COATED ORAL at 16:30

## 2022-08-30 RX ADMIN — LEVALBUTEROL HYDROCHLORIDE 1.25 MG: 1.25 SOLUTION, CONCENTRATE RESPIRATORY (INHALATION) at 07:36

## 2022-08-30 RX ADMIN — LOSARTAN POTASSIUM 50 MG: 50 TABLET, FILM COATED ORAL at 21:20

## 2022-08-30 RX ADMIN — CARVEDILOL 6.25 MG: 3.12 TABLET, FILM COATED ORAL at 08:02

## 2022-08-30 RX ADMIN — METRONIDAZOLE 500 MG: 500 INJECTION, SOLUTION INTRAVENOUS at 23:53

## 2022-08-30 RX ADMIN — ATORVASTATIN CALCIUM 80 MG: 40 TABLET, FILM COATED ORAL at 16:30

## 2022-08-30 RX ADMIN — AZITHROMYCIN MONOHYDRATE 500 MG: 500 INJECTION, POWDER, LYOPHILIZED, FOR SOLUTION INTRAVENOUS at 06:04

## 2022-08-30 RX ADMIN — METRONIDAZOLE 500 MG: 500 INJECTION, SOLUTION INTRAVENOUS at 08:02

## 2022-08-30 RX ADMIN — METRONIDAZOLE 500 MG: 500 INJECTION, SOLUTION INTRAVENOUS at 00:17

## 2022-08-30 RX ADMIN — LOSARTAN POTASSIUM 50 MG: 50 TABLET, FILM COATED ORAL at 08:02

## 2022-08-30 RX ADMIN — GABAPENTIN 800 MG: 400 CAPSULE ORAL at 17:36

## 2022-08-30 RX ADMIN — ISOSORBIDE MONONITRATE 30 MG: 30 TABLET, EXTENDED RELEASE ORAL at 08:02

## 2022-08-30 RX ADMIN — CEFTRIAXONE 1000 MG: 1 INJECTION, SOLUTION INTRAVENOUS at 20:32

## 2022-08-30 NOTE — ASSESSMENT & PLAN NOTE
· Reported to neurology as intermittent occurrence as outpatient  · Neurology work up to r/o focal motor seizure vs potential lesion on the 7th cranial nerve  · Still needs EEG  · MRI completed   · No twitching appreciated on exam

## 2022-08-30 NOTE — ASSESSMENT & PLAN NOTE
· Presented to the ER with HTN emegency, in setting of ICH/SAH (Max 231/110)    · In ER, labetalol IV x2 without improvement, started on Cardene gtt for strict BP control   · SBP target 120-160, currently stable  · Cardene gtt weaned off 8/28-8/29 overnight, Labetolol 10 mg IV q 4 prn  · C/w Coreg/imdur/cozaar (increased from home dose to BID on 8/30)

## 2022-08-30 NOTE — PLAN OF CARE
Problem: Potential for Falls  Goal: Patient will remain free of falls  Description: INTERVENTIONS:  - Educate patient/family on patient safety including physical limitations  - Instruct patient to call for assistance with activity   - Consult OT/PT to assist with strengthening/mobility   - Keep Call bell within reach  - Keep bed low and locked with side rails adjusted as appropriate  - Keep care items and personal belongings within reach  - Initiate and maintain comfort rounds  - Make Fall Risk Sign visible to staff    - Apply yellow socks and bracelet for high fall risk patients  - Consider moving patient to room near nurses station  Outcome: Progressing     Problem: MOBILITY - ADULT  Goal: Maintain or return to baseline ADL function  Description: INTERVENTIONS:  -  Assess patient's ability to carry out ADLs; assess patient's baseline for ADL function and identify physical deficits which impact ability to perform ADLs (bathing, care of mouth/teeth, toileting, grooming, dressing, etc )  - Assess/evaluate cause of self-care deficits   - Assess range of motion  - Assess patient's mobility; develop plan if impaired  - Assess patient's need for assistive devices and provide as appropriate  - Encourage maximum independence but intervene and supervise when necessary  - Involve family in performance of ADLs  - Assess for home care needs following discharge   - Consider OT consult to assist with ADL evaluation and planning for discharge  - Provide patient education as appropriate  Outcome: Progressing  Goal: Maintains/Returns to pre admission functional level  Description: INTERVENTIONS:  - Perform BMAT or MOVE assessment daily    - Set and communicate daily mobility goal to care team and patient/family/caregiver     - Collaborate with rehabilitation services on mobility goals if consulted    - Out of bed for toileting  - Record patient progress and toleration of activity level   Outcome: Progressing     Problem: PAIN - ADULT  Goal: Verbalizes/displays adequate comfort level or baseline comfort level  Description: Interventions:  - Encourage patient to monitor pain and request assistance  - Assess pain using appropriate pain scale  - Administer analgesics based on type and severity of pain and evaluate response  - Implement non-pharmacological measures as appropriate and evaluate response  - Consider cultural and social influences on pain and pain management  - Notify physician/advanced practitioner if interventions unsuccessful or patient reports new pain  Outcome: Progressing     Problem: INFECTION - ADULT  Goal: Absence or prevention of progression during hospitalization  Description: INTERVENTIONS:  - Assess and monitor for signs and symptoms of infection  - Monitor lab/diagnostic results  - Monitor all insertion sites, i e  indwelling lines, tubes, and drains  - Monitor endotracheal if appropriate and nasal secretions for changes in amount and color  - [facility] appropriate cooling/warming therapies per order  - Administer medications as ordered  - Instruct and encourage patient and family to use good hand hygiene technique  - Identify and instruct in appropriate isolation precautions for identified infection/condition  Outcome: Progressing  Goal: Absence of fever/infection during neutropenic period  Description: INTERVENTIONS:  - Monitor WBC    Outcome: Progressing     Problem: SAFETY ADULT  Goal: Patient will remain free of falls  Description: INTERVENTIONS:  - Educate patient/family on patient safety including physical limitations  - Instruct patient to call for assistance with activity   - Consult OT/PT to assist with strengthening/mobility   - Keep Call bell within reach  - Keep bed low and locked with side rails adjusted as appropriate  - Keep care items and personal belongings within reach  - Initiate and maintain comfort rounds  - Make Fall Risk Sign visible to staff    - Apply yellow socks and bracelet for high fall risk patients  - Consider moving patient to room near nurses station  Outcome: Progressing  Goal: Maintain or return to baseline ADL function  Description: INTERVENTIONS:  -  Assess patient's ability to carry out ADLs; assess patient's baseline for ADL function and identify physical deficits which impact ability to perform ADLs (bathing, care of mouth/teeth, toileting, grooming, dressing, etc )  - Assess/evaluate cause of self-care deficits   - Assess range of motion  - Assess patient's mobility; develop plan if impaired  - Assess patient's need for assistive devices and provide as appropriate  - Encourage maximum independence but intervene and supervise when necessary  - Involve family in performance of ADLs  - Assess for home care needs following discharge   - Consider OT consult to assist with ADL evaluation and planning for discharge  - Provide patient education as appropriate  Outcome: Progressing  Goal: Maintains/Returns to pre admission functional level  Description: INTERVENTIONS:  - Perform BMAT or MOVE assessment daily    - Set and communicate daily mobility goal to care team and patient/family/caregiver     - Collaborate with rehabilitation services on mobility goals if consulted    - Out of bed for toileting  - Record patient progress and toleration of activity level   Outcome: Progressing     Problem: DISCHARGE PLANNING  Goal: Discharge to home or other facility with appropriate resources  Description: INTERVENTIONS:  - Identify barriers to discharge w/patient and caregiver  - Arrange for needed discharge resources and transportation as appropriate  - Identify discharge learning needs (meds, wound care, etc )  - Arrange for interpretive services to assist at discharge as needed  - Refer to Case Management Department for coordinating discharge planning if the patient needs post-hospital services based on physician/advanced practitioner order or complex needs related to functional status, cognitive ability, or social support system  Outcome: Progressing     Problem: Knowledge Deficit  Goal: Patient/family/caregiver demonstrates understanding of disease process, treatment plan, medications, and discharge instructions  Description: Complete learning assessment and assess knowledge base  Interventions:  - Provide teaching at level of understanding  - Provide teaching via preferred learning methods  Outcome: Progressing     Problem: Neurological Deficit  Goal: Neurological status is stable or improving  Description: Interventions:  - Monitor and assess patient's level of consciousness, motor function, sensory function, and level of assistance needed for ADLs  - Monitor and report changes from baseline  Collaborate with interdisciplinary team to initiate plan and implement interventions as ordered  - Provide and maintain a safe environment  - Consider seizure precautions  - Consider fall precautions  - Consider aspiration precautions  - Consider bleeding precautions  Outcome: Progressing     Problem: Activity Intolerance/Impaired Mobility  Goal: Mobility/activity is maintained at optimum level for patient  Description: Interventions:  - Assess and monitor patient  barriers to mobility and need for assistive/adaptive devices  - Assess patient's emotional response to limitations  - Collaborate with interdisciplinary team and initiate plans and interventions as ordered  - Encourage independent activity per ability   - Maintain proper body alignment  - Perform active/passive rom as tolerated/ordered  - Plan activities to conserve energy   - Turn patient as appropriate  Outcome: Progressing     Problem: Communication Impairment  Goal: Ability to express needs and understand communication  Description: Assess patient's communication skills and ability to understand information    Patient will demonstrate use of effective communication techniques, alternative methods of communication and understanding even if not able to speak  - Encourage communication and provide alternate methods of communication as needed  - Collaborate with case management/ for discharge needs  - Include patient/family/caregiver in decisions related to communication  Outcome: Progressing     Problem: Potential for Aspiration  Goal: Non-ventilated patient's risk of aspiration is minimized  Description: Assess and monitor vital signs, respiratory status, and labs (WBC)  Monitor for signs of aspiration (tachypnea, cough, rales, wheezing, cyanosis, fever)  - Assess and monitor patient's ability to swallow  - Place patient up in chair to eat if possible  - HOB up at 90 degrees to eat if unable to get patient up into chair   - Supervise patient during oral intake  - Instruct patient/ family to take small bites  - Instruct patient/ family to take small single sips when taking liquids  - Follow patient-specific strategies generated by speech pathologist   Outcome: Progressing  Goal: Ventilated patient's risk of aspiration is minimized  Description: Assess and monitor vital signs, respiratory status, airway cuff pressure, and labs (WBC)  Monitor for signs of aspiration (tachypnea, cough, rales, wheezing, cyanosis, fever)  - Elevate head of bed 30 degrees if patient has tube feeding   - Monitor tube feeding  Outcome: Progressing     Problem: Nutrition  Goal: Nutrition/Hydration status is improving  Description: Monitor and assess patient's nutrition/hydration status for malnutrition (ex- brittle hair, bruises, dry skin, pale skin and conjunctiva, muscle wasting, smooth red tongue, and disorientation)  Collaborate with interdisciplinary team and initiate plan and interventions as ordered  Monitor patient's weight and dietary intake as ordered or per policy  Utilize nutrition screening tool and intervene per policy  Determine patient's food preferences and provide high-protein, high-caloric foods as appropriate       - Assist patient with eating   - Allow adequate time for meals   - Encourage patient to take dietary supplement as ordered  - Collaborate with clinical nutritionist   - Include patient/family/caregiver in decisions related to nutrition  Outcome: Progressing     Problem: Prexisting or High Potential for Compromised Skin Integrity  Goal: Skin integrity is maintained or improved  Description: INTERVENTIONS:  - Identify patients at risk for skin breakdown  - Assess and monitor skin integrity  - Assess and monitor nutrition and hydration status  - Monitor labs   - Assess for incontinence   - Turn and reposition patient  - Assist with mobility/ambulation  - Relieve pressure over bony prominences  - Avoid friction and shearing  - Provide appropriate hygiene as needed including keeping skin clean and dry  - Evaluate need for skin moisturizer/barrier cream  - Collaborate with interdisciplinary team   - Patient/family teaching  - Consider wound care consult   Outcome: Progressing     Problem: Nutrition/Hydration-ADULT  Goal: Nutrient/Hydration intake appropriate for improving, restoring or maintaining nutritional needs  Description: Monitor and assess patient's nutrition/hydration status for malnutrition  Collaborate with interdisciplinary team and initiate plan and interventions as ordered  Monitor patient's weight and dietary intake as ordered or per policy  Utilize nutrition screening tool and intervene as necessary  Determine patient's food preferences and provide high-protein, high-caloric foods as appropriate       INTERVENTIONS:  - Monitor oral intake, urinary output, labs, and treatment plans  - Assess nutrition and hydration status and recommend course of action  - Evaluate amount of meals eaten  - Assist patient with eating if necessary   - Allow adequate time for meals  - Recommend/ encourage appropriate diets, oral nutritional supplements, and vitamin/mineral supplements  - Order, calculate, and assess calorie counts as needed  - Recommend, monitor, and adjust tube feedings and TPN/PPN based on assessed needs  - Assess need for intravenous fluids  - Provide specific nutrition/hydration education as appropriate  - Include patient/family/caregiver in decisions related to nutrition  Outcome: Progressing

## 2022-08-30 NOTE — ASSESSMENT & PLAN NOTE
· Hx of CAD & Takutsubo, follows with [facility]  · CTA - Short segment of severe stenosis noted at the distal right cervical ICA segment  Mild to moderate atherosclerotic plaquing of both carotid bulbs causing less than 50% stenosis  Moderate to severe stenosis at the origin of the left vertebral artery  Dominant right vertebral artery  · TTE 2019 -- LVEF 55% with no RWMA; LV diastolic function normal    · NM Cardiac Stress Test 2019 -- Normal study after maximal exercise without reproduction of symptoms   Myocardial perfusion imaging was normal at rest and with stress    · Home Plavix stopped secondary to ICH/SAH, 8/28 DDVAP x1  · Lipid panel: wnl  · HgbA1c:  6 2, prediabetic  · Plavix on hold until repeat CTH in 3 wks  · 8/29 Losartan, Coreg, Lipitor, and Imdur restarted   · Restart home Lipitor none

## 2022-08-30 NOTE — PHYSICAL THERAPY NOTE
PHYSICAL THERAPY Evaluation    Performed at least 2 patient identifiers during session:  Patient Active Problem List   Diagnosis    De Quervain's disease (tenosynovitis)    Essential hypertension    Takotsubo syndrome    Sudden idiopathic hearing loss of right ear    Cervical stenosis of spinal canal    Plantar fasciitis    Dyslipidemia    Depression with anxiety    Takotsubo cardiomyopathy    Carpal tunnel syndrome    Arnold-Chiari malformation (HCC)    Pancreatic cyst    Emphysema    GERD (gastroesophageal reflux disease)    Stage 3a chronic kidney disease (HCC)    Facial twitching    Subarachnoid hemorrhage ([address] )    Accelerated hypertension    Community acquired pneumonia of right upper lobe of lung    Dysphagia    Stenosis of right carotid artery       Past Medical History:   Diagnosis Date    Angina pectoris ([address] )     Arnold-Chiari malformation (HCC)     Breast lump     last assessed: Jan 22, 2013     Coronary artery disease     Depression with anxiety     GERD (gastroesophageal reflux disease)     Gout     last assessed: Nov 26, 2012     Hair loss     last assessed: Dec 15, 2016     Hypertension     Hypotension     last assessed: Nov 10, 2014     Mitral valve disorder     Myocardial infarct, old     SVT (supraventricular tachycardia) ([facility] [address] )        Past Surgical History:   Procedure Laterality Date    BREAST BIOPSY Right 02/28/2017    US CORE BIOPSY--BENIGN    CARDIAC CATHETERIZATION      CERVICAL DISCECTOMY      Spinal     CERVICAL LAMINECTOMY      C1 with chiari decompression     COLONOSCOPY      5 yrs - onset: May 31, 2011     CRANIOTOMY      POPLITEAL SYNOVIAL CYST EXCISION      TUBAL LIGATION      US GUIDED BREAST BIOPSY RIGHT COMPLETE Right 02/28/2017 08/30/22 1345   PT Last Visit   PT Visit Date 08/30/22   Note Type   Note type Evaluation   Pain Assessment   Pain Assessment Tool 0-10   Pain Score No Pain   Home Living Type of [address] One level  (1+1 ADENIKE)   Additional Comments no DME at baseline   Prior Function   Level of [place] Independent with ADLs and functional mobility   Lives With Spouse  (pt takes care of 3yo grandson daily)   Receives Help From Family   ADL Assistance Independent   IADLs Independent   Vocational Retired  (pt takes care of 3yo grandson daily)   General   Family/Caregiver Present No   Cognition   Overall Cognitive Status WFL   Arousal/Participation Alert   Orientation Level Oriented X4   Memory Within functional limits   Following Commands Follows all commands and directions without difficulty   Subjective   Subjective Speech hesitation and dysarthric at times but appears baseline  Pt with nystagmus noted at lateral gaze bilaterally and superior and inferior  Pt states this is baseline  RUE Assessment   RUE Assessment WFL   LUE Assessment   LUE Assessment WFL   RLE Assessment   RLE Assessment WFL   LLE Assessment   LLE Assessment WFL   Vestibular   Vestibular Comments Pt with nystagmus noted at lateral gaze bilaterally and superior and inferior  Pt states this is baseline     Coordination   Movements are Fluid and Coordinated 1   Finger to Nose & Finger to Finger  Intact   Heel to Shin Intact   Rapid Alternating Movements Intact   Bed Mobility   Additional Comments Pt on commode at start of session, remains seated in chair at end of session   Transfers   Sit to Stand 6  Modified independent   Stand to Sit 6  Modified independent   Stand pivot 6  Modified independent   Toilet transfer 6  Modified independent   Additional items Commode  (pt able to perform hygiene while standing and able to pull pants up without assistance )   Ambulation/Elevation   Gait pattern WNL   Gait Assistance 7  Independent   Assistive Device None   Distance 50ft without AD, no LOB, slow turns;  pt limited by fatigue, mild SOB, SpO2 88%;  88-89% throughout all mobility on room air   Balance   Static Sitting Normal   Dynamic Sitting Normal   Static Standing Good   Dynamic Standing Good   Ambulatory Good   Activity Tolerance   Activity Tolerance Patient limited by fatigue;Treatment limited secondary to medical complications (Comment)  (SpO2 88-89% on room air throughout all mobility this session)   Medical Staff Made Aware TEVIN Silveira   Nurse Made Aware Mariah   Assessment   Prognosis Good   Problem List Decreased endurance   Assessment Pt is a 69 y o  female who presented to ED 8/28/22 with c/o headache, recent PNA dx with cough and SOB, HTN  Dx:  Headache, ICH, subarachnoid hemorrhage, HTN, SIRS, PNA  Comorbidities affecting pt's physical performance at time of assessment include: CKD, emphysema, Chiari malformation, spinal cord lesion, facial twitching, neurosensory hearing loss  Personal factors affecting pt at time of IE include: SpO2, telemetry, SOB  PLOF and home set up listed above  Upon evaluation: Pt mod I for sit to stand, and mod I for ambulation without AD  Full objective findings from PT assessment regarding body systems outlined above  Pt appears to be functioning at baseline for mobility, limited by fatigue; Pt denies need for AD for energy conservation;  SpO2 88-89% throughout all mobility this session; No need for skilled PT;  Encourage ambulation TID and OOB for all meals  Will DC PT order  The patient's AM-PAC Basic Mobility Inpatient Short Form Raw Score is 24  A Raw score of greater than 17 suggests the patient may benefit from discharge to home  Please also refer to the recommendation of the Physical Therapist for safe discharge planning     Goals   Patient Goals to get better and go home   Recommendation   PT Discharge Recommendation No rehabilitation needs   Additional Comments pt appears to be mobilizing at baseline, no need for skilled PT;  will DC PT order;  encourage ambulation TID and OOB for all meals   AM-PAC Basic Mobility Inpatient   Turning in Bed Without Bedrails 4   Lying on Back to Sitting on Edge of Flat Bed 4   Moving Bed to Chair 4   Standing Up From Chair 4   Walk in Room 4   Climb 3-5 Stairs 4   Basic Mobility Inpatient Raw Score 24   Basic Mobility Standardized Score 57 68   Highest Level Of Mobility   JH-HLM Goal 8: Walk 250 feet or more   JH-HLM Achieved 7: Walk 25 feet or more     Eliel Zavala PT      Patient Name: Carlos Alberto Navarro  [id number] Date: 8/30/2022

## 2022-08-30 NOTE — ASSESSMENT & PLAN NOTE
Lab Results   Component Value Date    EGFR 54 08/31/2022    EGFR 55 08/30/2022    EGFR 54 08/29/2022    CREATININE 1 04 08/31/2022    CREATININE 1 03 08/30/2022    CREATININE 1 04 08/29/2022   · Baseline creat appears to be 1 1-1 3  Admission creat 1  16   · Currently at baseline     · Avoid hypotension, nephrotoxic agents   · Trend UO, creat closely

## 2022-08-30 NOTE — PROGRESS NOTES
[place]  Progress Note - Irvin Parsons 1955, 69 y o  female MRN: [id number]  Unit/Bed#: -01 Encounter: [id number]  Primary Care Provider: Ingrid Connolly DO   Date and time admitted to hospital: 8/28/2022  6:13 PM    * Subarachnoid hemorrhage [facility])  Assessment & Plan  · On day of admission, 8/28/2022, patient reported suddenly feeling unwell at 1230 while preparing to grocery shop, then started with sudden onset HA at 1430, accompanied by HTN at home and presented to the ER   · 8/28 CTH:  Right parietal parenchymal hemorrhage associated with subarachnoid hemorrhage  · 8/28 DDAVP x1 (Home Plavix)  · 8/28 CTA head/neck: short segment of severe stenosis noted at the distal right cervical ICA segment  Mild to moderate atherosclerotic plaquing of both carotid bulbs causing less than 50% stenosis  Moderate to severe stenosis at the origin of the left vertebral artery      · Etiology poss 2/2 increase in cough in the last week per hx from patient, HTN vs underlying mass  · Patient denies hx of previous bleeds, however states this is not the worst HA she has ever experienced, as she has a hx of Chiari malformation s/p surgical intervention in 2000, on chronic Neurontin   · Patient had been referred to [address] [place] [address] Neurology 7/2022 secondary to "facial twitching"- work-up ongoing/not completed  · EEG pending  · 8/29 MRI brain: pending  · Scheduled neuro exams   · Patient discussed with Dr Yannick Busch (Neuro[facility]- [facility]) at time of admission   · Neurosurgery contacted/consulted  · Neurology contacted/consulted  · Keppra for seizure ppx per neurosx -- 1g load, cont 500mg IV q12h  · No need for long term seizure ppx   · Consider just 7 days dosing   · SBP target 120-160  · Focus on tight BP control   · Cardene gtt weaned off 8/28-8/29 overnight  · Labetolol 10 mg IV q 4 prn  · 8/29 Home Cozaar, Coreg, and Imdur restarted    Accelerated hypertension  Assessment & Plan  · Presented to the ER with HTN emegency, in setting of ICH/SAH (Max 231/110)  · In ER, labetalol IV x2 without improvement, started on Cardene gtt for strict BP control   · SBP target 120-160  · Cardene gtt weaned off 8/28-8/29 overnight  · Labetolol 10 mg IV q 4 prn  · 8/29 Home Cozaar, Coreg, and Imdur restarted    Community acquired pneumonia of right upper lobe of lung  Assessment & Plan  · Presented on day of admission; tachypnea & leukocytosis   · CXR 8/28 concerning for RML infiltrate   · Patient with 1 week of fevers, increased cough, sob at home and was prescribed doxycycline and prednisone for poss pneumonia by PCP (8/22/2022)  · Patient has past medical history of recurrent aspiration and is following with GI as OP, recent esophageal dilation however patient still reports coughing and choking with thin liquids and some solids   · ST consult pending  · LA 1 0, procal 0 06 on admission  · BC x2, strep, legionella, sputum cx:  pending   · Ceftriaxone/Azithromyacin IV for now, MRSA swab pending   · T/C adding Flagyl in setting of recent/progressive dysphagia  · O2 NC to keep O2 sat > 92%      Emphysema  Assessment & Plan  · No acute excerbation  · Patient with documented history of COPD; does not follow with Pulmonologist   · Home medication regimen: flonase, advair 100-50 bid, albuterol prn  · Plan to continue Breo (advair nonformulary) while admitted to hospital  · Xopenex/Atrovent nebs per respiratory protocol  · Former smoker -- quit 14 years ago  · PFT 7/7/2019 --   · FEV1/FVC Ratio: 62 %  · Forced Vital Capacity: 2 41 L    74 % predicted  · FEV1: 1 49 L     60 % predicted  · Goal spo2 > 90%    Dysphagia  Assessment & Plan  Patient reports history of worsening dysphagia  Following with [facility]- work-up ongoing  · 5/19/22 EGD: Irregular Z-line 38 cm from the incisors; biopsy to assess for Doty's   Swartz dilatation empirically performed with 50 Western Claudia dilator for treatment of dysphagia  · Tolerating diet   · Aspiration precautions, elevate HOB    Coronary artery disease  Assessment & Plan  · Hx of CAD & Takutsubo, follows with [facility]   · Home Plavix stopped secondary to ICH/SAH   · 8/28 DDVAP x1  · 8/29 Losartan, Coreg, Lipitor, and Imdur restarted   · TTE 2019 -- LVEF 55% with no RWMA; LV diastolic function normal    · NM Cardiac Stress Test 2019 -- Normal study after maximal exercise without reproduction of symptoms  Myocardial perfusion imaging was normal at rest and with stress    Dyslipidemia  Assessment & Plan  · Restart home Lipitor  · Lipid panel:  · Cholesterol: 138  · Tri: 87  · HDL: 56  · LDL: 65    GERD (gastroesophageal reflux disease)  Assessment & Plan  · On home PPI therapy   · Follows with GI as OP for chronic dysphagia/ aspiration     Stage 3a chronic kidney disease [facility])  Assessment & Plan  Lab Results   Component Value Date    EGFR 48 08/28/2022    EGFR 52 03/03/2022    EGFR 45 08/28/2021    CREATININE 1 16 08/28/2022    CREATININE 1 09 03/03/2022    CREATININE 1 23 08/28/2021     · Baseline creat appears to be 1 1-1 3  · Admission creat 1 16  · Currently at baseline  · Avoid hypotension, nephrotoxic agents   · Trend UO, creat closely     Facial twitching  Assessment & Plan  · Reported to neurology as intermittent occurrence as outpatient  · Neurology work up to r/o focal motor seizure vs potential lesion on the 7th cranial nerve  · Still needs EEG  · MRI completed   · No twitching appreciated on exam        ----------------------------------------------------------------------------------------  HPI/24hr events: Low grade fevers with tmax 100 9  Mild HA with rating 1 or 2  Patient appropriate for transfer out of the ICU today?: Patient does not meet criteria for referral to the ICU Follow-Up Clinic; referral has not been made     Disposition: Transfer to Med Surg with Telemetry   Code Status: Level 1 - Full Code  ---------------------------------------------------------------------------------------  SUBJECTIVE  "My head feels better "     Review of Systems   All other systems reviewed and are negative  Review of systems was reviewed and negative unless stated above in HPI/24-hour events   ---------------------------------------------------------------------------------------  OBJECTIVE    Vitals   Vitals:    22 0115 22 0200 22 0300 22 0400   BP:  130/62 137/64 155/74   BP Location:  Left arm Left arm Left arm   Pulse:  61 63 62   Resp:  15 13 15   Temp:       TempSrc:       SpO2: 96% 95% 96% 95%   Weight:       Height:         Temp (24hrs), Av 5 °F (37 5 °C), Min:98 7 °F (37 1 °C), Max:100 9 °F (38 3 °C)  Current: Temperature: 98 7 °F (37 1 °C)          Respiratory:  SpO2: SpO2: 95 %  Nasal Cannula O2 Flow Rate (L/min): 2 L/min    Invasive/non-invasive ventilation settings   Respiratory  Report   Lab Data (Last 4 hours)    None         O2/Vent Data (Last 4 hours)    None                Physical Exam  HENT:      Mouth/Throat:      Mouth: Mucous membranes are dry  Eyes:      Pupils: Pupils are equal, round, and reactive to light  Cardiovascular:      Rate and Rhythm: Normal rate and regular rhythm  Pulses: Normal pulses  Heart sounds: Normal heart sounds  Pulmonary:      Effort: Pulmonary effort is normal       Breath sounds: Normal breath sounds  Abdominal:      General: Abdomen is flat  Bowel sounds are normal       Palpations: Abdomen is soft  Musculoskeletal:         General: Normal range of motion  Skin:     General: Skin is warm and dry  Capillary Refill: Capillary refill takes less than 2 seconds  Neurological:      General: No focal deficit present  Mental Status: She is alert and oriented to person, place, and time               Laboratory and Diagnostics:  Results from last 7 days   Lab Units 22  0603 22  1824   WBC Thousand/uL 9 02 14 02*   HEMOGLOBIN g/dL 12 9 13 9   HEMATOCRIT % 38 9 41 1   PLATELETS Thousands/uL 449* 501*   BANDS PCT %  --  1   MONO PCT %  --  8     Results from last 7 days   Lab Units 08/29/22  0603 08/28/22  1824   SODIUM mmol/L 136 137   POTASSIUM mmol/L 4 3 3 2*   CHLORIDE mmol/L 103 100   CO2 mmol/L 24 26   ANION GAP mmol/L 9 11   BUN mg/dL 19 21   CREATININE mg/dL 1 04 1 16   CALCIUM mg/dL 8 9 9 2   GLUCOSE RANDOM mg/dL 112 121   ALT U/L  --  53   AST U/L  --  23   ALK PHOS U/L  --  119*   ALBUMIN g/dL  --  2 9*   TOTAL BILIRUBIN mg/dL  --  0 50     Results from last 7 days   Lab Units 08/29/22  0603 08/28/22  1824   MAGNESIUM mg/dL 2 5 1 9   PHOSPHORUS mg/dL 2 3 2 0*      Results from last 7 days   Lab Units 08/28/22 1951   INR  0 91   PTT seconds 25          Results from last 7 days   Lab Units 08/28/22 1951   LACTIC ACID mmol/L 1 0     ABG:    VBG:    Results from last 7 days   Lab Units 08/29/22  0603 08/28/22 1951   PROCALCITONIN ng/ml 0 16 0 06       Micro  Results from last 7 days   Lab Units 08/28/22 2243 08/28/22 2006 08/28/22 1951   BLOOD CULTURE   --  Received in Microbiology Lab  Culture in Progress  Received in Microbiology Lab  Culture in Progress  GRAM STAIN RESULT  2+ Gram negative rods*  No polys seen*  --   --    LEGIONELLA URINARY ANTIGEN  Negative  --   --    STREP PNEUMONIAE ANTIGEN, URINE  Negative  --   --        EKG: NSR   Imaging: I have personally reviewed pertinent films in PACS    Intake and Output  I/O       08/28 0701 08/29 0700 08/29 0701 08/30 0700    P  O  20 410    I V  (mL/kg) 103 3 (1 4) 30 (0 4)    IV Piggyback 500 600    Total Intake(mL/kg) 623 3 (8 4) 1040 (14)    Urine (mL/kg/hr) 1150 1850 (1)    Stool  0    Total Output 1150 1850    Net -526 7 -810          Unmeasured Stool Occurrence  2 x          Height and Weights   Height: 5' 5" (165 1 cm)     Body mass index is 27 29 kg/m²    Weight (last 2 days)     Date/Time Weight    08/29/22 0600 74 4 (164 02)    08/28/22 3610 73 8 (162 7)    08/28/22 1812 70 3 (155)            Nutrition       Diet Orders   (From admission, onward)             Start     Ordered    08/29/22 1434  Diet Cardiovascular; Cardiac; Consistent Carbohydrate Diet Level 3 (6 carb servings/90 grams CHO/meal)  Diet effective now        References:    Nutrtion Support Algorithm Enteral vs  Parenteral   Question Answer Comment   Diet Type Cardiovascular    Cardiac Cardiac    Other Restriction(s): Consistent Carbohydrate Diet Level 3 (6 carb servings/90 grams CHO/meal)    RD to adjust diet per protocol?  No        08/29/22 1437                  Active Medications  Scheduled Meds:  Current Facility-Administered Medications   Medication Dose Route Frequency Provider Last Rate    acetaminophen  650 mg Oral Q6H PRN Tiera Bullard PA-C      atorvastatin  80 mg Oral Daily With Savanna HERBERTH Jones      azithromycin  500 mg Intravenous Q24H Tiera Bullard PA-C      calcium carbonate-vitamin D  1 tablet Oral BID With Meals Tiera Bullard PA-C      carvedilol  6 25 mg Oral BID With Meals Tiera Bullard PA-C      cefTRIAXone  1,000 mg Intravenous Q24H Tiera Bullard PA-C Stopped (08/29/22 2200)    escitalopram  10 mg Oral Daily Tiera Bullard PA-C      fluticasone-vilanterol  1 puff Inhalation Daily ANTONIETA Cantrell      gabapentin  800 mg Oral BID Tiera Bullard PA-C      guaiFENesin  200 mg Oral Q4H PRN KRISTI Bradley-JOSEPH      hydrALAZINE  10 mg Intravenous Q4H PRN Tiera Bullard PA-C      ipratropium  0 5 mg Nebulization 4x Daily Tiera Bullard, [place]      isosorbide mononitrate  30 mg Oral Daily [facility]iera [facility], [place]      labetalol  10 mg Intravenous Q4H PRN Tiera Bullard PA-C      levalbuterol  1 25 mg Nebulization Q6H ANTONIETA Cantrell      levETIRAcetam  500 mg Intravenous Q12H [facility] ANTONIETA Cantrell Stopped (08/29/22 2200)    losartan  50 mg Oral Daily Tiera Bullard PA-C      metroNIDAZOLE  500 mg Intravenous Q8H DORA BradleyC 500 mg (08/30/22 0017)   Basia Garza pantoprazole  40 mg Oral Daily Before Breakfast Oliver Mtz PA-C      traMADol  50 mg Oral Q6H PRN Tata Terry PA-C       Continuous Infusions:     PRN Meds:   acetaminophen, 650 mg, Q6H PRN  guaiFENesin, 200 mg, Q4H PRN  hydrALAZINE, 10 mg, Q4H PRN  labetalol, 10 mg, Q4H PRN  traMADol, 50 mg, Q6H PRN        Invasive Devices Review  Invasive Devices  Report    Peripheral Intravenous Line  Duration           Peripheral IV 08/28/22 Left Forearm 1 day    Peripheral IV 08/28/22 Right Antecubital 1 day    Peripheral IV 08/28/22 Right;Upper;Ventral (anterior) Arm 1 day          Drain  Duration           External Urinary Catheter 1 day                Rationale for remaining devices: Medication requiring IV   ---------------------------------------------------------------------------------------  Advance Directive and Living Will:      Power of :    POLST:    ---------------------------------------------------------------------------------------  Care Time Delivered:   No Critical Care time spent       ANTONIETA Lovell      Portions of the record may have been created with voice recognition software  Occasional wrong word or "sound a like" substitutions may have occurred due to the inherent limitations of voice recognition software    Read the chart carefully and recognize, using context, where substitutions have occurred

## 2022-08-30 NOTE — OCCUPATIONAL THERAPY NOTE
Occupational Therapy Evaluation     Patient Name: Yumiko Jewell  EANGW'D Date: 8/30/2022  Problem List  Principal Problem:    Subarachnoid hemorrhage ([facility] )  Active Problems:    Dyslipidemia    Depression with anxiety    Takotsubo cardiomyopathy    Emphysema    Stage 3a chronic kidney disease ([facility] )    Facial twitching    Accelerated hypertension    Community acquired pneumonia of right upper lobe of lung    Dysphagia    Stenosis of right carotid artery    Past Medical History  Past Medical History:   Diagnosis Date    Angina pectoris ([facility] )     Arnold-Chiari malformation ([facility] )     Breast lump     last assessed: Jan 22, 2013     Coronary artery disease     Depression with anxiety     GERD (gastroesophageal reflux disease)     Gout     last assessed: Nov 26, 2012     Hair loss     last assessed: Dec 15, 2016     Hypertension     Hypotension     last assessed: Nov 10, 2014     Mitral valve disorder     Myocardial infarct, old     SVT (supraventricular tachycardia) ([facility] )      Past Surgical History  Past Surgical History:   Procedure Laterality Date    BREAST BIOPSY Right 02/28/2017    US CORE BIOPSY--BENIGN    CARDIAC CATHETERIZATION      CERVICAL DISCECTOMY      Spinal     CERVICAL LAMINECTOMY      C1 with chiari decompression     COLONOSCOPY      5 yrs - onset: May 31, 2011     CRANIOTOMY      POPLITEAL SYNOVIAL CYST EXCISION      TUBAL LIGATION      US GUIDED BREAST BIOPSY RIGHT COMPLETE Right 02/28/2017 08/30/22 1346   OT Last Visit   OT Visit Date 08/30/22   Note Type   Note type Evaluation   Restrictions/Precautions   Weight Bearing Precautions Per Order No   Other Precautions Multiple lines;Telemetry   Pain Assessment   Pain Assessment Tool 0-10   Pain Score No Pain   Home Living   Type of [address] [place] [address] One level  (1+1 ADENIKE)   Additional Comments No DME at baseline   Prior Function   Level of [place] Independent with ADLs and functional mobility   Lives With Mary Carmen Help From Family   ADL Assistance Independent   IADLs Independent   Vocational Retired   Lifestyle   Autonomy Pt reports being independent in ADL/IADLs, (+) drives   Reciprocal Relationships Pt babysits 3 y/o grandson 5x/wk   Service to Others Retired from [employer] 1949 Pt received seated on commode, agreeable to session  Pt's speech mildly dysarthric with some hesitation, appears baseline   ADL   Eating Assistance 7  [address] Deficit Don/doff R shoe;Don/doff L shoe; Fasteners;Pull up over hips   Toileting Assistance  7  Independent   Toileting Deficit Bedside commode;Perineal hygiene   Bed Mobility   Additional Comments Pt received OOB & remains OOB at end of session   Transfers   Sit to Stand 7  Independent   Stand to Sit 7  Independent   Functional Mobility   Functional Mobility 5  Supervision   Additional Comments Approx 15 ft -- limited d/t lines hardwired into wall   Additional items   (No DME)   Balance   Static Sitting Normal   Dynamic Sitting Good   Static Standing Fair +   Dynamic Standing Fair +   Ambulatory Fair +   Activity Tolerance   Activity Tolerance Patient limited by fatigue   Medical Staff Made Aware PT Libra   Nurse Made Aware RUTH HAMPTON Assessment   RUE Assessment WFL   LUE Assessment   LUE Assessment WFL   Hand Function   Gross Motor Coordination Functional   Fine Motor Coordination Functional   Vision-Basic Assessment   Current Vision Wears glasses all the time   Patient Visual Report   (Pt reports nystagmus at baseline & vertigo)   Vision - Complex Assessment   Ocular Range of Motion WFL   Head Position WDL   Tracking   (Nystagmus present in B/L superior/ inferior, & lateral gazes   Pt reports this is baseline)   Saccades Additional eye shifts occurred during testing Perception   Inattention/Neglect Appears intact   Motor Planning Appears intact   Perseveration Not present   Cognition   Overall Cognitive Status WFL   Arousal/Participation Alert   Attention Within functional limits   Orientation Level Oriented X4   Memory Within functional limits   Following Commands Follows all commands and directions without difficulty   Assessment   Limitation Decreased endurance   Prognosis Good   Assessment Pt is a 69 y o  female seen for OT evaluation at [facility], admitted 8/28/2022 w/ Subarachnoid hemorrhage ([address] )  OT completed expanded review of pt's medical and social history  Comorbidities affecting pt's functional performance at time of assessment include: depression, emphysema, stage 3 CKD, facial twitching, HTN, chronic dysphagia, community acquired PNA, arnold-chiari malformation  Prior to admission, pt was living with her spouse in a [facility] with a FF s/u & 1+1 ADENIKE  Pt was independent in all ADL/IADLs & functional txfers/mobility with no use of DME/AD  Upon evaluation, pt presents to OT at functional baseline  Based on findings, pt is of moderate complexity  The patient's raw score on the AM-PAC Daily Activity inpatient short form is 24, standardized score is 57 54, greater than 39 4  Patients at this level are likely to benefit from DC to home  Please refer to the recommendation of the Occupational Therapist for safe DC planning  At this time, OT recommendations at time of discharge are no OT needs  No further acute OT needs indicated at this time - Recommend pt continue to be OOB for meals, ambulation to/from BR, perform self care tasks, and mobility in hallway with nursing  D/C from OT caseload with above recommendations     Goals   Patient Goals Pt wants to go home   Plan   OT Frequency Eval only   Recommendation   OT Discharge Recommendation No rehabilitation needs   AM-PAC Daily Activity Inpatient   Lower Body Dressing 4   Bathing 4   Toileting 4   Upper Body Dressing 4   Grooming 4   Eating 4   Daily Activity Raw Score 24   Daily Activity Standardized Score (Calc for Raw Score >=11) 57 54   AM-PAC Applied Cognition Inpatient   Following a Speech/Presentation 4   Understanding Ordinary Conversation 4   Taking Medications 4   Remembering Where Things Are Placed or Put Away 4   Remembering List of 4-5 Errands 4   Taking Care of Complicated Tasks 4   Applied Cognition Raw Score 24   Applied Cognition Standardized Score 62 21     Sonya Orn, OTR/L

## 2022-08-30 NOTE — ASSESSMENT & PLAN NOTE
· Patient presented with 1 week of fevers, increased cough, SOB at home, was prescribed doxycycline and prednisone for suspected pneumonia by PCP (8/22/2022)  · PMH: recurrent aspiration and is following with GI as OP, recent esophageal dilation 5/19 however patient still reports coughing and choking with thin liquids and some solids  · CXR 8/28 concerning for RML infiltrate  · CT Chest 8/29: Multifocal bilateral pneumonia, greatest in the RUL, with trace effusions  Severe emphysema  · VBS neg  · PCT neg x 2  · Sputum cx: 2+GNR  · Strep, legionella antigens: negative  · Concern for aspiration pneumonia given history of recurrent aspirations/dysphagia  · Patient currently stable on room air with SpO2 >91%, although episode of desaturation with SpO2 88-89% yesterday  RT evaluated, SpO2 >91%, no home oxygen needs  · S/p 3 days azithromycin  · D4 IV Rocephin, D3 Flagyl in setting of recent/progressive dysphagia   Can discharge with 4 days PO Augmentin to complete total of 7 day antibiotic course   · Pulmonology following:  · Discharge with Trelergy daily  · Follow-up with pulmonology outpatient 8 weeks  · O2 NC to keep O2 sat > 92%

## 2022-08-30 NOTE — ASSESSMENT & PLAN NOTE
· Patient reports history of worsening dysphagia  Following with [facility] GI- work-up ongoing  · 5/19/22 EGD: Irregular Z-line 38 cm from the incisors; biopsy to assess for Doty's   Swartz dilatation empirically performed with 48 Western Claudia dilator for treatment of dysphagia  · Tolerating diet   · Aspiration precautions, elevate HOB  · Esophageal dilation 5/19  · VBS no aspiration 8/29  · Speech therapy following, continue regular diet with thin liquids

## 2022-08-30 NOTE — PROGRESS NOTES
Pastoral Care Progress Note    2022  Patient: Micheal Harris : 1955  Admission Date & Time: 2022 1813  MRN: [id number] CSN: [id number]       made introductory visit with patient/ Jayme Acevedo was also present  Pt was open with conversation and found to have strong local family support  Chaplaincy Interventions Utilized:     Exploration: Facilitated life review and Facilitated story telling    Relationship Building: Cultivated a relationship of care and support    Ritual: Provided prayer      Chaplaincy Outcomes Achieved:   Identified meaningful connections        22 1100   Clinical Encounter Type   Visited With Patient and family together   Routine Visit Introduction   Anabaptist Encounters   Anabaptist Needs Prayer

## 2022-08-30 NOTE — ASSESSMENT & PLAN NOTE
· No acute exacerbation   Patient with documented history of COPD; does not follow with Pulmonologist   · Home medication regimen: advair 100-50 bid, albuterol prn  · Former smoker -- quit 14 years ago  · Recent PFTs in 2019  · Alpha 1 anti trypsin levels elevated  · Continue with Breo while inpatient  · Xopenex/Atrovent  · Goal spo2 > 90%

## 2022-08-30 NOTE — ASSESSMENT & PLAN NOTE
Patient presented with sudden onset headache and feeling unwell  Patient presented to ED with hypertensive urgency, consider uncontrolled HTN and recent coughing contributing to subarachnoid hemorrhage  · CTH - R parietal parenchymal hemorrhage with associated SAH  · CTA - Short segment of severe stenosis noted at the distal right cervical ICA segment  Mild to moderate atherosclerotic plaquing of both carotid bulbs causing less than 50% stenosis  Moderate to severe stenosis at the origin of the left vertebral artery  Dominant right vertebral artery  · MRI - Stable SAH w/in the R parieto-occipital region with no underlying mass or abnormal enhancement  No signs of acute ischemia  Minor chronic microangiopathic change within the brainstem; stable postoperative change within the posterior fossa, status post inferior occipital craniectomy    · Neurosurgery signed off  · S/p 3 days Keppra for seizure ppx, chronic gabapentin will provide some seizure protection  · BP control to normotension, SBP 140s today, stable  · Neurology following:  · Ok to restart DVT ppx  · Okay for discharge from neuro perspective  · Obtain repeat CT head outpatient in 3 weeks to reassess restarting Plavix, continue holding on discharge  · Neuro checks  · Stat CTH with any acute changes

## 2022-08-30 NOTE — CASE MANAGEMENT
Case Management Assessment & Discharge Planning Note    Patient name Gertrudis Braden  Location /-43 MRN [id number]  : 1955 Date 2022       Current Admission Date: 2022  Current Admission Diagnosis:Subarachnoid hemorrhage [facility])   Patient Active Problem List    Diagnosis Date Noted    Stenosis of right carotid artery 2022    Community acquired pneumonia of right upper lobe of lung 2022    Dysphagia 2022    Subarachnoid hemorrhage ([facility] [address] ) 2022    Accelerated hypertension 2022    Facial twitching 2022    Stage 3a chronic kidney disease ([facility] [address] ) 2021    GERD (gastroesophageal reflux disease) 2019    Emphysema 2019    Pancreatic cyst 2019    De Quervain's disease (tenosynovitis) 2018    Sudden idiopathic hearing loss of right ear 2017    Takotsubo cardiomyopathy 06/15/2017    Depression with anxiety 09/15/2015    Cervical stenosis of spinal canal 2015    Carpal tunnel syndrome 2014    Plantar fasciitis 2012    Takotsubo syndrome 2012    Dyslipidemia 2012    Arnold-Chiari malformation ([facility] [address] ) 2012    Essential hypertension 2012      LOS (days): 2  Geometric Mean LOS (GMLOS) (days): 4 40  Days to GMLOS:2 7     OBJECTIVE:    Risk of Unplanned Readmission Score: 11 31         Current admission status: Inpatient  Referral Reason: Other (disposition planning)    Preferred Pharmacy:   [facility] [address] LIANET Munoz  [address], [place] - [address]  [address]    [facility] [address]  Phone: [phone] Fax: [phone]    Primary Care Provider: Lazarus Barton DO    Primary Insurance: MEDICARE  Secondary Insurance: Axel Donald:  [id number] [address] [facility], [address] [facility] Representative - Spouse   Primary Phone: [phone] [facility]  Home Phone: [phone]                         Readmission Root Cause  30 Day Readmission: No    Patient Information  Admitted from[de-identified] Home  Mental Status: Alert  During Assessment patient was accompanied by: Spouse  Assessment information provided by[de-identified] Patient  Primary Caregiver: Self  Support Systems: Spouse/significant other, Juan Carlos Gallardo Dr of Residence: [address] [place] [address] do you live in?: [address] [place] [address] entry access options   Select all that apply : Stairs  Number of steps to enter home : 2  Do the steps have railings?: No  Type of Current Residence: 2 story home  Upon entering residence, is there a bedroom on the main floor (no further steps)?: Yes  Upon entering residence, is there a bathroom on the main floor (no further steps)?: Yes  In the last 12 months, was there a time when you were not able to pay the mortgage or rent on time?: No  In the last 12 months, how many places have you lived?: 1  In the last 12 months, was there a time when you did not have a steady place to sleep or slept in a shelter (including now)?: No  Homeless/housing insecurity resource given?: N/A  Living Arrangements: Lives w/ Spouse/significant other  Is patient a ?: No    Activities of Daily Living Prior to Admission  Functional Status: Independent  Completes ADLs independently?: Yes  Ambulates independently?: Yes  Does patient use assisted devices?: No  Does patient currently own DME?: No  Does patient have a history of Outpatient Therapy (PT/OT)?: Yes  Does the patient have a history of Short-Term Rehab?: No  Does patient have a history of HHC?: No  [facility] ?: No         Patient Information Continued  Income Source: Pension/USP  Does patient have prescription coverage?: Yes  Within the past 12 months, you worried that your food would run out before you got the money to buy more : Never true  Within the past 12 months, the food you bought just didn't last and you didn't have money to get more : Never true  Food insecurity resource given?: N/A  Does patient receive dialysis treatments?: No  Does patient have a history of substance abuse?: No  Does patient have a history of Mental Health Diagnosis?: No         Means of Transportation  Means of Transport to Appts[de-identified] Family transport  In the past 12 months, has lack of transportation kept you from medical appointments or from getting medications?: No  In the past 12 months, has lack of transportation kept you from meetings, work, or from getting things needed for daily living?: No  Was application for public transport provided?: N/A        DISCHARGE DETAILS:    Discharge planning discussed with[de-identified] Patient and spouse  Freedom of Choice: Yes  Comments - Freedom of Choice: Discussed  CM contacted family/caregiver?: Yes  Were Treatment Team discharge recommendations reviewed with patient/caregiver?: Yes  Did patient/caregiver verbalize understanding of patient care needs?: Yes       Contacts  Patient Contacts: Bhavik Che  Relationship to Patient[de-identified] Family  Contact Method: In Person  Reason/Outcome: Discharge [address]         Is the patient interested in [address] at discharge?: No    DME Referral Provided  Referral made for DME?: No    Other Referral/Resources/Interventions Provided:  Interventions: None Indicated  Referral Comments: Pt declines dc needs at this time  Plans to return home with spouse at this time            Discharge Destination Plan[de-identified] Home  Transport at Discharge : Family                                      Additional Comments: Cm met with pt and her spouse at bedside  Pt states that she and her spouse reside in a [facility] with 2 gema  The home has first floor living  Pt states that she has no DME at home  Pt reports that she has 2 inhalers that she uses at home  Pt denies any needs for dc home  She has no hx of HHC/STR/MH/DA  Pt has participated in PT/OT op with  before  Pt is not driving at this time  Pt uses [address] and [facility] for PCP  Pts spouse will transport home

## 2022-08-30 NOTE — ASSESSMENT & PLAN NOTE
· Follows with Dr Erick Mancuso  · Hx Gia Webster 2010  · Cath 2010 clean coronaries  · Unclear need for continued plavix   F/u dr Mir Streeter outpt and hold until repeat cth in 3wks

## 2022-08-30 NOTE — PROGRESS NOTES
Progress Note - Neurology   Arley Buchanan 69 y o  female [id number]  Unit/Bed#: /-01    Assessment/Plan:  * Subarachnoid hemorrhage [facility])  Assessment & Plan  69year old female with Chiari malformation s/p decompression in 2000, spinal cord lesion, HTN, CAD, HLD, GERD, CKD, takotsubo syndrome, ICA stenosis on Plavix and former smoker who initially presented to the ED on 8/28 with hypertension (241/110 in the ED) and severe headache and was found to have a R parietal IPH/SAH  Workup:   · CTA H/N negative for focal stenosis or saccular aneurysm within [place], however revealed short segment of severe stenosis noted at the distal R cervical ICA segment, along with moderate-severe stenosis at origin of L vertebral artery with a dominant R vertebral artery  · MRI brain w wo: Stable SAH within the right parieto-occipital region with no underlying mass or abnormal enhancement  No signs of acute ischemia  Minor chronic microangiopathic change within the brainstem  Stable postoperative change within the posterior fossa, status post inferior occipital craniectomy  On neurologic exam today, patient continues to be at baseline with no neurologic deficits  No reported HA  BP continues to intermittently spike (as high as 284B systolic this morning)  ICU continue to adjust antihypertensives  Etiology of SAH unclear, though may be secondary to extreme isolated hypertension as CTA was unrevealing, though unsure what would have provoked this  Proceed with detailed plan as noted below including [address] in 3 weeks  No further inpatient neurodiagnostics  Plan:  - MRI brain w/wo revealed stable subarachnoid hemorrhage within the right parieto-occipital region, no underlying mass, abnormal enhancement or signs of acute ischemia   - Goal of normotension   - Cardene gtt discontinued   PO Losartan, Imdur, Coreg and PRN labetalol ordered per critical care team   - Ok for DVT prophylaxis  - Would recommend holding Plavix for now  - Repeat CTH in 3 weeks  If resolving, ok to restart Plavix at that time    - No further recommendations from Neurosurgery  - Keppra discontinued  Patient is on chronic Gabapentin which will offer some seizure protection    - Frequent neuro checks  STAT CTH and notify neurology if any acute changes in mental status  - Medical management and supportive care per primary team  Correction of any metabolic or infectious disturbances  Facial twitching  Assessment & Plan  Patient recently saw Dr Roderick Early on 07/21/2022 for episodic right facial twitching and muscle spasms  Patient also has chronic bilateral neurosensory hearing loss, and there was a concern for focal motor seizure vs potential lesion on the 7th cranial nerve  It was recommended that she has an MRI brain and EEG completed, (both of which had not been done prior to admission to [facility])  Patient did not want to start treatment with medications until testing was completed  - MRI brain completed inpatient and unrevealing  - Outpatient EEG pending  - Of note, patient is chronically on Gabapentin    Community acquired pneumonia of right upper lobe of lung  Assessment & Plan  Patient was noted to be tachypneic with leukocytosis in the ER, and met SIRS criteria  - CXR revealed RUL consolidation, suspicious for aspiration pneumonia  - Patient has a history of aspiration pneumonia that she follows with outpatient GI and PCP    - CT Chest completed, revealed multifocal pneumonia and severe emphysema   - IV Ceftriaxone  - Management/Treatment as per critical care team     Dyslipidemia  Assessment & Plan  Continue with Lipitor 80 mg daily       Marsha Ley will need follow up in in 4 weeks with neurovascular attending or advance practitioner  She will not require outpatient neurological testing  Subjective:   Patient reports she slept well through the night   She denies headache this morning stating "so far so good "    BP this morning 188/80 initially and then down to 166/73  During our exam this morning, BP down to 138/92  In discussing patient's initial symptoms, she reports that she initially wasn't feeling well so she took her BP and her SBP was > 200  After that she developed a severe sharp headache  She states that she takes her blood pressure regularly and she usually runs in systolics 131R  She denies any acute pain or anything to have caused her high blood pressure  She also denies medication noncompliance or changes with her medication      Past Medical History:   Diagnosis Date    Angina pectoris ([facility] [address] )     Arnold-Chiari malformation ([facility] [address] )     Breast lump     last assessed: Jan 22, 2013     Coronary artery disease     Depression with anxiety     GERD (gastroesophageal reflux disease)     Gout     last assessed: Nov 26, 2012     Hair loss     last assessed: Dec 15, 2016     Hypertension     Hypotension     last assessed: Nov 10, 2014     Mitral valve disorder     Myocardial infarct, old     SVT (supraventricular tachycardia) (HCC)      Past Surgical History:   Procedure Laterality Date    BREAST BIOPSY Right 02/28/2017    US CORE BIOPSY--BENIGN    CARDIAC CATHETERIZATION      CERVICAL DISCECTOMY      Spinal     CERVICAL LAMINECTOMY      C1 with chiari decompression     COLONOSCOPY      5 yrs - onset: May 31, 2011     CRANIOTOMY      POPLITEAL SYNOVIAL CYST EXCISION      TUBAL LIGATION      US GUIDED BREAST BIOPSY RIGHT COMPLETE Right 02/28/2017     Family History   Problem Relation Age of Onset    Stroke Mother     Other Father         blood clot in vein & CABG     Heart disease Father     Prostate cancer Father     Hearing loss Father     Hypertension Father     Alcohol abuse Brother     Throat cancer Brother     Cancer Brother         Throat tongue    Hearing loss Brother     No Known Problems Sister     No Known Problems Daughter     Breast cancer Maternal Grandmother  No Known Problems Daughter     No Known Problems Sister     Crohn's disease Maternal Aunt     No Known Problems Maternal Aunt     No Known Problems Maternal Aunt     Colon cancer Paternal Aunt     No Known Problems Paternal [de-identified]     Colon cancer Son     No Known Problems Paternal Aunt     No Known Problems Paternal [de-identified]     Colon cancer Paternal Aunt     Cancer Paternal Aunt         Colon cancer    No Known Problems Maternal Grandfather     No Known Problems Paternal Grandmother     No Known Problems Paternal Grandfather     Kidney failure Brother         kidney failure d/t NSIADs on dialysis    Hearing loss Brother      Social History     Socioeconomic History    Marital status: /Civil Union     Spouse name: None    Number of children: None    Years of education: None    Highest education level: None   Occupational History    Occupation: working full time    Tobacco Use    Smoking status: Former Smoker     Packs/day: 1 50     Years: 36 00     Pack years: 54 00     Types: Cigarettes     Quit date: 2008     Years since quittin 6    Smokeless tobacco: Never Used   Vaping Use    Vaping Use: Never used   Substance and Sexual Activity    Alcohol use: Yes     Comment: social     Drug use: No    Sexual activity: Not Currently     Partners: Male   Other Topics Concern    None   Social History Narrative    None     Social Determinants of Health     Financial Resource Strain: Not on file   Food Insecurity: No Food Insecurity    Worried About Running Out of Food in the Last Year: Never true    Sada of Food in the Last Year: Never true   Transportation Needs: No Transportation Needs    Lack of Transportation (Medical): No    Lack of Transportation (Non-Medical):  No   Physical Activity: Not on file   Stress: Not on file   Social Connections: Not on file   Intimate Partner Violence: Not on file   Housing Stability: Low Risk     Unable to Pay for Housing in the Last Year: No  Number of Places Lived in the Last Year: 1    Unstable Housing in the Last Year: No       Medications: Reviewed in detail by me  ROS: Review of Systems A 12 point ROS was completed and other than the above mentioned symptoms and those noted in the HPI, all remaining systems were negative  Vitals: /64   Pulse 62   Temp 98 8 °F (37 1 °C) (Oral)   Resp 18   Ht 5' 5" (1 651 m)   Wt 74 4 kg (164 lb 0 4 oz)   SpO2 (!) 88%   BMI 27 29 kg/m²     Physical Exam:   Physical Exam  Constitutional:       General: She is not in acute distress  Appearance: Normal appearance  She is well-developed  She is not ill-appearing or toxic-appearing  HENT:      Head: Normocephalic and atraumatic  Eyes:      General: No scleral icterus  Right eye: No discharge  Left eye: No discharge  Extraocular Movements: Extraocular movements intact and EOM normal       Conjunctiva/sclera: Conjunctivae normal    Cardiovascular:      Rate and Rhythm: Normal rate and regular rhythm  Pulmonary:      Effort: Pulmonary effort is normal  No respiratory distress  Breath sounds: No stridor  Musculoskeletal:         General: No tenderness or deformity  Normal range of motion  Cervical back: Normal range of motion and neck supple  Lymphadenopathy:      Cervical: No cervical adenopathy  Skin:     General: Skin is warm and dry  Findings: No erythema or rash  Neurological:      Mental Status: She is alert and oriented to person, place, and time  Coordination: Finger-Nose-Finger Test normal       Deep Tendon Reflexes: Strength normal    Psychiatric:         Speech: Speech normal        Neurologic Exam     Mental Status   Oriented to person, place, and time  Follows 2 step commands  Attention: normal  Concentration: normal    Speech: speech is normal (No dysarthria or aphasia)  Level of consciousness: alert  Knowledge: good  Normal comprehension       Cranial Nerves     CN II Visual acuity: normal (grossly)  Right visual field deficit: none  Left visual field deficit: none     CN III, IV, VI   Extraocular motions are normal    Diplopia: none  Ophthalmoparesis: none  Conjugate gaze: present    CN VII   Facial expression full, symmetric  CN VIII   Hearing: impaired  Face symmetric aside from L palpebral fissure > R     Motor Exam   Muscle bulk: normal  Overall muscle tone: normal  Right arm pronator drift: absent  Left arm pronator drift: absent    Strength   Strength 5/5 throughout  Sensory Exam   Light touch normal      Gait, Coordination, and Reflexes     Coordination   Finger to nose coordination: normal      Labs: Reviewed in detail by me  Imaging: I have personally reviewed pertinent imaging and PACS reports  VTE Prophylaxis: Sequential compression device (Venodyne)     Total time spent today 25 minutes  Greater than 50% of total time was spent with the patient and / or family counseling and / or coordination of care   A description of the counseling / coordination of care: speaking with patient,  and primary team about work-up, plan of care, follow-up recommendations A-T Advancement Flap Text: The defect edges were debeveled with a #15 scalpel blade.  Given the location of the defect, shape of the defect and the proximity to free margins an A-T advancement flap was deemed most appropriate.  Using a sterile surgical marker, an appropriate advancement flap was drawn incorporating the defect and placing the expected incisions within the relaxed skin tension lines where possible.    The area thus outlined was incised deep to adipose tissue with a #15 scalpel blade.  The skin margins were undermined to an appropriate distance in all directions utilizing iris scissors.

## 2022-08-31 ENCOUNTER — TRANSITIONAL CARE MANAGEMENT (OUTPATIENT)
Dept: FAMILY MEDICINE CLINIC | Facility: HOSPITAL | Age: 67
End: 2022-08-31

## 2022-08-31 VITALS
TEMPERATURE: 98.4 F | HEIGHT: 65 IN | WEIGHT: 160.72 LBS | BODY MASS INDEX: 26.78 KG/M2 | RESPIRATION RATE: 16 BRPM | HEART RATE: 69 BPM | DIASTOLIC BLOOD PRESSURE: 76 MMHG | OXYGEN SATURATION: 91 % | SYSTOLIC BLOOD PRESSURE: 141 MMHG

## 2022-08-31 DIAGNOSIS — J18.9 COMMUNITY ACQUIRED PNEUMONIA OF RIGHT UPPER LOBE OF LUNG: Primary | ICD-10-CM

## 2022-08-31 PROBLEM — A41.9 SEPSIS (HCC): Status: ACTIVE | Noted: 2022-08-31

## 2022-08-31 LAB
A1AT PHENOTYP SERPL IFE: ABNORMAL
A1AT SERPL-MCNC: 243 MG/DL (ref 101–187)
ANION GAP SERPL CALCULATED.3IONS-SCNC: 12 MMOL/L (ref 4–13)
BACTERIA SPT RESP CULT: ABNORMAL
BUN SERPL-MCNC: 15 MG/DL (ref 5–25)
CALCIUM SERPL-MCNC: 8.9 MG/DL (ref 8.3–10.1)
CHLORIDE SERPL-SCNC: 103 MMOL/L (ref 96–108)
CO2 SERPL-SCNC: 21 MMOL/L (ref 21–32)
CREAT SERPL-MCNC: 1.04 MG/DL (ref 0.6–1.3)
GFR SERPL CREATININE-BSD FRML MDRD: 54 ML/MIN/1.73SQ M
GLUCOSE SERPL-MCNC: 131 MG/DL (ref 65–140)
GRAM STN SPEC: ABNORMAL
GRAM STN SPEC: ABNORMAL
POTASSIUM SERPL-SCNC: 4 MMOL/L (ref 3.5–5.3)
SODIUM SERPL-SCNC: 136 MMOL/L (ref 135–147)

## 2022-08-31 PROCEDURE — 99232 SBSQ HOSP IP/OBS MODERATE 35: CPT | Performed by: INTERNAL MEDICINE

## 2022-08-31 PROCEDURE — 80048 BASIC METABOLIC PNL TOTAL CA: CPT | Performed by: NURSE PRACTITIONER

## 2022-08-31 PROCEDURE — 92526 ORAL FUNCTION THERAPY: CPT

## 2022-08-31 PROCEDURE — 99239 HOSP IP/OBS DSCHRG MGMT >30: CPT | Performed by: PHYSICIAN ASSISTANT

## 2022-08-31 RX ORDER — LOSARTAN POTASSIUM 50 MG/1
50 TABLET ORAL 2 TIMES DAILY
Qty: 60 TABLET | Refills: 0 | Status: SHIPPED | OUTPATIENT
Start: 2022-08-31 | End: 2022-10-25

## 2022-08-31 RX ORDER — AMOXICILLIN AND CLAVULANATE POTASSIUM 875; 125 MG/1; MG/1
1 TABLET, FILM COATED ORAL EVERY 12 HOURS SCHEDULED
Qty: 8 TABLET | Refills: 0 | Status: SHIPPED | OUTPATIENT
Start: 2022-08-31 | End: 2022-09-04

## 2022-08-31 RX ORDER — PANTOPRAZOLE SODIUM 40 MG/1
40 TABLET, DELAYED RELEASE ORAL
Qty: 30 TABLET | Refills: 0 | Status: SHIPPED | OUTPATIENT
Start: 2022-09-01 | End: 2022-10-07 | Stop reason: SDUPTHER

## 2022-08-31 RX ADMIN — GABAPENTIN 800 MG: 400 CAPSULE ORAL at 08:02

## 2022-08-31 RX ADMIN — ESCITALOPRAM OXALATE 10 MG: 10 TABLET ORAL at 08:02

## 2022-08-31 RX ADMIN — LOSARTAN POTASSIUM 50 MG: 50 TABLET, FILM COATED ORAL at 08:01

## 2022-08-31 RX ADMIN — PANTOPRAZOLE SODIUM 40 MG: 40 TABLET, DELAYED RELEASE ORAL at 06:29

## 2022-08-31 RX ADMIN — CARVEDILOL 6.25 MG: 3.12 TABLET, FILM COATED ORAL at 06:31

## 2022-08-31 RX ADMIN — AZITHROMYCIN MONOHYDRATE 500 MG: 500 INJECTION, POWDER, LYOPHILIZED, FOR SOLUTION INTRAVENOUS at 06:29

## 2022-08-31 RX ADMIN — ISOSORBIDE MONONITRATE 30 MG: 30 TABLET, EXTENDED RELEASE ORAL at 08:02

## 2022-08-31 RX ADMIN — METRONIDAZOLE 500 MG: 500 INJECTION, SOLUTION INTRAVENOUS at 07:58

## 2022-08-31 RX ADMIN — Medication 1 TABLET: at 06:31

## 2022-08-31 RX ADMIN — FLUTICASONE FUROATE AND VILANTEROL TRIFENATATE 1 PUFF: 100; 25 POWDER RESPIRATORY (INHALATION) at 07:57

## 2022-08-31 NOTE — ASSESSMENT & PLAN NOTE
· Sepsis, present on admission, due to pneumonia; as evidenced by meeting SIRS criteria on admission with an infectious process (pneumonia)   Improving  · See additional plan above for CAP  · Lactic acid: wnl  · BC x2: NGTD (48 hrs)  · MRSA cx:  Negative  · Will continue to complete 7 day abx course

## 2022-08-31 NOTE — ASSESSMENT & PLAN NOTE
· Reported to neurology as intermittent occurrence as outpatient  · Neurology work up to r/o focal motor seizure vs potential lesion on the 7th cranial nerve  ?  Still needs EEG  · MRI completed   · No twitching appreciated on exam

## 2022-08-31 NOTE — PLAN OF CARE
Problem: Potential for Falls  Goal: Patient will remain free of falls  Description: INTERVENTIONS:  - Educate patient/family on patient safety including physical limitations  - Instruct patient to call for assistance with activity   - Consult OT/PT to assist with strengthening/mobility   - Keep Call bell within reach  - Keep bed low and locked with side rails adjusted as appropriate  - Keep care items and personal belongings within reach  - Initiate and maintain comfort rounds  - Make Fall Risk Sign visible to staff  - Apply yellow socks and bracelet for high fall risk patients  - Consider moving patient to room near nurses station  8/30/2022 2058 by Laura Roth RN  Outcome: Progressing  8/30/2022 2058 by Laura Roth RN  Outcome: Progressing     Problem: MOBILITY - ADULT  Goal: Maintain or return to baseline ADL function  Description: INTERVENTIONS:  -  Assess patient's ability to carry out ADLs; assess patient's baseline for ADL function and identify physical deficits which impact ability to perform ADLs (bathing, care of mouth/teeth, toileting, grooming, dressing, etc )  - Assess/evaluate cause of self-care deficits   - Assess range of motion  - Assess patient's mobility; develop plan if impaired  - Assess patient's need for assistive devices and provide as appropriate  - Encourage maximum independence but intervene and supervise when necessary  - Involve family in performance of ADLs  - Assess for home care needs following discharge   - Consider OT consult to assist with ADL evaluation and planning for discharge  - Provide patient education as appropriate  8/30/2022 2058 by Laura Roth RN  Outcome: Progressing  8/30/2022 2058 by Laura Roth RN  Outcome: Progressing  Goal: Maintains/Returns to pre admission functional level  Description: INTERVENTIONS:  - Perform BMAT or MOVE assessment daily    - Set and communicate daily mobility goal to care team and patient/family/caregiver     - Collaborate with rehabilitation services on mobility goals if consulted  - Out of bed for toileting  - Record patient progress and toleration of activity level   8/30/2022 2058 by Be Vega RN  Outcome: Progressing  8/30/2022 2058 by Be Vega RN  Outcome: Progressing     Problem: PAIN - ADULT  Goal: Verbalizes/displays adequate comfort level or baseline comfort level  Description: Interventions:  - Encourage patient to monitor pain and request assistance  - Assess pain using appropriate pain scale  - Administer analgesics based on type and severity of pain and evaluate response  - Implement non-pharmacological measures as appropriate and evaluate response  - Consider cultural and social influences on pain and pain management  - Notify physician/advanced practitioner if interventions unsuccessful or patient reports new pain  8/30/2022 2058 by Be Vega RN  Outcome: Progressing  8/30/2022 2058 by Be Vega RN  Outcome: Progressing     Problem: INFECTION - ADULT  Goal: Absence or prevention of progression during hospitalization  Description: INTERVENTIONS:  - Assess and monitor for signs and symptoms of infection  - Monitor lab/diagnostic results  - Monitor all insertion sites, i e  indwelling lines, tubes, and drains  - Monitor endotracheal if appropriate and nasal secretions for changes in amount and color  - [place] appropriate cooling/warming therapies per order  - Administer medications as ordered  - Instruct and encourage patient and family to use good hand hygiene technique  - Identify and instruct in appropriate isolation precautions for identified infection/condition  8/30/2022 2058 by Be Vega RN  Outcome: Progressing  8/30/2022 2058 by Be Vega RN  Outcome: Progressing  Goal: Absence of fever/infection during neutropenic period  Description: INTERVENTIONS:  - Monitor WBC    8/30/2022 2058 by Be Vega RN  Outcome: Progressing  8/30/2022 2058 by Ana Diana RN  Outcome: Progressing     Problem: SAFETY ADULT  Goal: Patient will remain free of falls  Description: INTERVENTIONS:  - Educate patient/family on patient safety including physical limitations  - Instruct patient to call for assistance with activity   - Consult OT/PT to assist with strengthening/mobility   - Keep Call bell within reach  - Keep bed low and locked with side rails adjusted as appropriate  - Keep care items and personal belongings within reach  - Initiate and maintain comfort rounds  - Apply yellow socks and bracelet for high fall risk patients  - Consider moving patient to room near nurses station  8/30/2022 2058 by Ana Diana RN  Outcome: Progressing  8/30/2022 2058 by Ana Diana RN  Outcome: Progressing  Goal: Maintain or return to baseline ADL function  Description: INTERVENTIONS:  -  Assess patient's ability to carry out ADLs; assess patient's baseline for ADL function and identify physical deficits which impact ability to perform ADLs (bathing, care of mouth/teeth, toileting, grooming, dressing, etc )  - Assess/evaluate cause of self-care deficits   - Assess range of motion  - Assess patient's mobility; develop plan if impaired  - Assess patient's need for assistive devices and provide as appropriate  - Encourage maximum independence but intervene and supervise when necessary  - Involve family in performance of ADLs  - Assess for home care needs following discharge   - Consider OT consult to assist with ADL evaluation and planning for discharge  - Provide patient education as appropriate  8/30/2022 2058 by Ana Diana RN  Outcome: Progressing  8/30/2022 2058 by Ana Diana RN  Outcome: Progressing  Goal: Maintains/Returns to pre admission functional level  Description: INTERVENTIONS:  - Perform BMAT or MOVE assessment daily    - Set and communicate daily mobility goal to care team and patient/family/caregiver     - Collaborate with rehabilitation services on mobility goals if consulted  - Out of bed for toileting  - Record patient progress and toleration of activity level   8/30/2022 2058 by Tiffanie Snowden RN  Outcome: Progressing  8/30/2022 2058 by Tiffanie Snowden RN  Outcome: Progressing     Problem: DISCHARGE PLANNING  Goal: Discharge to home or other facility with appropriate resources  Description: INTERVENTIONS:  - Identify barriers to discharge w/patient and caregiver  - Arrange for needed discharge resources and transportation as appropriate  - Identify discharge learning needs (meds, wound care, etc )  - Arrange for interpretive services to assist at discharge as needed  - Refer to Case Management Department for coordinating discharge planning if the patient needs post-hospital services based on physician/advanced practitioner order or complex needs related to functional status, cognitive ability, or social support system  8/30/2022 2058 by Tiffanie Snowden RN  Outcome: Progressing  8/30/2022 2058 by Tiffanie Snowden RN  Outcome: Progressing     Problem: Knowledge Deficit  Goal: Patient/family/caregiver demonstrates understanding of disease process, treatment plan, medications, and discharge instructions  Description: Complete learning assessment and assess knowledge base  Interventions:  - Provide teaching at level of understanding  - Provide teaching via preferred learning methods  8/30/2022 2058 by Tiffanie Snowden RN  Outcome: Progressing  8/30/2022 2058 by Tiffanie Snowden RN  Outcome: Progressing     Problem: Neurological Deficit  Goal: Neurological status is stable or improving  Description: Interventions:  - Monitor and assess patient's level of consciousness, motor function, sensory function, and level of assistance needed for ADLs  - Monitor and report changes from baseline  Collaborate with interdisciplinary team to initiate plan and implement interventions as ordered  - Provide and maintain a safe environment    - Consider seizure precautions  - Consider fall precautions  - Consider aspiration precautions  - Consider bleeding precautions  8/30/2022 2058 by Leretha Bumpers, RN  Outcome: Progressing  8/30/2022 2058 by Leretha Bumpers, RN  Outcome: Progressing     Problem: Activity Intolerance/Impaired Mobility  Goal: Mobility/activity is maintained at optimum level for patient  Description: Interventions:  - Assess and monitor patient  barriers to mobility and need for assistive/adaptive devices  - Assess patient's emotional response to limitations  - Collaborate with interdisciplinary team and initiate plans and interventions as ordered  - Encourage independent activity per ability   - Maintain proper body alignment  - Perform active/passive rom as tolerated/ordered  - Plan activities to conserve energy   - Turn patient as appropriate  8/30/2022 2058 by Leretha Bumpers, RN  Outcome: Progressing  8/30/2022 2058 by Leretha Bumpers, RN  Outcome: Progressing     Problem: Communication Impairment  Goal: Ability to express needs and understand communication  Description: Assess patient's communication skills and ability to understand information  Patient will demonstrate use of effective communication techniques, alternative methods of communication and understanding even if not able to speak  - Encourage communication and provide alternate methods of communication as needed  - Collaborate with case management/ for discharge needs  - Include patient/family/caregiver in decisions related to communication  8/30/2022 2058 by Leretha Bumpers, RN  Outcome: Progressing  8/30/2022 2058 by Leretha Bumpers, RN  Outcome: Progressing     Problem: Potential for Aspiration  Goal: Non-ventilated patient's risk of aspiration is minimized  Description: Assess and monitor vital signs, respiratory status, and labs (WBC)  Monitor for signs of aspiration (tachypnea, cough, rales, wheezing, cyanosis, fever)      - Assess and monitor patient's ability to swallow  - Place patient up in chair to eat if possible  - HOB up at 90 degrees to eat if unable to get patient up into chair   - Supervise patient during oral intake  - Instruct patient/ family to take small bites  - Instruct patient/ family to take small single sips when taking liquids  - Follow patient-specific strategies generated by speech pathologist   8/30/2022 2058 by Moreno Page RN  Outcome: Progressing  8/30/2022 2058 by Moreno Page RN  Outcome: Progressing  Goal: Ventilated patient's risk of aspiration is minimized  Description: Assess and monitor vital signs, respiratory status, airway cuff pressure, and labs (WBC)  Monitor for signs of aspiration (tachypnea, cough, rales, wheezing, cyanosis, fever)  - Elevate head of bed 30 degrees if patient has tube feeding   - Monitor tube feeding  8/30/2022 2058 by Moreno Page RN  Outcome: Progressing  8/30/2022 2058 by Moreno Page RN  Outcome: Progressing     Problem: Nutrition  Goal: Nutrition/Hydration status is improving  Description: Monitor and assess patient's nutrition/hydration status for malnutrition (ex- brittle hair, bruises, dry skin, pale skin and conjunctiva, muscle wasting, smooth red tongue, and disorientation)  Collaborate with interdisciplinary team and initiate plan and interventions as ordered  Monitor patient's weight and dietary intake as ordered or per policy  Utilize nutrition screening tool and intervene per policy  Determine patient's food preferences and provide high-protein, high-caloric foods as appropriate  - Assist patient with eating   - Allow adequate time for meals   - Encourage patient to take dietary supplement as ordered  - Collaborate with clinical nutritionist   - Include patient/family/caregiver in decisions related to nutrition    8/30/2022 2058 by Moreno Page RN  Outcome: Progressing  8/30/2022 2058 by Moreno Page RN  Outcome: Progressing Problem: Prexisting or High Potential for Compromised Skin Integrity  Goal: Skin integrity is maintained or improved  Description: INTERVENTIONS:  - Identify patients at risk for skin breakdown  - Assess and monitor skin integrity  - Assess and monitor nutrition and hydration status  - Monitor labs   - Assess for incontinence   - Turn and reposition patient  - Assist with mobility/ambulation  - Relieve pressure over bony prominences  - Avoid friction and shearing  - Provide appropriate hygiene as needed including keeping skin clean and dry  - Evaluate need for skin moisturizer/barrier cream  - Collaborate with interdisciplinary team   - Patient/family teaching  - Consider wound care consult   8/30/2022 2058 by Isaiah Zapien RN  Outcome: Progressing  8/30/2022 2058 by Isaiah aZpien RN  Outcome: Progressing     Problem: Nutrition/Hydration-ADULT  Goal: Nutrient/Hydration intake appropriate for improving, restoring or maintaining nutritional needs  Description: Monitor and assess patient's nutrition/hydration status for malnutrition  Collaborate with interdisciplinary team and initiate plan and interventions as ordered  Monitor patient's weight and dietary intake as ordered or per policy  Utilize nutrition screening tool and intervene as necessary  Determine patient's food preferences and provide high-protein, high-caloric foods as appropriate       INTERVENTIONS:  - Monitor oral intake, urinary output, labs, and treatment plans  - Assess nutrition and hydration status and recommend course of action  - Evaluate amount of meals eaten  - Assist patient with eating if necessary   - Allow adequate time for meals  - Recommend/ encourage appropriate diets, oral nutritional supplements, and vitamin/mineral supplements  - Order, calculate, and assess calorie counts as needed  - Recommend, monitor, and adjust tube feedings and TPN/PPN based on assessed needs  - Assess need for intravenous fluids  - Provide specific nutrition/hydration education as appropriate  - Include patient/family/caregiver in decisions related to nutrition  8/30/2022 2058 by Shea Fabian, RN  Outcome: Progressing  8/30/2022 2058 by Shea Fabian, RN  Outcome: Progressing

## 2022-08-31 NOTE — DISCHARGE INSTR - AVS FIRST PAGE
You were treated and evaluated for headache, found to have subarachnoid hemorrhage, which appears to be stable based off CT and MRI imaging of your brain  Neurology and Neurosurgery were following while inpatient, neurosurgery does not have any further needs  You will need to have repeat head CT in 3 weeks, do not resume Plavix on discharge  Neurology will follow-up outpatient to reassess if stable to resume Plavix  You received medications for seizure prevention, will not need to continue further, as your gabapentin will help provide some seizure protection  Your losartan dosing frequency was increased to twice a day to help stabilize your blood pressure  You were also treated for pneumonia, there was concern for aspiration pneumonia given your history of dysphagia or difficulty swallowing  You were treated with IV antibiotics while inpatient, you can continue taking oral Augmentin for 4 more days to complete a total of 1 week antibiotic course    You will start new inhaler Trelergy as recommended by pulmonology  Follow-up with pulmonology outpatient in 8 weeks  Follow-up with your primary care provider for management of chronic conditions

## 2022-08-31 NOTE — SPEECH THERAPY NOTE
Speech Language/Pathology    Speech/Language Pathology Progress Note    Patient Name: Susana Elias  OACKB'I Date: 8/31/2022     Problem List  Principal Problem:    Subarachnoid hemorrhage ([facility] )  Active Problems:    Dyslipidemia    Depression with anxiety    Takotsubo cardiomyopathy    Emphysema    Stage 3a chronic kidney disease ([facility] )    Facial twitching    Accelerated hypertension    Community acquired pneumonia of right upper lobe of lung    Dysphagia    Stenosis of right carotid artery    Sepsis [facility])       Past Medical History  Past Medical History:   Diagnosis Date    Angina pectoris ([facility] )     Arnold-Chiari malformation ([facility] )     Breast lump     last assessed: Jan 22, 2013     Coronary artery disease     Depression with anxiety     GERD (gastroesophageal reflux disease)     Gout     last assessed: Nov 26, 2012     Hair loss     last assessed: Dec 15, 2016     Hypertension     Hypotension     last assessed: Nov 10, 2014     Mitral valve disorder     Myocardial infarct, old     SVT (supraventricular tachycardia) ([facility] )         Past Surgical History  Past Surgical History:   Procedure Laterality Date    BREAST BIOPSY Right 02/28/2017    US CORE BIOPSY--BENIGN    CARDIAC CATHETERIZATION      CERVICAL DISCECTOMY      Spinal     CERVICAL LAMINECTOMY      C1 with chiari decompression     COLONOSCOPY      5 yrs - onset: May 31, 2011     CRANIOTOMY      POPLITEAL SYNOVIAL CYST EXCISION      TUBAL LIGATION      US GUIDED BREAST BIOPSY RIGHT COMPLETE Right 02/28/2017         Subjective:  Pt awake and alert, lunch tray arrived      "I brought up some food yesterday, it wasn't really choking, it was more just gagging it back up"     Current Diet:  Regular w/ thin     Objective:  Pt seen for dx dysphagia tx f/u  VBS findings and recommendations thoroughly reviewed w/ pt  Education provided on anatomy/physiology of the swallow, identified impairments, and risks of aspiration   Pt demonstrated understanding and denied questions  Education provided on strategies to optimize swallow safety including the importance of thorough oral care to reduce risk of infection  Pt seen w/ regular texture lunch tray  Mastication and oral organization of all material is timely and effective  Pt w/ cough x1 on large straw sip of thin liquid  ST demonstrated strategy of oral prep w/ effortful swallow to improve pharyngeal constriction and airway protection  Pt demonstrated independent use of oral prep and reports effortful swallows  No subsequent overt s/s aspiration  Education provided on medical indicators of aspiration to notify her medical team of should they arise upon d/c home  Pt demonstrated understanding and denied further questions/concerns  Assessment:  Pt w/ good understanding of VBS findings and recommendations  Pt encouraged to use strategies to optimize swallow safety and provided thorough education on s/s aspiration to notify medical team of should they arise  Cough x1 w/ large straw sip of thin, no subsequent overt s/s w/ strategy use       Plan/Recommendations:  Continue current diet  Frequent and thorough oral care  ST to f/u for ongoing education/strategy use as able and appropriate

## 2022-08-31 NOTE — DISCHARGE SUMMARY
[facility]  Discharge- Micheal Kimberly 1955, 69 y o  female MRN: [id number]  Unit/Bed#: -01 Encounter: [id number]  Primary Care Provider: Ligia Lewis DO   Date and time admitted to hospital: 8/28/2022  6:13 PM    * Subarachnoid hemorrhage [facility])  Assessment & Plan  Patient presented with sudden onset headache and feeling unwell  Patient presented to ED with hypertensive urgency, consider uncontrolled HTN and recent coughing contributing to subarachnoid hemorrhage  · CTH - R parietal parenchymal hemorrhage with associated SAH  · CTA - Short segment of severe stenosis noted at the distal right cervical ICA segment  Mild to moderate atherosclerotic plaquing of both carotid bulbs causing less than 50% stenosis  Moderate to severe stenosis at the origin of the left vertebral artery  Dominant right vertebral artery  · MRI - Stable SAH w/in the R parieto-occipital region with no underlying mass or abnormal enhancement  No signs of acute ischemia  Minor chronic microangiopathic change within the brainstem; stable postoperative change within the posterior fossa, status post inferior occipital craniectomy  · Neurosurgery signed off  · S/p 3 days Keppra for seizure ppx, chronic gabapentin will provide some seizure protection  · BP control to normotension, SBP 140s today, stable  · Neurology following:  · Ok to restart DVT ppx  · Okay for discharge from neuro perspective  · Obtain repeat CT head outpatient in 3 weeks to reassess restarting Plavix, continue holding on discharge  · Neuro checks  · Stat CTH with any acute changes    Community acquired pneumonia of right upper lobe of lung  Assessment & Plan  · Patient presented with 1 week of fevers, increased cough, SOB at home, was prescribed doxycycline and prednisone for suspected pneumonia by PCP (8/22/2022)    · PMH: recurrent aspiration and is following with GI as OP, recent esophageal dilation 5/19 however patient still reports coughing and choking with thin liquids and some solids  · CXR 8/28 concerning for RML infiltrate  · CT Chest 8/29: Multifocal bilateral pneumonia, greatest in the RUL, with trace effusions  Severe emphysema  · VBS neg  · PCT neg x 2  · Sputum cx: 2+GNR  · Strep, legionella antigens: negative  · Concern for aspiration pneumonia given history of recurrent aspirations/dysphagia  · Patient currently stable on room air with SpO2 >91%, although episode of desaturation with SpO2 88-89% yesterday  RT evaluated, SpO2 >91%, no home oxygen needs  · S/p 3 days azithromycin  · D4 IV Rocephin, D3 Flagyl in setting of recent/progressive dysphagia  Can discharge with 4 days PO Augmentin to complete total of 7 day antibiotic course   · Pulmonology following:  · Discharge with Trelergy daily  · Follow-up with pulmonology outpatient 8 weeks  · O2 NC to keep O2 sat > 92%    Sepsis ([facility] [address] )  Assessment & Plan  · Sepsis, present on admission, due to pneumonia; as evidenced by meeting SIRS criteria on admission with an infectious process (pneumonia)  Improving  · See additional plan above for CAP  · Lactic acid: wnl  · BC x2: NGTD (48 hrs)  · MRSA cx:  Negative  · Will continue to complete 7 day abx course    Dysphagia  Assessment & Plan  · Patient reports history of worsening dysphagia  Following with [facility]- work-up ongoing  · 5/19/22 EGD: Irregular Z-line 38 cm from the incisors; biopsy to assess for Doty's  Swartz dilatation empirically performed with 48 Western Claudia dilator for treatment of dysphagia  · Tolerating diet   · Aspiration precautions, elevate HOB  · Esophageal dilation 5/19  · VBS no aspiration 8/29  · Speech therapy following, continue regular diet with thin liquids    Stenosis of right carotid artery  Assessment & Plan  · Hx of CAD & Takutsubo, follows with [facility]  · CTA - Short segment of severe stenosis noted at the distal right cervical ICA segment   Mild to moderate atherosclerotic plaquing of both carotid bulbs causing less than 50% stenosis  Moderate to severe stenosis at the origin of the left vertebral artery  Dominant right vertebral artery  · TTE 2019 -- LVEF 55% with no RWMA; LV diastolic function normal    · NM Cardiac Stress Test 2019 -- Normal study after maximal exercise without reproduction of symptoms  Myocardial perfusion imaging was normal at rest and with stress    · Home Plavix stopped secondary to ICH/SAH, 8/28 DDVAP x1  · Lipid panel: wnl  · HgbA1c:  6 2, prediabetic  · Plavix on hold until repeat CTH in 3 wks  · 8/29 Losartan, Coreg, Lipitor, and Imdur restarted   · Restart home Lipitor    Accelerated hypertension  Assessment & Plan  · Presented to the ER with HTN emegency, in setting of ICH/SAH (Max 231/110)  · In ER, labetalol IV x2 without improvement, started on Cardene gtt for strict BP control   · SBP target 120-160, currently stable  · Cardene gtt weaned off 8/28-8/29 overnight, Labetolol 10 mg IV q 4 prn  · C/w Coreg/imdur/cozaar (increased from home dose to BID on 8/30)    Facial twitching  Assessment & Plan  · Reported to neurology as intermittent occurrence as outpatient  · Neurology work up to r/o focal motor seizure vs potential lesion on the 7th cranial nerve  ? Still needs EEG  · MRI completed   · No twitching appreciated on exam      Stage 3a chronic kidney disease [facility])  Assessment & Plan  Lab Results   Component Value Date    EGFR 54 08/31/2022    EGFR 55 08/30/2022    EGFR 54 08/29/2022    CREATININE 1 04 08/31/2022    CREATININE 1 03 08/30/2022    CREATININE 1 04 08/29/2022   · Baseline creat appears to be 1 1-1 3  Admission creat 1  16   · Currently at baseline  · Avoid hypotension, nephrotoxic agents   · Trend UO, creat closely     Emphysema  Assessment & Plan  · No acute exacerbation   Patient with documented history of COPD; does not follow with Pulmonologist   · Home medication regimen: advair 100-50 bid, albuterol prn  · Former smoker -- quit 14 years ago  · Recent PFTs in 2019  · Alpha 1 anti trypsin levels elevated  · Continue with Breo while inpatient  · Xopenex/Atrovent  · Goal spo2 > 90%    Takotsubo cardiomyopathy  Assessment & Plan  · Follows with Dr Ofelia Pérez  · Hx Shanika Pole 2010  · Cath 2010 clean coronaries  · Unclear need for continued plavix  F/u dr Monica Ziegler outpt and hold until repeat cth in 3wks    Depression with anxiety  Assessment & Plan  · Home lexapro    Dyslipidemia  Assessment & Plan  · Home statin    Medical Problems             Resolved Problems  Date Reviewed: 8/31/2022   None               Discharging Physician / Practitioner: Darry Osgood, PA-C  PCP: Rajendra Santana DO  Admission Date:   Admission Orders (From admission, onward)     Ordered        08/28/22 2117  INPATIENT ADMISSION  Once                      Discharge Date: 08/31/22    Consultations During Hospital Stay:  · Critical care  · Neurosurgery  · Neurology  · Pulmonology  · PT/OT/speech    Procedures Performed:   · None    Significant Findings / Test Results:   CT chest w contrast   Final Result by Cheryl Mendez MD (08/29 1126)      Multifocal bilateral pneumonia, greatest in the right upper lobe, with trace effusions  Severe emphysema  Workstation performed: [id number]         MRI brain w wo contrast   Final Result by Kosta Alva DO (08/29 1208)      Stable subarachnoid hemorrhage within the right parieto-occipital region with no underlying mass or abnormal enhancement  No signs of acute ischemia  Minor chronic microangiopathic change within the brainstem  Stable postoperative change within the posterior fossa, status post inferior occipital craniectomy        Workstation performed: [id number]         FL barium swallow video w speech   Final Result by LENCHO ACE (08/29 1026)      CTA head and neck with and without contrast   Final Result by Regina Pagan MD (08/28 2230)      No focal stenosis or saccular aneurysm within the [place]  Short segment of severe stenosis noted at the distal right cervical ICA segment  Mild to moderate atherosclerotic plaquing of both carotid bulbs causing less than 50% stenosis by NASCET criteria  Previously described right parietal acute hemorrhage and a recent unenhanced CT brain is not as well visualized on this enhanced study  Moderate to severe stenosis at the origin of the left vertebral artery  Dominant right vertebral artery  Patchy bilateral upper lobe consolidations, right greater than left suspicious for multifocal bronchopneumonia  Clinical correlation is recommended  Workstation performed: [id number]         CT head without contrast   Final Result by Luciana Fernandez MD (08/28 2020)      Right parietal parenchymal hemorrhage associated with subarachnoid hemorrhage  Evaluation with MRI brain should be obtained to assess if this represents a hemorrhagic infarct and exclude an underlying mass  There was no vascular abnormality at this level on prior MRI  I personally discussed this study with Aldo Gonzales on 8/28/2022 at 8:20 PM                            Workstation performed: [id number]         XR chest 1 view portable   Final Result by Cayla Martinez MD (08/29 8642)         1  Right upper lobe pneumonia  There may be some areas of cavitation within  2   Severe emphysema  Recommend CT scan of the chest       The study was marked in EPIC for significant notification  Workstation performed: [id number]         CT head wo contrast    (Results Pending)       Incidental Findings:   · None     Test Results Pending at Discharge (will require follow up):    · Alpha 1 antitrypsin pending  · Final blood culture results     Outpatient Tests Requested:  · Obtain repeat head CT in 3 weeks  · Follow-up with neurology outpatient  · Follow-up with pulmonology outpatient in 8 weeks    Complications:  None    Reason for Admission:  Subarachnoid hemorrhage    Hospital Course:   Los Flores is a 69 y o  female patient, PMH dysphagia, carotid artery stenosis, CAD, HTN, CKD, emphysema, takotsubo cardiomyopathy, depression, HLD, who originally presented to the hospital on 8/28/2022 due to subarachnoid hemorrhage, likely in setting of uncontrolled HTN with recent coughing contributing  Patient presented with sudden-onset headache and feeling unwell, found to have hypertensive urgency on arrival to ED  CT head showed evidence of right parietal parenchymal hemorrhage and associated SAH  Neurosurgery and Neurology consulted, started on [address] for seizure prophylaxis and hold Plavix held  In addition, there was concern for pneumonia, as patient met sepsis criteria with right middle lobe infiltrate seen on CXR, also concern for aspiration given history of dysphagia, started on IV Rocephin, Flagyl and azithromycin  Patient initially admitted under Critical Care Service, as she was started on Cardene GTT for hypertension  During admission, Neurosurgery evaluated, no further interventions indicated at this time  MRI brain showed stable SAH, blood pressure had stabilized, was able to wean off Cardene GTT and have stabilization with adjustment of losartan  Neurology recommending repeat head CT in 3 weeks, continue holding Plavix on discharge to reassess outpatient  Patient received 3 days Keppra for seizure prophylaxis, can continue chronic gabapentin for seizure protection  Patient's infectious status had improved, pulmonology was following for management, can continue to complete 1 week antibiotic course with oral Augmentin on discharge, follow-up outpatient in 8 weeks  RT had performed desaturation screening, known home oxygen needs, PT/OT did not recommend any rehab needs, stable for discharge home  Please see above list of diagnoses and related plan for additional information       Condition at Discharge: stable    Discharge Day Visit / Exam:   Subjective:  Patient seen at bedside this a m , reports mild headache but otherwise significantly improved, states she feels ready for discharge today  Vitals: Blood Pressure: 141/76 (08/30/22 2326)  Pulse: 69 (08/31/22 0629)  Temperature: 98 4 °F (36 9 °C) (08/31/22 0600)  Temp Source: Oral (08/31/22 0600)  Respirations: 16 (08/30/22 2326)  Height: 5' 5" (165 1 cm) (08/28/22 1812)  Weight - Scale: 72 9 kg (160 lb 11 5 oz) (08/31/22 0600)  SpO2: 91 % (08/30/22 2326)  Exam:   Physical Exam  Constitutional:       General: She is not in acute distress  Appearance: She is not ill-appearing, toxic-appearing or diaphoretic  Cardiovascular:      Rate and Rhythm: Normal rate and regular rhythm  Pulses: Normal pulses  Heart sounds: Normal heart sounds  Pulmonary:      Effort: Pulmonary effort is normal  No respiratory distress  Breath sounds: Normal breath sounds  Abdominal:      General: Bowel sounds are normal  There is no distension  Palpations: Abdomen is soft  Tenderness: There is no abdominal tenderness  Musculoskeletal:         General: No swelling or tenderness  Skin:     General: Skin is warm  Neurological:      General: No focal deficit present  Mental Status: She is alert and oriented to person, place, and time  Psychiatric:         Mood and Affect: Mood normal          Behavior: Behavior normal          Discussion with Family: Updated  () at bedside  Discharge instructions/Information to patient and family:   See after visit summary for information provided to patient and family  Provisions for Follow-Up Care:  See after visit summary for information related to follow-up care and any pertinent home health orders  Disposition:   Home    Planned Readmission:  None     Discharge Statement:  I spent 60 minutes discharging the patient  This time was spent on the day of discharge   I had direct contact with the patient on the day of discharge  Greater than 50% of the total time was spent examining patient, answering all patient questions, arranging and discussing plan of care with patient as well as directly providing post-discharge instructions  Additional time then spent on discharge activities  Discharge Medications:  See after visit summary for reconciled discharge medications provided to patient and/or family        **Please Note: This note may have been constructed using a voice recognition system**

## 2022-08-31 NOTE — PROGRESS NOTES
Progress Note - Pulmonary   Billie Ann 69 y o  female MRN: [id number]  Unit/Bed#: -01 Encounter: [id number]      Assessment:  1  Significant RUL pneumonia  2  Severe emphysema/COPD  3  Former smoker   4  Intracranial hemorrhage in R occipital area    Plan:  1  Continue Rocephin, Zithromax and Flagyl  2  Upon discharge would complete 10 days of antibiotics total with augmentin  3  Continue Xopenex and atrovent  4  I would discharged with Trelegy Daily   5  She will need repeat CT in 6 weeks  6  Will follow up with us in 8 weeks  7  She did not require oxygen with exertion  Will follow walk test and PFTs as outpatient  Subjective:   Patient seen and examined  She states that she is feeling a bit better with her cough and shortness of breath  However, they are both still present  Objective:   Vitals: Blood pressure 141/76, pulse 69, temperature 98 4 °F (36 9 °C), temperature source Oral, resp  rate 16, height 5' 5" (1 651 m), weight 72 9 kg (160 lb 11 5 oz), SpO2 91 % , RA, Body mass index is 26 74 kg/m²  Intake/Output Summary (Last 24 hours) at 8/31/2022 1120  Last data filed at 8/30/2022 2326  Gross per 24 hour   Intake 910 ml   Output 1600 ml   Net -690 ml         Physical Exam  Gen: Awake, alert, oriented x 3, no acute distress  HEENT: Mucous membranes moist, no oral lesions, no thrush  NECK: No accessory muscle use, JVP not elevated  Cardiac: Regular, single S1, single S2, no murmurs, no rubs, no gallops  Lungs: Decreased breath sounds, scattered rhonchi  Abdomen: normoactive bowel sounds, soft nontender, nondistended, no rebound or rigidity, no guarding  Extremities: no cyanosis, no clubbing, no edema    Labs: I have personally reviewed pertinent lab results    Results from last 7 days   Lab Units 08/30/22  0546 08/29/22  0603 08/28/22  1824   WBC Thousand/uL 6 12 9 02 14 02*   HEMOGLOBIN g/dL 12 3 12 9 13 9   HEMATOCRIT % 39 5 38 9 41 1   PLATELETS Thousands/uL 341 449* 501*   MONO PCT % 1*  --  8      Results from last 7 days   Lab Units 08/31/22  0507 08/30/22  0546 08/29/22  0603 08/28/22  1824   POTASSIUM mmol/L 4 0 4 7 4 3 3 2*   CHLORIDE mmol/L 103 100 103 100   CO2 mmol/L 21 23 24 26   BUN mg/dL 15 17 19 21   CREATININE mg/dL 1 04 1 03 1 04 1 16   CALCIUM mg/dL 8 9 8 6 8 9 9 2   ALK PHOS U/L  --   --   --  119*   ALT U/L  --   --   --  53   AST U/L  --   --   --  23     Results from last 7 days   Lab Units 08/29/22  0603 08/28/22  1824   MAGNESIUM mg/dL 2 5 1 9     Results from last 7 days   Lab Units 08/29/22  0603 08/28/22  1824   PHOSPHORUS mg/dL 2 3 2 0*      Results from last 7 days   Lab Units 08/28/22 1951   INR  0 91   PTT seconds 25     Results from last 7 days   Lab Units 08/28/22 1951   LACTIC ACID mmol/L 1 0     No results found for: TROPONINI      Meds/Allergies   Current Facility-Administered Medications   Medication Dose Route Frequency    acetaminophen (TYLENOL) tablet 650 mg  650 mg Oral Q6H PRN    atorvastatin (LIPITOR) tablet 80 mg  80 mg Oral Daily With Dinner    calcium carbonate-vitamin D (OSCAL-D) 500 mg-200 units per tablet 1 tablet  1 tablet Oral BID With Meals    carvedilol (COREG) tablet 6 25 mg  6 25 mg Oral BID With Meals    cefTRIAXone (ROCEPHIN) IVPB (premix in dextrose) 1,000 mg 50 mL  1,000 mg Intravenous Q24H    escitalopram (LEXAPRO) tablet 10 mg  10 mg Oral Daily    fluticasone-vilanterol (BREO ELLIPTA) 100-25 mcg/inh inhaler 1 puff  1 puff Inhalation Daily    gabapentin (NEURONTIN) capsule 800 mg  800 mg Oral BID    guaiFENesin (ROBITUSSIN) oral solution 200 mg  200 mg Oral Q4H PRN    hydrALAZINE (APRESOLINE) injection 10 mg  10 mg Intravenous Q4H PRN    isosorbide mononitrate (IMDUR) 24 hr tablet 30 mg  30 mg Oral Daily    labetalol (NORMODYNE) injection 10 mg  10 mg Intravenous Q4H PRN    losartan (COZAAR) tablet 50 mg  50 mg Oral BID    metroNIDAZOLE (FLAGYL) IVPB (premix) 500 mg 100 mL  500 mg Intravenous Q8H    pantoprazole (PROTONIX) EC tablet 40 mg  40 mg Oral Daily Before Breakfast    traMADol (ULTRAM) tablet 50 mg  50 mg Oral Q6H PRN     Medications Prior to Admission   Medication    albuterol (ProAir HFA) 90 mcg/act inhaler    atorvastatin (LIPITOR) 80 mg tablet    benzonatate (TESSALON PERLES) 100 mg capsule    calcium carbonate (OS-FLASH) 600 MG tablet    carvedilol (COREG) 6 25 mg tablet    cholecalciferol (VITAMIN D3) 1,000 units tablet    clopidogrel (PLAVIX) 75 mg tablet    [] doxycycline hyclate (VIBRAMYCIN) 100 mg capsule    escitalopram (LEXAPRO) 20 mg tablet    Fluticasone-Salmeterol (Advair Diskus) 100-50 mcg/dose inhaler    gabapentin (NEURONTIN) 400 mg capsule    isosorbide mononitrate (IMDUR) 30 mg 24 hr tablet    losartan (COZAAR) 50 mg tablet    predniSONE 10 mg tablet    ALPRAZolam (XANAX) 0 25 mg tablet         Microbiology:  Lab Results   Component Value Date    BLOODCX No Growth at 48 hrs  2022    BLOODCX No Growth at 48 hrs  2022    URINECX 50,000-59,000 cfu/ml Escherichia coli 2016    URINECX 10,000-19,000 cfu/ml Mixed Contaminants X2 2016    SPUTUMCULTUR Culture results to follow  2022       Imaging and other studies: I have personally reviewed pertinent reports  CT chest   IMPRESSION:  Multifocal bilateral pneumonia, greatest in the right upper lobe, with trace effusions  Severe emphysema      [place] DO [facility]

## 2022-08-31 NOTE — RESPIRATORY THERAPY NOTE
Home Oxygen Qualifying Test     Patient name: Yash Cantu        : 1955   Date of Test:  2022  Diagnosis:    Home Oxygen Test:    **Medicare Guidelines require item(s) 1-5 on all ambulatory patients or 1 and 2 on non-ambulatory patients  1  Baseline SPO2 on Room Air at rest 93 %   a  If <= 88% on Room Air add O2 via NC to obtain SpO2 >=88%  If LPM needed, document LPM  needed to reach =>88%    2  SPO2 during exertion on Room Air 91  %  a  During exertion monitor SPO2  If SPO2 increases >=89%, do not add supplemental oxygen    3  SPO2 on Oxygen at Rest  % at  LPM    4  SPO2 during exertion on Oxygen % at  LPM    5  Test performed during exertion activity  []  Supplemental Home Oxygen is indicated  [x]  Client does not qualify for home oxygen      Respiratory Additional Notes- no o2 required during exertion    Shima Whyte, RT

## 2022-09-01 ENCOUNTER — PATIENT OUTREACH (OUTPATIENT)
Dept: FAMILY MEDICINE CLINIC | Facility: HOSPITAL | Age: 67
End: 2022-09-01

## 2022-09-01 NOTE — PROGRESS NOTES
Outpatient Care Management Note:    New BPCI referral received  Care manager called [employer], introduced myself and the [facility] program  [employer] states that she is still continuing with a dry cough  She denies any secretions  Her thermometer is not working, so her  will be purchasing a new one today  S[employer] states she is using her incentive spirometer 10 times per hour  [employer] is tracking her blood pressure  It was 152/83 at 7 am and 101/70 at 10:30 am   Her pulse ox is averaging at 92%  [employer] will track her vital signs and will call if her pressures are trending higher or her pulse ox is trending lower  S[employer] states that she is up and moving but she gets exhausted  She denies any deficits after her bleed  She did note that her hearing aids do not seem to be working well,, and she is wondering if her hearing was affected  We reviewed that she needs to schedule with pulmonary  She has the contact information and will call  She has a neurology follow up from prior  She will call to see if they want to see her sooner  S[employer] denies any swallowing issues  She states that speech did instruct her on sitting straight up when she eats  Med review completed  S[employer] states she has all medications and is taking them as directed  She and her  removed all discontinued medications (plavix, vibramycin, advair, and prednisone)  CM gave [employer] my contact information  She knows to call her PCP office directly for any immediate questions

## 2022-09-03 LAB
BACTERIA BLD CULT: NORMAL
BACTERIA BLD CULT: NORMAL

## 2022-09-07 ENCOUNTER — PATIENT OUTREACH (OUTPATIENT)
Dept: FAMILY MEDICINE CLINIC | Facility: HOSPITAL | Age: 67
End: 2022-09-07

## 2022-09-07 NOTE — PROGRESS NOTES
Outpatient Care Management Note:    Chart reviewed  Patient is scheduled with her PCP on 9/9  CM will follow up next week

## 2022-09-08 ENCOUNTER — TELEPHONE (OUTPATIENT)
Dept: NEUROLOGY | Facility: CLINIC | Age: 67
End: 2022-09-08

## 2022-09-08 NOTE — TELEPHONE ENCOUNTER
Neurovascular Discharge Follow Up  Hospitalization: 8/28/22-8/31/22    Called patient with no answer  Left a vm requesting for a call back  Provided the office's phone number

## 2022-09-08 NOTE — TELEPHONE ENCOUNTER
Neurovascular Discharge Follow Up  Hospitalization: 8/28/22-8/31/22    Called patient  Since discharge, she denies experiencing any new or worsening stroke-like symptoms  Patient reports she continues to have the following symptoms: tiredness and the need to take extra time to complete tasks and with ambulation  She claims symptoms are about the same since discharge with no changes/improvements/worsening  She is ambulating independently as well as preforming her own ADLs  Patient manages her own medications, appointments, and affairs  Reviewed appointments - patient is scheduled to see her PCP tomorrow  Patient is scheduled for CT head on 9/20/22  Offered to schedule hospital follow up appointment, patient is agreeable to this  I scheduled appointment, verified mailing address, then mailed appointment to home address on file  Placed patient on the wait list     I reviewed medications with her  There have not been any medication changes since discharge from the hospital  Reports having no difficulties obtaining medications  Denies taking any blood thinning medications  As for risk factors, patient reports monitoring her BP daily at home  Last /72  She is a non smoker  She reports following a low salt / low cholesterol / diabetic friendly diet  I addressed all her questions  At the conclusion of the conversation, patient denies having any further questions or concerns

## 2022-09-09 ENCOUNTER — OFFICE VISIT (OUTPATIENT)
Dept: FAMILY MEDICINE CLINIC | Facility: HOSPITAL | Age: 67
End: 2022-09-09
Payer: MEDICARE

## 2022-09-09 VITALS
BODY MASS INDEX: 25.49 KG/M2 | HEIGHT: 65 IN | WEIGHT: 153 LBS | SYSTOLIC BLOOD PRESSURE: 102 MMHG | HEART RATE: 76 BPM | DIASTOLIC BLOOD PRESSURE: 66 MMHG | TEMPERATURE: 98.1 F

## 2022-09-09 DIAGNOSIS — R13.19 ESOPHAGEAL DYSPHAGIA: Chronic | ICD-10-CM

## 2022-09-09 DIAGNOSIS — J43.8 OTHER EMPHYSEMA (HCC): Chronic | ICD-10-CM

## 2022-09-09 DIAGNOSIS — I60.9 SUBARACHNOID HEMORRHAGE (HCC): ICD-10-CM

## 2022-09-09 DIAGNOSIS — G89.4 CHRONIC PAIN SYNDROME: ICD-10-CM

## 2022-09-09 DIAGNOSIS — I65.21 STENOSIS OF RIGHT CAROTID ARTERY: ICD-10-CM

## 2022-09-09 DIAGNOSIS — I10 ACCELERATED HYPERTENSION: ICD-10-CM

## 2022-09-09 DIAGNOSIS — F41.9 ANXIETY: ICD-10-CM

## 2022-09-09 DIAGNOSIS — J18.9 COMMUNITY ACQUIRED PNEUMONIA OF RIGHT UPPER LOBE OF LUNG: ICD-10-CM

## 2022-09-09 DIAGNOSIS — Z09 HOSPITAL DISCHARGE FOLLOW-UP: Primary | ICD-10-CM

## 2022-09-09 DIAGNOSIS — A41.9 SEPSIS WITHOUT ACUTE ORGAN DYSFUNCTION, DUE TO UNSPECIFIED ORGANISM (HCC): ICD-10-CM

## 2022-09-09 PROCEDURE — 99495 TRANSJ CARE MGMT MOD F2F 14D: CPT | Performed by: INTERNAL MEDICINE

## 2022-09-09 RX ORDER — ALPRAZOLAM 0.25 MG/1
0.25 TABLET ORAL
Qty: 30 TABLET | Refills: 0
Start: 2022-09-09 | End: 2022-10-09

## 2022-09-09 RX ORDER — GABAPENTIN 400 MG/1
800 CAPSULE ORAL 2 TIMES DAILY
Qty: 360 CAPSULE | Refills: 0
Start: 2022-09-09

## 2022-09-09 NOTE — ASSESSMENT & PLAN NOTE
Resolved, finished abx for CAP, clinically doing better but still fatigued and with cough, will follow

## 2022-09-09 NOTE — ASSESSMENT & PLAN NOTE
Finished abx, has repeat CT chest ordered, still with cough, d/w pt that cough can last 4-6 wks, call with new/worse symptoms

## 2022-09-09 NOTE — ASSESSMENT & PLAN NOTE
Does not like taste in mouth left by Trelegy, notes persistent cough from CAP, has CT chest and f/u with Pulm

## 2022-09-09 NOTE — PROGRESS NOTES
Assessment/Plan:    Subarachnoid hemorrhage (HCC)  MRI showed stable SAH, has f/u CT head, Plavix on hold until f/u CT, con't f/u as per Neuro    Accelerated hypertension  BP better today, BP meds reviewed, con't current meds    Stenosis of right carotid artery  On statin, Plavix on hold till [facility] repeated    Community acquired pneumonia of right upper lobe of lung  Finished abx, has repeat CT chest ordered, still with cough, d/w pt that cough can last 4-6 wks, call with new/worse symptoms    Sepsis ([facility] [address] )  Resolved, finished abx for CAP, clinically doing better but still fatigued and with cough, will follow    Emphysema  Does not like taste in mouth left by Trelegy, notes persistent cough from CAP, has CT chest and f/u with Pulm    Dysphagia  Has appt with GI later this month, on PPI, will follow       Diagnoses and all orders for this visit:    Hospital discharge follow-up    Subarachnoid hemorrhage ([facility] [address] )    Accelerated hypertension    Stenosis of right carotid artery    Community acquired pneumonia of right upper lobe of lung    Sepsis without acute organ dysfunction, due to unspecified organism ([facility] [address] )    Emphysema    Esophageal dysphagia    Anxiety  Comments:  On Lexapro daily, only using lorazepam prn, call with new/worse mood  Orders:  -     ALPRAZolam (XANAX) 0 25 mg tablet; Take 1 tablet (0 25 mg total) by mouth daily at bedtime as needed for anxiety    Chronic pain syndrome  Comments:  On Gabapentin, med list reviewed and UTD  Orders:  -     gabapentin (NEURONTIN) 400 mg capsule; Take 2 capsules (800 mg total) by mouth 2 (two) times a day As directed      Colonoscopy 6/22 - 5 yrs    Mammo 2/22    Dexa 10/20- osteopenia    CT Lung CA screen 2/22    PAP 12/20    BW 3/22        Subjective:      Patient ID: Xavier Chávez is a 69 y o  female  HPI Pt here for TCM/Hospital follow up  Pt was admitted to [facility] [place] from 8/28/22 to 8/31/22    Records were reviewed by myself in detail and events are summarized below     TCM Call     Date and time call was made  8/31/2022  2:55 PM    Hospital care reviewed  Records reviewed    Patient was hospitialized at  [address]  [facility]    Date of Admission  08/28/22    Date of discharge  08/31/22    Diagnosis  HEADACHE     HIGH BLOOD PRESSURE    Disposition  Home    Current Symptoms  Fatigue; Cough      TCM Call     Post hospital issues  Reduced activity    Scheduled for follow up? Yes    Did you obtain your prescribed medications  Yes    Do you need help managing your prescriptions or medications  No    Is transportation to your appointment needed  No    I have advised the patient to call PCP with any new or worsening symptoms  MACEY TUTTLE MA    Living Arrangements  Spouse or Significiant other    Support System  Family    Do you have social support  Yes, as much as I need    Are you recieving any outpatient services  No    Are you recieving home care services  No    Are you using any community resources  No    Current waiver services  No    Have you fallen in the last 12 months  Yes    Interperter language line needed  No    Counseling  Patient        Pt presented to [facility] [place] ED on 8/28/22 with c/o sudden onset of R frontal HA  She checked her BP at home and it was very elevated  She decided to go to ED for eval d/t worsening HA  In ED /98, O2 sat 94%, RR 20, HR 75 and temp 98 5  Exam notable for wheezing mid R lung and no neuro deficits noted  Work up in ED notable for Phos 2 0, K 3 2, Alk Phos 119, Alb 2 9  WBC 14 02 and plt 4 01  Rest of CMP/CBC/Mg/trop/procalcitonin/lactate/PT/PTT/UA were all wnl  CXR showed RUL pneumonia with some cavitation and severe emphysema  CT head showed R parietal parenchymal SAH  CTA noted short segment of severe stenosis of distal R cervical ICA and mod to severe stenosis of L vertebral artery and mild to moderate atherosclerotic plaque of both carotid bulbs causing less then 50% stenosis  Pt was admitted for [facility]    Neurosurgery was consults  MRI recommended to r/u mass  MRI was done and stable SAH within the R parieto-occipital region with NO underlying mass or abnormal enhancement was noted  DDAVP given d/t home Plavix  Keppra started for seizure prophylaxis  Accelerated HTN was tx with labetalol x 2 w/o benefit  Cardene gtt was started  Pt med SIRS criteria  Felt to be poss d/t RUL pneumonia  She was tx with Rocephin/Flagyl/Zithromax  CT chest was done and multifocal B/L pneumonia greatest in RUL was noted with trace effusion and again severe emphysema  BC neg x 2  Ur Legionella and Strep pneumo both neg  She was seen by Pulm  Recommended Trelegy upon discharge and finish course of abx and f/u in 8 wks as OP  Neuro saw pt as well and recommended strict BP control and to hold Plavix as per Neurosurgery  PT/OT consulted and no therapy needs identified  Swallow study was done and dysmotility of esophagus was noted  She was advised to f/u with GI as OP for dysphagia  Was advised to f/u with CTH in 3 wks to assess restarting Plavix  She was discharged home on 8/31/22  Med list reviewed  Pt states she con't to feel fatigued and is "moving slow"  She con't to note a "nagging ache" around the R eye and R frontal region  She notes no N/V/seizures/falls  She finished abx  She notes a persistent cough - kristofer when she talks  She notes no further F/C  She is taking her Trelegy but states she notes a bad taste with the inhaler  She is ringing/brushing after use  She still feels she is choking after drinking and on her saliva  She is taking BP meds as directed  She has lost about 5 lbs since our last visit the end of Aug   She has CT head ordered 9/20/22  She has CT chest 10/13/22  She has GI appt 9/23/22  She has EEG in Oct   She has appt with Pulm in Oct  She has a f/u with Neuro 12/9/22  Review of Systems   Constitutional: Positive for appetite change, fatigue and unexpected weight change   Negative for chills and fever  HENT: Positive for trouble swallowing  Negative for congestion  Eyes: Negative for pain and visual disturbance  Respiratory: Positive for cough  Negative for shortness of breath and wheezing  Cardiovascular: Negative for chest pain and palpitations  Gastrointestinal: Negative for abdominal pain, blood in stool, constipation, diarrhea, nausea and vomiting  Endocrine: Negative for polydipsia and polyuria  Genitourinary: Negative for difficulty urinating and dysuria  Musculoskeletal: Positive for back pain  Negative for neck pain  Skin: Negative for rash and wound  Neurological: Positive for headaches  Negative for dizziness and light-headedness  Hematological: Does not bruise/bleed easily  Psychiatric/Behavioral: Negative for dysphoric mood  Objective:    /66   Pulse 76   Temp 98 1 °F (36 7 °C) (Tympanic)   Ht 5' 5" (1 651 m)   Wt 69 4 kg (153 lb)   BMI 25 46 kg/m²      Physical Exam  Vitals and nursing note reviewed  Constitutional:       General: She is not in acute distress  Appearance: She is well-developed  She is not ill-appearing  HENT:      Head: Normocephalic and atraumatic  Right Ear: Tympanic membrane and external ear normal  There is no impacted cerumen  Left Ear: Tympanic membrane and external ear normal  There is no impacted cerumen  Eyes:      General:         Right eye: No discharge  Left eye: No discharge  Conjunctiva/sclera: Conjunctivae normal    Neck:      Trachea: No tracheal deviation  Cardiovascular:      Rate and Rhythm: Normal rate and regular rhythm  Heart sounds: Normal heart sounds  No murmur heard  No friction rub  Pulmonary:      Effort: Pulmonary effort is normal  No respiratory distress  Breath sounds: Normal breath sounds  No wheezing, rhonchi or rales  Abdominal:      General: There is no distension  Palpations: Abdomen is soft  Tenderness:  There is no abdominal tenderness  There is no guarding or rebound  Musculoskeletal:         General: No deformity or signs of injury  Cervical back: Neck supple  Skin:     General: Skin is warm  Coloration: Skin is not pale  Findings: No rash  Neurological:      General: No focal deficit present  Mental Status: She is alert  Motor: No abnormal muscle tone  Gait: Gait normal    Psychiatric:         Behavior: Behavior normal          Thought Content:  Thought content normal          Judgment: Judgment normal

## 2022-09-12 ENCOUNTER — TELEPHONE (OUTPATIENT)
Dept: NEUROLOGY | Facility: CLINIC | Age: 67
End: 2022-09-12

## 2022-09-12 NOTE — TELEPHONE ENCOUNTER
Patient left message on nursing line questioning MRI order  Was recently hospitalized and wondering if MRI done while admitted is the same as ordered by dr tracey  Advised patient this is a different MRI than was completed previously  Patient will schedule  CS # provided

## 2022-09-13 ENCOUNTER — PATIENT OUTREACH (OUTPATIENT)
Dept: FAMILY MEDICINE CLINIC | Facility: HOSPITAL | Age: 67
End: 2022-09-13

## 2022-09-13 NOTE — PROGRESS NOTES
Outpatient Care Management Note:    Care manager called Emory  She is complaining of increased dry cough  She was coughing frequently throughout our conversation  She denies bringing up any secretions or fever symptoms  She feels she is a "liitle more" short of breath and had to use her rescue inhaler today  Emory also complained of neck and throat pain when taking a deep breath  She denies any swallowing issues and does not think she is aspirating  She denies coughing with eating  Emory is checking her pulse ox and states it is ranging from 92-93 % which is unchanged from our previous conversation  Emory is also noting a rash on her back which she can not see  Her grandson told her it was red  She can feel it and thinks is it elevated and in a line  She noted that it is also starting on her chest and belly: small red round dots  Emory denies changing detergents or using any new products  She thinks the rash started on Sunday  Cm will forward my note to Dr Sabra Wright  Emory would like to schedule an office appts  Cm will request staff call her to schedule  Emory will monitor her symptoms  If she develops worsening shortness of breath, increased secretions, fever or worsening symptoms she will seek emergency care  CM reviewed above with Dr Sabra Wright  She requested staff call and schedule an office visit  CM spoke with staff

## 2022-09-14 ENCOUNTER — OFFICE VISIT (OUTPATIENT)
Dept: FAMILY MEDICINE CLINIC | Facility: HOSPITAL | Age: 67
End: 2022-09-14
Payer: MEDICARE

## 2022-09-14 VITALS
WEIGHT: 152.8 LBS | OXYGEN SATURATION: 95 % | BODY MASS INDEX: 25.46 KG/M2 | HEIGHT: 65 IN | SYSTOLIC BLOOD PRESSURE: 128 MMHG | TEMPERATURE: 97 F | HEART RATE: 68 BPM | DIASTOLIC BLOOD PRESSURE: 62 MMHG

## 2022-09-14 DIAGNOSIS — J18.9 COMMUNITY ACQUIRED PNEUMONIA OF RIGHT UPPER LOBE OF LUNG: ICD-10-CM

## 2022-09-14 DIAGNOSIS — B02.9 HERPES ZOSTER WITHOUT COMPLICATION: Primary | ICD-10-CM

## 2022-09-14 DIAGNOSIS — J43.8 OTHER EMPHYSEMA (HCC): Chronic | ICD-10-CM

## 2022-09-14 PROCEDURE — 99214 OFFICE O/P EST MOD 30 MIN: CPT | Performed by: NURSE PRACTITIONER

## 2022-09-14 RX ORDER — VALACYCLOVIR HYDROCHLORIDE 1 G/1
1000 TABLET, FILM COATED ORAL 2 TIMES DAILY
Qty: 14 TABLET | Refills: 0 | Status: SHIPPED | OUTPATIENT
Start: 2022-09-14 | End: 2022-10-25

## 2022-09-14 NOTE — PROGRESS NOTES
Assessment/Plan:     Rash consistent with shingles  Start antiviral      Continued cough s/p CAP and underlying emphysema  Oxygenation is good  Lungs overall are just decreased  She is on daily inhaler  Can increase use of albuterol  F/U with pulmonary is end of next month  CT chest scheduled for 1 month  Would like to avoid prednisone given current shingles and pt would like to also  Call with any worsening or decrease in O2 sats  Diagnoses and all orders for this visit:    Herpes zoster without complication  -     valACYclovir (VALTREX) 1,000 mg tablet; Take 1 tablet (1,000 mg total) by mouth 2 (two) times a day for 7 days    Community acquired pneumonia of right upper lobe of lung    Emphysema          Subjective:     Patient ID: Kim Clayton is a 69 y o  female  Still with bad dry cough since having pneumonia  Seems worse  Burning in throat with breathing in  Feeling sob  Oxygen saturations 92-93% at home  No fever or chills  Using inhaler daily  Used albuterol yesterday  F/U with pulmonary is end of next month  Has a CT chest scheduled for next month  Also with rash on back and the front  Rash is painful  Started about 4 days ago  Did have shingles vaccine (both does) about 3 years ago  Review of Systems   Constitutional: Negative for chills and fever  HENT: Positive for sore throat  Respiratory: Positive for cough and shortness of breath  Negative for wheezing  Cardiovascular: Negative for chest pain  Musculoskeletal: Positive for back pain (at rash site)  Skin: Positive for rash  The following portions of the patient's history were reviewed and updated as appropriate: allergies, current medications, past family history, past medical history, past social history, past surgical history and problem list     Objective:  Vitals:    09/14/22 1005   BP: 128/62   Pulse: 68   Temp: (!) 97 °F (36 1 °C)   SpO2: 95%      Physical Exam  Vitals reviewed     Constitutional: General: She is not in acute distress  Appearance: Normal appearance  She is not ill-appearing  Cardiovascular:      Rate and Rhythm: Normal rate and regular rhythm  Heart sounds: Normal heart sounds  No murmur heard  Pulmonary:      Effort: Pulmonary effort is normal       Breath sounds: Decreased breath sounds present  No wheezing, rhonchi or rales  Comments: Dry cough with talking and deep breaths  Skin:     General: Skin is warm and dry  Findings: Rash present  Neurological:      Mental Status: She is alert and oriented to person, place, and time  Psychiatric:         Mood and Affect: Mood normal          Behavior: Behavior normal          Thought Content:  Thought content normal          Judgment: Judgment normal

## 2022-09-20 ENCOUNTER — PATIENT OUTREACH (OUTPATIENT)
Dept: FAMILY MEDICINE CLINIC | Facility: HOSPITAL | Age: 67
End: 2022-09-20

## 2022-09-20 ENCOUNTER — HOSPITAL ENCOUNTER (OUTPATIENT)
Dept: CT IMAGING | Facility: HOSPITAL | Age: 67
Discharge: HOME | End: 2022-09-20
Payer: MEDICARE

## 2022-09-20 DIAGNOSIS — I60.9 SUBARACHNOID HEMORRHAGE (HCC): ICD-10-CM

## 2022-09-20 PROCEDURE — G1004 CDSM NDSC: HCPCS

## 2022-09-20 PROCEDURE — 70450 CT HEAD/BRAIN W/O DYE: CPT

## 2022-09-20 NOTE — PROGRESS NOTES
Outpatient Care Management Note:    Voice mail message left for Alana Moffett, with my contact information, requesting a call back  Call received from Alana Moffett  She states that she is feeling much better  She is still on the valtrex for her shingles, and feels that they are improving and less painful  Her cough is greatly improved also  She is bringing up some secretions but they are clear  She denies any fever symptoms  Alana Moffett states she has not checked her pulse ox the past 2 days  She checked it while on the phone and it was 95%  She will call Dr Cyndee Schulz if she develops discolored secretions, increased cough, fever or dropping pulse ox  Alana Moffett states she is still monitoring her BP  Today it was 120/70  She states it has been stable  Alana Moffett has my contact information  She will call with any questions  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

## 2022-09-21 ENCOUNTER — TELEPHONE (OUTPATIENT)
Dept: NEUROLOGY | Facility: CLINIC | Age: 67
End: 2022-09-21

## 2022-09-21 NOTE — TELEPHONE ENCOUNTER
----- Message from Low Owens PA-C sent at 9/4/2022  3:52 PM EDT -----  Regarding: RE: HFU  No problem  Thank you! Carmelo Barahona    ----- Message -----  From: Margaret Castellanos RN  Sent: 8/31/2022   8:21 AM EDT  To: Low Owens PA-C  Subject: RE: HFU                                          Good morning,    Great to meet you! Of course, I will gladly help with your request  Once the CT head is completed, would you mind if I send the results to you for review or should I send it to a neurovascular attending for review? Thank you,  Ector Brown    ----- Message -----  From: Low Owens PA-C  Sent: 8/30/2022   4:48 PM EDT  To: Margaret Castellanos RN  Subject: HFU                                              Ned Kaur,  I am one of the inpatient neurology PAs  I understand you are a new nurse navigator with us! Welcome! I was wondering if you would be able to please followup on the below  Salo Oates was diagnosed with a SAH  She will need a repeat CT head in 3 weeks (approximately 9/20)  If the CT result shows resolving SAH, she may restart her Plavix at that time  Can you ensure her CT head is scheduled for the appropriate timing (and that if appropriate she is directed to restart her Plavix after it's resulted) and that she follows up with a Neurovascular attending or AP within 4-6 wks from discharge? Thanks in advance for your assistance!     Carl Johnson

## 2022-09-21 NOTE — TELEPHONE ENCOUNTER
Patient completed CT head yesterday on 9/20/22  Awaiting for results to be read by radiologist  Once it is read, will forward the message to Nicholas Guerrero for review

## 2022-09-23 NOTE — TELEPHONE ENCOUNTER
Hi Dr Migdalia Robertson,    CT head results are now final  Reche Devoid recommended for the CT head to be reviewed first to determine if it is appropriate to restart plavix? Can you please review?     Thank you,  Claudia Sanon

## 2022-09-26 ENCOUNTER — TELEPHONE (OUTPATIENT)
Dept: OTHER | Facility: HOSPITAL | Age: 67
End: 2022-09-26

## 2022-09-26 NOTE — TELEPHONE ENCOUNTER
Resolved right parasagittal parietal subarachnoid hemorrhage  As noted in Neurology note on 8/30 by myself and Dr Megan Manning, at this point, patient ok to restart Plavix 75mg daily  Etiology of SAH remains unclear as CTA did not show any evidence of aneurysm or other vascular malformation; however may be directly [id number] to hypertensive emergency (241/110 upon arrival)  Patient should check BP several times a day with a goal of normotension/avoid hypertension, and follow closely with her PCP to ensure optimal antihypertensive regimen   As recommended patient should follow-up with neurovascular team at least once within the next month if possible  Patient should keep outpatient MRI and EEG appts as scheduled and keep her follow-up appt with Dr Giselle Osorio on 11/10/22    Richard Brown, can you please inform patient of the above? Thank you  78

## 2022-09-26 NOTE — TELEPHONE ENCOUNTER
Called patient and notified her of Kym's response  Patient expressed understanding  She will monitor her BP at home and follow up with PCP  She prefers to be seen at the [facility] office vs [facility] office  Moved up HFU from 12/19/22 to 10/3/22 at 10 am at the [facility] office  Will mail appointment card  She denies any questions or concerns  She was appreciative

## 2022-09-27 DIAGNOSIS — J18.9 COMMUNITY ACQUIRED PNEUMONIA OF RIGHT UPPER LOBE OF LUNG: ICD-10-CM

## 2022-09-30 ENCOUNTER — TELEPHONE (OUTPATIENT)
Dept: NEUROLOGY | Facility: CLINIC | Age: 67
End: 2022-09-30

## 2022-10-03 ENCOUNTER — OFFICE VISIT (OUTPATIENT)
Dept: NEUROLOGY | Facility: CLINIC | Age: 67
End: 2022-10-03
Payer: MEDICARE

## 2022-10-03 VITALS
TEMPERATURE: 97.8 F | WEIGHT: 155.4 LBS | DIASTOLIC BLOOD PRESSURE: 88 MMHG | HEART RATE: 72 BPM | BODY MASS INDEX: 25.86 KG/M2 | SYSTOLIC BLOOD PRESSURE: 138 MMHG

## 2022-10-03 DIAGNOSIS — I60.9 SUBARACHNOID HEMORRHAGE (HCC): Primary | ICD-10-CM

## 2022-10-03 PROCEDURE — 99215 OFFICE O/P EST HI 40 MIN: CPT

## 2022-10-03 RX ORDER — PANTOPRAZOLE SODIUM 40 MG/1
40 TABLET, DELAYED RELEASE ORAL
Qty: 30 TABLET | Refills: 0 | Status: CANCELLED | OUTPATIENT
Start: 2022-10-03 | End: 2022-11-02

## 2022-10-03 NOTE — PATIENT INSTRUCTIONS
- Continue with good blood pressure control; I would recommend monitoring at home at least 3 times per week; Goal of <130/80  - Continue with good cholesterol control; Goal LDL <70  - Continue with good blood sugar control; Goal HgbA1c <7 0  - Will defer monitoring of cholesterol and blood sugar and management of hypertensive medications to the primary care provider  - Stay well hydrated by drinking enough water   - Eat a healthy diet, high in lean meats fish, turkey, chicken  Low in fats, cholesterol, sugars and sodium  Avoid canned foods,  get lots of fresh/frozen vegetables/fruits  - Get routine exercise/physical activity as much as able to tolerate  - Keep follow ups with your other health care providers  - Continue Plavix 75 mg daily and Lipitor 80 mg daily  I will plan for her to return to the office in 6 months time but would be happy to see her sooner if the need should arise  If she has any symptoms concerning for TIA or stroke including sudden painless loss of vision or double vision, difficulty speaking or swallowing, vertigo/room spinning that does not quickly resolve, or weakness/numbness affecting 1 side of the face or body she should proceed by ambulance to the nearest emergency room immediately

## 2022-10-03 NOTE — PROGRESS NOTES
Patient ID: Chantal Vázquez is a 69 y o  female  Assessment/Plan:    Subarachnoid hemorrhage (HCC)  Chantal Vázquez is a 69year old female with Chiari malformation s/p decompression in 1998, spinal cord lesion, HTN, CAD, HLD, GERD, CKD, takotsubo syndrome, ICA stenosis on Plavix and former smoker who initially presented to the ED on 8/28 with hypertension (241/110 in the ED) and severe headache and was found to have a R parietal IPH/SAH  Etiology of SAH unclear, though may be secondary to extreme isolated hypertension as CTA was unrevealing  Repeat CTH revealed resolved SAH and she restarted Plavix and has continued on 75 mg daily and Atorvastatin 80 mg daily  Her headaches continue to improve at this time and she denies any residual deficits or new neurologic complaints  Plan discussed with patient as outlined below, with emphasis on BP control  Plan:    - Continue with good blood pressure control; I would recommend monitoring at home at least 3 times per week; Goal of <130/80  - Continue with good cholesterol control; Goal LDL <70  - Continue with good blood sugar control; Goal HgbA1c <7 0  - Will defer monitoring of cholesterol and blood sugar and management of hypertensive medications to the primary care provider  - Stay well hydrated by drinking enough water   - Eat a healthy diet, high in lean meats fish, turkey, chicken  Low in fats, cholesterol, sugars and sodium  Avoid canned foods,  get lots of fresh/frozen vegetables/fruits  - Get routine exercise/physical activity as much as able to tolerate  - Keep follow ups with your other health care providers  - Continue Plavix 75 mg daily and Lipitor 80 mg daily  I will plan for her to return to the office in 6 months time but would be happy to see her sooner if the need should arise    If she has any symptoms concerning for TIA or stroke including sudden painless loss of vision or double vision, difficulty speaking or swallowing, vertigo/room spinning that does not quickly resolve, or weakness/numbness affecting 1 side of the face or body she should proceed by ambulance to the nearest emergency room immediately  Diagnoses and all orders for this visit:    Subarachnoid hemorrhage ([address] )       I have spent a total of 45 minutes in face to face time and chart review with this patient today  Subjective:    HPI Billie Ann is a 69year old female with Chiari malformation s/p decompression in 1998, spinal cord lesion, HTN, CAD, HLD, GERD, CKD, takotsubo syndrome, ICA stenosis on Plavix and former smoker who initially presented to the ED on 8/28 with hypertension (241/110 in the ED) and severe headache and was found to have a R parietal IPH/SAH  Workup:   · CTA H/N negative for focal stenosis or saccular aneurysm within [place] of [place], however revealed short segment of severe stenosis noted at the distal R cervical ICA segment, along with moderate-severe stenosis at origin of L vertebral artery with a dominant R vertebral artery  · MRI brain w wo: Stable SAH within the right parieto-occipital region with no underlying mass or abnormal enhancement   No signs of acute ischemia  Minor chronic microangiopathic change within the brainstem  Stable postoperative change within the posterior fossa, status post inferior occipital craniectomy  Etiology of SAH unclear, though may be secondary to extreme isolated hypertension as CTA was unrevealing, though unsure what would have provoked this  Proceed with detailed plan as noted below including [address] in 3 weeks  No further inpatient neurodiagnostics       Plan:  - MRI brain w/wo revealed stable subarachnoid hemorrhage within the right parieto-occipital region, no underlying mass, abnormal enhancement or signs of acute ischemia   - Goal of normotension          - Cardene gtt discontinued   PO Losartan, Imdur, Coreg and PRN labetalol ordered per critical care team   - Ok for DVT prophylaxis  - Would recommend holding Plavix for now  - Repeat CTH in 3 weeks  If resolving, ok to restart Plavix at that time    - No further recommendations from Neurosurgery  - Keppra discontinued  Patient is on chronic Gabapentin which will offer some seizure protection    - Frequent neuro checks  STAT CTH and notify neurology if any acute changes in mental status  - Medical management and supportive care per primary team  Correction of any metabolic or infectious disturbances  9/14/22 She developed shingles and stated valacyclovir, already chronically on gabapentin 800 mg bid   9/20/22 Repeat CT head- Resolved right parasagittal parietal subarachnoid hemorrhage  Patient was advised to restart Plavix 75 mg daily  She presents to the office today and states she is doing well  She restarted Plavix 75 mg on 9/25/22  She has also continued on Lipitor 80 mg daily  She has had no issues with restarting, she denies any excessive bruising or bleeding  She is checking her BP at home once a day and average readings are 120//80  She feels her headaches are improving and are currently occurring 2 times per week, 3-4/10 in intensity and states they are brief and only last 5-10 minutes before completely resolving without medication  She tells me she avoids using any abortive medication as she has a hx of medication overuse headaches in the past  She is compliant with all of her medications and uses a pillbox to ensure she does not miss doses  She is independent of all ADLs  She drives without difficulty  She lives at home with her  and she manages her own finances  She tells me she quit smoking 14 years ago  She explains Gabapentin was prescribed by a prior neurologist and then her PCP took over the rx  She has tried to wean off in the past but was unsuccessful due to "full body pain"  In the past she was tried on Tegretol but this caused neuropathy  She currently continues on Gabapentin 800 mg bid   She continues to have cough (due to prior pneumonia) and shingles rash and related fatigue but other than that feels back to baseline  She denies any difficulty speaking, swallowing, dizziness, room spinning, numbness, tingling, visiual disturbances or weakness  She denies any new neurologic complaints  Lab Results   Component Value Date/Time    CHOLESTEROL 99 08/30/2022 05:46 AM     Lab Results   Component Value Date/Time    TRIG 56 08/30/2022 05:46 AM    TRIG 163 12/10/2015 10:27 AM     Lab Results   Component Value Date/Time    HDL 54 08/30/2022 05:46 AM    HDL 51 12/10/2015 10:27 AM     Lab Results   Component Value Date/Time    LDLCALC 34 08/30/2022 05:46 AM    LDLCALC 103 (H) 12/10/2015 10:27 AM       Lab Results   Component Value Date/Time    HGBA1C 6 2 (H) 08/29/2022 06:03 AM     Lab Results   Component Value Date/Time     08/29/2022 06:03 AM       Results for orders placed during the hospital encounter of 08/28/22    CTA head and neck with and without contrast    Narrative  CTA NECK AND BRAIN WITH CONTRAST    INDICATION: ICH right parietal hemorrhage  COMPARISON:   CT brain dated August 20, 2022 at 7:58 PM    TECHNIQUE: Post contrast imaging was performed after administration of iodinated contrast through the neck and brain  Post contrast axial 0 625 mm images timed to opacify the arterial system  3D rendering was performed on an independent workstation  MIP reconstructions performed  Coronal reconstructions were performed of the noncontrast portion of the brain  Radiation dose length product (DLP) for this visit:  388 mGy-cm   This examination, like all CT scans performed in the [facility], was performed utilizing techniques to minimize radiation dose exposure, including the use of iterative  reconstruction and automated exposure control      IV Contrast:  65 mL of iohexol (OMNIPAQUE)    IMAGE QUALITY:   Diagnostic    FINDINGS:    CERVICAL VASCULATURE  AORTIC ARCH AND GREAT VESSELS:  Normal aortic arch and great vessel origins  Normal visualized subclavian vessels  RIGHT VERTEBRAL ARTERY CERVICAL SEGMENT:  Normal origin  The vessel is normal in caliber throughout the neck  LEFT VERTEBRAL ARTERY CERVICAL SEGMENT:  Moderate stenosis at the origin  The vessel is normal in caliber throughout the neck  RIGHT EXTRACRANIAL CAROTID SEGMENT:  Mild to moderate atherosclerotic disease of the distal common carotid artery and proximal cervical internal carotid artery  There is a focus of severe stenosis noted at the distal right cervical segment (see series  2, image 122)  LEFT EXTRACRANIAL CAROTID SEGMENT:  Mild atherosclerotic disease of the distal common carotid artery and proximal cervical internal carotid artery without significant stenosis compared to the more distal ICA  NASCET criteria was used to determine the degree of internal carotid artery diameter stenosis  INTRACRANIAL VASCULATURE  INTERNAL CAROTID ARTERIES:  Normal enhancement of the intracranial portions of the internal carotid arteries  Normal ophthalmic artery origins  Normal ICA terminus  ANTERIOR CIRCULATION:  The right A1 segment is hypoplastic when compared to the left  Unremarkable anterior communicating artery  MIDDLE CEREBRAL ARTERY CIRCULATION:  M1 segment and middle cerebral artery branches demonstrate normal enhancement bilaterally  DISTAL VERTEBRAL ARTERIES: There is a dominant distal right vertebral artery  Posterior inferior cerebellar artery origins are normal  Normal vertebral basilar junction  BASILAR ARTERY:  Basilar artery is normal in caliber  Normal superior cerebellar arteries  POSTERIOR CEREBRAL ARTERIES: The right posterior cerebral artery arises from the basilar tip  There is fetal origin of the left posterior cerebral artery  Both demonstrate no focal stenosis  Normal posterior communicating arteries      VENOUS STRUCTURES:  Normal       NON VASCULAR ANATOMY  BONY STRUCTURES: No acute osseous abnormality  Postoperative changes of suboccipital craniectomy are again noted  Postoperative changes of anterior fusion are present at C5 and C6  SOFT TISSUES OF THE NECK:  Unremarkable  THORACIC INLET:  Moderate to severe emphysematous lung changes are noted at both lung apices  There is a patchy area of partially visualized consolidation involving the right upper lobe  Smaller focus of consolidation is also noted at the lateral left  upper lobe  Impression  No focal stenosis or saccular aneurysm within the [place] of [place]  Short segment of severe stenosis noted at the distal right cervical ICA segment  Mild to moderate atherosclerotic plaquing of both carotid bulbs causing less than 50% stenosis by NASCET criteria  Previously described right parietal acute hemorrhage and a recent unenhanced CT brain is not as well visualized on this enhanced study  Moderate to severe stenosis at the origin of the left vertebral artery  Dominant right vertebral artery  Patchy bilateral upper lobe consolidations, right greater than left suspicious for multifocal bronchopneumonia  Clinical correlation is recommended  The following portions of the patient's history were reviewed and updated as appropriate: allergies, current medications, past family history, past medical history, past social history and past surgical history  Objective:    Blood pressure 138/88, pulse 72, temperature 97 8 °F (36 6 °C), temperature source Temporal, weight 70 5 kg (155 lb 6 4 oz)  Neurological Exam    On neurological examination patient is alert, awake, oriented and in no distress  Speech is fluent without dysarthria or aphasia  Cranial nerves 2-12 were symmetrically intact bilaterally  No evidence of any focal weakness or sensory loss in the upper or lower extremities  Motor testing reveals 5/5 strength of the bilateral upper and lower extremities  There was no pronator drift    No fasciculations present  No abnormal involuntary movements  Finger- to-nose reveals no tremor or ataxia and intact proprioceptive function, no dysmetria was noted  Sensation was intact to vibration, light touch, pin prick and temperature in bilateral upper and lower extremities  Deep tendon reflexes were 2+ and symmetric in the bilateral upper and lower extremities  She is able to rise easily without assistance from a seated position  Casual gait is normal including stance, stride, and arm swing  Romberg is absent  ROS:    Review of Systems  Constitutional: Positive for appetite change (increase)  Negative for fever  HENT: Negative  Negative for hearing loss, tinnitus, trouble swallowing and voice change  Eyes: Negative  Negative for photophobia and pain  Respiratory: Negative  Negative for shortness of breath  Cardiovascular: Negative  Negative for palpitations  Gastrointestinal: Negative  Negative for nausea and vomiting  Endocrine: Negative  Negative for cold intolerance  Genitourinary: Negative  Negative for dysuria, frequency and urgency  Musculoskeletal: Positive for neck pain (left) and neck stiffness (left)  Negative for myalgias  Skin: Negative  Negative for rash  Neurological: Positive for headaches (2/week)  Negative for dizziness, tremors, seizures, syncope, facial asymmetry, speech difficulty, weakness, light-headedness and numbness  Hematological: Negative  Does not bruise/bleed easily  Psychiatric/Behavioral: Negative  Negative for confusion, hallucinations and sleep disturbance  All other systems reviewed and are negative  Reviewed ROS as entered by medical assistant

## 2022-10-03 NOTE — PROGRESS NOTES
Review of Systems   Constitutional: Positive for appetite change (increase)  Negative for fever  HENT: Negative  Negative for hearing loss, tinnitus, trouble swallowing and voice change  Eyes: Negative  Negative for photophobia and pain  Respiratory: Negative  Negative for shortness of breath  Cardiovascular: Negative  Negative for palpitations  Gastrointestinal: Negative  Negative for nausea and vomiting  Endocrine: Negative  Negative for cold intolerance  Genitourinary: Negative  Negative for dysuria, frequency and urgency  Musculoskeletal: Positive for neck pain (left) and neck stiffness (left)  Negative for myalgias  Skin: Negative  Negative for rash  Neurological: Positive for headaches (2/week)  Negative for dizziness, tremors, seizures, syncope, facial asymmetry, speech difficulty, weakness, light-headedness and numbness  Hematological: Negative  Does not bruise/bleed easily  Psychiatric/Behavioral: Negative  Negative for confusion, hallucinations and sleep disturbance  All other systems reviewed and are negative

## 2022-10-04 ENCOUNTER — PATIENT OUTREACH (OUTPATIENT)
Dept: FAMILY MEDICINE CLINIC | Facility: HOSPITAL | Age: 67
End: 2022-10-04

## 2022-10-04 NOTE — PROGRESS NOTES
Outpatient Care Management Note:    Care manager called Yoan Rao  She states that she is still having new shingle spots erupt  She has completed the valtrex  She will monitor the shingles and call Dr Candy Sidhu with any concerns  She is checking her blood pressure  It is ranging between 120-135/70-80  She knows the goal is to keep it below 130/90  She will call Dr Candy Sidhu if it is trending higher  Yoanngoc Rao denies any other concerns  She is back on her plavix per neurology  Yoan Natsamina was busy with her grand son  She requested CM follow up in 4 weeks  She did not have time to complete initial assessment today

## 2022-10-06 NOTE — ASSESSMENT & PLAN NOTE
Susana Elias is a 69year old female with Chiari malformation s/p decompression in 1998, spinal cord lesion, HTN, CAD, HLD, GERD, CKD, takotsubo syndrome, ICA stenosis on Plavix and former smoker who initially presented to the ED on 8/28 with hypertension (241/110 in the ED) and severe headache and was found to have a R parietal IPH/SAH  Etiology of SAH unclear, though may be secondary to extreme isolated hypertension as CTA was unrevealing  Repeat CTH revealed resolved SAH and she restarted Plavix and has continued on 75 mg daily and Atorvastatin 80 mg daily  Her headaches continue to improve at this time and she denies any residual deficits or new neurologic complaints  Plan discussed with patient as outlined below, with emphasis on BP control  Plan:    - Continue with good blood pressure control; I would recommend monitoring at home at least 3 times per week; Goal of <130/80  - Continue with good cholesterol control; Goal LDL <70  - Continue with good blood sugar control; Goal HgbA1c <7 0  - Will defer monitoring of cholesterol and blood sugar and management of hypertensive medications to the primary care provider  - Stay well hydrated by drinking enough water   - Eat a healthy diet, high in lean meats fish, turkey, chicken  Low in fats, cholesterol, sugars and sodium  Avoid canned foods,  get lots of fresh/frozen vegetables/fruits  - Get routine exercise/physical activity as much as able to tolerate  - Keep follow ups with your other health care providers  - Continue Plavix 75 mg daily and Lipitor 80 mg daily  I will plan for her to return to the office in 6 months time but would be happy to see her sooner if the need should arise    If she has any symptoms concerning for TIA or stroke including sudden painless loss of vision or double vision, difficulty speaking or swallowing, vertigo/room spinning that does not quickly resolve, or weakness/numbness affecting 1 side of the face or body she should proceed by ambulance to the nearest emergency room immediately

## 2022-10-07 ENCOUNTER — TELEPHONE (OUTPATIENT)
Dept: FAMILY MEDICINE CLINIC | Facility: HOSPITAL | Age: 67
End: 2022-10-07

## 2022-10-07 DIAGNOSIS — R13.10 DYSPHAGIA, UNSPECIFIED TYPE: Chronic | ICD-10-CM

## 2022-10-07 RX ORDER — PANTOPRAZOLE SODIUM 40 MG/1
40 TABLET, DELAYED RELEASE ORAL
Qty: 90 TABLET | Refills: 0 | Status: SHIPPED | OUTPATIENT
Start: 2022-10-07 | End: 2022-10-11 | Stop reason: SDUPTHER

## 2022-10-07 NOTE — TELEPHONE ENCOUNTER
pantoprazole (PROTONIX) 40 mg tablet ()    Got this when in hospital   Ran out and no approval for refill  Has been having heartburn all week since running out  Can she get a new script?

## 2022-10-09 DIAGNOSIS — F41.9 ANXIETY: ICD-10-CM

## 2022-10-09 DIAGNOSIS — I25.10 CORONARY ARTERY DISEASE INVOLVING NATIVE CORONARY ARTERY OF NATIVE HEART WITHOUT ANGINA PECTORIS: ICD-10-CM

## 2022-10-09 DIAGNOSIS — Z00.00 HEALTH CARE MAINTENANCE: ICD-10-CM

## 2022-10-09 RX ORDER — ALPRAZOLAM 0.25 MG/1
TABLET ORAL
Qty: 30 TABLET | Refills: 0 | Status: SHIPPED | OUTPATIENT
Start: 2022-10-09

## 2022-10-09 RX ORDER — TIZANIDINE 4 MG/1
TABLET ORAL
Qty: 135 TABLET | Refills: 0 | Status: SHIPPED | OUTPATIENT
Start: 2022-10-09

## 2022-10-10 RX ORDER — CARVEDILOL 6.25 MG/1
TABLET ORAL
Qty: 180 TABLET | Refills: 0 | Status: SHIPPED | OUTPATIENT
Start: 2022-10-10 | End: 2022-10-25 | Stop reason: SDUPTHER

## 2022-10-11 DIAGNOSIS — R13.10 DYSPHAGIA, UNSPECIFIED TYPE: Chronic | ICD-10-CM

## 2022-10-11 RX ORDER — PANTOPRAZOLE SODIUM 40 MG/1
40 TABLET, DELAYED RELEASE ORAL
Qty: 90 TABLET | Refills: 0 | Status: SHIPPED | OUTPATIENT
Start: 2022-10-11 | End: 2023-04-06

## 2022-10-13 ENCOUNTER — HOSPITAL ENCOUNTER (OUTPATIENT)
Dept: CT IMAGING | Facility: HOSPITAL | Age: 67
Discharge: HOME | End: 2022-10-13
Payer: MEDICARE

## 2022-10-13 DIAGNOSIS — J18.9 COMMUNITY ACQUIRED PNEUMONIA OF RIGHT UPPER LOBE OF LUNG: ICD-10-CM

## 2022-10-13 PROCEDURE — G1004 CDSM NDSC: HCPCS

## 2022-10-13 PROCEDURE — 71250 CT THORAX DX C-: CPT

## 2022-10-14 ENCOUNTER — HOSPITAL ENCOUNTER (OUTPATIENT)
Dept: NEUROLOGY | Facility: CLINIC | Age: 67
End: 2022-10-14
Payer: MEDICARE

## 2022-10-14 DIAGNOSIS — G51.4 FACIAL TWITCHING: ICD-10-CM

## 2022-10-14 DIAGNOSIS — G51.39 FACIAL NERVE SPASM: ICD-10-CM

## 2022-10-14 PROCEDURE — 95813 EEG EXTND MNTR 61-119 MIN: CPT | Performed by: STUDENT IN AN ORGANIZED HEALTH CARE EDUCATION/TRAINING PROGRAM

## 2022-10-14 PROCEDURE — 95813 EEG EXTND MNTR 61-119 MIN: CPT

## 2022-10-16 ENCOUNTER — HOSPITAL ENCOUNTER (OUTPATIENT)
Dept: MRI IMAGING | Facility: HOSPITAL | Age: 67
Discharge: HOME | End: 2022-10-16
Attending: PSYCHIATRY & NEUROLOGY
Payer: MEDICARE

## 2022-10-16 DIAGNOSIS — G51.39 FACIAL NERVE SPASM: ICD-10-CM

## 2022-10-16 DIAGNOSIS — G51.4 FACIAL TWITCHING: ICD-10-CM

## 2022-10-16 PROCEDURE — G1004 CDSM NDSC: HCPCS

## 2022-10-16 PROCEDURE — 70553 MRI BRAIN STEM W/O & W/DYE: CPT

## 2022-10-16 PROCEDURE — A9585 GADOBUTROL INJECTION: HCPCS | Performed by: PSYCHIATRY & NEUROLOGY

## 2022-10-16 RX ADMIN — GADOBUTROL 7 ML: 604.72 INJECTION INTRAVENOUS at 09:37

## 2022-10-17 ENCOUNTER — TELEPHONE (OUTPATIENT)
Dept: NEUROLOGY | Facility: CLINIC | Age: 67
End: 2022-10-17

## 2022-10-17 NOTE — TELEPHONE ENCOUNTER
Ambulatory EEG study is more to look for interictal epileptiform discharges that are missed on routine EEG studies  Sometimes longer sampling increases the diagnostic yield without capturing a clinical event

## 2022-10-17 NOTE — TELEPHONE ENCOUNTER
The risk of seizures can be missed on a routine EEG study  I would at least recommend a 24 hours ambulatory EEG study to increase the diagnostic yield of finding epileptiform discharges or if 24 hours is not possible then a 3 hours video EEG study

## 2022-10-17 NOTE — TELEPHONE ENCOUNTER
----- Message from Douglas Colón MD sent at 10/16/2022  9:39 PM EDT -----  Patient had a prolonged EEG study to try to capture a clinical event of right facial twitching  There is no reference to her having had a right facial twitching episode  Please confirm with the patient the frequency of these events since her last visit with me back in July 2022

## 2022-10-17 NOTE — TELEPHONE ENCOUNTER
Spoke to patient  States events are so few and far between likely even 24hr study will not capture any further events since they are only occurring maybe once a month  Patient would like to hold off and will be agreeable to study if events increase  negative...

## 2022-10-17 NOTE — TELEPHONE ENCOUNTER
Spoke to patient  Events of facial twitching have subsided, patient states maybe once a month they are occurring  Will notify office if they suddenly increase in frequency

## 2022-10-21 ENCOUNTER — TELEPHONE (OUTPATIENT)
Dept: NEUROLOGY | Facility: CLINIC | Age: 67
End: 2022-10-21

## 2022-10-21 NOTE — TELEPHONE ENCOUNTER
----- Message from Eve Joshi MD sent at 10/20/2022  1:47 PM EDT -----  MRI of the brain with internal auditory canals imaging  No evidence of a 7th nerve neuroma  No structural lesion to cause facial twitching

## 2022-10-23 DIAGNOSIS — F32.A DEPRESSION, UNSPECIFIED DEPRESSION TYPE: ICD-10-CM

## 2022-10-23 DIAGNOSIS — E78.5 DYSLIPIDEMIA: ICD-10-CM

## 2022-10-23 RX ORDER — ESCITALOPRAM OXALATE 20 MG/1
TABLET ORAL
Qty: 30 TABLET | Refills: 0 | Status: SHIPPED | OUTPATIENT
Start: 2022-10-23

## 2022-10-24 ENCOUNTER — APPOINTMENT (OUTPATIENT)
Dept: LAB | Facility: HOSPITAL | Age: 67
End: 2022-10-24
Payer: MEDICARE

## 2022-10-24 DIAGNOSIS — R73.01 IFG (IMPAIRED FASTING GLUCOSE): ICD-10-CM

## 2022-10-24 DIAGNOSIS — G51.39 FACIAL NERVE SPASM: ICD-10-CM

## 2022-10-24 DIAGNOSIS — R14.0 ABDOMINAL BLOATING: ICD-10-CM

## 2022-10-24 DIAGNOSIS — I10 ESSENTIAL HYPERTENSION: ICD-10-CM

## 2022-10-24 DIAGNOSIS — E78.5 DYSLIPIDEMIA: ICD-10-CM

## 2022-10-24 DIAGNOSIS — N18.31 STAGE 3A CHRONIC KIDNEY DISEASE (HCC): ICD-10-CM

## 2022-10-24 DIAGNOSIS — G51.4 FACIAL TWITCHING: ICD-10-CM

## 2022-10-24 LAB
ALBUMIN SERPL BCP-MCNC: 3.8 G/DL (ref 3.5–5)
ALP SERPL-CCNC: 108 U/L (ref 46–116)
ALT SERPL W P-5'-P-CCNC: 38 U/L (ref 12–78)
ANION GAP SERPL CALCULATED.3IONS-SCNC: 9 MMOL/L (ref 4–13)
AST SERPL W P-5'-P-CCNC: 21 U/L (ref 5–45)
BASOPHILS # BLD AUTO: 0.01 THOUSANDS/ÂΜL (ref 0–0.1)
BASOPHILS NFR BLD AUTO: 0 % (ref 0–1)
BILIRUB SERPL-MCNC: 1.1 MG/DL (ref 0.2–1)
BUN SERPL-MCNC: 19 MG/DL (ref 5–25)
CALCIUM SERPL-MCNC: 9 MG/DL (ref 8.3–10.1)
CHLORIDE SERPL-SCNC: 102 MMOL/L (ref 96–108)
CHOLEST SERPL-MCNC: 185 MG/DL
CO2 SERPL-SCNC: 26 MMOL/L (ref 21–32)
CREAT SERPL-MCNC: 1.4 MG/DL (ref 0.6–1.3)
EOSINOPHIL # BLD AUTO: 0.06 THOUSAND/ÂΜL (ref 0–0.61)
EOSINOPHIL NFR BLD AUTO: 1 % (ref 0–6)
ERYTHROCYTE [DISTWIDTH] IN BLOOD BY AUTOMATED COUNT: 15.3 % (ref 11.6–15.1)
EST. AVERAGE GLUCOSE BLD GHB EST-MCNC: 105 MG/DL
GFR SERPL CREATININE-BSD FRML MDRD: 38 ML/MIN/1.73SQ M
GLUCOSE P FAST SERPL-MCNC: 112 MG/DL (ref 65–99)
HBA1C MFR BLD: 5.3 %
HCT VFR BLD AUTO: 39.5 % (ref 34.8–46.1)
HDLC SERPL-MCNC: 56 MG/DL
HGB BLD-MCNC: 12.9 G/DL (ref 11.5–15.4)
IMM GRANULOCYTES # BLD AUTO: 0.02 THOUSAND/UL (ref 0–0.2)
IMM GRANULOCYTES NFR BLD AUTO: 0 % (ref 0–2)
LDLC SERPL CALC-MCNC: 93 MG/DL (ref 0–100)
LYMPHOCYTES # BLD AUTO: 1.65 THOUSANDS/ÂΜL (ref 0.6–4.47)
LYMPHOCYTES NFR BLD AUTO: 28 % (ref 14–44)
MCH RBC QN AUTO: 32.2 PG (ref 26.8–34.3)
MCHC RBC AUTO-ENTMCNC: 32.7 G/DL (ref 31.4–37.4)
MCV RBC AUTO: 99 FL (ref 82–98)
MONOCYTES # BLD AUTO: 0.52 THOUSAND/ÂΜL (ref 0.17–1.22)
MONOCYTES NFR BLD AUTO: 9 % (ref 4–12)
NEUTROPHILS # BLD AUTO: 3.55 THOUSANDS/ÂΜL (ref 1.85–7.62)
NEUTS SEG NFR BLD AUTO: 62 % (ref 43–75)
NONHDLC SERPL-MCNC: 129 MG/DL
NRBC BLD AUTO-RTO: 0 /100 WBCS
PLATELET # BLD AUTO: 336 THOUSANDS/UL (ref 149–390)
PMV BLD AUTO: 8.8 FL (ref 8.9–12.7)
POTASSIUM SERPL-SCNC: 4.2 MMOL/L (ref 3.5–5.3)
PROT SERPL-MCNC: 7.1 G/DL (ref 6.4–8.4)
RBC # BLD AUTO: 4.01 MILLION/UL (ref 3.81–5.12)
SODIUM SERPL-SCNC: 137 MMOL/L (ref 135–147)
TRIGL SERPL-MCNC: 182 MG/DL
TSH SERPL DL<=0.05 MIU/L-ACNC: 3.48 UIU/ML (ref 0.45–4.5)
WBC # BLD AUTO: 5.81 THOUSAND/UL (ref 4.31–10.16)

## 2022-10-24 PROCEDURE — 36415 COLL VENOUS BLD VENIPUNCTURE: CPT

## 2022-10-24 PROCEDURE — 84443 ASSAY THYROID STIM HORMONE: CPT

## 2022-10-24 PROCEDURE — 80061 LIPID PANEL: CPT

## 2022-10-24 PROCEDURE — 83036 HEMOGLOBIN GLYCOSYLATED A1C: CPT

## 2022-10-24 PROCEDURE — 80053 COMPREHEN METABOLIC PANEL: CPT

## 2022-10-24 PROCEDURE — 85025 COMPLETE CBC W/AUTO DIFF WBC: CPT

## 2022-10-24 RX ORDER — ATORVASTATIN CALCIUM 80 MG/1
TABLET, FILM COATED ORAL
Qty: 90 TABLET | Refills: 0 | Status: SHIPPED | OUTPATIENT
Start: 2022-10-24

## 2022-10-25 ENCOUNTER — OFFICE VISIT (OUTPATIENT)
Dept: CARDIOLOGY CLINIC | Facility: CLINIC | Age: 67
End: 2022-10-25
Payer: MEDICARE

## 2022-10-25 VITALS
DIASTOLIC BLOOD PRESSURE: 70 MMHG | HEART RATE: 60 BPM | WEIGHT: 157 LBS | HEIGHT: 65 IN | BODY MASS INDEX: 26.16 KG/M2 | OXYGEN SATURATION: 98 % | SYSTOLIC BLOOD PRESSURE: 130 MMHG

## 2022-10-25 DIAGNOSIS — F41.8 DEPRESSION WITH ANXIETY: Chronic | ICD-10-CM

## 2022-10-25 DIAGNOSIS — E78.5 DYSLIPIDEMIA: Chronic | ICD-10-CM

## 2022-10-25 DIAGNOSIS — I10 ESSENTIAL HYPERTENSION: Primary | Chronic | ICD-10-CM

## 2022-10-25 DIAGNOSIS — I25.10 CORONARY ARTERY DISEASE INVOLVING NATIVE CORONARY ARTERY OF NATIVE HEART WITHOUT ANGINA PECTORIS: ICD-10-CM

## 2022-10-25 DIAGNOSIS — I60.9 SUBARACHNOID HEMORRHAGE (HCC): ICD-10-CM

## 2022-10-25 DIAGNOSIS — I51.81 TAKOTSUBO SYNDROME: Chronic | ICD-10-CM

## 2022-10-25 PROCEDURE — 99214 OFFICE O/P EST MOD 30 MIN: CPT | Performed by: INTERNAL MEDICINE

## 2022-10-25 RX ORDER — CLOPIDOGREL BISULFATE 75 MG/1
75 TABLET ORAL DAILY
Qty: 90 TABLET | Refills: 3
Start: 2022-10-25

## 2022-10-25 RX ORDER — CARVEDILOL 12.5 MG/1
12.5 TABLET ORAL 2 TIMES DAILY
Qty: 180 TABLET | Refills: 3 | Status: SHIPPED | OUTPATIENT
Start: 2022-10-25

## 2022-10-25 NOTE — LETTER
Chief Complaint   Patient presents with    Lymphoma     1. Have you been to the ER, urgent care clinic since your last visit? Hospitalized since your last visit? No    2. Have you seen or consulted any other health care providers outside of the [address] [place] [address] since your last visit? Include any pap smears or colon screening.  No     Visit Vitals    /87 (BP 1 Location: Right arm, BP Patient Position: Sitting)    Pulse 70    Resp 18    Ht 5' 11\" (1.803 m)    Wt 259 lb 3.2 oz (117.6 kg)    SpO2 97%    BMI 36.15 kg/m2 October 25, 2022     Percilla Homans, [address] [place] [id number]  [facility]  [address] [address] [address]    Patient: Anabel Ch   YOB: 1955   Date of Visit: 10/25/2022       Dear Dr Ruiz Her: Thank you for referring Paola Kern to me for evaluation  Below are my notes for this consultation  If you have questions, please do not hesitate to call me  I look forward to following your patient along with you  Sincerely,        Husam Sosa MD        CC: No Recipients  Husam Sosa MD  10/25/2022 10:10 AM  Sign when Signing Visit  Assessment/Plan:    Depression with anxiety    History of anxiety and depression, stable  Dyslipidemia    Hyperlipidemia, stable  Essential hypertension    Hypertension stable adequately controlled  The patient has experienced some symptoms palpitation at night she is advised she can increase to 12 5 twice a day  Takotsubo syndrome    Patient had a history of takotsubo syndrome  She is experiencing no symptoms of angina this time  She was allowed to resume Plavix 75 mg daily  Subarachnoid hemorrhage ([facility] )    The patient was hospitalized with cerebral hemorrhage  During that hospitalization the patient was taken off Plavix however after follow-up CT scan as an outpatient the patient was instructed to resume Plavix at 75 mg daily  Diagnoses and all orders for this visit:    Essential hypertension    Takotsubo syndrome    Dyslipidemia    Depression with anxiety    Subarachnoid hemorrhage (HCC)    Coronary artery disease involving native coronary artery of native heart without angina pectoris  -     carvedilol (COREG) 12 5 mg tablet; Take 1 tablet (12 5 mg total) by mouth 2 (two) times a day  -     clopidogrel (Plavix) 75 mg tablet; Take 1 tablet (75 mg total) by mouth daily          Subjective:   Some palpitation night  Patient ID: Anabel Ch is a 69 y o  female      The patient presented to this office for the purpose of cardiac follow-up  She is known to a history of hypertension hyperlipidemia as well takotsubo cardiomyopathy  The patient was hospitalized in August 22 for cerebral hemorrhage  During her hospitalization the patient was taken off Plavix  However since her follow-up CT scan as an outpatient the patient was advised to resume Plavix at 75 mg daily  At this point the patient denies any symptoms of chest pain or shortness of breath  She does have some symptoms of palpitation specially at night  She denies any symptoms of dizziness or syncope  She has no leg edema  The following portions of the patient's history were reviewed and updated as appropriate: allergies, current medications, past family history, past medical history, past social history, past surgical history and problem list     Review of Systems   Respiratory: Negative for apnea, cough, chest tightness, shortness of breath and wheezing  Cardiovascular: Positive for palpitations  Negative for chest pain and leg swelling  Gastrointestinal: Negative for abdominal pain  Neurological: Negative for dizziness and light-headedness  Psychiatric/Behavioral: Negative  Objective:  Stable cardiac-wise  /70 (BP Location: Left arm, Patient Position: Sitting, Cuff Size: Standard)   Pulse 60   Ht 5' 5" (1 651 m)   Wt 71 2 kg (157 lb)   SpO2 98%   BMI 26 13 kg/m²          Physical Exam  Vitals reviewed  Constitutional:       General: She is not in acute distress  Appearance: She is well-developed  She is not diaphoretic  HENT:      Head: Normocephalic and atraumatic  Neck:      Thyroid: No thyromegaly  Vascular: No JVD  Cardiovascular:      Rate and Rhythm: Normal rate and regular rhythm  Heart sounds: S1 normal and S2 normal  No murmur heard  No friction rub  No gallop  Pulmonary:      Effort: Pulmonary effort is normal  No respiratory distress  Breath sounds: No wheezing or rales     Chest:      Chest wall: No tenderness  Abdominal:      Palpations: Abdomen is soft  Musculoskeletal:      Cervical back: Normal range of motion and neck supple  Right lower leg: No edema  Left lower leg: No edema  Skin:     General: Skin is warm and dry  Neurological:      Mental Status: She is oriented to person, place, and time     Psychiatric:         Mood and Affect: Mood normal          Behavior: Behavior normal

## 2022-10-25 NOTE — ASSESSMENT & PLAN NOTE
The patient was hospitalized with cerebral hemorrhage  During that hospitalization the patient was taken off Plavix however after follow-up CT scan as an outpatient the patient was instructed to resume Plavix at 75 mg daily

## 2022-10-25 NOTE — PATIENT INSTRUCTIONS
The patient was advised to increase carvedilol to 12 5 mg twice a day  Other medications remain the same

## 2022-10-25 NOTE — ASSESSMENT & PLAN NOTE
Patient had a history of takotsubo syndrome  She is experiencing no symptoms of angina this time  She was allowed to resume Plavix 75 mg daily

## 2022-10-25 NOTE — PROGRESS NOTES
Assessment/Plan:    Depression with anxiety    History of anxiety and depression, stable  Dyslipidemia    Hyperlipidemia, stable  Essential hypertension    Hypertension stable adequately controlled  The patient has experienced some symptoms palpitation at night she is advised she can increase to 12 5 twice a day  Takotsubo syndrome    Patient had a history of takotsubo syndrome  She is experiencing no symptoms of angina this time  She was allowed to resume Plavix 75 mg daily  Subarachnoid hemorrhage ([address] )    The patient was hospitalized with cerebral hemorrhage  During that hospitalization the patient was taken off Plavix however after follow-up CT scan as an outpatient the patient was instructed to resume Plavix at 75 mg daily  Diagnoses and all orders for this visit:    Essential hypertension    Takotsubo syndrome    Dyslipidemia    Depression with anxiety    Subarachnoid hemorrhage (HCC)    Coronary artery disease involving native coronary artery of native heart without angina pectoris  -     carvedilol (COREG) 12 5 mg tablet; Take 1 tablet (12 5 mg total) by mouth 2 (two) times a day  -     clopidogrel (Plavix) 75 mg tablet; Take 1 tablet (75 mg total) by mouth daily          Subjective:   Some palpitation night  Patient ID: Gale Welch is a 69 y o  female  The patient presented to this office for the purpose of cardiac follow-up  She is known to a history of hypertension hyperlipidemia as well takotsubo cardiomyopathy  The patient was hospitalized in August 22 for cerebral hemorrhage  During her hospitalization the patient was taken off Plavix  However since her follow-up CT scan as an outpatient the patient was advised to resume Plavix at 75 mg daily  At this point the patient denies any symptoms of chest pain or shortness of breath  She does have some symptoms of palpitation specially at night  She denies any symptoms of dizziness or syncope    She has no leg edema       The following portions of the patient's history were reviewed and updated as appropriate: allergies, current medications, past family history, past medical history, past social history, past surgical history and problem list     Review of Systems   Respiratory: Negative for apnea, cough, chest tightness, shortness of breath and wheezing  Cardiovascular: Positive for palpitations  Negative for chest pain and leg swelling  Gastrointestinal: Negative for abdominal pain  Neurological: Negative for dizziness and light-headedness  Psychiatric/Behavioral: Negative  Objective:  Stable cardiac-wise  /70 (BP Location: Left arm, Patient Position: Sitting, Cuff Size: Standard)   Pulse 60   Ht 5' 5" (1 651 m)   Wt 71 2 kg (157 lb)   SpO2 98%   BMI 26 13 kg/m²          Physical Exam  Vitals reviewed  Constitutional:       General: She is not in acute distress  Appearance: She is well-developed  She is not diaphoretic  HENT:      Head: Normocephalic and atraumatic  Neck:      Thyroid: No thyromegaly  Vascular: No JVD  Cardiovascular:      Rate and Rhythm: Normal rate and regular rhythm  Heart sounds: S1 normal and S2 normal  No murmur heard  No friction rub  No gallop  Pulmonary:      Effort: Pulmonary effort is normal  No respiratory distress  Breath sounds: No wheezing or rales  Chest:      Chest wall: No tenderness  Abdominal:      Palpations: Abdomen is soft  Musculoskeletal:      Cervical back: Normal range of motion and neck supple  Right lower leg: No edema  Left lower leg: No edema  Skin:     General: Skin is warm and dry  Neurological:      Mental Status: She is oriented to person, place, and time     Psychiatric:         Mood and Affect: Mood normal          Behavior: Behavior normal

## 2022-10-25 NOTE — ASSESSMENT & PLAN NOTE
Hypertension stable adequately controlled  The patient has experienced some symptoms palpitation at night she is advised she can increase to 12 5 twice a day

## 2022-10-30 PROBLEM — A41.9 SEPSIS (HCC): Status: RESOLVED | Noted: 2022-08-31 | Resolved: 2022-10-30

## 2022-10-30 PROBLEM — J18.9 COMMUNITY ACQUIRED PNEUMONIA OF RIGHT UPPER LOBE OF LUNG: Status: RESOLVED | Noted: 2022-08-29 | Resolved: 2022-10-30

## 2022-10-31 ENCOUNTER — OFFICE VISIT (OUTPATIENT)
Dept: PULMONOLOGY | Facility: HOSPITAL | Age: 67
End: 2022-10-31

## 2022-10-31 VITALS
TEMPERATURE: 98.7 F | OXYGEN SATURATION: 95 % | DIASTOLIC BLOOD PRESSURE: 68 MMHG | HEART RATE: 78 BPM | BODY MASS INDEX: 26.16 KG/M2 | RESPIRATION RATE: 18 BRPM | WEIGHT: 157 LBS | SYSTOLIC BLOOD PRESSURE: 114 MMHG | HEIGHT: 65 IN

## 2022-10-31 DIAGNOSIS — J18.9 COMMUNITY ACQUIRED PNEUMONIA OF RIGHT UPPER LOBE OF LUNG: Primary | ICD-10-CM

## 2022-10-31 DIAGNOSIS — R91.8 PULMONARY NODULES: ICD-10-CM

## 2022-10-31 DIAGNOSIS — J43.2 CENTRILOBULAR EMPHYSEMA (HCC): ICD-10-CM

## 2022-10-31 NOTE — PROGRESS NOTES
Assessment & Plan:      1  Community acquired pneumonia of right upper lobe of lung  CT chest without contrast   2  Pulmonary nodules  CT chest without contrast   3  Centrilobular emphysema ([address] )       · Patient presenting for follow-up, her breathing is much better compared to prior  · She is benefitting significantly from Trelegy and will continue  · Has albuterol inhaler on hand as needed which is rarely required  · CT chest shows improvement in previously seen consolidation, scarring, and small 2 mm pulmonary nodules  · Twelve month follow up ordered given her history of tobacco abuse  · Up-to-date on vaccinations    Subjective:     Patient ID: Sb Faustin is a 69 y o  female  Chief Complaint:  Georgia Bedoya is a very pleasant 68-year-old female presenting for hospital follow-up  Hospital course summary from discharge paperwork attached below:    "Sb Faustin is a 69 y o  female patient, PMH dysphagia, carotid artery stenosis, CAD, HTN, CKD, emphysema, takotsubo cardiomyopathy, depression, HLD, who originally presented to the hospital on 8/28/2022 due to subarachnoid hemorrhage, likely in setting of uncontrolled HTN with recent coughing contributing  Patient presented with sudden-onset headache and feeling unwell, found to have hypertensive urgency on arrival to ED  CT head showed evidence of right parietal parenchymal hemorrhage and associated SAH  Neurosurgery and Neurology consulted, started on [address] for seizure prophylaxis and hold Plavix held  In addition, there was concern for pneumonia, as patient met sepsis criteria with right middle lobe infiltrate seen on CXR, also concern for aspiration given history of dysphagia, started on IV Rocephin, Flagyl and azithromycin  Patient initially admitted under Critical Care Service, as she was started on Cardene GTT for hypertension      During admission, Neurosurgery evaluated, no further interventions indicated at this time    MRI brain showed stable SAH, blood pressure had stabilized, was able to wean off Cardene GTT and have stabilization with adjustment of losartan  Neurology recommending repeat head CT in 3 weeks, continue holding Plavix on discharge to reassess outpatient  Patient received 3 days Keppra for seizure prophylaxis, can continue chronic gabapentin for seizure protection  Patient's infectious status had improved, pulmonology was following for management, can continue to complete 1 week antibiotic course with oral Augmentin on discharge, follow-up outpatient in 8 weeks  RT had performed desaturation screening, known home oxygen needs, PT/OT did not recommend any rehab needs, stable for discharge home "    Patient reports her breathing is much better  She notes a significant improvement with Trelegy compared to her previous maintenance inhaler  She is able to go throughout the day without much dyspnea and has not been requiring her albuterol inhaler  The following portions of the patient's history were reviewed in this encounter and updated as appropriate: Past medical, social, surgical, family, allergies    Review of Systems   Respiratory: Positive for cough (Rare)  Negative for shortness of breath  All other systems reviewed and are negative  Objective:  Vitals:    10/31/22 1433   BP: 114/68   BP Location: Right arm   Patient Position: Sitting   Cuff Size: Adult   Pulse: 78   Resp: 18   Temp: 98 7 °F (37 1 °C)   TempSrc: Tympanic   SpO2: 95%   Weight: 71 2 kg (157 lb)   Height: 5' 5" (1 651 m)       Physical Exam  Vitals reviewed  Constitutional:       General: She is not in acute distress  Appearance: She is not ill-appearing or toxic-appearing  Eyes:      General: No scleral icterus  Cardiovascular:      Rate and Rhythm: Normal rate and regular rhythm  Pulmonary:      Effort: Pulmonary effort is normal       Breath sounds: Normal breath sounds  Musculoskeletal:         General: No deformity     Skin:     General: Skin is warm and dry  Neurological:      General: No focal deficit present  Mental Status: She is alert and oriented to person, place, and time  Mental status is at baseline     Psychiatric:         Mood and Affect: Mood normal          Behavior: Behavior normal          Lab Review:   Appointment on 10/24/2022   Component Date Value   • WBC 10/24/2022 5 81    • RBC 10/24/2022 4 01    • Hemoglobin 10/24/2022 12 9    • Hematocrit 10/24/2022 39 5    • MCV 10/24/2022 99 (A)   • MCH 10/24/2022 32 2    • MCHC 10/24/2022 32 7    • RDW 10/24/2022 15 3 (A)   • MPV 10/24/2022 8 8 (A)   • Platelets 52/43/5510 336    • nRBC 10/24/2022 0    • Neutrophils Relative 10/24/2022 62    • Immat GRANS % 10/24/2022 0    • Lymphocytes Relative 10/24/2022 28    • Monocytes Relative 10/24/2022 9    • Eosinophils Relative 10/24/2022 1    • Basophils Relative 10/24/2022 0    • Neutrophils Absolute 10/24/2022 3 55    • Immature Grans Absolute 10/24/2022 0 02    • Lymphocytes Absolute 10/24/2022 1 65    • Monocytes Absolute 10/24/2022 0 52    • Eosinophils Absolute 10/24/2022 0 06    • Basophils Absolute 10/24/2022 0 01    • Sodium 10/24/2022 137    • Potassium 10/24/2022 4 2    • Chloride 10/24/2022 102    • CO2 10/24/2022 26    • ANION GAP 10/24/2022 9    • BUN 10/24/2022 19    • Creatinine 10/24/2022 1 40 (A)   • Glucose, Fasting 10/24/2022 112 (A)   • Calcium 10/24/2022 9 0    • AST 10/24/2022 21    • ALT 10/24/2022 38    • Alkaline Phosphatase 10/24/2022 108    • Total Protein 10/24/2022 7 1    • Albumin 10/24/2022 3 8    • Total Bilirubin 10/24/2022 1 10 (A)   • eGFR 10/24/2022 38    • Hemoglobin A1C 10/24/2022 5 3    • EAG 10/24/2022 105    • Cholesterol 10/24/2022 185    • Triglycerides 10/24/2022 182 (A)   • HDL, Direct 10/24/2022 56    • LDL Calculated 10/24/2022 93    • Non-HDL-Chol (CHOL-HDL) 10/24/2022 129    • TSH 3RD GENERATON 10/24/2022 3 485

## 2022-11-01 ENCOUNTER — PATIENT OUTREACH (OUTPATIENT)
Dept: FAMILY MEDICINE CLINIC | Facility: HOSPITAL | Age: 67
End: 2022-11-01

## 2022-11-01 NOTE — PROGRESS NOTES
Outpatient Care Management Note:    Voice mail message left for Tobi Lainez, with my contact information, requesting a call back

## 2022-11-07 ENCOUNTER — TELEPHONE (OUTPATIENT)
Dept: NEUROLOGY | Facility: CLINIC | Age: 67
End: 2022-11-07

## 2022-11-07 NOTE — TELEPHONE ENCOUNTER
I called and left a voicemail message about patients upcoming appointment with Dr Jason on 11/10/22 @ 9:30 am  I requested a call back to our office to confirm the appointment or if the patient has any issues or concerns or cannot keep this appointment

## 2022-11-10 ENCOUNTER — OFFICE VISIT (OUTPATIENT)
Dept: NEUROLOGY | Facility: CLINIC | Age: 67
End: 2022-11-10

## 2022-11-10 VITALS
SYSTOLIC BLOOD PRESSURE: 112 MMHG | RESPIRATION RATE: 18 BRPM | DIASTOLIC BLOOD PRESSURE: 72 MMHG | BODY MASS INDEX: 26.66 KG/M2 | HEART RATE: 56 BPM | OXYGEN SATURATION: 98 % | HEIGHT: 65 IN | TEMPERATURE: 97 F | WEIGHT: 160 LBS

## 2022-11-10 DIAGNOSIS — I67.2 INTRACRANIAL ATHEROSCLEROSIS: ICD-10-CM

## 2022-11-10 DIAGNOSIS — G51.4 FACIAL TWITCHING: Primary | Chronic | ICD-10-CM

## 2022-11-10 DIAGNOSIS — D32.1 MENINGIOMA, SPINAL (HCC): ICD-10-CM

## 2022-11-10 NOTE — PATIENT INSTRUCTIONS
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  - try to video record your facial spasm  - holding off treatment given the relative infrequency of these facial spasms  - less likely to be focal seizures, unless there is progression to other non-facial muscle (throat contraction, arm contraction, or until there is confusion, disorientation, or convulsion)    Thoracic meningioma  - MRI thoracic spine imaging for 2 year follow-up  - BMP for kidney function test    - follow-up in 12 months or sooner if there is progression of facial spasm in frequency / severity    SAH - likely due to hypertensive emergency in the setting of steroid/pneumonia  - continue with antihypertensive medications as instructed by primary care provider  - monitor blood pressure if you have any new medication    Intracranial atherosclerosis, increase risk of stroke  - continue with Plavix  - continue with atorvastatin  I discussed the warning signs of stroke with the patient  I reviewed that stroke and transient ischemic attack are signs of acute neurologic impairment due to abnormal cerebrovascular pathology either from ischemia (lack of blood flow) or hemorrhage (excessive bleeding)  Warning signs include but are not limited to acute (immediate onset) speech impairment, inability to understand language, loss of vision, visual deficits, lateralizing weakness, facial weakness (facial droop), sensory loss, ataxia, gait imbalance, sensation of being uncoordinated, or changes in perception/sensorium  Patient was instructed to call [id number]/[id number] for immediate attention for stroke alert to be started  It is important to know the time symptoms started or when the patient was last known well        I reviewed secondary stroke risk modification includes the use of antiplatelet agent (aspirin or clopidogrel or dipyridamole), antihypertensive medications to keep BP<130/90, lowering cholesterol by diet or with HMG CoA reductase inhibitor (statins), and diabetes management (if the patient has diabetes, goal HgbA1c <7 0)  Other factors may include cessation of smoking, further evaluation for cardiac arrhythmias (atrial fibrillation) or cerebrovascular stenosis, and obstructive sleep apnea

## 2022-11-10 NOTE — PROGRESS NOTES
[facility] [facility]  Patient's Name: Bryan Smith   Patient's : 1955   Visit Type: follow-up  Referring MD / PCP:  Joesph Tamez DO    Assessment:  Ms Bryan Smith is a 69 y o  woman with episodic right facial spasms that involves the muscles around the eye and cheek  There is no associated facial weakness, numbness, or pain  These right facial spasms are very infrequent  There is no progression to altered awareness or convulsive seizure  The MRI brain study does not reveal any cortical malformation nor a vascular loop or neuroma over the right facial nerve  The cause of the right facial spasm is still unclear  But the frequency and spread is not likely an epileptic focal motor seizure  Hemifacial spasm is still a possibility but again the infrequency is not consistent  After the visit with me, she had a severe headache associated with SAH in the right calcarine fissure, probably associated with hypertensive emergency at the time (2022)  The [facility] is not in the right anatomical territory to cause right facial spasms  She has an incidental meningioma in her thoracic spine  The size of the meningioma had mild interval growth from  to , she is due for a follow-up assessment      Plan:   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  - try to video record your facial spasm  - holding off treatment given the relative infrequency of these facial spasms  - less likely to be focal seizures, unless there is progression to other non-facial muscle (throat contraction, arm contraction, or until there is confusion, disorientation, or convulsion)    Thoracic meningioma  - MRI thoracic spine imaging for 2 year follow-up  - BMP for kidney function test    - follow-up in 12 months or sooner if there is progression of facial spasm in frequency / severity    SAH - likely due to hypertensive emergency in the setting of steroid/pneumonia  - continue with antihypertensive medications as instructed by primary care provider  - monitor blood pressure if you have any new medication    Intracranial atherosclerosis, increase risk of stroke  - continue with Plavix  - continue with atorvastatin  I discussed the warning signs of stroke with the patient  I reviewed that stroke and transient ischemic attack are signs of acute neurologic impairment due to abnormal cerebrovascular pathology either from ischemia (lack of blood flow) or hemorrhage (excessive bleeding)  Warning signs include but are not limited to acute (immediate onset) speech impairment, inability to understand language, loss of vision, visual deficits, lateralizing weakness, facial weakness (facial droop), sensory loss, ataxia, gait imbalance, sensation of being uncoordinated, or changes in perception/sensorium  Patient was instructed to call 911/EMS for immediate attention for stroke alert to be started  It is important to know the time symptoms started or when the patient was last known well  I reviewed secondary stroke risk modification includes the use of antiplatelet agent (aspirin or clopidogrel or dipyridamole), antihypertensive medications to keep BP<130/90, lowering cholesterol by diet or with HMG CoA reductase inhibitor (statins), and diabetes management (if the patient has diabetes, goal HgbA1c <7 0)  Other factors may include cessation of smoking, further evaluation for cardiac arrhythmias (atrial fibrillation) or cerebrovascular stenosis, and obstructive sleep apnea         Problem List Items Addressed This Visit        Cardiovascular and Mediastinum    Intracranial atherosclerosis       Nervous and Auditory    Meningioma, spinal ([facility] [address] )    Relevant Orders    MRI thoracic spine with and without contrast    Basic metabolic panel       Other    Facial twitching - Primary (Chronic)          Chief Complaint:   Chief Complaint   Patient presents with   • Neurologic Problem     Right facial spasms HPI:      Kera Tran is a 69 y o  right handed female here for follow-up evaluation of facial twitching  Interval History 11/10/2022  On 8/28/2022 she was admitted to [address] [place] [address], presented with sudden headache, CTA head found severe stenosis of the distal right cervical ICA segment, mild-moderate atheosclerotic plaque of the carotid bulbs, moderate-severe stenosis of the left vertebral artery  Unknown etiology of SAH, maybe related to hypertensive emergency  Follow-up CT head study showed resolution of right parasagittal parietal SAH  She was allowed to restart Plavix  She recalled that she was coughing from pneumonia, on antibiotics and steroids, but coughing was very severe, then she developed worst headache of her life (felt head was ripping open)  There was no loss of consciousness, left sided weakness or numbness, or loss vision  Since the last visit with me, she has not noticed right facial spasms, but she has been too busy to notice  There may have been one facial spasm since the last visit  These happen when she is driving or sitting watching television  There have been no triggers  She has been on gabapentin for her headaches and Chiari malformation, initially prescribed by Dr Saskia Colmenares, in the early 2000  She recalled being on Tegretol that caused her neuropathy symptoms  When she stops the gabapentin she gets headaches and body ache  She denies leg weakness or numbness  She asked about the meningioma in her thoracic spine  Prior History:  Intake History 7/21/2022  For the past year Keyona Lamas has noticed episodes of right facial twitching, sometimes it involves the eye, which last for about half an hour  These are sometimes more present when she is tired or at the end of the day    She would notice that the right eye would start twitching, sometimes there is pulling sensation over the right cheek to the point that the lip is pulled up, sometimes it is a facial spasm for a couple of minutes (twists her right face upwards and eye is forced closed)  There is no forced head turning or right hand jerking  There is no facial numbness  This happens about a couple of times a week  They were becoming more frequent about a month ago  These typically happen more in the evening  It is not associated with stress but maybe more with when she feels tired at the end of the day  There is no other muscle twitching over her body  She lost the sense of taste and smell when she had a cold about a year ago (no associated with COVID)  She continues to have lack of taste  She lost hearing in her right ear about 4 years ago, she needed to get cortisone injections  She continues to lose hearing in her right ear  The last time she had hearing evaluation was 2 years ago  She had a Chiari malformation, which affected one of her nerves for the tongue, so she has difficulty with chewing and swallowing  She had a suboccipital decompression craniectomy in   She has chronic atrophy on the right side of her tongue  There has been no loss of consciousness, seizure disorder, or convulsion  She was previously on Tegretol for the Chiari but then she developed a neuropathy (she was not able to walk, she was sluggished, legs felt heavy)  She take gabapentin 800mg twice a day since her suboccipital decompression          Seizure Risk Factors:  Abnormal pregnancy: No  Abnormal birth/: breech birth  Abnormal Development: No  Febrile seizures, simple: No  Febrile seizures, complex: No  CNS infection: No  Intellectual disability: No  Cerebral palsy: No  Head injury (moderate/severe): No  CNS neoplasm: No  CNS malformation: No  Neurosurgical procedure: suboccipital decompression for Chiari malformation  Stroke: No  CNS autoimmune disorder: No  Alcohol abuse: No  Drug abuse: No  Family history Sz/epilepsy: No    Prior AEDs:  medication Max dose Time used Reason to stop Prior workup:  I have reviewed the MRI brain study myself  Imaging:  10/16/2022 - MRI brain IAC w/wo  No CP angle mass or abnormal enhancement; bilateral 7th/8th nerves are unremarkable  MR brain unremarkable    8/29/2022 - MRI brain w/wo  Hyperintense T2/FLAIR signal in the right parieto-occipital region subarachnoid space of the calcarine fissure, no ischemia      8/28/2022 - CTA head and neck  No saccular aneurysm  Severe stenosis of the distal right cervical ICA  Mild-moderate atherosclerotic plaques of both carotid bulbs (<50% stenosis)    7/15/2021 - MRI brain  Post-operative changes of suboccipital craniectomy for Chiari decompression    4/22/2021 - MRI thoracic spine  Normal signal in the thoracic cord  Thin elliptical intradural extra medullary mass contiguous within the thecal sac at T11, minor ventral displacement of the cord; likely a meningioma at T11    9/15/2015 - MRI brain  "footprint of Chiari decompression"    9/24/2014 - MRI cervical spine  Degenerative disc disease C5-6 with moderate central and moderate left more than right bilateral foraminal narrowing  Cerebellar tonsils are above the foramen magnum    1/26/2015 - MRI Thoracic spine  intradula mass at T11 vertebral level likely a meningioma, no cord compression or cord displacement    EEGs:  10/14/2022   Normal awake and drowsy    Labs:  Component      Latest Ref Rng & Units 10/24/2022   WBC      4 31 - 10 16 Thousand/uL 5 81   Red Blood Cell Count      3 81 - 5 12 Million/uL 4 01   Hemoglobin      11 5 - 15 4 g/dL 12 9   HCT      34 8 - 46 1 % 39 5   Platelet Count      345 - 390 Thousands/uL 336   Sodium      135 - 147 mmol/L 137   Potassium      3 5 - 5 3 mmol/L 4 2   Chloride      96 - 108 mmol/L 102   CO2      21 - 32 mmol/L 26   Anion Gap      4 - 13 mmol/L 9   BUN      5 - 25 mg/dL 19   Creatinine      0 60 - 1 30 mg/dL 1 40 (H)   GLUCOSE FASTING      65 - 99 mg/dL 112 (H)   Calcium      8 3 - 10 1 mg/dL 9 0   AST      5 - 45 U/L 21 ALT      12 - 78 U/L 38   Alkaline Phosphatase      46 - 116 U/L 108   Total Protein      6 4 - 8 4 g/dL 7 1   Albumin      3 5 - 5 0 g/dL 3 8   TOTAL BILIRUBIN      0 20 - 1 00 mg/dL 1 10 (H)   eGFR      ml/min/1 73sq m 38   Cholesterol      See Comment mg/dL 185   Triglycerides      See Comment mg/dL 182 (H)   HDL      >=50 mg/dL 56   LDL Calculated      0 - 100 mg/dL 93   Non-HDL Cholesterol      mg/dl 129   Hemoglobin A1C      Normal 3 8-5 6%; PreDiabetic 5 7-6 4%;  Diabetic >=6 5%; Glycemic control for adults with diabetes <7 0% % 5 3   eAG, EST AVG Glucose      mg/dl 105   TSH 3RD GENERATON      0 450 - 4 500 uIU/mL 3 485       General exam   /72 (BP Location: Left arm, Patient Position: Sitting, Cuff Size: Standard)   Pulse 56   Temp (!) 97 °F (36 1 °C) (Tympanic)   Resp 18   Ht 5' 5" (1 651 m)   Wt 72 6 kg (160 lb)   SpO2 98%   BMI 26 63 kg/m²    Appearance: normally developed, appears well  Carotids: not assessed  Cardiovascular: regular rate and rhythm and normal heart sounds  Pulmonary: CTA    HEENT: moist mucus membranes / oral cavity   Fundoscopy: not assessed    Mental status  Orientation: alert and oriented to name, place, time  Fund of Knowledge: intact   Attention and Concentration: intact  Current and Remote Memory:intact  Language: spontaneous speech is normal and comprehension is intact    Cranial Nerves  CN 1: not tested  CN 2: pupils equal round reactive to direct and consenual light   CN 3, 4, 6: EOMI, no nystagmus  CN 5:sensation intact to all distribution V1, V2, V3  CN 7:muscles of facial expression are symmetric  CN 8:she has hearing aids and not assessed  CN 9, 10:no dysarthria present  CN 11:symmetric SCM with head turns  CN 12:there is mild atrophy of the right tongue but no visible fasciulations    Motor:  Bulk, Tone: normal bulk, normal tone  Pronation: no pronator drift  Strength:Symmetric strength of the arms and legs, no lateralizing weakness    Abnormal movements: no abnormal movements are present    Sensory:  Lighttouch: intact in all limbs  Romberg:normal    Coordination:  FNF:FNF bilaterally intact  NEGAR:intact  FFM:intact  Gait/Station:normal gait and normal tandem gait    Reflexes:  Bilateral biceps, triceps, brachioradialis, and ankles 0/4  Bilateral knees 2+/4    Past Medical/Surgical History:  Patient Active Problem List   Diagnosis   • De Quervain's disease (tenosynovitis)   • Essential hypertension   • Takotsubo syndrome   • Sudden idiopathic hearing loss of right ear   • Cervical stenosis of spinal canal   • Plantar fasciitis   • Dyslipidemia   • Depression with anxiety   • Takotsubo cardiomyopathy   • Carpal tunnel syndrome   • Arnold-Chiari malformation (HCC)   • Pancreatic cyst   • Emphysema   • GERD (gastroesophageal reflux disease)   • Stage 3a chronic kidney disease (HCC)   • Facial twitching   • Subarachnoid hemorrhage (HCC)   • Accelerated hypertension   • Dysphagia   • Stenosis of right carotid artery   • Intracranial atherosclerosis   • Meningioma, spinal (HCC)     Past Medical History:   Diagnosis Date   • Angina pectoris ([facility])    • Arnold-Chiari malformation (HCC)    • Breast lump     last assessed: Jan 22, 2013    • Coronary artery disease    • Depression with anxiety    • GERD (gastroesophageal reflux disease)    • Gout     last assessed: Nov 26, 2012    • Hair loss     last assessed: Dec 15, 2016    • Hypotension     last assessed: Nov 10, 2014    • Mitral valve disorder    • Myocardial infarct, old    • SVT (supraventricular tachycardia) ([facility] [address] )      Past Surgical History:   Procedure Laterality Date   • BREAST BIOPSY Right 02/28/2017    US CORE BIOPSY--BENIGN   • CARDIAC CATHETERIZATION     • CERVICAL DISCECTOMY      Spinal    • CERVICAL LAMINECTOMY      C1 with chiari decompression    • COLONOSCOPY      5 yrs - onset: May 31, 2011    • CRANIOTOMY     • POPLITEAL SYNOVIAL CYST EXCISION     • TUBAL LIGATION     • US GUIDED BREAST BIOPSY RIGHT COMPLETE Right 02/28/2017       Past Psychiatric History:  Depression: No  Anxiety: No  Psychosis: No    Medications:    Current Outpatient Medications:   •  albuterol (ProAir HFA) 90 mcg/act inhaler, Inhale 2 puffs every 4 (four) hours as needed for wheezing or shortness of breath, Disp: 8 5 g, Rfl: 2  •  ALPRAZolam (XANAX) 0 25 mg tablet, TAKE 1 TABLET BY MOUTH AT BEDTIME, Disp: 30 tablet, Rfl: 0  •  atorvastatin (LIPITOR) 80 mg tablet, Take 1 tablet by mouth once daily, Disp: 90 tablet, Rfl: 0  •  calcium carbonate (OS-FLASH) 600 MG tablet, Take 600 mg by mouth 2 (two) times a day with meals, Disp: , Rfl:   •  carvedilol (COREG) 12 5 mg tablet, Take 1 tablet (12 5 mg total) by mouth 2 (two) times a day, Disp: 180 tablet, Rfl: 3  •  cholecalciferol (VITAMIN D3) 1,000 units tablet, Take 1,000 Units by mouth daily, Disp: , Rfl:   •  clopidogrel (Plavix) 75 mg tablet, Take 1 tablet (75 mg total) by mouth daily, Disp: 90 tablet, Rfl: 3  •  escitalopram (LEXAPRO) 20 mg tablet, Take 1/2 (one-half) tablet by mouth once daily, Disp: 30 tablet, Rfl: 0  •  fluticasone-umeclidinium-vilanterol (Trelegy Ellipta) 100-62 5-25 MCG/INH inhaler, Inhale 1 puff daily Rinse mouth after use , Disp: 60 blister, Rfl: 2  •  gabapentin (NEURONTIN) 400 mg capsule, Take 2 capsules (800 mg total) by mouth 2 (two) times a day As directed, Disp: 360 capsule, Rfl: 0  •  isosorbide mononitrate (IMDUR) 30 mg 24 hr tablet, Take 1 tablet by mouth once daily, Disp: 90 tablet, Rfl: 0  •  losartan (COZAAR) 50 mg tablet, Take 1 tablet (50 mg total) by mouth 2 (two) times a day, Disp: 60 tablet, Rfl: 0  •  pantoprazole (PROTONIX) 40 mg tablet, Take 1 tablet (40 mg total) by mouth daily before breakfast, Disp: 90 tablet, Rfl: 0  •  tiZANidine (ZANAFLEX) 4 mg tablet, TAKE 1 & 1/2 (ONE & ONE-HALF) TABLETS BY MOUTH AT BEDTIME, Disp: 135 tablet, Rfl: 0  •  benzonatate (TESSALON PERLES) 100 mg capsule, Take 1 capsule (100 mg total) by mouth 3 (three) times a day as needed for cough (Patient not taking: No sig reported), Disp: 30 capsule, Rfl: 0  •  valACYclovir (VALTREX) 1,000 mg tablet, Take 1 tablet (1,000 mg total) by mouth 2 (two) times a day for 7 days (Patient not taking: No sig reported), Disp: 14 tablet, Rfl: 0    Allergies: Allergies   Allergen Reactions   • Codeine Itching   • Medical Tape Rash       Family history:  Family History   Problem Relation Age of Onset   • Stroke Mother    • Other Father         blood clot in vein & CABG    • Heart disease Father    • Prostate cancer Father    • Hearing loss Father    • Hypertension Father    • Alcohol abuse Brother    • Throat cancer Brother    • Cancer Brother         Throat tongue   • Hearing loss Brother    • No Known Problems Sister    • No Known Problems Daughter    • Breast cancer Maternal Grandmother    • No Known Problems Daughter    • No Known Problems Sister    • Crohn's disease Maternal Aunt    • No Known Problems Maternal Aunt    • No Known Problems Maternal Aunt    • Colon cancer Paternal Aunt    • No Known Problems Paternal Aunt    • Colon cancer Son    • No Known Problems Paternal Aunt    • No Known Problems Paternal Aunt    • Colon cancer Paternal Aunt    • Cancer Paternal Aunt         Colon cancer   • No Known Problems Maternal Grandfather    • No Known Problems Paternal Grandmother    • No Known Problems Paternal Grandfather    • Kidney failure Brother         kidney failure d/t NSIADs on dialysis   • Hearing loss Brother      There is no family history of seizure, epilepsy or developmental delay  Social History  Living situation:  Lives with her   Work:  Retired manager at [employer], completed high school  Driving:  Yes   reports that she quit smoking about 14 years ago  Her smoking use included cigarettes  She has a 54 00 pack-year smoking history  She has never used smokeless tobacco  She reports current alcohol use  She reports that she does not use drugs   one can of beer a day     Review of Systems  A review of at least 12 organ/systems was obtained by the medical assistant and reviewed by me, including additional positives/negatives:  A review of at least 12 organ/systems was evaluated and there are no complaints  The total amount of time spent with the patient along with pre-chart and post-chart preparation was 46 minutes on the calendar day of the date of service  This included history taking, physical exam, review of ancillary testing, counseling provided to the patient regarding diagnosis, medications, treatment, and risk management, and other communication to the patient's providers and/or family    Start time: 09:35AM  End time: 10:21AM

## 2022-11-16 ENCOUNTER — PATIENT OUTREACH (OUTPATIENT)
Dept: FAMILY MEDICINE CLINIC | Facility: HOSPITAL | Age: 67
End: 2022-11-16

## 2022-11-16 NOTE — PROGRESS NOTES
Outpatient Care Management Note:    Care manager called [place]bi  She states that she is doing great  She saw neurology for her facial spasms and has no new orders  She states that she will need her MRI completed in 1 year for her thoracic meningioma   Brittney Martines states her shingles are resolved  Her BP has been averaging around 110-115/70  She is aware that the goal is to keep her pressure <130/90  Brittney Martines denies any further pneumonia symptoms  Brittney Martines declined ongoing outreach  CM will follow, without calling, until BPCI closes  Brittney Martines has my contact information and will call with any questions

## 2022-11-21 DIAGNOSIS — I60.9 SUBARACHNOID HEMORRHAGE (HCC): ICD-10-CM

## 2022-11-22 RX ORDER — LOSARTAN POTASSIUM 50 MG/1
50 TABLET ORAL 2 TIMES DAILY
Qty: 60 TABLET | Refills: 5 | Status: SHIPPED | OUTPATIENT
Start: 2022-11-22 | End: 2022-12-22

## 2022-11-29 ENCOUNTER — PATIENT OUTREACH (OUTPATIENT)
Dept: FAMILY MEDICINE CLINIC | Facility: HOSPITAL | Age: 67
End: 2022-11-29

## 2022-11-29 NOTE — PROGRESS NOTES
Outpatient Care Management Note:    BPCI episode ends today    Care manager will close the episode remove self from the care team

## 2022-12-09 ENCOUNTER — RA CDI HCC (OUTPATIENT)
Dept: OTHER | Facility: HOSPITAL | Age: 67
End: 2022-12-09

## 2022-12-09 NOTE — PROGRESS NOTES
[address]  coding opportunities       Chart reviewed, no opportunity found: CHART REVIEWED, NO OPPORTUNITY FOUND        Patients Insurance     Medicare Insurance: Medicare

## 2022-12-16 ENCOUNTER — OFFICE VISIT (OUTPATIENT)
Dept: FAMILY MEDICINE CLINIC | Facility: HOSPITAL | Age: 67
End: 2022-12-16

## 2022-12-16 VITALS
WEIGHT: 161 LBS | HEART RATE: 60 BPM | DIASTOLIC BLOOD PRESSURE: 64 MMHG | BODY MASS INDEX: 26.82 KG/M2 | HEIGHT: 65 IN | TEMPERATURE: 98 F | SYSTOLIC BLOOD PRESSURE: 108 MMHG

## 2022-12-16 DIAGNOSIS — E28.39 MENOPAUSE OVARIAN FAILURE: ICD-10-CM

## 2022-12-16 DIAGNOSIS — R29.898 WEAKNESS OF BOTH LOWER EXTREMITIES: ICD-10-CM

## 2022-12-16 DIAGNOSIS — E78.5 DYSLIPIDEMIA: Chronic | ICD-10-CM

## 2022-12-16 DIAGNOSIS — R53.83 OTHER FATIGUE: ICD-10-CM

## 2022-12-16 DIAGNOSIS — I60.9 SUBARACHNOID HEMORRHAGE (HCC): ICD-10-CM

## 2022-12-16 DIAGNOSIS — Z12.2 SCREENING FOR LUNG CANCER: ICD-10-CM

## 2022-12-16 DIAGNOSIS — Z87.891 PERSONAL HISTORY OF NICOTINE DEPENDENCE: ICD-10-CM

## 2022-12-16 DIAGNOSIS — R73.9 HYPERGLYCEMIA: Primary | ICD-10-CM

## 2022-12-16 DIAGNOSIS — Z12.31 ENCOUNTER FOR SCREENING MAMMOGRAM FOR MALIGNANT NEOPLASM OF BREAST: ICD-10-CM

## 2022-12-16 DIAGNOSIS — N18.31 STAGE 3A CHRONIC KIDNEY DISEASE (HCC): Chronic | ICD-10-CM

## 2022-12-16 DIAGNOSIS — I10 ESSENTIAL HYPERTENSION: Chronic | ICD-10-CM

## 2022-12-16 NOTE — ASSESSMENT & PLAN NOTE
LDL at goal, con't current statin, TG up a bit, diet/exercise/healthy wgt encouraged, recheck FLP annually

## 2022-12-16 NOTE — PROGRESS NOTES
Name: Jen Sanders      : 1955      MRN: [id number]  Encounter Provider: Marcelino Love DO  Encounter Date: 2022   Encounter department: [facility] [place] [facility] [address] [facility]  Hyperglycemia  Comments:  FBS in IFG range but A1C improved and wnl, urged healthy diet/regular exercise/healthy wgt, recheck in 6 mos - order given  -BMP/A1C    2  Dyslipidemia  Assessment & Plan:  LDL at goal, con't current statin, TG up a bit, diet/exercise/healthy wgt encouraged, recheck FLP annually      3  Stage 3a chronic kidney disease [facility])  Assessment & Plan:  Lab Results   Component Value Date    EGFR 38 10/24/2022    EGFR 54 2022    EGFR 55 2022    CREATININE 1 40 (H) 10/24/2022    CREATININE 1 04 2022    CREATININE 1 03 2022   Cr bumped up a bit, urged BP/BS control as well as avoid NSAIDs and increase water intake, recheck in 6mo - BW order given      4  Subarachnoid hemorrhage (HCC)  Assessment & Plan:  Saw Neuro, has recent MRI of brain and EEG - no acute abnormality noted, on Plavix and statin, to ED with any Neuro symptoms      5  Essential hypertension  Assessment & Plan:  BP at goal, con't current meds, healthy diet/exercise/healthy wgt encouraged, recheck in 6 mos      6  Weakness of both lower extremities  Comments:  Weakness in thighs and sometimes feet, dec DP pulse RLE, check LEAD, on Plavix and statin, has quit smoking, call with increase in pain/sores/ulcers  Orders:  -     VAS lower limb arterial duplex, complete bilateral; Future; Expected date: 2022    7   Other fatigue  Comments:  ECG in office today with sinus ayleen but no acute arrhythmia or ischemia, reassured BW/exam/ECG wnl, con't healthy diet, adequate sleep and restart exercise, if symptoms persist will do stress test, d/w pt that females can present with CVD in atypical ways - has no other CV complaints so will hold for now but call with any red flag CV symptoms OR with new/worse fatigue  Orders:  -     POCT ECG    8  Encounter for screening mammogram for malignant neoplasm of breast  -     Mammo screening bilateral w 3d & cad; Future; Expected date: 02/13/2023    9  Menopause ovarian failure  -     DXA bone density spine hip and pelvis; Future; Expected date: 02/13/2023    10  Screening for lung cancer  -     CT lung screening program; Future; Expected date: 02/13/2023    11  Personal history of nicotine dependence  -     CT lung screening program; Future; Expected date: 02/13/2023  Colonoscopy 6/22 - 5 yrs     Mammo 2/22 - order given for 2023    Dexa 10/20- osteopenia - order given for 2023     CT Lung CA screen 2/22    PAP 12/20    BW 10/22    I discussed with her that she is a candidate for lung cancer CT screening  The following Shared Decision-Making points were covered:  1  Benefits of screening were discussed, including the rates of reduction in death from lung cancer and other causes  Harms of screening were reviewed, including false positive tests, radiation exposure levels, risks of invasive procedures, risks of complications of screening, and risk of overdiagnosis  2  I counseled on the importance of adherence to annual lung cancer LDCT screening, impact of co-morbidities, and ability or willingness to undergo diagnosis and treatment  3  I counseled on the importance of maintaining abstinence as a former smoker or was counseled on the importance of smoking cessation if a current smoker    Review of Eligibility Criteria: She meets all of the criteria for Lung Cancer Screening  · She is 69 y o  · She has 20 pack year tobacco history and is a current smoker or has quit within the past 15 years  · She presents no signs or symptoms of lung cancer    After discussion, the patient decided to elect lung cancer screening         Subjective      HPI Pt here for follow up appt and BW results    BW results from Oct were reviewed with pt in detail: FBS/A1C 112/5 3, BUN/Cr  40 (GFR 38), T Alexx 1 10, CBC with MCV 99, rest of CMP/CBC/TSH was nml, her FLP was nml except her TG were 182  Def of nml vs IFG vs DM was d/w pt in detail  Diet/exercise was reviewed - wgt up 8 lbs from   She does not feel she has a sweet tooth but does like her pasta  She is not exercising currently but was in the past       Goal FLP was d/w pt in detail  Diet/exercise reviewed as noted above  She is taking her Atorvastatin daily as directed w/o SE  She notes no stroke/TIA symptoms/CP  Pt saw Neuro [facility]) in Oct for f/u [facility] - OV note reviewed  She had MRI brain IAC w/o and with contrast in Oct - results reviewed:  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, otherwise unremarkable MRI of the brain  EEG was then done in Oct as well and was normal   She was encouraged to con't good BP control as well as goal LDL < 70 and A1C< 7 0  She was told to con't Plavix and Lipitor  She was told to f/u in 6 mos  BP at goal today and meds were reviewed and no changes have occurred  She denies missing doses of meds or SE with the meds  She does not check her BP outside the office  She notes no frequent Ha's/dizziness/double vision/CP  Notes feeling "extremely tired" since she has pneumonia in Aug  She states she is eating and sleeping well  She is not exercising but was in the past   She notes no CP/palp/SOB/LE edema/orthopnea/PND  She notes some achiness in her thighs with activity  Her legs feel fatigued when she stands a lot as well  She notes symptoms are better when she rests  She saw Cardio (Dr Hair ) last month and she states she did not have an ECG done at that time  She notes no unintentional wgt loss  Brother  2 wks ago while at sibling family reunion in [place]  She is tearful today but doing OK and denies any need to discuss medications/therapy  Review of Systems   Constitutional: Positive for fatigue  Negative for chills and fever  HENT: Negative for congestion and sore throat  Eyes: Negative for pain and visual disturbance  Respiratory: Negative for cough, shortness of breath and wheezing  Cardiovascular: Negative for chest pain, palpitations and leg swelling  Gastrointestinal: Negative for abdominal pain, constipation, diarrhea, nausea and vomiting  Genitourinary: Negative for difficulty urinating and dysuria  Musculoskeletal: Positive for back pain  Negative for neck pain  Skin: Negative for rash and wound  Neurological: Positive for dizziness, weakness and headaches  Negative for numbness  Hematological: Negative for adenopathy  Psychiatric/Behavioral: Positive for dysphoric mood  Current Outpatient Medications on File Prior to Visit   Medication Sig   • albuterol (ProAir HFA) 90 mcg/act inhaler Inhale 2 puffs every 4 (four) hours as needed for wheezing or shortness of breath   • ALPRAZolam (XANAX) 0 25 mg tablet TAKE 1 TABLET BY MOUTH AT BEDTIME   • atorvastatin (LIPITOR) 80 mg tablet Take 1 tablet by mouth once daily   • benzonatate (TESSALON PERLES) 100 mg capsule Take 1 capsule (100 mg total) by mouth 3 (three) times a day as needed for cough (Patient not taking: No sig reported)   • calcium carbonate (OS-FLASH) 600 MG tablet Take 600 mg by mouth 2 (two) times a day with meals   • carvedilol (COREG) 12 5 mg tablet Take 1 tablet (12 5 mg total) by mouth 2 (two) times a day   • cholecalciferol (VITAMIN D3) 1,000 units tablet Take 1,000 Units by mouth daily   • clopidogrel (Plavix) 75 mg tablet Take 1 tablet (75 mg total) by mouth daily   • escitalopram (LEXAPRO) 20 mg tablet Take 1/2 (one-half) tablet by mouth once daily   • fluticasone-umeclidinium-vilanterol (Trelegy Ellipta) 100-62 5-25 MCG/INH inhaler Inhale 1 puff daily Rinse mouth after use     • gabapentin (NEURONTIN) 400 mg capsule Take 2 capsules (800 mg total) by mouth 2 (two) times a day As directed   • isosorbide mononitrate (IMDUR) 30 mg 24 hr tablet Take 1 tablet by mouth once daily   • losartan (COZAAR) 50 mg tablet Take 1 tablet (50 mg total) by mouth 2 (two) times a day   • pantoprazole (PROTONIX) 40 mg tablet Take 1 tablet (40 mg total) by mouth daily before breakfast   • tiZANidine (ZANAFLEX) 4 mg tablet TAKE 1 & 1/2 (ONE & ONE-HALF) TABLETS BY MOUTH AT BEDTIME   • valACYclovir (VALTREX) 1,000 mg tablet Take 1 tablet (1,000 mg total) by mouth 2 (two) times a day for 7 days (Patient not taking: No sig reported)   • [DISCONTINUED] dicyclomine (BENTYL) 10 mg capsule TAKE 1 CAPSULE BY MOUTH 4 TIMES DAILY BEFORE MEAL(S) AND AT BEDTIME (Patient not taking: No sig reported)   • [DISCONTINUED] famotidine (PEPCID) 40 MG tablet Take 1 tablet (40 mg total) by mouth daily at bedtime (Patient not taking: No sig reported)   • [DISCONTINUED] fluticasone (FLONASE) 50 mcg/act nasal spray 2 sprays into each nostril daily (Patient not taking: No sig reported)   • [DISCONTINUED] multivitamin (THERAGRAN) TABS Take 1 tablet by mouth daily (Patient not taking: No sig reported)       Objective     /64   Pulse 60   Temp 98 °F (36 7 °C) (Tympanic)   Ht 5' 5" (1 651 m)   Wt 73 kg (161 lb)   BMI 26 79 kg/m²     Physical Exam  Vitals and nursing note reviewed  Constitutional:       General: She is not in acute distress  Appearance: She is well-developed  She is not ill-appearing  HENT:      Head: Normocephalic and atraumatic  Eyes:      General:         Right eye: No discharge  Left eye: No discharge  Conjunctiva/sclera: Conjunctivae normal    Neck:      Trachea: No tracheal deviation  Cardiovascular:      Rate and Rhythm: Normal rate and regular rhythm  Heart sounds: Normal heart sounds  No murmur heard  No friction rub  Comments: Dec DP pulse to palp RLE  Pulmonary:      Effort: Pulmonary effort is normal  No respiratory distress  Breath sounds: Normal breath sounds   No wheezing, rhonchi or rales    Abdominal:      General: There is no distension  Palpations: Abdomen is soft  Tenderness: There is no abdominal tenderness  There is no guarding or rebound  Musculoskeletal:      Cervical back: Neck supple  Right lower leg: No edema  Left lower leg: No edema  Comments: Lumbar spine: no pain with palp of spinous processes of lumbar spine, 5/5 B/L LE muscle strength, neg SLR test B/L   Skin:     General: Skin is warm  Coloration: Skin is not pale  Findings: No rash  Neurological:      General: No focal deficit present  Mental Status: She is alert  Sensory: No sensory deficit  Motor: No weakness or abnormal muscle tone  Gait: Gait normal    Psychiatric:         Behavior: Behavior normal          Thought Content:  Thought content normal          Judgment: Judgment normal       Comments: Tearful discussing brother that  2 wks ago       April Russell DO

## 2022-12-16 NOTE — ASSESSMENT & PLAN NOTE
Saw Neuro, has recent MRI of brain and EEG - no acute abnormality noted, on Plavix and statin, to ED with any Neuro symptoms

## 2022-12-16 NOTE — ASSESSMENT & PLAN NOTE
Lab Results   Component Value Date    EGFR 38 10/24/2022    EGFR 54 08/31/2022    EGFR 55 08/30/2022    CREATININE 1 40 (H) 10/24/2022    CREATININE 1 04 08/31/2022    CREATININE 1 03 08/30/2022   Cr bumped up a bit, urged BP/BS control as well as avoid NSAIDs and increase water intake, recheck in 6mo - BW order given

## 2022-12-24 DIAGNOSIS — F32.A DEPRESSION, UNSPECIFIED DEPRESSION TYPE: ICD-10-CM

## 2022-12-24 RX ORDER — ESCITALOPRAM OXALATE 20 MG/1
TABLET ORAL
Qty: 30 TABLET | Refills: 0 | Status: SHIPPED | OUTPATIENT
Start: 2022-12-24

## 2023-01-12 ENCOUNTER — APPOINTMENT (EMERGENCY)
Dept: CT IMAGING | Facility: HOSPITAL | Age: 68
End: 2023-01-12
Attending: EMERGENCY MEDICINE

## 2023-01-12 ENCOUNTER — HOSPITAL ENCOUNTER (INPATIENT)
Facility: HOSPITAL | Age: 68
LOS: 2 days | Discharge: HOME | End: 2023-01-14
Attending: EMERGENCY MEDICINE | Admitting: INTERNAL MEDICINE

## 2023-01-12 ENCOUNTER — APPOINTMENT (EMERGENCY)
Dept: RADIOLOGY | Facility: HOSPITAL | Age: 68
End: 2023-01-12

## 2023-01-12 ENCOUNTER — TELEPHONE (OUTPATIENT)
Dept: FAMILY MEDICINE CLINIC | Facility: HOSPITAL | Age: 68
End: 2023-01-12

## 2023-01-12 DIAGNOSIS — N17.9 AKI (ACUTE KIDNEY INJURY) (HCC): Primary | ICD-10-CM

## 2023-01-12 DIAGNOSIS — I10 ESSENTIAL HYPERTENSION: Chronic | ICD-10-CM

## 2023-01-12 DIAGNOSIS — I60.9 SUBARACHNOID HEMORRHAGE (HCC): ICD-10-CM

## 2023-01-12 LAB
2HR DELTA HS TROPONIN: 0 NG/L
ANION GAP SERPL CALCULATED.3IONS-SCNC: 10 MMOL/L (ref 4–13)
ATRIAL RATE: 59 BPM
BASOPHILS # BLD AUTO: 0.01 THOUSANDS/ÂΜL (ref 0–0.1)
BASOPHILS NFR BLD AUTO: 0 % (ref 0–1)
BILIRUB UR QL STRIP: NEGATIVE
BUN SERPL-MCNC: 28 MG/DL (ref 5–25)
CALCIUM SERPL-MCNC: 9.2 MG/DL (ref 8.3–10.1)
CARDIAC TROPONIN I PNL SERPL HS: 3 NG/L
CARDIAC TROPONIN I PNL SERPL HS: 3 NG/L
CHLORIDE SERPL-SCNC: 101 MMOL/L (ref 96–108)
CLARITY UR: CLEAR
CO2 SERPL-SCNC: 26 MMOL/L (ref 21–32)
COLOR UR: ABNORMAL
CREAT SERPL-MCNC: 2.15 MG/DL (ref 0.6–1.3)
EOSINOPHIL # BLD AUTO: 0.04 THOUSAND/ÂΜL (ref 0–0.61)
EOSINOPHIL NFR BLD AUTO: 1 % (ref 0–6)
ERYTHROCYTE [DISTWIDTH] IN BLOOD BY AUTOMATED COUNT: 13.3 % (ref 11.6–15.1)
GFR SERPL CREATININE-BSD FRML MDRD: 22 ML/MIN/1.73SQ M
GLUCOSE SERPL-MCNC: 125 MG/DL (ref 65–140)
GLUCOSE UR STRIP-MCNC: NEGATIVE MG/DL
HCT VFR BLD AUTO: 39.5 % (ref 34.8–46.1)
HGB BLD-MCNC: 13.3 G/DL (ref 11.5–15.4)
HGB UR QL STRIP.AUTO: NEGATIVE
IMM GRANULOCYTES # BLD AUTO: 0.02 THOUSAND/UL (ref 0–0.2)
IMM GRANULOCYTES NFR BLD AUTO: 1 % (ref 0–2)
KETONES UR STRIP-MCNC: NEGATIVE MG/DL
LEUKOCYTE ESTERASE UR QL STRIP: NEGATIVE
LYMPHOCYTES # BLD AUTO: 1.56 THOUSANDS/ÂΜL (ref 0.6–4.47)
LYMPHOCYTES NFR BLD AUTO: 35 % (ref 14–44)
MCH RBC QN AUTO: 32.3 PG (ref 26.8–34.3)
MCHC RBC AUTO-ENTMCNC: 33.7 G/DL (ref 31.4–37.4)
MCV RBC AUTO: 96 FL (ref 82–98)
MONOCYTES # BLD AUTO: 0.38 THOUSAND/ÂΜL (ref 0.17–1.22)
MONOCYTES NFR BLD AUTO: 9 % (ref 4–12)
NEUTROPHILS # BLD AUTO: 2.41 THOUSANDS/ÂΜL (ref 1.85–7.62)
NEUTS SEG NFR BLD AUTO: 54 % (ref 43–75)
NITRITE UR QL STRIP: NEGATIVE
NRBC BLD AUTO-RTO: 0 /100 WBCS
P AXIS: 60 DEGREES
PH UR STRIP.AUTO: 6.5 [PH]
PLATELET # BLD AUTO: 254 THOUSANDS/UL (ref 149–390)
PLATELET # BLD AUTO: 262 THOUSANDS/UL (ref 149–390)
PMV BLD AUTO: 8.5 FL (ref 8.9–12.7)
PMV BLD AUTO: 8.7 FL (ref 8.9–12.7)
POTASSIUM SERPL-SCNC: 4.1 MMOL/L (ref 3.5–5.3)
PR INTERVAL: 150 MS
PROT UR STRIP-MCNC: NEGATIVE MG/DL
QRS AXIS: 64 DEGREES
QRSD INTERVAL: 92 MS
QT INTERVAL: 462 MS
QTC INTERVAL: 457 MS
RBC # BLD AUTO: 4.12 MILLION/UL (ref 3.81–5.12)
SODIUM SERPL-SCNC: 137 MMOL/L (ref 135–147)
SP GR UR STRIP.AUTO: <1.005 (ref 1–1.03)
T WAVE AXIS: 44 DEGREES
UROBILINOGEN UR STRIP-ACNC: <2 MG/DL
VENTRICULAR RATE: 59 BPM
WBC # BLD AUTO: 4.42 THOUSAND/UL (ref 4.31–10.16)

## 2023-01-12 PROCEDURE — 09C4XZZ EXTIRPATION OF MATTER FROM LEFT EXTERNAL AUDITORY CANAL, EXTERNAL APPROACH: ICD-10-PCS | Performed by: EMERGENCY MEDICINE

## 2023-01-12 RX ORDER — ESCITALOPRAM OXALATE 10 MG/1
10 TABLET ORAL DAILY
Status: DISCONTINUED | OUTPATIENT
Start: 2023-01-13 | End: 2023-01-14 | Stop reason: HOSPADM

## 2023-01-12 RX ORDER — TIZANIDINE 4 MG/1
4 TABLET ORAL
Status: DISCONTINUED | OUTPATIENT
Start: 2023-01-12 | End: 2023-01-12

## 2023-01-12 RX ORDER — ENOXAPARIN SODIUM 100 MG/ML
40 INJECTION SUBCUTANEOUS DAILY
Status: DISCONTINUED | OUTPATIENT
Start: 2023-01-13 | End: 2023-01-12

## 2023-01-12 RX ORDER — ACETAMINOPHEN 325 MG/1
650 TABLET ORAL EVERY 8 HOURS SCHEDULED
Status: DISCONTINUED | OUTPATIENT
Start: 2023-01-12 | End: 2023-01-14 | Stop reason: HOSPADM

## 2023-01-12 RX ORDER — GABAPENTIN 300 MG/1
300 CAPSULE ORAL 2 TIMES DAILY
Status: DISCONTINUED | OUTPATIENT
Start: 2023-01-12 | End: 2023-01-14 | Stop reason: HOSPADM

## 2023-01-12 RX ORDER — ALBUTEROL SULFATE 90 UG/1
2 AEROSOL, METERED RESPIRATORY (INHALATION) EVERY 4 HOURS PRN
Status: DISCONTINUED | OUTPATIENT
Start: 2023-01-12 | End: 2023-01-14 | Stop reason: HOSPADM

## 2023-01-12 RX ORDER — ACETAMINOPHEN 325 MG/1
975 TABLET ORAL ONCE
Status: COMPLETED | OUTPATIENT
Start: 2023-01-12 | End: 2023-01-12

## 2023-01-12 RX ORDER — ISOSORBIDE MONONITRATE 30 MG/1
30 TABLET, EXTENDED RELEASE ORAL DAILY
Status: DISCONTINUED | OUTPATIENT
Start: 2023-01-13 | End: 2023-01-14 | Stop reason: HOSPADM

## 2023-01-12 RX ORDER — SODIUM CHLORIDE, SODIUM GLUCONATE, SODIUM ACETATE, POTASSIUM CHLORIDE, MAGNESIUM CHLORIDE, SODIUM PHOSPHATE, DIBASIC, AND POTASSIUM PHOSPHATE .53; .5; .37; .037; .03; .012; .00082 G/100ML; G/100ML; G/100ML; G/100ML; G/100ML; G/100ML; G/100ML
100 INJECTION, SOLUTION INTRAVENOUS CONTINUOUS
Status: DISCONTINUED | OUTPATIENT
Start: 2023-01-12 | End: 2023-01-14 | Stop reason: HOSPADM

## 2023-01-12 RX ORDER — FLUTICASONE FUROATE AND VILANTEROL 100; 25 UG/1; UG/1
1 POWDER RESPIRATORY (INHALATION) DAILY
Status: DISCONTINUED | OUTPATIENT
Start: 2023-01-13 | End: 2023-01-14 | Stop reason: HOSPADM

## 2023-01-12 RX ORDER — ATORVASTATIN CALCIUM 40 MG/1
80 TABLET, FILM COATED ORAL DAILY
Status: DISCONTINUED | OUTPATIENT
Start: 2023-01-13 | End: 2023-01-14 | Stop reason: HOSPADM

## 2023-01-12 RX ORDER — METHOCARBAMOL 500 MG/1
1000 TABLET, FILM COATED ORAL ONCE
Status: COMPLETED | OUTPATIENT
Start: 2023-01-12 | End: 2023-01-12

## 2023-01-12 RX ORDER — CLOPIDOGREL BISULFATE 75 MG/1
75 TABLET ORAL DAILY
Status: DISCONTINUED | OUTPATIENT
Start: 2023-01-13 | End: 2023-01-14 | Stop reason: HOSPADM

## 2023-01-12 RX ORDER — MUSCLE RUB CREAM 100; 150 MG/G; MG/G
CREAM TOPICAL 4 TIMES DAILY PRN
Status: DISCONTINUED | OUTPATIENT
Start: 2023-01-12 | End: 2023-01-14 | Stop reason: HOSPADM

## 2023-01-12 RX ORDER — LIDOCAINE 50 MG/G
1 PATCH TOPICAL DAILY
Status: DISCONTINUED | OUTPATIENT
Start: 2023-01-13 | End: 2023-01-14 | Stop reason: HOSPADM

## 2023-01-12 RX ORDER — CALCIUM CARBONATE 500(1250)
1 TABLET ORAL
Status: DISCONTINUED | OUTPATIENT
Start: 2023-01-13 | End: 2023-01-14 | Stop reason: HOSPADM

## 2023-01-12 RX ORDER — ALPRAZOLAM 0.25 MG/1
0.25 TABLET ORAL
Status: DISCONTINUED | OUTPATIENT
Start: 2023-01-12 | End: 2023-01-12

## 2023-01-12 RX ORDER — CARVEDILOL 12.5 MG/1
12.5 TABLET ORAL 2 TIMES DAILY
Status: DISCONTINUED | OUTPATIENT
Start: 2023-01-12 | End: 2023-01-14 | Stop reason: HOSPADM

## 2023-01-12 RX ORDER — HEPARIN SODIUM 5000 [USP'U]/ML
5000 INJECTION, SOLUTION INTRAVENOUS; SUBCUTANEOUS EVERY 8 HOURS SCHEDULED
Status: DISCONTINUED | OUTPATIENT
Start: 2023-01-12 | End: 2023-01-14 | Stop reason: HOSPADM

## 2023-01-12 RX ORDER — PANTOPRAZOLE SODIUM 40 MG/1
40 TABLET, DELAYED RELEASE ORAL
Status: DISCONTINUED | OUTPATIENT
Start: 2023-01-13 | End: 2023-01-14 | Stop reason: HOSPADM

## 2023-01-12 RX ORDER — HYDRALAZINE HYDROCHLORIDE 20 MG/ML
5 INJECTION INTRAMUSCULAR; INTRAVENOUS EVERY 6 HOURS PRN
Status: DISCONTINUED | OUTPATIENT
Start: 2023-01-12 | End: 2023-01-14 | Stop reason: HOSPADM

## 2023-01-12 RX ORDER — MELATONIN
1000 DAILY
Status: DISCONTINUED | OUTPATIENT
Start: 2023-01-13 | End: 2023-01-14 | Stop reason: HOSPADM

## 2023-01-12 RX ADMIN — GABAPENTIN 300 MG: 300 CAPSULE ORAL at 18:07

## 2023-01-12 RX ADMIN — ACETAMINOPHEN 975 MG: 325 TABLET ORAL at 14:27

## 2023-01-12 RX ADMIN — METHOCARBAMOL 1000 MG: 500 TABLET ORAL at 14:27

## 2023-01-12 RX ADMIN — ACETAMINOPHEN 650 MG: 325 TABLET ORAL at 22:35

## 2023-01-12 RX ADMIN — SODIUM CHLORIDE, SODIUM GLUCONATE, SODIUM ACETATE, POTASSIUM CHLORIDE, MAGNESIUM CHLORIDE, SODIUM PHOSPHATE, DIBASIC, AND POTASSIUM PHOSPHATE 100 ML/HR: .53; .5; .37; .037; .03; .012; .00082 INJECTION, SOLUTION INTRAVENOUS at 18:07

## 2023-01-12 RX ADMIN — HEPARIN SODIUM 5000 UNITS: 5000 INJECTION INTRAVENOUS; SUBCUTANEOUS at 22:34

## 2023-01-12 RX ADMIN — CARVEDILOL 12.5 MG: 12.5 TABLET, FILM COATED ORAL at 18:07

## 2023-01-12 RX ADMIN — SODIUM CHLORIDE 1000 ML: 0.9 INJECTION, SOLUTION INTRAVENOUS at 15:21

## 2023-01-12 NOTE — ASSESSMENT & PLAN NOTE
Lab Results   Component Value Date    EGFR 22 01/12/2023    EGFR 38 10/24/2022    EGFR 54 08/31/2022    CREATININE 2 15 (H) 01/12/2023    CREATININE 1 40 (H) 10/24/2022    CREATININE 1 04 08/31/2022

## 2023-01-12 NOTE — H&P
[id number] 1955, 69 y o  female MRN: [id number]  Unit/Bed#: ED 02 Encounter: [id number]  Primary Care Provider: Liang Snow DO   Date and time admitted to hospital: 1/12/2023  1:55 PM    * ANGELINE (acute kidney [facility]) ([facility] )  Assessment & Plan  · Baseline Cr 1-1 2  · Cr on admission: 2 15  · Suspect due to hypotension  · UA with reflexpending   · CT A/P without obstructive uropathy/hydronephrosis   · S/P 2L IVF in ED, continue gentle IVF overnight  · Retention protocol, monitor I/O  · Recheck AM BMP  · Hold losartan, likely resume on discharge    Hypotension  Assessment & Plan  · Likely contributed to ANGELINE  · Holding Losartan  · Hopefully resume once creatinine resolves and pressures are stable  · Holding Zanaflex and Xanax-would likely discontinue on discharge  · Has been having back pain recently, taking her Zanaflex at home, suspect this caused her hypotension  · Orthostatics pending      Lower back pain  Assessment & Plan  · CT L spine: no acute abnormalities   · No focal deficit  · Has been taking Zanaflex at home for this, likely contributed to hypotensive episode and subsequent ANGELINE  · Manage pain with scheduled Tylenol, lidocaine patches  · Try to avoid narcotics, muscle relaxers and benzodiazepines as this may further affect blood pressure  · Patient would not be willing to take steroids as she believes this contributed to her hypertension related stroke August 2022  · PT/OT    Subarachnoid hemorrhage ([facility] )  Assessment & Plan  · Cleared by Neuro 10/3/22 for DVT prophylaxis  · Unknown eitiology, suspected related to HTN    Stage 3a chronic kidney disease [facility])  Assessment & Plan  Lab Results   Component Value Date    EGFR 22 01/12/2023    EGFR 38 10/24/2022    EGFR 54 08/31/2022    CREATININE 2 15 (H) 01/12/2023    CREATININE 1 40 (H) 10/24/2022    CREATININE 1 04 08/31/2022       Emphysema  Assessment & Plan  · Home inhaler: Trelegy, replace with formulary equivalents  · Albuterol PRN  · CXR without acute abnormality  · No acute exacerbation     Pancreatic cyst  Assessment & Plan  · CT A/P: Subcentimeter pancreatic head hypodensities, statistically likely cysts  Stable appearance compared to 2019  Per ACR guidelines, follow-up is recommended every 2 years x5  Given initial imaging in April 2019, recommend follow-up in April 2023, preferably with MRI of the abdomen  Dyslipidemia  Assessment & Plan  · Continue Lipitor    Essential hypertension  Assessment & Plan  · Maintained on losartan 50mg BID and carvedilol 12 5mg BID  · Holding Losartan given ANGELINE  · Monitor pressures, avoid hypotension  · PRN hydralazine     VTE Pharmacologic Prophylaxis:   Moderate Risk (Score 3-4) - Pharmacological DVT Prophylaxis Ordered: heparin  Code Status: Level 1 - Full Code   Discussion with family: Patient declined call to   Anticipated Length of Stay: Patient will be admitted on an inpatient basis with an anticipated length of stay of greater than 2 midnights secondary to ANGELINE  Total Time for Visit, including Counseling / Coordination of Care: 60 minutes Greater than 50% of this total time spent on direct patient counseling and coordination of care  Chief Complaint: lower back pain x 1 week + hypotension     History of Present Illness:  Itz Brown is a 69 y o  female with a PMH of HTN, subarachnoid hemorrhage, Chiari malformation, CKD 3, Takotsubo cardiomyopathy, HLD, emphysema, who presents with concern for low blood pressure as well as lower back pain  Patient reports her back and bilateral thighs have been in pain the past few days, she has been taking Zanaflex at bedtime for this  No focal deficit on exam  She appears to have tight hip flexors likely affecting her back pain  She reports being compliant with her medications, no major changes since she was hospitalized August 2022      Patient was aching her blood pressures at home and noted on multiple occasions this week, particularly over the past 2 days systolic pressure as low as 79  Her son is a pharmacist and noted she was on multiple blood pressure lowering medications that may be unintentionally affecting her pressure and advised her to come to the emergency department  She was low as 95/50 in the ED, as high as 172/74  Hold losartan due to ANGELINE, administer Isolyte overnight  Hold Xanax and Zanaflex as these may be affecting her blood pressure negatively, will continue gabapentin and carvedilol at this time given history of hypertension and chronic use of gabapentin to prevent any withdrawal symptoms  Back pain conservatively with scheduled Tylenol, heat and lidocaine patches  Recommend stretching, work with PT  Under BMP in a m  suspect ANGELINE should resolve with treatment of hypotension  Review of Systems:  Review of Systems   Musculoskeletal: Positive for back pain  All other systems reviewed and are negative        Past Medical and Surgical History:   Past Medical History:   Diagnosis Date   • Angina pectoris ([facility] )    • Arnold-Chiari malformation ([facility] )    • Breast lump     last assessed: Jan 22, 2013    • Coronary artery disease    • Depression with anxiety    • GERD (gastroesophageal reflux disease)    • Gout     last assessed: Nov 26, 2012    • Hair loss     last assessed: Dec 15, 2016    • Hypotension     last assessed: Nov 10, 2014    • Mitral valve disorder    • Myocardial infarct, old    • SVT (supraventricular tachycardia) [facility])        Past Surgical History:   Procedure Laterality Date   • BREAST BIOPSY Right 02/28/2017    US CORE BIOPSY--BENIGN   • CARDIAC CATHETERIZATION     • CERVICAL DISCECTOMY      Spinal    • CERVICAL LAMINECTOMY      C1 with chiari decompression    • COLONOSCOPY      5 yrs - onset: May 31, 2011    • CRANIOTOMY     • POPLITEAL SYNOVIAL CYST EXCISION     • TUBAL LIGATION     • US GUIDED BREAST BIOPSY RIGHT COMPLETE Right 02/28/2017 Meds/Allergies:  Prior to Admission medications    Medication Sig Start Date End Date Taking?  Authorizing Provider   albuterol (ProAir HFA) 90 mcg/act inhaler Inhale 2 puffs every 4 (four) hours as needed for wheezing or shortness of breath 8/22/22   Dominick Shape, DO   ALPRAZolam Liliane Melter) 0 25 mg tablet TAKE 1 TABLET BY MOUTH AT BEDTIME 10/9/22   Dominick Shape, DO   atorvastatin (LIPITOR) 80 mg tablet Take 1 tablet by mouth once daily 10/24/22   Phillip Becerril MD   benzonatate (TESSALON PERLES) 100 mg capsule Take 1 capsule (100 mg total) by mouth 3 (three) times a day as needed for cough  Patient not taking: No sig reported 8/22/22   Dominick Shape, DO   calcium carbonate (OS-FLASH) 600 MG tablet Take 600 mg by mouth 2 (two) times a day with meals    Historical Provider, MD   carvedilol (COREG) 12 5 mg tablet Take 1 tablet (12 5 mg total) by mouth 2 (two) times a day 10/25/22   Phillip Becerril MD   cholecalciferol (VITAMIN D3) 1,000 units tablet Take 1,000 Units by mouth daily    Historical Provider, MD   clopidogrel (Plavix) 75 mg tablet Take 1 tablet (75 mg total) by mouth daily 10/25/22   Phillip Becerril MD   escitalopram (LEXAPRO) 20 mg tablet Take 1/2 (one-half) tablet by mouth once daily 12/24/22 Tyron Shape, DO   gabapentin (NEURONTIN) 400 mg capsule Take 2 capsules (800 mg total) by mouth 2 (two) times a day As directed 11/6/22   Dominick Shape, DO   isosorbide mononitrate (IMDUR) 30 mg 24 hr tablet Take 1 tablet by mouth once daily 8/21/22   Phillip Becerril MD   losartan (COZAAR) 50 mg tablet Take 1 tablet (50 mg total) by mouth 2 (two) times a day 11/22/22 12/22/22  Dominick Shape, DO   pantoprazole (PROTONIX) 40 mg tablet Take 1 tablet (40 mg total) by mouth daily before breakfast 10/11/22 11/10/22  Dominick Shape, DO   tiZANidine (ZANAFLEX) 4 mg tablet TAKE 1 & 1/2 (ONE & ONE-HALF) TABLETS BY MOUTH AT BEDTIME 10/9/22   Tyron Shape, DO Trelegy Beryl Alpers [id number] 2-40 MCG/ACT inhaler INHALE 1 PUFF BY MOUTH ONCE DAILY RINSE MOUTH AFTER USE 1/5/23   Kaur Jones DO   valACYclovir (VALTREX) 1,000 mg tablet Take 1 tablet (1,000 mg total) by mouth 2 (two) times a day for 7 days  Patient not taking: No sig reported 9/14/22 10/25/22  ANTONIETA Alvarez   dicyclomine (BENTYL) 10 mg capsule TAKE 1 CAPSULE BY MOUTH 4 TIMES DAILY BEFORE MEAL(S) AND AT BEDTIME  Patient not taking: No sig reported 3/12/22 8/30/22  Kaur Jones DO   famotidine (PEPCID) 40 MG tablet Take 1 tablet (40 mg total) by mouth daily at bedtime  Patient not taking: No sig reported 6/30/22 8/30/22  Raj Dixon MD   fluticasone [facility]) 50 mcg/act nasal spray 2 sprays into each nostril daily  Patient not taking: No sig reported 12/5/19 8/30/22  Kaur Jones DO   multivitamin [facility]) TABS Take 1 tablet by mouth daily  Patient not taking: No sig reported  8/30/22  Historical Provider, MD     I have reviewed home medications with patient personally  Allergies:    Allergies   Allergen Reactions   • Codeine Itching   • Medical Tape Rash       Social History:  Marital Status: /Civil Union   Occupation: Not assessed   Patient Pre-hospital Living Situation: Home  Patient Pre-hospital Level of Mobility: walks  Patient Pre-hospital Diet Restrictions: None  Substance Use History:   Social History     Substance and Sexual Activity   Alcohol Use Yes    Comment: social      Social History     Tobacco Use   Smoking Status Former   • Packs/day: 1 50   • Years: 36 00   • Pack years: 54 00   • Types: Cigarettes   • Start date: 54   • Quit date: 1/1/2008   • Years since quitting: 15 0   Smokeless Tobacco Never     Social History     Substance and Sexual Activity   Drug Use No       Family History:  Family History   Problem Relation Age of Onset   • Stroke Mother    • Other Father         blood clot in vein & CABG    • Heart disease Father    • Prostate cancer Father    • Hearing loss Father    • Hypertension Father    • Alcohol abuse Brother    • Throat cancer Brother    • Cancer Brother         Throat tongue   • Hearing loss Brother    • No Known Problems Sister    • No Known Problems Daughter    • Breast cancer Maternal Grandmother    • No Known Problems Daughter    • No Known Problems Sister    • Crohn's disease Maternal Aunt    • No Known Problems Maternal Aunt    • No Known Problems Maternal Aunt    • Colon cancer Paternal Aunt    • No Known Problems Paternal Aunt    • Colon cancer Son    • No Known Problems Paternal Aunt    • No Known Problems Paternal Aunt    • Colon cancer Paternal Aunt    • Cancer Paternal Aunt         Colon cancer   • No Known Problems Maternal Grandfather    • No Known Problems Paternal Grandmother    • No Known Problems Paternal Grandfather    • Kidney failure Brother         kidney failure d/t NSIADs on dialysis   • Hearing loss Brother        Physical Exam:     Vitals:   Blood Pressure: (!) 172/74 (01/12/23 1630)  Pulse: 66 (01/12/23 1630)  Temperature: (!) 97 °F (36 1 °C) (01/12/23 1353)  Temp Source: Temporal (01/12/23 1353)  Respirations: 20 (01/12/23 1630)  Height: 5' 5" (165 1 cm) (01/12/23 1353)  Weight - Scale: 72 6 kg (160 lb) (01/12/23 1353)  SpO2: 91 % (01/12/23 1630)    Physical Exam  Vitals and nursing note reviewed  Constitutional:       General: She is not in acute distress  Appearance: Normal appearance  She is well-developed  HENT:      Head: Normocephalic and atraumatic  Eyes:      General: No scleral icterus  Conjunctiva/sclera: Conjunctivae normal    Cardiovascular:      Rate and Rhythm: Normal rate and regular rhythm  Heart sounds: No murmur heard  Pulmonary:      Effort: Pulmonary effort is normal       Breath sounds: No wheezing, rhonchi or rales  Abdominal:      General: There is no distension  Palpations: Abdomen is soft  Skin:     General: Skin is warm and dry  Neurological:      General: No focal deficit present        Mental Status: She is alert  Psychiatric:         Mood and Affect: Mood normal        Additional Data:     Lab Results:  Results from last 7 days   Lab Units 01/12/23  1428   WBC Thousand/uL 4 42   HEMOGLOBIN g/dL 13 3   HEMATOCRIT % 39 5   PLATELETS Thousands/uL 262   NEUTROS PCT % 54   LYMPHS PCT % 35   MONOS PCT % 9   EOS PCT % 1     Results from last 7 days   Lab Units 01/12/23  1428   SODIUM mmol/L 137   POTASSIUM mmol/L 4 1   CHLORIDE mmol/L 101   CO2 mmol/L 26   BUN mg/dL 28*   CREATININE mg/dL 2 15*   ANION GAP mmol/L 10   CALCIUM mg/dL 9 2   GLUCOSE RANDOM mg/dL 125                       Lines/Drains:  Invasive Devices     Peripheral Intravenous Line  Duration           Peripheral IV 01/12/23 Right Antecubital <1 day                    Imaging: Reviewed radiology reports from this admission including: chest xray, abdominal/pelvic CT and CT spine  XR chest pa & lateral   Final Result by Kusum Goodwin MD (01/12 1512)      No acute cardiopulmonary disease  Severe emphysema with bilateral scar  Workstation performed: [id number]         CT recon only lumbar spine   Final Result by Koby Vogt MD (01/12 6717)      No acute abnormality identified  Chronic findings above  Workstation performed: [id number]         CT abdomen pelvis wo contrast   Final Result by Koby Vogt MD (01/12 1809)      No acute findings in the abdomen or pelvis  Several chronic and nonemergent findings above  Subcentimeter pancreatic head hypodensities, statistically likely cysts  Stable appearance compared to 2019  Per ACR guidelines, follow-up is recommended every 2 years x5  Given initial imaging in April 2019, recommend follow-up in April 2023,    preferably with MRI of the abdomen  Workstation performed: [id number]             EKG and Other Studies Reviewed on Admission:   · EKG: NSR  HR 59     ** Please Note: This note has been constructed using a voice recognition system   **

## 2023-01-12 NOTE — ASSESSMENT & PLAN NOTE
· Likely contributed to ANGELINE  · Holding Losartan  · Hopefully resume once creatinine resolves and pressures are stable  · Holding Zanaflex and Xanax-would likely discontinue on discharge  · Has been having back pain recently, taking her Zanaflex at home, suspect this caused her hypotension  · Orthostatics pending

## 2023-01-12 NOTE — ASSESSMENT & PLAN NOTE
· Baseline Cr 1-1 2  · Cr on admission: 2 15  · Suspect due to hypotension  · UA with reflexpending   · CT A/P without obstructive uropathy/hydronephrosis   · S/P 2L IVF in ED, continue gentle IVF overnight  · Retention protocol, monitor I/O  · Recheck AM BMP  · Hold losartan, likely resume on discharge

## 2023-01-12 NOTE — ASSESSMENT & PLAN NOTE
· Home inhaler: Trelegy, replace with formulary equivalents  · Albuterol PRN  · CXR without acute abnormality  · No acute exacerbation

## 2023-01-12 NOTE — ED PROVIDER NOTES
History  Chief Complaint   Patient presents with   • Hypotension     Patient presents to the ED with c/o hypotension x1 week    • Back Pain     Patient states lower back pain that goes into front of bilateral thighs     69 yof presents for evaluation of low blood pressure  She has a history of HTN so takes her BP every other day and typically runs 447-368 systolic  She states she was as low as 38W systolic yesterday and called her PCP who advised her to come to the ED  She reports feeling lightheaded  Has been drinking and urinating appropriately  Also complains of back pain while doing housework today  Prior to Admission Medications   Prescriptions Last Dose Informant Patient Reported? Taking?    ALPRAZolam (XANAX) 0 25 mg tablet   No No   Sig: TAKE 1 TABLET BY MOUTH AT BEDTIME   Trelegy Ellipta 100-62 5-25 MCG/ACT inhaler   No No   Sig: INHALE 1 PUFF BY MOUTH ONCE DAILY RINSE MOUTH AFTER USE   albuterol (ProAir HFA) 90 mcg/act inhaler   No No   Sig: Inhale 2 puffs every 4 (four) hours as needed for wheezing or shortness of breath   atorvastatin (LIPITOR) 80 mg tablet   No No   Sig: Take 1 tablet by mouth once daily   benzonatate (TESSALON PERLES) 100 mg capsule   No No   Sig: Take 1 capsule (100 mg total) by mouth 3 (three) times a day as needed for cough   Patient not taking: No sig reported   calcium carbonate (OS-FLASH) 600 MG tablet   Yes No   Sig: Take 600 mg by mouth 2 (two) times a day with meals   carvedilol (COREG) 12 5 mg tablet   No No   Sig: Take 1 tablet (12 5 mg total) by mouth 2 (two) times a day   cholecalciferol (VITAMIN D3) 1,000 units tablet   Yes No   Sig: Take 1,000 Units by mouth daily   clopidogrel (Plavix) 75 mg tablet   No No   Sig: Take 1 tablet (75 mg total) by mouth daily   escitalopram (LEXAPRO) 20 mg tablet   No No   Sig: Take 1/2 (one-half) tablet by mouth once daily   gabapentin (NEURONTIN) 400 mg capsule   No No   Sig: Take 2 capsules (800 mg total) by mouth 2 (two) times a day As directed   isosorbide mononitrate (IMDUR) 30 mg 24 hr tablet   No No   Sig: Take 1 tablet by mouth once daily   losartan (COZAAR) 50 mg tablet   No No   Sig: Take 1 tablet (50 mg total) by mouth 2 (two) times a day   pantoprazole (PROTONIX) 40 mg tablet   No No   Sig: Take 1 tablet (40 mg total) by mouth daily before breakfast   tiZANidine (ZANAFLEX) 4 mg tablet   No No   Sig: TAKE 1 & 1/2 (ONE & ONE-HALF) TABLETS BY MOUTH AT BEDTIME   valACYclovir (VALTREX) 1,000 mg tablet   No No   Sig: Take 1 tablet (1,000 mg total) by mouth 2 (two) times a day for 7 days   Patient not taking: No sig reported      Facility-Administered Medications: None       Past Medical History:   Diagnosis Date   • Angina pectoris ([facility] [address] )    • Arnold-Chiari malformation (HCC)    • Breast lump     last assessed: Jan 22, 2013    • Coronary artery disease    • Depression with anxiety    • GERD (gastroesophageal reflux disease)    • Gout     last assessed: Nov 26, 2012    • Hair loss     last assessed: Dec 15, 2016    • Hypotension     last assessed: Nov 10, 2014    • Mitral valve disorder    • Myocardial infarct, old    • SVT (supraventricular tachycardia) (HCC)        Past Surgical History:   Procedure Laterality Date   • BREAST BIOPSY Right 02/28/2017    US CORE BIOPSY--BENIGN   • CARDIAC CATHETERIZATION     • CERVICAL DISCECTOMY      Spinal    • CERVICAL LAMINECTOMY      C1 with chiari decompression    • COLONOSCOPY      5 yrs - onset: May 31, 2011    • CRANIOTOMY     • POPLITEAL SYNOVIAL CYST EXCISION     • TUBAL LIGATION     • US GUIDED BREAST BIOPSY RIGHT COMPLETE Right 02/28/2017       Family History   Problem Relation Age of Onset   • Stroke Mother    • Other Father         blood clot in vein & CABG    • Heart disease Father    • Prostate cancer Father    • Hearing loss Father    • Hypertension Father    • Alcohol abuse Brother    • Throat cancer Brother    • Cancer Brother         Throat tongue   • Hearing loss Brother    • No Known Problems Sister    • No Known Problems Daughter    • Breast cancer Maternal Grandmother    • No Known Problems Daughter    • No Known Problems Sister    • Crohn's disease Maternal Aunt    • No Known Problems Maternal Aunt    • No Known Problems Maternal Aunt    • Colon cancer Paternal Aunt    • No Known Problems Paternal Aunt    • Colon cancer Son    • No Known Problems Paternal Aunt    • No Known Problems Paternal [de-identified]    • Colon cancer Paternal Aunt    • Cancer Paternal Aunt         Colon cancer   • No Known Problems Maternal Grandfather    • No Known Problems Paternal Grandmother    • No Known Problems Paternal Grandfather    • Kidney failure Brother         kidney failure d/t NSIADs on dialysis   • Hearing loss Brother      I have reviewed and agree with the history as documented  E-Cigarette/Vaping   • E-Cigarette Use Never User      E-Cigarette/Vaping Substances   • Nicotine No    • THC No    • CBD No    • Flavoring No    • Other No    • Unknown No      Social History     Tobacco Use   • Smoking status: Former     Packs/day: 1 50     Years: 36 00     Pack years: 54 00     Types: Cigarettes     Start date: 1969     Quit date: 1/1/2008     Years since quitting: 15 0   • Smokeless tobacco: Never   Vaping Use   • Vaping Use: Never used   Substance Use Topics   • Alcohol use: Yes     Comment: social    • Drug use: No       Review of Systems   Musculoskeletal: Positive for back pain  Neurological: Positive for light-headedness  Physical Exam  Physical Exam  Vitals and nursing note reviewed  Constitutional:       Appearance: She is well-developed  HENT:      Head: Normocephalic and atraumatic  Right Ear: External ear normal       Left Ear: External ear normal       Nose: Nose normal    Eyes:      General: No scleral icterus  Cardiovascular:      Rate and Rhythm: Normal rate  Pulmonary:      Effort: Pulmonary effort is normal  No respiratory distress     Abdominal:      General: There is no distension  Tenderness: There is no abdominal tenderness  Musculoskeletal:         General: Tenderness present  No deformity  Normal range of motion  Cervical back: Normal range of motion  Comments: No evidence of saddle anesthesia  TTP B/L Lumbar region  No skin changes  Normal ROM   Skin:     Findings: No rash  Neurological:      General: No focal deficit present  Mental Status: She is alert and oriented to person, place, and time  Psychiatric:         Mood and Affect: Mood normal          Vital Signs  ED Triage Vitals [01/12/23 1353]   Temperature Pulse Respirations Blood Pressure SpO2   (!) 97 °F (36 1 °C) 64 18 95/50 100 %      Temp Source Heart Rate Source Patient Position - Orthostatic VS BP Location FiO2 (%)   Temporal Monitor Sitting Left arm --      Pain Score       4           Vitals:    01/12/23 1415 01/12/23 1445 01/12/23 1600 01/12/23 1630   BP: 161/70 126/60 164/74 (!) 172/74   Pulse: 61 61 64 66   Patient Position - Orthostatic VS:             Visual Acuity      ED Medications  Medications   acetaminophen (TYLENOL) tablet 975 mg (975 mg Oral Given 1/12/23 1427)   methocarbamol (ROBAXIN) tablet 1,000 mg (1,000 mg Oral Given 1/12/23 1427)   sodium chloride 0 9 % bolus 1,000 mL (0 mL Intravenous Stopped 1/12/23 1643)       Diagnostic Studies  Results Reviewed     Procedure Component Value Units Date/Time    HS Troponin I 2hr [[id number]] Collected: 01/12/23 1645    Lab Status:  In process Specimen: Blood from Arm, Right Updated: 01/12/23 1650    UA w Reflex to Microscopic w Reflex to Culture [[id number]]     Lab Status: No result Specimen: Urine     HS Troponin I 4hr [[id number]]     Lab Status: No result Specimen: Blood     HS Troponin 0hr (reflex protocol) [[id number]]  (Normal) Collected: 01/12/23 1428    Lab Status: Final result Specimen: Blood from Arm, Right Updated: 01/12/23 1502     hs TnI 0hr 3 ng/L     Basic metabolic panel [[id number]]  (Abnormal) Collected: 01/12/23 1428 Lab Status: Final result Specimen: Blood from Arm, Right Updated: 01/12/23 1500     Sodium 137 mmol/L      Potassium 4 1 mmol/L      Chloride 101 mmol/L      CO2 26 mmol/L      ANION GAP 10 mmol/L      BUN 28 mg/dL      Creatinine 2 15 mg/dL      Glucose 125 mg/dL      Calcium 9 2 mg/dL      eGFR 22 ml/min/1 73sq m     Narrative:      [place] guidelines for Chronic Kidney Disease (CKD):   •  Stage 1 with normal or high GFR (GFR > 90 mL/min/1 73 square meters)  •  Stage 2 Mild CKD (GFR = 60-89 mL/min/1 73 square meters)  •  Stage 3A Moderate CKD (GFR = 45-59 mL/min/1 73 square meters)  •  Stage 3B Moderate CKD (GFR = 30-44 mL/min/1 73 square meters)  •  Stage 4 Severe CKD (GFR = 15-29 mL/min/1 73 square meters)  •  Stage 5 End Stage CKD (GFR <15 mL/min/1 73 square meters)  Note: GFR calculation is accurate only with a steady state creatinine    CBC and differential [[id number]]  (Abnormal) Collected: 01/12/23 1428    Lab Status: Final result Specimen: Blood from Arm, Right Updated: 01/12/23 1435     WBC 4 42 Thousand/uL      RBC 4 12 Million/uL      Hemoglobin 13 3 g/dL      Hematocrit 39 5 %      MCV 96 fL      MCH 32 3 pg      MCHC 33 7 g/dL      RDW 13 3 %      MPV 8 7 fL      Platelets 937 Thousands/uL      nRBC 0 /100 WBCs      Neutrophils Relative 54 %      Immat GRANS % 1 %      Lymphocytes Relative 35 %      Monocytes Relative 9 %      Eosinophils Relative 1 %      Basophils Relative 0 %      Neutrophils Absolute 2 41 Thousands/µL      Immature Grans Absolute 0 02 Thousand/uL      Lymphocytes Absolute 1 56 Thousands/µL      Monocytes Absolute 0 38 Thousand/µL      Eosinophils Absolute 0 04 Thousand/µL      Basophils Absolute 0 01 Thousands/µL                  XR chest pa & lateral   Final Result by Mingo Riedel, MD (01/12 1512)      No acute cardiopulmonary disease  Severe emphysema with bilateral scar                    Workstation performed: [id number]         CT recon only lumbar spine   Final Result by Jeancarlos Damico MD (01/12 1474)      No acute abnormality identified  Chronic findings above  Workstation performed: [id number]         CT abdomen pelvis wo contrast   Final Result by Jeancarlos Damico MD (01/12 1513)      No acute findings in the abdomen or pelvis  Several chronic and nonemergent findings above  Subcentimeter pancreatic head hypodensities, statistically likely cysts  Stable appearance compared to 2019  Per ACR guidelines, follow-up is recommended every 2 years x5  Given initial imaging in April 2019, recommend follow-up in April 2023,    preferably with MRI of the abdomen  Workstation performed: [id number]                    Procedures  Foreign Body - Orifice    Date/Time: 1/12/2023 3:00 PM  Performed by: Ricky Cr DO  Authorized by: Ricky Cr DO     Patient location:  ED  Consent:     Consent obtained:  Verbal    Consent given by:  Patient    Alternatives discussed:  Delayed treatment  Universal protocol:     Patient identity confirmed:  Verbally with patient  Location:     Location:  Ear    Ear location:  L ear  Anesthesia (see MAR for exact dosages): Topical anesthetic:  None  Procedure details:     Localization method:  Direct visualization    Removal mechanism:  Alligator forceps    Procedure complexity:  Simple    Foreign bodies recovered:  1    Description:  Intact hearing aid tip    Intact foreign body removal: yes    Post-procedure details:     Confirmation:  No additional foreign bodies on visualization    Patient tolerance of procedure: Tolerated well, no immediate complications             ED Course                               SBIRT 22yo+    Flowsheet Row Most Recent Value   SBIRT (25 yo +)    In order to provide better care to our patients, we are screening all of our patients for alcohol and drug use  Would it be okay to ask you these screening questions?  Yes Filed at: 01/12/2023 1001   Initial Alcohol Screen: US AUDIT-C     1  How often do you have a drink containing alcohol? 6 Filed at: 01/12/2023 1429   2  How many drinks containing alcohol do you have on a typical day you are drinking? 0 Filed at: 01/12/2023 1429   3a  Male UNDER 65: How often do you have five or more drinks on one occasion? 0 Filed at: 01/12/2023 1429   3b  FEMALE Any Age, or MALE 65+: How often do you have 4 or more drinks on one occassion? 0 Filed at: 01/12/2023 1429   Audit-C Score 6 Filed at: 01/12/2023 1429   BERNADINE: How many times in the past year have you    Used an illegal drug or used a prescription medication for non-medical reasons? Never Filed at: 01/12/2023 1429                    Medical Decision Making  69 yof with low blood pressure at home  Also with back pain  Also with foreign body in left ear  Obtain labs to assess for kidney function and lytes  CTAP  Symptom control prn  Removed FB  D/w slim  Admit for further evaluation of ANGELINE  ANGELINE (acute kidney injury) [facility]): complicated acute illness or injury  Amount and/or Complexity of Data Reviewed  Labs: ordered  Radiology: ordered  Risk  OTC drugs  Prescription drug management  Decision regarding hospitalization  Disposition  Final diagnoses:   ANGELINE (acute kidney injury) ([facility] [address] )     Time reflects when diagnosis was documented in both MDM as applicable and the Disposition within this note     Time User Action Codes Description Comment    1/12/2023  4:43 PM Sussy Hence Add [N17 9] ANGELINE (acute kidney injury) [facility])       ED Disposition     ED Disposition   Admit    Condition   Stable    Date/Time   Thu Jan 12, 2023  4:43 PM    Comment   Case was discussed with THOR and the patient's admission status was agreed to be Admission Status: inpatient status to the service of Dr Mak Olea              Follow-up Information    None         Patient's Medications   Discharge Prescriptions    No medications on file       No discharge procedures on file     PDMP Review       Value Time User    PDMP Reviewed  Yes 10/9/2022  8:23 PM May DO Yasmin          ED Provider  Electronically Signed by           Carolynn Church DO  01/12/23 3582

## 2023-01-12 NOTE — ASSESSMENT & PLAN NOTE
· CT L spine: no acute abnormalities   · No focal deficit  · Has been taking Zanaflex at home for this, likely contributed to hypotensive episode and subsequent ANGELINE  · Manage pain with scheduled Tylenol, lidocaine patches  · Try to avoid narcotics, muscle relaxers and benzodiazepines as this may further affect blood pressure  · Patient would not be willing to take steroids as she believes this contributed to her hypertension related stroke August 2022  · PT/OT

## 2023-01-12 NOTE — ASSESSMENT & PLAN NOTE
· Maintained on losartan 50mg BID and carvedilol 12 5mg BID  · Holding Losartan given ANGELINE  · Monitor pressures, avoid hypotension  · PRN hydralazine

## 2023-01-12 NOTE — ASSESSMENT & PLAN NOTE
· CT A/P: Subcentimeter pancreatic head hypodensities, statistically likely cysts  Stable appearance compared to 2019  Per ACR guidelines, follow-up is recommended every 2 years x5  Given initial imaging in April 2019, recommend follow-up in April 2023, preferably with MRI of the abdomen

## 2023-01-12 NOTE — TELEPHONE ENCOUNTER
Patient called about scheduling an appointment for the followin  Intense lower back pain  2  Her BP keeps dropping  3  She has a piece of her hearing aid is stuck in her ear  Upon discussion with the MA, it was recommended that she go to an urgent care or ER for all three issues  Patient agreed

## 2023-01-13 LAB
ANION GAP SERPL CALCULATED.3IONS-SCNC: 11 MMOL/L (ref 4–13)
BASOPHILS # BLD AUTO: 0.01 THOUSANDS/ÂΜL (ref 0–0.1)
BASOPHILS NFR BLD AUTO: 0 % (ref 0–1)
BUN SERPL-MCNC: 22 MG/DL (ref 5–25)
CALCIUM SERPL-MCNC: 9.1 MG/DL (ref 8.3–10.1)
CHLORIDE SERPL-SCNC: 108 MMOL/L (ref 96–108)
CO2 SERPL-SCNC: 23 MMOL/L (ref 21–32)
CREAT SERPL-MCNC: 1.79 MG/DL (ref 0.6–1.3)
EOSINOPHIL # BLD AUTO: 0.05 THOUSAND/ÂΜL (ref 0–0.61)
EOSINOPHIL NFR BLD AUTO: 1 % (ref 0–6)
ERYTHROCYTE [DISTWIDTH] IN BLOOD BY AUTOMATED COUNT: 13.2 % (ref 11.6–15.1)
GFR SERPL CREATININE-BSD FRML MDRD: 28 ML/MIN/1.73SQ M
GLUCOSE SERPL-MCNC: 101 MG/DL (ref 65–140)
HCT VFR BLD AUTO: 36.5 % (ref 34.8–46.1)
HGB BLD-MCNC: 12.3 G/DL (ref 11.5–15.4)
IMM GRANULOCYTES # BLD AUTO: 0.01 THOUSAND/UL (ref 0–0.2)
IMM GRANULOCYTES NFR BLD AUTO: 0 % (ref 0–2)
LYMPHOCYTES # BLD AUTO: 1.76 THOUSANDS/ÂΜL (ref 0.6–4.47)
LYMPHOCYTES NFR BLD AUTO: 51 % (ref 14–44)
MCH RBC QN AUTO: 31.9 PG (ref 26.8–34.3)
MCHC RBC AUTO-ENTMCNC: 33.7 G/DL (ref 31.4–37.4)
MCV RBC AUTO: 95 FL (ref 82–98)
MONOCYTES # BLD AUTO: 0.32 THOUSAND/ÂΜL (ref 0.17–1.22)
MONOCYTES NFR BLD AUTO: 9 % (ref 4–12)
NEUTROPHILS # BLD AUTO: 1.39 THOUSANDS/ÂΜL (ref 1.85–7.62)
NEUTS SEG NFR BLD AUTO: 39 % (ref 43–75)
NRBC BLD AUTO-RTO: 0 /100 WBCS
PLATELET # BLD AUTO: 266 THOUSANDS/UL (ref 149–390)
PMV BLD AUTO: 8.7 FL (ref 8.9–12.7)
POTASSIUM SERPL-SCNC: 4.1 MMOL/L (ref 3.5–5.3)
RBC # BLD AUTO: 3.85 MILLION/UL (ref 3.81–5.12)
SODIUM SERPL-SCNC: 142 MMOL/L (ref 135–147)
WBC # BLD AUTO: 3.54 THOUSAND/UL (ref 4.31–10.16)

## 2023-01-13 RX ADMIN — ISOSORBIDE MONONITRATE 30 MG: 30 TABLET, EXTENDED RELEASE ORAL at 08:28

## 2023-01-13 RX ADMIN — Medication 1000 UNITS: at 08:28

## 2023-01-13 RX ADMIN — CARVEDILOL 12.5 MG: 12.5 TABLET, FILM COATED ORAL at 17:35

## 2023-01-13 RX ADMIN — CALCIUM 1 TABLET: 500 TABLET ORAL at 08:28

## 2023-01-13 RX ADMIN — LIDOCAINE 5% 1 PATCH: 700 PATCH TOPICAL at 08:28

## 2023-01-13 RX ADMIN — SODIUM CHLORIDE, SODIUM GLUCONATE, SODIUM ACETATE, POTASSIUM CHLORIDE, MAGNESIUM CHLORIDE, SODIUM PHOSPHATE, DIBASIC, AND POTASSIUM PHOSPHATE 100 ML/HR: .53; .5; .37; .037; .03; .012; .00082 INJECTION, SOLUTION INTRAVENOUS at 22:41

## 2023-01-13 RX ADMIN — ACETAMINOPHEN 650 MG: 325 TABLET ORAL at 05:38

## 2023-01-13 RX ADMIN — HEPARIN SODIUM 5000 UNITS: 5000 INJECTION INTRAVENOUS; SUBCUTANEOUS at 14:56

## 2023-01-13 RX ADMIN — ESCITALOPRAM OXALATE 10 MG: 10 TABLET ORAL at 08:28

## 2023-01-13 RX ADMIN — PANTOPRAZOLE SODIUM 40 MG: 40 TABLET, DELAYED RELEASE ORAL at 05:38

## 2023-01-13 RX ADMIN — ACETAMINOPHEN 650 MG: 325 TABLET ORAL at 21:26

## 2023-01-13 RX ADMIN — HEPARIN SODIUM 5000 UNITS: 5000 INJECTION INTRAVENOUS; SUBCUTANEOUS at 05:38

## 2023-01-13 RX ADMIN — GABAPENTIN 300 MG: 300 CAPSULE ORAL at 08:28

## 2023-01-13 RX ADMIN — SODIUM CHLORIDE, SODIUM GLUCONATE, SODIUM ACETATE, POTASSIUM CHLORIDE, MAGNESIUM CHLORIDE, SODIUM PHOSPHATE, DIBASIC, AND POTASSIUM PHOSPHATE 100 ML/HR: .53; .5; .37; .037; .03; .012; .00082 INJECTION, SOLUTION INTRAVENOUS at 13:31

## 2023-01-13 RX ADMIN — GABAPENTIN 300 MG: 300 CAPSULE ORAL at 17:35

## 2023-01-13 RX ADMIN — HEPARIN SODIUM 5000 UNITS: 5000 INJECTION INTRAVENOUS; SUBCUTANEOUS at 21:26

## 2023-01-13 RX ADMIN — CLOPIDOGREL BISULFATE 75 MG: 75 TABLET ORAL at 08:28

## 2023-01-13 RX ADMIN — CARVEDILOL 12.5 MG: 12.5 TABLET, FILM COATED ORAL at 08:28

## 2023-01-13 RX ADMIN — SODIUM CHLORIDE, SODIUM GLUCONATE, SODIUM ACETATE, POTASSIUM CHLORIDE, MAGNESIUM CHLORIDE, SODIUM PHOSPHATE, DIBASIC, AND POTASSIUM PHOSPHATE 100 ML/HR: .53; .5; .37; .037; .03; .012; .00082 INJECTION, SOLUTION INTRAVENOUS at 03:11

## 2023-01-13 RX ADMIN — ACETAMINOPHEN 650 MG: 325 TABLET ORAL at 14:56

## 2023-01-13 RX ADMIN — ATORVASTATIN CALCIUM 80 MG: 40 TABLET, FILM COATED ORAL at 08:28

## 2023-01-13 NOTE — CASE MANAGEMENT
Case Management Assessment & Discharge Planning Note    Patient name Carolyn Garcia  Location /-80 MRN [id number]  : 1955 Date 2023       Current Admission Date: 2023  Current Admission Diagnosis:ANGELINE (acute kidney injury) [facility])   Patient Active Problem List    Diagnosis Date Noted   • ANGELINE (acute kidney injury) ([facility] [address] ) 2023   • Hyperglycemia 2022   • Intracranial atherosclerosis 11/10/2022   • Meningioma, spinal ([address] ) 11/10/2022   • Stenosis of right carotid artery 2022   • Dysphagia 2022   • Subarachnoid hemorrhage ([address] ) 2022   • Facial twitching 2022   • Stage 3a chronic kidney disease ([facility] [address] ) 2021   • Hypotension    • GERD (gastroesophageal reflux disease) 2019   • Emphysema 2019   • Pancreatic cyst 2019   • De Quervain's disease (tenosynovitis) 2018   • Sudden idiopathic hearing loss of right ear 2017   • Takotsubo cardiomyopathy 06/15/2017   • Lower back pain 2016   • Depression with anxiety 09/15/2015   • Cervical stenosis of spinal canal 2015   • Carpal tunnel syndrome 2014   • Plantar fasciitis 2012   • Dyslipidemia 2012   • Arnold-Chiari malformation ([facility] [address] ) 2012   • Essential hypertension 2012      LOS (days): 1  Geometric Mean LOS (GMLOS) (days):   Days to GMLOS:     OBJECTIVE:    Risk of Unplanned Readmission Score: 12 53         Current admission status: Inpatient       Preferred Pharmacy:   [facility] [address] LIANET Munoz  [address] - [address]  [address]    [place] [address]  Phone: [phone] Fax: [phone]    Primary Care Provider: Olena Gillis DO    Primary Insurance: MEDICARE  Secondary Insurance: Edwyn[place] :  [id number] [facility], [address] [facility] Representative - Spouse   Primary Phone: [phone] [facility] [place])  Home Phone: [phone]               Advance Directives  Does patient have a [address]?: No  Was patient offered paperwork?: Yes (given)  Does patient currently have a Health Care decision maker?: No  Does patient have Advance Directives?: No  Was patient offered paperwork?: Yes (Given)  Primary Contact: Glenn Contreras - spouse         Readmission Root Cause  30 Day Readmission: No    Patient Information  Admitted from[de-identified] Home  Mental Status: Alert  During Assessment patient was accompanied by: Not accompanied during assessment  Assessment information provided by[de-identified] Patient  Primary Caregiver: Self  Support Systems: Spouse/significant other, Son, Daughter  [place] of Residence: [address] [place] [address] do you live in?: [address] [place] [address] entry access options   Select all that apply : Stairs  Number of steps to enter home : 2  Do the steps have railings?: No  Type of Current Residence: [address] [place] home  Upon entering residence, is there a bedroom on the main floor (no further steps)?: Yes  Upon entering residence, is there a bathroom on the main floor (no further steps)?: Yes  In the last 12 months, was there a time when you were not able to pay the mortgage or rent on time?: No  In the last 12 months, how many places have you lived?: 1  In the last 12 months, was there a time when you did not have a steady place to sleep or slept in a shelter (including now)?: No  Homeless/housing insecurity resource given?: N/A  Living Arrangements: Lives w/ Spouse/significant other  Is patient a ?: No    Activities of Daily Living Prior to Admission  Functional Status: Independent  Completes ADLs independently?: Yes  Ambulates independently?: Yes  Does patient use assisted devices?: No  Does patient currently own DME?: No  Does patient have a history of Outpatient Therapy (PT/OT)?: Yes  Does the patient have a history of Short-Term Rehab?: No  Does patient have a history of HHC?: No  Does patient currently have [facility]?: No         Patient Information Continued  Income Source: SSI/SSD  Does patient have prescription coverage?: Yes  Within the past 12 months, you worried that your food would run out before you got the money to buy more : Never true  Within the past 12 months, the food you bought just didn't last and you didn't have money to get more : Never true  Food insecurity resource given?: N/A  Does patient receive dialysis treatments?: No  Does patient have a history of substance abuse?: No  Does patient have a history of Mental Health Diagnosis?: No         Means of Transportation  Means of Transport to Appts[de-identified] Drives Self  In the past 12 months, has lack of transportation kept you from medical appointments or from getting medications?: No  In the past 12 months, has lack of transportation kept you from meetings, work, or from getting things needed for daily living?: No  Was application for public transport provided?: N/A        DISCHARGE DETAILS:    Discharge planning discussed with[de-identified] patient  Freedom of Choice: Yes  Comments - Freedom of Choice: Patient plans on returning home at discharge and does not anticipate any discharge needs  CM contacted family/caregiver?: No- see comments (declined)  Were Treatment Team discharge recommendations reviewed with patient/caregiver?: Yes  Did patient/caregiver verbalize understanding of patient care needs?: Yes  Were patient/caregiver advised of the risks associated with not following Treatment Team discharge recommendations?: Yes         [facility]         Is the patient interested in [facility] at discharge?: No    DME Referral Provided  Referral made for DME?: No        Treatment Team Recommendation: Home  Discharge Destination Plan[de-identified] Home  Transport at Discharge : Automobile, Self        IMM Given (Date):: 01/13/23  IMM Given to[de-identified] Patient     Additional Comments: Patient lives with spouse in a [facility] style home with 2 ADENIKE  Has first floor set up  Independent adl's and ambulation, drives  No services anticipated  Drove self

## 2023-01-13 NOTE — ASSESSMENT & PLAN NOTE
· Baseline Cr 1-1 2  · Cr on admission: 2 15, improving with IVF  · Suspect due to hypotension  · UA with reflex negative  · CT A/P without obstructive uropathy/hydronephrosis   · S/P 2L IVF in ED, continue 100cc/hr Isolyte  · Retention protocol, monitor I/O  · Recheck AM BMP  · Hold losartan, likely hold on discharge given orthostatic hypotension

## 2023-01-13 NOTE — ASSESSMENT & PLAN NOTE
· Maintained on losartan 50mg BID and carvedilol 12 5mg BID  · Holding Losartan given ANGELINE, consider discontinuing on discharge given orthostatic hypotension  · Monitor pressures, avoid hypotension  · PRN hydralazine

## 2023-01-13 NOTE — PLAN OF CARE
Problem: PAIN - ADULT  Goal: Verbalizes/displays adequate comfort level or baseline comfort level  Description: Interventions:  - Encourage patient to monitor pain and request assistance  - Assess pain using appropriate pain scale  - Administer analgesics based on type and severity of pain and evaluate response  - Implement non-pharmacological measures as appropriate and evaluate response  - Consider cultural and social influences on pain and pain management  - Notify physician/advanced practitioner if interventions unsuccessful or patient reports new pain  Outcome: Progressing     Problem: INFECTION - ADULT  Goal: Absence or prevention of progression during hospitalization  Description: INTERVENTIONS:  - Assess and monitor for signs and symptoms of infection  - Monitor lab/diagnostic results  - Monitor all insertion sites, i e  indwelling lines, tubes, and drains  - Monitor endotracheal if appropriate and nasal secretions for changes in amount and color  - [place] appropriate cooling/warming therapies per order  - Administer medications as ordered  - Instruct and encourage patient and family to use good hand hygiene technique  - Identify and instruct in appropriate isolation precautions for identified infection/condition  Outcome: Progressing  Goal: Absence of fever/infection during neutropenic period  Description: INTERVENTIONS:  - Monitor WBC    Outcome: Progressing     Problem: Nutrition/Hydration-ADULT  Goal: Nutrient/Hydration intake appropriate for improving, restoring or maintaining nutritional needs  Description: Monitor and assess patient's nutrition/hydration status for malnutrition  Collaborate with interdisciplinary team and initiate plan and interventions as ordered  Monitor patient's weight and dietary intake as ordered or per policy  Utilize nutrition screening tool and intervene as necessary  Determine patient's food preferences and provide high-protein, high-caloric foods as appropriate  INTERVENTIONS:  - Monitor oral intake, urinary output, labs, and treatment plans  - Assess nutrition and hydration status and recommend course of action  - Evaluate amount of meals eaten  - Assist patient with eating if necessary   - Allow adequate time for meals  - Recommend/ encourage appropriate diets, oral nutritional supplements, and vitamin/mineral supplements  - Order, calculate, and assess calorie counts as needed  - Recommend, monitor, and adjust tube feedings and TPN/PPN based on assessed needs  - Assess need for intravenous fluids  - Provide specific nutrition/hydration education as appropriate  - Include patient/family/caregiver in decisions related to nutrition  Outcome: Progressing     Problem: METABOLIC, FLUID AND ELECTROLYTES - ADULT  Goal: Electrolytes maintained within normal limits  Description: INTERVENTIONS:  - Monitor labs and assess patient for signs and symptoms of electrolyte imbalances  - Administer electrolyte replacement as ordered  - Monitor response to electrolyte replacements, including repeat lab results as appropriate  - Instruct patient on fluid and nutrition as appropriate  Outcome: Progressing  Goal: Fluid balance maintained  Description: INTERVENTIONS:  - Monitor labs   - Monitor I/O and WT  - Instruct patient on fluid and nutrition as appropriate  - Assess for signs & symptoms of volume excess or deficit  Outcome: Progressing  Goal: Glucose maintained within target range  Description: INTERVENTIONS:  - Monitor Blood Glucose as ordered  - Assess for signs and symptoms of hyperglycemia and hypoglycemia  - Administer ordered medications to maintain glucose within target range  - Assess nutritional intake and initiate nutrition service referral as needed  Outcome: Progressing

## 2023-01-13 NOTE — PLAN OF CARE
Problem: PAIN - ADULT  Goal: Verbalizes/displays adequate comfort level or baseline comfort level  Description: Interventions:  - Encourage patient to monitor pain and request assistance  - Assess pain using appropriate pain scale  - Administer analgesics based on type and severity of pain and evaluate response  - Implement non-pharmacological measures as appropriate and evaluate response  - Consider cultural and social influences on pain and pain management  - Notify physician/advanced practitioner if interventions unsuccessful or patient reports new pain  Outcome: Progressing     Problem: INFECTION - ADULT  Goal: Absence or prevention of progression during hospitalization  Description: INTERVENTIONS:  - Assess and monitor for signs and symptoms of infection  - Monitor lab/diagnostic results  - Monitor all insertion sites, i e  indwelling lines, tubes, and drains  - Monitor endotracheal if appropriate and nasal secretions for changes in amount and color  - [place] appropriate cooling/warming therapies per order  - Administer medications as ordered  - Instruct and encourage patient and family to use good hand hygiene technique  - Identify and instruct in appropriate isolation precautions for identified infection/condition  Outcome: Progressing  Goal: Absence of fever/infection during neutropenic period  Description: INTERVENTIONS:  - Monitor WBC    Outcome: Progressing     Problem: Nutrition/Hydration-ADULT  Goal: Nutrient/Hydration intake appropriate for improving, restoring or maintaining nutritional needs  Description: Monitor and assess patient's nutrition/hydration status for malnutrition  Collaborate with interdisciplinary team and initiate plan and interventions as ordered  Monitor patient's weight and dietary intake as ordered or per policy  Utilize nutrition screening tool and intervene as necessary  Determine patient's food preferences and provide high-protein, high-caloric foods as appropriate  INTERVENTIONS:  - Monitor oral intake, urinary output, labs, and treatment plans  - Assess nutrition and hydration status and recommend course of action  - Evaluate amount of meals eaten  - Assist patient with eating if necessary   - Allow adequate time for meals  - Recommend/ encourage appropriate diets, oral nutritional supplements, and vitamin/mineral supplements  - Order, calculate, and assess calorie counts as needed  - Recommend, monitor, and adjust tube feedings and TPN/PPN based on assessed needs  - Assess need for intravenous fluids  - Provide specific nutrition/hydration education as appropriate  - Include patient/family/caregiver in decisions related to nutrition  Outcome: Progressing     Problem: METABOLIC, FLUID AND ELECTROLYTES - ADULT  Goal: Electrolytes maintained within normal limits  Description: INTERVENTIONS:  - Monitor labs and assess patient for signs and symptoms of electrolyte imbalances  - Administer electrolyte replacement as ordered  - Monitor response to electrolyte replacements, including repeat lab results as appropriate  - Instruct patient on fluid and nutrition as appropriate  Outcome: Progressing  Goal: Fluid balance maintained  Description: INTERVENTIONS:  - Monitor labs   - Monitor I/O and WT  - Instruct patient on fluid and nutrition as appropriate  - Assess for signs & symptoms of volume excess or deficit  Outcome: Progressing  Goal: Glucose maintained within target range  Description: INTERVENTIONS:  - Monitor Blood Glucose as ordered  - Assess for signs and symptoms of hyperglycemia and hypoglycemia  - Administer ordered medications to maintain glucose within target range  - Assess nutritional intake and initiate nutrition service referral as needed  Outcome: Progressing

## 2023-01-13 NOTE — ASSESSMENT & PLAN NOTE
· Likely contributed to ANGELINE  · Holding Losartan  · Hopefully resume once creatinine resolves and pressures are stable  · Holding Zanaflex and Xanax-would likely discontinue on discharge  · Has been having back pain recently, taking her Zanaflex at home, suspect this caused her hypotension  · Orthostatics positive  · Compression stockings added  · Continue IVF

## 2023-01-13 NOTE — OCCUPATIONAL THERAPY NOTE
Occupational Therapy Screen Note     Patient Name: Ulises SOLARESFX'D Date: 1/13/2023  Problem List  Principal Problem:    ANGELINE (acute kidney injury) [facility])  Active Problems:    Essential hypertension    Lower back pain    Dyslipidemia    Pancreatic cyst    Emphysema    Hypotension    Subarachnoid hemorrhage ([facility] [address] )              01/13/23 1306   OT Last Visit   OT Visit Date 01/13/23   Note Type   Note type Screen   Additional Comments OT orders received  Chart reviewed  Pt independent in room per documentation, confirmed with nursing  Pt with no acute OT needs  Will DC OT orders         ANNELISE Coles/L

## 2023-01-13 NOTE — PROGRESS NOTES
[place]  Progress Note - Yenny Coe 1955, 69 y o  female MRN: [id number]  Unit/Bed#: -01 Encounter: [id number]  Primary Care Provider: Arabella Albright DO   Date and time admitted to hospital: 1/12/2023  1:55 PM    * ANGELINE (acute kidney injury) ([facility] [address] )  Assessment & Plan  · Baseline Cr 1-1 2  · Cr on admission: 2 15, improving with IVF  · Suspect due to hypotension  · UA with reflex negative  · CT A/P without obstructive uropathy/hydronephrosis   · S/P 2L IVF in ED, continue 100cc/hr Isolyte  · Retention protocol, monitor I/O  · Recheck AM BMP  · Hold losartan, likely hold on discharge given orthostatic hypotension    Hypotension  Assessment & Plan  · Likely contributed to ANGELINE  · Holding Losartan  · Hopefully resume once creatinine resolves and pressures are stable  · Holding Zanaflex and Xanax-would likely discontinue on discharge  · Has been having back pain recently, taking her Zanaflex at home, suspect this caused her hypotension  · Orthostatics positive  · Compression stockings added  · Continue IVF      Lower back pain  Assessment & Plan  · CT L spine: no acute abnormalities   · No focal deficit  · Has been taking Zanaflex at home for this, likely contributed to hypotensive episode and subsequent ANGELINE  · Manage pain with scheduled Tylenol, lidocaine patches  · Try to avoid narcotics, muscle relaxers and benzodiazepines as this may further affect blood pressure  · Patient would not be willing to take steroids as she believes this contributed to her hypertension related stroke August 2022  · PT/OT    Subarachnoid hemorrhage [facility])  Assessment & Plan  · Cleared by Neuro 10/3/22 for DVT prophylaxis  · Unknown eitiology, suspected related to HTN    Emphysema  Assessment & Plan  · Home inhaler: Trelegy, replace with formulary equivalents  · Albuterol PRN  · CXR without acute abnormality  · No acute exacerbation     Pancreatic cyst  Assessment & Plan  · CT A/P: Subcentimeter pancreatic head hypodensities, statistically likely cysts  Stable appearance compared to 2019  Per ACR guidelines, follow-up is recommended every 2 years x5  Given initial imaging in 2019, recommend follow-up in 2023, preferably with MRI of the abdomen  Dyslipidemia  Assessment & Plan  · Continue Lipitor    Essential hypertension  Assessment & Plan  · Maintained on losartan 50mg BID and carvedilol 12 5mg BID  · Holding Losartan given ANGELINE, consider discontinuing on discharge given orthostatic hypotension  · Monitor pressures, avoid hypotension  · PRN hydralazine       VTE Pharmacologic Prophylaxis: VTE Score: 3 Moderate Risk (Score 3-4) - Pharmacological DVT Prophylaxis Ordered: heparin  Patient Centered Rounds: I performed bedside rounds with nursing staff today  Discussions with Specialists or Other Care Team Provider: Discussed with CM, PT, OT    Education and Discussions with Family / Patient: Patient declined call to   Time Spent for Care: 30 minutes  More than 50% of total time spent on counseling and coordination of care as described above  Current Length of Stay: 1 day(s)  Current Patient Status: Inpatient   Certification Statement: The patient will continue to require additional inpatient hospital stay due to ANGELINE  Discharge Plan: Anticipate discharge tomorrow to home  Code Status: Level 1 - Full Code    Subjective:   Reports he feels well, back pain has improved with Tylenol and lidocaine patches  She does have somewhat of a headache which she attributed to her elevated blood pressure this morning  Offers no additional complaints at this time, is hopeful to go home over the next day or so      Objective:     Vitals:   Temp (24hrs), Av 4 °F (36 3 °C), Min:97 °F (36 1 °C), Max:97 8 °F (36 6 °C)    Temp:  [97 °F (36 1 °C)-97 8 °F (36 6 °C)] 97 5 °F (36 4 °C)  HR:  [59-68] 68  Resp:  [16-20] 18  BP: ()/(50-90) 175/79  SpO2:  [90 %-100 %] 90 %  Body mass index is 26 63 kg/m²  Input and Output Summary (last 24 hours): Intake/Output Summary (Last 24 hours) at 1/13/2023 1206  Last data filed at 1/12/2023 2300  Gross per 24 hour   Intake 1360 ml   Output --   Net 1360 ml       Physical Exam:   Physical Exam  Vitals and nursing note reviewed  Constitutional:       General: She is not in acute distress  Appearance: Normal appearance  She is well-developed  HENT:      Head: Normocephalic and atraumatic  Eyes:      General: No scleral icterus  Conjunctiva/sclera: Conjunctivae normal    Cardiovascular:      Rate and Rhythm: Normal rate and regular rhythm  Heart sounds: No murmur heard  Pulmonary:      Effort: Pulmonary effort is normal       Breath sounds: No wheezing, rhonchi or rales  Abdominal:      General: There is no distension  Palpations: Abdomen is soft  Skin:     General: Skin is warm and dry  Neurological:      General: No focal deficit present  Mental Status: She is alert     Psychiatric:         Mood and Affect: Mood normal         Additional Data:     Labs:  Results from last 7 days   Lab Units 01/13/23  0433   WBC Thousand/uL 3 54*   HEMOGLOBIN g/dL 12 3   HEMATOCRIT % 36 5   PLATELETS Thousands/uL 266   NEUTROS PCT % 39*   LYMPHS PCT % 51*   MONOS PCT % 9   EOS PCT % 1     Results from last 7 days   Lab Units 01/13/23  0433   SODIUM mmol/L 142   POTASSIUM mmol/L 4 1   CHLORIDE mmol/L 108   CO2 mmol/L 23   BUN mg/dL 22   CREATININE mg/dL 1 79*   ANION GAP mmol/L 11   CALCIUM mg/dL 9 1   GLUCOSE RANDOM mg/dL 101                       Lines/Drains:  Invasive Devices     Peripheral Intravenous Line  Duration           Peripheral IV 01/12/23 Right Antecubital <1 day                      Imaging: Reviewed radiology reports from this admission including: chest xray    Recent Cultures (last 7 days):         Last 24 Hours Medication List:   Current Facility-Administered Medications   Medication Dose Route Frequency Provider Last Rate   • acetaminophen  650 mg Oral [facility] Ashley MAY PA-C     • albuterol  2 puff Inhalation Q4H PRN Ashley MAY PA-C     • atorvastatin  80 mg Oral Daily Ashley MAY PA-C     • calcium carbonate  1 tablet Oral Daily With Breakfast Ashley MAY PA-C     • carvedilol  12 5 mg Oral BID Cynthia MAY PA-C     • cholecalciferol  1,000 Units Oral Daily Ashley MAY PA-C     • clopidogrel  75 mg Oral Daily Ashley MAY PA-C     • escitalopram  10 mg Oral Daily Ashley MAY PA-C     • Fluticasone Furoate-Vilanterol  1 puff Inhalation Daily Ashley MAY PA-C      And   • umeclidinium  1 puff Inhalation Daily Ashley MAY PA-C     • gabapentin  300 mg Oral BID Ashley MAY PA-C     • heparin (porcine)  5,000 Units Subcutaneous [facility] Kalyn Flood MD     • hydrALAZINE  5 mg Intravenous Q6H PRN Ashley MAY PA-C     • isosorbide mononitrate  30 mg Oral Daily Ashley MAY PA-C     • lidocaine  1 patch Topical Daily Ashley MAY PA-C     • menthol-methyl salicylate   Apply externally 4x Daily PRN Cynthia MAY PA-C     • multi-electrolyte  100 mL/hr Intravenous Continuous Ashley MAY PA-C 100 mL/hr (01/13/23 0311)   • pantoprazole  40 mg Oral Daily Before Breakfast Ashley MAY PA-C          Today, Patient Was Seen By: Bishop Luis Antonio PA-C    **Please Note: This note may have been constructed using a voice recognition system  **

## 2023-01-13 NOTE — PHYSICAL THERAPY NOTE
PHYSICAL THERAPY SCREEN          Patient Name: Radha Mcfadden  [id number] Date: 1/13/2023 01/13/23 6725   Note Type   Note type Screen   Additional Comments Pt is independent for all mobility per nursing  No needs for skilled PT at this time  Will sign off       Jose Matthew

## 2023-01-14 VITALS
RESPIRATION RATE: 18 BRPM | BODY MASS INDEX: 26.66 KG/M2 | DIASTOLIC BLOOD PRESSURE: 89 MMHG | TEMPERATURE: 98.1 F | HEIGHT: 65 IN | WEIGHT: 160 LBS | SYSTOLIC BLOOD PRESSURE: 175 MMHG | HEART RATE: 68 BPM | OXYGEN SATURATION: 89 %

## 2023-01-14 LAB
ANION GAP SERPL CALCULATED.3IONS-SCNC: 9 MMOL/L (ref 4–13)
BASOPHILS # BLD AUTO: 0.01 THOUSANDS/ÂΜL (ref 0–0.1)
BASOPHILS NFR BLD AUTO: 0 % (ref 0–1)
BUN SERPL-MCNC: 18 MG/DL (ref 5–25)
CALCIUM SERPL-MCNC: 8.9 MG/DL (ref 8.3–10.1)
CHLORIDE SERPL-SCNC: 108 MMOL/L (ref 96–108)
CO2 SERPL-SCNC: 21 MMOL/L (ref 21–32)
CREAT SERPL-MCNC: 1.43 MG/DL (ref 0.6–1.3)
EOSINOPHIL # BLD AUTO: 0.07 THOUSAND/ÂΜL (ref 0–0.61)
EOSINOPHIL NFR BLD AUTO: 2 % (ref 0–6)
ERYTHROCYTE [DISTWIDTH] IN BLOOD BY AUTOMATED COUNT: 13.6 % (ref 11.6–15.1)
GFR SERPL CREATININE-BSD FRML MDRD: 37 ML/MIN/1.73SQ M
GLUCOSE SERPL-MCNC: 101 MG/DL (ref 65–140)
HCT VFR BLD AUTO: 34.2 % (ref 34.8–46.1)
HGB BLD-MCNC: 11.5 G/DL (ref 11.5–15.4)
IMM GRANULOCYTES # BLD AUTO: 0.01 THOUSAND/UL (ref 0–0.2)
IMM GRANULOCYTES NFR BLD AUTO: 0 % (ref 0–2)
LYMPHOCYTES # BLD AUTO: 1.58 THOUSANDS/ÂΜL (ref 0.6–4.47)
LYMPHOCYTES NFR BLD AUTO: 34 % (ref 14–44)
MCH RBC QN AUTO: 32 PG (ref 26.8–34.3)
MCHC RBC AUTO-ENTMCNC: 33.6 G/DL (ref 31.4–37.4)
MCV RBC AUTO: 95 FL (ref 82–98)
MONOCYTES # BLD AUTO: 0.48 THOUSAND/ÂΜL (ref 0.17–1.22)
MONOCYTES NFR BLD AUTO: 10 % (ref 4–12)
NEUTROPHILS # BLD AUTO: 2.51 THOUSANDS/ÂΜL (ref 1.85–7.62)
NEUTS SEG NFR BLD AUTO: 54 % (ref 43–75)
NRBC BLD AUTO-RTO: 0 /100 WBCS
PLATELET # BLD AUTO: 247 THOUSANDS/UL (ref 149–390)
PMV BLD AUTO: 8.6 FL (ref 8.9–12.7)
POTASSIUM SERPL-SCNC: 4.9 MMOL/L (ref 3.5–5.3)
RBC # BLD AUTO: 3.59 MILLION/UL (ref 3.81–5.12)
SODIUM SERPL-SCNC: 138 MMOL/L (ref 135–147)
WBC # BLD AUTO: 4.66 THOUSAND/UL (ref 4.31–10.16)

## 2023-01-14 RX ORDER — LOSARTAN POTASSIUM 25 MG/1
25 TABLET ORAL DAILY
Qty: 30 TABLET | Refills: 0 | Status: SHIPPED | OUTPATIENT
Start: 2023-01-14 | End: 2023-01-17 | Stop reason: SDUPTHER

## 2023-01-14 RX ADMIN — Medication 1000 UNITS: at 08:22

## 2023-01-14 RX ADMIN — ATORVASTATIN CALCIUM 80 MG: 40 TABLET, FILM COATED ORAL at 08:22

## 2023-01-14 RX ADMIN — UMECLIDINIUM 1 PUFF: 62.5 AEROSOL, POWDER ORAL at 08:22

## 2023-01-14 RX ADMIN — FLUTICASONE FUROATE AND VILANTEROL TRIFENATATE 1 PUFF: 100; 25 POWDER RESPIRATORY (INHALATION) at 08:22

## 2023-01-14 RX ADMIN — CLOPIDOGREL BISULFATE 75 MG: 75 TABLET ORAL at 08:22

## 2023-01-14 RX ADMIN — CARVEDILOL 12.5 MG: 12.5 TABLET, FILM COATED ORAL at 08:22

## 2023-01-14 RX ADMIN — ISOSORBIDE MONONITRATE 30 MG: 30 TABLET, EXTENDED RELEASE ORAL at 08:22

## 2023-01-14 RX ADMIN — HEPARIN SODIUM 5000 UNITS: 5000 INJECTION INTRAVENOUS; SUBCUTANEOUS at 05:28

## 2023-01-14 RX ADMIN — CALCIUM 1 TABLET: 500 TABLET ORAL at 08:22

## 2023-01-14 RX ADMIN — PANTOPRAZOLE SODIUM 40 MG: 40 TABLET, DELAYED RELEASE ORAL at 05:28

## 2023-01-14 RX ADMIN — ACETAMINOPHEN 650 MG: 325 TABLET ORAL at 05:28

## 2023-01-14 RX ADMIN — ESCITALOPRAM OXALATE 10 MG: 10 TABLET ORAL at 08:22

## 2023-01-14 RX ADMIN — GABAPENTIN 300 MG: 300 CAPSULE ORAL at 08:22

## 2023-01-14 NOTE — PLAN OF CARE
Problem: PAIN - ADULT  Goal: Verbalizes/displays adequate comfort level or baseline comfort level  Description: Interventions:  - Encourage patient to monitor pain and request assistance  - Assess pain using appropriate pain scale  - Administer analgesics based on type and severity of pain and evaluate response  - Implement non-pharmacological measures as appropriate and evaluate response  - Consider cultural and social influences on pain and pain management  - Notify physician/advanced practitioner if interventions unsuccessful or patient reports new pain  Outcome: Progressing     Problem: INFECTION - ADULT  Goal: Absence or prevention of progression during hospitalization  Description: INTERVENTIONS:  - Assess and monitor for signs and symptoms of infection  - Monitor lab/diagnostic results  - Monitor all insertion sites, i e  indwelling lines, tubes, and drains  - Monitor endotracheal if appropriate and nasal secretions for changes in amount and color  - [place] appropriate cooling/warming therapies per order  - Administer medications as ordered  - Instruct and encourage patient and family to use good hand hygiene technique  - Identify and instruct in appropriate isolation precautions for identified infection/condition  Outcome: Progressing  Goal: Absence of fever/infection during neutropenic period  Description: INTERVENTIONS:  - Monitor WBC    Outcome: Progressing     Problem: Nutrition/Hydration-ADULT  Goal: Nutrient/Hydration intake appropriate for improving, restoring or maintaining nutritional needs  Description: Monitor and assess patient's nutrition/hydration status for malnutrition  Collaborate with interdisciplinary team and initiate plan and interventions as ordered  Monitor patient's weight and dietary intake as ordered or per policy  Utilize nutrition screening tool and intervene as necessary  Determine patient's food preferences and provide high-protein, high-caloric foods as appropriate  INTERVENTIONS:  - Monitor oral intake, urinary output, labs, and treatment plans  - Assess nutrition and hydration status and recommend course of action  - Evaluate amount of meals eaten  - Assist patient with eating if necessary   - Allow adequate time for meals  - Recommend/ encourage appropriate diets, oral nutritional supplements, and vitamin/mineral supplements  - Order, calculate, and assess calorie counts as needed  - Recommend, monitor, and adjust tube feedings and TPN/PPN based on assessed needs  - Assess need for intravenous fluids  - Provide specific nutrition/hydration education as appropriate  - Include patient/family/caregiver in decisions related to nutrition  Outcome: Progressing     Problem: METABOLIC, FLUID AND ELECTROLYTES - ADULT  Goal: Electrolytes maintained within normal limits  Description: INTERVENTIONS:  - Monitor labs and assess patient for signs and symptoms of electrolyte imbalances  - Administer electrolyte replacement as ordered  - Monitor response to electrolyte replacements, including repeat lab results as appropriate  - Instruct patient on fluid and nutrition as appropriate  Outcome: Progressing  Goal: Fluid balance maintained  Description: INTERVENTIONS:  - Monitor labs   - Monitor I/O and WT  - Instruct patient on fluid and nutrition as appropriate  - Assess for signs & symptoms of volume excess or deficit  Outcome: Progressing  Goal: Glucose maintained within target range  Description: INTERVENTIONS:  - Monitor Blood Glucose as ordered  - Assess for signs and symptoms of hyperglycemia and hypoglycemia  - Administer ordered medications to maintain glucose within target range  - Assess nutritional intake and initiate nutrition service referral as needed  Outcome: Progressing

## 2023-01-14 NOTE — DISCHARGE SUMMARY
[place]  Discharge- Carolyn Garcia 1955, 69 y o  female MRN: [id number]  Unit/Bed#: -01 Encounter: [id number]  Primary Care Provider: Olena Gillis DO   Date and time admitted to hospital: 1/12/2023  1:55 PM    * ANGELINE (acute kidney injury) ([facility] [address] )  Assessment & Plan  · Baseline Cr 1-1 2  · Cr on admission: 2 15, improving with IVF  · Continues to trend towards baseline  · Suspect due to hypotension  · No longer orthostatic, can resume losartan tomorrow and repeat BMP outpatient  · UA with reflex negative  · CT A/P without obstructive uropathy/hydronephrosis   · S/P 2L IVF in ED, continue 100cc/hr Isolyte  · Retention protocol, monitor I/O  · Recheck AM BMP  · Hold losartan, likely hold on discharge given orthostatic hypotension    Hypotension  Assessment & Plan  · Likely contributed to ANGELINE  · Held Losartan  · Resume after discharge at reduced dose  · Holding Zanaflex and Xanax-would likely discontinue on discharge  · Has been having back pain recently, taking her Zanaflex at home, suspect this caused her hypotension  · Orthostatics positive  · Compression stockings added  · Continue IVF      Lower back pain  Assessment & Plan  · CT L spine: no acute abnormalities   · No focal deficit  · Has been taking Zanaflex at home for this, likely contributed to hypotensive episode and subsequent ANGELINE  · Manage pain with scheduled Tylenol, lidocaine patches  · Try to avoid narcotics, muscle relaxers and benzodiazepines as this may further affect blood pressure  · Patient would not be willing to take steroids as she believes this contributed to her hypertension related stroke August 2022  · PT/OT    Subarachnoid hemorrhage [facility])  Assessment & Plan  · Cleared by Neuro 10/3/22 for DVT prophylaxis  · Unknown eitiology, suspected related to HTN    Emphysema  Assessment & Plan  · Home inhaler: Trelegy, replace with formulary equivalents  · Albuterol PRN  · CXR without acute abnormality  · No acute exacerbation     Pancreatic cyst  Assessment & Plan  · CT A/P: Subcentimeter pancreatic head hypodensities, statistically likely cysts  Stable appearance compared to 2019  Per ACR guidelines, follow-up is recommended every 2 years x5  Given initial imaging in April 2019, recommend follow-up in April 2023, preferably with MRI of the abdomen  Dyslipidemia  Assessment & Plan  · Continue Lipitor    Essential hypertension  Assessment & Plan  · Maintained on losartan 50mg BID and carvedilol 12 5mg BID  · Held Losartan given ANGELINE, can resume at reduced dose after discharge, repeat BMP outpatient  No longer orthostatic  · Monitor pressures, avoid hypotension  · PRN hydralazine     Medical Problems     Resolved Problems  Date Reviewed: 1/14/2023   None       Discharging Physician / Practitioner: Steffanie Ordonez PA-C  PCP: Pastor Ortiz DO  Admission Date:   Admission Orders (From admission, onward)     Ordered        01/12/23 [address]  Once                      Discharge Date: 01/14/23    Consultations During Hospital Stay:  · None    Procedures Performed:   · None    Significant Findings / Test Results:   · CT abdomen pelvis 1/12:  · No acute findings in the abdomen or pelvis  · Several chronic and nonemergent findings  · Subcentimeter pancreatic head hypodensities, statistically likely cysts  Stable appearance compared to 2019  Per ACR guidelines, follow-up is recommended every 2 years x5  Given initial imaging in April 2019, recommend follow-up in April 2023, preferably with MRI of the abdomen  · CT recon 1/12: No acute abnormality identified  Chronic findings  Chronic 1 2 x 0 6 x 1 1 cm (length X depth X width) posterior extra-axial, calcified nodule at T11 compatible with the given history of meningioma  Similar moderate central canal narrowing with effacement of the dorsal thecal sac  · CXR 1/12: No acute cardiopulmonary disease    Severe emphysema with bilateral scar    Incidental Findings:   · See above    Test Results Pending at Discharge (will require follow up): · None     Outpatient Tests Requested:  · Follow-up MRI abdomen in April 2023 as above  · Repeat BMP Monday    Complications: None    Reason for Admission: ANGELINE    Hospital Course:   Severiano Casiano is a 69 y o  female patient with a past medical history of hypertension, subarachnoid hemorrhage, Chiari malformation, CKD 3, Takotsubo cardiomyopathy, hyperlipidemia, emphysema who originally presented to the hospital on 1/12/2023 due to low blood pressure and back pain  Patient had been taking Zanaflex at home for her back pain, likely contributing to hypotensive episodes  Patient found to have ANGELINE on admission, likely related to hypotension  Initially she was orthostatic  Low blood pressure and orthostasis resolved with IV fluids  Fluids were stopped as she did experience slight shortness of breath overnight 1/13 1/14  Per most recent echo, no CHF history noted, anticipate this will resolve with time and discontinuation of fluids  Her losartan was held at admission due to ANGELINE  Blood pressures creased throughout her admission, carvedilol was continued  We will continue to hold losartan and resume tomorrow, reduced dose, outpatient with repeat BMP as well  No longer orthostatic at time of discharge  Ambulating without difficulty  Recommended discontinuation of Zanaflex and Xanax on discharge patient is to monitor home blood pressures at least twice daily and daily weights    Please see above list of diagnoses and related plan for additional information       Condition at Discharge: stable    Discharge Day Visit / Exam:   Subjective: Patient feels well aside from a slight headache this morning  Vitals: Blood Pressure: (!) 175/89 (01/14/23 1058)  Pulse: 68 (01/14/23 1058)  Temperature: 98 1 °F (36 7 °C) (01/14/23 0749)  Temp Source: Oral (01/12/23 2211)  Respirations: 18 (01/14/23 0749)  Height: 5' 5" (165 1 cm) (01/12/23 1353)  Weight - Scale: 72 6 kg (160 lb) (01/12/23 1353)  SpO2: (!) 89 % (01/14/23 1058)  Exam:   Physical Exam  Vitals and nursing note reviewed  Constitutional:       General: She is not in acute distress  Appearance: Normal appearance  She is well-developed  HENT:      Head: Normocephalic and atraumatic  Eyes:      General: No scleral icterus  Conjunctiva/sclera: Conjunctivae normal    Cardiovascular:      Rate and Rhythm: Normal rate and regular rhythm  Heart sounds: No murmur heard  Pulmonary:      Effort: Pulmonary effort is normal       Breath sounds: No wheezing, rhonchi or rales  Abdominal:      General: There is no distension  Palpations: Abdomen is soft  Skin:     General: Skin is warm and dry  Neurological:      General: No focal deficit present  Mental Status: She is alert  Psychiatric:         Mood and Affect: Mood normal        Discussion with Family: Patient declined call to   Discharge instructions/Information to patient and family:   See after visit summary for information provided to patient and family  Provisions for Follow-Up Care:  See after visit summary for information related to follow-up care and any pertinent home health orders  Disposition:   Home    Planned Readmission: None     Discharge Statement:  I spent 65 minutes discharging the patient  This time was spent on the day of discharge  I had direct contact with the patient on the day of discharge  Greater than 50% of the total time was spent examining patient, answering all patient questions, arranging and discussing plan of care with patient as well as directly providing post-discharge instructions  Additional time then spent on discharge activities  Discharge Medications:  See after visit summary for reconciled discharge medications provided to patient and/or family        **Please Note: This note may have been constructed using a voice recognition system**

## 2023-01-14 NOTE — ASSESSMENT & PLAN NOTE
· Maintained on losartan 50mg BID and carvedilol 12 5mg BID  · Held Losartan given ANGELINE, can resume at reduced dose after discharge, repeat BMP outpatient   No longer orthostatic  · Monitor pressures, avoid hypotension  · PRN hydralazine

## 2023-01-14 NOTE — ASSESSMENT & PLAN NOTE
· Likely contributed to ANGELINE  · Held Losartan  · Resume after discharge at reduced dose  · Holding Zanaflex and Xanax-would likely discontinue on discharge  · Has been having back pain recently, taking her Zanaflex at home, suspect this caused her hypotension  · Orthostatics positive  · Compression stockings added  · Continue IVF

## 2023-01-14 NOTE — ASSESSMENT & PLAN NOTE
· Baseline Cr 1-1 2  · Cr on admission: 2 15, improving with IVF  · Continues to trend towards baseline  · Suspect due to hypotension  · No longer orthostatic, can resume losartan tomorrow and repeat BMP outpatient  · UA with reflex negative  · CT A/P without obstructive uropathy/hydronephrosis   · S/P 2L IVF in ED, continue 100cc/hr Isolyte  · Retention protocol, monitor I/O  · Recheck AM BMP  · Hold losartan, likely hold on discharge given orthostatic hypotension

## 2023-01-14 NOTE — PLAN OF CARE
Problem: PAIN - ADULT  Goal: Verbalizes/displays adequate comfort level or baseline comfort level  Description: Interventions:  - Encourage patient to monitor pain and request assistance  - Assess pain using appropriate pain scale  - Administer analgesics based on type and severity of pain and evaluate response  - Implement non-pharmacological measures as appropriate and evaluate response  - Consider cultural and social influences on pain and pain management  - Notify physician/advanced practitioner if interventions unsuccessful or patient reports new pain  Outcome: Progressing     Problem: INFECTION - ADULT  Goal: Absence or prevention of progression during hospitalization  Description: INTERVENTIONS:  - Assess and monitor for signs and symptoms of infection  - Monitor lab/diagnostic results  - Monitor all insertion sites, i e  indwelling lines, tubes, and drains  - Monitor endotracheal if appropriate and nasal secretions for changes in amount and color  - [place] appropriate cooling/warming therapies per order  - Administer medications as ordered  - Instruct and encourage patient and family to use good hand hygiene technique  - Identify and instruct in appropriate isolation precautions for identified infection/condition  Outcome: Progressing  Goal: Absence of fever/infection during neutropenic period  Description: INTERVENTIONS:  - Monitor WBC    Outcome: Progressing     Problem: Nutrition/Hydration-ADULT  Goal: Nutrient/Hydration intake appropriate for improving, restoring or maintaining nutritional needs  Description: Monitor and assess patient's nutrition/hydration status for malnutrition  Collaborate with interdisciplinary team and initiate plan and interventions as ordered  Monitor patient's weight and dietary intake as ordered or per policy  Utilize nutrition screening tool and intervene as necessary  Determine patient's food preferences and provide high-protein, high-caloric foods as appropriate  INTERVENTIONS:  - Monitor oral intake, urinary output, labs, and treatment plans  - Assess nutrition and hydration status and recommend course of action  - Evaluate amount of meals eaten  - Assist patient with eating if necessary   - Allow adequate time for meals  - Recommend/ encourage appropriate diets, oral nutritional supplements, and vitamin/mineral supplements  - Order, calculate, and assess calorie counts as needed  - Recommend, monitor, and adjust tube feedings and TPN/PPN based on assessed needs  - Assess need for intravenous fluids  - Provide specific nutrition/hydration education as appropriate  - Include patient/family/caregiver in decisions related to nutrition  Outcome: Progressing     Problem: METABOLIC, FLUID AND ELECTROLYTES - ADULT  Goal: Electrolytes maintained within normal limits  Description: INTERVENTIONS:  - Monitor labs and assess patient for signs and symptoms of electrolyte imbalances  - Administer electrolyte replacement as ordered  - Monitor response to electrolyte replacements, including repeat lab results as appropriate  - Instruct patient on fluid and nutrition as appropriate  Outcome: Progressing  Goal: Fluid balance maintained  Description: INTERVENTIONS:  - Monitor labs   - Monitor I/O and WT  - Instruct patient on fluid and nutrition as appropriate  - Assess for signs & symptoms of volume excess or deficit  Outcome: Progressing  Goal: Glucose maintained within target range  Description: INTERVENTIONS:  - Monitor Blood Glucose as ordered  - Assess for signs and symptoms of hyperglycemia and hypoglycemia  - Administer ordered medications to maintain glucose within target range  - Assess nutritional intake and initiate nutrition service referral as needed  Outcome: Progressing

## 2023-01-16 ENCOUNTER — TRANSITIONAL CARE MANAGEMENT (OUTPATIENT)
Dept: FAMILY MEDICINE CLINIC | Facility: HOSPITAL | Age: 68
End: 2023-01-16

## 2023-01-16 ENCOUNTER — APPOINTMENT (OUTPATIENT)
Dept: LAB | Facility: HOSPITAL | Age: 68
End: 2023-01-16

## 2023-01-16 DIAGNOSIS — N17.9 AKI (ACUTE KIDNEY INJURY) (HCC): ICD-10-CM

## 2023-01-16 LAB
ANION GAP SERPL CALCULATED.3IONS-SCNC: 10 MMOL/L (ref 4–13)
BUN SERPL-MCNC: 12 MG/DL (ref 5–25)
CALCIUM SERPL-MCNC: 9.2 MG/DL (ref 8.3–10.1)
CHLORIDE SERPL-SCNC: 109 MMOL/L (ref 96–108)
CO2 SERPL-SCNC: 23 MMOL/L (ref 21–32)
CREAT SERPL-MCNC: 1.49 MG/DL (ref 0.6–1.3)
GFR SERPL CREATININE-BSD FRML MDRD: 35 ML/MIN/1.73SQ M
GLUCOSE SERPL-MCNC: 122 MG/DL (ref 65–140)
POTASSIUM SERPL-SCNC: 4.3 MMOL/L (ref 3.5–5.3)
SODIUM SERPL-SCNC: 142 MMOL/L (ref 135–147)

## 2023-01-17 ENCOUNTER — OFFICE VISIT (OUTPATIENT)
Dept: FAMILY MEDICINE CLINIC | Facility: HOSPITAL | Age: 68
End: 2023-01-17

## 2023-01-17 VITALS
HEIGHT: 65 IN | HEART RATE: 62 BPM | TEMPERATURE: 97.5 F | BODY MASS INDEX: 27.26 KG/M2 | SYSTOLIC BLOOD PRESSURE: 158 MMHG | DIASTOLIC BLOOD PRESSURE: 70 MMHG | WEIGHT: 163.6 LBS

## 2023-01-17 DIAGNOSIS — I10 ESSENTIAL HYPERTENSION: Chronic | ICD-10-CM

## 2023-01-17 DIAGNOSIS — N17.9 AKI (ACUTE KIDNEY INJURY) (HCC): ICD-10-CM

## 2023-01-17 DIAGNOSIS — I60.9 SUBARACHNOID HEMORRHAGE (HCC): ICD-10-CM

## 2023-01-17 DIAGNOSIS — I95.9 HYPOTENSION, UNSPECIFIED HYPOTENSION TYPE: ICD-10-CM

## 2023-01-17 DIAGNOSIS — N18.31 STAGE 3A CHRONIC KIDNEY DISEASE (HCC): Chronic | ICD-10-CM

## 2023-01-17 DIAGNOSIS — K86.2 PANCREATIC CYST: Chronic | ICD-10-CM

## 2023-01-17 DIAGNOSIS — D32.1 MENINGIOMA, SPINAL (HCC): ICD-10-CM

## 2023-01-17 DIAGNOSIS — Z09 HOSPITAL DISCHARGE FOLLOW-UP: Primary | ICD-10-CM

## 2023-01-17 RX ORDER — LOSARTAN POTASSIUM 50 MG/1
50 TABLET ORAL DAILY
Start: 2023-01-17

## 2023-01-17 NOTE — ASSESSMENT & PLAN NOTE
Improved with IVF and holding losartan 50 mg bid, tolerating PO and losartan restarted at 25 mg 1 tab q day - increase to 50 mg q day d/t elevated readings, recheck BMP in 1 wks,  BMP yesterday was back to baseline, keep hydrated, will follow

## 2023-01-17 NOTE — ASSESSMENT & PLAN NOTE
Improved with IVF and holding of losartan (was on 50 mg bid), losartan restarted at lower dose q day ( 25 mg 1 tab PO q day), now BP a bit high again - see below HTN, will follow closely, urged to keep hydrated

## 2023-01-17 NOTE — ASSESSMENT & PLAN NOTE
Importance of getting BP back down with h/o SAH reviewed, increase Losartan from 25 mg q day to 50 mg q day, recheck in 4 wks, call with any Neuro symptoms

## 2023-01-17 NOTE — ASSESSMENT & PLAN NOTE
BP high today and high at home - likely d/t decrease dose in Losartan from 50 mg bid to 25 mg q day - increase to 50 mg q day, check BMP in 1 - 2 wks, recheck BP at home and call if in 2 wks BP still > 140/90, re-eval in office in 4 wks

## 2023-01-17 NOTE — PROGRESS NOTES
Assessment & Plan     1  Hospital discharge follow-up    2  Hypotension, unspecified hypotension type  Assessment & Plan:  Improved with IVF and holding of losartan (was on 50 mg bid), losartan restarted at lower dose q day ( 25 mg 1 tab PO q day), now BP a bit high again - see below HTN, will follow closely, urged to keep hydrated       3  ANGELINE (acute kidney injury) ([facility] [address] )  Assessment & Plan:  Improved with IVF and holding losartan 50 mg bid, tolerating PO and losartan restarted at 25 mg 1 tab q day - increase to 50 mg q day d/t elevated readings, recheck BMP in 1 wks,  BMP yesterday was back to baseline, keep hydrated, will follow    Orders:  -     Basic metabolic panel; Future; Expected date: 01/24/2023    4  Stage 3a chronic kidney disease [facility])  Assessment & Plan:  Lab Results   Component Value Date    EGFR 35 01/16/2023    EGFR 37 01/14/2023    EGFR 28 01/13/2023    CREATININE 1 49 (H) 01/16/2023    CREATININE 1 43 (H) 01/14/2023    CREATININE 1 79 (H) 01/13/2023   Back to baseline with BMP yesterday, repeat BMP in 1-2 wks with increase in Losartan, urged to keep hydrated      5  Essential hypertension  Assessment & Plan:  BP high today and high at home - likely d/t decrease dose in Losartan from 50 mg bid to 25 mg q day - increase to 50 mg q day, check BMP in 1 - 2 wks, recheck BP at home and call if in 2 wks BP still > 140/90, re-eval in office in 4 wks    Orders:  -     losartan (COZAAR) 50 mg tablet; Take 1 tablet (50 mg total) by mouth daily    6  Pancreatic cyst  Assessment & Plan:  Aware of known cyst, repeat MRI Aug 23 - WILL ORDER AT FUTURE APPT      7  Subarachnoid hemorrhage (HCC)  Assessment & Plan:  Importance of getting BP back down with h/o SAH reviewed, increase Losartan from 25 mg q day to 50 mg q day, recheck in 4 wks, call with any Neuro symptoms      8   Meningioma, spinal [facility])  Assessment & Plan:  Saw Neuro 11/22 and has MRI ordered for Nov 23, con't imaging and f/u as per Neuro Colonoscopy 6/22 - 5 yrs    Mammo 2/22 - scheduled for 2023    Dexa 10/20 - osteopenia - scheduled for 2023    BW 10/22    CT lung CA screening 2/22 - scheduled      Subjective     Transitional Care Management Review:   Manisha Saini is a 69 y o  female here for TCM follow up  She was admitted to [address] [place] [address] from 1/12/23 to 1/14/23  Records were reviewed by myself in detail and events are summarized below  During the TCM phone call patient stated:  TCM Call     Date and time call was made  1/16/2023 10:55 AM    Hospital care reviewed  Records reviewed    Patient was hospitial[facility] at  [facility]    Date of Admission  01/12/23    Date of discharge  01/14/23    Diagnosis  ANGELINE    Disposition  Home    Were the patients medications reviewed and updated  No    Current Symptoms  None      TCM Call     Post hospital issues  None    Should patient be enrolled in anticoag monitoring? No    Scheduled for follow up? Yes    Did you obtain your prescribed medications  Yes    Do you need help managing your prescriptions or medications  No    Is transportation to your appointment needed  No    I have advised the patient to call PCP with any new or worsening symptoms  Jairon Reyna MA    [address]  or Zees[address] yeung    [facility] you have social support  Yes, as much as I need    Are you recieving any outpatient services  No    Are you recieving home care services  No    Are you using any community resources  No    Current waiver services  No    Have you fallen in the last 12 months  Yes    Interperter language line needed  No    Counseling  Patient        HPI Pt presented to [address] [place] [address] ED on 1/12/23 with c/o low BP at home  At home pt was reporting BP readings as low as 70 systolic  She was also c/o back pain  In the ED BP was 95/50 and rest of vitals were nml  Exam notable for some tenderness of lumbar spine but no other focal abnormality present  Work up in ED notable for BUN/Cr 28/2  15  Rest of CMP/CBC/troponin and UA were normal   CXR showed severe emphysema with B/L scar  CT L spine showed chronic disc height loss at L5-S1 and multilevel DDD but no acute abnormality  CT A/P showed subcentemeter pancreatic head hypodensities  F/u with MRI q 2 yrs x 5 recommended  She was noted to have a FB in L ear - hearing aid tip removed with alligator forceps  Pt was admitted for ANGELINE and hypotension  Her losartan was held and IVF given  Was recommended she avoid Xanaflex and Xanax as they can contribute to low BP  She notes neither med is new but she was not using Xanax at time of event and was only using half tab of the Xanaflex  She states she was eating/drinking like normal and denied N/V/D or decrease PO intake  PT/OT evaluated pt and no acute therapy needs were identified  Known pancreatic cysts were recommended to be f/u with MRI in April of 23  BP improved and renal function returned to nml  BUN/Cr on 1/14/23 was 18/1  43  Pt was discharged home on 1/14/23  BP was 175/89 on am of discharge  She was given an order to repeat BMP as OP  Med list reviewed    Pt has been doing well since returning home  She is taking her BP medication (losartan and Coreg) as directed  She denies missing doses forgetting to take the meds  She has been checking her BP at home and readings brought in and reviewed (186/92 this am) and is ranging 170-180's/80's with lowest 163/82  She did her f/u BMP 1/16/23 and BUN/Cr 12/1  49  Pt has MRI of T-spine for f/u thoracic meningioma by Neuro scheduled for Nov 23  Neuro recommended con't HTN tx for tx of SAH  Review of Systems   Constitutional: Positive for fatigue  Negative for chills and fever  HENT: Positive for congestion  Negative for sore throat  Eyes: Negative for pain and visual disturbance  Respiratory: Positive for cough  Negative for shortness of breath and wheezing      Cardiovascular: Negative for chest pain, palpitations and leg swelling  Gastrointestinal: Negative for abdominal pain, constipation, diarrhea, nausea and vomiting  Genitourinary: Negative for difficulty urinating and dysuria  Musculoskeletal: Positive for back pain  Negative for neck pain  Skin: Negative for rash and wound  Neurological: Positive for headaches  Negative for dizziness and light-headedness  Psychiatric/Behavioral: Negative for dysphoric mood  Objective     /70   Pulse 62   Temp 97 5 °F (36 4 °C) (Tympanic)   Ht 5' 5" (1 651 m)   Wt 74 2 kg (163 lb 9 6 oz)   BMI 27 22 kg/m²      Physical Exam  Vitals and nursing note reviewed  Constitutional:       General: She is not in acute distress  Appearance: She is well-developed  She is not ill-appearing  HENT:      Head: Normocephalic and atraumatic  Right Ear: Tympanic membrane and external ear normal       Left Ear: Tympanic membrane and external ear normal       Mouth/Throat:      Mouth: Mucous membranes are moist       Pharynx: Oropharynx is clear  No oropharyngeal exudate  Eyes:      General:         Right eye: No discharge  Left eye: No discharge  Extraocular Movements: Extraocular movements intact  Conjunctiva/sclera: Conjunctivae normal       Pupils: Pupils are equal, round, and reactive to light  Neck:      Thyroid: No thyromegaly  Trachea: No tracheal deviation  Cardiovascular:      Rate and Rhythm: Normal rate and regular rhythm  Heart sounds: Normal heart sounds  No murmur heard  Pulmonary:      Effort: Pulmonary effort is normal  No respiratory distress  Breath sounds: Normal breath sounds  No wheezing, rhonchi or rales  Abdominal:      General: There is no distension  Palpations: Abdomen is soft  Tenderness: There is no abdominal tenderness  There is no guarding or rebound  Musculoskeletal:         General: No deformity or signs of injury  Cervical back: Neck supple        Comments: 5/5 global muscle strength Lymphadenopathy:      Cervical: No cervical adenopathy  Skin:     General: Skin is warm and dry  Coloration: Skin is not pale  Findings: No rash  Neurological:      General: No focal deficit present  Mental Status: She is alert  Cranial Nerves: No cranial nerve deficit  Sensory: No sensory deficit  Motor: No weakness or abnormal muscle tone  Coordination: Coordination normal       Gait: Gait normal       Deep Tendon Reflexes: Reflexes normal       Comments: CN II-XII intact, sensation intact to light touch B/L LE, 5/5 global muscle strength, 2/4 patellar reflexes B/L LE, nml FN and HS   Psychiatric:         Mood and Affect: Mood normal          Behavior: Behavior normal          Thought Content:  Thought content normal          Judgment: Judgment normal        Medications have been reviewed by provider in current encounter    Loren Mancia DO

## 2023-01-17 NOTE — ASSESSMENT & PLAN NOTE
Lab Results   Component Value Date    EGFR 35 01/16/2023    EGFR 37 01/14/2023    EGFR 28 01/13/2023    CREATININE 1 49 (H) 01/16/2023    CREATININE 1 43 (H) 01/14/2023    CREATININE 1 79 (H) 01/13/2023   Back to baseline with BMP yesterday, repeat BMP in 1-2 wks with increase in Losartan, urged to keep hydrated

## 2023-01-22 DIAGNOSIS — I20.9 ANGINAL PAIN (HCC): ICD-10-CM

## 2023-01-22 DIAGNOSIS — E78.5 DYSLIPIDEMIA: ICD-10-CM

## 2023-01-23 RX ORDER — ATORVASTATIN CALCIUM 80 MG/1
TABLET, FILM COATED ORAL
Qty: 90 TABLET | Refills: 0 | Status: SHIPPED | OUTPATIENT
Start: 2023-01-23

## 2023-01-23 RX ORDER — ISOSORBIDE MONONITRATE 30 MG/1
TABLET, EXTENDED RELEASE ORAL
Qty: 90 TABLET | Refills: 0 | Status: SHIPPED | OUTPATIENT
Start: 2023-01-23

## 2023-01-26 ENCOUNTER — APPOINTMENT (OUTPATIENT)
Dept: LAB | Facility: HOSPITAL | Age: 68
End: 2023-01-26

## 2023-01-26 DIAGNOSIS — N17.9 AKI (ACUTE KIDNEY INJURY) (HCC): ICD-10-CM

## 2023-01-26 LAB
ANION GAP SERPL CALCULATED.3IONS-SCNC: 9 MMOL/L (ref 4–13)
BUN SERPL-MCNC: 21 MG/DL (ref 5–25)
CALCIUM SERPL-MCNC: 9.1 MG/DL (ref 8.3–10.1)
CHLORIDE SERPL-SCNC: 103 MMOL/L (ref 96–108)
CO2 SERPL-SCNC: 27 MMOL/L (ref 21–32)
CREAT SERPL-MCNC: 1.54 MG/DL (ref 0.6–1.3)
GFR SERPL CREATININE-BSD FRML MDRD: 34 ML/MIN/1.73SQ M
GLUCOSE P FAST SERPL-MCNC: 106 MG/DL (ref 65–99)
POTASSIUM SERPL-SCNC: 4.6 MMOL/L (ref 3.5–5.3)
SODIUM SERPL-SCNC: 139 MMOL/L (ref 135–147)

## 2023-02-05 DIAGNOSIS — G89.4 CHRONIC PAIN SYNDROME: ICD-10-CM

## 2023-02-06 RX ORDER — GABAPENTIN 400 MG/1
CAPSULE ORAL
Qty: 360 CAPSULE | Refills: 0 | Status: SHIPPED | OUTPATIENT
Start: 2023-02-06

## 2023-02-09 ENCOUNTER — TELEPHONE (OUTPATIENT)
Dept: FAMILY MEDICINE CLINIC | Facility: HOSPITAL | Age: 68
End: 2023-02-09

## 2023-02-09 NOTE — TELEPHONE ENCOUNTER
Patient states she has had a lot of chest congestion and coughing since mid-January  Had an old script for Tessalon Pearls that she has been using periodically but only helps with the cough   Has taken a negative COVID test  Scheduled w/ Dr Nikolas Christianson in the AM

## 2023-02-09 NOTE — TELEPHONE ENCOUNTER
Pt states she has had a cold since mid January  Has a bad cough that keeps her up at night, no other symptoms but asking for something to help with the cough that she can take with bp issues   PCB

## 2023-02-10 ENCOUNTER — OFFICE VISIT (OUTPATIENT)
Dept: FAMILY MEDICINE CLINIC | Facility: HOSPITAL | Age: 68
End: 2023-02-10

## 2023-02-10 VITALS
WEIGHT: 162.8 LBS | HEART RATE: 82 BPM | DIASTOLIC BLOOD PRESSURE: 70 MMHG | HEIGHT: 65 IN | SYSTOLIC BLOOD PRESSURE: 132 MMHG | TEMPERATURE: 97.7 F | BODY MASS INDEX: 27.12 KG/M2 | OXYGEN SATURATION: 92 %

## 2023-02-10 DIAGNOSIS — G47.10 HYPERSOMNIA, UNSPECIFIED: ICD-10-CM

## 2023-02-10 DIAGNOSIS — R06.83 SNORING: ICD-10-CM

## 2023-02-10 DIAGNOSIS — I10 ESSENTIAL HYPERTENSION: Chronic | ICD-10-CM

## 2023-02-10 DIAGNOSIS — J44.1 CHRONIC OBSTRUCTIVE PULMONARY DISEASE WITH ACUTE EXACERBATION (HCC): ICD-10-CM

## 2023-02-10 DIAGNOSIS — R05.1 ACUTE COUGH: Primary | ICD-10-CM

## 2023-02-10 DIAGNOSIS — R40.0 DAYTIME SOMNOLENCE: ICD-10-CM

## 2023-02-10 PROBLEM — N17.9 AKI (ACUTE KIDNEY INJURY) (HCC): Status: RESOLVED | Noted: 2023-01-12 | Resolved: 2023-02-10

## 2023-02-10 RX ORDER — BENZONATATE 100 MG/1
100 CAPSULE ORAL 3 TIMES DAILY PRN
Qty: 30 CAPSULE | Refills: 0 | Status: SHIPPED | OUTPATIENT
Start: 2023-02-10

## 2023-02-10 RX ORDER — PREDNISONE 10 MG/1
TABLET ORAL
Qty: 24 TABLET | Refills: 0 | Status: SHIPPED | OUTPATIENT
Start: 2023-02-10

## 2023-02-10 RX ORDER — CEFUROXIME AXETIL 500 MG/1
500 TABLET ORAL EVERY 12 HOURS SCHEDULED
Qty: 14 TABLET | Refills: 0 | Status: SHIPPED | OUTPATIENT
Start: 2023-02-10 | End: 2023-02-17

## 2023-02-10 NOTE — PROGRESS NOTES
Name: Carolyn Garcia      : 1955      MRN: [id number]  Encounter Provider: Olena Gillis DO  Encounter Date: 2/10/2023   Encounter department: [facility] [address] [facility]  Acute cough  Comments:  Likely related to persistent resp infection with COPD exacerbation - con't Tessalon Perles, start Ceftin and Prednisone, watch BP closely, to ED with new/worse symptoms  Orders:  -     benzonatate (TESSALON PERLES) 100 mg capsule; Take 1 capsule (100 mg total) by mouth 3 (three) times a day as needed for cough  -     cefuroxime (CEFTIN) 500 mg tablet; Take 1 tablet (500 mg total) by mouth every 12 (twelve) hours for 7 days  -     predniSONE 10 mg tablet; 3 tab PO q day x 4 days then 2 tab PO q day x 4 days then 1 tab PO q day x 4 days then stop    2  Chronic obstructive pulmonary disease with acute exacerbation (HCC)  Comments:  Start Prednison cautiously at low dose d/t h/o ICH with last steroids, watch BP closely - if BP > 160/90 stop Prednisone, con't Trelegy, to ED with new/worse symptoms  Orders:  -     cefuroxime (CEFTIN) 500 mg tablet; Take 1 tablet (500 mg total) by mouth every 12 (twelve) hours for 7 days  -     predniSONE 10 mg tablet; 3 tab PO q day x 4 days then 2 tab PO q day x 4 days then 1 tab PO q day x 4 days then stop    3  Essential hypertension  Comments:  BP great today and even lower by end of appt, pt reporting home numbers high, encouraged not to take BP at home till an hour after BP meds and hour after caffeine - call if readings > 160/90  Orders:  -     Home Study; Future    4  Snoring  Comments:  Needs sleep study - order placed and SE of untx GER reviewed, will follow  Orders:  -     Home Study; Future    5  Daytime somnolence  Comments:  Needs sleep study - order placed and SE of untx GER reviewed, will follow  Orders:  -     Home Study; Future    6   Hypersomnia, unspecified  Comments:  Needs sleep study - order placed and SE of untx GER reviewed, will follow  Orders:  -     Home Study; Future    Colonoscopy 6/22 - 5 yrs     Mammo 2/22 - scheduled for 2023    Dexa 10/20 - osteopenia - scheduled for 2023    BW 10/22     CT lung CA screening 2/22 - scheduled for 2023       Subjective      HPI Pt here for an acute visit    Pt noting a cough for approx 3-4 wks  She has had some runny nose and congestion with it  She notes no ST/ear pain/F/C/N/V/D/rashes  She notes no wheezing but has had some SOB/coughing fits for which she had to use her rescue inhaler  She is using rescue inhaler approx 1-2 x's a day  She is on Trelegy for her daily inhaler  She does not smoke and no one smokes in the house  She is using [employer]    She has noted home BP readings are fluctuating  She is ranging from 140's to 170's/70's  She states her average home BP is 150's/70's  She has had some "grumpiness" with the BP being high  She has had some am headaches  She notes some HA's through out the day  She is not sure if they are related to coughing or just chronic daily HA's  She thinks she snores  She is not aware of any apnea  She does not wake feeling rested  She notes no issues with concentration or memory  She does not add salt to her diet and is avoiding higher sodium foods  Review of Systems   Constitutional: Positive for fatigue  Negative for chills and fever  HENT: Positive for congestion and hearing loss  Negative for ear pain and sore throat  Eyes: Negative for discharge, redness and itching  Respiratory: Positive for cough and shortness of breath  Negative for wheezing  Cardiovascular: Negative for chest pain and palpitations  Gastrointestinal: Negative for abdominal pain, diarrhea and nausea  Skin: Negative for rash and wound  Neurological: Positive for headaches  Negative for dizziness and light-headedness  Hematological: Does not bruise/bleed easily         Current Outpatient Medications on File Prior to Visit   Medication Sig   • albuterol (ProAir HFA) 90 mcg/act inhaler Inhale 2 puffs every 4 (four) hours as needed for wheezing or shortness of breath   • atorvastatin (LIPITOR) 80 mg tablet Take 1 tablet by mouth once daily   • calcium carbonate (OS-FLASH) 600 MG tablet Take 600 mg by mouth 2 (two) times a day with meals   • carvedilol (COREG) 12 5 mg tablet Take 1 tablet (12 5 mg total) by mouth 2 (two) times a day   • cholecalciferol (VITAMIN D3) 1,000 units tablet Take 1,000 Units by mouth daily   • clopidogrel (Plavix) 75 mg tablet Take 1 tablet (75 mg total) by mouth daily   • escitalopram (LEXAPRO) 20 mg tablet Take 1/2 (one-half) tablet by mouth once daily   • gabapentin (NEURONTIN) 400 mg capsule TAKE 2 CAPSULES BY MOUTH TWICE DAILY AS DIRECTED   • isosorbide mononitrate (IMDUR) 30 mg 24 hr tablet Take 1 tablet by mouth once daily   • losartan (COZAAR) 50 mg tablet Take 1 tablet (50 mg total) by mouth daily   • pantoprazole (PROTONIX) 40 mg tablet Take 1 tablet (40 mg total) by mouth daily before breakfast   • Trelegy Ellipta 100-62 5-25 MCG/ACT inhaler INHALE 1 PUFF BY MOUTH ONCE DAILY RINSE MOUTH AFTER USE   • [DISCONTINUED] dicyclomine (BENTYL) 10 mg capsule TAKE 1 CAPSULE BY MOUTH 4 TIMES DAILY BEFORE MEAL(S) AND AT BEDTIME   • [DISCONTINUED] famotidine (PEPCID) 40 MG tablet Take 1 tablet (40 mg total) by mouth daily at bedtime   • [DISCONTINUED] fluticasone (FLONASE) 50 mcg/act nasal spray 2 sprays into each nostril daily   • [DISCONTINUED] multivitamin (THERAGRAN) TABS Take 1 tablet by mouth daily       Objective     /70   Pulse 82   Temp 97 7 °F (36 5 °C) (Tympanic)   Ht 5' 5" (1 651 m)   Wt 73 8 kg (162 lb 12 8 oz)   SpO2 92%   BMI 27 09 kg/m²     Physical Exam  Vitals and nursing note reviewed  Constitutional:       General: She is not in acute distress  Appearance: She is well-developed  She is not ill-appearing  HENT:      Head: Normocephalic and atraumatic        Right Ear: Tympanic membrane and external ear normal  There is no impacted cerumen  Left Ear: Tympanic membrane and external ear normal  There is no impacted cerumen  Ears:      Comments: B/L hearing aids removed for exam  Eyes:      General:         Right eye: No discharge  Left eye: No discharge  Conjunctiva/sclera: Conjunctivae normal    Neck:      Trachea: No tracheal deviation  Cardiovascular:      Rate and Rhythm: Normal rate and regular rhythm  Heart sounds: Normal heart sounds  No murmur heard  No friction rub  Pulmonary:      Effort: Pulmonary effort is normal  No respiratory distress  Breath sounds: Rhonchi present  No wheezing or rales  Comments: RUL rhonchi, clears after coughing  Musculoskeletal:      Cervical back: Neck supple  Lymphadenopathy:      Cervical: No cervical adenopathy  Skin:     General: Skin is warm  Coloration: Skin is not pale  Findings: No rash  Neurological:      General: No focal deficit present  Mental Status: She is alert  Mental status is at baseline  Motor: No abnormal muscle tone  Psychiatric:         Behavior: Behavior normal          Thought Content:  Thought content normal          Judgment: Judgment normal        Gavino Campos DO

## 2023-02-12 ENCOUNTER — RA CDI HCC (OUTPATIENT)
Dept: OTHER | Facility: HOSPITAL | Age: 68
End: 2023-02-12

## 2023-02-12 NOTE — PROGRESS NOTES
Q07 00 for 2023  [facility] 75  coding opportunities          Chart Reviewed number of suggestions sent to Provider: 1     Patients Insurance     Medicare Insurance: [employer]

## 2023-02-14 ENCOUNTER — HOSPITAL ENCOUNTER (OUTPATIENT)
Dept: CT IMAGING | Facility: HOSPITAL | Age: 68
Discharge: HOME | End: 2023-02-14

## 2023-02-14 DIAGNOSIS — Z87.891 PERSONAL HISTORY OF NICOTINE DEPENDENCE: ICD-10-CM

## 2023-02-14 DIAGNOSIS — Z12.2 SCREENING FOR LUNG CANCER: ICD-10-CM

## 2023-02-17 ENCOUNTER — HOSPITAL ENCOUNTER (OUTPATIENT)
Dept: NON INVASIVE DIAGNOSTICS | Facility: HOSPITAL | Age: 68
Discharge: HOME | End: 2023-02-17

## 2023-02-17 DIAGNOSIS — R29.898 WEAKNESS OF BOTH LOWER EXTREMITIES: ICD-10-CM

## 2023-02-19 DIAGNOSIS — I25.10 CORONARY ARTERY DISEASE INVOLVING NATIVE CORONARY ARTERY OF NATIVE HEART WITHOUT ANGINA PECTORIS: ICD-10-CM

## 2023-02-20 ENCOUNTER — OFFICE VISIT (OUTPATIENT)
Dept: FAMILY MEDICINE CLINIC | Facility: HOSPITAL | Age: 68
End: 2023-02-20

## 2023-02-20 VITALS
TEMPERATURE: 97.1 F | WEIGHT: 167.2 LBS | SYSTOLIC BLOOD PRESSURE: 152 MMHG | HEART RATE: 79 BPM | BODY MASS INDEX: 27.86 KG/M2 | OXYGEN SATURATION: 94 % | HEIGHT: 65 IN | DIASTOLIC BLOOD PRESSURE: 90 MMHG

## 2023-02-20 DIAGNOSIS — I25.10 CORONARY ARTERY DISEASE INVOLVING NATIVE CORONARY ARTERY OF NATIVE HEART WITHOUT ANGINA PECTORIS: ICD-10-CM

## 2023-02-20 DIAGNOSIS — R05.1 ACUTE COUGH: Primary | ICD-10-CM

## 2023-02-20 DIAGNOSIS — J43.8 OTHER EMPHYSEMA (HCC): Chronic | ICD-10-CM

## 2023-02-20 DIAGNOSIS — I10 ESSENTIAL HYPERTENSION: Chronic | ICD-10-CM

## 2023-02-20 RX ORDER — CLOPIDOGREL BISULFATE 75 MG/1
75 TABLET ORAL DAILY
Qty: 90 TABLET | Refills: 2 | Status: SHIPPED | OUTPATIENT
Start: 2023-02-20

## 2023-02-20 RX ORDER — CLOPIDOGREL BISULFATE 75 MG/1
TABLET ORAL
Qty: 90 TABLET | Refills: 0 | Status: SHIPPED | OUTPATIENT
Start: 2023-02-20 | End: 2023-02-20 | Stop reason: SDUPTHER

## 2023-02-20 RX ORDER — LOSARTAN POTASSIUM 50 MG/1
50 TABLET ORAL DAILY
Qty: 90 TABLET | Refills: 2 | Status: SHIPPED | OUTPATIENT
Start: 2023-02-20

## 2023-02-20 NOTE — PROGRESS NOTES
Name: Laurie Leyva      : 1955      MRN: [id number]  Encounter Provider: Ronan Solorio DO  Encounter Date: 2023   Encounter department: [facility] [place] 2[facility] [address] [facility]  Acute cough  Comments:  Resolved with abx and Prednisone, finish steroid taper as directed today, call with relapse in symptoms    2  Emphysema  Assessment & Plan:  Back to baseline, finish steroid taper the next 3 days and call with any relapse in symptoms, con't daily Trelegy and prn rescue inhaler      3  Essential hypertension  Assessment & Plan: Bp up as expected with Prednisone, resp status back to baseline, finish next 3 days of Prednisone (only do 1/2 tab Tue and Wed) and will start Losartan 50 mg 2 tab today and take the higher dose through Friday, on Sat/Sun/Mon go back to Losartan 50 mg 1 tab daily - call Tue if has BP > 140/90 on 50 mg 1 tab daily and will go back to 100 mg daily and send a new rx, call with Neuro symptoms in interm    Orders:  -     losartan (COZAAR) 50 mg tablet; Take 1 tablet (50 mg total) by mouth daily    4  Coronary artery disease involving native coronary artery of native heart without angina pectoris  Comments:  Plavix refilled today upon request, call with CV symptoms  Orders:  -     clopidogrel (PLAVIX) 75 mg tablet; Take 1 tablet (75 mg total) by mouth daily      Colonoscopy  - 5 yrs     Mammo  - scheduled for     Dexa 10/20 - osteopenia - scheduled for     BW 10/22     CT lung CA screening       Subjective      HPI Pt here for a BP check    Last visit 2/10/22 pt was noting persistent cough and was started on Prednisone and Cefuroxime for acute resp infection with COPD exacerbation  It was stressed to check BP frequently while on steroid d/t h/o elevated BP and resulting SAH on steroid taper in Aug 22  BP above goal today and meds were reviewed and no changes have occurred  She denies missing doses of meds or SE with the meds  She does check her BP outside the office and has been getting average home BP readings in the 170's/90's with a spike of SBP in the 200's this weekend  She "can feel when its high" and feels "grumpy and angry" when it is up  She notes no frequent HA's/dizziness/double vision/CP/speech changes  She notes her cough and breathing is much better  She notes no F/C/wheezing/SOB and has no further cough  She is currently on 10 mg 1 tab daily - has that for 3 more days  Review of Systems   Constitutional: Negative for chills, fever and unexpected weight change  HENT: Negative for congestion and sore throat  Eyes: Negative for pain and visual disturbance  Respiratory: Negative for cough, shortness of breath and wheezing  Cardiovascular: Positive for leg swelling  Negative for chest pain and palpitations  Gastrointestinal: Negative for abdominal pain, diarrhea, nausea and vomiting  Musculoskeletal: Positive for neck pain  Negative for back pain  Skin: Negative for rash and wound  Neurological: Negative for dizziness, light-headedness and headaches     Psychiatric/Behavioral:        Irritable and grumpy when BP up       Current Outpatient Medications on File Prior to Visit   Medication Sig   • albuterol (ProAir HFA) 90 mcg/act inhaler Inhale 2 puffs every 4 (four) hours as needed for wheezing or shortness of breath   • atorvastatin (LIPITOR) 80 mg tablet Take 1 tablet by mouth once daily   • calcium carbonate (OS-FLASH) 600 MG tablet Take 600 mg by mouth 2 (two) times a day with meals   • carvedilol (COREG) 12 5 mg tablet Take 1 tablet (12 5 mg total) by mouth 2 (two) times a day   • cholecalciferol (VITAMIN D3) 1,000 units tablet Take 1,000 Units by mouth daily   • escitalopram (LEXAPRO) 20 mg tablet Take 1/2 (one-half) tablet by mouth once daily   • gabapentin (NEURONTIN) 400 mg capsule TAKE 2 CAPSULES BY MOUTH TWICE DAILY AS DIRECTED   • isosorbide mononitrate (IMDUR) 30 mg 24 hr tablet Take 1 tablet by mouth once daily   • pantoprazole (PROTONIX) 40 mg tablet Take 1 tablet (40 mg total) by mouth daily before breakfast   • predniSONE 10 mg tablet 3 tab PO q day x 4 days then 2 tab PO q day x 4 days then 1 tab PO q day x 4 days then stop   • Trelegy Ellipta 100-62 5-25 MCG/ACT inhaler INHALE 1 PUFF BY MOUTH ONCE DAILY RINSE MOUTH AFTER USE   • [DISCONTINUED] clopidogrel (PLAVIX) 75 mg tablet Take 1 tablet by mouth once daily   • [DISCONTINUED] losartan (COZAAR) 50 mg tablet Take 1 tablet (50 mg total) by mouth daily   • [DISCONTINUED] benzonatate (TESSALON PERLES) 100 mg capsule Take 1 capsule (100 mg total) by mouth 3 (three) times a day as needed for cough (Patient not taking: Reported on 2/20/2023)   • [DISCONTINUED] clopidogrel (Plavix) 75 mg tablet Take 1 tablet (75 mg total) by mouth daily   • [DISCONTINUED] dicyclomine (BENTYL) 10 mg capsule TAKE 1 CAPSULE BY MOUTH 4 TIMES DAILY BEFORE MEAL(S) AND AT BEDTIME   • [DISCONTINUED] famotidine (PEPCID) 40 MG tablet Take 1 tablet (40 mg total) by mouth daily at bedtime   • [DISCONTINUED] fluticasone (FLONASE) 50 mcg/act nasal spray 2 sprays into each nostril daily   • [DISCONTINUED] multivitamin (THERAGRAN) TABS Take 1 tablet by mouth daily       Objective     /90 (Patient Position: Sitting, Cuff Size: Large)   Pulse 79   Temp (!) 97 1 °F (36 2 °C) (Tympanic)   Ht 5' 5" (1 651 m)   Wt 75 8 kg (167 lb 3 2 oz)   SpO2 94%   BMI 27 82 kg/m²     Physical Exam  Nellie Lira DO

## 2023-02-20 NOTE — ASSESSMENT & PLAN NOTE
Bp up as expected with Prednisone, resp status back to baseline, finish next 3 days of Prednisone (only do 1/2 tab Tue and Wed) and will start Losartan 50 mg 2 tab today and take the higher dose through Friday, on Sat/Sun/Mon go back to Losartan 50 mg 1 tab daily - call Tue if has BP > 140/90 on 50 mg 1 tab daily and will go back to 100 mg daily and send a new rx, call with Neuro symptoms in interm

## 2023-02-20 NOTE — ASSESSMENT & PLAN NOTE
Back to baseline, finish steroid taper the next 3 days and call with any relapse in symptoms, con't daily Trelegy and prn rescue inhaler

## 2023-02-20 NOTE — TELEPHONE ENCOUNTER
Requested medication(s) are due for refill today: Yes  Patient has already received a courtesy refill: No  Other reason request has been forwarded to provider: [place] patient

## 2023-02-21 ENCOUNTER — HOSPITAL ENCOUNTER (OUTPATIENT)
Dept: BONE DENSITY | Facility: CLINIC | Age: 68
Discharge: HOME | End: 2023-02-21

## 2023-02-21 ENCOUNTER — HOSPITAL ENCOUNTER (OUTPATIENT)
Dept: MAMMOGRAPHY | Facility: CLINIC | Age: 68
Discharge: HOME | End: 2023-02-21

## 2023-02-21 VITALS — BODY MASS INDEX: 27.82 KG/M2 | HEIGHT: 65 IN | WEIGHT: 167 LBS

## 2023-02-21 DIAGNOSIS — Z12.31 ENCOUNTER FOR SCREENING MAMMOGRAM FOR MALIGNANT NEOPLASM OF BREAST: ICD-10-CM

## 2023-02-21 DIAGNOSIS — E28.39 MENOPAUSE OVARIAN FAILURE: ICD-10-CM

## 2023-03-03 ENCOUNTER — TELEPHONE (OUTPATIENT)
Dept: FAMILY MEDICINE CLINIC | Facility: HOSPITAL | Age: 68
End: 2023-03-03

## 2023-03-03 NOTE — TELEPHONE ENCOUNTER
Pt left  stating she was to call in with the average BP reading since going off steroids  Average is 146/82   PCB

## 2023-03-05 DIAGNOSIS — F32.A DEPRESSION, UNSPECIFIED DEPRESSION TYPE: ICD-10-CM

## 2023-03-05 RX ORDER — ESCITALOPRAM OXALATE 20 MG/1
TABLET ORAL
Qty: 30 TABLET | Refills: 0 | Status: SHIPPED | OUTPATIENT
Start: 2023-03-05

## 2023-03-06 DIAGNOSIS — I10 ESSENTIAL HYPERTENSION: Chronic | ICD-10-CM

## 2023-03-06 RX ORDER — LOSARTAN POTASSIUM 50 MG/1
100 TABLET ORAL DAILY
Qty: 90 TABLET | Refills: 1 | Status: SHIPPED | OUTPATIENT
Start: 2023-03-06 | End: 2023-04-14 | Stop reason: SDUPTHER

## 2023-03-24 ENCOUNTER — TELEPHONE (OUTPATIENT)
Dept: SLEEP CENTER | Facility: CLINIC | Age: 68
End: 2023-03-24

## 2023-03-24 NOTE — TELEPHONE ENCOUNTER
----- Message from Patric Wyatt MD sent at 3/23/2023  2:30 PM EDT -----  Approved    ----- Message -----  From: Zaira Donovan  Sent: 3/23/2023  10:48 AM EDT  To: Sleep Medicine [facility] Provider    This home sleep study needs approval      If approved please sign and return to clerical pool  If denied please include reasons why  Also provide alternative testing if warranted  Please sign and return to clerical pool

## 2023-04-03 ENCOUNTER — OFFICE VISIT (OUTPATIENT)
Dept: NEUROLOGY | Facility: CLINIC | Age: 68
End: 2023-04-03

## 2023-04-03 VITALS
HEIGHT: 65 IN | DIASTOLIC BLOOD PRESSURE: 60 MMHG | WEIGHT: 165 LBS | SYSTOLIC BLOOD PRESSURE: 110 MMHG | BODY MASS INDEX: 27.49 KG/M2 | OXYGEN SATURATION: 94 % | TEMPERATURE: 97 F | HEART RATE: 64 BPM

## 2023-04-03 DIAGNOSIS — Z86.79 HISTORY OF SUBARACHNOID HEMORRHAGE: ICD-10-CM

## 2023-04-03 DIAGNOSIS — I65.23 CAROTID STENOSIS, BILATERAL: Primary | ICD-10-CM

## 2023-04-03 DIAGNOSIS — E78.5 DYSLIPIDEMIA: Chronic | ICD-10-CM

## 2023-04-03 NOTE — PROGRESS NOTES
Patient ID: Salas Morel is a 69 y o  female  Assessment/Plan:    History of subarachnoid hemorrhage  Salas Morel is a 69year old female with a hx (8/28/22) of right parietal IPH/SAH, thought to be hypertensive in etiology  She continues on Plavix 75 mg and Atorvastatin 80 mg for her distal right cervical ICA severe stenosis, mild to moderate atherosclerosis of bilateral ICA, and moderate to severe stenosis of the left vertebral artery  She denies any new or worsening symptoms concerning for TIA, stroke or hemorrhage  Her vascular risk factors overall are well controlled, with the exception that she has recently had an increase in her LDL, now no longer at goal of <70  Her blood pressure also continues to be extremely liable, she reports Bps as low as 74/50 and as high as 170-200/90's  She continues to work with her PCP and Cardiology on this  We reviewed her stroke risk factors and management of the same  She was provided stroke education and we reviewed stroke warning signs/symptoms and reasons to return by ambulance to the ER  Plan:    - Continue with good blood pressure control; I would recommend monitoring at home at least 3 times per week; Goal of <130/80  - Continue with good cholesterol control; Goal LDL <70; repeat Lipid panel  - Continue with good blood sugar control; Goal HgbA1c <7 0  - Will defer monitoring of cholesterol and blood sugar and management of hypertensive medications to the primary care provider  - Stay well hydrated by drinking enough water   - Complete carotid ultrasound; ordered today to follow up on ICA stenosis  - Eat a healthy diet, high in lean meats fish, turkey, chicken  Low in fats, cholesterol, sugars and sodium  Avoid canned foods,  get lots of fresh/frozen vegetables/fruits    - Get routine exercise/physical activity as much as able to tolerate  - Keep follow ups with your other health care providers  - Continue Plavix 75 mg daily and Atorvastatin 80 mg      I will plan for her to return to the office in 8 monhs  time but would be happy to see her sooner if the need should arise  If she has any symptoms concerning for TIA or stroke including sudden painless loss of vision or double vision, difficulty speaking or swallowing, vertigo/room spinning that does not quickly resolve, or weakness/numbness affecting 1 side of the face or body she should proceed by ambulance to the nearest emergency room immediately  Dyslipidemia  10/24/22 Lipid panel total 185, triglycerides 182, HDL 56, LDL 93    On Atorvastatin 80 mg daily  Goal LDL <70  She was previously at goal  She denies any change in diet or physical activity  Will have her repeat her Lipid panel now     Carotid stenosis, bilateral  CTA H/N 8/28/22- Short segment of severe stenosis noted at the distal right cervical ICA segment  Mild to moderate atherosclerotic plaquing of both carotid bulbs causing less than 50% stenosis by NASCET criteria  Moderate to severe stenosis at the origin of the left vertebral artery  Dominant right vertebral artery  Will repeat carotid doppler study now to assess for ICA stenosis  Diagnoses and all orders for this visit:    Carotid stenosis, bilateral  -     VAS carotid complete study; Future    Dyslipidemia  -     Lipid Panel with Direct LDL reflex; Future    History of subarachnoid hemorrhage         I have spent a total time of 40 minutes on 04/03/23 in caring for this patient including Diagnostic results, Prognosis, Risks and benefits of tx options, Instructions for management, Patient and family education, Importance of tx compliance, Risk factor reductions, Impressions, Counseling / Coordination of care, Documenting in the medical record, Reviewing / ordering tests, medicine, procedures   and Obtaining or reviewing history          Subjective:    ELAINE Morel is a 69year old female with Chiari malformation s/p decompression MB 0435, U74 meningioma, HTN, CAD, HLD, GERD, CKD, takotsubo syndrome, ICA stenosis on Plavix and former smoker who initially presented to the ED on 8/28 with hypertension (241/110 in the ED) and severe headache and was found to have a R parietal IPH/SAH  Etiology of SAH unclear, though may be secondary to extreme isolated hypertension as CTA was unrevealing  Repeat CTH revealed resolved SAH and she restarted Plavix and has continued on 75 mg daily and Atorvastatin 80 mg daily  She was last seen 10/3/22  She denies any residual deficits or new neurologic complaints  Secondary Stroke Prevention  She continues on Plavix 75 mg daily and Atorvastatin 80 mg  Compliant with his medications, no issues affording or obtaining medications  Denies excessive bruising or bleeding     Deficits  She is dealing with intermittent dizziness related to hypotension  She was recently admitted and her PCP and Cardiology are working to find a antihypertensive combination that works for her  Denies numbness, tingling, weakness, headaches, vision changes or any new or worsening stroke like symptoms  She specifically reports prior headaches have resolved  Denies difficulty with speaking or swallowing    Monitoring  BP- monitored twice a day; average is 130/80 but at times can be 74/50 or as high as 170/90's  10/24/22 HgbA1c 5 3  10/24/22 Lipid panel total 185, triglycerides 182, HDL 56, LDL 93  CTA H/N 8/28/22- Short segment of severe stenosis noted at the distal right cervical ICA segment  Mild to moderate atherosclerotic plaquing of both carotid bulbs causing less than 50% stenosis by NASCET criteria  Moderate to severe stenosis at the origin of the left vertebral artery  Dominant right vertebral artery      Safety  Lives at home with her    Independent of all ADLs  No falls at home  Managing her own medications and finances  assistive devices- none  Driving- no difficulty  Memory- no concerns    Substance use    Smoking- prior smoker; quit 15 years "ago; 1-1 5 PPD x 36 years  Etoh- 1 beer per day  Drugs- none  Caffeine - none      Sleep  She has a pending sleep study  She denies snoring  She does gasp, excessive daytime sleepiness, unrefreshed sleep     Diet  Has gained weight due to oral steroids   Salad  Portion control     Exercise  Treadmill 30 minutes 3 x a week     Mood  Lost her brother 12/2/22; is grieving      Follow ups  She has pending follow ups with her PCP and Cardiology    Return to work  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     The following portions of the patient's history were reviewed and updated as appropriate: allergies, current medications, past family history, past medical history, past social history and past surgical history  Objective:    Blood pressure 110/60, pulse 64, temperature (!) 97 °F (36 1 °C), temperature source Temporal, height 5' 5\" (1 651 m), weight 74 8 kg (165 lb), SpO2 94 %  Neurological Exam     On neurological examination patient is alert, awake, oriented and in no distress  Speech is fluent without dysarthria or aphasia  Cranial nerves 2-12 were symmetrically intact bilaterally  No evidence of any focal weakness or sensory loss in the upper or lower extremities  Motor testing reveals 5/5 strength of the bilateral upper and lower extremities  There was no pronator drift  No fasciculations present  No abnormal involuntary movements  Finger- to-nose reveals no tremor or ataxia and intact proprioceptive function, no dysmetria was noted  Rapid alternating movement normal  Sensation was intact to vibration, light touch, and temperature in bilateral upper and lower extremities  Deep tendon reflexes were 2+ and symmetric in the bilateral upper and lower extremities  She is able to rise easily without assistance from a seated position  Casual gait is normal including stance, stride, and arm swing  Mild difficulty with tandem gait  Romberg is absent        ROS:    Review of Systems   Constitutional: " Negative  Negative for appetite change and fever  HENT: Negative  Negative for hearing loss, tinnitus, trouble swallowing and voice change  Eyes: Negative  Negative for photophobia, pain and visual disturbance  Respiratory: Negative  Negative for shortness of breath  Cardiovascular: Negative  Negative for palpitations  Gastrointestinal: Negative  Negative for nausea and vomiting  Endocrine: Negative  Negative for cold intolerance  Genitourinary: Negative  Negative for dysuria, frequency and urgency  Musculoskeletal: Negative  Negative for gait problem, myalgias and neck pain  Skin: Negative  Negative for rash  Allergic/Immunologic: Negative  Neurological: Positive for dizziness (with low BPs)  Negative for tremors, seizures, syncope, facial asymmetry, speech difficulty, weakness, light-headedness, numbness and headaches  Hematological: Negative  Does not bruise/bleed easily  Psychiatric/Behavioral: Negative  Negative for confusion, hallucinations and sleep disturbance  Reviewed ROS as entered by medical assistant

## 2023-04-03 NOTE — ASSESSMENT & PLAN NOTE
10/24/22 Lipid panel total 185, triglycerides 182, HDL 56, LDL 93    On Atorvastatin 80 mg daily  Goal LDL <70  She was previously at goal  She denies any change in diet or physical activity  Will have her repeat her Lipid panel now

## 2023-04-03 NOTE — ASSESSMENT & PLAN NOTE
CTA H/N 8/28/22- Short segment of severe stenosis noted at the distal right cervical ICA segment  Mild to moderate atherosclerotic plaquing of both carotid bulbs causing less than 50% stenosis by NASCET criteria  Moderate to severe stenosis at the origin of the left vertebral artery  Dominant right vertebral artery  Will repeat carotid doppler study now to assess for ICA stenosis

## 2023-04-03 NOTE — PATIENT INSTRUCTIONS
- Continue with good blood pressure control; I would recommend monitoring at home at least 3 times per week; Goal of <130/80  - Continue with good cholesterol control; Goal LDL <70; repeat Lipid panel  - Continue with good blood sugar control; Goal HgbA1c <7 0  - Will defer monitoring of cholesterol and blood sugar and management of hypertensive medications to the primary care provider  - Stay well hydrated by drinking enough water   - Complete carotid ultrasound  - Eat a healthy diet, high in lean meats fish, turkey, chicken  Low in fats, cholesterol, sugars and sodium  Avoid canned foods,  get lots of fresh/frozen vegetables/fruits  - Get routine exercise/physical activity as much as able to tolerate  - Keep follow ups with your other health care providers  - Continue Plavix 75 mg daily and Atorvastatin 80 mg      I will plan for her to return to the office in 8 monhs  time but would be happy to see her sooner if the need should arise  If she has any symptoms concerning for TIA or stroke including sudden painless loss of vision or double vision, difficulty speaking or swallowing, vertigo/room spinning that does not quickly resolve, or weakness/numbness affecting 1 side of the face or body she should proceed by ambulance to the nearest emergency room immediately

## 2023-04-03 NOTE — ASSESSMENT & PLAN NOTE
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  She continues on Plavix 75 mg and Atorvastatin 80 mg for her distal right cervical ICA severe stenosis, mild to moderate atherosclerosis of bilateral ICA, and moderate to severe stenosis of the left vertebral artery  She denies any new or worsening symptoms concerning for TIA, stroke or hemorrhage  Her vascular risk factors overall are well controlled, with the exception that she has recently had an increase in her LDL, now no longer at goal of <70  Her blood pressure also continues to be extremely liable, she reports Bps as low as 74/50 and as high as 170-200/90's  She continues to work with her PCP and Cardiology on this  We reviewed her stroke risk factors and management of the same  She was provided stroke education and we reviewed stroke warning signs/symptoms and reasons to return by ambulance to the ER  Plan:    - Continue with good blood pressure control; I would recommend monitoring at home at least 3 times per week; Goal of <130/80  - Continue with good cholesterol control; Goal LDL <70; repeat Lipid panel  - Continue with good blood sugar control; Goal HgbA1c <7 0  - Will defer monitoring of cholesterol and blood sugar and management of hypertensive medications to the primary care provider  - Stay well hydrated by drinking enough water   - Complete carotid ultrasound; ordered today to follow up on ICA stenosis  - Eat a healthy diet, high in lean meats fish, turkey, chicken  Low in fats, cholesterol, sugars and sodium  Avoid canned foods,  get lots of fresh/frozen vegetables/fruits    - Get routine exercise/physical activity as much as able to tolerate  - Keep follow ups with your other health care providers  - Continue Plavix 75 mg daily and Atorvastatin 80 mg      I will plan for her to return to the office in 8 monhs  time but would be happy to see her sooner if the need should arise  If she has any symptoms concerning for TIA or stroke including sudden painless loss of vision or double vision, difficulty speaking or swallowing, vertigo/room spinning that does not quickly resolve, or weakness/numbness affecting 1 side of the face or body she should proceed by ambulance to the nearest emergency room immediately

## 2023-04-06 DIAGNOSIS — R13.10 DYSPHAGIA, UNSPECIFIED TYPE: Chronic | ICD-10-CM

## 2023-04-06 RX ORDER — PANTOPRAZOLE SODIUM 40 MG/1
TABLET, DELAYED RELEASE ORAL
Qty: 90 TABLET | Refills: 0 | Status: SHIPPED | OUTPATIENT
Start: 2023-04-06

## 2023-04-14 DIAGNOSIS — I10 ESSENTIAL HYPERTENSION: Chronic | ICD-10-CM

## 2023-04-14 RX ORDER — LOSARTAN POTASSIUM 50 MG/1
100 TABLET ORAL DAILY
Qty: 180 TABLET | Refills: 1 | Status: SHIPPED | OUTPATIENT
Start: 2023-04-14 | End: 2023-04-25

## 2023-04-17 PROBLEM — T78.40XA ALLERGIES: Status: ACTIVE | Noted: 2023-04-17

## 2023-04-25 ENCOUNTER — OFFICE VISIT (OUTPATIENT)
Dept: CARDIOLOGY CLINIC | Facility: CLINIC | Age: 68
End: 2023-04-25

## 2023-04-25 VITALS
DIASTOLIC BLOOD PRESSURE: 60 MMHG | BODY MASS INDEX: 27.32 KG/M2 | WEIGHT: 164 LBS | HEIGHT: 65 IN | SYSTOLIC BLOOD PRESSURE: 102 MMHG | HEART RATE: 62 BPM

## 2023-04-25 DIAGNOSIS — I10 ESSENTIAL HYPERTENSION: Primary | Chronic | ICD-10-CM

## 2023-04-25 DIAGNOSIS — I51.81 TAKOTSUBO CARDIOMYOPATHY: ICD-10-CM

## 2023-04-25 DIAGNOSIS — E78.5 DYSLIPIDEMIA: Chronic | ICD-10-CM

## 2023-04-25 DIAGNOSIS — F41.8 DEPRESSION WITH ANXIETY: Chronic | ICD-10-CM

## 2023-04-25 RX ORDER — LOSARTAN POTASSIUM 50 MG/1
50 TABLET ORAL
Qty: 90 TABLET | Refills: 3 | Status: SHIPPED | OUTPATIENT
Start: 2023-04-25

## 2023-04-25 NOTE — PROGRESS NOTES
"Assessment/Plan:    No problem-specific Assessment & Plan notes found for this encounter  Diagnoses and all orders for this visit:    Essential hypertension    Takotsubo cardiomyopathy    Dyslipidemia    Depression with anxiety          Subjective: Lightheadedness  Fatigue  Patient ID: Anurag Whaley is a 69 y o  female  The patient presented to this office for the purpose of cardiac follow-up  She is known to have a history of coronary artery disease with hypertension and hyperlipidemia  The patient was recently hospitalized in January 2023 because of presumed dehydration associated with acute kidney injury  The patient was treated with rehydration and she was allowed to resume her losartan  At this point she has symptoms of fatigue  She experiences lightheadedness when her blood pressure is low  She does not feel well when her blood pressure is up  She denies any chest pain however  She denies any symptoms of dyspnea or palpitation  She has no leg edema  The following portions of the patient's history were reviewed and updated as appropriate: allergies, current medications, past family history, past medical history, past social history, past surgical history and problem list     Review of Systems   Respiratory: Negative for apnea, cough, chest tightness, shortness of breath and wheezing  Cardiovascular: Negative for chest pain, palpitations and leg swelling  Gastrointestinal: Negative for abdominal pain  Neurological: Positive for light-headedness  Negative for dizziness  Psychiatric/Behavioral: Negative  Objective: Stable cardiac wise  Tendency for relatively low blood pressure  /60 (BP Location: Left arm, Patient Position: Sitting, Cuff Size: Standard)   Pulse 62   Ht 5' 5\" (1 651 m)   Wt 74 4 kg (164 lb)   BMI 27 29 kg/m²          Physical Exam  Vitals reviewed  Constitutional:       General: She is not in acute distress       Appearance: She is " well-developed  She is not diaphoretic  HENT:      Head: Normocephalic and atraumatic  Neck:      Thyroid: No thyromegaly  Vascular: No JVD  Cardiovascular:      Rate and Rhythm: Normal rate and regular rhythm  Heart sounds: S1 normal and S2 normal  No murmur heard  No friction rub  No gallop  Pulmonary:      Effort: Pulmonary effort is normal  No respiratory distress  Breath sounds: No wheezing or rales  Chest:      Chest wall: No tenderness  Abdominal:      Palpations: Abdomen is soft  Musculoskeletal:      Cervical back: Normal range of motion and neck supple  Right lower leg: No edema  Left lower leg: No edema  Skin:     General: Skin is warm and dry  Neurological:      Mental Status: She is oriented to person, place, and time     Psychiatric:         Mood and Affect: Mood normal          Behavior: Behavior normal

## 2023-04-25 NOTE — LETTER
April 25, 2023     Abbe Luevano, [address]  [address] [facility]  [address] [facility] [address]    Patient: Thurston Sicard   YOB: 1955   Date of Visit: 4/25/2023       Dear Dr Tahir Benavides: Thank you for referring Janee Goodman to me for evaluation  Below are my notes for this consultation  If you have questions, please do not hesitate to call me  I look forward to following your patient along with you  Sincerely,        Thana Meigs, MD        CC: No Recipients  Thana Meigs, MD  4/25/2023 10:14 AM  Sign when Signing Visit  Assessment/Plan:    No problem-specific Assessment & Plan notes found for this encounter  Diagnoses and all orders for this visit:    Essential hypertension    Takotsubo cardiomyopathy    Dyslipidemia    Depression with anxiety         Subjective: Lightheadedness  Fatigue  Patient ID: Thurston Sicard is a 69 y o  female  The patient presented to this office for the purpose of cardiac follow-up  She is known to have a history of coronary artery disease with hypertension and hyperlipidemia  The patient was recently hospitalized in January 2023 because of presumed dehydration associated with acute kidney injury  The patient was treated with rehydration and she was allowed to resume her losartan  At this point she has symptoms of fatigue  She experiences lightheadedness when her blood pressure is low  She does not feel well when her blood pressure is up  She denies any chest pain however  She denies any symptoms of dyspnea or palpitation  She has no leg edema  The following portions of the patient's history were reviewed and updated as appropriate: allergies, current medications, past family history, past medical history, past social history, past surgical history and problem list     Review of Systems   Respiratory: Negative for apnea, cough, chest tightness, shortness of breath and wheezing      Cardiovascular: Negative for chest pain, "palpitations and leg swelling  Gastrointestinal: Negative for abdominal pain  Neurological: Positive for light-headedness  Negative for dizziness  Psychiatric/Behavioral: Negative  Objective: Stable cardiac wise  Tendency for relatively low blood pressure  /60 (BP Location: Left arm, Patient Position: Sitting, Cuff Size: Standard)   Pulse 62   Ht 5' 5\" (1 651 m)   Wt 74 4 kg (164 lb)   BMI 27 29 kg/m²         Physical Exam  Vitals reviewed  Constitutional:       General: She is not in acute distress  Appearance: She is well-developed  She is not diaphoretic  HENT:      Head: Normocephalic and atraumatic  Neck:      Thyroid: No thyromegaly  Vascular: No JVD  Cardiovascular:      Rate and Rhythm: Normal rate and regular rhythm  Heart sounds: S1 normal and S2 normal  No murmur heard  No friction rub  No gallop  Pulmonary:      Effort: Pulmonary effort is normal  No respiratory distress  Breath sounds: No wheezing or rales  Chest:      Chest wall: No tenderness  Abdominal:      Palpations: Abdomen is soft  Musculoskeletal:      Cervical back: Normal range of motion and neck supple  Right lower leg: No edema  Left lower leg: No edema  Skin:     General: Skin is warm and dry  Neurological:      Mental Status: She is oriented to person, place, and time     Psychiatric:         Mood and Affect: Mood normal          Behavior: Behavior normal           "

## 2023-04-25 NOTE — ASSESSMENT & PLAN NOTE
Hypertension with tendency for relatively low blood pressure associated with symptoms of lightheadedness  At times the blood pressure is elevated  The patient had a hospitalization with presumed dehydration associated with acute kidney injury  I am therefore asking the patient to reduce losartan to 50 mg at bedtime  It may become necessary to discontinue losartan altogether and try amlodipine if the blood pressure is elevated  We also discussed the option of nephrology evaluation  The patient prefers to wait until after this change of medication has occurred

## 2023-04-25 NOTE — PATIENT INSTRUCTIONS
The patient is advised to decrease losartan to 50 mg after dinner  Other medications remain the same  I am arranging for BMP to be done 2 to 3 weeks after these changes were made

## 2023-04-27 ENCOUNTER — HOSPITAL ENCOUNTER (OUTPATIENT)
Dept: SLEEP CENTER | Facility: HOSPITAL | Age: 68
Discharge: HOME | End: 2023-04-27

## 2023-04-27 DIAGNOSIS — I10 ESSENTIAL HYPERTENSION: ICD-10-CM

## 2023-04-27 DIAGNOSIS — G47.10 HYPERSOMNIA, UNSPECIFIED: ICD-10-CM

## 2023-04-27 DIAGNOSIS — R06.83 SNORING: ICD-10-CM

## 2023-04-27 DIAGNOSIS — R40.0 DAYTIME SOMNOLENCE: ICD-10-CM

## 2023-04-28 ENCOUNTER — RA CDI HCC (OUTPATIENT)
Dept: OTHER | Facility: HOSPITAL | Age: 68
End: 2023-04-28

## 2023-04-28 ENCOUNTER — HOSPITAL ENCOUNTER (OUTPATIENT)
Dept: NON INVASIVE DIAGNOSTICS | Age: 68
Discharge: HOME | End: 2023-04-28

## 2023-04-28 DIAGNOSIS — I65.23 CAROTID STENOSIS, BILATERAL: ICD-10-CM

## 2023-04-28 NOTE — PROGRESS NOTES
[facility] 73  coding opportunities          Chart Reviewed number of suggestions sent to Provider: 1     Patients Insurance     Medicare Insurance: [employer]

## 2023-04-30 DIAGNOSIS — I65.23 CAROTID STENOSIS, BILATERAL: Primary | ICD-10-CM

## 2023-04-30 DIAGNOSIS — F32.A DEPRESSION, UNSPECIFIED DEPRESSION TYPE: ICD-10-CM

## 2023-04-30 RX ORDER — ESCITALOPRAM OXALATE 20 MG/1
TABLET ORAL
Qty: 30 TABLET | Refills: 0 | Status: SHIPPED | OUTPATIENT
Start: 2023-04-30

## 2023-05-02 NOTE — PROGRESS NOTES
Home Sleep Study Documentation    HOME STUDY DEVICE: Noxturnal no                                           Anette G3 yes      Pre-Sleep Home Study:    Set-up and instructions performed by: Yvan Casarez 59    Technician performed demonstration for Patient: yes    Return demonstration performed by Patient: yes    Written instructions provided to Patient: yes    Patient signed consent form: yes        Post-Sleep Home Study:    Additional comments by Patient:     Home Sleep Study Failed:no:    Failure reason: N/A    Reported or Detected: N/A    Scored by: Lisy Zhu

## 2023-05-05 ENCOUNTER — OFFICE VISIT (OUTPATIENT)
Dept: FAMILY MEDICINE CLINIC | Facility: HOSPITAL | Age: 68
End: 2023-05-05

## 2023-05-05 VITALS
HEIGHT: 65 IN | HEART RATE: 68 BPM | WEIGHT: 166.6 LBS | BODY MASS INDEX: 27.76 KG/M2 | DIASTOLIC BLOOD PRESSURE: 78 MMHG | SYSTOLIC BLOOD PRESSURE: 138 MMHG | TEMPERATURE: 97.6 F

## 2023-05-05 DIAGNOSIS — Z13.29 SCREENING FOR THYROID DISORDER: ICD-10-CM

## 2023-05-05 DIAGNOSIS — G47.33 OSA (OBSTRUCTIVE SLEEP APNEA): ICD-10-CM

## 2023-05-05 DIAGNOSIS — Z00.00 MEDICARE ANNUAL WELLNESS VISIT, SUBSEQUENT: ICD-10-CM

## 2023-05-05 DIAGNOSIS — I51.81 TAKOTSUBO CARDIOMYOPATHY: ICD-10-CM

## 2023-05-05 DIAGNOSIS — D32.1 BENIGN TUMOR OF SPINAL MENINGIOMA (HCC): ICD-10-CM

## 2023-05-05 DIAGNOSIS — Z86.79 HISTORY OF SUBARACHNOID HEMORRHAGE: Primary | ICD-10-CM

## 2023-05-05 DIAGNOSIS — R73.9 HYPERGLYCEMIA: ICD-10-CM

## 2023-05-05 DIAGNOSIS — J43.8 OTHER EMPHYSEMA (HCC): Chronic | ICD-10-CM

## 2023-05-05 DIAGNOSIS — I10 ESSENTIAL HYPERTENSION: Chronic | ICD-10-CM

## 2023-05-05 NOTE — ASSESSMENT & PLAN NOTE
No current CV symptoms, just saw Cardio, on beta blocker, ARB, statin and Plavix, call with CV symptoms

## 2023-05-05 NOTE — PATIENT INSTRUCTIONS
Medicare Preventive Visit Patient Instructions  Thank you for completing your Welcome to Medicare Visit or Medicare Annual Wellness Visit today  Your next wellness visit will be due in one year (5/5/2024)  The screening/preventive services that you may require over the next 5-10 years are detailed below  Some tests may not apply to you based off risk factors and/or age  Screening tests ordered at today's visit but not completed yet may show as past due  Also, please note that scanned in results may not display below  Preventive Screenings:  Service Recommendations Previous Testing/Comments   Colorectal Cancer Screening  * Colonoscopy    * Fecal Occult Blood Test (FOBT)/Fecal Immunochemical Test (FIT)  * Fecal DNA/Cologuard Test  * Flexible Sigmoidoscopy Age: 39-70 years old   Colonoscopy: every 10 years (may be performed more frequently if at higher risk)  OR  FOBT/FIT: every 1 year  OR  Cologuard: every 3 years  OR  Sigmoidoscopy: every 5 years  Screening may be recommended earlier than age 39 if at higher risk for colorectal cancer  Also, an individualized decision between you and your healthcare provider will decide whether screening between the ages of 74-80 would be appropriate  Colonoscopy: 06/30/2022  FOBT/FIT: Not on file  Cologuard: Not on file  Sigmoidoscopy: Not on file    Screening Current     Breast Cancer Screening Age: 36 years old  Frequency: every 1-2 years  Not required if history of left and right mastectomy Mammogram: 02/21/2023    Screening Current   Cervical Cancer Screening Between the ages of 21-29, pap smear recommended once every 3 years  Between the ages of 33-67, can perform pap smear with HPV co-testing every 5 years     Recommendations may differ for women with a history of total hysterectomy, cervical cancer, or abnormal pap smears in past  Pap Smear: 12/17/2020    Screening Not Indicated   Hepatitis C Screening Once for adults born between 1945 and 1965  More frequently in patients at high risk for Hepatitis C Hep C Antibody: 09/04/2019    Screening Current   Diabetes Screening 1-2 times per year if you're at risk for diabetes or have pre-diabetes Fasting glucose: 106 mg/dL (1/26/2023)  A1C: 5 3 % (10/24/2022)  Screening Current   Cholesterol Screening Once every 5 years if you don't have a lipid disorder  May order more often based on risk factors  Lipid panel: 10/24/2022    Screening Current     Other Preventive Screenings Covered by Medicare:  1  Abdominal Aortic Aneurysm (AAA) Screening: covered once if your at risk  You're considered to be at risk if you have a family history of AAA  2  Lung Cancer Screening: covers low dose CT scan once per year if you meet all of the following conditions: (1) Age 50-69; (2) No signs or symptoms of lung cancer; (3) Current smoker or have quit smoking within the last 15 years; (4) You have a tobacco smoking history of at least 20 pack years (packs per day multiplied by number of years you smoked); (5) You get a written order from a healthcare provider  3  Glaucoma Screening: covered annually if you're considered high risk: (1) You have diabetes OR (2) Family history of glaucoma OR (3)  aged 48 and older OR (3)  American aged 72 and older  3  Osteoporosis Screening: covered every 2 years if you meet one of the following conditions: (1) You're estrogen deficient and at risk for osteoporosis based off medical history and other findings; (2) Have a vertebral abnormality; (3) On glucocorticoid therapy for more than 3 months; (4) Have primary hyperparathyroidism; (5) On osteoporosis medications and need to assess response to drug therapy  · Last bone density test (DXA Scan): 02/21/2023   5  HIV Screening: covered annually if you're between the age of 15-65  Also covered annually if you are younger than 13 and older than 72 with risk factors for HIV infection   For pregnant patients, it is covered up to 3 times per pregnancy  Immunizations:  Immunization Recommendations   Influenza Vaccine Annual influenza vaccination during flu season is recommended for all persons aged >= 6 months who do not have contraindications   Pneumococcal Vaccine   * Pneumococcal conjugate vaccine = PCV13 (Prevnar 13), PCV15 (Vaxneuvance), PCV20 (Prevnar 20)  * Pneumococcal polysaccharide vaccine = PPSV23 (Pneumovax) Adults 25-60 years old: 1-3 doses may be recommended based on certain risk factors  Adults 72 years old: 1-2 doses may be recommended based off what pneumonia vaccine you previously received   Hepatitis B Vaccine 3 dose series if at intermediate or high risk (ex: diabetes, end stage renal disease, liver disease)   Tetanus (Td) Vaccine - COST NOT COVERED BY MEDICARE PART B Following completion of primary series, a booster dose should be given every 10 years to maintain immunity against tetanus  Td may also be given as tetanus wound prophylaxis  Tdap Vaccine - COST NOT COVERED BY MEDICARE PART B Recommended at least once for all adults  For pregnant patients, recommended with each pregnancy  Shingles Vaccine (Shingrix) - COST NOT COVERED BY MEDICARE PART B  2 shot series recommended in those aged 48 and above     Health Maintenance Due:      Topic Date Due    Cervical Cancer Screening  12/17/2023    Breast Cancer Screening: Mammogram  02/21/2024    DXA SCAN  02/21/2025    Colorectal Cancer Screening  06/29/2027    Hepatitis C Screening  Completed    Lung Cancer Screening  Discontinued     Immunizations Due:  There are no preventive care reminders to display for this patient  Advance Directives   What are advance directives? Advance directives are legal documents that state your wishes and plans for medical care  These plans are made ahead of time in case you lose your ability to make decisions for yourself   Advance directives can apply to any medical decision, such as the treatments you want, and if you want to donate organs  What are the types of advance directives? There are many types of advance directives, and each state has rules about how to use them  You may choose a combination of any of the following:  · Living will: This is a written record of the treatment you want  You can also choose which treatments you do not want, which to limit, and which to stop at a certain time  This includes surgery, medicine, IV fluid, and tube feedings  · Durable power of  for healthcare [facility]): This is a written record that states who you want to make healthcare choices for you when you are unable to make them for yourself  This person, called a proxy, is usually a family member or a friend  You may choose more than 1 proxy  · Do not resuscitate (DNR) order:  A DNR order is used in case your heart stops beating or you stop breathing  It is a request not to have certain forms of treatment, such as CPR  A DNR order may be included in other types of advance directives  · Medical directive: This covers the care that you want if you are in a coma, near death, or unable to make decisions for yourself  You can list the treatments you want for each condition  Treatment may include pain medicine, surgery, blood transfusions, dialysis, IV or tube feedings, and a ventilator (breathing machine)  · Values history: This document has questions about your views, beliefs, and how you feel and think about life  This information can help others choose the care that you would choose  Why are advance directives important? An advance directive helps you control your care  Although spoken wishes may be used, it is better to have your wishes written down  Spoken wishes can be misunderstood, or not followed  Treatments may be given even if you do not want them  An advance directive may make it easier for your family to make difficult choices about your care     Weight Management   Why it is important to manage your weight:  Being overweight increases your risk of health conditions such as heart disease, high blood pressure, type 2 diabetes, and certain types of cancer  It can also increase your risk for osteoarthritis, sleep apnea, and other respiratory problems  Aim for a slow, steady weight loss  Even a small amount of weight loss can lower your risk of health problems  How to lose weight safely:  A safe and healthy way to lose weight is to eat fewer calories and get regular exercise  You can lose up about 1 pound a week by decreasing the number of calories you eat by 500 calories each day  Healthy meal plan for weight management:  A healthy meal plan includes a variety of foods, contains fewer calories, and helps you stay healthy  A healthy meal plan includes the following:  · Eat whole-grain foods more often  A healthy meal plan should contain fiber  Fiber is the part of grains, fruits, and vegetables that is not broken down by your body  Whole-grain foods are healthy and provide extra fiber in your diet  Some examples of whole-grain foods are whole-wheat breads and pastas, oatmeal, brown rice, and bulgur  · Eat a variety of vegetables every day  Include dark, leafy greens such as spinach, kale, carlos eduardo greens, and mustard greens  Eat yellow and orange vegetables such as carrots, sweet potatoes, and winter squash  · Eat a variety of fruits every day  Choose fresh or canned fruit (canned in its own juice or light syrup) instead of juice  Fruit juice has very little or no fiber  · Eat low-fat dairy foods  Drink fat-free (skim) milk or 1% milk  Eat fat-free yogurt and low-fat cottage cheese  Try low-fat cheeses such as mozzarella and other reduced-fat cheeses  · Choose meat and other protein foods that are low in fat  Choose beans or other legumes such as split peas or lentils  Choose fish, skinless poultry (chicken or turkey), or lean cuts of red meat (beef or pork)  Before you cook meat or poultry, cut off any visible fat     · Use less fat and oil  Try baking foods instead of frying them  Add less fat, such as margarine, sour cream, regular salad dressing and mayonnaise to foods  Eat fewer high-fat foods  Some examples of high-fat foods include french fries, doughnuts, ice cream, and cakes  · Eat fewer sweets  Limit foods and drinks that are high in sugar  This includes candy, cookies, regular soda, and sweetened drinks  Exercise:  Exercise at least 30 minutes per day on most days of the week  Some examples of exercise include walking, biking, dancing, and swimming  You can also fit in more physical activity by taking the stairs instead of the elevator or parking farther away from stores  Ask your healthcare provider about the best exercise plan for you  © Copyright [employer] 2018 Information is for End User's use only and may not be sold, redistributed or otherwise used for commercial purposes  All illustrations and images included in CareNotes® are the copyrighted property of [employer]  or [facility] Preventive Visit Patient Instructions  Thank you for completing your Welcome to Medicare Visit or Medicare Annual Wellness Visit today  Your next wellness visit will be due in one year (5/5/2024)  The screening/preventive services that you may require over the next 5-10 years are detailed below  Some tests may not apply to you based off risk factors and/or age  Screening tests ordered at today's visit but not completed yet may show as past due  Also, please note that scanned in results may not display below    Preventive Screenings:  Service Recommendations Previous Testing/Comments   Colorectal Cancer Screening  * Colonoscopy    * Fecal Occult Blood Test (FOBT)/Fecal Immunochemical Test (FIT)  * Fecal DNA/Cologuard Test  * Flexible Sigmoidoscopy Age: 39-70 years old   Colonoscopy: every 10 years (may be performed more frequently if at higher risk)  OR  FOBT/FIT: every 1 year  OR  Cologuard: every 3 years OR  Sigmoidoscopy: every 5 years  Screening may be recommended earlier than age 39 if at higher risk for colorectal cancer  Also, an individualized decision between you and your healthcare provider will decide whether screening between the ages of 74-80 would be appropriate  Colonoscopy: 06/30/2022  FOBT/FIT: Not on file  Cologuard: Not on file  Sigmoidoscopy: Not on file    Screening Current     Breast Cancer Screening Age: 36 years old  Frequency: every 1-2 years  Not required if history of left and right mastectomy Mammogram: 02/21/2023    Screening Current   Cervical Cancer Screening Between the ages of 21-29, pap smear recommended once every 3 years  Between the ages of 33-67, can perform pap smear with HPV co-testing every 5 years  Recommendations may differ for women with a history of total hysterectomy, cervical cancer, or abnormal pap smears in past  Pap Smear: 12/17/2020    Screening Not Indicated   Hepatitis C Screening Once for adults born between 1945 and 1965  More frequently in patients at high risk for Hepatitis C Hep C Antibody: 09/04/2019    Screening Current   Diabetes Screening 1-2 times per year if you're at risk for diabetes or have pre-diabetes Fasting glucose: 106 mg/dL (1/26/2023)  A1C: 5 3 % (10/24/2022)  Screening Current   Cholesterol Screening Once every 5 years if you don't have a lipid disorder  May order more often based on risk factors  Lipid panel: 10/24/2022    Screening Current     Other Preventive Screenings Covered by Medicare:  6  Abdominal Aortic Aneurysm (AAA) Screening: covered once if your at risk  You're considered to be at risk if you have a family history of AAA    7  Lung Cancer Screening: covers low dose CT scan once per year if you meet all of the following conditions: (1) Age 50-69; (2) No signs or symptoms of lung cancer; (3) Current smoker or have quit smoking within the last 15 years; (4) You have a tobacco smoking history of at least 20 pack years (packs per day multiplied by number of years you smoked); (5) You get a written order from a healthcare provider  8  Glaucoma Screening: covered annually if you're considered high risk: (1) You have diabetes OR (2) Family history of glaucoma OR (3)  aged 48 and older OR (3)  American aged 72 and older  5  Osteoporosis Screening: covered every 2 years if you meet one of the following conditions: (1) You're estrogen deficient and at risk for osteoporosis based off medical history and other findings; (2) Have a vertebral abnormality; (3) On glucocorticoid therapy for more than 3 months; (4) Have primary hyperparathyroidism; (5) On osteoporosis medications and need to assess response to drug therapy  · Last bone density test (DXA Scan): 02/21/2023   10  HIV Screening: covered annually if you're between the age of 15-65  Also covered annually if you are younger than 13 and older than 72 with risk factors for HIV infection  For pregnant patients, it is covered up to 3 times per pregnancy  Immunizations:  Immunization Recommendations   Influenza Vaccine Annual influenza vaccination during flu season is recommended for all persons aged >= 6 months who do not have contraindications   Pneumococcal Vaccine   * Pneumococcal conjugate vaccine = PCV13 (Prevnar 13), PCV15 (Vaxneuvance), PCV20 (Prevnar 20)  * Pneumococcal polysaccharide vaccine = PPSV23 (Pneumovax) Adults 25-60 years old: 1-3 doses may be recommended based on certain risk factors  Adults 72 years old: 1-2 doses may be recommended based off what pneumonia vaccine you previously received   Hepatitis B Vaccine 3 dose series if at intermediate or high risk (ex: diabetes, end stage renal disease, liver disease)   Tetanus (Td) Vaccine - COST NOT COVERED BY MEDICARE PART B Following completion of primary series, a booster dose should be given every 10 years to maintain immunity against tetanus  Td may also be given as tetanus wound prophylaxis     Tdap Vaccine - COST NOT COVERED BY MEDICARE PART B Recommended at least once for all adults  For pregnant patients, recommended with each pregnancy  Shingles Vaccine (Shingrix) - COST NOT COVERED BY MEDICARE PART B  2 shot series recommended in those aged 48 and above     Health Maintenance Due:      Topic Date Due    Cervical Cancer Screening  12/17/2023    Breast Cancer Screening: Mammogram  02/21/2024    DXA SCAN  02/21/2025    Colorectal Cancer Screening  06/29/2027    Hepatitis C Screening  Completed    Lung Cancer Screening  Discontinued     Immunizations Due:  There are no preventive care reminders to display for this patient  Advance Directives   What are advance directives? Advance directives are legal documents that state your wishes and plans for medical care  These plans are made ahead of time in case you lose your ability to make decisions for yourself  Advance directives can apply to any medical decision, such as the treatments you want, and if you want to donate organs  What are the types of advance directives? There are many types of advance directives, and each state has rules about how to use them  You may choose a combination of any of the following:  · Living will: This is a written record of the treatment you want  You can also choose which treatments you do not want, which to limit, and which to stop at a certain time  This includes surgery, medicine, IV fluid, and tube feedings  · Durable power of  for healthcare [facility]): This is a written record that states who you want to make healthcare choices for you when you are unable to make them for yourself  This person, called a proxy, is usually a family member or a friend  You may choose more than 1 proxy  · Do not resuscitate (DNR) order:  A DNR order is used in case your heart stops beating or you stop breathing  It is a request not to have certain forms of treatment, such as CPR   A DNR order may be included in other types of advance directives  · Medical directive: This covers the care that you want if you are in a coma, near death, or unable to make decisions for yourself  You can list the treatments you want for each condition  Treatment may include pain medicine, surgery, blood transfusions, dialysis, IV or tube feedings, and a ventilator (breathing machine)  · Values history: This document has questions about your views, beliefs, and how you feel and think about life  This information can help others choose the care that you would choose  Why are advance directives important? An advance directive helps you control your care  Although spoken wishes may be used, it is better to have your wishes written down  Spoken wishes can be misunderstood, or not followed  Treatments may be given even if you do not want them  An advance directive may make it easier for your family to make difficult choices about your care  Weight Management   Why it is important to manage your weight:  Being overweight increases your risk of health conditions such as heart disease, high blood pressure, type 2 diabetes, and certain types of cancer  It can also increase your risk for osteoarthritis, sleep apnea, and other respiratory problems  Aim for a slow, steady weight loss  Even a small amount of weight loss can lower your risk of health problems  How to lose weight safely:  A safe and healthy way to lose weight is to eat fewer calories and get regular exercise  You can lose up about 1 pound a week by decreasing the number of calories you eat by 500 calories each day  Healthy meal plan for weight management:  A healthy meal plan includes a variety of foods, contains fewer calories, and helps you stay healthy  A healthy meal plan includes the following:  · Eat whole-grain foods more often  A healthy meal plan should contain fiber  Fiber is the part of grains, fruits, and vegetables that is not broken down by your body   Whole-grain foods are healthy and provide extra fiber in your diet  Some examples of whole-grain foods are whole-wheat breads and pastas, oatmeal, brown rice, and bulgur  · Eat a variety of vegetables every day  Include dark, leafy greens such as spinach, kale, carlos eduardo greens, and mustard greens  Eat yellow and orange vegetables such as carrots, sweet potatoes, and winter squash  · Eat a variety of fruits every day  Choose fresh or canned fruit (canned in its own juice or light syrup) instead of juice  Fruit juice has very little or no fiber  · Eat low-fat dairy foods  Drink fat-free (skim) milk or 1% milk  Eat fat-free yogurt and low-fat cottage cheese  Try low-fat cheeses such as mozzarella and other reduced-fat cheeses  · Choose meat and other protein foods that are low in fat  Choose beans or other legumes such as split peas or lentils  Choose fish, skinless poultry (chicken or turkey), or lean cuts of red meat (beef or pork)  Before you cook meat or poultry, cut off any visible fat  · Use less fat and oil  Try baking foods instead of frying them  Add less fat, such as margarine, sour cream, regular salad dressing and mayonnaise to foods  Eat fewer high-fat foods  Some examples of high-fat foods include french fries, doughnuts, ice cream, and cakes  · Eat fewer sweets  Limit foods and drinks that are high in sugar  This includes candy, cookies, regular soda, and sweetened drinks  Exercise:  Exercise at least 30 minutes per day on most days of the week  Some examples of exercise include walking, biking, dancing, and swimming  You can also fit in more physical activity by taking the stairs instead of the elevator or parking farther away from stores  Ask your healthcare provider about the best exercise plan for you  © Copyright [employer] 2018 Information is for End User's use only and may not be sold, redistributed or otherwise used for commercial purposes   All illustrations and images included in [facility] are the copyrighted property of [employer]  or [address] [place], [place] [address] [address]

## 2023-05-05 NOTE — ASSESSMENT & PLAN NOTE
No current Neuro symptoms, just saw Neuro 4/23 - con't f/u as per Neuro, on Plavix and statin, will follow as well

## 2023-05-05 NOTE — ASSESSMENT & PLAN NOTE
Currently at baseline with no s/sx of exacerbation, has quit smoking, is UTD on lung CA screening, just saw Pulm, con't Trelegy and f/u as per Pulm, call with resp symptoms

## 2023-05-05 NOTE — ASSESSMENT & PLAN NOTE
Following with Neuro, has MRI of spine for Nov and f/u with Neuro after, call with Neuro symptoms, will follow

## 2023-05-05 NOTE — PROGRESS NOTES
Assessment and Plan:     Problem List Items Addressed This Visit        Respiratory    Emphysema (Chronic)     Currently at baseline with no s/sx of exacerbation, has quit smoking, is UTD on lung CA screening, just saw Pulm, con't Trelegy and f/u as per Pulm, call with resp symptoms         GER (obstructive sleep apnea)     Just had home sleep study - no further GER noted, con't healthy diet and keep active            Cardiovascular and Mediastinum    Essential hypertension (Chronic)     BP labile but acceptable at home and in office, losartan just decreased by Cardio but acceptable today, con't current meds         Takotsubo cardiomyopathy     No current CV symptoms, just saw Cardio, on beta blocker, ARB, statin and Plavix, call with CV symptoms            Nervous and Auditory    Benign tumor of spinal meningioma ([facility] [address] )     Following with Neuro, has MRI of spine for Nov and f/u with Neuro after, call with Neuro symptoms, will follow            Other    History of subarachnoid hemorrhage - Primary     No current Neuro symptoms, just saw Neuro 4/23 - con't f/u as per Neuro, on Plavix and statin, will follow as well         Hyperglycemia     FBS/A1C annually - order given for Oct 23, urged healthy diet and increase activity         Relevant Orders    Hemoglobin O9B    Basic metabolic panel   Other Visit Diagnoses     Medicare annual wellness visit, subsequent        BMI 27 0-27 9,adult        healthy diet and regular exercise encouraged    Screening for thyroid disorder        Relevant Orders    TSH, 3rd generation with Free T4 reflex        BMI Counseling: Body mass index is 27 72 kg/m²  The BMI is above normal  Nutrition recommendations include decreasing portion sizes, encouraging healthy choices of fruits and vegetables, consuming healthier snacks, moderation in carbohydrate intake, increasing intake of lean protein, reducing intake of saturated and trans fat and reducing intake of cholesterol   Exercise recommendations include exercising 3-5 times per week  No pharmacotherapy was ordered  Rationale for BMI follow-up plan is due to patient being overweight or obese  Preventive health issues were discussed with patient, and age appropriate screening tests were ordered as noted in patient's After Visit Summary  Colonoscopy 6/22    CT chest 2/23    Mammo 2/23    Dexa 2/23 - osteopenia    CUS 4/23    PAP 12/20    BW 10/22      Personalized health advice and appropriate referrals for health education or preventive services given if needed, as noted in patient's After Visit Summary  History of Present Illness:     Patient presents for a Medicare Wellness Visit    HPI Pt here for follow up appt and AWV    Pt saw Neuro NP (Gail Gary Downs ) in April for f/u [facility] - OV note reviewed  Urged to con't Plavix and statin  Was told to f/u in 8 mos  She denies stroke/TIA symptoms  She notes no issues bleeding/bruising with the Plavix  Pt saw Pulm NP (Sy Whipple) in April for f/u COPD - OV note reviewed  She was told to con't Trelegy and f/u in 6 mos  She notes no recent resp infections or use of rescue inhaler  She denies chronic cough/SOB/wheezing  She had her CT lung CA screening 2/23  Pt saw Cardio (Dr Dariana Vivas) in April as well for f/u Takotsubo CM and HTN - OV note reviewed  She had her Losartan decreased from 50 mg bid to 50 mg q day  She had a home sleep study done and no GER was noted  BP upper end of acceptable today but OK  Med list reviewed - denies missing doses or SE with meds  She does check her BP at home and states she has been 130's/70's  Pt with known meningioma of thoracic spine - last MRI 4/21  She has f/u MRI of spine in Nov 23  She has f/u with Dr Genesis Lares in Nov as well       Patient Care Team:  Dimitri Almeida DO as PCP - Nic Francis MD (Cardiology)  DO Too Urbano MD (Dermatology)  Rubén Kang MD (Neurology)  Slime Valdivia Joe Troncoso as Nurse Practitioner (Nurse Practitioner)     Review of Systems:     Review of Systems   Constitutional: Positive for fatigue  Negative for chills, fever and unexpected weight change  HENT: Positive for hearing loss  Negative for congestion and trouble swallowing  Eyes: Positive for visual disturbance  Negative for pain  Respiratory: Negative for cough, shortness of breath and wheezing  Cardiovascular: Negative for chest pain, palpitations and leg swelling  Gastrointestinal: Negative for abdominal pain, constipation, diarrhea, nausea and vomiting  Genitourinary: Negative for difficulty urinating, dysuria, vaginal bleeding, vaginal discharge and vaginal pain  Musculoskeletal: Negative for back pain and neck pain  Skin: Negative for rash and wound  Neurological: Negative for dizziness, light-headedness and headaches  Hematological: Does not bruise/bleed easily  Psychiatric/Behavioral: Negative for confusion, dysphoric mood and sleep disturbance          Problem List:     Patient Active Problem List   Diagnosis    De Quervain's disease (tenosynovitis)    Essential hypertension    Sudden idiopathic hearing loss of right ear    Cervical stenosis of spinal canal    Plantar fasciitis    Lower back pain    Dyslipidemia    Depression with anxiety    Takotsubo cardiomyopathy    Carpal tunnel syndrome    Arnold-Chiari malformation (HCC)    Pancreatic cyst    Emphysema    GERD (gastroesophageal reflux disease)    Hypotension    Stage 3a chronic kidney disease (HCC)    Facial twitching    History of subarachnoid hemorrhage    Dysphagia    Carotid stenosis, bilateral    Intracranial atherosclerosis    Benign tumor of spinal meningioma ([facility] [address] )    Hyperglycemia    Allergies    GER (obstructive sleep apnea)    Snoring    Daytime somnolence    Hypersomnia, unspecified      Past Medical and Surgical History:     Past Medical History:   Diagnosis Date    Angina pectoris ([facility] )     Arnold-Chiari malformation ([facility] )     Breast lump     last assessed: Jan 22, 2013     Coronary artery disease     Depression with anxiety     GERD (gastroesophageal reflux disease)     Gout     last assessed: Nov 26, 2012     Hair loss     last assessed: Dec 15, 2016     Hypotension     last assessed: Nov 10, 2014     Mitral valve disorder     Myocardial infarct, old     SVT [facility]supraventricular tachycardia) ([facility])      Past Surgical History:   Procedure Laterality Date    BREAST BIOPSY Right 02/28/2017    US CORE BIOPSY--BENIGN    CARDIAC CATHETERIZATION      CERVICAL DISCECTOMY      Spinal     CERVICAL LAMINECTOMY      C1 with chiari decompression     COLONOSCOPY      5 yrs - onset: May 31, 2011     CRANIOTOMY      POPLITEAL SYNOVIAL CYST EXCISION      TUBAL LIGATION      US GUIDED BREAST BIOPSY RIGHT COMPLETE Right 02/28/2017      Family History:     Family History   Problem Relation Age of Onset    Stroke Mother     Other Father         blood clot in vein & CABG     Heart disease Father     Prostate cancer Father     Hearing loss Father     Hypertension Father     No Known Problems Sister     No Known Problems Sister     No Known Problems Daughter     No Known Problems Daughter     Breast cancer Maternal Grandmother     No Known Problems Maternal Grandfather     No Known Problems Paternal Grandmother     No Known Problems Paternal Grandfather     Alcohol abuse Brother     Throat cancer Brother     Cancer Brother         Throat tongue    Hearing loss Brother     Kidney failure Brother         kidney failure d/t NSIADs on dialysis    Hearing loss Brother     Crohn's disease Maternal Aunt     No Known Problems Maternal Aunt     No Known Problems Maternal Aunt     Colon cancer Paternal Aunt     No Known Problems Paternal Aunt     No Known Problems Paternal Aunt     No Known Problems Paternal Aunt     Colon cancer Paternal Aunt     Cancer Paternal [de-identified] Colon cancer      Social History:     Social History     Socioeconomic History    Marital status: /Civil Union     Spouse name: None    Number of children: None    Years of education: None    Highest education level: None   Occupational History    Occupation: working full time    Tobacco Use    Smoking status: Former     Packs/day: 1 50     Years: 36 00     Pack years: 54 00     Types: Cigarettes     Start date: 1969     Quit date: 1/1/2008     Years since quitting: 15 3    Smokeless tobacco: Never   Vaping Use    Vaping Use: Never used   Substance and Sexual Activity    Alcohol use: Yes     Comment: social     Drug use: No    Sexual activity: Not Currently     Partners: Male   Other Topics Concern    None   Social History Narrative    None     Social Determinants of Health     Financial Resource Strain: Low Risk     Difficulty of Paying Living Expenses: Not hard at all   Food Insecurity: No Food Insecurity    Worried About Running Out of Food in the Last Year: Never true    [address] in the Last Year: Never true   Transportation Needs: No Transportation Needs    Lack of Transportation (Medical): No    Lack of Transportation (Non-Medical):  No   Physical Activity: Not on file   Stress: Not on file   Social Connections: Not on file   Intimate Partner Violence: Not on file   Housing Stability: Low Risk     Unable to Pay for Housing in the Last Year: No    Number of Places Lived in the Last Year: 1    Unstable Housing in the Last Year: No      Medications and Allergies:     Current Outpatient Medications   Medication Sig Dispense Refill    albuterol (ProAir HFA) 90 mcg/act inhaler Inhale 2 puffs every 4 (four) hours as needed for wheezing or shortness of breath 8 5 g 2    amoxicillin (AMOXIL) 500 mg capsule  (Patient not taking: Reported on 4/25/2023)      atorvastatin (LIPITOR) 80 mg tablet Take 1 tablet by mouth once daily 90 tablet 0    calcium carbonate (OS-FLASH) 600 MG tablet Take 600 mg by mouth 2 (two) times a day with meals      carvedilol (COREG) 12 5 mg tablet Take 1 tablet (12 5 mg total) by mouth 2 (two) times a day 180 tablet 3    cholecalciferol (VITAMIN D3) 1,000 units tablet Take 1,000 Units by mouth daily      clopidogrel (PLAVIX) 75 mg tablet Take 1 tablet (75 mg total) by mouth daily 90 tablet 2    escitalopram (LEXAPRO) 20 mg tablet Take 1/2 (one-half) tablet by mouth once daily 30 tablet 0    gabapentin (NEURONTIN) 400 mg capsule TAKE 2 CAPSULES BY MOUTH TWICE DAILY AS DIRECTED 360 capsule 0    isosorbide mononitrate (IMDUR) 30 mg 24 hr tablet Take 1 tablet by mouth once daily 90 tablet 0    losartan (COZAAR) 50 mg tablet Take 1 tablet (50 mg total) by mouth daily after dinner 90 tablet 3    pantoprazole (PROTONIX) 40 mg tablet TAKE 1 TABLET BY MOUTH ONCE DAILY BEFORE BREAKFAST 90 tablet 0    Trelegy Ellipta 100-62 5-25 MCG/ACT inhaler INHALE 1 PUFF BY MOUTH ONCE DAILY RINSE MOUTH AFTER USE 60 each 5     No current facility-administered medications for this visit       Allergies   Allergen Reactions    Codeine Itching    Medical Tape Rash      Immunizations:     Immunization History   Administered Date(s) Administered    COVID-19 PFIZER VACCINE 0 3 ML IM 03/09/2021, 04/01/2021, 10/29/2021    COVID-19 Pfizer Vac BIVALENT Markus-sucrose 12 Yr+ IM (BOOSTER ONLY) 10/08/2022    INFLUENZA 11/10/2014, 09/15/2015, 09/06/2016, 08/25/2018, 09/24/2019, 10/08/2022    Influenza Quadrivalent Preservative Free 3 years and older IM 11/10/2014, 09/15/2015, 09/06/2016, 12/19/2017    Influenza Quadrivalent, 6-35 Months IM 12/19/2017    Influenza, high dose seasonal 0 7 mL 09/13/2020, 09/16/2021    Influenza, seasonal, injectable 11/29/2012, 12/06/2013    Pneumococcal Conjugate 13-Valent 08/25/2018    Pneumococcal Polysaccharide PPV23 08/29/2019    Tdap 12/19/2017    Zoster Vaccine Recombinant 09/13/2020, 02/07/2021      Health Maintenance:         Topic Date Due    Cervical Cancer Screening  12/17/2023    Breast Cancer Screening: Mammogram  02/21/2024    DXA SCAN  02/21/2025    Colorectal Cancer Screening  06/29/2027    Hepatitis C Screening  Completed    Lung Cancer Screening  Discontinued     There are no preventive care reminders to display for this patient  Medicare Screening Tests and Risk Assessments:     Nallely Pena is here for her Subsequent Wellness visit  Last Medicare Wellness visit information reviewed, patient interviewed and updates made to the record today  Health Risk Assessment:   Patient rates overall health as good  Patient feels that their physical health rating is slightly worse  Patient is very satisfied with their life  Eyesight was rated as slightly worse  Hearing was rated as same  Patient feels that their emotional and mental health rating is same  Patients states they are never, rarely angry  Patient states they are always unusually tired/fatigued  Pain experienced in the last 7 days has been none  Patient states that she has experienced no weight loss or gain in last 6 months  Physical healthy worse d/t chronic fatigue  Eyesight worse - vision in R eye worse - saw eye doc and was told she has cataract that is not age related - has f/u in 6 mos    Depression Screening:   PHQ-9 Score: 3      Fall Risk Screening: In the past year, patient has experienced: no history of falling in past year      Urinary Incontinence Screening:   Patient has not leaked urine accidently in the last six months  Home Safety:  Patient does not have trouble with stairs inside or outside of their home  Patient has working smoke alarms and has working carbon monoxide detector  Home safety hazards include: none  Nutrition:   Current diet is Regular  Medications:   Patient is currently taking over-the-counter supplements  OTC medications include: see medication list  Patient is able to manage medications       Activities of Daily Living (ADLs)/Instrumental Activities of Daily Living (IADLs):   Walk and transfer into and out of bed and chair?: Yes  Dress and groom yourself?: Yes    Bathe or shower yourself?: Yes    Feed yourself? Yes  Do your laundry/housekeeping?: Yes  Manage your money, pay your bills and track your expenses?: Yes  Make your own meals?: Yes    Do your own shopping?: Yes    Previous Hospitalizations:   Any hospitalizations or ED visits within the last 12 months?: Yes    How many hospitalizations have you had in the last year?: 1-2    Hospitalization Comments: 2 hospitalization    Advance Care Planning:   Living will: No    Durable POA for healthcare: No    Advanced directive: No    Five wishes given: Yes      Cognitive Screening:   Provider or family/friend/caregiver concerned regarding cognition?: No    PREVENTIVE SCREENINGS      Cardiovascular Screening:    General: Screening Current, Risks and Benefits Discussed and History Lipid Disorder      Diabetes Screening:     General: Screening Current and Risks and Benefits Discussed      Colorectal Cancer Screening:     General: Screening Current      Breast Cancer Screening:     General: Screening Current and Risks and Benefits Discussed      Cervical Cancer Screening:    General: Screening Not Indicated and Risks and Benefits Discussed      Osteoporosis Screening:    General: Screening Current and Risks and Benefits Discussed      Abdominal Aortic Aneurysm (AAA) Screening:        General: Risks and Benefits Discussed and Screening Not Indicated      Lung Cancer Screening:     General: Screening Not Indicated and Risks and Benefits Discussed      Hepatitis C Screening:    General: Screening Current and Risks and Benefits Discussed    Screening, Brief Intervention, and Referral to Treatment (SBIRT)    Screening  Typical number of drinks in a day: 1  Typical number of drinks in a week: 5  Interpretation: Low risk drinking behavior      Single Item Drug Screening:  How often have you used an illegal drug "(including marijuana) or a prescription medication for non-medical reasons in the past year? never    Single Item Drug Screen Score: 0  Interpretation: Negative screen for possible drug use disorder    Other Counseling Topics:   Car/seat belt/driving safety and regular weightbearing exercise and calcium and vitamin D intake  Vision Screening    Right eye Left eye Both eyes   Without correction      With correction 20/50 20/30 20/25        Physical Exam:     /78   Pulse 68   Temp 97 6 °F (36 4 °C) (Tympanic)   Ht 5' 5\" (1 651 m)   Wt 75 6 kg (166 lb 9 6 oz)   BMI 27 72 kg/m²     Physical Exam  Vitals and nursing note reviewed  Constitutional:       General: She is not in acute distress  Appearance: She is well-developed  She is not ill-appearing  HENT:      Head: Normocephalic and atraumatic  Right Ear: Tympanic membrane and external ear normal  There is no impacted cerumen  Left Ear: Tympanic membrane and external ear normal    Eyes:      General:         Right eye: No discharge  Left eye: No discharge  Conjunctiva/sclera: Conjunctivae normal    Neck:      Thyroid: No thyromegaly  Vascular: No carotid bruit  Trachea: No tracheal deviation  Cardiovascular:      Rate and Rhythm: Normal rate and regular rhythm  Heart sounds: Normal heart sounds  No murmur heard  Pulmonary:      Effort: Pulmonary effort is normal  No respiratory distress  Breath sounds: Normal breath sounds  No wheezing, rhonchi or rales  Abdominal:      General: There is no distension  Palpations: Abdomen is soft  Tenderness: There is no abdominal tenderness  There is no guarding or rebound  Musculoskeletal:         General: No deformity or signs of injury  Cervical back: Neck supple  Lymphadenopathy:      Cervical: No cervical adenopathy  Skin:     General: Skin is warm and dry  Coloration: Skin is not pale  Findings: No rash     Neurological:      " General: No focal deficit present  Mental Status: She is alert  Mental status is at baseline  Motor: No abnormal muscle tone  Gait: Gait normal    Psychiatric:         Mood and Affect: Mood normal          Behavior: Behavior normal          Thought Content:  Thought content normal          Judgment: Judgment normal           Macros Mai DO

## 2023-05-05 NOTE — ASSESSMENT & PLAN NOTE
BP labile but acceptable at home and in office, losartan just decreased by Cardio but acceptable today, con't current meds

## 2023-05-06 DIAGNOSIS — E78.5 DYSLIPIDEMIA: ICD-10-CM

## 2023-05-07 RX ORDER — ATORVASTATIN CALCIUM 80 MG/1
TABLET, FILM COATED ORAL
Qty: 90 TABLET | Refills: 0 | Status: SHIPPED | OUTPATIENT
Start: 2023-05-07

## 2023-05-12 ENCOUNTER — APPOINTMENT (OUTPATIENT)
Dept: LAB | Facility: HOSPITAL | Age: 68
End: 2023-05-12

## 2023-05-12 ENCOUNTER — TELEPHONE (OUTPATIENT)
Dept: NEUROLOGY | Facility: CLINIC | Age: 68
End: 2023-05-12

## 2023-05-12 DIAGNOSIS — E78.5 DYSLIPIDEMIA: Chronic | ICD-10-CM

## 2023-05-12 DIAGNOSIS — I10 ESSENTIAL HYPERTENSION: Chronic | ICD-10-CM

## 2023-05-12 LAB
ANION GAP SERPL CALCULATED.3IONS-SCNC: 8 MMOL/L (ref 4–13)
BUN SERPL-MCNC: 14 MG/DL (ref 5–25)
CALCIUM SERPL-MCNC: 9.7 MG/DL (ref 8.4–10.2)
CHLORIDE SERPL-SCNC: 106 MMOL/L (ref 96–108)
CHOLEST SERPL-MCNC: 285 MG/DL
CO2 SERPL-SCNC: 26 MMOL/L (ref 21–32)
CREAT SERPL-MCNC: 1.27 MG/DL (ref 0.6–1.3)
GFR SERPL CREATININE-BSD FRML MDRD: 42 ML/MIN/1.73SQ M
GLUCOSE P FAST SERPL-MCNC: 100 MG/DL (ref 65–99)
HDLC SERPL-MCNC: 57 MG/DL
LDLC SERPL CALC-MCNC: 168 MG/DL (ref 0–100)
POTASSIUM SERPL-SCNC: 4.2 MMOL/L (ref 3.5–5.3)
SODIUM SERPL-SCNC: 140 MMOL/L (ref 135–147)
TRIGL SERPL-MCNC: 301 MG/DL

## 2023-05-14 DIAGNOSIS — G89.4 CHRONIC PAIN SYNDROME: ICD-10-CM

## 2023-05-14 RX ORDER — GABAPENTIN 400 MG/1
CAPSULE ORAL
Qty: 360 CAPSULE | Refills: 0 | Status: SHIPPED | OUTPATIENT
Start: 2023-05-14

## 2023-05-16 NOTE — TELEPHONE ENCOUNTER
Left detailed message on home phone number (ok per consent on file) advising pt of Gail's message regarding cholesterol results from labs  Advised pt to call back with answers to Gail's questions and if agreeable to Crestor if pt following heart health diet/taking atorvastatin daily  Awaiting call back

## 2023-05-16 NOTE — TELEPHONE ENCOUNTER
Result Notes   Minh Graff   5/12/2023  2:18 PM EDT Back to Top      I attempted to call Pilar Aria but she did not answer  I left her a voicemail requesting she call the office back to review her lab work  Her cholesterol is significantly worse than 6 months ago, especially her LDL which is now 168  Her goal LDL is <70  This poses a high risk for stroke  Please ask her to confirm that she is consistently taking Atorvastatin 80 mg daily? Has she recently been eating a more high fat diet? Fast foods or fried foods? If she is consistently taking her statin and following a heart healthy, low fat diet then we will need to change her statin to something else stronger such as Rosuvastatin (Crestor) 40 mg and repeat her panel again in 3 months  Please let me know

## 2023-05-24 ENCOUNTER — TELEPHONE (OUTPATIENT)
Dept: NEUROLOGY | Facility: CLINIC | Age: 68
End: 2023-05-24

## 2023-05-24 NOTE — TELEPHONE ENCOUNTER
Called and Left a message on pt's answering machine for a call back    No consent to leave detailed msg on file  [facility]

## 2023-05-24 NOTE — TELEPHONE ENCOUNTER
Called pt and advised her of Gail's review of results and recommendations as well as follow up questions  Pt says apparently she did not renew or  prescription of atorvastatin  Says she has been out of this medication for over a month  Pt says her cardiologist also reached out regarding labs and even pharmacy called her at one point to ask if she was still taking medication  Pt says she is now back on it and started the day she picked up medication from pharmacy which was back on May 5th  Pt will continue on it and verbalizes understanding that she is high risk for stroke with her LDL at such a high level  [facility] [phone], ok to leave detailed message

## 2023-05-24 NOTE — TELEPHONE ENCOUNTER
----- Message from Minh Prerna sent at 5/12/2023  2:18 PM EDT -----  I attempted to call Cassie Baldwin but she did not answer  I left her a voicemail requesting she call the office back to review her lab work  Her cholesterol is significantly worse than 6 months ago, especially her LDL which is now 168  Her goal LDL is <70  This poses a high risk for stroke  Please ask her to confirm that she is consistently taking Atorvastatin 80 mg daily? Has she recently been eating a more high fat diet? Fast foods or fried foods? If she is consistently taking her statin and following a heart healthy, low fat diet then we will need to change her statin to something else stronger such as Rosuvastatin (Crestor) 40 mg and repeat her panel again in 3 months  Please let me know

## 2023-05-25 NOTE — TELEPHONE ENCOUNTER
She should resume Atorvastatin 80 mg and remain on this   We will repeat her Lipid panel again in 3 months; if not at goal then we will need to switch to an alternative or add another medication such as repatha/praulent

## 2023-05-25 NOTE — TELEPHONE ENCOUNTER
Left detailed message advising pt of Gail's message and recommendations  Advised pt to call back if any further questions

## 2023-06-02 ENCOUNTER — HOSPITAL ENCOUNTER (INPATIENT)
Facility: HOSPITAL | Age: 68
LOS: 4 days | Discharge: HOME | DRG: 193 | End: 2023-06-06
Attending: EMERGENCY MEDICINE | Admitting: STUDENT IN AN ORGANIZED HEALTH CARE EDUCATION/TRAINING PROGRAM
Payer: MEDICARE

## 2023-06-02 ENCOUNTER — APPOINTMENT (EMERGENCY)
Dept: RADIOLOGY | Facility: HOSPITAL | Age: 68
DRG: 193 | End: 2023-06-02
Payer: MEDICARE

## 2023-06-02 DIAGNOSIS — N28.9 RENAL INSUFFICIENCY: ICD-10-CM

## 2023-06-02 DIAGNOSIS — J44.1 COPD WITH ACUTE EXACERBATION (HCC): Primary | ICD-10-CM

## 2023-06-02 DIAGNOSIS — J18.9 BILATERAL PNEUMONIA: ICD-10-CM

## 2023-06-02 DIAGNOSIS — J18.9 COMMUNITY ACQUIRED PNEUMONIA OF LEFT UPPER LOBE OF LUNG: ICD-10-CM

## 2023-06-02 LAB
2HR DELTA HS TROPONIN: -1 NG/L
ALBUMIN SERPL BCP-MCNC: 3.8 G/DL (ref 3.5–5)
ALP SERPL-CCNC: 91 U/L (ref 34–104)
ALT SERPL W P-5'-P-CCNC: 19 U/L (ref 7–52)
ANION GAP SERPL CALCULATED.3IONS-SCNC: 12 MMOL/L (ref 4–13)
AST SERPL W P-5'-P-CCNC: 23 U/L (ref 13–39)
BASOPHILS # BLD AUTO: 0.01 THOUSANDS/ÂΜL (ref 0–0.1)
BASOPHILS NFR BLD AUTO: 0 % (ref 0–1)
BILIRUB SERPL-MCNC: 1.15 MG/DL (ref 0.2–1)
BNP SERPL-MCNC: 100 PG/ML (ref 0–100)
BUN SERPL-MCNC: 26 MG/DL (ref 5–25)
CALCIUM SERPL-MCNC: 9.9 MG/DL (ref 8.4–10.2)
CARDIAC TROPONIN I PNL SERPL HS: 5 NG/L
CARDIAC TROPONIN I PNL SERPL HS: 6 NG/L
CHLORIDE SERPL-SCNC: 101 MMOL/L (ref 96–108)
CO2 SERPL-SCNC: 20 MMOL/L (ref 21–32)
CREAT SERPL-MCNC: 1.66 MG/DL (ref 0.6–1.3)
EOSINOPHIL # BLD AUTO: 0.1 THOUSAND/ÂΜL (ref 0–0.61)
EOSINOPHIL NFR BLD AUTO: 1 % (ref 0–6)
ERYTHROCYTE [DISTWIDTH] IN BLOOD BY AUTOMATED COUNT: 14.1 % (ref 11.6–15.1)
FLUAV RNA RESP QL NAA+PROBE: NEGATIVE
FLUBV RNA RESP QL NAA+PROBE: NEGATIVE
GFR SERPL CREATININE-BSD FRML MDRD: 31 ML/MIN/1.73SQ M
GLUCOSE SERPL-MCNC: 116 MG/DL (ref 65–140)
HCT VFR BLD AUTO: 36.9 % (ref 34.8–46.1)
HGB BLD-MCNC: 12.3 G/DL (ref 11.5–15.4)
IMM GRANULOCYTES # BLD AUTO: 0.14 THOUSAND/UL (ref 0–0.2)
IMM GRANULOCYTES NFR BLD AUTO: 2 % (ref 0–2)
LYMPHOCYTES # BLD AUTO: 1.18 THOUSANDS/ÂΜL (ref 0.6–4.47)
LYMPHOCYTES NFR BLD AUTO: 16 % (ref 14–44)
MCH RBC QN AUTO: 32.2 PG (ref 26.8–34.3)
MCHC RBC AUTO-ENTMCNC: 33.3 G/DL (ref 31.4–37.4)
MCV RBC AUTO: 97 FL (ref 82–98)
MONOCYTES # BLD AUTO: 0.8 THOUSAND/ÂΜL (ref 0.17–1.22)
MONOCYTES NFR BLD AUTO: 11 % (ref 4–12)
NEUTROPHILS # BLD AUTO: 5.33 THOUSANDS/ÂΜL (ref 1.85–7.62)
NEUTS SEG NFR BLD AUTO: 70 % (ref 43–75)
NRBC BLD AUTO-RTO: 0 /100 WBCS
PLATELET # BLD AUTO: 370 THOUSANDS/UL (ref 149–390)
PMV BLD AUTO: 9.4 FL (ref 8.9–12.7)
POTASSIUM SERPL-SCNC: 4 MMOL/L (ref 3.5–5.3)
PROCALCITONIN SERPL-MCNC: 1.29 NG/ML
PROT SERPL-MCNC: 7.9 G/DL (ref 6.4–8.4)
RBC # BLD AUTO: 3.82 MILLION/UL (ref 3.81–5.12)
RSV RNA RESP QL NAA+PROBE: NEGATIVE
SARS-COV-2 RNA RESP QL NAA+PROBE: NEGATIVE
SODIUM SERPL-SCNC: 133 MMOL/L (ref 135–147)
WBC # BLD AUTO: 7.56 THOUSAND/UL (ref 4.31–10.16)

## 2023-06-02 PROCEDURE — 85025 COMPLETE CBC W/AUTO DIFF WBC: CPT | Performed by: EMERGENCY MEDICINE

## 2023-06-02 PROCEDURE — 93005 ELECTROCARDIOGRAM TRACING: CPT

## 2023-06-02 PROCEDURE — 94640 AIRWAY INHALATION TREATMENT: CPT

## 2023-06-02 PROCEDURE — 94664 DEMO&/EVAL PT USE INHALER: CPT

## 2023-06-02 PROCEDURE — 94762 N-INVAS EAR/PLS OXIMTRY CONT: CPT

## 2023-06-02 PROCEDURE — 94760 N-INVAS EAR/PLS OXIMETRY 1: CPT

## 2023-06-02 PROCEDURE — 0241U HB NFCT DS VIR RESP RNA 4 TRGT: CPT | Performed by: EMERGENCY MEDICINE

## 2023-06-02 PROCEDURE — 36415 COLL VENOUS BLD VENIPUNCTURE: CPT | Performed by: EMERGENCY MEDICINE

## 2023-06-02 PROCEDURE — 87081 CULTURE SCREEN ONLY: CPT

## 2023-06-02 PROCEDURE — 83880 ASSAY OF NATRIURETIC PEPTIDE: CPT | Performed by: EMERGENCY MEDICINE

## 2023-06-02 PROCEDURE — 71045 X-RAY EXAM CHEST 1 VIEW: CPT

## 2023-06-02 PROCEDURE — 87040 BLOOD CULTURE FOR BACTERIA: CPT | Performed by: EMERGENCY MEDICINE

## 2023-06-02 PROCEDURE — 84484 ASSAY OF TROPONIN QUANT: CPT | Performed by: EMERGENCY MEDICINE

## 2023-06-02 PROCEDURE — 87449 NOS EACH ORGANISM AG IA: CPT | Performed by: EMERGENCY MEDICINE

## 2023-06-02 PROCEDURE — 99285 EMERGENCY DEPT VISIT HI MDM: CPT

## 2023-06-02 PROCEDURE — 80053 COMPREHEN METABOLIC PANEL: CPT | Performed by: EMERGENCY MEDICINE

## 2023-06-02 PROCEDURE — 84145 PROCALCITONIN (PCT): CPT

## 2023-06-02 PROCEDURE — 99223 1ST HOSP IP/OBS HIGH 75: CPT | Performed by: PHYSICIAN ASSISTANT

## 2023-06-02 RX ORDER — BUDESONIDE 0.5 MG/2ML
0.5 INHALANT ORAL
Status: DISCONTINUED | OUTPATIENT
Start: 2023-06-02 | End: 2023-06-06 | Stop reason: HOSPADM

## 2023-06-02 RX ORDER — CEFTRIAXONE 1 G/50ML
1000 INJECTION, SOLUTION INTRAVENOUS EVERY 24 HOURS
Status: DISCONTINUED | OUTPATIENT
Start: 2023-06-03 | End: 2023-06-06 | Stop reason: HOSPADM

## 2023-06-02 RX ORDER — LOSARTAN POTASSIUM 50 MG/1
50 TABLET ORAL
Status: DISCONTINUED | OUTPATIENT
Start: 2023-06-03 | End: 2023-06-06 | Stop reason: HOSPADM

## 2023-06-02 RX ORDER — BENZONATATE 100 MG/1
100 CAPSULE ORAL 3 TIMES DAILY PRN
Status: DISCONTINUED | OUTPATIENT
Start: 2023-06-02 | End: 2023-06-02

## 2023-06-02 RX ORDER — ISOSORBIDE MONONITRATE 30 MG/1
30 TABLET, EXTENDED RELEASE ORAL DAILY
Status: DISCONTINUED | OUTPATIENT
Start: 2023-06-03 | End: 2023-06-06 | Stop reason: HOSPADM

## 2023-06-02 RX ORDER — ACETAMINOPHEN 325 MG/1
650 TABLET ORAL EVERY 6 HOURS PRN
Status: DISCONTINUED | OUTPATIENT
Start: 2023-06-02 | End: 2023-06-06 | Stop reason: HOSPADM

## 2023-06-02 RX ORDER — METHYLPREDNISOLONE SODIUM SUCCINATE 125 MG/2ML
80 INJECTION, POWDER, LYOPHILIZED, FOR SOLUTION INTRAMUSCULAR; INTRAVENOUS ONCE
Status: COMPLETED | OUTPATIENT
Start: 2023-06-02 | End: 2023-06-02

## 2023-06-02 RX ORDER — SENNOSIDES 8.6 MG
1 TABLET ORAL
Status: DISCONTINUED | OUTPATIENT
Start: 2023-06-02 | End: 2023-06-06 | Stop reason: HOSPADM

## 2023-06-02 RX ORDER — MAGNESIUM HYDROXIDE/ALUMINUM HYDROXICE/SIMETHICONE 120; 1200; 1200 MG/30ML; MG/30ML; MG/30ML
30 SUSPENSION ORAL EVERY 6 HOURS PRN
Status: DISCONTINUED | OUTPATIENT
Start: 2023-06-02 | End: 2023-06-06 | Stop reason: HOSPADM

## 2023-06-02 RX ORDER — GUAIFENESIN 600 MG/1
600 TABLET, EXTENDED RELEASE ORAL EVERY 12 HOURS SCHEDULED
Status: DISCONTINUED | OUTPATIENT
Start: 2023-06-02 | End: 2023-06-03

## 2023-06-02 RX ORDER — PANTOPRAZOLE SODIUM 40 MG/1
40 TABLET, DELAYED RELEASE ORAL
Status: DISCONTINUED | OUTPATIENT
Start: 2023-06-03 | End: 2023-06-06 | Stop reason: HOSPADM

## 2023-06-02 RX ORDER — BENZONATATE 100 MG/1
200 CAPSULE ORAL
Status: DISCONTINUED | OUTPATIENT
Start: 2023-06-02 | End: 2023-06-06 | Stop reason: HOSPADM

## 2023-06-02 RX ORDER — FLUTICASONE FUROATE AND VILANTEROL 100; 25 UG/1; UG/1
1 POWDER RESPIRATORY (INHALATION)
Status: DISCONTINUED | OUTPATIENT
Start: 2023-06-03 | End: 2023-06-03

## 2023-06-02 RX ORDER — CEFTRIAXONE 1 G/50ML
1000 INJECTION, SOLUTION INTRAVENOUS ONCE
Status: COMPLETED | OUTPATIENT
Start: 2023-06-02 | End: 2023-06-02

## 2023-06-02 RX ORDER — CLOPIDOGREL BISULFATE 75 MG/1
75 TABLET ORAL DAILY
Status: DISCONTINUED | OUTPATIENT
Start: 2023-06-03 | End: 2023-06-06 | Stop reason: HOSPADM

## 2023-06-02 RX ORDER — ATORVASTATIN CALCIUM 40 MG/1
80 TABLET, FILM COATED ORAL
Status: DISCONTINUED | OUTPATIENT
Start: 2023-06-03 | End: 2023-06-06 | Stop reason: HOSPADM

## 2023-06-02 RX ORDER — IPRATROPIUM BROMIDE AND ALBUTEROL SULFATE 2.5; .5 MG/3ML; MG/3ML
3 SOLUTION RESPIRATORY (INHALATION) ONCE
Status: COMPLETED | OUTPATIENT
Start: 2023-06-02 | End: 2023-06-02

## 2023-06-02 RX ORDER — CARVEDILOL 12.5 MG/1
12.5 TABLET ORAL 2 TIMES DAILY
Status: DISCONTINUED | OUTPATIENT
Start: 2023-06-03 | End: 2023-06-06 | Stop reason: HOSPADM

## 2023-06-02 RX ORDER — LEVALBUTEROL INHALATION SOLUTION 1.25 MG/3ML
1.25 SOLUTION RESPIRATORY (INHALATION)
Status: DISCONTINUED | OUTPATIENT
Start: 2023-06-03 | End: 2023-06-06 | Stop reason: HOSPADM

## 2023-06-02 RX ORDER — ONDANSETRON 2 MG/ML
4 INJECTION INTRAMUSCULAR; INTRAVENOUS EVERY 6 HOURS PRN
Status: DISCONTINUED | OUTPATIENT
Start: 2023-06-02 | End: 2023-06-06 | Stop reason: HOSPADM

## 2023-06-02 RX ORDER — ESCITALOPRAM OXALATE 10 MG/1
10 TABLET ORAL DAILY
Status: DISCONTINUED | OUTPATIENT
Start: 2023-06-03 | End: 2023-06-06 | Stop reason: HOSPADM

## 2023-06-02 RX ORDER — FORMOTEROL FUMARATE 20 UG/2ML
20 SOLUTION RESPIRATORY (INHALATION)
Status: DISCONTINUED | OUTPATIENT
Start: 2023-06-02 | End: 2023-06-06 | Stop reason: HOSPADM

## 2023-06-02 RX ORDER — GABAPENTIN 400 MG/1
800 CAPSULE ORAL 2 TIMES DAILY
Status: DISCONTINUED | OUTPATIENT
Start: 2023-06-02 | End: 2023-06-06 | Stop reason: HOSPADM

## 2023-06-02 RX ORDER — MELATONIN
1000 DAILY
Status: DISCONTINUED | OUTPATIENT
Start: 2023-06-03 | End: 2023-06-06 | Stop reason: HOSPADM

## 2023-06-02 RX ORDER — HEPARIN SODIUM 5000 [USP'U]/ML
5000 INJECTION, SOLUTION INTRAVENOUS; SUBCUTANEOUS EVERY 8 HOURS SCHEDULED
Status: DISCONTINUED | OUTPATIENT
Start: 2023-06-03 | End: 2023-06-06 | Stop reason: HOSPADM

## 2023-06-02 RX ADMIN — GABAPENTIN 800 MG: 400 CAPSULE ORAL at 23:38

## 2023-06-02 RX ADMIN — AZITHROMYCIN 500 MG: 500 INJECTION, POWDER, LYOPHILIZED, FOR SOLUTION INTRAVENOUS at 20:34

## 2023-06-02 RX ADMIN — GUAIFENESIN 600 MG: 600 TABLET ORAL at 20:31

## 2023-06-02 RX ADMIN — METHYLPREDNISOLONE SODIUM SUCCINATE 80 MG: 125 INJECTION, POWDER, FOR SOLUTION INTRAMUSCULAR; INTRAVENOUS at 20:28

## 2023-06-02 RX ADMIN — CEFTRIAXONE 1000 MG: 1 INJECTION, SOLUTION INTRAVENOUS at 20:15

## 2023-06-02 RX ADMIN — FORMOTEROL FUMARATE DIHYDRATE 20 MCG: 20 SOLUTION RESPIRATORY (INHALATION) at 23:43

## 2023-06-02 RX ADMIN — BUDESONIDE 0.5 MG: 0.5 INHALANT ORAL at 23:43

## 2023-06-02 RX ADMIN — IPRATROPIUM BROMIDE AND ALBUTEROL SULFATE 3 ML: 2.5; .5 SOLUTION RESPIRATORY (INHALATION) at 19:19

## 2023-06-02 RX ADMIN — ACETAMINOPHEN 650 MG: 325 TABLET ORAL at 23:41

## 2023-06-02 NOTE — ED PROVIDER NOTES
"History  Chief Complaint   Patient presents with   • Shortness of Breath     Pt states \" I've been having trouble breathing and have fevers for a week  This was my highest temperature 102 5 around 11am\" Reports a dry cough  Reports taking tylenol around 5pm  Denies chest pain  68-year-old female presents for the evaluation of progressive shortness of breath with cough and associated fever  Patient states that for the past week she had had progressive shortness of breath and fevers from 102 5-103  She was taking Tylenol for fever reduction  Patient denies any associated chest pain, pressure, shortness of breath  Patient denies productive cough although she states that her cough is very raspy and deep  She denies sick contacts or travel  She states that she has been taking all of her medications including her asthma medications as prescribed using her albuterol several times a day  Prior to Admission Medications   Prescriptions Last Dose Informant Patient Reported? Taking?    Trelegy Ellipta 100-62 5-25 MCG/ACT inhaler  Self No No   Sig: INHALE 1 PUFF BY MOUTH ONCE DAILY RINSE MOUTH AFTER USE   albuterol (ProAir HFA) 90 mcg/act inhaler  Self No No   Sig: Inhale 2 puffs every 4 (four) hours as needed for wheezing or shortness of breath   amoxicillin (AMOXIL) 500 mg capsule  Self Yes No   Patient not taking: Reported on 4/25/2023   atorvastatin (LIPITOR) 80 mg tablet   No No   Sig: Take 1 tablet by mouth once daily   calcium carbonate (OS-FLASH) 600 MG tablet  Self Yes No   Sig: Take 600 mg by mouth 2 (two) times a day with meals   carvedilol (COREG) 12 5 mg tablet  Self No No   Sig: Take 1 tablet (12 5 mg total) by mouth 2 (two) times a day   cholecalciferol (VITAMIN D3) 1,000 units tablet  Self Yes No   Sig: Take 1,000 Units by mouth daily   clopidogrel (PLAVIX) 75 mg tablet  Self No No   Sig: Take 1 tablet (75 mg total) by mouth daily   escitalopram (LEXAPRO) 20 mg tablet   No No   Sig: Take 1/2 " (one-half) tablet by mouth once daily   gabapentin (NEURONTIN) 400 mg capsule   No No   Sig: TAKE 2 CAPSULES BY MOUTH TWICE DAILY AS DIRECTED   isosorbide mononitrate (IMDUR) 30 mg 24 hr tablet  Self No No   Sig: Take 1 tablet by mouth once daily   losartan (COZAAR) 50 mg tablet   No No   Sig: Take 1 tablet (50 mg total) by mouth daily after dinner   pantoprazole (PROTONIX) 40 mg tablet  Self No No   Sig: TAKE 1 TABLET BY MOUTH ONCE DAILY BEFORE BREAKFAST      Facility-Administered Medications: None       Past Medical History:   Diagnosis Date   • Angina pectoris ([facility] [address] )    • Arnold-Chiari malformation (HCC)    • Breast lump     last assessed: Jan 22, 2013    • Coronary artery disease    • Depression with anxiety    • GERD (gastroesophageal reflux disease)    • Gout     last assessed: Nov 26, 2012    • Hair loss     last assessed: Dec 15, 2016    • Hypotension     last assessed: Nov 10, 2014    • Mitral valve disorder    • Myocardial infarct, old    • SVT (supraventricular tachycardia) (HCC)        Past Surgical History:   Procedure Laterality Date   • BREAST BIOPSY Right 02/28/2017    US CORE BIOPSY--BENIGN   • CARDIAC CATHETERIZATION     • CERVICAL DISCECTOMY      Spinal    • CERVICAL LAMINECTOMY      C1 with chiari decompression    • COLONOSCOPY      5 yrs - onset: May 31, 2011    • CRANIOTOMY     • POPLITEAL SYNOVIAL CYST EXCISION     • TUBAL LIGATION     • US GUIDED BREAST BIOPSY RIGHT COMPLETE Right 02/28/2017       Family History   Problem Relation Age of Onset   • Stroke Mother    • Other Father         blood clot in vein & CABG    • Heart disease Father    • Prostate cancer Father    • Hearing loss Father    • Hypertension Father    • No Known Problems Sister    • No Known Problems Sister    • No Known Problems Daughter    • No Known Problems Daughter    • Breast cancer Maternal Grandmother    • No Known Problems Maternal Grandfather    • No Known Problems Paternal Grandmother    • No Known Problems Paternal Grandfather    • Alcohol abuse Brother    • Throat cancer Brother    • Cancer Brother         Throat tongue   • Hearing loss Brother    • Kidney failure Brother         kidney failure d/t NSIADs on dialysis   • Hearing loss Brother    • Crohn's disease Maternal Aunt    • No Known Problems Maternal Aunt    • No Known Problems Maternal Aunt    • Colon cancer Paternal Aunt    • No Known Problems Paternal Aunt    • No Known Problems Paternal Aunt    • No Known Problems Paternal Aunt    • Colon cancer Paternal Aunt    • Cancer Paternal Aunt         Colon cancer     I have reviewed and agree with the history as documented  E-Cigarette/Vaping   • E-Cigarette Use Never User      E-Cigarette/Vaping Substances   • Nicotine No    • THC No    • CBD No    • Flavoring No    • Other No    • Unknown No      Social History     Tobacco Use   • Smoking status: Former     Packs/day: 1 50     Years: 36 00     Total pack years: 54 00     Types: Cigarettes     Start date: 54     Quit date: 1/1/2008     Years since quitting: 15 4   • Smokeless tobacco: Never   Vaping Use   • Vaping Use: Never used   Substance Use Topics   • Alcohol use: Yes     Comment: social    • Drug use: No       Review of Systems   Constitutional: Positive for fatigue and fever  Respiratory: Positive for cough and shortness of breath  Physical Exam  Physical Exam  Vitals and nursing note reviewed  Constitutional:       Appearance: She is well-developed  HENT:      Head: Normocephalic and atraumatic  Right Ear: External ear normal       Left Ear: External ear normal    Eyes:      General: No scleral icterus  Conjunctiva/sclera: Conjunctivae normal       Pupils: Pupils are equal, round, and reactive to light  Cardiovascular:      Rate and Rhythm: Normal rate and regular rhythm  Heart sounds: Normal heart sounds  Pulmonary:      Effort: Tachypnea, accessory muscle usage and respiratory distress present        Breath sounds: Decreased breath sounds and rhonchi present  Abdominal:      General: Bowel sounds are normal       Palpations: Abdomen is soft  Tenderness: There is no abdominal tenderness  There is no guarding or rebound  Musculoskeletal:         General: Normal range of motion  Cervical back: Normal range of motion  Skin:     General: Skin is warm and dry  Findings: No rash  Neurological:      Mental Status: She is alert and oriented to person, place, and time           Vital Signs  ED Triage Vitals [06/02/23 1848]   Temperature Pulse Respirations Blood Pressure SpO2   97 8 °F (36 6 °C) 84 20 133/61 91 %      Temp Source Heart Rate Source Patient Position - Orthostatic VS BP Location FiO2 (%)   Oral Monitor Sitting Right arm --      Pain Score       No Pain           Vitals:    06/02/23 1848 06/02/23 1915 06/02/23 1930 06/02/23 2000   BP: 133/61 139/66 128/63 111/67   Pulse: 84 79 74 75   Patient Position - Orthostatic VS: Sitting Sitting Sitting Sitting         Visual Acuity      ED Medications  Medications   methylPREDNISolone sodium succinate (Solu-MEDROL) injection 80 mg (has no administration in time range)   cefTRIAXone (ROCEPHIN) IVPB (premix in dextrose) 1,000 mg 50 mL (has no administration in time range)   azithromycin (ZITHROMAX) 500 mg in sodium chloride 0 9% 250mL IVPB 500 mg (has no administration in time range)   cefTRIAXone (ROCEPHIN) IVPB (premix in dextrose) 1,000 mg 50 mL (has no administration in time range)   guaiFENesin (MUCINEX) 12 hr tablet 600 mg (has no administration in time range)   benzonatate (TESSALON PERLES) capsule 100 mg (has no administration in time range)   azithromycin (ZITHROMAX) 500 mg in sodium chloride 0 9% 250mL IVPB 500 mg (has no administration in time range)   ipratropium-albuterol (DUO-NEB) 0 5-2 5 mg/3 mL inhalation solution 3 mL (3 mL Nebulization Given 6/2/23 1919)       Diagnostic Studies  Results Reviewed     Procedure Component Value Units Date/Time    MRSA culture [[id number]]     Lab Status: No result Specimen: Nares from Nose     Legionella antigen, urine [[id number]]     Lab Status: No result Specimen: Urine, Clean Catch     Strep Pneumoniae, Urine [[id number]]     Lab Status: No result Specimen: Urine, Clean Catch     Procalcitonin [[id number]]     Lab Status: No result Specimen: Blood     HS Troponin I 4hr [[id number]]     Lab Status: No result Specimen: Blood     Blood culture #1 [[id number]]     Lab Status: No result Specimen: Blood     Blood culture #2 [[id number]]     Lab Status: No result Specimen: Blood     Legionella antigen, urine [[id number]]     Lab Status: No result Specimen: Urine     Strep Pneumoniae, Urine [[id number]]     Lab Status: No result Specimen: Urine     B-Type Natriuretic Peptide(BNP) [[id number]]  (Normal) Collected: 06/02/23 1919    Lab Status: Final result Specimen: Blood from Arm, Right Updated: 06/02/23 1954      pg/mL     HS Troponin I 2hr [[id number]]     Lab Status: No result Specimen: Blood     HS Troponin 0hr (reflex protocol) [[id number]]  (Normal) Collected: 06/02/23 1919    Lab Status: Final result Specimen: Blood from Arm, Right Updated: 06/02/23 1951     hs TnI 0hr 6 ng/L     Comprehensive metabolic panel [[id number]]  (Abnormal) Collected: 06/02/23 1919    Lab Status: Final result Specimen: Blood from Arm, Right Updated: 06/02/23 1943     Sodium 133 mmol/L      Potassium 4 0 mmol/L      Chloride 101 mmol/L      CO2 20 mmol/L      ANION GAP 12 mmol/L      BUN 26 mg/dL      Creatinine 1 66 mg/dL      Glucose 116 mg/dL      Calcium 9 9 mg/dL      AST 23 U/L      ALT 19 U/L      Alkaline Phosphatase 91 U/L      Total Protein 7 9 g/dL      Albumin 3 8 g/dL      Total Bilirubin 1 15 mg/dL      eGFR 31 ml/min/1 73sq m     Narrative:      John guidelines for Chronic Kidney Disease (CKD):   •  Stage 1 with normal or high GFR (GFR > 90 mL/min/1 73 square meters)  •  Stage 2 Mild CKD (GFR = 60-89 mL/min/1 73 square meters)  •  Stage 3A Moderate CKD (GFR = 45-59 mL/min/1 73 square meters)  •  Stage 3B Moderate CKD (GFR = 30-44 mL/min/1 73 square meters)  •  Stage 4 Severe CKD (GFR = 15-29 mL/min/1 73 square meters)  •  Stage 5 End Stage CKD (GFR <15 mL/min/1 73 square meters)  Note: GFR calculation is accurate only with a steady state creatinine    COVID/FLU/RSV [[id number]]  (Normal) Collected: 06/02/23 1851    Lab Status: Final result Specimen: Nares from Nose Updated: 06/02/23 1937     SARS-CoV-2 Negative     INFLUENZA A PCR Negative     INFLUENZA B PCR Negative     RSV PCR Negative    Narrative:      FOR PEDIATRIC PATIENTS - copy/paste COVID Guidelines URL to browser: [email or web address]  [email or web address]    SARS-CoV-2 assay is a Nucleic Acid Amplification assay intended for the  qualitative detection of nucleic acid from SARS-CoV-2 in nasopharyngeal  swabs  Results are for the presumptive identification of SARS-CoV-2 RNA  Positive results are indicative of infection with SARS-CoV-2, the virus  causing COVID-19, but do not rule out bacterial infection or co-infection  with other viruses  Laboratories within the [place] and its  territories are required to report all positive results to the appropriate  public health authorities  Negative results do not preclude SARS-CoV-2  infection and should not be used as the sole basis for treatment or other  patient management decisions  Negative results must be combined with  clinical observations, patient history, and epidemiological information  This test has not been FDA cleared or approved  This test has been authorized by FDA under an Emergency Use Authorization  (EUA)   This test is only authorized for the duration of time the  declaration that circumstances exist justifying the authorization of the  emergency use of an in vitro diagnostic tests for detection of SARS-CoV-2  virus and/or diagnosis of COVID-19 infection under section 564(b)(1) of  the Act, 21 U  S C  192LUV-4(W)(2), unless the authorization is terminated  or revoked sooner  The test has been validated but independent review by FDA  and CLIA is pending  Test performed using [employer] Gene[employer]pert: This RT-PCR assay targets N2,  a region unique to SARS-CoV-2  A conserved region in the E-gene was chosen  for pan-Sarbecovirus detection which includes SARS-CoV-2  According to CMS-2020-01-R, this platform meets the definition of high-throughput technology  CBC and differential [[id number]] Collected: 06/02/23 1919    Lab Status: Final result Specimen: Blood from Arm, Right Updated: 06/02/23 1927     WBC 7 56 Thousand/uL      RBC 3 82 Million/uL      Hemoglobin 12 3 g/dL      Hematocrit 36 9 %      MCV 97 fL      MCH 32 2 pg      MCHC 33 3 g/dL      RDW 14 1 %      MPV 9 4 fL      Platelets 464 Thousands/uL      nRBC 0 /100 WBCs      Neutrophils Relative 70 %      Immat GRANS % 2 %      Lymphocytes Relative 16 %      Monocytes Relative 11 %      Eosinophils Relative 1 %      Basophils Relative 0 %      Neutrophils Absolute 5 33 Thousands/µL      Immature Grans Absolute 0 14 Thousand/uL      Lymphocytes Absolute 1 18 Thousands/µL      Monocytes Absolute 0 80 Thousand/µL      Eosinophils Absolute 0 10 Thousand/µL      Basophils Absolute 0 01 Thousands/µL                  XR chest 1 view portable   ED Interpretation by Mere Menard DO (06/02 2000)   Bilateral infiltrates, worse when compared to previous                 Procedures  ECG 12 Lead Documentation Only    Date/Time: 6/2/2023 7:17 PM    Performed by: Mere Menard DO  Authorized by:  Mere Menard DO    Indications / Diagnosis:  Breath, cough  ECG reviewed by me, the ED Provider: yes    Patient location:  ED  Previous ECG:     Previous ECG:  Compared to current    Comparison ECG info:  Nonspecific T wave changes noted in the anterior leads when compared to previous EKG from January 12, 2023    Similarity: Changes noted  Interpretation:     Interpretation: abnormal    Rate:     ECG rate:  79    ECG rate assessment: normal    Rhythm:     Rhythm: sinus rhythm    Ectopy:     Ectopy: none    QRS:     QRS axis:  Normal    QRS intervals:  Normal  Conduction:     Conduction: normal    ST segments:     ST segments:  Normal  T waves:     T waves: non-specific               ED Course  ED Course as of 06/02/23 2016 Fri Jun 02, 2023 2000 Since dyspnea essentially unchanged after DuoNeb  I discussed the results of the chest x-ray and laboratories with the patient  The plan is for admission with IV antibiotics and steroids due to her increased work of breathing, age and history of COPD   2011 I discussed the case with the hospitalist  We reviewed the HPI, pertinent PMH, ED course and workup  Hospitalist agreed with plan and will admit the patient to the hospital                                SBIRT 20yo+    Flowsheet Row Most Recent Value   Initial Alcohol Screen: US AUDIT-C     1  How often do you have a drink containing alcohol? 6 Filed at: 06/02/2023 1915   2  How many drinks containing alcohol do you have on a typical day you are drinking? 1 Filed at: 06/02/2023 1915   3a  Male UNDER 65: How often do you have five or more drinks on one occasion? 0 Filed at: 06/02/2023 1915   3b  FEMALE Any Age, or MALE 65+: How often do you have 4 or more drinks on one occassion? 0 Filed at: 06/02/2023 1915   Audit-C Score 7 Filed at: 06/02/2023 1915   Full Alcohol Screen: US AUDIT    4  How often during the last year have you found that you were not able to stop drinking once you had started? 0 Filed at: 06/02/2023 1915   5  How often during past year have you failed to do what was normally expected of you because of drinking? 0 Filed at: 06/02/2023 1915   6  How often in past year have you needed a first drink in the morning to get yourself going after a heavy drinking session? 0 Filed at: 06/02/2023 1915   7   How often in past year have you had feeling of guilt or remorse after drinking? 0 Filed at: 06/02/2023 1915   8  How often in past year have you been unable to remember what happened night before because you had been drinking? 0 Filed at: 06/02/2023 1915   9  Have you or someone else been injured as a result of your drinking? 0 Filed at: 06/02/2023 1915   10  Has a relative, friend, doctor or other health worker been concerned about your drinking and suggested you cut down?  0 Filed at: 06/02/2023 1915   AUDIT Total Score 7 Filed at: 06/02/2023 1142   BERNADINE: How many times in the past year have you    Used an illegal drug or used a prescription medication for non-medical reasons? Never Filed at: 06/02/2023 7906                    Medical Decision Making  Differential diagnosis: Pneumonia, COPD exacerbation, COVID, flu, congestive heart failure  Plan for chest x-ray, EKG, laboratories, DuoNeb    Diagnostics reviewed and plan for broad-spectrum antibiotic coverage for pneumonia  Patient also given IV steroids for COPD exacerbation complicating the pneumonia    Amount and/or Complexity of Data Reviewed  External Data Reviewed: notes  Details: Discharge summary from January reviewed  Labs: ordered  Radiology: ordered and independent interpretation performed  Risk  Prescription drug management  Decision regarding hospitalization            Disposition  Final diagnoses:   COPD with acute exacerbation ([facility] [address] )   Bilateral pneumonia   Renal insufficiency     Time reflects when diagnosis was documented in both MDM as applicable and the Disposition within this note     Time User Action Codes Description Comment    6/2/2023  8:04 PM Josiephine Ano Add [J44 1] COPD with acute exacerbation ([facility] [address] )     6/2/2023  8:05 PM Lesa Sham B Add [J18 9] Bilateral pneumonia     6/2/2023  8:13 PM Josiephine Ano Add [N28 9] Renal insufficiency       ED Disposition     ED Disposition   Admit    Condition   Stable    Date/Time   Fri Jun 2, 2023  8:12 PM    Comment   Case was discussed with Kierra Pena and the patient's admission status was agreed to be Admission Status: inpatient status to the service of Dr Germaine Rubalcava   Follow-up Information    None         Patient's Medications   Discharge Prescriptions    No medications on file       No discharge procedures on file      PDMP Review       Value Time User    PDMP Reviewed  Yes 10/9/2022  8:23 PM Joslyn Olivares DO          ED Provider  Electronically Signed by           Олег Santos DO  06/02/23 2017

## 2023-06-03 PROBLEM — J44.9 COPD (CHRONIC OBSTRUCTIVE PULMONARY DISEASE) (HCC): Status: ACTIVE | Noted: 2019-07-18

## 2023-06-03 LAB
ALBUMIN SERPL BCP-MCNC: 3.5 G/DL (ref 3.5–5)
ALP SERPL-CCNC: 78 U/L (ref 34–104)
ALT SERPL W P-5'-P-CCNC: 17 U/L (ref 7–52)
ANION GAP SERPL CALCULATED.3IONS-SCNC: 12 MMOL/L (ref 4–13)
AST SERPL W P-5'-P-CCNC: 18 U/L (ref 13–39)
ATRIAL RATE: 79 BPM
BASOPHILS # BLD AUTO: 0.01 THOUSANDS/ÂΜL (ref 0–0.1)
BASOPHILS NFR BLD AUTO: 0 % (ref 0–1)
BILIRUB SERPL-MCNC: 0.64 MG/DL (ref 0.2–1)
BUN SERPL-MCNC: 28 MG/DL (ref 5–25)
CALCIUM SERPL-MCNC: 9.2 MG/DL (ref 8.4–10.2)
CHLORIDE SERPL-SCNC: 105 MMOL/L (ref 96–108)
CO2 SERPL-SCNC: 18 MMOL/L (ref 21–32)
CREAT SERPL-MCNC: 1.3 MG/DL (ref 0.6–1.3)
EOSINOPHIL # BLD AUTO: 0.01 THOUSAND/ÂΜL (ref 0–0.61)
EOSINOPHIL NFR BLD AUTO: 0 % (ref 0–6)
ERYTHROCYTE [DISTWIDTH] IN BLOOD BY AUTOMATED COUNT: 14 % (ref 11.6–15.1)
GFR SERPL CREATININE-BSD FRML MDRD: 41 ML/MIN/1.73SQ M
GLUCOSE SERPL-MCNC: 147 MG/DL (ref 65–140)
GLUCOSE SERPL-MCNC: 156 MG/DL (ref 65–140)
HCT VFR BLD AUTO: 34.5 % (ref 34.8–46.1)
HGB BLD-MCNC: 11.5 G/DL (ref 11.5–15.4)
IMM GRANULOCYTES # BLD AUTO: 0.08 THOUSAND/UL (ref 0–0.2)
IMM GRANULOCYTES NFR BLD AUTO: 2 % (ref 0–2)
L PNEUMO1 AG UR QL IA.RAPID: NEGATIVE
LYMPHOCYTES # BLD AUTO: 0.5 THOUSANDS/ÂΜL (ref 0.6–4.47)
LYMPHOCYTES NFR BLD AUTO: 11 % (ref 14–44)
MCH RBC QN AUTO: 32.1 PG (ref 26.8–34.3)
MCHC RBC AUTO-ENTMCNC: 33.3 G/DL (ref 31.4–37.4)
MCV RBC AUTO: 96 FL (ref 82–98)
MONOCYTES # BLD AUTO: 0.17 THOUSAND/ÂΜL (ref 0.17–1.22)
MONOCYTES NFR BLD AUTO: 4 % (ref 4–12)
NEUTROPHILS # BLD AUTO: 3.9 THOUSANDS/ÂΜL (ref 1.85–7.62)
NEUTS SEG NFR BLD AUTO: 83 % (ref 43–75)
NRBC BLD AUTO-RTO: 0 /100 WBCS
P AXIS: 63 DEGREES
PLATELET # BLD AUTO: 361 THOUSANDS/UL (ref 149–390)
PMV BLD AUTO: 9.4 FL (ref 8.9–12.7)
POTASSIUM SERPL-SCNC: 3.9 MMOL/L (ref 3.5–5.3)
PR INTERVAL: 136 MS
PROCALCITONIN SERPL-MCNC: 1.03 NG/ML
PROT SERPL-MCNC: 7.2 G/DL (ref 6.4–8.4)
QRS AXIS: 56 DEGREES
QRSD INTERVAL: 84 MS
QT INTERVAL: 394 MS
QTC INTERVAL: 451 MS
RBC # BLD AUTO: 3.58 MILLION/UL (ref 3.81–5.12)
S PNEUM AG UR QL: NEGATIVE
SODIUM SERPL-SCNC: 135 MMOL/L (ref 135–147)
T WAVE AXIS: 24 DEGREES
VENTRICULAR RATE: 79 BPM
WBC # BLD AUTO: 4.67 THOUSAND/UL (ref 4.31–10.16)

## 2023-06-03 PROCEDURE — 99223 1ST HOSP IP/OBS HIGH 75: CPT | Performed by: INTERNAL MEDICINE

## 2023-06-03 PROCEDURE — 99232 SBSQ HOSP IP/OBS MODERATE 35: CPT | Performed by: INTERNAL MEDICINE

## 2023-06-03 PROCEDURE — 94640 AIRWAY INHALATION TREATMENT: CPT

## 2023-06-03 PROCEDURE — 92610 EVALUATE SWALLOWING FUNCTION: CPT

## 2023-06-03 PROCEDURE — 80053 COMPREHEN METABOLIC PANEL: CPT | Performed by: PHYSICIAN ASSISTANT

## 2023-06-03 PROCEDURE — 85025 COMPLETE CBC W/AUTO DIFF WBC: CPT | Performed by: PHYSICIAN ASSISTANT

## 2023-06-03 PROCEDURE — 84145 PROCALCITONIN (PCT): CPT | Performed by: PHYSICIAN ASSISTANT

## 2023-06-03 PROCEDURE — 82948 REAGENT STRIP/BLOOD GLUCOSE: CPT

## 2023-06-03 PROCEDURE — 93010 ELECTROCARDIOGRAM REPORT: CPT | Performed by: INTERNAL MEDICINE

## 2023-06-03 PROCEDURE — 94760 N-INVAS EAR/PLS OXIMETRY 1: CPT

## 2023-06-03 RX ORDER — GUAIFENESIN 600 MG/1
1200 TABLET, EXTENDED RELEASE ORAL EVERY 12 HOURS SCHEDULED
Status: DISCONTINUED | OUTPATIENT
Start: 2023-06-03 | End: 2023-06-06 | Stop reason: HOSPADM

## 2023-06-03 RX ORDER — LORATADINE 10 MG/1
10 TABLET ORAL DAILY
Status: DISCONTINUED | OUTPATIENT
Start: 2023-06-03 | End: 2023-06-06 | Stop reason: HOSPADM

## 2023-06-03 RX ORDER — LORATADINE 10 MG/1
10 TABLET ORAL
Status: DISCONTINUED | OUTPATIENT
Start: 2023-06-03 | End: 2023-06-03

## 2023-06-03 RX ADMIN — BUDESONIDE 0.5 MG: 0.5 INHALANT ORAL at 08:07

## 2023-06-03 RX ADMIN — FORMOTEROL FUMARATE DIHYDRATE 20 MCG: 20 SOLUTION RESPIRATORY (INHALATION) at 08:08

## 2023-06-03 RX ADMIN — IPRATROPIUM BROMIDE 0.5 MG: 0.5 SOLUTION RESPIRATORY (INHALATION) at 13:55

## 2023-06-03 RX ADMIN — CLOPIDOGREL BISULFATE 75 MG: 75 TABLET ORAL at 09:32

## 2023-06-03 RX ADMIN — CARVEDILOL 12.5 MG: 12.5 TABLET, FILM COATED ORAL at 09:32

## 2023-06-03 RX ADMIN — FLUTICASONE FUROATE AND VILANTEROL TRIFENATATE 1 PUFF: 100; 25 POWDER RESPIRATORY (INHALATION) at 09:31

## 2023-06-03 RX ADMIN — PANTOPRAZOLE SODIUM 40 MG: 40 TABLET, DELAYED RELEASE ORAL at 05:11

## 2023-06-03 RX ADMIN — Medication 1000 UNITS: at 09:32

## 2023-06-03 RX ADMIN — GUAIFENESIN 1200 MG: 600 TABLET ORAL at 21:24

## 2023-06-03 RX ADMIN — HEPARIN SODIUM 5000 UNITS: 5000 INJECTION INTRAVENOUS; SUBCUTANEOUS at 21:24

## 2023-06-03 RX ADMIN — LEVALBUTEROL HYDROCHLORIDE 1.25 MG: 1.25 SOLUTION RESPIRATORY (INHALATION) at 08:07

## 2023-06-03 RX ADMIN — LEVALBUTEROL HYDROCHLORIDE 1.25 MG: 1.25 SOLUTION RESPIRATORY (INHALATION) at 13:55

## 2023-06-03 RX ADMIN — HEPARIN SODIUM 5000 UNITS: 5000 INJECTION INTRAVENOUS; SUBCUTANEOUS at 15:55

## 2023-06-03 RX ADMIN — UMECLIDINIUM 1 PUFF: 62.5 AEROSOL, POWDER ORAL at 09:31

## 2023-06-03 RX ADMIN — AZITHROMYCIN MONOHYDRATE 500 MG: 500 INJECTION, POWDER, LYOPHILIZED, FOR SOLUTION INTRAVENOUS at 20:00

## 2023-06-03 RX ADMIN — HEPARIN SODIUM 5000 UNITS: 5000 INJECTION INTRAVENOUS; SUBCUTANEOUS at 05:11

## 2023-06-03 RX ADMIN — CEFTRIAXONE 1000 MG: 1 INJECTION, SOLUTION INTRAVENOUS at 20:00

## 2023-06-03 RX ADMIN — LORATADINE 10 MG: 10 TABLET ORAL at 09:32

## 2023-06-03 RX ADMIN — ISOSORBIDE MONONITRATE 30 MG: 30 TABLET, EXTENDED RELEASE ORAL at 09:32

## 2023-06-03 RX ADMIN — ESCITALOPRAM OXALATE 10 MG: 10 TABLET ORAL at 09:32

## 2023-06-03 RX ADMIN — GABAPENTIN 800 MG: 400 CAPSULE ORAL at 21:24

## 2023-06-03 RX ADMIN — GUAIFENESIN 600 MG: 600 TABLET ORAL at 09:31

## 2023-06-03 RX ADMIN — LOSARTAN POTASSIUM 50 MG: 50 TABLET, FILM COATED ORAL at 17:20

## 2023-06-03 RX ADMIN — BUDESONIDE 0.5 MG: 0.5 INHALANT ORAL at 19:45

## 2023-06-03 RX ADMIN — FORMOTEROL FUMARATE DIHYDRATE 20 MCG: 20 SOLUTION RESPIRATORY (INHALATION) at 19:45

## 2023-06-03 RX ADMIN — CARVEDILOL 12.5 MG: 12.5 TABLET, FILM COATED ORAL at 17:20

## 2023-06-03 RX ADMIN — GABAPENTIN 800 MG: 400 CAPSULE ORAL at 09:32

## 2023-06-03 RX ADMIN — LEVALBUTEROL HYDROCHLORIDE 1.25 MG: 1.25 SOLUTION RESPIRATORY (INHALATION) at 19:45

## 2023-06-03 RX ADMIN — ATORVASTATIN CALCIUM 80 MG: 40 TABLET, FILM COATED ORAL at 17:20

## 2023-06-03 RX ADMIN — IPRATROPIUM BROMIDE 0.5 MG: 0.5 SOLUTION RESPIRATORY (INHALATION) at 08:07

## 2023-06-03 RX ADMIN — IPRATROPIUM BROMIDE 0.5 MG: 0.5 SOLUTION RESPIRATORY (INHALATION) at 19:45

## 2023-06-03 NOTE — PROGRESS NOTES
[place]  Progress Note  Name: True Montanez I  MRN: [id number]  Unit/Bed#: -21 I Date of Admission: 6/2/2023   Date of Service: 6/3/2023 I Hospital Day: 1    Assessment/Plan   * Community acquired pneumonia of left upper lobe of lung  Assessment & Plan  · Presented due to shortness of breath with cough and fever over the last couple of days  · CXR with left upper and mid lung pneumonia  · Not meeting SIRS/sepsis however BC x 2 pending   · Procalcitonin 1 29  · Urine antigens pending  · Sputum culture and MRSA swab pending  · Initiated on IV ceftriaxone and azithromycin, continue  · Mucinex twice daily  · Monitor O2 sats -stable at 93% on room air upon admission -slight desaturation 84% with ambulation requiring brief nasal cannula oxygen, however quickly recovered and was weaned down to room air  · Pulmonology consulted     COPD (chronic obstructive pulmonary disease) ([facility] [address] )  Assessment & Plan  Home regimen: Trelegy and albuterol as needed  Not on home O2  PFT 2016 showed FEV1/FVC Ratio: 62 % and  FEV1:  60 % predicted  Moderate airway obstruction   · Patient with active wheezing on admission  · Received 80 mg Solu-Medrol in ED  · Pulmicort and performance twice daily  · Xopenex and Atrovent 3 times daily  · Hold further steroids for now due to significant concurrent pneumonia  Appears improving with Abx  Continue to hold off on Steroid  · Pulmonology consulted    Stage 3a chronic kidney disease [facility])  Assessment & Plan  Lab Results   Component Value Date    CREATININE 1 30 06/03/2023    CREATININE 1 66 (H) 06/02/2023    CREATININE 1 27 05/12/2023    EGFR 41 06/03/2023    EGFR 31 06/02/2023    EGFR 42 05/12/2023     · Creatinine baseline 1 4-1 6  · Creatinine on admission 1 66, does not meet ANGELINE   Improved to nl 1 30   · Avoid nephrotoxins and hypotension  · Monitor I's/O      Takotsubo cardiomyopathy  Assessment & Plan  · Continue home coreg, imdur, plavix  · Denies any associated chest pain  · HS troponins negative  · Echo 2019 nl EF 55-65%         VTE Pharmacologic Prophylaxis:   Pharmacologic: Heparin    Discussions with Specialists or Other Care Team Provider:     Education and Discussions with Family / Patient: I spoke with Pt's family at bedside    Current Length of Stay: 1 day(s)    Current Patient Status: Inpatient   Certification Statement: The patient will continue to require additional inpatient hospital stay due to IV abx      Code Status: Level 1 - Full Code      Subjective:   Pt states that her breathing is better this morning  No conversational dyspnea  SaO2 96% on 1 liter  Reports fever and dyspnea for 2 days  Dry cough  Denies sick contact    Objective:     Vitals:   Temp (24hrs), Av 8 °F (36 6 °C), Min:97 6 °F (36 4 °C), Max:98 2 °F (36 8 °C)    Temp:  [97 6 °F (36 4 °C)-98 2 °F (36 8 °C)] 97 7 °F (36 5 °C)  HR:  [69-84] 77  Resp:  [17-20] 17  BP: (110-143)/(61-77) 143/77  SpO2:  [91 %-96 %] 93 %  Body mass index is 27 19 kg/m²  Input and Output Summary (last 24 hours): Intake/Output Summary (Last 24 hours) at 6/3/2023 1246  Last data filed at 6/3/2023 0936  Gross per 24 hour   Intake 470 ml   Output --   Net 470 ml       Physical Exam:     Physical Exam  Constitutional:       General: She is not in acute distress  Appearance: She is not ill-appearing, toxic-appearing or diaphoretic  Eyes:      General:         Right eye: No discharge  Left eye: No discharge  Cardiovascular:      Rate and Rhythm: Normal rate and regular rhythm  Pulses: Normal pulses  Pulmonary:      Effort: Pulmonary effort is normal  No respiratory distress  Breath sounds: No wheezing or rales  Abdominal:      General: Bowel sounds are normal  There is no distension  Palpations: Abdomen is soft  Tenderness: There is no abdominal tenderness  Skin:     General: Skin is warm     Neurological:      Mental Status: She is oriented to person, place, and time  Psychiatric:         Behavior: Behavior normal          Additional Data:     Labs:    Results from last 7 days   Lab Units 06/03/23  0519   EOS PCT % 0   HEMATOCRIT % 34 5*   HEMOGLOBIN g/dL 11 5   LYMPHS PCT % 11*   MONOS PCT % 4   NEUTROS PCT % 83*   PLATELETS Thousands/uL 361   WBC Thousand/uL 4 67     Results from last 7 days   Lab Units 06/03/23  0519   ALK PHOS U/L 78   ALT U/L 17   AST U/L 18   BUN mg/dL 28*   CALCIUM mg/dL 9 2   CHLORIDE mmol/L 105   CO2 mmol/L 18*   CREATININE mg/dL 1 30   POTASSIUM mmol/L 3 9             Recent Cultures (last 7 days):     Results from last 7 days   Lab Units 06/02/23 2101 06/02/23 2021   BLOOD CULTURE   --  Received in Microbiology Lab  Culture in Progress  Received in Microbiology Lab  Culture in Progress     LEGIONELLA URINARY ANTIGEN  Negative  --        Last 24 Hours Medication List:   Current Facility-Administered Medications   Medication Dose Route Frequency Provider Last Rate   • acetaminophen  650 mg Oral Q6H PRN Jt Gonzales PA-C     • aluminum-magnesium hydroxide-simethicone  30 mL Oral Q6H PRN Jt Gonzales PA-C     • atorvastatin  80 mg Oral After Addie Park PA-C     • azithromycin  500 mg Intravenous Q24H Jt Gonzales PA-C     • benzonatate  200 mg Oral HS PRN Jt Gonzales PA-C     • budesonide  0 5 mg Nebulization Q12H Jt Gonzales PA-C     • carvedilol  12 5 mg Oral BID Jt Gonzales PA-C     • cefTRIAXone  1,000 mg Intravenous Q24H Jt Gonzales PA-C     • cholecalciferol  1,000 Units Oral Daily Jt Gonzales PA-C     • clopidogrel  75 mg Oral Daily Jt Gonzales PA-C     • escitalopram  10 mg Oral Daily Jt Gonzales PA-C     • Fluticasone Furoate-Vilanterol  1 puff Inhalation Daily Jt Gonzales PA-C      And   • umeclidinium  1 puff Inhalation Daily Jt Gonzales PA-C     • formoterol  20 mcg Nebulization Q12H Jt Gonzales PA-C     • gabapentin  800 mg Oral BID Bashir Leo PA-C     • guaiFENesin  600 mg Oral Q12H [address] Bashir Leo PA-C     • heparin (porcine)  5,000 Units Subcutaneous [facility] Bashir Leo PA-C     • ipratropium  0 5 mg Nebulization TID Bashir Leo PA-C     • isosorbide mononitrate  30 mg Oral Daily Bashir Leo PA-C     • levalbuterol  1 25 mg Nebulization TID Bashir Leo PA-C     • loratadine  10 mg Oral Daily Bashir Leo PA-C     • losartan  50 mg Oral After Vanda Blair PA-C     • ondansetron  4 mg Intravenous Q6H PRN Bashir Leo PA-C     • pantoprazole  40 mg Oral Early Morning Bashir Leo PA-C     • senna  1 tablet Oral HS PRN Bashir Leo PA-C          Today, Patient Was Seen By: Bean Coleman MD    ** Please Note: Dictation voice to text software may have been used in the creation of this document   **

## 2023-06-03 NOTE — H&P
[facility]  H&P  Name: Los Chapin 77 y o  female I MRN: [id number]  Unit/Bed#: ED 05 I Date of Admission: 6/2/2023   Date of Service: 6/3/2023 I Hospital Day: 1      Assessment/Plan   * Community acquired pneumonia of left upper lobe of lung  Assessment & Plan  · Presented due to shortness of breath with cough over the last couple of days  · CXR with left upper lobe pneumonia  · Not meeting SIRS/sepsis however BC x 2 pending   · Procalcitonin 1 29  · Urine antigens pending  · Sputum culture and MRSA swab pending  · Initiated on IV ceftriaxone and azithromycin, continue  · Mucinex twice daily  · Monitor O2 sats -stable at 93% on room air upon admission -slight desaturation 84% with ambulation requiring brief nasal cannula oxygen, however quickly recovered and was weaned down to room air  · Pulmonology consulted due to concurrent severe emphysema with acute exacerbation    Emphysema  Assessment & Plan  · Home regimen: Trelegy and albuterol as needed  · Patient with active wheezing on admission  · Received 80 mg Solu-Medrol in ED  · IV azithromycin x 3 days   · Pulmicort and performance twice daily  · Xopenex and Atrovent 3 times daily  · Hold further steroids for now due to significant concurrent pneumonia  · Pulmonology consulted    Stage 3a chronic kidney disease [facility])  Assessment & Plan  Lab Results   Component Value Date    CREATININE 1 66 (H) 06/02/2023    CREATININE 1 27 05/12/2023    CREATININE 1 54 (H) 01/26/2023    EGFR 31 06/02/2023    EGFR 42 05/12/2023    EGFR 34 01/26/2023     · Creatinine baseline 1 4-1 6  · Creatinine on admission 1 66, does not meet ANGELINE  · Avoid nephrotoxins and hypotension  · Monitor I's/O  · Trend BMP daily    Essential hypertension  Assessment & Plan  · Home regimen: Carvedilol 12 5 mg twice daily and losartan 50 Mg daily  · Continue    Takotsubo cardiomyopathy  Assessment & Plan  · Continue home coreg, imdur, plavix  · Denies any associated chest "pain  · HS troponins negative       VTE Pharmacologic Prophylaxis: VTE Score: 7 High Risk (Score >/= 5) - Pharmacological DVT Prophylaxis Ordered: heparin  Sequential Compression Devices Ordered  Code Status: Level 1 - Full Code   Discussion with family: I offered to call the patient's , however she declined  Anticipated Length of Stay: Patient will be admitted on an inpatient basis with an anticipated length of stay of greater than 2 midnights secondary to Left upper lobe pneumonia requiring IV antibiotics  COPD exacerbation requiring scheduled nebulizers  Total Time Spent on Date of Encounter in care of patient: 75 minutes This time was spent on one or more of the following: performing physical exam; counseling and coordination of care; obtaining or reviewing history; documenting in the medical record; reviewing/ordering tests, medications or procedures; communicating with other healthcare professionals and discussing with patient's family/caregivers  Chief Complaint: \" My shortness of breath has been bad over the last couple of days\"    History of Present Illness:  Thomas Fernandez is a 77 y o  female with a PMH of severe emphysema on Trelegy, Takotsubo cardiomyopathy, and HTN who presents with severe dyspnea over the last couple of days with acute worsening today  Associated with fever x2 days with Tmax of 102 5 °F today  Reports dry cough  No chest pain  Reports utilizing home albuterol inhaler twice daily, or does not have any home nebulizers  Reports sneezing and coughing since springtime, which she had attributed to her allergies  Denies abdominal pain, nausea, vomiting, change in bowel or bladder habits  Reports breathing on admission is about the same as when she came in  Review of Systems:  Review of Systems   Constitutional: Positive for fever  Negative for chills  HENT: Positive for congestion and sneezing  Respiratory: Positive for cough and shortness of breath    " Cardiovascular: Negative for chest pain and leg swelling  Gastrointestinal: Negative for abdominal pain, constipation, diarrhea, nausea and vomiting  Genitourinary: Negative for difficulty urinating, dysuria and hematuria  Musculoskeletal: Negative for gait problem  Neurological: Negative for weakness and numbness  All other systems reviewed and are negative  Past Medical and Surgical History:   Past Medical History:   Diagnosis Date   • Angina pectoris ([facility] )    • Arnold-Chiari malformation ([facility] )    • Breast lump     last assessed: Jan 22, 2013    • Coronary artery disease    • Depression with anxiety    • GERD (gastroesophageal reflux disease)    • Gout     last assessed: Nov 26, 2012    • Hair loss     last assessed: Dec 15, 2016    • Hypotension     last assessed: Nov 10, 2014    • Mitral valve disorder    • Myocardial infarct, old    • SVT (supraventricular tachycardia) ([facility] )        Past Surgical History:   Procedure Laterality Date   • BREAST BIOPSY Right 02/28/2017    US CORE BIOPSY--BENIGN   • CARDIAC CATHETERIZATION     • CERVICAL DISCECTOMY      Spinal    • CERVICAL LAMINECTOMY      C1 with chiari decompression    • COLONOSCOPY      5 yrs - onset: May 31, 2011    • CRANIOTOMY     • POPLITEAL SYNOVIAL CYST EXCISION     • TUBAL LIGATION     • US GUIDED BREAST BIOPSY RIGHT COMPLETE Right 02/28/2017       Meds/Allergies:  Prior to Admission medications    Medication Sig Start Date End Date Taking?  Authorizing Provider   atorvastatin (LIPITOR) 80 mg tablet Take 1 tablet by mouth once daily 5/7/23  Yes Paulo De La Torre MD   calcium carbonate (OS-FLASH) 600 MG tablet Take 600 mg by mouth 2 (two) times a day with meals   Yes Historical Provider, MD   carvedilol (COREG) 12 5 mg tablet Take 1 tablet (12 5 mg total) by mouth 2 (two) times a day 10/25/22  Yes Paulo De La Torre MD   cholecalciferol (VITAMIN D3) 1,000 units tablet Take 1,000 Units by mouth daily   Yes Historical Provider, MD clopidogrel (PLAVIX) 75 mg tablet Take 1 tablet (75 mg total) by mouth daily 2/20/23  Yes Candance Don, DO   escitalopram (LEXAPRO) 20 mg tablet Take 1/2 (one-half) tablet by mouth once daily 4/30/23  Yes Candance Don, DO   gabapentin (NEURONTIN) 400 mg capsule TAKE 2 CAPSULES BY MOUTH TWICE DAILY AS DIRECTED  Patient taking differently: Take 800 mg by mouth 2 (two) times a day Am and HS 5/14/23  Yes Candance Don, DO   isosorbide mononitrate (IMDUR) 30 mg 24 hr tablet Take 1 tablet by mouth once daily 4/17/23  Yes Yessy Tilley MD   losartan (COZAAR) 50 mg tablet Take 1 tablet (50 mg total) by mouth daily after dinner 4/25/23  Yes Yessy Tilley MD   pantoprazole (PROTONIX) 40 mg tablet TAKE 1 TABLET BY MOUTH ONCE DAILY BEFORE BREAKFAST 4/6/23  Yes Candance Don, DO   Trelegy Ellipta 549-83 4-88 MCG/ACT inhaler INHALE 1 PUFF BY MOUTH ONCE DAILY RINSE MOUTH AFTER USE 1/5/23  Yes Candance Don, DO   albuterol (ProAir HFA) 90 mcg/act inhaler Inhale 2 puffs every 4 (four) hours as needed for wheezing or shortness of breath 8/22/22   Candance Don, DO   dicyclomine (BENTYL) 10 mg capsule TAKE 1 CAPSULE BY MOUTH 4 TIMES DAILY BEFORE MEAL(S) AND AT BEDTIME 3/12/22 1/17/23  Candance Don, DO   famotidine (PEPCID) 40 MG tablet Take 1 tablet (40 mg total) by mouth daily at bedtime 6/30/22 1/17/23  Allen Kearns MD   fluticasone [facility]) 50 mcg/act nasal spray 2 sprays into each nostril daily 12/5/19 1/17/23  Candance Don, DO   multivitamin [facility]) TABS Take 1 tablet by mouth daily  1/17/23  Historical Provider, MD     I have reviewed home medications with patient personally  Allergies:    Allergies   Allergen Reactions   • Codeine Itching   • Medical Tape Rash       Social History:  Marital Status: /Civil Union   Occupation: Retired  Patient Pre-hospital Living Situation: Home, With spouse  Patient Pre-hospital Level of Mobility: walks  Patient Pre-hospital Diet Restrictions: "None  Substance Use History:   Social History     Substance and Sexual Activity   Alcohol Use Yes    Comment: social      Social History     Tobacco Use   Smoking Status Former   • Packs/day: 1 50   • Years: 36 00   • Total pack years: 54 00   • Types: Cigarettes   • Start date: 54   • Quit date: 1/1/2008   • Years since quitting: 15 4   Smokeless Tobacco Never     Social History     Substance and Sexual Activity   Drug Use No       Family History:  Family History   Problem Relation Age of Onset   • Stroke Mother    • Other Father         blood clot in vein & CABG    • Heart disease Father    • Prostate cancer Father    • Hearing loss Father    • Hypertension Father    • No Known Problems Sister    • No Known Problems Sister    • No Known Problems Daughter    • No Known Problems Daughter    • Breast cancer Maternal Grandmother    • No Known Problems Maternal Grandfather    • No Known Problems Paternal Grandmother    • No Known Problems Paternal Grandfather    • Alcohol abuse Brother    • Throat cancer Brother    • Cancer Brother         Throat tongue   • Hearing loss Brother    • Kidney failure Brother         kidney failure d/t NSIADs on dialysis   • Hearing loss Brother    • Crohn's disease Maternal Aunt    • No Known Problems Maternal Aunt    • No Known Problems Maternal Aunt    • Colon cancer Paternal Aunt    • No Known Problems Paternal Aunt    • No Known Problems Paternal Aunt    • No Known Problems Paternal Aunt    • Colon cancer Paternal Aunt    • Cancer Paternal Aunt         Colon cancer       Physical Exam:     Vitals:   Blood Pressure: 110/65 (06/02/23 2030)  Pulse: 79 (06/02/23 2030)  Temperature: 98 2 °F (36 8 °C) (06/02/23 2253)  Temp Source: Oral (06/02/23 2253)  Respirations: 20 (06/02/23 2030)  Height: 5' 5\" (165 1 cm) (06/02/23 1848)  Weight - Scale: 72 6 kg (160 lb) (06/02/23 1848)  SpO2: 92 % (06/02/23 2030)    Physical Exam  Vitals and nursing note reviewed     Constitutional:       Appearance: " Normal appearance  Comments: Pleasant and conversational   HENT:      Head: Normocephalic  Nose: Nose normal       Mouth/Throat:      Mouth: Mucous membranes are moist    Eyes:      Extraocular Movements: Extraocular movements intact  Conjunctiva/sclera: Conjunctivae normal    Cardiovascular:      Rate and Rhythm: Normal rate and regular rhythm  Pulses: Normal pulses  Heart sounds: No murmur heard  Pulmonary:      Comments: Rhonchi in left upper lobe  Diffuse expiratory wheezing posteriorly  Slight conversational dyspnea, however recovers quickly though  Abdominal:      General: Abdomen is flat  Palpations: Abdomen is soft  Tenderness: There is no abdominal tenderness  There is no guarding or rebound  Musculoskeletal:         General: Normal range of motion  Cervical back: Normal range of motion  Right lower leg: No edema  Left lower leg: No edema  Skin:     General: Skin is warm and dry  Coloration: Skin is not pale  Neurological:      General: No focal deficit present  Mental Status: She is alert and oriented to person, place, and time  Psychiatric:         Mood and Affect: Mood normal          Thought Content:  Thought content normal           Additional Data:     Lab Results:  Results from last 7 days   Lab Units 06/02/23 1919   EOS PCT % 1   HEMATOCRIT % 36 9   HEMOGLOBIN g/dL 12 3   LYMPHS PCT % 16   MONOS PCT % 11   NEUTROS PCT % 70   PLATELETS Thousands/uL 370   WBC Thousand/uL 7 56     Results from last 7 days   Lab Units 06/02/23 1919   ANION GAP mmol/L 12   ALBUMIN g/dL 3 8   ALK PHOS U/L 91   ALT U/L 19   AST U/L 23   BUN mg/dL 26*   CALCIUM mg/dL 9 9   CHLORIDE mmol/L 101   CO2 mmol/L 20*   CREATININE mg/dL 1 66*   GLUCOSE RANDOM mg/dL 116   POTASSIUM mmol/L 4 0   SODIUM mmol/L 133*   TOTAL BILIRUBIN mg/dL 1 15*                 Results from last 7 days   Lab Units 06/02/23 1919   PROCALCITONIN ng/ml 1 29* Lines/Drains:  Invasive Devices     Peripheral Intravenous Line  Duration           Peripheral IV 06/02/23 Right Antecubital <1 day                    Imaging: Personally reviewed the following imaging: Chest x-ray with left upper lobe pneumonia  XR chest 1 view portable   ED Interpretation by Elva Ross DO (06/02 2000)   Bilateral infiltrates, worse when compared to previous          EKG and Other Studies Reviewed on Admission:   · EKG: NSR with no acute ischemia  ** Please Note: This note has been constructed using a voice recognition system   **

## 2023-06-03 NOTE — SPEECH THERAPY NOTE
Speech Language/Pathology  Speech-Language Pathology Bedside Swallow Evaluation        Patient Name: Nelson Montgomery    ZPGLE'L Date: 6/3/2023     Problem List  Principal Problem:    Community acquired pneumonia of left upper lobe of lung  Active Problems:    Essential hypertension    Takotsubo cardiomyopathy    COPD (chronic obstructive pulmonary disease) ([facility] )    Stage 3a chronic kidney disease ([facility] )         Past Medical History  Past Medical History:   Diagnosis Date   • Angina pectoris ([facility] )    • Arnold-Chiari malformation ([facility] )    • Breast lump     last assessed: Jan 22, 2013    • COPD (chronic obstructive pulmonary disease) ([facility] )    • Coronary artery disease    • Depression with anxiety    • GERD (gastroesophageal reflux disease)    • Gout     last assessed: Nov 26, 2012    • Hair loss     last assessed: Dec 15, 2016    • Hypertension    • Hypotension     last assessed: Nov 10, 2014    • Mitral valve disorder    • Myocardial infarct, old    • Stroke [facility])    • SVT (supraventricular tachycardia) [facility])        Past Surgical History  Past Surgical History:   Procedure Laterality Date   • BREAST BIOPSY Right 02/28/2017    US CORE BIOPSY--BENIGN   • CARDIAC CATHETERIZATION     • CERVICAL DISCECTOMY      Spinal    • CERVICAL LAMINECTOMY      C1 with chiari decompression    • COLONOSCOPY      5 yrs - onset: May 31, 2011    • CRANIOTOMY     • POPLITEAL SYNOVIAL CYST EXCISION     • TUBAL LIGATION     • US GUIDED BREAST BIOPSY RIGHT COMPLETE Right 02/28/2017       Summary    Pt presents with oropharyngeal swallow that appears WNL  No overt s/s of aspiration observed  There was occasional cough before and during the meal, but did not appear timed with the swallow  Pt independently took breaks while eating, as she sometimes became SOB due to COPD and pneumonia  O2 was in the mid 90s during lunch  VBS completed in Aug 2022 did not show aspiration, only minimal penetration of thin liquids   Per VBS report there did appear to be an esophageal impairment, increasing risk for retrograde aspiration, and also pt has GERD  Pt had an EGD in May 2022, but does not think she has had an esophagram  Pt may benefit from this test  Pt able to accurately recall compensatory strategies for swallowing that were provided to her last year, including oral prep/set, extra dry swallows, and reports she still tries to follow the strategies  At this time pt appears appropriate to continue current regular diet and thin liquids, while following strategies listed below (pt followed independently during eval)  If pneumonia does not improve, consider repeating VBS  Also recommend esophagram to further assess esophageal motility if this has not been completed (unable to locate any report from this in chart)  Recommendations:   Diet: regular diet and thin liquids   Meds: whole with liquid   Frequent Oral care: 2x/day  Pt independent for feeding  Reflux precautions  Aspiration precautions and compensatory swallowing strategies: upright posture, slow rate of feeding and oral prep/set, extra dry swallows, pacing during meals (take breaks as needed if SOB), avoid distractions and talking while eating  Other Recommendations/ considerations: ST to see for dysphagia tx, likely 1-2 sessions  If pneumonia is not improving, consider VBS  Consider esophagram for potential esophageal dysfunction  Current Medical Status  Copied from admission:  Cat Deluna is a 77 y o  female with a PMH of severe emphysema on Trelegy, Takotsubo cardiomyopathy, and HTN who presents with severe dyspnea over the last couple of days with acute worsening today  Associated with fever x2 days with Tmax of 102 5 °F today  Reports dry cough  No chest pain  Reports utilizing home albuterol inhaler twice daily, or does not have any home nebulizers  Reports sneezing and coughing since springtime, which she had attributed to her allergies    Denies abdominal pain, nausea, vomiting, "change in bowel or bladder habits  Reports breathing on admission is about the same as when she came in  Order received and chart reviewed  Pt does report intermittent dysphagia, that she has to pay attention when swallowing \"so it goes down right\"  Pt recounts a specific recent episode recently when she was distracted by her grandson while she was eating, and she feels she likely aspirated (pt was eating salad)  Pt was seen by ST in August 2022 for VBS:  Video Barium Swallow Study     Summary:  Images are on PACS for review  Oral stage: Appears WNL, with prompt mastication, bolus formation and transfer, good oral control of the bolus, and no significant oral residue or pocketing  Pharyngeal stage: Appears WFL  Epiglottis appears somewhat small in size, with reduced inversion intermittently, and reduced hyolaryngeal elevation and anterior motion, however these do not appear to effect the safety of the swallow  There was minimal transient penetration of thin barium on the first two sips, but no further penetration on further trials of thin barium  There was no penetration or aspiration on any other consistencies  Min/mild residue with thin liquids noted on BOT, vallecula, and PPW, but cleared with a dry swallow  Per gross esophageal screen: Brief scan of the esophagus is suggestive of dysmotility/slow motility, retropulsion/reflux, and what appeared to be esophageal spasms in the lower third of the esophagus  Pt is at elevated risk for bottom up aspiration, and would benefit from esophagram for further assessment of the esophagus  Recommendations:  Diet: Regular  Liquids:  Thin  Meds: Whole with water  Strategies: Frequent dry swallows  Frequent oral care  Upright position  F/u ST tx: Yes  Therapy Prognosis: Good  Prognosis considerations: Age, cognition, motivation  Intermittent Supervision  Aspiration Precautions  Reflux Precautions, including remaining upright at least 45 minutes after " meals, HOB elevated while sleeping, avoid eating 2-3 hours before bed  Consider consult with: GI  Results reviewed with: pt, nursing, PA     Repeat VBS as necessary  Recommend esophagram/barium swallow to further assess the esophagus  Pt had EGD in May  Past medical history:   Please see H&P for details      Special Studies:  CXR-  FINDINGS:     Cardiomediastinal silhouette normal      Pneumonia in the left upper and mid lung  Severe emphysema with scar in the right midlung  No effusion or pneumothorax  Upper abdomen normal  Bones normal for age  Cervicothoracic fusion  IMPRESSION:     Severe emphysema with pneumonia in the left upper and mid lung  EGD May 2022:  IMPRESSION:  Mild-to-moderate antral gastritis  Slight irregularity at Z-line  Biopsies obtained to assess for EOE, Doty's, H pylori and celiac  Swartz dilatation empirically performed for treatment of dysphagia     RECOMMENDATION:  There is no recommended follow-up for this procedure    Reflux diet and precautions  Resume usual medications  Trial of famotidine 40 mg daily  GI office follow-up with nurse practitioner in 1-2 months as planned         Social/Education/Vocational Hx:  Pt lives with family    Swallow Information   Current Risks for Dysphagia & Aspiration: known history of dysphagia and R lingual atrophy resulting from previous chiari malformation  Current Symptoms/Concerns: change in respiratory status  Current Diet: regular diet and thin liquids   Baseline Diet: regular diet and thin liquids    Baseline Assessment   Behavior/Cognition: alert  Speech/Language Status: able to participate in conversation and able to follow commands  Patient Positioning: upright in bed     Swallow Mechanism Exam   Facial: symmetrical  Labial: WFL  Lingual: R sided lingual atrophy 2* consequence of chiari malformation that pt reports herniated several years ago  Velum: symmetrical  Mandible: adequate ROM  Dentition: some missing teeth  Vocal quality:clear/adequate   Volitional Cough: strong/productive   Respiratory: Sats in mid 90s with midflow NC      Consistencies Assessed and Performance   Consistencies Administered: thin liquids, soft solids and hard solids  -pt fed herself lunch, including garden salad with dressing, egg salad, thin liquids via straw    Oral Stage: Adequate bolus retrieval and draw from straw  Mastication and bolus transfer appeared prompt  No residue or pocketing  Adequate lip seal      Pharyngeal Stage: Hyolaryngeal elevation observed and palpated  Swallows appeared prompt  There were no overt s/s of aspiration  Occasional cough observed before and during the meal, but not timed with the swallow  Esophageal Concerns: Hx of GERD      Results Reviewed with: patient and RN   Dysphagia Goals: 1  Pt will tolerate regular diet and thin liquids with prompt oropharyngeal swallows and no overt s/s of aspiration  2  Pt will use compensatory strategies to improve safety and reduce risk of aspiration during 100% of meals with min cues  3  Pt will tolerate LRD without s/s of aspiration     Discharge recommendation: likely no follow up needed for swallowing    Speech Therapy Prognosis   Prognosis: good    Prognosis Considerations: age, medical status, cognitive status and therapeutic potential

## 2023-06-03 NOTE — PROGRESS NOTES
[place]  Progress Note  Name: True Montanez I  MRN: [id number]  Unit/Bed#: ED 05 I Date of Admission: 6/2/2023   Date of Service: 6/2/2023 I Hospital Day: 0    Assessment/Plan   * Pneumonia  Assessment & Plan  · Presented due to shortness of breath + increased WOB  · CXR: Pending final read however concerning for left upper lobe pneumonia  · Not meeting SIRS/sepsis however BC x 2 pending   · Procalcitonin pending  · Urine antigens pending  · Sputum culture and MRSA swab pending  · Initiated on IV ceftriaxone, continue  · Monitor O2 sats -stable at 93% on room air upon admission    Emphysema  Assessment & Plan  · Acute exacerbation**  · Received 80 mg Solu-Medrol in ED  · IV azithromycin x 3 days   · Respiratory protocol + nebs  · Stable on room air upon admission    Stage 3a chronic kidney disease [facility])  Assessment & Plan  Lab Results   Component Value Date    CREATININE 1 66 (H) 06/02/2023    CREATININE 1 27 05/12/2023    CREATININE 1 54 (H) 01/26/2023    EGFR 31 06/02/2023    EGFR 42 05/12/2023    EGFR 34 01/26/2023     · Creatinine baseline 1-1 3  · Creatinine on admission 1 66, does not meet ANGELINE  · Hold losartan  · Avoid nephrotoxins and hypotension  · Retention protocol  · Monitor I's/O    Takotsubo cardiomyopathy  Assessment & Plan  · Continue home coreg, imdur, plavix    Essential hypertension  Assessment & Plan  · Continue home carvedilol  · Hold losartan given elevated renal function             VTE Pharmacologic Prophylaxis:   {VTE Risk:89738}  Code Status: Prior ***  Discussion with family: {Family Communication:57886}    Anticipated Length of Stay: {Inpt Obs:76797}    Total Time Spent on Date of Encounter in care of patient: {Note Time H&P:88547} This time was spent on one or more of the following: performing physical exam; counseling and coordination of care; obtaining or reviewing history; documenting in the medical record; reviewing/ordering tests, medications or procedures; communicating with other healthcare professionals and discussing with patient's family/caregivers  Chief Complaint: ***    History of Present Illness:  Paco Blackwell is a 77 y o  female with a PMH of *** who presents with ***  Review of Systems:  Review of Systems    Past Medical and Surgical History:   Past Medical History:   Diagnosis Date   • Angina pectoris ([facility] )    • Arnold-Chiari malformation ([facility] )    • Breast lump     last assessed: Jan 22, 2013    • Coronary artery disease    • Depression with anxiety    • GERD (gastroesophageal reflux disease)    • Gout     last assessed: Nov 26, 2012    • Hair loss     last assessed: Dec 15, 2016    • Hypotension     last assessed: Nov 10, 2014    • Mitral valve disorder    • Myocardial infarct, old    • SVT (supraventricular tachycardia) ([facility] )        Past Surgical History:   Procedure Laterality Date   • BREAST BIOPSY Right 02/28/2017    US CORE BIOPSY--BENIGN   • CARDIAC CATHETERIZATION     • CERVICAL DISCECTOMY      Spinal    • CERVICAL LAMINECTOMY      C1 with chiari decompression    • COLONOSCOPY      5 yrs - onset: May 31, 2011    • CRANIOTOMY     • POPLITEAL SYNOVIAL CYST EXCISION     • TUBAL LIGATION     • US GUIDED BREAST BIOPSY RIGHT COMPLETE Right 02/28/2017       Meds/Allergies:  Prior to Admission medications    Medication Sig Start Date End Date Taking?  Authorizing Provider   albuterol (ProAir HFA) 90 mcg/act inhaler Inhale 2 puffs every 4 (four) hours as needed for wheezing or shortness of breath 8/22/22   Katya Padron,    amoxicillin (AMOXIL) 500 mg capsule  4/14/23   Historical Provider, MD   atorvastatin (LIPITOR) 80 mg tablet Take 1 tablet by mouth once daily 5/7/23   Angelica Reaves MD   calcium carbonate (OS-FLASH) 600 MG tablet Take 600 mg by mouth 2 (two) times a day with meals    Historical Provider, MD   carvedilol (COREG) 12 5 mg tablet Take 1 tablet (12 5 mg total) by mouth 2 (two) times a day 10/25/22   Tod Clancy Minh Pham MD   cholecalciferol (VITAMIN D3) 1,000 units tablet Take 1,000 Units by mouth daily    Historical Provider, MD   clopidogrel (PLAVIX) 75 mg tablet Take 1 tablet (75 mg total) by mouth daily 2/20/23   Chucky Camacho DO   escitalopram (LEXAPRO) 20 mg tablet Take 1/2 (one-half) tablet by mouth once daily 4/30/23   Chucky Camacho DO   gabapentin (NEURONTIN) 400 mg capsule TAKE 2 CAPSULES BY MOUTH TWICE DAILY AS DIRECTED 5/14/23   Chucky Camacho DO   isosorbide mononitrate (IMDUR) 30 mg 24 hr tablet Take 1 tablet by mouth once daily 4/17/23   Tonya Bejarano MD   losartan (COZAAR) 50 mg tablet Take 1 tablet (50 mg total) by mouth daily after dinner 4/25/23   Tonya Bejarano MD   pantoprazole (PROTONIX) 40 mg tablet TAKE 1 TABLET BY MOUTH ONCE DAILY BEFORE BREAKFAST 4/6/23   Chucky Camacho DO   Trelegy Ellipta 298-31 1-09 MCG/ACT inhaler INHALE 1 PUFF BY MOUTH ONCE DAILY RINSE MOUTH AFTER USE 1/5/23   Chucky Camacho DO   dicyclomine (BENTYL) 10 mg capsule TAKE 1 CAPSULE BY MOUTH 4 TIMES DAILY BEFORE MEAL(S) AND AT BEDTIME 3/12/22 1/17/23  Chucky Camacho DO   famotidine (PEPCID) 40 MG tablet Take 1 tablet (40 mg total) by mouth daily at bedtime 6/30/22 1/17/23  Brittany Ding MD   fluticasone [facility]) 50 mcg/act nasal spray 2 sprays into each nostril daily 12/5/19 1/17/23  Chucky Camacho DO   multivitamin [facility]) TABS Take 1 tablet by mouth daily  1/17/23  Historical Provider, MD     {Home YIYJ:35018}    Allergies:    Allergies   Allergen Reactions   • Codeine Itching   • Medical Tape Rash       Social History:  Marital Status: /Civil Union   Occupation: ***  Patient Pre-hospital Living Situation: {Living Situation:20936}  Patient Pre-hospital Level of Mobility: {Mobility:02241}  Patient Pre-hospital Diet Restrictions: ***  Substance Use History:   Social History     Substance and Sexual Activity   Alcohol Use Yes    Comment: social      Social History     Tobacco Use   Smoking Status "Former   • Packs/day: 1 50   • Years: 36 00   • Total pack years: 54 00   • Types: Cigarettes   • Start date: 54   • Quit date: 2008   • Years since quitting: 15 4   Smokeless Tobacco Never     Social History     Substance and Sexual Activity   Drug Use No       Family History:  {SL IP SLIM Notes [id number]    Physical Exam:     Vitals:   Blood Pressure: 111/67 (23)  Pulse: 75 (23)  Temperature: 97 8 °F (36 6 °C) (23)  Temp Source: Oral (23)  Respirations: 20 (23)  Height: 5' 5\" (165 1 cm) (23)  Weight - Scale: 72 6 kg (160 lb) (23)  SpO2: 94 % (23)    Physical Exam ***    Additional Data:     Lab Results:  Results from last 7 days   Lab Units 23   EOS PCT % 1   HEMATOCRIT % 36 9   HEMOGLOBIN g/dL 12 3   LYMPHS PCT % 16   MONOS PCT % 11   NEUTROS PCT % 70   PLATELETS Thousands/uL 370   WBC Thousand/uL 7 56     Results from last 7 days   Lab Units 23   ANION GAP mmol/L 12   ALBUMIN g/dL 3 8   ALK PHOS U/L 91   ALT U/L 19   AST U/L 23   BUN mg/dL 26*   CALCIUM mg/dL 9 9   CHLORIDE mmol/L 101   CO2 mmol/L 20*   CREATININE mg/dL 1 66*   GLUCOSE RANDOM mg/dL 116   POTASSIUM mmol/L 4 0   SODIUM mmol/L 133*   TOTAL BILIRUBIN mg/dL 1 15*                       Lines/Drains:  Invasive Devices     Peripheral Intravenous Line  Duration           Peripheral IV 23 Right Antecubital <1 day                    Imaging: {Radiology Review:98174}  XR chest 1 view portable   ED Interpretation by Flash Rose DO (2000)   Bilateral infiltrates, worse when compared to previous          EKG and Other Studies Reviewed on Admission:   · EKG: {Interpretation of EK}    ** Please Note: This note has been constructed using a voice recognition system   **  "

## 2023-06-03 NOTE — ASSESSMENT & PLAN NOTE
· Home regimen: Trelegy and albuterol as needed  · Patient with active wheezing on admission  · Received 80 mg Solu-Medrol in ED  · IV azithromycin x 3 days   · Pulmicort and performance twice daily  · Xopenex and Atrovent 3 times daily  · Hold further steroids for now due to significant concurrent pneumonia  · Pulmonology consulted

## 2023-06-03 NOTE — ASSESSMENT & PLAN NOTE
Lab Results   Component Value Date    CREATININE 1 30 06/03/2023    CREATININE 1 66 (H) 06/02/2023    CREATININE 1 27 05/12/2023    EGFR 41 06/03/2023    EGFR 31 06/02/2023    EGFR 42 05/12/2023     · Creatinine baseline 1 4-1 6  · Creatinine on admission 1 66, does not meet ANGELINE   Improved to nl 1 30   · Avoid nephrotoxins and hypotension  · Monitor I's/O

## 2023-06-03 NOTE — CONSULTS
Consultation - Pulmonary Medicine   Alaina Khan 77 y o  female MRN: [id number]  Unit/Bed#: -01 Encounter: [id number]      Assessment/Plan:     Acute hypoxic respiratory failure  -Patient on room air at home continue to wean oxygen as tolerated as patient's condition improves    Community-acquired pneumonia  -Agree with Rocephin and Zithromax no history of multidrug-resistant organisms  Patient had pneumonia last year with normal respiratory merry de-escalate per urine antigens and sputum, add Mucinex 1200 twice daily    Moderate COPD with acute exacerbation  -No audible wheeze at this time agree with holding off on further steroids  I was able to de-escalate patient's inhaler regiment although she is on Trelegy at home I would continue all nebulized solutions while in the hospital with Perforomist and budesonide and hold her inhalers  Continue Xopenex Atrovent 3 times daily  Upon discharge can resume her home Trelegy  History of Present Illness   Physician Requesting Consult: Sadia Odell MD  Reason for Consult / Principal Problem: Pneumonia    HPI: Alaina Khan is a 77y o  year old female who presents with acute onset of shortness of breath and fever  Patient really denies any mucus production but does have a dry cough  She is had no sick contacts at home and has been sick for 3 to 4 days  She had runny nose but no nausea vomiting or diarrhea  She has a history of moderate COPD maintains on Trelegy but does need to use her rescue inhaler  Over the course of several days she has had increased her rescue inhaler several times a day to help improve her shortness of breath  She had audible wheezing in the emergency department currently feels better  She quit smoking 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      Inpatient consult to Pulmonology  Consult performed by: Nader Diggs DO  Consult ordered by: Alex Ganser, PA-C          Review of Systems   Constitutional: Positive for fatigue and fever  Negative for unexpected weight change  HENT: Negative  Negative for postnasal drip  Eyes: Negative  Respiratory: Positive for cough and shortness of breath  Negative for wheezing  Cardiovascular: Negative  Negative for chest pain and leg swelling  Gastrointestinal: Negative  Endocrine: Negative  Genitourinary: Negative  Musculoskeletal: Negative  Allergic/Immunologic: Negative  Neurological: Negative  Hematological: Negative          Historical Information   Past Medical History:   Diagnosis Date   • Angina pectoris ([facility] )    • Arnold-Chiari malformation ([facility] )    • Breast lump     last assessed: Jan 22, 2013    • COPD (chronic obstructive pulmonary disease) ([facility] )    • Coronary artery disease    • Depression with anxiety    • GERD (gastroesophageal reflux disease)    • Gout     last assessed: Nov 26, 2012    • Hair loss     last assessed: Dec 15, 2016    • Hypertension    • Hypotension     last assessed: Nov 10, 2014    • Mitral valve disorder    • Myocardial infarct, old    • Stroke [facility])    • SVT (supraventricular tachycardia) ([place] [id number] )      Past Surgical History:   Procedure Laterality Date   • BREAST BIOPSY Right 02/28/2017    US CORE BIOPSY--BENIGN   • CARDIAC CATHETERIZATION     • CERVICAL DISCECTOMY      Spinal    • CERVICAL LAMINECTOMY      C1 with chiari decompression    • COLONOSCOPY      5 yrs - onset: May 31, 2011    • CRANIOTOMY     • POPLITEAL SYNOVIAL CYST EXCISION     • TUBAL LIGATION     • US GUIDED BREAST BIOPSY RIGHT COMPLETE Right 02/28/2017     Social History   Social History     Substance and Sexual Activity   Alcohol Use Yes   • Alcohol/week: 1 0 standard drink of alcohol   • Types: 1 Cans of beer per week     Social History     Substance and Sexual Activity   Drug Use No     E-Cigarette/Vaping   • E-Cigarette Use Never User      E-Cigarette/Vaping Substances   • Nicotine No    • THC No    • CBD No    • "Flavoring No    • Other No    • Unknown No      Social History     Tobacco Use   Smoking Status Former   • Packs/day: 1 50   • Years: 36 00   • Total pack years: 54 00   • Types: Cigarettes   • Start date: 54   • Quit date: 1/1/2008   • Years since quitting: 15 4   Smokeless Tobacco Never   Tobacco Comments    Patient quit     Occupational History: Retired    Family History: non-contributory    Meds/Allergies   all current active meds have been reviewed    Allergies   Allergen Reactions   • Codeine Itching   • Medical Tape Rash       Objective   Vitals: Blood pressure 143/77, pulse 77, temperature 97 7 °F (36 5 °C), resp  rate 17, height 5' 5\" (1 651 m), weight 74 1 kg (163 lb 6 4 oz), SpO2 93 %  ,Body mass index is 27 19 kg/m²  Intake/Output Summary (Last 24 hours) at 6/3/2023 1409  Last data filed at 6/3/2023 8874  Gross per 24 hour   Intake 470 ml   Output --   Net 470 ml     Invasive Devices     Peripheral Intravenous Line  Duration           Peripheral IV 06/02/23 Right Antecubital <1 day                Physical Exam  Constitutional:       Appearance: She is well-developed  HENT:      Head: Normocephalic and atraumatic  Eyes:      Pupils: Pupils are equal, round, and reactive to light  Cardiovascular:      Rate and Rhythm: Normal rate and regular rhythm  Heart sounds: No murmur heard  Pulmonary:      Effort: Pulmonary effort is normal  No respiratory distress  Breath sounds: Rhonchi present  No wheezing or rales  Abdominal:      Palpations: Abdomen is soft  Musculoskeletal:      Cervical back: Normal range of motion and neck supple  Skin:     General: Skin is warm and dry  Neurological:      Mental Status: She is alert and oriented to person, place, and time           Lab Results:   CBC:   Lab Results   Component Value Date    HCT 34 5 (L) 06/03/2023    HGB 11 5 06/03/2023    MCH 32 1 06/03/2023    MCHC 33 3 06/03/2023    MCV 96 06/03/2023    MPV 9 4 06/03/2023    NRBC 0 06/03/2023 "  06/03/2023    RBC 3 58 (L) 06/03/2023    RDW 14 0 06/03/2023    WBC 4 67 06/03/2023   , CMP:   Lab Results   Component Value Date    ALKPHOS 78 06/03/2023    ALT 17 06/03/2023    AST 18 06/03/2023    BUN 28 (H) 06/03/2023    CALCIUM 9 2 06/03/2023     06/03/2023    CO2 18 (L) 06/03/2023    CREATININE 1 30 06/03/2023    EGFR 41 06/03/2023    K 3 9 06/03/2023    SODIUM 135 06/03/2023     Imaging Studies: I have personally reviewed pertinent films in PACS  EKG, Pathology, and Other Studies: I have personally reviewed pertinent films in PACS  VTE Prophylaxis: Heparin    Code Status: Level 1 - Full Code  Advance Directive and Living Will:      Power of :    POLST:      None

## 2023-06-03 NOTE — ASSESSMENT & PLAN NOTE
Lab Results   Component Value Date    CREATININE 1 66 (H) 06/02/2023    CREATININE 1 27 05/12/2023    CREATININE 1 54 (H) 01/26/2023    EGFR 31 06/02/2023    EGFR 42 05/12/2023    EGFR 34 01/26/2023     · Creatinine baseline 1 4-1 6  · Creatinine on admission 1 66, does not meet ANGELINE  · Avoid nephrotoxins and hypotension  · Monitor I's/O  · Trend BMP daily

## 2023-06-03 NOTE — ASSESSMENT & PLAN NOTE
· Presented due to shortness of breath with cough over the last couple of days  · CXR with left upper lobe pneumonia  · Not meeting SIRS/sepsis however BC x 2 pending   · Procalcitonin 1 29  · Urine antigens pending  · Sputum culture and MRSA swab pending  · Initiated on IV ceftriaxone and azithromycin, continue  · Mucinex twice daily  · Monitor O2 sats -stable at 93% on room air upon admission -slight desaturation 84% with ambulation requiring brief nasal cannula oxygen, however quickly recovered and was weaned down to room air  · Pulmonology consulted due to concurrent severe emphysema with acute exacerbation

## 2023-06-03 NOTE — PLAN OF CARE
Problem: Potential for Falls  Goal: Patient will remain free of falls  Description: INTERVENTIONS:  - Educate patient/family on patient safety including physical limitations  - Instruct patient to call for assistance with activity   - Consult OT/PT to assist with strengthening/mobility   - Keep Call bell within reach  - Keep bed low and locked with side rails adjusted as appropriate  - Keep care items and personal belongings within reach  - Initiate and maintain comfort rounds  - Make Fall Risk Sign visible to staff  - Offer Toileting every 2 Hours, in advance of need  - Initiate/Maintain bed alarm  - Obtain necessary fall risk management equipment: socks  - Apply yellow socks and bracelet for high fall risk patients  - Consider moving patient to room near nurses station  Outcome: Progressing     Problem: PAIN - ADULT  Goal: Verbalizes/displays adequate comfort level or baseline comfort level  Description: Interventions:  - Encourage patient to monitor pain and request assistance  - Assess pain using appropriate pain scale  - Administer analgesics based on type and severity of pain and evaluate response  - Implement non-pharmacological measures as appropriate and evaluate response  - Consider cultural and social influences on pain and pain management  - Notify physician/advanced practitioner if interventions unsuccessful or patient reports new pain  Outcome: Progressing     Problem: INFECTION - ADULT  Goal: Absence or prevention of progression during hospitalization  Description: INTERVENTIONS:  - Assess and monitor for signs and symptoms of infection  - Monitor lab/diagnostic results  - Monitor all insertion sites, i e  indwelling lines, tubes, and drains  - Monitor endotracheal if appropriate and nasal secretions for changes in amount and color  - [place] appropriate cooling/warming therapies per order  - Administer medications as ordered  - Instruct and encourage patient and family to use good hand hygiene technique  - Identify and instruct in appropriate isolation precautions for identified infection/condition  Outcome: Progressing  Goal: Absence of fever/infection during neutropenic period  Description: INTERVENTIONS:  - Monitor WBC    Outcome: Progressing     Problem: SAFETY ADULT  Goal: Patient will remain free of falls  Description: INTERVENTIONS:  - Educate patient/family on patient safety including physical limitations  - Instruct patient to call for assistance with activity   - Consult OT/PT to assist with strengthening/mobility   - Keep Call bell within reach  - Keep bed low and locked with side rails adjusted as appropriate  - Keep care items and personal belongings within reach  - Initiate and maintain comfort rounds  - Make Fall Risk Sign visible to staff  - Offer Toileting every 2 Hours, in advance of need  - Initiate/Maintain bed alarm  - Obtain necessary fall risk management equipment: socks  - Apply yellow socks and bracelet for high fall risk patients  - Consider moving patient to room near nurses station  Outcome: Progressing  Goal: Maintain or return to baseline ADL function  Description: INTERVENTIONS:  -  Assess patient's ability to carry out ADLs; assess patient's baseline for ADL function and identify physical deficits which impact ability to perform ADLs (bathing, care of mouth/teeth, toileting, grooming, dressing, etc )  - Assess/evaluate cause of self-care deficits   - Assess range of motion  - Assess patient's mobility; develop plan if impaired  - Assess patient's need for assistive devices and provide as appropriate  - Encourage maximum independence but intervene and supervise when necessary  - Involve family in performance of ADLs  - Assess for home care needs following discharge   - Consider OT consult to assist with ADL evaluation and planning for discharge  - Provide patient education as appropriate  Outcome: Progressing  Goal: Maintains/Returns to pre admission functional level  Description: INTERVENTIONS:  - Perform BMAT or MOVE assessment daily    - Set and communicate daily mobility goal to care team and patient/family/caregiver  - Collaborate with rehabilitation services on mobility goals if consulted  - Perform Range of Motion 3 times a day  - Reposition patient every 2 hours  - Dangle patient 3 times a day  - Stand patient 3 times a day  - Ambulate patient 3 times a day  - Out of bed to chair 3 times a day   - Out of bed for meals 3 times a day  - Out of bed for toileting  - Record patient progress and toleration of activity level   Outcome: Progressing     Problem: DISCHARGE PLANNING  Goal: Discharge to home or other facility with appropriate resources  Description: INTERVENTIONS:  - Identify barriers to discharge w/patient and caregiver  - Arrange for needed discharge resources and transportation as appropriate  - Identify discharge learning needs (meds, wound care, etc )  - Arrange for interpretive services to assist at discharge as needed  - Refer to Case Management Department for coordinating discharge planning if the patient needs post-hospital services based on physician/advanced practitioner order or complex needs related to functional status, cognitive ability, or social support system  Outcome: Progressing     Problem: Knowledge Deficit  Goal: Patient/family/caregiver demonstrates understanding of disease process, treatment plan, medications, and discharge instructions  Description: Complete learning assessment and assess knowledge base    Interventions:  - Provide teaching at level of understanding  - Provide teaching via preferred learning methods  Outcome: Progressing     Problem: RESPIRATORY - ADULT  Goal: Achieves optimal ventilation and oxygenation  Description: INTERVENTIONS:  - Assess for changes in respiratory status  - Assess for changes in mentation and behavior  - Position to facilitate oxygenation and minimize respiratory effort  - Oxygen administered by appropriate delivery if ordered  - Initiate smoking cessation education as indicated  - Encourage broncho-pulmonary hygiene including cough, deep breathe, Incentive Spirometry  - Assess the need for suctioning and aspirate as needed  - Assess and instruct to report SOB or any respiratory difficulty  - Respiratory Therapy support as indicated  Outcome: Progressing

## 2023-06-03 NOTE — ASSESSMENT & PLAN NOTE
· Presented due to shortness of breath with cough and fever over the last couple of days  · CXR with left upper and mid lung pneumonia  · Not meeting SIRS/sepsis however BC x 2 pending   · Procalcitonin 1 29  · Urine antigens pending  · Sputum culture and MRSA swab pending  · Initiated on IV ceftriaxone and azithromycin, continue  · Mucinex twice daily  · Monitor O2 sats -stable at 93% on room air upon admission -slight desaturation 84% with ambulation requiring brief nasal cannula oxygen, however quickly recovered and was weaned down to room air  · Pulmonology consulted

## 2023-06-03 NOTE — PLAN OF CARE
Problem: Potential for Falls  Goal: Patient will remain free of falls  Description: INTERVENTIONS:  - Educate patient/family on patient safety including physical limitations  - Instruct patient to call for assistance with activity   - Consult OT/PT to assist with strengthening/mobility   - Keep Call bell within reach  - Keep bed low and locked with side rails adjusted as appropriate  - Keep care items and personal belongings within reach  - Initiate and maintain comfort rounds  - Make Fall Risk Sign visible to staff  - Offer Toileting every 2 Hours, in advance of need  - Initiate/Maintain bed alarm  - Obtain necessary fall risk management equipment: non-slip socks  - Apply yellow socks and bracelet for high fall risk patients  - Consider moving patient to room near nurses station  Outcome: Progressing     Problem: PAIN - ADULT  Goal: Verbalizes/displays adequate comfort level or baseline comfort level  Description: Interventions:  - Encourage patient to monitor pain and request assistance  - Assess pain using appropriate pain scale  - Administer analgesics based on type and severity of pain and evaluate response  - Implement non-pharmacological measures as appropriate and evaluate response  - Consider cultural and social influences on pain and pain management  - Notify physician/advanced practitioner if interventions unsuccessful or patient reports new pain  Outcome: Progressing     Problem: INFECTION - ADULT  Goal: Absence or prevention of progression during hospitalization  Description: INTERVENTIONS:  - Assess and monitor for signs and symptoms of infection  - Monitor lab/diagnostic results  - Monitor all insertion sites, i e  indwelling lines, tubes, and drains  - Monitor endotracheal if appropriate and nasal secretions for changes in amount and color  - [place] appropriate cooling/warming therapies per order  - Administer medications as ordered  - Instruct and encourage patient and family to use good hand hygiene technique  - Identify and instruct in appropriate isolation precautions for identified infection/condition  Outcome: Progressing     Problem: SAFETY ADULT  Goal: Maintain or return to baseline ADL function  Description: INTERVENTIONS:  -  Assess patient's ability to carry out ADLs; assess patient's baseline for ADL function and identify physical deficits which impact ability to perform ADLs (bathing, care of mouth/teeth, toileting, grooming, dressing, etc )  - Assess/evaluate cause of self-care deficits   - Assess range of motion  - Assess patient's mobility; develop plan if impaired  - Assess patient's need for assistive devices and provide as appropriate  - Encourage maximum independence but intervene and supervise when necessary  - Involve family in performance of ADLs  - Assess for home care needs following discharge   - Consider OT consult to assist with ADL evaluation and planning for discharge  - Provide patient education as appropriate  Outcome: Progressing  Goal: Maintains/Returns to pre admission functional level  Description: INTERVENTIONS:  - Perform BMAT or MOVE assessment daily    - Set and communicate daily mobility goal to care team and patient/family/caregiver  - Collaborate with rehabilitation services on mobility goals if consulted  - Perform Range of Motion 3 times a day  - Reposition patient every 2 hours    - Dangle patient 3 times a day  - Stand patient 3 times a day  - Ambulate patient 3 times a day  - Out of bed to chair 3 times a day for meals  - Out of bed for toileting  - Record patient progress and toleration of activity level   Outcome: Progressing     Problem: DISCHARGE PLANNING  Goal: Discharge to home or other facility with appropriate resources  Description: INTERVENTIONS:  - Identify barriers to discharge w/patient and caregiver  - Arrange for needed discharge resources and transportation as appropriate  - Identify discharge learning needs (meds, wound care, etc )  - Arrange for interpretive services to assist at discharge as needed  - Refer to Case Management Department for coordinating discharge planning if the patient needs post-hospital services based on physician/advanced practitioner order or complex needs related to functional status, cognitive ability, or social support system  Outcome: Progressing     Problem: Knowledge Deficit  Goal: Patient/family/caregiver demonstrates understanding of disease process, treatment plan, medications, and discharge instructions  Description: Complete learning assessment and assess knowledge base    Interventions:  - Provide teaching at level of understanding  - Provide teaching via preferred learning methods  Outcome: Progressing     Problem: RESPIRATORY - ADULT  Goal: Achieves optimal ventilation and oxygenation  Description: INTERVENTIONS:  - Assess for changes in respiratory status  - Assess for changes in mentation and behavior  - Position to facilitate oxygenation and minimize respiratory effort  - Oxygen administered by appropriate delivery if ordered  - Initiate smoking cessation education as indicated  - Encourage broncho-pulmonary hygiene including cough, deep breathe, Incentive Spirometry  - Assess the need for suctioning and aspirate as needed  - Assess and instruct to report SOB or any respiratory difficulty  - Respiratory Therapy support as indicated  Outcome: Progressing

## 2023-06-03 NOTE — ASSESSMENT & PLAN NOTE
Home regimen: Trelegy and albuterol as needed  Not on home O2  PFT 2016 showed FEV1/FVC Ratio: 62 % and  FEV1:  60 % predicted  Moderate airway obstruction   · Patient with active wheezing on admission  · Received 80 mg Solu-Medrol in ED  · Pulmicort and performance twice daily  · Xopenex and Atrovent 3 times daily  · Hold further steroids for now due to significant concurrent pneumonia  Appears improving with Abx  Continue to hold off on Steroid     · Pulmonology consulted

## 2023-06-04 LAB
ANION GAP SERPL CALCULATED.3IONS-SCNC: 10 MMOL/L (ref 4–13)
BASOPHILS # BLD AUTO: 0.02 THOUSANDS/ÂΜL (ref 0–0.1)
BASOPHILS NFR BLD AUTO: 0 % (ref 0–1)
BUN SERPL-MCNC: 30 MG/DL (ref 5–25)
CALCIUM SERPL-MCNC: 9.1 MG/DL (ref 8.4–10.2)
CHLORIDE SERPL-SCNC: 106 MMOL/L (ref 96–108)
CO2 SERPL-SCNC: 23 MMOL/L (ref 21–32)
CREAT SERPL-MCNC: 1.17 MG/DL (ref 0.6–1.3)
EOSINOPHIL # BLD AUTO: 0.05 THOUSAND/ÂΜL (ref 0–0.61)
EOSINOPHIL NFR BLD AUTO: 1 % (ref 0–6)
ERYTHROCYTE [DISTWIDTH] IN BLOOD BY AUTOMATED COUNT: 14.5 % (ref 11.6–15.1)
GFR SERPL CREATININE-BSD FRML MDRD: 47 ML/MIN/1.73SQ M
GLUCOSE SERPL-MCNC: 85 MG/DL (ref 65–140)
HCT VFR BLD AUTO: 37 % (ref 34.8–46.1)
HGB BLD-MCNC: 12.1 G/DL (ref 11.5–15.4)
IMM GRANULOCYTES # BLD AUTO: 0.04 THOUSAND/UL (ref 0–0.2)
IMM GRANULOCYTES NFR BLD AUTO: 1 % (ref 0–2)
LYMPHOCYTES # BLD AUTO: 1.07 THOUSANDS/ÂΜL (ref 0.6–4.47)
LYMPHOCYTES NFR BLD AUTO: 21 % (ref 14–44)
MCH RBC QN AUTO: 32.1 PG (ref 26.8–34.3)
MCHC RBC AUTO-ENTMCNC: 32.7 G/DL (ref 31.4–37.4)
MCV RBC AUTO: 98 FL (ref 82–98)
MONOCYTES # BLD AUTO: 0.64 THOUSAND/ÂΜL (ref 0.17–1.22)
MONOCYTES NFR BLD AUTO: 13 % (ref 4–12)
MRSA NOSE QL CULT: NORMAL
NEUTROPHILS # BLD AUTO: 3.32 THOUSANDS/ÂΜL (ref 1.85–7.62)
NEUTS SEG NFR BLD AUTO: 64 % (ref 43–75)
NRBC BLD AUTO-RTO: 0 /100 WBCS
PLATELET # BLD AUTO: 470 THOUSANDS/UL (ref 149–390)
PMV BLD AUTO: 9.2 FL (ref 8.9–12.7)
POTASSIUM SERPL-SCNC: 3.6 MMOL/L (ref 3.5–5.3)
PROCALCITONIN SERPL-MCNC: 0.58 NG/ML
RBC # BLD AUTO: 3.77 MILLION/UL (ref 3.81–5.12)
SODIUM SERPL-SCNC: 139 MMOL/L (ref 135–147)
WBC # BLD AUTO: 5.14 THOUSAND/UL (ref 4.31–10.16)

## 2023-06-04 PROCEDURE — 99232 SBSQ HOSP IP/OBS MODERATE 35: CPT | Performed by: INTERNAL MEDICINE

## 2023-06-04 PROCEDURE — 84145 PROCALCITONIN (PCT): CPT | Performed by: INTERNAL MEDICINE

## 2023-06-04 PROCEDURE — 94760 N-INVAS EAR/PLS OXIMETRY 1: CPT

## 2023-06-04 PROCEDURE — 85025 COMPLETE CBC W/AUTO DIFF WBC: CPT | Performed by: INTERNAL MEDICINE

## 2023-06-04 PROCEDURE — 80048 BASIC METABOLIC PNL TOTAL CA: CPT | Performed by: INTERNAL MEDICINE

## 2023-06-04 PROCEDURE — 94640 AIRWAY INHALATION TREATMENT: CPT

## 2023-06-04 RX ORDER — PREDNISONE 20 MG/1
40 TABLET ORAL DAILY
Status: DISCONTINUED | OUTPATIENT
Start: 2023-06-04 | End: 2023-06-06 | Stop reason: HOSPADM

## 2023-06-04 RX ORDER — HYDROCODONE POLISTIREX AND CHLORPHENIRAMINE POLISTIREX 10; 8 MG/5ML; MG/5ML
5 SUSPENSION, EXTENDED RELEASE ORAL EVERY 12 HOURS PRN
Status: DISCONTINUED | OUTPATIENT
Start: 2023-06-04 | End: 2023-06-06 | Stop reason: HOSPADM

## 2023-06-04 RX ADMIN — CLOPIDOGREL BISULFATE 75 MG: 75 TABLET ORAL at 09:33

## 2023-06-04 RX ADMIN — CARVEDILOL 12.5 MG: 12.5 TABLET, FILM COATED ORAL at 09:33

## 2023-06-04 RX ADMIN — GABAPENTIN 800 MG: 400 CAPSULE ORAL at 09:33

## 2023-06-04 RX ADMIN — PANTOPRAZOLE SODIUM 40 MG: 40 TABLET, DELAYED RELEASE ORAL at 05:18

## 2023-06-04 RX ADMIN — LOSARTAN POTASSIUM 50 MG: 50 TABLET, FILM COATED ORAL at 17:48

## 2023-06-04 RX ADMIN — IPRATROPIUM BROMIDE 0.5 MG: 0.5 SOLUTION RESPIRATORY (INHALATION) at 20:42

## 2023-06-04 RX ADMIN — BUDESONIDE 0.5 MG: 0.5 INHALANT ORAL at 07:49

## 2023-06-04 RX ADMIN — Medication 1000 UNITS: at 09:33

## 2023-06-04 RX ADMIN — FORMOTEROL FUMARATE DIHYDRATE 20 MCG: 20 SOLUTION RESPIRATORY (INHALATION) at 20:42

## 2023-06-04 RX ADMIN — ATORVASTATIN CALCIUM 80 MG: 40 TABLET, FILM COATED ORAL at 17:48

## 2023-06-04 RX ADMIN — AZITHROMYCIN MONOHYDRATE 500 MG: 500 INJECTION, POWDER, LYOPHILIZED, FOR SOLUTION INTRAVENOUS at 20:54

## 2023-06-04 RX ADMIN — LEVALBUTEROL HYDROCHLORIDE 1.25 MG: 1.25 SOLUTION RESPIRATORY (INHALATION) at 13:53

## 2023-06-04 RX ADMIN — HYDROCODONE POLISTIREX AND CHLORPHENIRAMINE POLISTIREX 5 ML: 10; 8 SUSPENSION, EXTENDED RELEASE ORAL at 18:05

## 2023-06-04 RX ADMIN — LEVALBUTEROL HYDROCHLORIDE 1.25 MG: 1.25 SOLUTION RESPIRATORY (INHALATION) at 20:42

## 2023-06-04 RX ADMIN — CARVEDILOL 12.5 MG: 12.5 TABLET, FILM COATED ORAL at 17:48

## 2023-06-04 RX ADMIN — LORATADINE 10 MG: 10 TABLET ORAL at 09:33

## 2023-06-04 RX ADMIN — CEFTRIAXONE 1000 MG: 1 INJECTION, SOLUTION INTRAVENOUS at 20:07

## 2023-06-04 RX ADMIN — GABAPENTIN 800 MG: 400 CAPSULE ORAL at 21:46

## 2023-06-04 RX ADMIN — GUAIFENESIN 1200 MG: 600 TABLET ORAL at 09:33

## 2023-06-04 RX ADMIN — HEPARIN SODIUM 5000 UNITS: 5000 INJECTION INTRAVENOUS; SUBCUTANEOUS at 13:43

## 2023-06-04 RX ADMIN — PREDNISONE 40 MG: 20 TABLET ORAL at 13:43

## 2023-06-04 RX ADMIN — IPRATROPIUM BROMIDE 0.5 MG: 0.5 SOLUTION RESPIRATORY (INHALATION) at 13:53

## 2023-06-04 RX ADMIN — ESCITALOPRAM OXALATE 10 MG: 10 TABLET ORAL at 09:33

## 2023-06-04 RX ADMIN — HEPARIN SODIUM 5000 UNITS: 5000 INJECTION INTRAVENOUS; SUBCUTANEOUS at 21:46

## 2023-06-04 RX ADMIN — LEVALBUTEROL HYDROCHLORIDE 1.25 MG: 1.25 SOLUTION RESPIRATORY (INHALATION) at 07:49

## 2023-06-04 RX ADMIN — HEPARIN SODIUM 5000 UNITS: 5000 INJECTION INTRAVENOUS; SUBCUTANEOUS at 05:18

## 2023-06-04 RX ADMIN — BUDESONIDE 0.5 MG: 0.5 INHALANT ORAL at 20:42

## 2023-06-04 RX ADMIN — FORMOTEROL FUMARATE DIHYDRATE 20 MCG: 20 SOLUTION RESPIRATORY (INHALATION) at 07:49

## 2023-06-04 RX ADMIN — ISOSORBIDE MONONITRATE 30 MG: 30 TABLET, EXTENDED RELEASE ORAL at 09:33

## 2023-06-04 RX ADMIN — GUAIFENESIN 1200 MG: 600 TABLET ORAL at 21:46

## 2023-06-04 RX ADMIN — IPRATROPIUM BROMIDE 0.5 MG: 0.5 SOLUTION RESPIRATORY (INHALATION) at 07:49

## 2023-06-04 NOTE — PROGRESS NOTES
[place]  Progress Note  Name: Nicole James  MRN: [id number]  Unit/Bed#: -81 I Date of Admission: 6/2/2023   Date of Service: 6/4/2023 I Hospital Day: 2    Assessment/Plan   Stage 3a chronic kidney disease [facility])  Assessment & Plan  Lab Results   Component Value Date    CREATININE 1 30 06/03/2023    CREATININE 1 66 (H) 06/02/2023    CREATININE 1 27 05/12/2023    EGFR 41 06/03/2023    EGFR 31 06/02/2023    EGFR 42 05/12/2023     · Creatinine baseline 1 4-1 6  · Creatinine on admission 1 66, does not meet ANGELINE  Improved to nl 1 30   · Avoid nephrotoxins and hypotension  · Monitor I's/O      COPD (chronic obstructive pulmonary disease) ([facility])  Assessment & Plan  Home regimen: Trelegy and albuterol as needed  Not on home O2  PFT 2016 showed FEV1/FVC Ratio: 62 % and  FEV1:  60 % predicted  Moderate airway obstruction   · Patient with active wheezing on admission  · Received 80 mg Solu-Medrol in ED  · Pulmicort and performance twice daily  · Xopenex and Atrovent 3 times daily  · Appears improving with Abx  Continue to hold off on Steroid  · Pulmonology consult appreciated    Takotsubo cardiomyopathy  Assessment & Plan  · Continue home coreg, imdur, plavix  · Denies any associated chest pain  · HS troponins negative  · Echo 2019 nl EF 55-65%    Essential hypertension  Assessment & Plan  · Home regimen: Carvedilol 12 5 mg twice daily and losartan 50 Mg daily  · Continue    * Community acquired pneumonia of left upper lobe of lung  Assessment & Plan  · Presented due to shortness of breath with cough and fever over the last couple of days  · CXR with left upper and mid lung pneumonia  · Not meeting SIRS/sepsis however BC x 2 pending   · Procalcitonin 1 29    Trend procalcitonin  · Urine antigens negative  · Sputum culture and MRSA swab pending  · Initiated on IV ceftriaxone and azithromycin, continue  · Mucinex twice daily  · Monitor O2 sats -stable at 93% on room air upon admission "-slight desaturation 84% with ambulation requiring brief nasal cannula oxygen, however quickly recovered and was weaned down to room air  · Pulmonology consult and recommendations appreciated             Labs & Imaging: I have personally reviewed pertinent reports  VTE Prophylaxis: in place  Code Status:   Level 1 - Full Code    Patient Centered Rounds: I have performed bedside rounds with nursing staff today  Discussions with Specialists or Other Care Team Provider: CM    Education and Discussions with Family / Patient: Patient states she will update her family  I offered to call    Current Length of Stay: 2 day(s)    Current Patient Status: Inpatient   Certification Statement: The patient will continue to require additional inpatient hospital stay due to see my assessment and plan  Subjective:   Patient is seen and examined at bedside  Denies any new complaints  Has cough which is nonproductive  Denies any shortness of breath  Afebrile  All other ROS are negative  Objective:    Vitals: Blood pressure 115/64, pulse 64, temperature 97 9 °F (36 6 °C), resp  rate 20, height 5' 5\" (1 651 m), weight 74 kg (163 lb 2 3 oz), SpO2 90 %  ,Body mass index is 27 15 kg/m²  SPO2 RA Rest    Flowsheet Row ED to Hosp-Admission (Current) from 6/2/2023 in [address] Med [address]   SpO2 90 %   SpO2 Activity At Rest   O2 Device None (Room air)   O2 Flow Rate --        I&O:     Intake/Output Summary (Last 24 hours) at 6/4/2023 0751  Last data filed at 6/3/2023 0936  Gross per 24 hour   Intake 60 ml   Output --   Net 60 ml       Physical Exam:    General- Alert, lying comfortably in bed  Not in any acute distress  Neck- Supple, No JVD  CVS- regular, S1 and S2 normal  Chest- Bilateral Air entry, No rhochi, crackles or wheezing present    Abdomen- soft, nontender, not distended, no guarding or rigidity, BS+  Extremities-  No pedal edema, No calf tenderness                         Normal ROM in all " extremities  CNS-   Alert, awake and orientedx3  No focal deficits present      Invasive Devices     Peripheral Intravenous Line  Duration           Peripheral IV 06/02/23 Right Antecubital 1 day                      Social History  reviewed  Family History   Problem Relation Age of Onset   • Stroke Mother    • Other Father         blood clot in vein & CABG    • Heart disease Father    • Prostate cancer Father    • Hearing loss Father    • Hypertension Father    • No Known Problems Sister    • No Known Problems Sister    • No Known Problems Daughter    • No Known Problems Daughter    • Breast cancer Maternal Grandmother    • No Known Problems Maternal Grandfather    • No Known Problems Paternal Grandmother    • No Known Problems Paternal Grandfather    • Alcohol abuse Brother    • Throat cancer Brother    • Cancer Brother         Throat tongue   • Hearing loss Brother    • Kidney failure Brother         kidney failure d/t NSIADs on dialysis   • Hearing loss Brother    • Crohn's disease Maternal Aunt    • No Known Problems Maternal Aunt    • No Known Problems Maternal Aunt    • Colon cancer Paternal Aunt    • No Known Problems Paternal Aunt    • No Known Problems Paternal Aunt    • No Known Problems Paternal Aunt    • Colon cancer Paternal Aunt    • Cancer Paternal Aunt         Colon cancer    reviewed    Meds:  Current Facility-Administered Medications   Medication Dose Route Frequency Provider Last Rate Last Admin   • acetaminophen (TYLENOL) tablet 650 mg  650 mg Oral Q6H PRN Gelene Anchors, PA-C   650 mg at 06/02/23 2341   • aluminum-magnesium hydroxide-simethicone (MYLANTA) oral suspension 30 mL  30 mL Oral Q6H PRN Gelene Anchors, PA-C       • atorvastatin (LIPITOR) tablet 80 mg  80 mg Oral After Laurel Osman, PA-C   80 mg at 06/03/23 1720   • azithromycin (ZITHROMAX) 500 mg in sodium chloride 0 9% 250mL IVPB 500 mg  500 mg Intravenous Q24H Gelene Anchors, PA-C   500 mg at 06/03/23 2000   • benzonatate (TESSALON PERLES) capsule 200 mg  200 mg Oral HS PRN Breanna Romero PA-C       • budesonide (PULMICORT) inhalation solution 0 5 mg  0 5 mg Nebulization Q12H Breanna Romero PA-C   0 5 mg at 06/04/23 0749   • carvedilol (COREG) tablet 12 5 mg  12 5 mg Oral BID Breanna Romero PA-C   12 5 mg at 06/03/23 1720   • cefTRIAXone (ROCEPHIN) IVPB (premix in dextrose) 1,000 mg 50 mL  1,000 mg Intravenous Q24H Breanna Romero PA-C 100 mL/hr at 06/03/23 2000 1,000 mg at 06/03/23 2000   • cholecalciferol (VITAMIN D3) tablet 1,000 Units  1,000 Units Oral Daily Breanna Romero PA-C   1,000 Units at 06/03/23 0932   • clopidogrel (PLAVIX) tablet 75 mg  75 mg Oral Daily Breanna Romero PA-C   75 mg at 06/03/23 0932   • escitalopram (LEXAPRO) tablet 10 mg  10 mg Oral Daily Breanna Romero PA-C   10 mg at 06/03/23 0932   • formoterol (PERFOROMIST) nebulizer solution 20 mcg  20 mcg Nebulization Q12H Breanna Romero PA-C   20 mcg at 06/04/23 2335   • gabapentin (NEURONTIN) capsule 800 mg  800 mg Oral BID Breanna Romero PA-C   800 mg at 06/03/23 2124   • guaiFENesin (MUCINEX) 12 hr tablet 1,200 mg  1,200 mg Oral Q12H [address] Analisa Bratis, DO   1,200 mg at 06/03/23 2124   • heparin (porcine) subcutaneous injection 5,000 Units  5,000 Units Subcutaneous [facility] Breanna Romero PA-C   5,000 Units at 06/04/23 0518   • ipratropium (ATROVENT) 0 02 % inhalation solution 0 5 mg  0 5 mg Nebulization TID Breanna Romero PA-C   0 5 mg at 06/04/23 3116   • isosorbide mononitrate (IMDUR) 24 hr tablet 30 mg  30 mg Oral Daily Breanna Romero PA-C   30 mg at 06/03/23 0932   • levalbuterol (XOPENEX) inhalation solution 1 25 mg  1 25 mg Nebulization TID Breanna Romero PA-C   1 25 mg at 06/04/23 7843   • loratadine (CLARITIN) tablet 10 mg  10 mg Oral Daily Breanna Romero PA-C   10 mg at 06/03/23 0932   • losartan (COZAAR) tablet 50 mg  50 mg Oral After Keyona Velez PA-C   50 mg at 06/03/23 "1720   • ondansetron (ZOFRAN) injection 4 mg  4 mg Intravenous Q6H PRN Hilda Barajas PA-C       • pantoprazole (PROTONIX) EC tablet 40 mg  40 mg Oral Early Morning Hilda Barajas PA-C   40 mg at 06/04/23 0518   • senna (SENOKOT) tablet 8 6 mg  1 tablet Oral HS PRN Hilda Barajas PA-C          Medications Prior to Admission   Medication   • albuterol (ProAir HFA) 90 mcg/act inhaler   • atorvastatin (LIPITOR) 80 mg tablet   • calcium carbonate (OS-FLASH) 600 MG tablet   • carvedilol (COREG) 12 5 mg tablet   • cholecalciferol (VITAMIN D3) 1,000 units tablet   • clopidogrel (PLAVIX) 75 mg tablet   • escitalopram (LEXAPRO) 20 mg tablet   • gabapentin (NEURONTIN) 400 mg capsule   • isosorbide mononitrate (IMDUR) 30 mg 24 hr tablet   • losartan (COZAAR) 50 mg tablet   • pantoprazole (PROTONIX) 40 mg tablet   • Trelegy Ellipta 100-62 5-25 MCG/ACT inhaler       Labs:  Results from last 7 days   Lab Units 06/03/23  0519 06/02/23 1919   EOS PCT % 0 1   HEMATOCRIT % 34 5* 36 9   HEMOGLOBIN g/dL 11 5 12 3   LYMPHS PCT % 11* 16   MONOS PCT % 4 11   NEUTROS PCT % 83* 70   PLATELETS Thousands/uL 361 370   WBC Thousand/uL 4 67 7 56     Results from last 7 days   Lab Units 06/03/23  0519 06/02/23 1919   ALK PHOS U/L 78 91   ALT U/L 17 19   AST U/L 18 23   BUN mg/dL 28* 26*   CALCIUM mg/dL 9 2 9 9   CHLORIDE mmol/L 105 101   CO2 mmol/L 18* 20*   CREATININE mg/dL 1 30 1 66*   POTASSIUM mmol/L 3 9 4 0     No results found for: \"CKMB\", \"CKTOTAL\", \"TROPONINI\"      Lab Results   Component Value Date    BLOODCX Received in Microbiology Lab  Culture in Progress  06/02/2023    BLOODCX No Growth at 24 hrs  06/02/2023    BLOODCX No Growth After 5 Days   08/28/2022    SPUTUMCULTUR 4+ Growth of 08/28/2022    URINECX 50,000-59,000 cfu/ml Escherichia coli 09/27/2016    URINECX 10,000-19,000 cfu/ml Mixed Contaminants X2 09/27/2016         Imaging:  Results for orders placed during the hospital encounter of 06/02/23    XR chest 1 view " portable    Narrative  CHEST    INDICATION:   SOB     COMPARISON: CXR 1/12/2023 and chest CT 2/14/2023  EXAM PERFORMED/VIEWS:  XR CHEST PORTABLE  FINDINGS:    Cardiomediastinal silhouette normal     Pneumonia in the left upper and mid lung  Severe emphysema with scar in the right midlung  No effusion or pneumothorax  Upper abdomen normal  Bones normal for age  Cervicothoracic fusion  Impression  Severe emphysema with pneumonia in the left upper and mid lung  Workstation performed: [id number]    Results for orders placed during the hospital encounter of 01/12/23    XR chest pa & lateral    Narrative  CHEST    INDICATION:   chest pain  Hypotension  COMPARISON:  CXR 10/13/2022, abdomen CT 01/12/2023, CXR 8/28/2022  EXAM PERFORMED/VIEWS:  XR CHEST PA & LATERAL      FINDINGS:    Cardiomediastinal silhouette appears unremarkable  Severe emphysema with no acute disease  Right greater than left bilateral upper lung scar  No effusion or pneumothorax  Osseous structures appear within normal limits for patient age  Cervicothoracic fusion  Impression  No acute cardiopulmonary disease  Severe emphysema with bilateral scar              Workstation performed: [id number]      Last 24 Hours Medication List:   Current Facility-Administered Medications   Medication Dose Route Frequency Provider Last Rate   • acetaminophen  650 mg Oral Q6H PRN Megha Benitez PA-C     • aluminum-magnesium hydroxide-simethicone  30 mL Oral Q6H PRN Megha Benitez PA-C     • atorvastatin  80 mg Oral After Christian Silva PA-C     • azithromycin  500 mg Intravenous Q24H Megha Benitez PA-C     • benzonatate  200 mg Oral HS PRN Megha Benitez PA-C     • budesonide  0 5 mg Nebulization Q12H Megha Benitez PA-C     • carvedilol  12 5 mg Oral BID Megha Benitez PA-C     • cefTRIAXone  1,000 mg Intravenous Q24H Megha Benitez PA-C 1,000 mg (06/03/23 2000)   • cholecalciferol  1,000 Units Oral Daily Michaela Merlin, PA-C     • clopidogrel  75 mg Oral Daily Michaela Merlin, PA-C     • escitalopram  10 mg Oral Daily Michaela Merlin, PA-C     • formoterol  20 mcg Nebulization Q12H Michaela Merlin, PA-C     • gabapentin  800 mg Oral BID Michaela Merlin, PA-C     • guaiFENesin  1,200 mg Oral Q12H [facility] Analisa Otero DO     • heparin (porcine)  5,000 Units Subcutaneous [facility] Michaela Merlin, PA-C     • ipratropium  0 5 mg Nebulization TID Michaela Merlin, PA-C     • isosorbide mononitrate  30 mg Oral Daily Michaela Merlin, PA-C     • levalbuterol  1 25 mg Nebulization TID Michaela Merlin, PA-C     • loratadine  10 mg Oral Daily Michaela Merlin, PA-C     • losartan  50 mg Oral After Armen Kinney PA-C     • ondansetron  4 mg Intravenous Q6H PRN Michaela Merlin, PA-C     • pantoprazole  40 mg Oral Early Morning Michaela Merlin, PA-C     • senna  1 tablet Oral HS PRN Michaela Merlin, PA-C          Today, Patient Was Seen By: Stu Contreras MD    ** Please Note: Dictation voice to text software may have been used in the creation of this document   **

## 2023-06-04 NOTE — ASSESSMENT & PLAN NOTE
Home regimen: Trelegy and albuterol as needed  Not on home O2  PFT 2016 showed FEV1/FVC Ratio: 62 % and  FEV1:  60 % predicted  Moderate airway obstruction   · Patient with active wheezing on admission  · Received 80 mg Solu-Medrol in ED  · Pulmicort and performance twice daily  · Xopenex and Atrovent 3 times daily  · Appears improving with Abx  Continue to hold off on Steroid     · Pulmonology consult appreciated

## 2023-06-04 NOTE — ASSESSMENT & PLAN NOTE
· Presented due to shortness of breath with cough and fever over the last couple of days  · CXR with left upper and mid lung pneumonia  · Not meeting SIRS/sepsis however BC x 2 pending   · Procalcitonin 1 29    Trend procalcitonin  · Urine antigens negative  · Sputum culture and MRSA swab pending  · Initiated on IV ceftriaxone and azithromycin, continue  · Mucinex twice daily  · Monitor O2 sats -stable at 93% on room air upon admission -slight desaturation 84% with ambulation requiring brief nasal cannula oxygen, however quickly recovered and was weaned down to room air  · Pulmonology consult and recommendations appreciated

## 2023-06-04 NOTE — PLAN OF CARE
Problem: PAIN - ADULT  Goal: Verbalizes/displays adequate comfort level or baseline comfort level  Description: Interventions:  - Encourage patient to monitor pain and request assistance  - Assess pain using appropriate pain scale  - Administer analgesics based on type and severity of pain and evaluate response  - Implement non-pharmacological measures as appropriate and evaluate response  - Consider cultural and social influences on pain and pain management  - Notify physician/advanced practitioner if interventions unsuccessful or patient reports new pain  Outcome: Progressing     Problem: INFECTION - ADULT  Goal: Absence or prevention of progression during hospitalization  Description: INTERVENTIONS:  - Assess and monitor for signs and symptoms of infection  - Monitor lab/diagnostic results  - Monitor all insertion sites, i e  indwelling lines, tubes, and drains  - Monitor endotracheal if appropriate and nasal secretions for changes in amount and color  - [place] appropriate cooling/warming therapies per order  - Administer medications as ordered  - Instruct and encourage patient and family to use good hand hygiene technique  - Identify and instruct in appropriate isolation precautions for identified infection/condition  Outcome: Progressing     Problem: SAFETY ADULT  Goal: Patient will remain free of falls  Description: INTERVENTIONS:  - Educate patient/family on patient safety including physical limitations  - Instruct patient to call for assistance with activity   - Consult OT/PT to assist with strengthening/mobility   - Keep Call bell within reach  - Keep bed low and locked with side rails adjusted as appropriate  - Keep care items and personal belongings within reach  - Initiate and maintain comfort rounds  - Make Fall Risk Sign visible to staff  - Offer Toileting every 2 Hours, in advance of need  - Initiate/Maintain bed alarm  - Obtain necessary fall risk management equipment: socks  - Apply yellow socks and bracelet for high fall risk patients  - Consider moving patient to room near nurses station  Outcome: Progressing  Goal: Maintain or return to baseline ADL function  Description: INTERVENTIONS:  -  Assess patient's ability to carry out ADLs; assess patient's baseline for ADL function and identify physical deficits which impact ability to perform ADLs (bathing, care of mouth/teeth, toileting, grooming, dressing, etc )  - Assess/evaluate cause of self-care deficits   - Assess range of motion  - Assess patient's mobility; develop plan if impaired  - Assess patient's need for assistive devices and provide as appropriate  - Encourage maximum independence but intervene and supervise when necessary  - Involve family in performance of ADLs  - Assess for home care needs following discharge   - Consider OT consult to assist with ADL evaluation and planning for discharge  - Provide patient education as appropriate  Outcome: Progressing  Goal: Maintains/Returns to pre admission functional level  Description: INTERVENTIONS:  - Perform BMAT or MOVE assessment daily    - Set and communicate daily mobility goal to care team and patient/family/caregiver  - Collaborate with rehabilitation services on mobility goals if consulted  - Perform Range of Motion 3 times a day  - Reposition patient every 2 hours    - Dangle patient 3 times a day  - Stand patient 3 times a day  - Ambulate patient 3 times a day  - Out of bed to chair 3 times a day   - Out of bed for meals 3 times a day  - Out of bed for toileting  - Record patient progress and toleration of activity level   Outcome: Progressing     Problem: DISCHARGE PLANNING  Goal: Discharge to home or other facility with appropriate resources  Description: INTERVENTIONS:  - Identify barriers to discharge w/patient and caregiver  - Arrange for needed discharge resources and transportation as appropriate  - Identify discharge learning needs (meds, wound care, etc )  - Arrange for interpretive services to assist at discharge as needed  - Refer to Case Management Department for coordinating discharge planning if the patient needs post-hospital services based on physician/advanced practitioner order or complex needs related to functional status, cognitive ability, or social support system  Outcome: Progressing     Problem: Knowledge Deficit  Goal: Patient/family/caregiver demonstrates understanding of disease process, treatment plan, medications, and discharge instructions  Description: Complete learning assessment and assess knowledge base  Interventions:  - Provide teaching at level of understanding  - Provide teaching via preferred learning methods  Outcome: Progressing     Problem: RESPIRATORY - ADULT  Goal: Achieves optimal ventilation and oxygenation  Description: INTERVENTIONS:  - Assess for changes in respiratory status  - Assess for changes in mentation and behavior  - Position to facilitate oxygenation and minimize respiratory effort  - Oxygen administered by appropriate delivery if ordered  - Initiate smoking cessation education as indicated  - Encourage broncho-pulmonary hygiene including cough, deep breathe, Incentive Spirometry  - Assess the need for suctioning and aspirate as needed  - Assess and instruct to report SOB or any respiratory difficulty  - Respiratory Therapy support as indicated  Outcome: Progressing     Problem: Nutrition/Hydration-ADULT  Goal: Nutrient/Hydration intake appropriate for improving, restoring or maintaining nutritional needs  Description: Monitor and assess patient's nutrition/hydration status for malnutrition  Collaborate with interdisciplinary team and initiate plan and interventions as ordered  Monitor patient's weight and dietary intake as ordered or per policy  Utilize nutrition screening tool and intervene as necessary  Determine patient's food preferences and provide high-protein, high-caloric foods as appropriate       INTERVENTIONS:  - Monitor oral intake, urinary output, labs, and treatment plans  - Assess nutrition and hydration status and recommend course of action  - Evaluate amount of meals eaten  - Assist patient with eating if necessary   - Allow adequate time for meals  - Recommend/ encourage appropriate diets, oral nutritional supplements, and vitamin/mineral supplements  - Order, calculate, and assess calorie counts as needed  - Assess need for intravenous fluids  - Provide specific nutrition/hydration education as appropriate  - Include patient/family/caregiver in decisions related to nutrition  Outcome: Progressing

## 2023-06-04 NOTE — PROGRESS NOTES
"Progress Note - Pulmonary   Darrick Lanes 77 y o  female MRN: [id number]  Unit/Bed#: -01 Encounter: [id number]    Assessment:  Acute hypoxic respiratory failure  Community-acquired pneumonia  Moderate COPD    Plan:  Patient has a very tight dry cough likely related to COPD we will add prednisone 40 mg to all nebulized solutions at this time  Given her symptoms continued hospitalization with performance budesonide Xopenex and Atrovent as well as antibiotics can discontinue Zithromax based on negative Legionella  Check ambulatory pulse ox prior to discharge  Add Tussionex for cough  Chief Complaint:   I still have this cough    Subjective:   Patient still complaining of cough mostly dry but able to expectorate some mucus intermittently  She is complaining of some shortness of breath with exertion  Objective:     Vitals: Blood pressure 147/81, pulse 67, temperature 98 6 °F (37 °C), resp  rate 20, height 5' 5\" (1 651 m), weight 74 kg (163 lb 2 3 oz), SpO2 97 %  ,Body mass index is 27 15 kg/m²      No intake or output data in the 24 hours ending 06/04/23 1253    Invasive Devices     Peripheral Intravenous Line  Duration           Peripheral IV 06/02/23 Right Antecubital 1 day                Physical Exam: /81   Pulse 67   Temp 98 6 °F (37 °C)   Resp 20   Ht 5' 5\" (1 651 m)   Wt 74 kg (163 lb 2 3 oz)   SpO2 97%   BMI 27 15 kg/m²   General appearance: alert and oriented, in no acute distress  Neck: no adenopathy, no carotid bruit, no JVD, supple, symmetrical, trachea midline and thyroid not enlarged, symmetric, no tenderness/mass/nodules  Lungs: diminished breath sounds  Heart: regular rate and rhythm, S1, S2 normal, no murmur, click, rub or gallop  Abdomen: soft, non-tender; bowel sounds normal; no masses,  no organomegaly  Extremities: extremities normal, warm and well-perfused; no cyanosis, clubbing, or edema  Lymph nodes: Cervical, supraclavicular, and axillary nodes normal   Neurologic: " "Grossly normal     Labs: CBC: No results found for: \"ADJUSTEDWBC\", \"HCT\", \"HGB\", \"MCH\", \"MCHC\", \"MCV\", \"MPV\", \"NRBC\", \"PLT\", \"RBC\", \"RDW\", \"WBC\", CMP: No results found for: \"ALKPHOS\", \"ALT\", \"ANIONGAP\", \"AST\", \"BILITOT\", \"BUN\", \"CALCIUM\", \"CL\", \"CO2\", \"CREATININE\", \"EGFR\", \"GLUCOSE\", \"K\", \"PROT\", \"SODIUM\"  Imaging and other studies: I have personally reviewed pertinent films in PACS      "

## 2023-06-04 NOTE — ASSESSMENT & PLAN NOTE
SUBJECTIVE:   Rissa Acosta is a 67 year old female who presents to clinic today for the following health issues:  This patient is accompanied in the office by her .    Diabetes Follow-up    Patient is checking blood sugars: twice daily.    Blood sugar testing frequency justification: Uncontrolled diabetes  Results are as follows:         2 hours prior to breakfasts and 2 hours prior to dinner - after eat about 180 before meal in morning 110    Diabetic concerns: None     Symptoms of hypoglycemia (low blood sugar): none     Paresthesias (numbness or burning in feet) or sores: No     Date of last diabetic eye exam: July 2017    BP Readings from Last 2 Encounters:   05/25/18 108/70   10/13/17 110/80     Hemoglobin A1C (%)   Date Value   05/25/2018 6.4 (H)   10/13/2017 6.1 (H)     LDL Cholesterol Calculated (mg/dL)   Date Value   11/07/2017 93   10/13/2017 121 (H)       Amount of exercise or physical activity: 6-7 days/week for an average of 15-30 minutes    Problems taking medications regularly: No    Medication side effects: none    Diet: diabetic      Problem list and histories reviewed & adjusted, as indicated.  Additional history: as documented    Patient Active Problem List   Diagnosis     CARDIOVASCULAR SCREENING; LDL GOAL LESS THAN 160     DDD (degenerative disc disease), cervical     DDD (degenerative disc disease), lumbar     Vitamin D insufficiency     Elevated LFTs     Positive PPD     Bilateral low back pain without sciatica     Lumbar radiculopathy     Advanced directives, counseling/discussion     Closed fracture of lumbar vertebra (H)     Spinal stenosis of lumbar region     Viral hepatitis     Type 2 diabetes mellitus without complication, without long-term current use of insulin (H)     Osteopenia     Neuropathic pain, leg     Exposure to hepatitis B     Spinal stenosis of lumbar region without neurogenic claudication     Osteoporosis     Hyperlipidemia LDL goal <100     Past Surgical  · Continue home coreg, imdur, plavix  · Denies any associated chest pain  · HS troponins negative  · Echo 2019 nl EF 55-65% History:   Procedure Laterality Date     BACK SURGERY  2011    [facility]     CATARACT IOL, RT/LT Right 2005       Social History   Substance Use Topics     Smoking status: Never Smoker     Smokeless tobacco: Never Used     Alcohol use No     Family History   Problem Relation Age of Onset     DIABETES No family hx of      Macular Degeneration No family hx of      Glaucoma No family hx of      Strabismus No family hx of          Current Outpatient Prescriptions   Medication Sig Dispense Refill     alendronate (FOSAMAX) 70 MG tablet TAKE 1 TAB BY MOUTH EVERY 7 DAYS 1 HR BEFORE BREAKFAST, WITH 8 OZ OF WATER. STAY UPRIGHT FOR 30 MIN 4 tablet 2     ascorbic acid (VITAMIN C) 500 MG tablet Take by mouth daily       B Complex Vitamins (VITAMIN-B COMPLEX PO)        blood glucose monitoring (BETSY CONTOUR NEXT) test strip Use to test blood sugar 2 times daily or as directed. 100 strip 3     blood glucose monitoring (NO BRAND SPECIFIED) test strip Use to test blood sugars 2 times daily or as directed 100 strip 3     blood glucose monitoring (ONETOUCH VERIO) meter device kit USE TO TEST BLOOD SUGAR 2 TIMES DAILY OR AS DIRECTED. 1 kit 0     diclofenac (VOLTAREN) 1 % GEL topical gel APPLY 4 GRAMS TWICE DAILY USING ENCLOSED DOSING CARD. 100 g 4     ibuprofen (ADVIL/MOTRIN) 600 MG tablet TAKE ONE TABLET BY MOUTH EVERY 12 HOURS AS NEEDED FOR MODERATE PAIN 60 tablet 1     MICROLET LANCETS MISC USE TO TEST 1 TO 2 TIMES DAILY, AND AS NEEDED 200 each 3     Multiple Vitamins-Minerals (CHOICEFUL MULTIVITAMIN PO)        Omega-3 Fatty Acids (FISH OIL CONCENTRATE PO)        ORDER FOR DME Equipment being ordered: walker  Wheeled and seated if needed 1 each 0     ORDER FOR DME Equipment being ordered: TENS 1 Units 0     pravastatin (PRAVACHOL) 20 MG tablet TAKE 1 TABLET (20 MG) BY MOUTH DAILY 90 tablet 3     No Known Allergies  Recent Labs   Lab Test  05/25/18   0800  11/07/17   0735  10/13/17   1038  06/05/17   0919  08/22/16    "1044  07/07/16   1126   A1C  6.4*   --   6.1*  6.4*   --   6.5*   LDL   --   93  121*   --   82  131*   HDL   --   53  59   --   56  52   TRIG   --   138  128   --   97  143   ALT   --    --   51*   --   33  29   CR  0.75   --   0.69  0.70  0.69  0.62   GFRESTIMATED  76   --   84  83  85  >90  Non  GFR Calc     GFRESTBLACK  >90   --   >90  >90  African American GFR Calc    >90   GFR Calc    >90   GFR Calc     POTASSIUM  4.3   --   4.4  4.9  4.2  4.3   TSH   --    --   0.56   --   0.92   --       BP Readings from Last 3 Encounters:   05/25/18 108/70   10/13/17 110/80   06/05/17 145/80    Wt Readings from Last 3 Encounters:   05/25/18 134 lb 9.6 oz (61.1 kg)   10/13/17 139 lb 14.4 oz (63.5 kg)   06/05/17 141 lb 11.2 oz (64.3 kg)                  Labs reviewed in EPIC    Reviewed and updated as needed this visit by clinical staff       Reviewed and updated as needed this visit by Provider         ROS:  CONSTITUTIONAL:NEGATIVE for fever, chills, change in weight  ENT/MOUTH: NEGATIVE for ear, mouth and throat problems  RESP:NEGATIVE for significant cough or SOB  CV: NEGATIVE for chest pain, palpitations or peripheral edema  GI: NEGATIVE for nausea, poor appetite and vomiting  MUSCULOSKELETAL: POSITIVE  for arthralgias left shoulder pain. Woke up with it  and NEGATIVE for joint swelling  and joint warmth   NEURO: NEGATIVE for weakness, dizziness or paresthesias  ENDOCRINE: NEGATIVE for temperature intolerance, skin/hair changes and POSITIVE  for HX diabetes    OBJECTIVE:     /70 (BP Location: Right arm, Patient Position: Chair, Cuff Size: Adult Regular)  Pulse 68  Temp 97.4  F (36.3  C) (Temporal)  Ht 5' 0.63\" (1.54 m)  Wt 134 lb 9.6 oz (61.1 kg)  SpO2 97%  BMI 25.74 kg/m2  Body mass index is 25.74 kg/(m^2).  GENERAL: Patient is well nourished, well developed,in no apparent distress  EYES: Eyes grossly normal to inspection and conjunctivae and sclerae " normal  HENT:ear canals and TM's normal and nose and mouth without ulcers or lesions   NECK:normal, supple and no adenopathy  CARDIAC:regular rates and rhythm, no murmur, click or rub, no irregular beats and no bruits heard  without LE edema bilaterally  RESP: normal respiratory rate and rhythm, lungs clear to auscultation  ABD:soft, nontender  SKIN: Skin color, texture, turgor normal. No rashes or lesions.  MS: extremities normal- no gross deformities noted, gait normal and normal muscle tone  Shoulder exam - both sides normal; full range of motion, no pain on motion, no tenderness or deformity noted.  NEURO: mentation intact and speech normal  PSYCH: Alert, oriented, thought content appropriate,mentation appears normal., affect and mood normal    Diagnostic Test Results:  Results for orders placed or performed in visit on 11/07/17   **Lipid panel reflex to direct LDL FUTURE anytime   Result Value Ref Range    Cholesterol 174 <200 mg/dL    Triglycerides 138 <150 mg/dL    HDL Cholesterol 53 >49 mg/dL    LDL Cholesterol Calculated 93 <100 mg/dL    Non HDL Cholesterol 121 <130 mg/dL       ASSESSMENT/PLAN:     Rissa was seen today for diabetes.    Diagnoses and all orders for this visit:    Type 2 diabetes mellitus without complication, without long-term current use of insulin (H)  foot care discussed and Podiatry visits discussed, annual eye examinations at Ophthalmology discussed and glycohemoglobin monitoring discussed  No changes in current regimen  Attempt to improve diet  Increased exercise advised  Follow up in 6 months.    Encounter for screening mammogram for breast cancer  -     MA Screening Digital Bilateral; Future    Postmenopausal status  -     DX Hip/Pelvis/Spine; Future    Acute pain of left shoulder  Can try tylenol as needed  Further workup and treatment could be done if symptoms persist, worsen or new related symptoms occur. The patient will call in that eventuality.    PLAN:   1.   Symptomatic  therapy suggested: Continue current medications.    2.  Orders Placed This Encounter   Procedures     MA Screening Digital Bilateral     DX Hip/Pelvis/Spine       3. Patient needs to follow up in if no improvement,or sooner if worsening of symptoms or other symptoms develop.  FURTHER TESTING:       - DXA (bone density) scan       - mammogram  Follow up in 6 months with fasting labs   Schedule physical exam 6 months       See Patient Instructions    CAMILO Benavides [facility]

## 2023-06-05 LAB
ANION GAP SERPL CALCULATED.3IONS-SCNC: 9 MMOL/L (ref 4–13)
BASOPHILS # BLD AUTO: 0.02 THOUSANDS/ÂΜL (ref 0–0.1)
BASOPHILS NFR BLD AUTO: 0 % (ref 0–1)
BUN SERPL-MCNC: 27 MG/DL (ref 5–25)
CALCIUM SERPL-MCNC: 8.9 MG/DL (ref 8.4–10.2)
CHLORIDE SERPL-SCNC: 107 MMOL/L (ref 96–108)
CO2 SERPL-SCNC: 20 MMOL/L (ref 21–32)
CREAT SERPL-MCNC: 1.01 MG/DL (ref 0.6–1.3)
EOSINOPHIL # BLD AUTO: 0.01 THOUSAND/ÂΜL (ref 0–0.61)
EOSINOPHIL NFR BLD AUTO: 0 % (ref 0–6)
ERYTHROCYTE [DISTWIDTH] IN BLOOD BY AUTOMATED COUNT: 14.4 % (ref 11.6–15.1)
GFR SERPL CREATININE-BSD FRML MDRD: 56 ML/MIN/1.73SQ M
GLUCOSE SERPL-MCNC: 122 MG/DL (ref 65–140)
HCT VFR BLD AUTO: 36.5 % (ref 34.8–46.1)
HGB BLD-MCNC: 12 G/DL (ref 11.5–15.4)
IMM GRANULOCYTES # BLD AUTO: 0.07 THOUSAND/UL (ref 0–0.2)
IMM GRANULOCYTES NFR BLD AUTO: 1 % (ref 0–2)
LYMPHOCYTES # BLD AUTO: 0.79 THOUSANDS/ÂΜL (ref 0.6–4.47)
LYMPHOCYTES NFR BLD AUTO: 16 % (ref 14–44)
MAGNESIUM SERPL-MCNC: 2.2 MG/DL (ref 1.9–2.7)
MCH RBC QN AUTO: 32 PG (ref 26.8–34.3)
MCHC RBC AUTO-ENTMCNC: 32.9 G/DL (ref 31.4–37.4)
MCV RBC AUTO: 97 FL (ref 82–98)
MONOCYTES # BLD AUTO: 0.4 THOUSAND/ÂΜL (ref 0.17–1.22)
MONOCYTES NFR BLD AUTO: 8 % (ref 4–12)
NEUTROPHILS # BLD AUTO: 3.79 THOUSANDS/ÂΜL (ref 1.85–7.62)
NEUTS SEG NFR BLD AUTO: 75 % (ref 43–75)
NRBC BLD AUTO-RTO: 0 /100 WBCS
PLATELET # BLD AUTO: 461 THOUSANDS/UL (ref 149–390)
PMV BLD AUTO: 9.3 FL (ref 8.9–12.7)
POTASSIUM SERPL-SCNC: 4.2 MMOL/L (ref 3.5–5.3)
PROCALCITONIN SERPL-MCNC: 0.33 NG/ML
RBC # BLD AUTO: 3.75 MILLION/UL (ref 3.81–5.12)
SODIUM SERPL-SCNC: 136 MMOL/L (ref 135–147)
WBC # BLD AUTO: 5.08 THOUSAND/UL (ref 4.31–10.16)

## 2023-06-05 PROCEDURE — 83735 ASSAY OF MAGNESIUM: CPT | Performed by: INTERNAL MEDICINE

## 2023-06-05 PROCEDURE — 94640 AIRWAY INHALATION TREATMENT: CPT

## 2023-06-05 PROCEDURE — 99232 SBSQ HOSP IP/OBS MODERATE 35: CPT | Performed by: INTERNAL MEDICINE

## 2023-06-05 PROCEDURE — 85025 COMPLETE CBC W/AUTO DIFF WBC: CPT | Performed by: INTERNAL MEDICINE

## 2023-06-05 PROCEDURE — 80048 BASIC METABOLIC PNL TOTAL CA: CPT | Performed by: INTERNAL MEDICINE

## 2023-06-05 PROCEDURE — 84145 PROCALCITONIN (PCT): CPT | Performed by: INTERNAL MEDICINE

## 2023-06-05 PROCEDURE — 94760 N-INVAS EAR/PLS OXIMETRY 1: CPT

## 2023-06-05 RX ADMIN — LEVALBUTEROL HYDROCHLORIDE 1.25 MG: 1.25 SOLUTION RESPIRATORY (INHALATION) at 13:49

## 2023-06-05 RX ADMIN — CARVEDILOL 12.5 MG: 12.5 TABLET, FILM COATED ORAL at 17:47

## 2023-06-05 RX ADMIN — HEPARIN SODIUM 5000 UNITS: 5000 INJECTION INTRAVENOUS; SUBCUTANEOUS at 21:20

## 2023-06-05 RX ADMIN — BUDESONIDE 0.5 MG: 0.5 INHALANT ORAL at 07:31

## 2023-06-05 RX ADMIN — GUAIFENESIN 1200 MG: 600 TABLET ORAL at 21:20

## 2023-06-05 RX ADMIN — GUAIFENESIN 1200 MG: 600 TABLET ORAL at 09:36

## 2023-06-05 RX ADMIN — IPRATROPIUM BROMIDE 0.5 MG: 0.5 SOLUTION RESPIRATORY (INHALATION) at 20:33

## 2023-06-05 RX ADMIN — LORATADINE 10 MG: 10 TABLET ORAL at 09:38

## 2023-06-05 RX ADMIN — LEVALBUTEROL HYDROCHLORIDE 1.25 MG: 1.25 SOLUTION RESPIRATORY (INHALATION) at 07:31

## 2023-06-05 RX ADMIN — CLOPIDOGREL BISULFATE 75 MG: 75 TABLET ORAL at 09:39

## 2023-06-05 RX ADMIN — LOSARTAN POTASSIUM 50 MG: 50 TABLET, FILM COATED ORAL at 17:47

## 2023-06-05 RX ADMIN — ESCITALOPRAM OXALATE 10 MG: 10 TABLET ORAL at 09:37

## 2023-06-05 RX ADMIN — GABAPENTIN 800 MG: 400 CAPSULE ORAL at 21:20

## 2023-06-05 RX ADMIN — ISOSORBIDE MONONITRATE 30 MG: 30 TABLET, EXTENDED RELEASE ORAL at 09:38

## 2023-06-05 RX ADMIN — BUDESONIDE 0.5 MG: 0.5 INHALANT ORAL at 20:33

## 2023-06-05 RX ADMIN — FORMOTEROL FUMARATE DIHYDRATE 20 MCG: 20 SOLUTION RESPIRATORY (INHALATION) at 07:31

## 2023-06-05 RX ADMIN — HEPARIN SODIUM 5000 UNITS: 5000 INJECTION INTRAVENOUS; SUBCUTANEOUS at 05:59

## 2023-06-05 RX ADMIN — CARVEDILOL 12.5 MG: 12.5 TABLET, FILM COATED ORAL at 09:38

## 2023-06-05 RX ADMIN — IPRATROPIUM BROMIDE 0.5 MG: 0.5 SOLUTION RESPIRATORY (INHALATION) at 13:49

## 2023-06-05 RX ADMIN — FORMOTEROL FUMARATE DIHYDRATE 20 MCG: 20 SOLUTION RESPIRATORY (INHALATION) at 20:33

## 2023-06-05 RX ADMIN — ATORVASTATIN CALCIUM 80 MG: 40 TABLET, FILM COATED ORAL at 17:47

## 2023-06-05 RX ADMIN — HEPARIN SODIUM 5000 UNITS: 5000 INJECTION INTRAVENOUS; SUBCUTANEOUS at 15:41

## 2023-06-05 RX ADMIN — CEFTRIAXONE 1000 MG: 1 INJECTION, SOLUTION INTRAVENOUS at 21:20

## 2023-06-05 RX ADMIN — PANTOPRAZOLE SODIUM 40 MG: 40 TABLET, DELAYED RELEASE ORAL at 05:59

## 2023-06-05 RX ADMIN — GABAPENTIN 800 MG: 400 CAPSULE ORAL at 09:50

## 2023-06-05 RX ADMIN — IPRATROPIUM BROMIDE 0.5 MG: 0.5 SOLUTION RESPIRATORY (INHALATION) at 07:31

## 2023-06-05 RX ADMIN — Medication 1000 UNITS: at 09:39

## 2023-06-05 RX ADMIN — LEVALBUTEROL HYDROCHLORIDE 1.25 MG: 1.25 SOLUTION RESPIRATORY (INHALATION) at 20:33

## 2023-06-05 NOTE — ASSESSMENT & PLAN NOTE
Home regimen: Trelegy and albuterol as needed  Not on home O2  PFT 2016 showed FEV1/FVC Ratio: 62 % and  FEV1:  60 % predicted   Moderate airway obstruction   · Patient with active wheezing on admission  · Received 80 mg Solu-Medrol in ED  · Pulmicort and performance twice daily  · Xopenex and Atrovent 3 times daily  · Started on p o  prednisone by pulmonary  · Pulmonology consult appreciated

## 2023-06-05 NOTE — PROGRESS NOTES
"Progress Note - Pulmonary   Chacho Noriega 77 y o  female MRN: [id number]  Unit/Bed#: -01 Encounter: [id number]    Assessment:    1  Severe emphysema with acute exacerbation  2  FELISHA pneumonia  3  Pulmonary nodules  4  History of tobacco abuse- 39 pack years and quit 15 years ago    Plan:    · Complete 5 days of antibiotics total  · Complete 5 days of prednisone, no need for taper  · Check sputum culture if she is able to provide a sample  · On 1L NC, wean if able to, check ambulatory pulse ox prior to discharge  · Will need CT chest w/o contrast in 6-8 weeks  · F/up as outpatient, no further recommendations from pulmonary standpoint, will sign off please contact us with any questions/concerns    Chief Complaint: \"Im ok\"    Subjective:   Patient seen and examined bedside, has a mild cough denies any acute complaints    Objective:     Vitals: Blood pressure (!) 172/83, pulse 64, temperature 98 2 °F (36 8 °C), resp  rate 17, height 5' 5\" (1 651 m), weight 74 kg (163 lb 2 3 oz), SpO2 93 %  ,Body mass index is 27 15 kg/m²  Intake/Output Summary (Last 24 hours) at 6/5/2023 1216  Last data filed at 6/5/2023 1101  Gross per 24 hour   Intake 240 ml   Output 401 ml   Net -161 ml       Invasive Devices     Peripheral Intravenous Line  Duration           Peripheral IV 06/02/23 Right Antecubital 2 days                Physical Exam:   General: Awake alert and oriented, no acute distress  Pulmonary: No wheezing, no rales  Cardiac: Regular rate   GI: Abdomen soft, nontender  Skin: No rashes, no jaundice   Neuro: Cranial nerves grossly intact, no focal deficits    Labs: I have personally reviewed pertinent lab results  Imaging and other studies: I have personally reviewed pertinent reports     and I have personally reviewed pertinent films in PACS      "

## 2023-06-05 NOTE — ASSESSMENT & PLAN NOTE
· Continue home coreg, imdur, plavix  · Denies any associated chest pain  · HS troponins negative  · Echo 2019 nl EF 55-65%

## 2023-06-05 NOTE — ASSESSMENT & PLAN NOTE
· Presented due to shortness of breath with cough and fever over the last couple of days  · CXR with left upper and mid lung pneumonia  · Not meeting SIRS/sepsis however BC x 2 are negative to date  · Procalcitonin 1 29    Procalcitonin is 0 33  · Urine antigens negative  · MRSA swab is negative  · On IV Rocephin  · Mucinex twice daily  · Currently on 1 L of supplemental oxygen  · Pulmonology consult and recommendations appreciated  · She will need pulse ox with ambulation prior to discharge

## 2023-06-05 NOTE — PROGRESS NOTES
[place]  Progress Note  Name: Lopez Kellogg  MRN: [id number]  Unit/Bed#: -35 I Date of Admission: 6/2/2023   Date of Service: 6/5/2023 I Hospital Day: 3    Assessment/Plan   Stage 3a chronic kidney disease [facility])  Assessment & Plan  Lab Results   Component Value Date    CREATININE 1 01 06/05/2023    CREATININE 1 17 06/04/2023    CREATININE 1 30 06/03/2023    EGFR 56 06/05/2023    EGFR 47 06/04/2023    EGFR 41 06/03/2023     · Creatinine baseline 1 4-1 6  · Creatinine on admission 1 66, does not meet ANGELINE  Improved to 1 01  · Avoid nephrotoxins and hypotension  · Monitor I's/O  · Resolved      COPD (chronic obstructive pulmonary disease) ([facility])  Assessment & Plan  Home regimen: Trelegy and albuterol as needed  Not on home O2  PFT 2016 showed FEV1/FVC Ratio: 62 % and  FEV1:  60 % predicted  Moderate airway obstruction   · Patient with active wheezing on admission  · Received 80 mg Solu-Medrol in ED  · Pulmicort and performance twice daily  · Xopenex and Atrovent 3 times daily  · Started on p o  prednisone by pulmonary  · Pulmonology consult appreciated    Takotsubo cardiomyopathy  Assessment & Plan  · Continue home coreg, imdur, plavix  · Denies any associated chest pain  · HS troponins negative  · Echo 2019 nl EF 55-65%    Essential hypertension  Assessment & Plan  · Home regimen: Carvedilol 12 5 mg twice daily and losartan 50 Mg daily  · Continue    * Community acquired pneumonia of left upper lobe of lung  Assessment & Plan  · Presented due to shortness of breath with cough and fever over the last couple of days  · CXR with left upper and mid lung pneumonia  · Not meeting SIRS/sepsis however BC x 2 are negative to date  · Procalcitonin 1 29    Procalcitonin is 0 33  · Urine antigens negative  · MRSA swab is negative  · On IV Rocephin  · Mucinex twice daily  · Currently on 1 L of supplemental oxygen  · Pulmonology consult and recommendations appreciated  · She will need pulse "ox with ambulation prior to discharge           Labs & Imaging: I have personally reviewed pertinent reports  VTE Prophylaxis: in place  Code Status:   Level 1 - Full Code    Patient Centered Rounds: I have performed bedside rounds with nursing staff today  Discussions with Specialists or Other Care Team Provider: Pulmonary    Education and Discussions with Family / Patient:  at bedside    Current Length of Stay: 3 day(s)    Current Patient Status: Inpatient   Certification Statement: The patient will continue to require additional inpatient hospital stay due to see my assessment and plan  Subjective:   Patient is seen and examined at bedside  Cough is improving  No other complaints  Afebrile  All other ROS are negative  Objective:    Vitals: Blood pressure (!) 172/83, pulse 64, temperature 98 2 °F (36 8 °C), resp  rate 17, height 5' 5\" (1 651 m), weight 74 kg (163 lb 2 3 oz), SpO2 96 %  ,Body mass index is 27 15 kg/m²  SPO2 RA Rest    Flowsheet Row ED to Hosp-Admission (Current) from 6/2/2023 in [address] Med Surg Unit   SpO2 96 %   SpO2 Activity At Rest   O2 Device None (Room air)   O2 Flow Rate --        I&O:     Intake/Output Summary (Last 24 hours) at 6/5/2023 0849  Last data filed at 6/5/2023 0837  Gross per 24 hour   Intake 240 ml   Output --   Net 240 ml       Physical Exam:    General- Alert, lying comfortably in bed  Not in any acute distress  Neck- Supple, No JVD  CVS- regular, S1 and S2 normal  Chest- Bilateral Air entry, No rhochi, crackles or wheezing present  Abdomen- soft, nontender, not distended, no guarding or rigidity, BS+  Extremities-  No pedal edema, No calf tenderness                         Normal ROM in all extremities  CNS-   Alert, awake and orientedx3  No focal deficits present      Invasive Devices     Peripheral Intravenous Line  Duration           Peripheral IV 06/02/23 Right Antecubital 2 days                      Social History  " reviewed  Family History   Problem Relation Age of Onset   • Stroke Mother    • Other Father         blood clot in vein & CABG    • Heart disease Father    • Prostate cancer Father    • Hearing loss Father    • Hypertension Father    • No Known Problems Sister    • No Known Problems Sister    • No Known Problems Daughter    • No Known Problems Daughter    • Breast cancer Maternal Grandmother    • No Known Problems Maternal Grandfather    • No Known Problems Paternal Grandmother    • No Known Problems Paternal Grandfather    • Alcohol abuse Brother    • Throat cancer Brother    • Cancer Brother         Throat tongue   • Hearing loss Brother    • Kidney failure Brother         kidney failure d/t NSIADs on dialysis   • Hearing loss Brother    • Crohn's disease Maternal Aunt    • No Known Problems Maternal Aunt    • No Known Problems Maternal Aunt    • Colon cancer Paternal Aunt    • No Known Problems Paternal Aunt    • No Known Problems Paternal Aunt    • No Known Problems Paternal Aunt    • Colon cancer Paternal Aunt    • Cancer Paternal Aunt         Colon cancer    reviewed    Meds:  Current Facility-Administered Medications   Medication Dose Route Frequency Provider Last Rate Last Admin   • acetaminophen (TYLENOL) tablet 650 mg  650 mg Oral Q6H PRN Jt Gonzales PA-C   650 mg at 06/02/23 2341   • aluminum-magnesium hydroxide-simethicone (MYLANTA) oral suspension 30 mL  30 mL Oral Q6H PRN Jt Gonzales PA-C       • atorvastatin (LIPITOR) tablet 80 mg  80 mg Oral After Addie Park PA-C   80 mg at 06/04/23 1748   • benzonatate (TESSALON PERLES) capsule 200 mg  200 mg Oral HS PRN Jt Gonzales PA-C       • budesonide (PULMICORT) inhalation solution 0 5 mg  0 5 mg Nebulization Q12H Jt Gonzales PA-C   0 5 mg at 06/05/23 0731   • carvedilol (COREG) tablet 12 5 mg  12 5 mg Oral BID Jt Gonzales PA-C   12 5 mg at 06/04/23 1748   • cefTRIAXone (ROCEPHIN) IVPB (premix in dextrose) 1,000 mg 50 mL  1,000 mg Intravenous Q24H Michaela Merlin, PA-C 100 mL/hr at 06/04/23 2007 1,000 mg at 06/04/23 2007   • cholecalciferol (VITAMIN D3) tablet 1,000 Units  1,000 Units Oral Daily Michaela Merlin, PA-C   1,000 Units at 06/04/23 4483   • clopidogrel (PLAVIX) tablet 75 mg  75 mg Oral Daily Michaela Merlin, PA-C   75 mg at 06/04/23 0366   • escitalopram (LEXAPRO) tablet 10 mg  10 mg Oral Daily Michaela Merlin, PA-C   10 mg at 06/04/23 9374   • formoterol (PERFOROMIST) nebulizer solution 20 mcg  20 mcg Nebulization Q12H Michaela Merlin, PA-C   20 mcg at 06/05/23 7739   • gabapentin (NEURONTIN) capsule 800 mg  800 mg Oral BID Michaela Merlin, PA-C   800 mg at 06/04/23 2146   • guaiFENesin (MUCINEX) 12 hr tablet 1,200 mg  1,200 mg Oral Q12H [facility] Analisa Otero DO   1,200 mg at 06/04/23 2146   • heparin (porcine) subcutaneous injection 5,000 Units  5,000 Units Subcutaneous [facility] Michaela Merlin, PA-C   5,000 Units at 06/05/23 0559   • Hydrocod Cody-Chlorphe Cody ER (TUSSIONEX) ER suspension 5 mL  5 mL Oral Q12H PRN Analisa Otero DO   5 mL at 06/04/23 1805   • ipratropium (ATROVENT) 0 02 % inhalation solution 0 5 mg  0 5 mg Nebulization TID Michaela Merlin, PA-C   0 5 mg at 06/05/23 9581   • isosorbide mononitrate (IMDUR) 24 hr tablet 30 mg  30 mg Oral Daily Michaela Merlin, PA-C   30 mg at 06/04/23 9311   • levalbuterol (XOPENEX) inhalation solution 1 25 mg  1 25 mg Nebulization TID Michaela Merlin, PA-C   1 25 mg at 06/05/23 0206   • loratadine (CLARITIN) tablet 10 mg  10 mg Oral Daily Michaela Merlin, PA-C   10 mg at 06/04/23 2801   • losartan (COZAAR) tablet 50 mg  50 mg Oral After Armen Kinney PA-C   50 mg at 06/04/23 2786   • ondansetron (ZOFRAN) injection 4 mg  4 mg Intravenous Q6H PRN Michaela Merlin, PA-C       • pantoprazole (PROTONIX) EC tablet 40 mg  40 mg Oral Early Morning Michaela Merlin, PA-C   40 mg at 06/05/23 0533   • predniSONE tablet 40 mg  40 mg Oral "Daily Analisa Bratis, DO   40 mg at 06/04/23 1343   • senna (SENOKOT) tablet 8 6 mg  1 tablet Oral HS PRN Madison Sena PA-C          Medications Prior to Admission   Medication   • albuterol (ProAir HFA) 90 mcg/act inhaler   • atorvastatin (LIPITOR) 80 mg tablet   • calcium carbonate (OS-FLASH) 600 MG tablet   • carvedilol (COREG) 12 5 mg tablet   • cholecalciferol (VITAMIN D3) 1,000 units tablet   • clopidogrel (PLAVIX) 75 mg tablet   • escitalopram (LEXAPRO) 20 mg tablet   • gabapentin (NEURONTIN) 400 mg capsule   • isosorbide mononitrate (IMDUR) 30 mg 24 hr tablet   • losartan (COZAAR) 50 mg tablet   • pantoprazole (PROTONIX) 40 mg tablet   • Trelegy Ellipta 100-62 5-25 MCG/ACT inhaler       Labs:  Results from last 7 days   Lab Units 06/05/23  0558 06/04/23  1351 06/03/23  0519   EOS PCT % 0 1 0   HEMATOCRIT % 36 5 37 0 34 5*   HEMOGLOBIN g/dL 12 0 12 1 11 5   LYMPHS PCT % 16 21 11*   MONOS PCT % 8 13* 4   NEUTROS PCT % 75 64 83*   PLATELETS Thousands/uL 461* 470* 361   WBC Thousand/uL 5 08 5 14 4 67     Results from last 7 days   Lab Units 06/05/23  0558 06/04/23  1351 06/03/23  0519 06/02/23  1919   ALK PHOS U/L  --   --  78 91   ALT U/L  --   --  17 19   AST U/L  --   --  18 23   BUN mg/dL 27* 30* 28* 26*   CALCIUM mg/dL 8 9 9 1 9 2 9 9   CHLORIDE mmol/L 107 106 105 101   CO2 mmol/L 20* 23 18* 20*   CREATININE mg/dL 1 01 1 17 1 30 1 66*   POTASSIUM mmol/L 4 2 3 6 3 9 4 0     No results found for: \"CKMB\", \"CKTOTAL\", \"TROPONINI\"      Lab Results   Component Value Date    BLOODCX No Growth at 48 hrs  06/02/2023    BLOODCX No Growth at 48 hrs  06/02/2023    BLOODCX No Growth After 5 Days   08/28/2022    SPUTUMCULTUR 4+ Growth of 08/28/2022    URINECX 50,000-59,000 cfu/ml Escherichia coli 09/27/2016    URINECX 10,000-19,000 cfu/ml Mixed Contaminants X2 09/27/2016         Imaging:  Results for orders placed during the hospital encounter of 06/02/23    XR chest 1 view portable    Narrative  CHEST    INDICATION:   " SOB     COMPARISON: CXR 1/12/2023 and chest CT 2/14/2023  EXAM PERFORMED/VIEWS:  XR CHEST PORTABLE  FINDINGS:    Cardiomediastinal silhouette normal     Pneumonia in the left upper and mid lung  Severe emphysema with scar in the right midlung  No effusion or pneumothorax  Upper abdomen normal  Bones normal for age  Cervicothoracic fusion  Impression  Severe emphysema with pneumonia in the left upper and mid lung  Workstation performed: [id number]    Results for orders placed during the hospital encounter of 01/12/23    XR chest pa & lateral    Narrative  CHEST    INDICATION:   chest pain  Hypotension  COMPARISON:  CXR 10/13/2022, abdomen CT 01/12/2023, CXR 8/28/2022  EXAM PERFORMED/VIEWS:  XR CHEST PA & LATERAL      FINDINGS:    Cardiomediastinal silhouette appears unremarkable  Severe emphysema with no acute disease  Right greater than left bilateral upper lung scar  No effusion or pneumothorax  Osseous structures appear within normal limits for patient age  Cervicothoracic fusion  Impression  No acute cardiopulmonary disease  Severe emphysema with bilateral scar              Workstation performed: [id number]      Last 24 Hours Medication List:   Current Facility-Administered Medications   Medication Dose Route Frequency Provider Last Rate   • acetaminophen  650 mg Oral Q6H PRN Thena Joseline PA-C     • aluminum-magnesium hydroxide-simethicone  30 mL Oral Q6H PRN Thena Joseline PA-C     • atorvastatin  80 [facility]     • benzonatate  200 mg Oral HS PRN Thena Palkael PA-C     • budesonide  0 5 mg Nebulization Q12H Thena Joseline PA-C     • carvedilol  12 5 mg Oral BID DORA BurtonC     • cefTRIAXone  1,000 mg Intravenous Q24H Thena Joseline PA-C 1,000 mg (06/04/23 2007)   • cholecalciferol  1,000 Units Oral Daily Bijana KRISTI Trevizo-C     • clopidogrel  75 mg Oral Daily Royal Trevizo PA-C     • escitalopram  10 mg Oral Daily Jt Gonzales PA-C     • formoterol  20 mcg Nebulization Q12H Jt Gonzales PA-C     • gabapentin  800 mg Oral BID Jt Gonzales PA-C     • guaiFENesin  1,200 mg Oral Q12H [facility] Analisa Otero, DO     • heparin (porcine)  5,000 Units Subcutaneous Q8H [facility] Jt Gonzales PA-C     • Hydrocod Cody-Chlorphe Cody ER  5 mL Oral Q12H PRN Analisa Otero DO     • ipratropium  0 5 mg Nebulization TID Jt Gonzales PA-C     • isosorbide mononitrate  30 mg Oral Daily Jt Gonzales PA-C     • levalbuterol  1 25 mg Nebulization TID Jt Gonzales PA-C     • loratadine  10 mg Oral Daily Jt Gonzales PA-C     • losartan  50 mg Oral After Dinner Jt Gonzales PA-C     • ondansetron  4 mg Intravenous Q6H PRN Jt Gonzales PA-C     • pantoprazole  40 mg Oral Early Morning Jt Gonzales PA-C     • predniSONE  40 mg Oral Daily Analisa Otero DO     • senna  1 tablet Oral HS PRN Jt Gonzales PA-C          Today, Patient Was Seen By: Sina Brewer MD    ** Please Note: Dictation voice to text software may have been used in the creation of this document   **

## 2023-06-05 NOTE — CASE MANAGEMENT
Case Management Assessment & Discharge Planning Note    Patient name Fernanda Niki  Location /-49 MRN [id number]  : 1955 Date 2023       Current Admission Date: 2023  Current Admission Diagnosis:Community acquired pneumonia of left upper lobe of lung   Patient Active Problem List    Diagnosis Date Noted   • Community acquired pneumonia of left upper lobe of lung 2023   • Snoring    • Daytime somnolence    • Hypersomnia, unspecified    • GER (obstructive sleep apnea)    • Allergies 2023   • Hyperglycemia 2022   • Intracranial atherosclerosis 11/10/2022   • [facility] 11/10/2022   • Carotid stenosis, bilateral 2022   • Dysphagia 2022   • History of subarachnoid hemorrhage 2022   • Facial twitching 2022   • Stage 3a chronic kidney disease ([facility] ) 2021   • Hypotension    • GERD (gastroesophageal reflux disease) 2019   • COPD (chronic obstructive pulmonary disease) [facility] 2019   • Pancreatic cyst 2019   • De Quervain's disease (tenosynovitis) 2018   • Sudden idiopathic hearing loss of right ear 2017   • Takotsubo cardiomyopathy 06/15/2017   • Lower back pain 2016   • Depression with anxiety 09/15/2015   • Cervical stenosis of spinal canal 2015   • Carpal tunnel syndrome 2014   • Plantar fasciitis 2012   • Dyslipidemia 2012   • Arnold-Chiari malformation ([facility] ) 2012   • Essential hypertension 2012      LOS (days): 3  Geometric Mean LOS (GMLOS) (days): 4 00  Days to GMLOS:1 1     OBJECTIVE:    Risk of Unplanned Readmission Score: 16 86      Current admission status: Inpatient    Preferred Pharmacy:   [facility] [address] LIANET Munoz  [address]  [address]    [facility] [address]  Phone: [phone] Fax: [phone]    Primary Care Provider: Brianne George DO    Primary Insurance: MEDICARE  Secondary Insurance: AETNA    ASSESSMENT:  Active Health Care Proxies     Singh Leal [facility] Representative - Spouse   Primary Phone: [phone] [facility]  Home Phone: [phone]               Advance Directives  Does patient have a [address]?: No  Was patient offered paperwork?: Yes (declined)  Does patient currently have a Health Care decision maker?: Yes, please see Health Care Proxy section  Does patient have Advance Directives?: No  Was patient offered paperwork?: Yes (declined)  Primary Contact: Carla Tao    Readmission Root Cause  30 Day Readmission: No    Patient Information  Admitted from[de-identified] Home  Mental Status: Alert  During Assessment patient was accompanied by: Not accompanied during assessment  Assessment information provided by[de-identified] Patient  Primary Caregiver: Self  Support Systems: Spouse/significant other  [place] of Residence: [address] [facility] do you live in?: [address] [facility] entry access options   Select all that apply : Stairs  Number of steps to enter home : 2  Type of Current Residence: 2 story home (first floor set up)  Upon entering residence, is there a bedroom on the main floor (no further steps)?: Yes  Upon entering residence, is there a bathroom on the main floor (no further steps)?: Yes  In the last 12 months, was there a time when you were not able to pay the mortgage or rent on time?: No  In the last 12 months, how many places have you lived?: 1  In the last 12 months, was there a time when you did not have a steady place to sleep or slept in a shelter (including now)?: No  Homeless/housing insecurity resource given?: N/A  Living Arrangements: Lives w/ Spouse/significant other  Is patient a ?: No    Activities of Daily Living Prior to Admission  Functional Status: Independent  Completes ADLs independently?: Yes  Ambulates independently?: Yes  Does patient use assisted devices?: No  Does patient currently own DME?: No  Does patient have a history of Outpatient Therapy (PT/OT)?: Yes  Does the patient have a history of Short-Term Rehab?: No  Does patient have a history of HHC?: No  Does patient currently have [address]?: No    Patient Information Continued  Does patient have prescription coverage?: Yes  Within the past 12 months, you worried that your food would run out before you got the money to buy more : Never true  Within the past 12 months, the food you bought just didn't last and you didn't have money to get more : Never true  Food insecurity resource given?: N/A  Does patient receive dialysis treatments?: No  Does patient have a history of substance abuse?: No  Does patient have a history of Mental Health Diagnosis?: No    Means of Transportation  Means of Transport to Appts[de-identified] Drives Self  In the past 12 months, has lack of transportation kept you from medical appointments or from getting medications?: No  In the past 12 months, has lack of transportation kept you from meetings, work, or from getting things needed for daily living?: No  Was application for public transport provided?: N/A    DISCHARGE DETAILS:    Discharge planning discussed with[de-identified] pt's spouse, pt was sleeping  Freedom of Choice: Yes  Comments - Freedom of Choice: Reviewed cm role  CM contacted family/caregiver?: Yes  Were Treatment Team discharge recommendations reviewed with patient/caregiver?: Yes  Did patient/caregiver verbalize understanding of patient care needs?: Yes  Were patient/caregiver advised of the risks associated with not following Treatment Team discharge recommendations?: Yes    Contacts  Patient Contacts: Mercedez Muñiz  Relationship to Patient[de-identified] Family  Contact Method: Phone  Phone Number: [phone]  Reason/Outcome: Emergency Contact    [facility]         Is the patient interested in [address] at discharge?: No    DME Referral Provided  Referral made for DME?: No    Treatment Team Recommendation: Home  Discharge Destination Plan[de-identified] Home     Additional Comments: Cm spoke with pt's spouse via phone as pt was asleep  Pt lives with her spouse in [place] with 1st floor set up and 2STE  SHe is ind and has no DME  SHe has had outpt PT  No hx of STR, HHC, MH and D/a tx  SHe drives normally  USes [employer]  Pt's spouse will transport home

## 2023-06-05 NOTE — ASSESSMENT & PLAN NOTE
Lab Results   Component Value Date    CREATININE 1 01 06/05/2023    CREATININE 1 17 06/04/2023    CREATININE 1 30 06/03/2023    EGFR 56 06/05/2023    EGFR 47 06/04/2023    EGFR 41 06/03/2023     · Creatinine baseline 1 4-1 6  · Creatinine on admission 1 66, does not meet ANGELINE   Improved to 1 01  · Avoid nephrotoxins and hypotension  · Monitor I's/O  · Resolved

## 2023-06-05 NOTE — PLAN OF CARE
Problem: Potential for Falls  Goal: Patient will remain free of falls  Description: INTERVENTIONS:  - Educate patient/family on patient safety including physical limitations  - Instruct patient to call for assistance with activity   - Consult OT/PT to assist with strengthening/mobility   - Keep Call bell within reach  - Keep bed low and locked with side rails adjusted as appropriate  - Keep care items and personal belongings within reach  - Initiate and maintain comfort rounds  - Make Fall Risk Sign visible to staff  - Offer Toileting every 2 Hours, in advance of need  - Initiate/Maintain bed alarm  - Obtain necessary fall risk management equipment: socks  - Apply yellow socks and bracelet for high fall risk patients  - Consider moving patient to room near nurses station  Outcome: Progressing     Problem: PAIN - ADULT  Goal: Verbalizes/displays adequate comfort level or baseline comfort level  Description: Interventions:  - Encourage patient to monitor pain and request assistance  - Assess pain using appropriate pain scale  - Administer analgesics based on type and severity of pain and evaluate response  - Implement non-pharmacological measures as appropriate and evaluate response  - Consider cultural and social influences on pain and pain management  - Notify physician/advanced practitioner if interventions unsuccessful or patient reports new pain  Outcome: Progressing     Problem: SAFETY ADULT  Goal: Patient will remain free of falls  Description: INTERVENTIONS:  - Educate patient/family on patient safety including physical limitations  - Instruct patient to call for assistance with activity   - Consult OT/PT to assist with strengthening/mobility   - Keep Call bell within reach  - Keep bed low and locked with side rails adjusted as appropriate  - Keep care items and personal belongings within reach  - Initiate and maintain comfort rounds  - Make Fall Risk Sign visible to staff  - Offer Toileting every 2 Hours, in advance of need  - Initiate/Maintain bed alarm  - Obtain necessary fall risk management equipment: socks  - Apply yellow socks and bracelet for high fall risk patients  - Consider moving patient to room near nurses station  Outcome: Progressing

## 2023-06-06 ENCOUNTER — TRANSITIONAL CARE MANAGEMENT (OUTPATIENT)
Dept: FAMILY MEDICINE CLINIC | Facility: HOSPITAL | Age: 68
End: 2023-06-06

## 2023-06-06 VITALS
OXYGEN SATURATION: 93 % | TEMPERATURE: 98.1 F | DIASTOLIC BLOOD PRESSURE: 83 MMHG | SYSTOLIC BLOOD PRESSURE: 129 MMHG | BODY MASS INDEX: 27.66 KG/M2 | WEIGHT: 166.01 LBS | HEART RATE: 72 BPM | HEIGHT: 65 IN | RESPIRATION RATE: 16 BRPM

## 2023-06-06 LAB
ANION GAP SERPL CALCULATED.3IONS-SCNC: 7 MMOL/L (ref 4–13)
BASOPHILS # BLD MANUAL: 0 THOUSAND/UL (ref 0–0.1)
BASOPHILS NFR MAR MANUAL: 0 % (ref 0–1)
BUN SERPL-MCNC: 25 MG/DL (ref 5–25)
CALCIUM SERPL-MCNC: 8.8 MG/DL (ref 8.4–10.2)
CHLORIDE SERPL-SCNC: 108 MMOL/L (ref 96–108)
CO2 SERPL-SCNC: 21 MMOL/L (ref 21–32)
CREAT SERPL-MCNC: 1.09 MG/DL (ref 0.6–1.3)
EOSINOPHIL # BLD MANUAL: 0.11 THOUSAND/UL (ref 0–0.4)
EOSINOPHIL NFR BLD MANUAL: 2 % (ref 0–6)
ERYTHROCYTE [DISTWIDTH] IN BLOOD BY AUTOMATED COUNT: 14.3 % (ref 11.6–15.1)
GFR SERPL CREATININE-BSD FRML MDRD: 51 ML/MIN/1.73SQ M
GLUCOSE SERPL-MCNC: 95 MG/DL (ref 65–140)
HCT VFR BLD AUTO: 36.4 % (ref 34.8–46.1)
HGB BLD-MCNC: 11.9 G/DL (ref 11.5–15.4)
LYMPHOCYTES # BLD AUTO: 1.37 THOUSAND/UL (ref 0.6–4.47)
LYMPHOCYTES # BLD AUTO: 25 % (ref 14–44)
MCH RBC QN AUTO: 32 PG (ref 26.8–34.3)
MCHC RBC AUTO-ENTMCNC: 32.7 G/DL (ref 31.4–37.4)
MCV RBC AUTO: 98 FL (ref 82–98)
METAMYELOCYTES NFR BLD MANUAL: 2 % (ref 0–1)
MONOCYTES # BLD AUTO: 0.33 THOUSAND/UL (ref 0–1.22)
MONOCYTES NFR BLD: 6 % (ref 4–12)
MYELOCYTES NFR BLD MANUAL: 3 % (ref 0–1)
NEUTROPHILS # BLD MANUAL: 3.39 THOUSAND/UL (ref 1.85–7.62)
NEUTS BAND NFR BLD MANUAL: 1 % (ref 0–8)
NEUTS SEG NFR BLD AUTO: 61 % (ref 43–75)
PLATELET # BLD AUTO: 500 THOUSANDS/UL (ref 149–390)
PLATELET BLD QL SMEAR: ABNORMAL
PMV BLD AUTO: 9.3 FL (ref 8.9–12.7)
POTASSIUM SERPL-SCNC: 4.3 MMOL/L (ref 3.5–5.3)
RBC # BLD AUTO: 3.72 MILLION/UL (ref 3.81–5.12)
SODIUM SERPL-SCNC: 136 MMOL/L (ref 135–147)
WBC # BLD AUTO: 5.46 THOUSAND/UL (ref 4.31–10.16)

## 2023-06-06 PROCEDURE — 99239 HOSP IP/OBS DSCHRG MGMT >30: CPT | Performed by: INTERNAL MEDICINE

## 2023-06-06 PROCEDURE — 94761 N-INVAS EAR/PLS OXIMETRY MLT: CPT

## 2023-06-06 PROCEDURE — 92526 ORAL FUNCTION THERAPY: CPT

## 2023-06-06 PROCEDURE — 94760 N-INVAS EAR/PLS OXIMETRY 1: CPT

## 2023-06-06 PROCEDURE — 94640 AIRWAY INHALATION TREATMENT: CPT

## 2023-06-06 PROCEDURE — 80048 BASIC METABOLIC PNL TOTAL CA: CPT | Performed by: INTERNAL MEDICINE

## 2023-06-06 PROCEDURE — 85027 COMPLETE CBC AUTOMATED: CPT | Performed by: INTERNAL MEDICINE

## 2023-06-06 PROCEDURE — 85007 BL SMEAR W/DIFF WBC COUNT: CPT | Performed by: INTERNAL MEDICINE

## 2023-06-06 RX ORDER — BENZONATATE 200 MG/1
200 CAPSULE ORAL
Qty: 15 CAPSULE | Refills: 0 | Status: SHIPPED | OUTPATIENT
Start: 2023-06-06

## 2023-06-06 RX ORDER — CEFDINIR 300 MG/1
300 CAPSULE ORAL EVERY 12 HOURS SCHEDULED
Qty: 6 CAPSULE | Refills: 0 | Status: SHIPPED | OUTPATIENT
Start: 2023-06-06 | End: 2023-06-09

## 2023-06-06 RX ADMIN — Medication 1000 UNITS: at 09:02

## 2023-06-06 RX ADMIN — ISOSORBIDE MONONITRATE 30 MG: 30 TABLET, EXTENDED RELEASE ORAL at 09:02

## 2023-06-06 RX ADMIN — CARVEDILOL 12.5 MG: 12.5 TABLET, FILM COATED ORAL at 09:02

## 2023-06-06 RX ADMIN — HEPARIN SODIUM 5000 UNITS: 5000 INJECTION INTRAVENOUS; SUBCUTANEOUS at 06:08

## 2023-06-06 RX ADMIN — GABAPENTIN 800 MG: 400 CAPSULE ORAL at 09:02

## 2023-06-06 RX ADMIN — LORATADINE 10 MG: 10 TABLET ORAL at 09:02

## 2023-06-06 RX ADMIN — GUAIFENESIN 1200 MG: 600 TABLET ORAL at 09:02

## 2023-06-06 RX ADMIN — BUDESONIDE 0.5 MG: 0.5 INHALANT ORAL at 07:59

## 2023-06-06 RX ADMIN — ESCITALOPRAM OXALATE 10 MG: 10 TABLET ORAL at 09:02

## 2023-06-06 RX ADMIN — CLOPIDOGREL BISULFATE 75 MG: 75 TABLET ORAL at 09:02

## 2023-06-06 RX ADMIN — IPRATROPIUM BROMIDE 0.5 MG: 0.5 SOLUTION RESPIRATORY (INHALATION) at 08:00

## 2023-06-06 RX ADMIN — PANTOPRAZOLE SODIUM 40 MG: 40 TABLET, DELAYED RELEASE ORAL at 06:07

## 2023-06-06 RX ADMIN — LEVALBUTEROL HYDROCHLORIDE 1.25 MG: 1.25 SOLUTION RESPIRATORY (INHALATION) at 07:59

## 2023-06-06 NOTE — ASSESSMENT & PLAN NOTE
Lab Results   Component Value Date    CREATININE 1 09 06/06/2023    CREATININE 1 01 06/05/2023    CREATININE 1 17 06/04/2023    EGFR 51 06/06/2023    EGFR 56 06/05/2023    EGFR 47 06/04/2023     · Creatinine baseline 1 4-1 6  · Creatinine on admission 1 66, does not meet ANGELINE   Improved to 1 01  · Avoid nephrotoxins and hypotension  · Monitor I's/O  · Resolved

## 2023-06-06 NOTE — DISCHARGE SUMMARY
[place]  Discharge- [place] 1955, 77 y o  female MRN: [id number]  Unit/Bed#: -01 Encounter: [id number]  Primary Care Provider: Aaron Sandhoff, DO   Date and time admitted to hospital: 6/2/2023  7:05 PM    Stage 3a chronic kidney disease [facility])  Assessment & Plan  Lab Results   Component Value Date    CREATININE 1 09 06/06/2023    CREATININE 1 01 06/05/2023    CREATININE 1 17 06/04/2023    EGFR 51 06/06/2023    EGFR 56 06/05/2023    EGFR 47 06/04/2023     · Creatinine baseline 1 4-1 6  · Creatinine on admission 1 66, does not meet ANGELINE  Improved to 1 01  · Avoid nephrotoxins and hypotension  · Monitor I's/O  · Resolved      COPD (chronic obstructive pulmonary disease) (HCC)  Assessment & Plan  Home regimen: Trelegy and albuterol as needed  Not on home O2  PFT 2016 showed FEV1/FVC Ratio: 62 % and  FEV1:  60 % predicted  Moderate airway obstruction   · Patient with active wheezing on admission  · Received 80 mg Solu-Medrol in ED  · Pulmicort and performance twice daily  · Xopenex and Atrovent 3 times daily  · Started on p o  prednisone by pulmonary  Patient has been refusing prednisone  · Pulmonology consult appreciated  · Patient had pulse ox with ambulation done and did not require home O2 on discharge  · Patient will be discharged on oral antibiotic to complete the course    Takotsubo cardiomyopathy  Assessment & Plan  · Continue home coreg, imdur, plavix  · Denies any associated chest pain  · HS troponins negative  · Echo 2019 nl EF 55-65%    Essential hypertension  Assessment & Plan  · Home regimen: Carvedilol 12 5 mg twice daily and losartan 50 Mg daily  · Continue    * Community acquired pneumonia of left upper lobe of lung  Assessment & Plan  · Presented due to shortness of breath with cough and fever over the last couple of days  · CXR with left upper and mid lung pneumonia  · Not meeting SIRS/sepsis however BC x 2 are negative to date  · Procalcitonin 1 29  Procalcitonin is 0 33  · Urine antigens negative  · MRSA swab is negative  · On IV Rocephin  · Mucinex twice daily  · Pulmonology consult and recommendations appreciated  · Patient had home O2 evaluation done and did not require oxygen at discharge  · Will switch to MIRELES EDGAR at discharge      Hospital Course:     Shanon Stewart is a 77 y o  female patient who originally presented to the hospital on   Admission Orders (From admission, onward)     Ordered        06/02/23 2013  INPATIENT ADMISSION  Once                     due to cough and shortness of breath  Patient was admitted with pneumonia and was started on Rocephin and Zithromax  Patient was seen by pulmonary and they continued bronchodilators  Patient was started on prednisone which patient refused  Patient is feeling much better  Cough is improved patient is afebrile  Cultures are negative to date  Patient had home O2 evaluation done and did not require oxygen at discharge  Patient will be switched to MIRELES EDGAR to complete the course  On Exam-  Chest-bilateral air entry, clear to auscultation  Abdomen-soft, nontender  Heart-S1 S2 regular  Extremities-no pedal edema or calf tenderness  Neuro-alert awake oriented x3  No focal deficits    Please see above list of diagnoses and related plan for additional information  Follow-up with PCP in 1 week  Follow-up with pulmonary as outpatient    Condition at Discharge:  good      Discharge instructions/Information to patient and family:   See after visit summary for information provided to patient and family  Provisions for Follow-Up Care:  See after visit summary for information related to follow-up care and any pertinent home health orders  Disposition:     Home       Discharge Statement:  I spent 40 minutes discharging the patient  This time was spent on the day of discharge  I had direct contact with the patient on the day of discharge   Greater than 50% of the total time was spent examining patient, answering all patient questions, arranging and discussing plan of care with patient as well as directly providing post-discharge instructions  Additional time then spent on discharge activities  Discharge Medications:  See after visit summary for reconciled discharge medications provided to patient and family        ** Please Note: This note has been constructed using a voice recognition system **

## 2023-06-06 NOTE — PLAN OF CARE
Problem: Potential for Falls  Goal: Patient will remain free of falls  Description: INTERVENTIONS:  - Educate patient/family on patient safety including physical limitations  - Instruct patient to call for assistance with activity   - Consult OT/PT to assist with strengthening/mobility   - Keep Call bell within reach  - Keep bed low and locked with side rails adjusted as appropriate  - Keep care items and personal belongings within reach  - Initiate and maintain comfort rounds  - Make Fall Risk Sign visible to staff  - Offer Toileting every 2 Hours, in advance of need  - Initiate/Maintain bed alarm  - Obtain necessary fall risk management equipment: socks  - Apply yellow socks and bracelet for high fall risk patients  - Consider moving patient to room near nurses station  Outcome: Adequate for Discharge     Problem: PAIN - ADULT  Goal: Verbalizes/displays adequate comfort level or baseline comfort level  Description: Interventions:  - Encourage patient to monitor pain and request assistance  - Assess pain using appropriate pain scale  - Administer analgesics based on type and severity of pain and evaluate response  - Implement non-pharmacological measures as appropriate and evaluate response  - Consider cultural and social influences on pain and pain management  - Notify physician/advanced practitioner if interventions unsuccessful or patient reports new pain  Outcome: Adequate for Discharge     Problem: INFECTION - ADULT  Goal: Absence or prevention of progression during hospitalization  Description: INTERVENTIONS:  - Assess and monitor for signs and symptoms of infection  - Monitor lab/diagnostic results  - Monitor all insertion sites, i e  indwelling lines, tubes, and drains  - Monitor endotracheal if appropriate and nasal secretions for changes in amount and color  - [employer] appropriate cooling/warming therapies per order  - Administer medications as ordered  - Instruct and encourage patient and family to use good hand hygiene technique  - Identify and instruct in appropriate isolation precautions for identified infection/condition  Outcome: Adequate for Discharge     Problem: SAFETY ADULT  Goal: Patient will remain free of falls  Description: INTERVENTIONS:  - Educate patient/family on patient safety including physical limitations  - Instruct patient to call for assistance with activity   - Consult OT/PT to assist with strengthening/mobility   - Keep Call bell within reach  - Keep bed low and locked with side rails adjusted as appropriate  - Keep care items and personal belongings within reach  - Initiate and maintain comfort rounds  - Make Fall Risk Sign visible to staff  - Offer Toileting every 2 Hours, in advance of need  - Initiate/Maintain bed alarm  - Obtain necessary fall risk management equipment: socks  - Apply yellow socks and bracelet for high fall risk patients  - Consider moving patient to room near nurses station  Outcome: Adequate for Discharge  Goal: Maintain or return to baseline ADL function  Description: INTERVENTIONS:  -  Assess patient's ability to carry out ADLs; assess patient's baseline for ADL function and identify physical deficits which impact ability to perform ADLs (bathing, care of mouth/teeth, toileting, grooming, dressing, etc )  - Assess/evaluate cause of self-care deficits   - Assess range of motion  - Assess patient's mobility; develop plan if impaired  - Assess patient's need for assistive devices and provide as appropriate  - Encourage maximum independence but intervene and supervise when necessary  - Involve family in performance of ADLs  - Assess for home care needs following discharge   - Consider OT consult to assist with ADL evaluation and planning for discharge  - Provide patient education as appropriate  Outcome: Adequate for Discharge  Goal: Maintains/Returns to pre admission functional level  Description: INTERVENTIONS:  - Perform BMAT or MOVE assessment daily    - Set and communicate daily mobility goal to care team and patient/family/caregiver  - Collaborate with rehabilitation services on mobility goals if consulted  - Perform Range of Motion 3 times a day  - Reposition patient every 2 hours  - Dangle patient 3 times a day  - Stand patient 3 times a day  - Ambulate patient 3 times a day  - Out of bed to chair 3 times a day   - Out of bed for meals 3 times a day  - Out of bed for toileting  - Record patient progress and toleration of activity level   Outcome: Adequate for Discharge     Problem: DISCHARGE PLANNING  Goal: Discharge to home or other facility with appropriate resources  Description: INTERVENTIONS:  - Identify barriers to discharge w/patient and caregiver  - Arrange for needed discharge resources and transportation as appropriate  - Identify discharge learning needs (meds, wound care, etc )  - Arrange for interpretive services to assist at discharge as needed  - Refer to Case Management Department for coordinating discharge planning if the patient needs post-hospital services based on physician/advanced practitioner order or complex needs related to functional status, cognitive ability, or social support system  Outcome: Adequate for Discharge     Problem: Knowledge Deficit  Goal: Patient/family/caregiver demonstrates understanding of disease process, treatment plan, medications, and discharge instructions  Description: Complete learning assessment and assess knowledge base    Interventions:  - Provide teaching at level of understanding  - Provide teaching via preferred learning methods  Outcome: Adequate for Discharge     Problem: RESPIRATORY - ADULT  Goal: Achieves optimal ventilation and oxygenation  Description: INTERVENTIONS:  - Assess for changes in respiratory status  - Assess for changes in mentation and behavior  - Position to facilitate oxygenation and minimize respiratory effort  - Oxygen administered by appropriate delivery if ordered  - Initiate smoking cessation education as indicated  - Encourage broncho-pulmonary hygiene including cough, deep breathe, Incentive Spirometry  - Assess the need for suctioning and aspirate as needed  - Assess and instruct to report SOB or any respiratory difficulty  - Respiratory Therapy support as indicated  Outcome: Adequate for Discharge     Problem: Nutrition/Hydration-ADULT  Goal: Nutrient/Hydration intake appropriate for improving, restoring or maintaining nutritional needs  Description: Monitor and assess patient's nutrition/hydration status for malnutrition  Collaborate with interdisciplinary team and initiate plan and interventions as ordered  Monitor patient's weight and dietary intake as ordered or per policy  Utilize nutrition screening tool and intervene as necessary  Determine patient's food preferences and provide high-protein, high-caloric foods as appropriate       INTERVENTIONS:  - Monitor oral intake, urinary output, labs, and treatment plans  - Assess nutrition and hydration status and recommend course of action  - Evaluate amount of meals eaten  - Assist patient with eating if necessary   - Allow adequate time for meals  - Recommend/ encourage appropriate diets, oral nutritional supplements, and vitamin/mineral supplements  - Order, calculate, and assess calorie counts as needed  - Recommend, monitor, and adjust tube feedings and TPN/PPN based on assessed needs  - Assess need for intravenous fluids  - Provide specific nutrition/hydration education as appropriate  - Include patient/family/caregiver in decisions related to nutrition  Outcome: Adequate for Discharge     Problem: Prexisting or High Potential for Compromised Skin Integrity  Goal: Skin integrity is maintained or improved  Description: INTERVENTIONS:  - Identify patients at risk for skin breakdown  - Assess and monitor skin integrity  - Assess and monitor nutrition and hydration status  - Monitor labs   - Assess for incontinence   - Turn and reposition patient  - Assist with mobility/ambulation  - Relieve pressure over bony prominences  - Avoid friction and shearing  - Provide appropriate hygiene as needed including keeping skin clean and dry  - Evaluate need for skin moisturizer/barrier cream  - Collaborate with interdisciplinary team   - Patient/family teaching  - Consider wound care consult   Outcome: Adequate for Discharge     Problem: MOBILITY - ADULT  Goal: Maintain or return to baseline ADL function  Description: INTERVENTIONS:  -  Assess patient's ability to carry out ADLs; assess patient's baseline for ADL function and identify physical deficits which impact ability to perform ADLs (bathing, care of mouth/teeth, toileting, grooming, dressing, etc )  - Assess/evaluate cause of self-care deficits   - Assess range of motion  - Assess patient's mobility; develop plan if impaired  - Assess patient's need for assistive devices and provide as appropriate  - Encourage maximum independence but intervene and supervise when necessary  - Involve family in performance of ADLs  - Assess for home care needs following discharge   - Consider OT consult to assist with ADL evaluation and planning for discharge  - Provide patient education as appropriate  Outcome: Adequate for Discharge  Goal: Maintains/Returns to pre admission functional level  Description: INTERVENTIONS:  - Perform BMAT or MOVE assessment daily    - Set and communicate daily mobility goal to care team and patient/family/caregiver  - Collaborate with rehabilitation services on mobility goals if consulted  - Perform Range of Motion 3 times a day  - Reposition patient every 2 hours    - Dangle patient 3 times a day  - Stand patient 3 times a day  - Ambulate patient 3 times a day  - Out of bed to chair 3 times a day   - Out of bed for meals 3 times a day  - Out of bed for toileting  - Record patient progress and toleration of activity level   Outcome: Adequate for Discharge

## 2023-06-06 NOTE — RESPIRATORY THERAPY NOTE
Home Oxygen Qualifying Test     Patient name: Jamarcus Rosales        : 1955   Date of Test:  2023  Diagnosis:    Home Oxygen Test:    Medicare Guidelines require item(s) 1-5 on all ambulatory patients or 1 and 2 on non-ambulatory patients  1  Baseline SPO2 on Room Air at rest 92%   If <= 88% on Room Air add O2 via NC to obtain SpO2 >=88%  If LPM needed, document LPM needed to reach =>88%    SPO2 during exertion on Room Air 93-90 %  During exertion monitor SPO2  If SPO2 increases >=89%, do not add supplemental oxygen    SPO2 on Oxygen at Rest  % at LPM    SPO2 during exertion on Oxygen % at LPM    Test performed during exertion activity  []  Supplemental Home Oxygen is indicated  [x]  Client does not qualify for home oxygen      Respiratory Additional Notes-     Mariama Alcaraz, RT

## 2023-06-06 NOTE — ASSESSMENT & PLAN NOTE
Home regimen: Trelegy and albuterol as needed  Not on home O2  PFT 2016 showed FEV1/FVC Ratio: 62 % and  FEV1:  60 % predicted  Moderate airway obstruction   · Patient with active wheezing on admission  · Received 80 mg Solu-Medrol in ED  · Pulmicort and performance twice daily  · Xopenex and Atrovent 3 times daily  · Started on p o  prednisone by pulmonary    Patient has been refusing prednisone  · Pulmonology consult appreciated  · Patient had pulse ox with ambulation done and did not require home O2 on discharge  · Patient will be discharged on oral antibiotic to complete the course

## 2023-06-06 NOTE — SPEECH THERAPY NOTE
"Speech Language/Pathology    Speech/Language Pathology Progress Note    Patient Name: Niharika SCOTT Date: 6/6/2023     Problem List  Principal Problem:    Community acquired pneumonia of left upper lobe of lung  Active Problems:    Essential hypertension    Takotsubo cardiomyopathy    COPD (chronic obstructive pulmonary disease) ([facility] )    Stage 3a chronic kidney disease ([facility] )       Past Medical History  Past Medical History:   Diagnosis Date   • Angina pectoris ([facility] )    • Arnold-Chiari malformation ([facility] )    • Breast lump     last assessed: Jan 22, 2013    • COPD (chronic obstructive pulmonary disease) ([facility] )    • Coronary artery disease    • Depression with anxiety    • GERD (gastroesophageal reflux disease)    • Gout     last assessed: Nov 26, 2012    • Hair loss     last assessed: Dec 15, 2016    • Hypertension    • Hypotension     last assessed: Nov 10, 2014    • Mitral valve disorder    • Myocardial infarct, old    • Stroke [facility])    • SVT (supraventricular tachycardia) [facility])         Past Surgical History  Past Surgical History:   Procedure Laterality Date   • BREAST BIOPSY Right 02/28/2017    US CORE BIOPSY--BENIGN   • CARDIAC CATHETERIZATION     • CERVICAL DISCECTOMY      Spinal    • CERVICAL LAMINECTOMY      C1 with chiari decompression    • COLONOSCOPY      5 yrs - onset: May 31, 2011    • CRANIOTOMY     • POPLITEAL SYNOVIAL CYST EXCISION     • TUBAL LIGATION     • US GUIDED BREAST BIOPSY RIGHT COMPLETE Right 02/28/2017         Subjective:  Pt awake and alert, breakfast tray at bedside  \"My appetite has been down\"     Current Diet:   Regular/thin     Objective:  Pt seen for dx dysphagia tx f/u w/ breakfast tray  Pt w/ poor appetite, taking only applesauce and thin liquids from tray  Prompt oral manipulation and transfer of all  [place]ows suspected timely  No overt s/s aspiration across trials  Pt denies any ongoing concerns for aspiration/dysphagia   Education reviewed on strategies to optimize " swallow safety, the importance of frequent/thorough oral care, and s/s aspiration to notify medical team of should they arise  Pt demonstrated understanding and denied questions/concerns at this time  Assessment:  Oropharyngeal swallow skill appears WNL, no overt s/s aspiration       Plan/Recommendations:  Continue current diet  Frequent/thorough oral care  If any ongoing concerns for aspiration per physician, please order MBS  Otherwise, no ST f/u indicated

## 2023-06-06 NOTE — ASSESSMENT & PLAN NOTE
· Presented due to shortness of breath with cough and fever over the last couple of days  · CXR with left upper and mid lung pneumonia  · Not meeting SIRS/sepsis however BC x 2 are negative to date  · Procalcitonin 1 29    Procalcitonin is 0 33  · Urine antigens negative  · MRSA swab is negative  · On IV Rocephin  · Mucinex twice daily  · Pulmonology consult and recommendations appreciated  · Patient had home O2 evaluation done and did not require oxygen at discharge  · Will switch to MIRELES EDGAR at discharge

## 2023-06-06 NOTE — PLAN OF CARE
Problem: PAIN - ADULT  Goal: Verbalizes/displays adequate comfort level or baseline comfort level  Description: Interventions:  - Encourage patient to monitor pain and request assistance  - Assess pain using appropriate pain scale  - Administer analgesics based on type and severity of pain and evaluate response  - Implement non-pharmacological measures as appropriate and evaluate response  - Consider cultural and social influences on pain and pain management  - Notify physician/advanced practitioner if interventions unsuccessful or patient reports new pain  Outcome: Progressing     Problem: INFECTION - ADULT  Goal: Absence or prevention of progression during hospitalization  Description: INTERVENTIONS:  - Assess and monitor for signs and symptoms of infection  - Monitor lab/diagnostic results  - Monitor all insertion sites, i e  indwelling lines, tubes, and drains  - Monitor endotracheal if appropriate and nasal secretions for changes in amount and color  - [place] appropriate cooling/warming therapies per order  - Administer medications as ordered  - Instruct and encourage patient and family to use good hand hygiene technique  - Identify and instruct in appropriate isolation precautions for identified infection/condition  Outcome: Progressing     Problem: Knowledge Deficit  Goal: Patient/family/caregiver demonstrates understanding of disease process, treatment plan, medications, and discharge instructions  Description: Complete learning assessment and assess knowledge base    Interventions:  - Provide teaching at level of understanding  - Provide teaching via preferred learning methods  Outcome: Progressing     Problem: DISCHARGE PLANNING  Goal: Discharge to home or other facility with appropriate resources  Description: INTERVENTIONS:  - Identify barriers to discharge w/patient and caregiver  - Arrange for needed discharge resources and transportation as appropriate  - Identify discharge learning needs (meds, wound care, etc )  - Arrange for interpretive services to assist at discharge as needed  - Refer to Case Management Department for coordinating discharge planning if the patient needs post-hospital services based on physician/advanced practitioner order or complex needs related to functional status, cognitive ability, or social support system  Outcome: Progressing     Problem: RESPIRATORY - ADULT  Goal: Achieves optimal ventilation and oxygenation  Description: INTERVENTIONS:  - Assess for changes in respiratory status  - Assess for changes in mentation and behavior  - Position to facilitate oxygenation and minimize respiratory effort  - Oxygen administered by appropriate delivery if ordered  - Initiate smoking cessation education as indicated  - Encourage broncho-pulmonary hygiene including cough, deep breathe, Incentive Spirometry  - Assess the need for suctioning and aspirate as needed  - Assess and instruct to report SOB or any respiratory difficulty  - Respiratory Therapy support as indicated  Outcome: Progressing

## 2023-06-06 NOTE — CASE MANAGEMENT
Case Management Discharge Planning Note    Patient name Que Cervantes  Location /-92 MRN [id number]  : 1955 Date 2023       Current Admission Date: 2023  Current Admission Diagnosis:Community acquired pneumonia of left upper lobe of lung   Patient Active Problem List    Diagnosis Date Noted   • Community acquired pneumonia of left upper lobe of lung 2023   • Snoring    • Daytime somnolence    • Hypersomnia, unspecified    • GER (obstructive sleep apnea)    • Allergies 2023   • Hyperglycemia 2022   • Intracranial atherosclerosis 11/10/2022   • Benign tumor of spinal meningioma ([facility] ) 11/10/2022   • Carotid stenosis, bilateral 2022   • Dysphagia 2022   • History of subarachnoid hemorrhage 2022   • Facial twitching 2022   • Stage 3a chronic kidney disease ([facility] ) 2021   • Hypotension    • GERD (gastroesophageal reflux disease) 2019   • COPD (chronic obstructive pulmonary disease) ([facility] ) 2019   • Pancreatic cyst 2019   • De Quervain's disease (tenosynovitis) 2018   • Sudden idiopathic hearing loss of right ear 2017   • Takotsubo cardiomyopathy 06/15/2017   • Lower back pain 2016   • Depression with anxiety 09/15/2015   • Cervical stenosis of spinal canal 2015   • Carpal tunnel syndrome 2014   • Plantar fasciitis 2012   • Dyslipidemia 2012   • Arnold-Chiari malformation ([facility] ) 2012   • Essential hypertension 2012      LOS (days): 4  Geometric Mean LOS (GMLOS) (days): 4 00  Days to GMLOS:0 4     OBJECTIVE:  Risk of Unplanned Readmission Score: 14 94         Current admission status: Inpatient   Preferred Pharmacy:   [address]  [facility] [place] - [address]  [address]    [place] [address]  Phone: [phone] Fax: [phone]    Primary Care Provider: Isabella Leyva DO    Primary Insurance: MEDICARE  Secondary Insurance: Violet Whitt DETAILS:         IMM reviewed with patient, patient agrees with discharge determination                                                                                      IMM Given (Date):: 06/06/23 (8:20 am)  IMM Given to[de-identified] Patient

## 2023-06-08 ENCOUNTER — RA CDI HCC (OUTPATIENT)
Dept: OTHER | Facility: HOSPITAL | Age: 68
End: 2023-06-08

## 2023-06-08 LAB
BACTERIA BLD CULT: NORMAL
BACTERIA BLD CULT: NORMAL

## 2023-06-15 ENCOUNTER — OFFICE VISIT (OUTPATIENT)
Dept: FAMILY MEDICINE CLINIC | Facility: HOSPITAL | Age: 68
End: 2023-06-15
Payer: MEDICARE

## 2023-06-15 VITALS
SYSTOLIC BLOOD PRESSURE: 142 MMHG | BODY MASS INDEX: 27.12 KG/M2 | HEART RATE: 76 BPM | DIASTOLIC BLOOD PRESSURE: 74 MMHG | WEIGHT: 162.8 LBS | HEIGHT: 65 IN | OXYGEN SATURATION: 94 % | TEMPERATURE: 97.9 F

## 2023-06-15 DIAGNOSIS — J44.9 CHRONIC OBSTRUCTIVE PULMONARY DISEASE, UNSPECIFIED COPD TYPE (HCC): ICD-10-CM

## 2023-06-15 DIAGNOSIS — N18.31 STAGE 3A CHRONIC KIDNEY DISEASE (HCC): Chronic | ICD-10-CM

## 2023-06-15 DIAGNOSIS — J18.9 COMMUNITY ACQUIRED PNEUMONIA OF LEFT UPPER LOBE OF LUNG: ICD-10-CM

## 2023-06-15 DIAGNOSIS — Q07.00 ARNOLD-CHIARI MALFORMATION (HCC): ICD-10-CM

## 2023-06-15 DIAGNOSIS — Z09 HOSPITAL DISCHARGE FOLLOW-UP: Primary | ICD-10-CM

## 2023-06-15 PROCEDURE — 99495 TRANSJ CARE MGMT MOD F2F 14D: CPT | Performed by: INTERNAL MEDICINE

## 2023-06-15 NOTE — ASSESSMENT & PLAN NOTE
Finished abx and feeling better, still with cough, as per discharge recommendations from [facility] [address] will check CT chest in 4-6 wks - order given, will follow, recommended OP f/u with Pulm on discharge as well - reminded pt to call for appt

## 2023-06-15 NOTE — ASSESSMENT & PLAN NOTE
Lab Results   Component Value Date    CREATININE 1 09 06/06/2023    CREATININE 1 01 06/05/2023    CREATININE 1 17 06/04/2023    EGFR 51 06/06/2023    EGFR 56 06/05/2023    EGFR 47 06/04/2023   BUN/Cr stable throughout hospital stay, con't current ARB and close monitoring of BP/BS control

## 2023-06-15 NOTE — ASSESSMENT & PLAN NOTE
One dose of Solu-Medrol given in ED but PO steroids held d/t elevated BP in past, recommended f/u with Pulm as OP - urged pt to call for appt, still using Trelegy daily but rescue inhaler still 3-4 x's a day, call with new/worse symptoms

## 2023-06-15 NOTE — ASSESSMENT & PLAN NOTE
Follows with Neuro regularly and just had MRI/MRA of brain in Oct 22, con't imaging and f/u as per Neuro

## 2023-06-15 NOTE — PROGRESS NOTES
Assessment & Plan     1  Hospital discharge follow-up    2  Community acquired pneumonia of left upper lobe of lung  Assessment & Plan:  Finished abx and feeling better, still with cough, as per discharge recommendations from [facility] [address] will check CT chest in 4-6 wks - order given, will follow, recommended OP f/u with Pulm on discharge as well - reminded pt to call for appt    Orders:  -     CT chest wo contrast; Future; Expected date: 07/24/2023    3  Chronic obstructive pulmonary disease, unspecified COPD type ([facility] )  Assessment & Plan:  One dose of Solu-Medrol given in ED but PO steroids held d/t elevated BP in past, recommended f/u with Pulm as OP - urged pt to call for appt, still using Trelegy daily but rescue inhaler still 3-4 x's a day, call with new/worse symptoms      4  Stage 3a chronic kidney disease [facility])  Assessment & Plan:  Lab Results   Component Value Date    CREATININE 1 09 06/06/2023    CREATININE 1 01 06/05/2023    CREATININE 1 17 06/04/2023    EGFR 51 06/06/2023    EGFR 56 06/05/2023    EGFR 47 06/04/2023   BUN/Cr stable throughout hospital stay, con't current ARB and close monitoring of BP/BS control      5  Arnold-Chiari malformation [facility])  Assessment & Plan:  Follows with Neuro regularly and just had MRI/MRA of brain in Oct 22, con't imaging and f/u as per Neuro    Colonoscopy 6/22    Mammo 2/23    Dexa 2/23 - osteopenia    PAP 12/20    CUS 4/23    CT chest 2/23    BW 5/22       Subjective     Transitional Care Management Review:   Vale Mack is a 77 y o  female here for TCM follow up  Pt was hospitalized at [facility] from 6/2/23 to 6/5/23  Records were reviewed by myself in detail and events are summarized below       During the TCM phone call patient stated:  TCM Call     Date and time call was made  6/7/2023  3:10 PM    Hospital care reviewed  Records reviewed    Patient was hospitialized at  [facility]  [facility]    Date of Admission  06/02/23    Date of discharge  06/06/23    Diagnosis PNEUMONIA    Disposition  Home    Were the patients medications reviewed and updated  No    Current Symptoms  None      TCM Call     Post hospital issues  None    Should patient be enrolled in anticoag monitoring? No    Scheduled for follow up? Yes    Did you obtain your prescribed medications  Yes    Do you need help managing your prescriptions or medications  No    Is transportation to your appointment needed  No    I have advised the patient to call PCP with any new or worsening symptoms  Angelina Carolann  MA    Living Arrangements  Spouse or Significiant other    Support System  Family    Do you have social support  Yes, as much as I need    Are you recieving any outpatient services  No    Are you recieving home care services  No    Are you using any community resources  No    Current waiver services  No    Have you fallen in the last 12 months  No    Interperter language line needed  No    Counseling  Patient        HPI Pt presented [facility] on 6/2/23 with c/o cough, fever and increase SOB  In ED VSS with O2 sat 91% on RA  Exam notable for tachypnea, resp distress with accessory muscles of resp being used, as well as dec BS and rhonchi  Work up in ED notable for Na 133, BUN/Cr 26/1 66, T Alexx 1 15, rest of CMP/CBC/COVID-Flu-RSV/BNP/troponin were all wnl  Procalcitonin did come back elevated  BC neg x 2  CXR with infiltrate FELISHA and mid lung with severe COPD, ECG showed NSR w/NS T wave changes in ant lead  Pt was admitted for CAP of FELISHA and given CTX and Zithromax as well as Mucinex bid  She did have a drop in O2 sat with ambulation and was placed on O2 via NC  She was given 1 dose of IV Solu-Medrol while in the ED  Pulm was consulted her home Trelegy was changed to Xopenex and Atrovent  Abx were continued  Ur Legionella and Ur Strep Pneumo were done and both came back neg and Zithromax was stopped  Oral Prednisone was recommended but then not prescribed d/t previous SE/elevated BP SAH    She was able to "be weaned of O2  She did not qualify for home O2  Symptoms improved and pt was discharged home on Omnicef and told to f/u with Pulm as OP as well as a CT chest in 6-8 wks  Med list reviewed    Pt finished her abx w/o significant SE  She is still coughing but it is gradually improving  She still has some SOB with deep breath and when she gets to coughing  She has some SOB with up and down the steps  She has had no F/C/wheezing  She is using her Trelegy daily as directed  She is still using rescue inhaler 3-4 x's a day  She is eating well  BP a bit above goal today and meds were reviewed and no changes have occurred  She denies missing doses of meds or SE with the meds  She does  check her BP outside the office and is around 874-649'K systolic at home  She notes no frequent HA's/dizziness/double vision/CP  She saw Neuro in April for f/u [facility]  CUS 4/23 and were unchanged at <50% stenosis B/L carotids  She has f/u Nov and Dec  She had MRI/MRA of brain in Oct      Review of Systems   Constitutional: Negative for chills and fever  HENT: Negative for congestion and sore throat  Eyes: Negative for pain and visual disturbance  Respiratory: Positive for shortness of breath  Negative for wheezing  Cardiovascular: Negative for chest pain and palpitations  Gastrointestinal: Positive for constipation  Negative for abdominal pain, diarrhea, nausea and vomiting  Genitourinary: Negative for difficulty urinating and dysuria  Musculoskeletal: Negative for back pain and neck pain  Skin: Negative for rash and wound  Neurological: Negative for dizziness, light-headedness and headaches  Hematological: Does not bruise/bleed easily  Psychiatric/Behavioral: Negative for confusion and dysphoric mood         Objective     /74   Pulse 76   Temp 97 9 °F (36 6 °C) (Tympanic)   Ht 5' 5\" (1 651 m)   Wt 73 8 kg (162 lb 12 8 oz)   SpO2 94%   BMI 27 09 kg/m²      Physical Exam  Vitals " and nursing note reviewed  Constitutional:       General: She is not in acute distress  Appearance: She is well-developed  She is not ill-appearing  HENT:      Head: Normocephalic and atraumatic  Right Ear: External ear normal  There is no impacted cerumen  Left Ear: Tympanic membrane and external ear normal  There is no impacted cerumen  Ears:      Comments: Small effusion R TM  Eyes:      General:         Right eye: No discharge  Left eye: No discharge  Conjunctiva/sclera: Conjunctivae normal    Neck:      Thyroid: No thyromegaly  Trachea: No tracheal deviation  Cardiovascular:      Rate and Rhythm: Normal rate and regular rhythm  Heart sounds: Normal heart sounds  No murmur heard  Pulmonary:      Effort: Pulmonary effort is normal  No respiratory distress  Breath sounds: Wheezing present  No rhonchi or rales  Comments: Faint expiratory wheeze FELISHA  Abdominal:      General: There is no distension  Palpations: Abdomen is soft  Tenderness: There is no abdominal tenderness  There is no guarding or rebound  Musculoskeletal:         General: No deformity or signs of injury  Cervical back: Neck supple  Right lower leg: No edema  Left lower leg: No edema  Lymphadenopathy:      Cervical: Cervical adenopathy present  Skin:     General: Skin is warm and dry  Coloration: Skin is not pale  Findings: No rash  Neurological:      General: No focal deficit present  Mental Status: She is alert  Motor: No abnormal muscle tone  Gait: Gait normal    Psychiatric:         Mood and Affect: Mood normal          Behavior: Behavior normal          Thought Content:  Thought content normal          Judgment: Judgment normal        Medications have been reviewed by provider in current encounter    Rachel Amaro DO

## 2023-07-02 DIAGNOSIS — R13.10 DYSPHAGIA, UNSPECIFIED TYPE: Chronic | ICD-10-CM

## 2023-07-02 DIAGNOSIS — F32.A DEPRESSION, UNSPECIFIED DEPRESSION TYPE: ICD-10-CM

## 2023-07-02 RX ORDER — ESCITALOPRAM OXALATE 20 MG/1
TABLET ORAL
Qty: 30 TABLET | Refills: 0 | Status: SHIPPED | OUTPATIENT
Start: 2023-07-02

## 2023-07-02 RX ORDER — PANTOPRAZOLE SODIUM 40 MG/1
TABLET, DELAYED RELEASE ORAL
Qty: 90 TABLET | Refills: 0 | Status: SHIPPED | OUTPATIENT
Start: 2023-07-02

## 2023-07-12 NOTE — ASSESSMENT & PLAN NOTE
S/p joint injection with Ortho - symptoms improved - f/u with Ortho prn, call with new/worse symptoms The patient is a 21y Male complaining of

## 2023-07-29 ENCOUNTER — APPOINTMENT (OUTPATIENT)
Dept: LAB | Facility: HOSPITAL | Age: 68
End: 2023-07-29
Payer: MEDICARE

## 2023-07-29 ENCOUNTER — HOSPITAL ENCOUNTER (OUTPATIENT)
Dept: CT IMAGING | Facility: HOSPITAL | Age: 68
Discharge: HOME | End: 2023-07-29
Payer: MEDICARE

## 2023-07-29 DIAGNOSIS — R73.9 HYPERGLYCEMIA: ICD-10-CM

## 2023-07-29 DIAGNOSIS — J18.9 COMMUNITY ACQUIRED PNEUMONIA OF LEFT UPPER LOBE OF LUNG: ICD-10-CM

## 2023-07-29 DIAGNOSIS — D32.1 MENINGIOMA, SPINAL (HCC): ICD-10-CM

## 2023-07-29 DIAGNOSIS — J18.9 COMMUNITY ACQUIRED PNEUMONIA OF RIGHT UPPER LOBE OF LUNG: ICD-10-CM

## 2023-07-29 LAB
ANION GAP SERPL CALCULATED.3IONS-SCNC: 7 MMOL/L
BUN SERPL-MCNC: 17 MG/DL (ref 5–25)
CALCIUM SERPL-MCNC: 9.6 MG/DL (ref 8.4–10.2)
CHLORIDE SERPL-SCNC: 105 MMOL/L (ref 96–108)
CO2 SERPL-SCNC: 26 MMOL/L (ref 21–32)
CREAT SERPL-MCNC: 1.31 MG/DL (ref 0.6–1.3)
EST. AVERAGE GLUCOSE BLD GHB EST-MCNC: 120 MG/DL
GFR SERPL CREATININE-BSD FRML MDRD: 41 ML/MIN/1.73SQ M
GLUCOSE P FAST SERPL-MCNC: 105 MG/DL (ref 65–99)
HBA1C MFR BLD: 5.8 %
POTASSIUM SERPL-SCNC: 4.4 MMOL/L (ref 3.5–5.3)
SODIUM SERPL-SCNC: 138 MMOL/L (ref 135–147)

## 2023-07-29 PROCEDURE — 71250 CT THORAX DX C-: CPT

## 2023-07-29 PROCEDURE — 80048 BASIC METABOLIC PNL TOTAL CA: CPT

## 2023-07-29 PROCEDURE — 36415 COLL VENOUS BLD VENIPUNCTURE: CPT

## 2023-07-29 PROCEDURE — G1004 CDSM NDSC: HCPCS

## 2023-07-30 DIAGNOSIS — R79.89 AZOTEMIA: Primary | ICD-10-CM

## 2023-07-30 DIAGNOSIS — E78.5 DYSLIPIDEMIA: ICD-10-CM

## 2023-07-30 DIAGNOSIS — I20.9 ANGINAL PAIN (HCC): ICD-10-CM

## 2023-07-30 RX ORDER — FLUTICASONE FUROATE, UMECLIDINIUM BROMIDE AND VILANTEROL TRIFENATATE 100; 62.5; 25 UG/1; UG/1; UG/1
POWDER RESPIRATORY (INHALATION)
Qty: 60 EACH | Refills: 5 | Status: SHIPPED | OUTPATIENT
Start: 2023-07-30

## 2023-08-02 PROBLEM — J18.9 COMMUNITY ACQUIRED PNEUMONIA OF LEFT UPPER LOBE OF LUNG: Status: RESOLVED | Noted: 2023-06-02 | Resolved: 2023-08-02

## 2023-08-04 DIAGNOSIS — J85.2 ABSCESS OF UPPER LOBE OF LEFT LUNG WITHOUT PNEUMONIA (HCC): Primary | ICD-10-CM

## 2023-08-05 RX ORDER — ATORVASTATIN CALCIUM 80 MG/1
TABLET, FILM COATED ORAL
Qty: 90 TABLET | Refills: 0 | Status: SHIPPED | OUTPATIENT
Start: 2023-08-05

## 2023-08-05 RX ORDER — ISOSORBIDE MONONITRATE 30 MG/1
TABLET, EXTENDED RELEASE ORAL
Qty: 90 TABLET | Refills: 0 | Status: SHIPPED | OUTPATIENT
Start: 2023-08-05

## 2023-08-08 ENCOUNTER — TELEPHONE (OUTPATIENT)
Dept: NEUROLOGY | Facility: CLINIC | Age: 68
End: 2023-08-08

## 2023-08-08 DIAGNOSIS — R13.10 DYSPHAGIA, UNSPECIFIED TYPE: Chronic | ICD-10-CM

## 2023-08-08 NOTE — TELEPHONE ENCOUNTER
----- Message from Aicha Jason MD sent at 7/31/2023  4:13 PM EDT -----  BMP is reasonable except for Creatinine which is just over the upper limit of normal.  She should follow-up with PCP to review medications as possible causes but also repeat testing.

## 2023-08-27 DIAGNOSIS — G89.4 CHRONIC PAIN SYNDROME: ICD-10-CM

## 2023-08-27 RX ORDER — GABAPENTIN 400 MG/1
800 CAPSULE ORAL 2 TIMES DAILY
Qty: 120 CAPSULE | Refills: 2 | Status: SHIPPED | OUTPATIENT
Start: 2023-08-27

## 2023-09-10 DIAGNOSIS — F32.A DEPRESSION, UNSPECIFIED DEPRESSION TYPE: ICD-10-CM

## 2023-09-10 RX ORDER — ESCITALOPRAM OXALATE 20 MG/1
TABLET ORAL
Qty: 30 TABLET | Refills: 0 | Status: SHIPPED | OUTPATIENT
Start: 2023-09-10

## 2023-09-24 DIAGNOSIS — R13.10 DYSPHAGIA, UNSPECIFIED TYPE: Chronic | ICD-10-CM

## 2023-09-24 RX ORDER — PANTOPRAZOLE SODIUM 40 MG/1
TABLET, DELAYED RELEASE ORAL
Qty: 90 TABLET | Refills: 0 | Status: SHIPPED | OUTPATIENT
Start: 2023-09-24

## 2023-10-12 ENCOUNTER — HOSPITAL ENCOUNTER (OUTPATIENT)
Dept: CT IMAGING | Facility: HOSPITAL | Age: 68
Discharge: HOME | End: 2023-10-12
Payer: MEDICARE

## 2023-10-12 DIAGNOSIS — J18.9 COMMUNITY ACQUIRED PNEUMONIA OF RIGHT UPPER LOBE OF LUNG: ICD-10-CM

## 2023-10-12 DIAGNOSIS — R91.8 PULMONARY NODULES: ICD-10-CM

## 2023-10-12 PROCEDURE — 71250 CT THORAX DX C-: CPT

## 2023-10-12 PROCEDURE — G1004 CDSM NDSC: HCPCS

## 2023-10-27 ENCOUNTER — RA CDI HCC (OUTPATIENT)
Dept: OTHER | Facility: HOSPITAL | Age: 68
End: 2023-10-27

## 2023-10-27 NOTE — PROGRESS NOTES
Previous suggestion used  [address] coding opportunities       Chart reviewed, no opportunity found: CHART REVIEWED, NO OPPORTUNITY FOUND        Patients Insurance     Medicare Insurance: [employer]

## 2023-11-01 ENCOUNTER — HOSPITAL ENCOUNTER (OUTPATIENT)
Dept: MRI IMAGING | Facility: HOSPITAL | Age: 68
Discharge: HOME | End: 2023-11-01
Attending: PSYCHIATRY & NEUROLOGY
Payer: MEDICARE

## 2023-11-01 DIAGNOSIS — D32.1 MENINGIOMA, SPINAL (HCC): ICD-10-CM

## 2023-11-01 PROCEDURE — 72157 MRI CHEST SPINE W/O & W/DYE: CPT

## 2023-11-01 PROCEDURE — A9585 GADOBUTROL INJECTION: HCPCS | Performed by: PSYCHIATRY & NEUROLOGY

## 2023-11-01 PROCEDURE — G1004 CDSM NDSC: HCPCS

## 2023-11-01 RX ORDER — GADOBUTROL 604.72 MG/ML
7 INJECTION INTRAVENOUS
Status: COMPLETED | OUTPATIENT
Start: 2023-11-01 | End: 2023-11-01

## 2023-11-01 RX ADMIN — GADOBUTROL 7 ML: 604.72 INJECTION INTRAVENOUS at 14:13

## 2023-11-02 ENCOUNTER — OFFICE VISIT (OUTPATIENT)
Age: 68
End: 2023-11-02
Payer: MEDICARE

## 2023-11-02 VITALS
HEART RATE: 60 BPM | DIASTOLIC BLOOD PRESSURE: 80 MMHG | OXYGEN SATURATION: 93 % | BODY MASS INDEX: 28.46 KG/M2 | SYSTOLIC BLOOD PRESSURE: 152 MMHG | WEIGHT: 171 LBS | TEMPERATURE: 96.5 F

## 2023-11-02 DIAGNOSIS — J44.9 CHRONIC OBSTRUCTIVE PULMONARY DISEASE, UNSPECIFIED COPD TYPE (HCC): ICD-10-CM

## 2023-11-02 DIAGNOSIS — J85.2 ABSCESS OF UPPER LOBE OF LEFT LUNG WITHOUT PNEUMONIA (HCC): Primary | ICD-10-CM

## 2023-11-02 PROCEDURE — 99214 OFFICE O/P EST MOD 30 MIN: CPT | Performed by: INTERNAL MEDICINE

## 2023-11-02 RX ORDER — OFLOXACIN 3 MG/ML
1 SOLUTION/ DROPS OPHTHALMIC
COMMUNITY
Start: 2023-10-30

## 2023-11-02 RX ORDER — PREDNISOLONE ACETATE 10 MG/ML
1 SUSPENSION/ DROPS OPHTHALMIC
COMMUNITY
Start: 2023-10-30

## 2023-11-02 NOTE — PROGRESS NOTES
Progress Note - Pulmonary   Yefri Dixon 77 y.o. female MRN: [id number]   Encounter: [id number]      Assessment/Plan:  Patient is 64-year-old female with past medical history significant remote history of tobacco abuse, moderate COPD who presents for routine follow-up. The patient had repeat imaging given her history of pneumonia. There has been improvement but remains persistent opacification. Patient would benefit from repeat imaging in 3 months to ensure continued resolution. Additionally, patient is concerned about cost of her inhalers. She was given a copy of the inhaler listed and encouraged to discuss this with her pharmacist to see which may be most affordable for her. Moderate obstruction  -  improved with Trelegy  -  evaluating alternate options    -  consider triple vs laba/lama    Nicotine dependence   -  in remission  - quit in 2007  -  no longer requires lung cancer screening    Right upper lobe consolidation  -  interval improvement air-fluid levels  -  will repeat CT chest in January (3 mos) given persistence of opacification in high risk patient    1. Abscess of upper lobe of left lung without pneumonia [facility])  -     Ambulatory Referral to Pulmonology  -     CT chest without contrast; Future; Expected date: 02/02/2024    2. Chronic obstructive pulmonary disease, unspecified COPD type ([address])        Patient may follow up in 3 months or sooner as necessary. Orders:  Orders Placed This Encounter   Procedures    CT chest without contrast     Standing Status:   Future     Standing Expiration Date:   11/2/2027     Scheduling Instructions: There is no prep for this study. Please bring your insurance cards, a form of photo ID and a list of your medications with you. Arrive 15 minutes prior to your appointment time to register. On the day of your test, please bring any prior CT or MRI studies of this area with you that were not performed at a [facility].             To schedule this appointment, please contact Central Scheduling at [phone]. Order Specific Question:   What is the patient's sedation requirement? If Medication for Claustrophobia is selected, order medication at this point. Answer:   No Sedation     Order Specific Question:   Release to patient through Mychart     Answer:   Immediate     Order Specific Question:   Reason for Exam (FREE TEXT)     Answer:   consolidatin       Subjective: The patient reports she is doing well. She is paying 172/month for trelegy. Her breathing is better on the Trelegy as compared to Advair. She has had recent weight gain. She denies fevers, chills, nausea or vomiting. She denies recent usage of albuterol inhaler. Inhaler Regimen:  Trelegy    Remainder of review of systems negative except as described in HPI. The following portions of the patient's history were reviewed and updated as appropriate: allergies, current medications, past family history, past medical history, past social history, past surgical history and problem list.     Objective:   Vitals: Blood pressure 152/80, pulse 60, temperature (!) 96.5 °F (35.8 °C), temperature source Tympanic, weight 77.6 kg (171 lb), SpO2 93 %., RA, Body mass index is 28.46 kg/m². Physical Exam  Gen: Pleasant, awake, alert, oriented x 3, no acute distress  HEENT: Mucous membranes moist, no oral lesions, no thrush  NECK: No accessory muscle use, JVP not elevated  Cardiac: RRR, single S1, single S2, no murmurs, no rubs, no gallops  Lungs: CTA b/l  Abdomen: normoactive bowel sounds, soft nontender, nondistended, no rebound or rigidity, no guarding  Extremities: no cyanosis, no clubbing, no LE edema  MSK:  Strength equal in all extremities  Derm:  No rashes/lesions noted  Neuro:  Appropriate mood/affect    Labs: I have personally reviewed pertinent lab results.   Lab Results   Component Value Date    WBC 5.46 06/06/2023    WBC 4.69 12/10/2015    HGB 11.9 06/06/2023    HGB 15.3 12/10/2015     (H) 06/06/2023     12/10/2015     Lab Results   Component Value Date    CREATININE 1.31 (H) 07/29/2023    CREATININE 0.90 12/10/2015        Imaging and other studies: I have personally reviewed pertinent reports. and I have personally reviewed pertinent films in PACS  CT Chest 10/12/2023  My interpretation:  Improved air fluid levels w/ air fluid levels    Radiology findings:  LUNGS: Interval resolution of previously noted left upper lobe air-fluid level is noted. There is persistent left upper lobe masslike opacity present. Severe bullous emphysema is noted. Linear scarring is identified within the right upper lobe. There is no tracheal or endobronchial lesion. PLEURA:  Unremarkable. HEART/GREAT VESSELS: Coronary artery calcification is present. Atherosclerotic calcification of the ascending thoracic aorta is present. MEDIASTINUM AND KAVITHA:  Unremarkable. CHEST WALL AND LOWER NECK:  Unremarkable. VISUALIZED STRUCTURES IN THE UPPER ABDOMEN: Visualized hepatic parenchyma is diffusely decreased in density consistent with hepatic steatosis. Otherwise no clinical significant abnormality identified in the visualized upper abdomen. OSSEOUS STRUCTURES:  No acute fracture or destructive osseous lesion. Pulmonary Function Testing: I have personally reviewed pertinent reports. and I have personally reviewed pertinent films in PACS  7/17/2019: Moderate obstruction  FEV1/FVC Ratio: 62 %  Forced Vital Capacity: 2.41 L    74 % predicted  FEV1: 1.49 L     60 % predicted    Alexis Santos.  [facility], [address] [place] [address]

## 2023-11-04 ENCOUNTER — APPOINTMENT (OUTPATIENT)
Dept: LAB | Facility: HOSPITAL | Age: 68
End: 2023-11-04
Payer: MEDICARE

## 2023-11-04 DIAGNOSIS — R73.9 HYPERGLYCEMIA: ICD-10-CM

## 2023-11-04 DIAGNOSIS — Z13.29 SCREENING FOR THYROID DISORDER: ICD-10-CM

## 2023-11-04 LAB
ANION GAP SERPL CALCULATED.3IONS-SCNC: 8 MMOL/L
BUN SERPL-MCNC: 17 MG/DL (ref 5–25)
CALCIUM SERPL-MCNC: 9.5 MG/DL (ref 8.4–10.2)
CHLORIDE SERPL-SCNC: 104 MMOL/L (ref 96–108)
CO2 SERPL-SCNC: 27 MMOL/L (ref 21–32)
CREAT SERPL-MCNC: 1.16 MG/DL (ref 0.6–1.3)
GFR SERPL CREATININE-BSD FRML MDRD: 47 ML/MIN/1.73SQ M
GLUCOSE P FAST SERPL-MCNC: 97 MG/DL (ref 65–99)
POTASSIUM SERPL-SCNC: 4.1 MMOL/L (ref 3.5–5.3)
SODIUM SERPL-SCNC: 139 MMOL/L (ref 135–147)
TSH SERPL DL<=0.05 MIU/L-ACNC: 3.38 UIU/ML (ref 0.45–4.5)

## 2023-11-04 PROCEDURE — 84443 ASSAY THYROID STIM HORMONE: CPT

## 2023-11-04 PROCEDURE — 36415 COLL VENOUS BLD VENIPUNCTURE: CPT

## 2023-11-04 PROCEDURE — 83036 HEMOGLOBIN GLYCOSYLATED A1C: CPT

## 2023-11-04 PROCEDURE — 80048 BASIC METABOLIC PNL TOTAL CA: CPT

## 2023-11-05 DIAGNOSIS — I20.9 ANGINAL PAIN (HCC): ICD-10-CM

## 2023-11-05 DIAGNOSIS — F32.A DEPRESSION, UNSPECIFIED DEPRESSION TYPE: ICD-10-CM

## 2023-11-05 LAB
EST. AVERAGE GLUCOSE BLD GHB EST-MCNC: 120 MG/DL
HBA1C MFR BLD: 5.8 %

## 2023-11-05 RX ORDER — ISOSORBIDE MONONITRATE 30 MG/1
TABLET, EXTENDED RELEASE ORAL
Qty: 90 TABLET | Refills: 0 | Status: SHIPPED | OUTPATIENT
Start: 2023-11-05

## 2023-11-05 RX ORDER — ESCITALOPRAM OXALATE 20 MG/1
TABLET ORAL
Qty: 30 TABLET | Refills: 0 | Status: SHIPPED | OUTPATIENT
Start: 2023-11-05

## 2023-11-06 ENCOUNTER — TELEPHONE (OUTPATIENT)
Dept: NEUROLOGY | Facility: CLINIC | Age: 68
End: 2023-11-06

## 2023-11-06 ENCOUNTER — OFFICE VISIT (OUTPATIENT)
Dept: FAMILY MEDICINE CLINIC | Facility: HOSPITAL | Age: 68
End: 2023-11-06
Payer: MEDICARE

## 2023-11-06 ENCOUNTER — OFFICE VISIT (OUTPATIENT)
Dept: CARDIOLOGY CLINIC | Facility: CLINIC | Age: 68
End: 2023-11-06
Payer: MEDICARE

## 2023-11-06 VITALS
DIASTOLIC BLOOD PRESSURE: 86 MMHG | TEMPERATURE: 97.2 F | HEART RATE: 68 BPM | WEIGHT: 172.4 LBS | BODY MASS INDEX: 28.72 KG/M2 | HEIGHT: 65 IN | SYSTOLIC BLOOD PRESSURE: 142 MMHG

## 2023-11-06 VITALS
SYSTOLIC BLOOD PRESSURE: 140 MMHG | OXYGEN SATURATION: 98 % | HEIGHT: 65 IN | BODY MASS INDEX: 28.72 KG/M2 | DIASTOLIC BLOOD PRESSURE: 80 MMHG | WEIGHT: 172.4 LBS | HEART RATE: 61 BPM

## 2023-11-06 DIAGNOSIS — I95.9 HYPOTENSION, UNSPECIFIED HYPOTENSION TYPE: ICD-10-CM

## 2023-11-06 DIAGNOSIS — I51.81 TAKOTSUBO CARDIOMYOPATHY: ICD-10-CM

## 2023-11-06 DIAGNOSIS — Z01.818 PREOPERATIVE CLEARANCE: Primary | ICD-10-CM

## 2023-11-06 DIAGNOSIS — G89.4 CHRONIC PAIN SYNDROME: ICD-10-CM

## 2023-11-06 DIAGNOSIS — E78.5 DYSLIPIDEMIA: Chronic | ICD-10-CM

## 2023-11-06 DIAGNOSIS — I10 ESSENTIAL HYPERTENSION: Chronic | ICD-10-CM

## 2023-11-06 DIAGNOSIS — J44.9 CHRONIC OBSTRUCTIVE PULMONARY DISEASE, UNSPECIFIED COPD TYPE (HCC): ICD-10-CM

## 2023-11-06 DIAGNOSIS — Z12.31 ENCOUNTER FOR SCREENING MAMMOGRAM FOR MALIGNANT NEOPLASM OF BREAST: ICD-10-CM

## 2023-11-06 DIAGNOSIS — D32.1 BENIGN TUMOR OF SPINAL MENINGIOMA (HCC): ICD-10-CM

## 2023-11-06 DIAGNOSIS — Q07.00 ARNOLD-CHIARI MALFORMATION (HCC): Chronic | ICD-10-CM

## 2023-11-06 DIAGNOSIS — N18.31 STAGE 3A CHRONIC KIDNEY DISEASE (HCC): Chronic | ICD-10-CM

## 2023-11-06 DIAGNOSIS — R73.01 IFG (IMPAIRED FASTING GLUCOSE): ICD-10-CM

## 2023-11-06 DIAGNOSIS — I65.23 CAROTID STENOSIS, BILATERAL: ICD-10-CM

## 2023-11-06 DIAGNOSIS — H26.9 CATARACT OF BOTH EYES, UNSPECIFIED CATARACT TYPE: ICD-10-CM

## 2023-11-06 DIAGNOSIS — I10 ESSENTIAL HYPERTENSION: Primary | Chronic | ICD-10-CM

## 2023-11-06 DIAGNOSIS — E66.3 OVERWEIGHT (BMI 25.0-29.9): ICD-10-CM

## 2023-11-06 DIAGNOSIS — G47.33 OSA (OBSTRUCTIVE SLEEP APNEA): ICD-10-CM

## 2023-11-06 PROCEDURE — 99214 OFFICE O/P EST MOD 30 MIN: CPT | Performed by: INTERNAL MEDICINE

## 2023-11-06 PROCEDURE — 99213 OFFICE O/P EST LOW 20 MIN: CPT | Performed by: INTERNAL MEDICINE

## 2023-11-06 RX ORDER — GABAPENTIN 400 MG/1
800 CAPSULE ORAL 2 TIMES DAILY
Qty: 360 CAPSULE | Refills: 1 | Status: SHIPPED | OUTPATIENT
Start: 2023-11-06

## 2023-11-06 NOTE — PROGRESS NOTES
Subjective:     Shaila Friday is a 77 y.o. female who presents to the office today for a preoperative consultation at the request of surgeon Dr. Alberto White who plans on performing cataract surgery on R eye November 8, 2023 and L eye November 15, 2023. Prior anesthesia adverse reactions - None    Exercise capacity - Able to walk 4 blocks or climb 2 flights of stairs without symptoms - Yes    Easy bleeding/bruising - No    Chest pain/palpitation/edema - No    Dyspnea/wheezing/cough - No    Sleep apnea - No    COPD - Yes    HPI Pt here for preoperative clearance for B/L cataract surgery with Dr. Hailey Cardozo. She has progressively worsening vision d/t progressive cataracts. She has chronic HA's from this. BW results from 11/23 reviewed with pt in detail: FBS/A1C 97/5.8, rest of BMP and TSH were wnl    Def of nml vs IFG vs DM was d/w pt in detail. Diet/exercise was reviewed - wgt up 10 lbs from June 23. She is "trying to" watch sugars/carbs but was just on vacay and diet was not as good. She is not exercising as much as she used to. Saw Dr Jody De Leon today for f/u CM. She had no meds changed or studies ordered. She is on an ARB/beta blocker/Plavix and statin. BP a bit above goal today and meds were reviewed and no changes have occurred. She denies missing doses of meds or SE with the meds. She does not check her BP outside the office frequently but when she does it has actually been a bit low after activity. She notes no frequent Ha's/dizziness/double vision/CP. BP better by end of visit    Noting no great issues with COPD recently. Trelegy is getting very costly and pt is working with Pulm to look for more affordable alternative.       Past Medical History:   Diagnosis Date    Arnold-Chiari malformation (HCC)     Breast lump     last assessed: Jan 22, 2013     COPD (chronic obstructive pulmonary disease) ([address])     Coronary artery disease     Depression with anxiety     GERD (gastroesophageal reflux disease)     Gout     last assessed: Nov 26, 2012     Hair loss     last assessed: Dec 15, 2016     Hypotension     last assessed: Nov 10, 2014     Mitral valve disorder     Myocardial infarct, old     Stroke [facility])     SVT (supraventricular tachycardia)          Past Surgical History:   Procedure Laterality Date    BREAST BIOPSY Right 02/28/2017    US CORE BIOPSY--BENIGN    CARDIAC CATHETERIZATION      CERVICAL DISCECTOMY      Spinal     CERVICAL LAMINECTOMY      C1 with chiari decompression     COLONOSCOPY      5 yrs - onset: May 31, 2011     CRANIOTOMY      POPLITEAL SYNOVIAL CYST EXCISION      TUBAL LIGATION      US GUIDED BREAST BIOPSY RIGHT COMPLETE Right 02/28/2017         Family History   Problem Relation Age of Onset    Stroke Mother     Other Father         blood clot in vein & CABG     Heart disease Father     Prostate cancer Father     Hearing loss Father     Hypertension Father     No Known Problems Sister     No Known Problems Sister     No Known Problems Daughter     No Known Problems Daughter     Breast cancer Maternal Grandmother     No Known Problems Maternal Grandfather     No Known Problems Paternal Grandmother     No Known Problems Paternal Grandfather     Alcohol abuse Brother     Throat cancer Brother     Cancer Brother         Throat tongue    Hearing loss Brother     Kidney failure Brother         kidney failure d/t NSIADs on dialysis    Hearing loss Brother     Crohn's disease Maternal Aunt     No Known Problems Maternal Aunt     No Known Problems Maternal Aunt     Colon cancer Paternal Aunt     No Known Problems Paternal Aunt     No Known Problems Paternal Aunt     No Known Problems Paternal Aunt     Colon cancer Paternal Aunt     Cancer Paternal Aunt         Colon cancer         Social History     Tobacco Use    Smoking status: Former     Packs/day: 1.50     Years: 36.00     Total pack years: 54.00     Types: Cigarettes     Start date: 54     Quit date: 1/1/2008     Years since quitting: 15.8    Smokeless tobacco: Never    Tobacco comments:     Patient quit   Vaping Use    Vaping Use: Never used   Substance Use Topics    Alcohol use: Yes     Alcohol/week: 7.0 standard drinks of alcohol     Types: 7 Cans of beer per week    Drug use: No       Current Outpatient Medications   Medication Sig Dispense Refill    albuterol (ProAir HFA) 90 mcg/act inhaler Inhale 2 puffs every 4 (four) hours as needed for wheezing or shortness of breath 8.5 g 2    atorvastatin (LIPITOR) 80 mg tablet Take 1 tablet by mouth once daily 90 tablet 0    benzonatate (TESSALON) 200 MG capsule Take 1 capsule (200 mg total) by mouth daily at bedtime as needed for cough 15 capsule 0    calcium carbonate (OS-FLASH) 600 MG tablet Take 600 mg by mouth 2 (two) times a day with meals      carvedilol (COREG) 12.5 mg tablet Take 1 tablet (12.5 mg total) by mouth 2 (two) times a day 180 tablet 3    cholecalciferol (VITAMIN D3) 1,000 units tablet Take 1,000 Units by mouth daily      clopidogrel (PLAVIX) 75 mg tablet Take 1 tablet (75 mg total) by mouth daily 90 tablet 2    escitalopram (LEXAPRO) 20 mg tablet Take 1/2 (one-half) tablet by mouth once daily 30 tablet 0    gabapentin (NEURONTIN) 400 mg capsule Take 2 capsules (800 mg total) by mouth 2 (two) times a day Am and  capsule 2    isosorbide mononitrate (IMDUR) 30 mg 24 hr tablet Take 1 tablet by mouth once daily 90 tablet 0    losartan (COZAAR) 50 mg tablet Take 1 tablet (50 mg total) by mouth daily after dinner 90 tablet 3    ofloxacin (OCUFLOX) 0.3 % ophthalmic solution Administer 1 drop to both eyes      pantoprazole (PROTONIX) 40 mg tablet TAKE 1 TABLET BY MOUTH ONCE DAILY BEFORE BREAKFAST 90 tablet 0    prednisoLONE acetate (PRED FORTE) 1 % ophthalmic suspension Administer 1 drop to both eyes      Trelegy Ellipta 100-62.5-25 MCG/ACT inhaler INHALE 1 PUFF ONCE DAILY RINSE MOUTH AFTER USE 60 each 5     No current facility-administered medications for this visit. Allergies:  Codeine and Medical tape      The following portions of the patient's history were reviewed and updated as appropriate: allergies, current medications, past family history, past medical history, past social history, past surgical history, and problem list.    Review of Systems  Review of Systems   Constitutional:  Negative for chills and fever. HENT:  Positive for hearing loss. Negative for congestion and trouble swallowing. Hearing aids B/L   Eyes:  Positive for visual disturbance. Negative for pain. Respiratory:  Negative for cough, shortness of breath and wheezing. Cardiovascular:  Negative for chest pain, palpitations and leg swelling. Gastrointestinal:  Negative for abdominal pain, blood in stool, constipation, diarrhea, nausea and vomiting. Genitourinary:  Negative for difficulty urinating and dysuria. Musculoskeletal:  Positive for arthralgias and back pain. Negative for neck pain. Skin:  Negative for rash and wound. Neurological:  Negative for dizziness, light-headedness and headaches. Hematological:  Does not bruise/bleed easily. Psychiatric/Behavioral:  Negative for confusion and dysphoric mood. Objective:    /86   Pulse 68   Temp (!) 97.2 °F (36.2 °C) (Tympanic)   Ht 5' 5" (1.651 m)   Wt 78.2 kg (172 lb 6.4 oz)   BMI 28.69 kg/m²  136/78    Physical Exam  Vitals and nursing note reviewed. Constitutional:       General: She is not in acute distress. Appearance: She is well-developed. She is not ill-appearing. HENT:      Head: Normocephalic and atraumatic. Right Ear: Tympanic membrane and external ear normal. There is no impacted cerumen. Left Ear: Tympanic membrane and external ear normal. There is no impacted cerumen. Mouth/Throat:      Mouth: Mucous membranes are moist.      Pharynx: Oropharynx is clear. No oropharyngeal exudate. Eyes:      General:         Right eye: No discharge. Left eye: No discharge. Conjunctiva/sclera: Conjunctivae normal.   Neck:      Vascular: Carotid bruit present. Trachea: No tracheal deviation. Comments:  Carotid bruits  Cardiovascular:      Rate and Rhythm: Normal rate and regular rhythm. Heart sounds: Normal heart sounds. No murmur heard. Pulmonary:      Effort: Pulmonary effort is normal. No respiratory distress. Breath sounds: Normal breath sounds. No wheezing, rhonchi or rales. Abdominal:      General: Bowel sounds are normal. There is no distension. Palpations: Abdomen is soft. Tenderness: There is no abdominal tenderness. There is no guarding or rebound. Musculoskeletal:         General: No deformity or signs of injury. Normal range of motion. Cervical back: Neck supple. Right lower leg: No edema. Left lower leg: No edema. Lymphadenopathy:      Cervical: No cervical adenopathy. Skin:     General: Skin is warm and dry. Coloration: Skin is not pale. Findings: No bruising or rash. Neurological:      General: No focal deficit present. Mental Status: She is alert. Mental status is at baseline. Motor: No abnormal muscle tone. Gait: Gait normal.   Psychiatric:         Mood and Affect: Mood normal.         Behavior: Behavior normal.         Thought Content:  Thought content normal.         Judgment: Judgment normal.         Cardiographics  ECG: not indicated    Imaging  Chest x-ray: not indicated    Lab Review   Recent labs: not indicated    Assessment:    Patient Active Problem List   Diagnosis    De Quervain's disease (tenosynovitis)    Essential hypertension    Sudden idiopathic hearing loss of right ear    Cervical stenosis of spinal canal    Plantar fasciitis    Lower back pain    IFG (impaired fasting glucose)    Dyslipidemia    Depression with anxiety    Takotsubo cardiomyopathy    Carpal tunnel syndrome    Arnold-Chiari malformation (HCC)    Pancreatic cyst    COPD (chronic obstructive pulmonary disease) ([address])    GERD (gastroesophageal reflux disease)    Hypotension    Stage 3a chronic kidney disease (HCC)    Facial twitching    History of subarachnoid hemorrhage    Dysphagia    Carotid stenosis, bilateral    Intracranial atherosclerosis    Benign tumor of spinal meningioma (HCC)    Allergies    GER (obstructive sleep apnea)    Hypersomnia, unspecified          Plan:     Surgical Clearance - Cleared    Risk - pt acceptable risk for low risk procedure    Discussion/Summary:   (1) Preoperative clearance - Pt acceptable risk for procedure, call with any changes in health btw now and surgery, no meds need to be held, avoid NSAIDs as previously directed    (2) B/L cataracts - for B/L cataract surgery with Dr. Chanel Lora    (3) IFG - FBS nml but A1C still in IFG range, urged to get back on track with diet and exercise, recheck BW in 6 mos - BW order given    (4) Takotsubo cardiomyopathy - just saw Dr. Jacquelyn Pelayo, on Plavix, statin, ARB and beta blocker, call with CV symptoms    (5) HTN - BP at goal by end of appt, con't current BP meds, diet/exercise/healthy wgt encouraged    (6) Carotid stenosis B/L - R bruits on exam, follows with Cardio and has CUS ordered, on Plavix and ASA, to ED with stroke/TIA symptoms    (7) COPD - No current resp symptoms, following with Pulm and on Trelegy, going to have inhalers changed d/t cost, con't tx and f/u as per Pulm    (8) GER - recent home sleep study showed NO GER, not on PAP tx, urged diet/exercise/healthy wgt    (9) Arnold-Chiari malformation - follows with Neurosurgery, no current Neuro symptoms    (10) Benign spinal meningioma - just had MRI T-spine - no changes, has f/u with Neurosurgery    (11) CKD stage 3 - stable, con't to avoid NSAIDs and keep hydrated, con't labs q 6 mos       Colonoscopy 6/22 - 5 yrs    Mammo 2/23 - order given for 2024    Dexa 2/23 - osteopenia    CUS 4/23 - has order from Neuro    CT chest 2/23 - scheduled for Dec 18th 2023    PAP 12/20    BW 11/23        Refugio Leon, DO

## 2023-11-06 NOTE — PATIENT INSTRUCTIONS
Weight Management   AMBULATORY CARE:   Why it is important to manage your weight:  Being overweight increases your risk of health conditions such as heart disease, high blood pressure, type 2 diabetes, and certain types of cancer. It can also increase your risk for osteoarthritis, sleep apnea, and other respiratory problems. Aim for a slow, steady weight loss. Even a small amount of weight loss can lower your risk of health problems. Risks of being overweight:  Extra weight can cause many health problems, including the following:  Diabetes (high blood sugar level)    High blood pressure or high cholesterol    Heart disease    Stroke    Gallbladder or liver disease    Cancer of the colon, breast, prostate, liver, or kidney    Sleep apnea    Arthritis or gout    Screening  is done to check for health conditions before you have signs or symptoms. If you are 28to 79years old, your blood sugar level may be checked every 3 years for signs of prediabetes or diabetes. Your healthcare provider will check your blood pressure at each visit. High blood pressure can lead to a stroke or other problems. Your provider may check for signs of heart disease, cancer, or other health problems. How to lose weight safely:  A safe and healthy way to lose weight is to eat fewer calories and get regular exercise. You can lose up about 1 pound a week by decreasing the number of calories you eat by 500 calories each day. You can decrease calories by eating smaller portion sizes or by cutting out high-calorie foods. Read labels to find out how many calories are in the foods you eat. You can also burn calories with exercise such as walking, swimming, or biking. You will be more likely to keep weight off if you make these changes part of your lifestyle. Exercise at least 30 minutes per day on most days of the week.  You can also fit in more physical activity by taking the stairs instead of the elevator or parking farther away from stores. Ask your healthcare provider about the best exercise plan for you. Healthy meal plan for weight management:  A healthy meal plan includes a variety of foods, contains fewer calories, and helps you stay healthy. A healthy meal plan includes the following:     Eat whole-grain foods more often. A healthy meal plan should contain fiber. Fiber is the part of grains, fruits, and vegetables that is not broken down by your body. Whole-grain foods are healthy and provide extra fiber in your diet. Some examples of whole-grain foods are whole-wheat breads and pastas, oatmeal, brown rice, and bulgur. Eat a variety of vegetables every day. Include dark, leafy greens such as spinach, kale, carlos eduardo greens, and mustard greens. Eat yellow and orange vegetables such as carrots, sweet potatoes, and winter squash. Eat a variety of fruits every day. Choose fresh or canned fruit (canned in its own juice or light syrup) instead of juice. Fruit juice has very little or no fiber. Eat low-fat dairy foods. Drink fat-free (skim) milk or 1% milk. Eat fat-free yogurt and low-fat cottage cheese. Try low-fat cheeses such as mozzarella and other reduced-fat cheeses. Choose meat and other protein foods that are low in fat. Choose beans or other legumes such as split peas or lentils. Choose fish, skinless poultry (chicken or turkey), or lean cuts of red meat (beef or pork). Before you cook meat or poultry, cut off any visible fat. Use less fat and oil. Try baking foods instead of frying them. Add less fat, such as margarine, sour cream, regular salad dressing and mayonnaise to foods. Eat fewer high-fat foods. Some examples of high-fat foods include french fries, doughnuts, ice cream, and cakes. Eat fewer sweets. Limit foods and drinks that are high in sugar. This includes candy, cookies, regular soda, and sweetened drinks. Ways to decrease calories:   Eat smaller portions.      Use a small plate with smaller servings. Do not eat second helpings. When you eat at a restaurant, ask for a box and place half of your meal in the box before you eat. Share an entrée with someone else. Replace high-calorie snacks with healthy, low-calorie snacks. Choose fresh fruit, vegetables, fat-free rice cakes, or air-popped popcorn instead of potato chips, nuts, or chocolate. Choose water or calorie-free drinks instead of soda or sweetened drinks. Do not shop for groceries when you are hungry. You may be more likely to make unhealthy food choices. Take a grocery list of healthy foods and shop after you have eaten. Eat regular meals. Do not skip meals. Skipping meals can lead to overeating later in the day. This can make it harder for you to lose weight. Eat a healthy snack in place of a meal if you do not have time to eat a regular meal. Talk with a dietitian to help you create a meal plan and schedule that is right for you. Other things to consider as you try to lose weight:   Be aware of situations that may give you the urge to overeat, such as eating while watching television. Find ways to avoid these situations. For example, read a book, go for a walk, or do crafts. Meet with a weight loss support group or friends who are also trying to lose weight. This may help you stay motivated to continue working on your weight loss goals. © Copyright [employer] 2023 Information is for End User's use only and may not be sold, redistributed or otherwise used for commercial purposes. The above information is an  only. It is not intended as medical advice for individual conditions or treatments. Talk to your doctor, nurse or pharmacist before following any medical regimen to see if it is safe and effective for you.

## 2023-11-06 NOTE — PROGRESS NOTES
Assessment/Plan:    Essential hypertension  Hypertension, stable and adequately controlled. Takotsubo cardiomyopathy  History of Takotsubo cardiomyopathy, stable. I will arrange for follow-up echo. Hypotension  The patient has an occasional instance of hypotension. This has been stable. Dyslipidemia  Hyperlipidemia, stable. The patient is due for follow-up lipid profile. Diagnoses and all orders for this visit:    Essential hypertension    Takotsubo cardiomyopathy  -     Echo complete w/ contrast if indicated; Future    Hypotension, unspecified hypotension type    Dyslipidemia  -     Lipid Panel with Direct LDL reflex; Future  -     TSH, 3rd generation          Subjective: Feels well. Patient ID: Irene Zamora is a 77 y.o. female. The patient presented to this office for the purpose of cardiac follow-up. She is known to have a history of hypertension and hyperlipidemia. The patient also had a history of Takotsubo cardiomyopathy. She has been feeling well from the cardiac standpoint denying any symptoms of chest pain, shortness of breath, palpitation, dizziness or lightheadedness. She has no leg edema. The following portions of the patient's history were reviewed and updated as appropriate: allergies, current medications, past family history, past medical history, past social history, past surgical history, and problem list.    Review of Systems   Respiratory:  Negative for apnea, cough, chest tightness, shortness of breath and wheezing. Cardiovascular:  Negative for chest pain, palpitations and leg swelling. Gastrointestinal:  Negative for abdominal pain. Neurological:  Negative for dizziness and light-headedness. Psychiatric/Behavioral: Negative. Objective: Stable cardiac wise.       /80 (BP Location: Left arm, Patient Position: Sitting, Cuff Size: Standard)   Pulse 61   Ht 5' 5" (1.651 m)   Wt 78.2 kg (172 lb 6.4 oz)   SpO2 98%   BMI 28.69 kg/m² Physical Exam  Vitals reviewed. Constitutional:       General: She is not in acute distress. Appearance: She is well-developed. She is not diaphoretic. HENT:      Head: Normocephalic and atraumatic. Neck:      Thyroid: No thyromegaly. Vascular: No JVD. Cardiovascular:      Rate and Rhythm: Normal rate and regular rhythm. Heart sounds: S1 normal and S2 normal. No murmur heard. No friction rub. No gallop. Pulmonary:      Effort: Pulmonary effort is normal. No respiratory distress. Breath sounds: No wheezing or rales. Chest:      Chest wall: No tenderness. Abdominal:      Palpations: Abdomen is soft. Musculoskeletal:      Cervical back: Normal range of motion and neck supple. Right lower leg: No edema. Left lower leg: No edema. Skin:     General: Skin is warm and dry. Neurological:      Mental Status: She is oriented to person, place, and time.    Psychiatric:         Mood and Affect: Mood normal.         Behavior: Behavior normal.

## 2023-11-06 NOTE — TELEPHONE ENCOUNTER
----- Message from Donal Mccormack MD sent at 11/5/2023  9:27 AM EST -----  Spinal meningioma in the thoracic region is unchanged from 2021. No cord compression.

## 2023-11-06 NOTE — PATIENT INSTRUCTIONS
The patient is advised to continue present regimen. I will arrange for follow-up lipid profile.   I will arrange for follow-up echocardiogram.

## 2023-11-10 ENCOUNTER — TELEPHONE (OUTPATIENT)
Dept: NEUROLOGY | Facility: CLINIC | Age: 68
End: 2023-11-10

## 2023-11-10 NOTE — TELEPHONE ENCOUNTER
Called the patient no answer lmom . Appt reminder call [email or web address] with Dr Gary Romo @ [email or web address] [facility].

## 2023-11-16 ENCOUNTER — OFFICE VISIT (OUTPATIENT)
Dept: NEUROLOGY | Facility: CLINIC | Age: 68
End: 2023-11-16
Payer: MEDICARE

## 2023-11-16 VITALS
DIASTOLIC BLOOD PRESSURE: 46 MMHG | OXYGEN SATURATION: 95 % | BODY MASS INDEX: 28.36 KG/M2 | HEIGHT: 65 IN | HEART RATE: 60 BPM | WEIGHT: 170.2 LBS | TEMPERATURE: 97.2 F | SYSTOLIC BLOOD PRESSURE: 98 MMHG

## 2023-11-16 DIAGNOSIS — D32.1 BENIGN TUMOR OF SPINAL MENINGIOMA (HCC): ICD-10-CM

## 2023-11-16 DIAGNOSIS — I95.1 ORTHOSTATIC HYPOTENSION: ICD-10-CM

## 2023-11-16 DIAGNOSIS — G51.4 FACIAL TWITCHING: Primary | Chronic | ICD-10-CM

## 2023-11-16 DIAGNOSIS — I67.2 INTRACRANIAL ATHEROSCLEROSIS: ICD-10-CM

## 2023-11-16 PROCEDURE — 99215 OFFICE O/P EST HI 40 MIN: CPT | Performed by: PSYCHIATRY & NEUROLOGY

## 2023-11-16 NOTE — PROGRESS NOTES
[facility]  Patient's Name: Magdalene Hall   Patient's : 1955   Visit Type: follow-up  Referring MD / PCP:  Marion Bravo DO    Assessment:  Ms. Magdalene Hall is a 77 y.o. woman who was initially referred for evaluation of episodic right facial twitching (primarily involving the right eye). These have not recurred for many months. There are no structural lesions on MRI brain imaging, including no vascular loop over the right 7th nerve. These do not progress to muscle weakness or altered awareness or spread to other body parts. These are likely just random benign fasciculations (sometimes triggered by anxiety). She has had infrequent headaches. It may be reasonable to try to wean off of gabapentin to reduce her medication burden. Headaches do not necessarily require life long treatment. But if headaches increase after reduction of gabapentin then it is reasonable to return to the prior dose. In 2022, she had a SAH in the right calcarine fissure, probably associated with hypertensive emergency at the time. She has an incidental meningioma in her thoracic spine. The size of the meningioma had mild interval growth from  to , recent follow-up imaging of the thoracic spine shows a stable meningioma without cord compression. Today, she reports having episodic lightheadedness. She is noted to have low blood pressure worsened when she is positioned from supine to sitting along with decreased tachycardia response. This hypotension can be due to her medications for blood pressure.     Plan:   Facial twitching resolved    Thoracic meningioma  - call the office if there is acute back pain, worsening weakness in the legs, difficulty with urination, may consider repeating imaging in 3 years or if there are new symptoms    Headaches  - gabapentin 400mg take one tab three times a day  - call the office if no worsening of headaches to change prescription to 400mg 3 times a day  - if you notice increased headaches with lower of gabapentin then go back to 800mg twice a day    Orthostatic hypotension and bradycardia  - please see your PCP/cardiologist to review your current antihypertensive medications, your dizziness may be related to your blood pressure medications. Intracranial atherosclerosis, increase risk of stroke  - continue with Plavix  - continue with atorvastatin  I discussed the warning signs of stroke with the patient. I reviewed that stroke and transient ischemic attack are signs of acute neurologic impairment due to abnormal cerebrovascular pathology either from ischemia (lack of blood flow) or hemorrhage (excessive bleeding). Warning signs include but are not limited to acute (immediate onset) speech impairment, inability to understand language, loss of vision, visual deficits, lateralizing weakness, facial weakness (facial droop), sensory loss, ataxia, gait imbalance, sensation of being uncoordinated, or changes in perception/sensorium. Patient was instructed to call 911/EMS for immediate attention for stroke alert to be started. It is important to know the time symptoms started or when the patient was last known well. I reviewed secondary stroke risk modification includes the use of antiplatelet agent (aspirin or clopidogrel or dipyridamole), antihypertensive medications to keep BP<130/90, lowering cholesterol by diet or with HMG CoA reductase inhibitor (statins), and diabetes management (if the patient has diabetes, goal HgbA1c <7.0). Other factors may include cessation of smoking, further evaluation for cardiac arrhythmias (atrial fibrillation) or cerebrovascular stenosis, and obstructive sleep apnea.      - follow-up in 6 months    Problem List Items Addressed This Visit          Cardiovascular and Mediastinum    Intracranial atherosclerosis    Orthostatic hypotension       Nervous and Auditory    Benign tumor of spinal meningioma ([address]) Other    Facial twitching - Primary (Chronic)         Chief Complaint:   Chief Complaint   Patient presents with    Headache    Eye Twitching    Dizziness      HPI:    Terrance Perkins is a 77 y.o. right handed female here for follow-up evaluation of facial twitching. Interval History 11/16/2023  She has not noticed recurrent right facial twitching, the last time she can remember it happening was about 10 months ago. She has not noticed much in the way of headaches. The last time she had to take a medication for a headache was about 6 weeks ago. Prior to starting gabapentin, the headaches were severe (like the worse ice cream headache) headache that last a couple of hours, and nauseated. Within a year she may get 2-3 of these severe headaches. When she is shopping, she is looking around, turning her head, she gets lightheadness and dizziness. The sensation of dizziness is not spinning or vertigo or sense of motion but more lightheaded. It may take a few minutes for the sensation to go away. Prior History:  Intake History 7/21/2022  For the past year Shady Leandro has noticed episodes of right facial twitching, sometimes it involves the eye, which last for about half an hour. These are sometimes more present when she is tired or at the end of the day. She would notice that the right eye would start twitching, sometimes there is pulling sensation over the right cheek to the point that the lip is pulled up, sometimes it is a facial spasm for a couple of minutes (twists her right face upwards and eye is forced closed). There is no forced head turning or right hand jerking. There is no facial numbness. This happens about a couple of times a week. They were becoming more frequent about a month ago. These typically happen more in the evening. It is not associated with stress but maybe more with when she feels tired at the end of the day. There is no other muscle twitching over her body.   She lost the sense of taste and smell when she had a cold about a year ago (no associated with COVID). She continues to have lack of taste. She lost hearing in her right ear about 4 years ago, she needed to get cortisone injections. She continues to lose hearing in her right ear. The last time she had hearing evaluation was 2 years ago. She had a Chiari malformation, which affected one of her nerves for the tongue, so she has difficulty with chewing and swallowing. She had a suboccipital decompression craniectomy in 1997. She has chronic atrophy on the right side of her tongue. There has been no loss of consciousness, seizure disorder, or convulsion. She was previously on Tegretol for the Chiari but then she developed a neuropathy (she was not able to walk, she was sluggished, legs felt heavy). She take gabapentin 800mg twice a day since her suboccipital decompression. Interval History 11/10/2022  On 8/28/2022 she was admitted to [address] [place] [address], presented with sudden headache, CTA head found severe stenosis of the distal right cervical ICA segment, mild-moderate atheosclerotic plaque of the carotid bulbs, moderate-severe stenosis of the left vertebral artery. Unknown etiology of SAH, maybe related to hypertensive emergency. Follow-up CT head study showed resolution of right parasagittal parietal SAH. She was allowed to restart Plavix. She recalled that she was coughing from pneumonia, on antibiotics and steroids, but coughing was very severe, then she developed worst headache of her life (felt head was ripping open). There was no loss of consciousness, left sided weakness or numbness, or loss vision. Since the last visit with me, she has not noticed right facial spasms, but she has been too busy to notice. There may have been one facial spasm since the last visit. These happen when she is driving or sitting watching television. There have been no triggers.   She has been on gabapentin for her headaches and Chiari malformation, initially prescribed by Dr. Adam Blood, in the early . She recalled being on Tegretol that caused her neuropathy symptoms. When she stops the gabapentin she gets headaches and body ache. She denies leg weakness or numbness. She asked about the meningioma in her thoracic spine. Seizure Risk Factors:  Abnormal pregnancy: No  Abnormal birth/: breech birth  Abnormal Development: No  Febrile seizures, simple: No  Febrile seizures, complex: No  CNS infection: No  Intellectual disability: No  Cerebral palsy: No  Head injury (moderate/severe): No  CNS neoplasm: No  CNS malformation: No  Neurosurgical procedure: suboccipital decompression for Chiari malformation  Stroke: No  CNS autoimmune disorder: No  Alcohol abuse: No  Drug abuse: No  Family history Sz/epilepsy: No      Prior workup:  x  Imagin2023 - MRI thoracic spine w/wo  Normal signal within the thoracic cord  Enhancing elliptical intradural extra-medullary mass (meningioma) contiguous with the thecal sac at T11, no change from prior exam   Facet arthritic degenerative changes at T10-T11 in the right posterior aspect of the spinal cord, ligamentum flavum hypertrophy    10/16/2022 - MRI brain IAC w/wo  No CP angle mass or abnormal enhancement; bilateral 7th/8th nerves are unremarkable  MR brain unremarkable    2022 - MRI brain w/wo  Hyperintense T2/FLAIR signal in the right parieto-occipital region subarachnoid space of the calcarine fissure, no ischemia.     2022 - CTA head and neck  No saccular aneurysm  Severe stenosis of the distal right cervical ICA  Mild-moderate atherosclerotic plaques of both carotid bulbs (<50% stenosis)    7/15/2021 - MRI brain  Post-operative changes of suboccipital craniectomy for Chiari decompression    2021 - MRI thoracic spine  Normal signal in the thoracic cord  Thin elliptical intradural extra medullary mass contiguous within the thecal sac at T11, minor ventral displacement of the cord; likely a meningioma at T11    9/15/2015 - MRI brain  "footprint of Chiari decompression"    9/24/2014 - MRI cervical spine  Degenerative disc disease C5-6 with moderate central and moderate left more than right bilateral foraminal narrowing  Cerebellar tonsils are above the foramen magnum    1/26/2015 - MRI Thoracic spine  intradula mass at T11 vertebral level likely a meningioma, no cord compression or cord displacement    EEGs:  10/14/2022   Normal awake and drowsy    Labs:  Component      Latest Ref Rng 11/4/2023   Hemoglobin A1C      Normal 4.0-5.6%; PreDiabetic 5.7-6.4%;  Diabetic >=6.5%; Glycemic control for adults with diabetes <7.0% % 5.8 (H)    eAG, EST AVG Glucose      mg/dl 120    TSH 3RD GENERATON      0.450 - 4.500 uIU/mL 3.375         General exam   BP (!) 98/46 (BP Location: Left arm, Patient Position: Standing, Cuff Size: Adult)   Pulse 60   Temp (!) 97.2 °F (36.2 °C) (Temporal)   Ht 5' 5" (1.651 m)   Wt 77.2 kg (170 lb 3.2 oz)   SpO2 95%   BMI 28.32 kg/m²    Supine 110/54, HR 48  Sitting 68/38, HR 58  Standing 98/46, HR 60    Appearance: normally developed, appears well  Carotids: not assessed  Cardiovascular:  distant heart sounds  Pulmonary: clear to auscultation  Extremities: no edema    HEENT: moist mucus membranes / oral cavity   Fundoscopy: not assessed    Mental status  Orientation: alert and oriented to name, October 17th, 2023, Thursday, [address] of Knowledge: intact   Attention and Concentration:  BARBARA - DNOMAID  Current and Remote Memory:recalled 2/3 words after five minutes  Language: spontaneous speech is normal and comprehension is intact    Cranial Nerves  CN 1: not tested  CN 2:  left pupil is larger than the right both are minimally reactive (since cataract surgery); left pupil is about 5mm and right pupil is 2mm;     CN 3, 4, 6: EOMI, no nystagmus  CN 5:sensation intact to all distribution V1, V2, V3  CN 7:muscles of facial expression are symmetric  CN 8: she has hearing aids  Elliot-Hallpike - negative for nystagmus, no trigger of dizziness  CN 9, 10:no dysarthria present  CN 11:symmetric SCM with head turns  CN 12: there is mild atrophy of the right tongue but no visible fasciulations    Motor:  Bulk, Tone: normal bulk, normal tone  Pronation: normal barrel roll  Strength:Symmetric strength of the arms and legs, no lateralizing weakness    Abnormal movements: no abnormal movements are present    Sensory:  Lighttouch: intact in all limbs  Romberg:normal    Coordination:  FNF:FNF bilaterally intact  NEGAR:intact  FFM:intact  Gait/Station:normal gait and normal tandem gait    Reflexes:  Bilateral biceps, triceps, brachioradialis 1+/4  Bilateral ankles 0/4  Bilateral knees 1/4    Past Medical/Surgical History:  Patient Active Problem List   Diagnosis    De Quervain's disease (tenosynovitis)    Essential hypertension    Sudden idiopathic hearing loss of right ear    Cervical stenosis of spinal canal    Plantar fasciitis    Lower back pain    IFG (impaired fasting glucose)    Dyslipidemia    Depression with anxiety    Takotsubo cardiomyopathy    Carpal tunnel syndrome    Arnold-Chiari malformation (HCC)    Pancreatic cyst    COPD (chronic obstructive pulmonary disease) (HCC)    GERD (gastroesophageal reflux disease)    Hypotension    Stage 3a chronic kidney disease (HCC)    Facial twitching    History of subarachnoid hemorrhage    Dysphagia    Carotid stenosis, bilateral    Intracranial atherosclerosis    Benign tumor of spinal meningioma (HCC)    Allergies    GER (obstructive sleep apnea)    Hypersomnia, unspecified    Orthostatic hypotension     Past Medical History:   Diagnosis Date    Arnold-Chiari malformation (HCC)     Breast lump     last assessed: Jan 22, 2013     COPD (chronic obstructive pulmonary disease) (HCC)     Coronary artery disease     Depression with anxiety     GERD (gastroesophageal reflux disease)     Gout     last assessed: Nov 26, 2012     Hair loss     last assessed: Dec 15, 2016     Hypotension     last assessed: Nov 10, 2014     Mitral valve disorder     Myocardial infarct, old     Stroke [facility])     SVT (supraventricular tachycardia)      Past Surgical History:   Procedure Laterality Date    BREAST BIOPSY Right 02/28/2017    US CORE BIOPSY--BENIGN    CARDIAC CATHETERIZATION      CERVICAL DISCECTOMY      Spinal     CERVICAL LAMINECTOMY      C1 with chiari decompression     COLONOSCOPY      5 yrs - onset: May 31, 2011     CRANIOTOMY      INTRACAPSULAR CATARACT EXTRACTION Right 11/08/2023    INTRACAPSULAR CATARACT EXTRACTION Left 11/15/2023    POPLITEAL SYNOVIAL CYST EXCISION      TUBAL LIGATION      US GUIDED BREAST BIOPSY RIGHT COMPLETE Right 02/28/2017       Past Psychiatric History:  Depression: No  Anxiety: No  Psychosis: No    Medications:    Current Outpatient Medications:     albuterol (ProAir HFA) 90 mcg/act inhaler, Inhale 2 puffs every 4 (four) hours as needed for wheezing or shortness of breath, Disp: 8.5 g, Rfl: 2    atorvastatin (LIPITOR) 80 mg tablet, Take 1 tablet by mouth once daily, Disp: 90 tablet, Rfl: 0    calcium carbonate (OS-FLASH) 600 MG tablet, Take 600 mg by mouth 2 (two) times a day with meals, Disp: , Rfl:     carvedilol (COREG) 12.5 mg tablet, Take 1 tablet (12.5 mg total) by mouth 2 (two) times a day, Disp: 180 tablet, Rfl: 3    cholecalciferol (VITAMIN D3) 1,000 units tablet, Take 1,000 Units by mouth daily, Disp: , Rfl:     clopidogrel (PLAVIX) 75 mg tablet, Take 1 tablet (75 mg total) by mouth daily, Disp: 90 tablet, Rfl: 2    escitalopram (LEXAPRO) 20 mg tablet, Take 1/2 (one-half) tablet by mouth once daily, Disp: 30 tablet, Rfl: 0    gabapentin (NEURONTIN) 400 mg capsule, Take 2 capsules (800 mg total) by mouth 2 (two) times a day Am and HS, Disp: 360 capsule, Rfl: 1    isosorbide mononitrate (IMDUR) 30 mg 24 hr tablet, Take 1 tablet by mouth once daily, Disp: 90 tablet, Rfl: 0    losartan (COZAAR) 50 mg tablet, Take 1 tablet (50 mg total) by mouth daily after dinner, Disp: 90 tablet, Rfl: 3    ofloxacin (OCUFLOX) 0.3 % ophthalmic solution, Administer 1 drop to both eyes, Disp: , Rfl:     pantoprazole (PROTONIX) 40 mg tablet, TAKE 1 TABLET BY MOUTH ONCE DAILY BEFORE BREAKFAST, Disp: 90 tablet, Rfl: 0    Trelegy Ellipta 100-62.5-25 MCG/ACT inhaler, INHALE 1 PUFF ONCE DAILY RINSE MOUTH AFTER USE, Disp: 60 each, Rfl: 5    prednisoLONE acetate (PRED FORTE) 1 % ophthalmic suspension, Administer 1 drop to both eyes (Patient not taking: Reported on 11/16/2023), Disp: , Rfl:     Allergies: Allergies   Allergen Reactions    Codeine Itching    Medical Tape Rash       Family history:  Family History   Problem Relation Age of Onset    Stroke Mother     Other Father         blood clot in vein & CABG     Heart disease Father     Prostate cancer Father     Hearing loss Father     Hypertension Father     No Known Problems Sister     No Known Problems Sister     No Known Problems Daughter     No Known Problems Daughter     Breast cancer Maternal Grandmother     No Known Problems Maternal Grandfather     No Known Problems Paternal Grandmother     No Known Problems Paternal Grandfather     Alcohol abuse Brother     Throat cancer Brother     Cancer Brother         Throat tongue    Hearing loss Brother     Kidney failure Brother         kidney failure d/t NSIADs on dialysis    Hearing loss Brother     Crohn's disease Maternal Aunt     No Known Problems Maternal Aunt     No Known Problems Maternal Aunt     Colon cancer Paternal Aunt     No Known Problems Paternal Aunt     No Known Problems Paternal Aunt     No Known Problems Paternal Aunt     Colon cancer Paternal Aunt     Cancer Paternal Aunt         Colon cancer     There is no family history of seizure, epilepsy or developmental delay.       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 Her smoking use included cigarettes. She started smoking about 54 years ago. She has a 54.00 pack-year smoking history. She has never used smokeless tobacco. She reports current alcohol use of about 7.0 standard drinks of alcohol per week. She reports that she does not use drugs. one can of beer a day. Review of Systems  A review of at least 12 organ/systems was obtained by the medical assistant and reviewed by me, including additional positives/negatives:  Neurological:  Positive for dizziness (she feels dizzy since this morning she had catract done yesterday . ). The total amount of time spent with the patient along with pre-chart and post-chart preparation was 41 minutes on the calendar day of the date of service. This included history taking, physical exam, review of ancillary testing, counseling provided to the patient regarding diagnosis, medications, treatment, and risk management, and other communication to the patient's providers and/or family.   Start time: 10:01AM  End time: 10:42AM

## 2023-11-16 NOTE — PROGRESS NOTES
Review of Systems   Constitutional:  Negative for appetite change, fatigue and fever. HENT: Negative. Negative for hearing loss, tinnitus, trouble swallowing and voice change. Eyes: Negative. Negative for photophobia, pain and visual disturbance. Respiratory: Negative. Negative for shortness of breath. Cardiovascular: Negative. Negative for palpitations. Gastrointestinal: Negative. Negative for nausea and vomiting. Endocrine: Negative. Negative for cold intolerance. Genitourinary: Negative. Negative for dysuria, frequency and urgency. Musculoskeletal:  Negative for back pain, gait problem, myalgias and neck pain. Skin: Negative. Negative for rash. Allergic/Immunologic: Negative. Neurological:  Positive for dizziness (she feels dizzy since this morning she had catract done yesterday . ). Negative for tremors, seizures, syncope, facial asymmetry, speech difficulty, weakness, light-headedness, numbness and headaches. Hematological: Negative. Does not bruise/bleed easily. Psychiatric/Behavioral: Negative. Negative for confusion, hallucinations and sleep disturbance. All other systems reviewed and are negative.

## 2023-11-16 NOTE — PATIENT INSTRUCTIONS
Plan:   Facial twitching resolve    Thoracic meningioma  - call the office if there is acute back pain, worsening weakness in the legs, difficulty with urination, may consider repeating imaging in 3 years or if there are new symptoms    Headaches  - gabapentin 400mg take one tab three times a day  - call the office if no worsening of headaches to change prescription to 400mg 3 times a day  - if you notice increased headaches with lower of gabapentin then go back to 800mg twice a day    Orthostatic hypotension  - please see your PCP/cardiologist to review your current antihypertensive medications, your dizziness may be related to your blood pressure medications. Intracranial atherosclerosis, increase risk of stroke  - continue with Plavix  - continue with atorvastatin  I discussed the warning signs of stroke with the patient. I reviewed that stroke and transient ischemic attack are signs of acute neurologic impairment due to abnormal cerebrovascular pathology either from ischemia (lack of blood flow) or hemorrhage (excessive bleeding). Warning signs include but are not limited to acute (immediate onset) speech impairment, inability to understand language, loss of vision, visual deficits, lateralizing weakness, facial weakness (facial droop), sensory loss, ataxia, gait imbalance, sensation of being uncoordinated, or changes in perception/sensorium. Patient was instructed to call 911/EMS for immediate attention for stroke alert to be started. It is important to know the time symptoms started or when the patient was last known well. I reviewed secondary stroke risk modification includes the use of antiplatelet agent (aspirin or clopidogrel or dipyridamole), antihypertensive medications to keep BP<130/90, lowering cholesterol by diet or with HMG CoA reductase inhibitor (statins), and diabetes management (if the patient has diabetes, goal HgbA1c <7.0).   Other factors may include cessation of smoking, further evaluation for cardiac arrhythmias (atrial fibrillation) or cerebrovascular stenosis, and obstructive sleep apnea.      - follow-up in 6 months

## 2023-11-19 DIAGNOSIS — I25.10 CORONARY ARTERY DISEASE INVOLVING NATIVE CORONARY ARTERY OF NATIVE HEART WITHOUT ANGINA PECTORIS: ICD-10-CM

## 2023-11-19 DIAGNOSIS — E78.5 DYSLIPIDEMIA: ICD-10-CM

## 2023-11-20 RX ORDER — ATORVASTATIN CALCIUM 80 MG/1
TABLET, FILM COATED ORAL
Qty: 90 TABLET | Refills: 0 | Status: SHIPPED | OUTPATIENT
Start: 2023-11-20

## 2023-11-20 RX ORDER — CARVEDILOL 12.5 MG/1
12.5 TABLET ORAL 2 TIMES DAILY
Qty: 180 TABLET | Refills: 0 | Status: SHIPPED | OUTPATIENT
Start: 2023-11-20

## 2023-12-03 DIAGNOSIS — I25.10 CORONARY ARTERY DISEASE INVOLVING NATIVE CORONARY ARTERY OF NATIVE HEART WITHOUT ANGINA PECTORIS: ICD-10-CM

## 2023-12-04 ENCOUNTER — OFFICE VISIT (OUTPATIENT)
Dept: NEUROLOGY | Facility: CLINIC | Age: 68
End: 2023-12-04
Payer: MEDICARE

## 2023-12-04 ENCOUNTER — HOSPITAL ENCOUNTER (OUTPATIENT)
Dept: NON INVASIVE DIAGNOSTICS | Facility: HOSPITAL | Age: 68
Discharge: HOME | End: 2023-12-04
Payer: MEDICARE

## 2023-12-04 VITALS
WEIGHT: 170 LBS | SYSTOLIC BLOOD PRESSURE: 146 MMHG | HEIGHT: 65 IN | HEART RATE: 57 BPM | BODY MASS INDEX: 28.32 KG/M2 | DIASTOLIC BLOOD PRESSURE: 63 MMHG

## 2023-12-04 VITALS
WEIGHT: 169.7 LBS | SYSTOLIC BLOOD PRESSURE: 146 MMHG | DIASTOLIC BLOOD PRESSURE: 63 MMHG | TEMPERATURE: 97.4 F | HEART RATE: 59 BPM | HEIGHT: 65 IN | BODY MASS INDEX: 28.27 KG/M2

## 2023-12-04 DIAGNOSIS — I65.23 CAROTID STENOSIS, BILATERAL: Primary | ICD-10-CM

## 2023-12-04 DIAGNOSIS — I51.81 TAKOTSUBO CARDIOMYOPATHY: ICD-10-CM

## 2023-12-04 DIAGNOSIS — Z86.79 HISTORY OF SUBARACHNOID HEMORRHAGE: ICD-10-CM

## 2023-12-04 PROCEDURE — 93306 TTE W/DOPPLER COMPLETE: CPT

## 2023-12-04 PROCEDURE — 93356 MYOCRD STRAIN IMG SPCKL TRCK: CPT

## 2023-12-04 PROCEDURE — 99214 OFFICE O/P EST MOD 30 MIN: CPT

## 2023-12-04 RX ORDER — CARVEDILOL 12.5 MG/1
12.5 TABLET ORAL 2 TIMES DAILY
Qty: 180 TABLET | Refills: 0 | Status: SHIPPED | OUTPATIENT
Start: 2023-12-04

## 2023-12-04 NOTE — PATIENT INSTRUCTIONS
- Continue with good blood pressure control; I would recommend monitoring at home at least 3 times per week; Goal of <130/80  - Continue with good cholesterol control; Goal LDL <70; repeat Lipid panel  - Continue with good blood sugar control; Goal HgbA1c <7.0  - Will defer monitoring of cholesterol and blood sugar and management of hypertensive medications to the primary care provider  - Stay well hydrated by drinking enough water   - Eat a healthy diet, high in lean meats fish, turkey, chicken. Low in fats, cholesterol, sugars and sodium. Avoid canned foods,  get lots of fresh/frozen vegetables/fruits. - Get routine exercise/physical activity as much as able to tolerate  - Keep follow ups with your other health care providers  - Continue Plavix 75 mg daily and Atorvastatin 80 mg     I will plan for her to continue to follow up with Dr. Cassi Root but would be happy to see her back if the need should arise. If she has any symptoms concerning for TIA or stroke including sudden painless loss of vision or double vision, difficulty speaking or swallowing, vertigo/room spinning that does not quickly resolve, or weakness/numbness affecting 1 side of the face or body she should proceed by ambulance to the nearest emergency room immediately.

## 2023-12-04 NOTE — PROGRESS NOTES
Patient ID: Terrance Perkins is a 77 y.o. female. Assessment/Plan:    History of subarachnoid hemorrhage  Terrance Perkins is a 69year old female with a hx (8/28/22) of right parietal IPH/SAH, thought to be hypertensive in etiology. She continues on Plavix 75 mg and Atorvastatin 80 mg for her distal right cervical ICA severe stenosis, mild to moderate atherosclerosis of bilateral ICA, and moderate to severe stenosis of the left vertebral artery. She denies any new or worsening symptoms concerning for TIA, stroke or hemorrhage. Her vascular risk factors overall are well controlled. We reviewed her stroke risk factors and management of the same. She was provided stroke education and we reviewed stroke warning signs/symptoms and reasons to return by ambulance to the ER. Plan:  - Continue with good blood pressure control; I would recommend monitoring at home at least 3 times per week; Goal of <130/80  - Continue with good cholesterol control; Goal LDL <70; repeat Lipid panel  - Continue with good blood sugar control; Goal HgbA1c <7.0  - Will defer monitoring of cholesterol and blood sugar and management of hypertensive medications to the primary care provider  - Update carotid ultrasound April 2025  - Stay well hydrated by drinking enough water   - Eat a healthy diet, high in lean meats fish, turkey, chicken. Low in fats, cholesterol, sugars and sodium. Avoid canned foods,  get lots of fresh/frozen vegetables/fruits. - Get routine exercise/physical activity as much as able to tolerate  - Keep follow ups with your other health care providers  - Continue Plavix 75 mg daily and Atorvastatin 80 mg     I will plan for her to continue to follow up with Dr. Santo Easley but would be happy to see her back if the need should arise.   If she has any symptoms concerning for TIA or stroke including sudden painless loss of vision or double vision, difficulty speaking or swallowing, vertigo/room spinning that does not quickly resolve, or weakness/numbness affecting 1 side of the face or body she should proceed by ambulance to the nearest emergency room immediately. Carotid stenosis, bilateral  VAS Carotid 4/28/23: <50% stenosis, PSV <100    Plan to repeat bi-annually        Diagnoses and all orders for this visit:    Carotid stenosis, bilateral    History of subarachnoid hemorrhage         I have spent a total time of 30 minutes on 12/04/23 in caring for this patient including Diagnostic results, Prognosis, Risks and benefits of tx options, Instructions for management, Patient and family education, Importance of tx compliance, Risk factor reductions, Impressions, Documenting in the medical record, Reviewing / ordering tests, medicine, procedures  , and Obtaining or reviewing history  . Subjective:    ELAINE Jones is a 77year old female with Chiari malformation s/p decompression in 1998, T11 meningioma, HTN, CAD, HLD, GERD, CKD, takotsubo syndrome, ICA stenosis on Plavix and former smoker who initially presented to the ED on 8/28 with hypertension (241/110 in the ED) and severe headache and was found to have a R parietal IPH/SAH. Etiology of SAH unclear, though may be secondary to extreme isolated hypertension as CTA was unrevealing. Repeat CTH revealed resolved SAH and she restarted Plavix and has continued on 75 mg daily and Atorvastatin 80 mg daily. She was last seen 4/3/2023. She denies any residual deficits or new neurologic complaints. Secondary Stroke Prevention  She continues on Plavix 75 mg daily and Atorvastatin 80 mg  Compliant with her medications, no issues affording or obtaining medications.    Denies excessive bruising or bleeding     Deficits  Denies numbness, tingling, weakness, dizziness, headaches, vision   changes or any new or worsening stroke like symptoms  Denies difficulty with speaking or swallowing  She reports facial twitching has also resolved      Monitoring  BP- monitored once a day; average is 857/54-98 but at times can sometimes be low (during these times may be dizzy)  11/4/23 HgbA1c 5.8  10/24/22 Lipid panel total 185, triglycerides 182, HDL 56, LDL 93  VAS Carotid 4/28/23: <50% stenosis, PSV <100     Safety  Lives at home with her    Independent of all ADLs  No falls at home  Managing her own medications and finances  assistive devices- none  Driving- no difficulty  Memory- no concerns     Substance use    Smoking- prior smoker; quit 15 years ago; 1-1.5 PPD x 36 years  Etoh- 1 beer per day  Drugs- none  Caffeine - none      Sleep  Home sleep study completed 5/1/23- no GER  Continues to report excessive daytime sleepiness, unrefreshed sleep     Diet  Weight is stable- 169 lbs   Salad  Portion control     Exercise  Treadmill 30 minutes 3 x a week- she has not recently been doing this. 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    Objective:    Blood pressure 146/63, pulse 59, temperature (!) 97.4 °F (36.3 °C), temperature source Temporal, height 5' 5" (1.651 m), weight 77 kg (169 lb 11.2 oz). Neurological Exam  On neurological examination patient is alert, awake, oriented and in no distress. Speech is fluent without dysarthria or aphasia. Cranial nerves 2-12 were symmetrically intact bilaterally. No evidence of any focal weakness or sensory loss in the upper or lower extremities. Motor testing reveals 5/5 strength of the bilateral upper and lower extremities. There was no pronator drift. No fasciculations present. No abnormal involuntary movements.  Finger- to-nose reveals no tremor or ataxia and intact proprioceptive function, no dysmetria was noted. Rapid alternating movement normal. Sensation was intact to vibration, light touch, and temperature in bilateral upper and lower extremities. Deep tendon reflexes were 2+ and symmetric in the bilateral upper and lower extremities. She is able to rise easily without assistance from a seated position. Casual gait is normal including stance, stride, and arm swing. ROS:    Review of Systems   Constitutional:  Negative for appetite change, fatigue and fever. HENT: Negative. Negative for hearing loss, tinnitus, trouble swallowing and voice change. Eyes: Negative. Negative for photophobia, pain and visual disturbance. Respiratory: Negative. Negative for shortness of breath. Cardiovascular: Negative. Negative for palpitations. Gastrointestinal: Negative. Negative for nausea and vomiting. Endocrine: Negative. Negative for cold intolerance. Genitourinary: Negative. Negative for dysuria, frequency and urgency. Musculoskeletal:  Negative for back pain, gait problem, myalgias and neck pain. Skin: Negative. Negative for rash. Allergic/Immunologic: Negative. Neurological: Negative. Negative for dizziness, tremors, seizures, syncope, facial asymmetry, speech difficulty, weakness, light-headedness, numbness and headaches. Hematological: Negative. Does not bruise/bleed easily. Psychiatric/Behavioral: Negative. Negative for confusion, hallucinations and sleep disturbance. All other systems reviewed and are negative. Reviewed ROS as entered by medical assistant.

## 2023-12-05 LAB
AORTIC ROOT: 3.3 CM
AORTIC VALVE MEAN VELOCITY: 8.2 M/S
APICAL FOUR CHAMBER EJECTION FRACTION: 65 %
ASCENDING AORTA: 3.4 CM
AV LVOT MEAN GRADIENT: 2 MMHG
AV LVOT PEAK GRADIENT: 4 MMHG
AV MEAN GRADIENT: 3 MMHG
AV PEAK GRADIENT: 7 MMHG
DOP CALC AO VTI: 29.5 CM
DOP CALC LVOT PEAK VEL VTI: 26.4 CM
DOP CALC LVOT PEAK VEL: 1.01 M/S
DOP CALC MV VTI: 30.2 CM
E WAVE DECELERATION TIME: 198 MS
E/A RATIO: 0.7
FRACTIONAL SHORTENING: 33 (ref 28–44)
GLOBAL LONGITUIDAL STRAIN: -25 %
INTERVENTRICULAR SEPTUM IN DIASTOLE (PARASTERNAL SHORT AXIS VIEW): 1 CM
INTERVENTRICULAR SEPTUM: 1 CM (ref 0.6–1.1)
LA/AORTA RATIO 2D: 1.18
LAAS-AP2: 14.9 CM2
LAAS-AP4: 13.6 CM2
LEFT ATRIUM SIZE: 3.9 CM
LEFT ATRIUM VOLUME (MOD BIPLANE): 37 ML
LEFT ATRIUM VOLUME INDEX (MOD BIPLANE): 20.1 ML/M2
LEFT INTERNAL DIMENSION IN SYSTOLE: 2.8 CM (ref 2.1–4)
LEFT VENTRICLE DIASTOLIC VOLUME (MOD BIPLANE): 70 ML
LEFT VENTRICLE SYSTOLIC VOLUME (MOD BIPLANE): 26 ML
LEFT VENTRICULAR INTERNAL DIMENSION IN DIASTOLE: 4.2 CM (ref 3.5–6)
LEFT VENTRICULAR POSTERIOR WALL IN END DIASTOLE: 0.9 CM
LEFT VENTRICULAR STROKE VOLUME: 49 ML
LV EF: 63 %
LVSV (TEICH): 49 ML
MV E'TISSUE VEL-LAT: 8 CM/S
MV E'TISSUE VEL-SEP: 6 CM/S
MV MEAN GRADIENT: 1 MMHG
MV PEAK A VEL: 1.06 M/S
MV PEAK E VEL: 74 CM/S
MV PEAK GRADIENT: 4 MMHG
MV STENOSIS PRESSURE HALF TIME: 58 MS
MV VALVE AREA P 1/2 METHOD: 3.79
RIGHT VENTRICLE ID DIMENSION: 3.4 CM
SL CV LV EF: 65
SL CV PED ECHO LEFT VENTRICLE DIASTOLIC VOLUME (MOD BIPLANE) 2D: 78 ML
SL CV PED ECHO LEFT VENTRICLE SYSTOLIC VOLUME (MOD BIPLANE) 2D: 28 ML
TR MAX PG: 26 MMHG
TR PEAK VELOCITY: 2.5 M/S
TRICUSPID ANNULAR PLANE SYSTOLIC EXCURSION: 2 CM
TRICUSPID VALVE PEAK REGURGITATION VELOCITY: 2.54 M/S

## 2023-12-05 PROCEDURE — 93356 MYOCRD STRAIN IMG SPCKL TRCK: CPT | Performed by: INTERNAL MEDICINE

## 2023-12-05 PROCEDURE — 93306 TTE W/DOPPLER COMPLETE: CPT | Performed by: INTERNAL MEDICINE

## 2023-12-07 PROBLEM — G51.4 FACIAL TWITCHING: Chronic | Status: RESOLVED | Noted: 2022-07-21 | Resolved: 2023-12-07

## 2023-12-07 NOTE — ASSESSMENT & PLAN NOTE
Dennis Villarreal is a 69year old female with a hx (8/28/22) of right parietal IPH/SAH, thought to be hypertensive in etiology. She continues on Plavix 75 mg and Atorvastatin 80 mg for her distal right cervical ICA severe stenosis, mild to moderate atherosclerosis of bilateral ICA, and moderate to severe stenosis of the left vertebral artery. She denies any new or worsening symptoms concerning for TIA, stroke or hemorrhage. Her vascular risk factors overall are well controlled. We reviewed her stroke risk factors and management of the same. She was provided stroke education and we reviewed stroke warning signs/symptoms and reasons to return by ambulance to the ER. Plan:  - Continue with good blood pressure control; I would recommend monitoring at home at least 3 times per week; Goal of <130/80  - Continue with good cholesterol control; Goal LDL <70; repeat Lipid panel  - Continue with good blood sugar control; Goal HgbA1c <7.0  - Will defer monitoring of cholesterol and blood sugar and management of hypertensive medications to the primary care provider  - Update carotid ultrasound April 2025  - Stay well hydrated by drinking enough water   - Eat a healthy diet, high in lean meats fish, turkey, chicken. Low in fats, cholesterol, sugars and sodium. Avoid canned foods,  get lots of fresh/frozen vegetables/fruits. - Get routine exercise/physical activity as much as able to tolerate  - Keep follow ups with your other health care providers  - Continue Plavix 75 mg daily and Atorvastatin 80 mg     I will plan for her to continue to follow up with Dr. Amaya Mckeon but would be happy to see her back if the need should arise.   If she has any symptoms concerning for TIA or stroke including sudden painless loss of vision or double vision, difficulty speaking or swallowing, vertigo/room spinning that does not quickly resolve, or weakness/numbness affecting 1 side of the face or body she should proceed by ambulance to the nearest emergency room immediately.

## 2023-12-18 ENCOUNTER — HOSPITAL ENCOUNTER (OUTPATIENT)
Dept: CT IMAGING | Facility: HOSPITAL | Age: 68
Discharge: HOME | End: 2023-12-18
Attending: INTERNAL MEDICINE
Payer: MEDICARE

## 2023-12-18 DIAGNOSIS — J85.2 ABSCESS OF UPPER LOBE OF LEFT LUNG WITHOUT PNEUMONIA (HCC): ICD-10-CM

## 2023-12-18 PROCEDURE — G1004 CDSM NDSC: HCPCS

## 2023-12-18 PROCEDURE — 71250 CT THORAX DX C-: CPT

## 2023-12-21 ENCOUNTER — TELEPHONE (OUTPATIENT)
Dept: PULMONOLOGY | Facility: CLINIC | Age: 68
End: 2023-12-21

## 2023-12-31 DIAGNOSIS — R13.10 DYSPHAGIA, UNSPECIFIED TYPE: Chronic | ICD-10-CM

## 2023-12-31 DIAGNOSIS — F32.A DEPRESSION, UNSPECIFIED DEPRESSION TYPE: ICD-10-CM

## 2023-12-31 RX ORDER — PANTOPRAZOLE SODIUM 40 MG/1
TABLET, DELAYED RELEASE ORAL
Qty: 90 TABLET | Refills: 0 | Status: SHIPPED | OUTPATIENT
Start: 2023-12-31

## 2023-12-31 RX ORDER — ESCITALOPRAM OXALATE 20 MG/1
TABLET ORAL
Qty: 30 TABLET | Refills: 0 | Status: SHIPPED | OUTPATIENT
Start: 2023-12-31

## 2024-01-26 ENCOUNTER — OFFICE VISIT (OUTPATIENT)
Dept: FAMILY MEDICINE CLINIC | Facility: HOSPITAL | Age: 69
End: 2024-01-26
Payer: MEDICARE

## 2024-01-26 VITALS
DIASTOLIC BLOOD PRESSURE: 72 MMHG | HEIGHT: 65 IN | BODY MASS INDEX: 28.72 KG/M2 | TEMPERATURE: 98.2 F | WEIGHT: 172.4 LBS | SYSTOLIC BLOOD PRESSURE: 116 MMHG | OXYGEN SATURATION: 96 % | HEART RATE: 65 BPM

## 2024-01-26 DIAGNOSIS — J06.9 BACTERIAL URI: ICD-10-CM

## 2024-01-26 DIAGNOSIS — B96.89 BACTERIAL URI: ICD-10-CM

## 2024-01-26 DIAGNOSIS — J44.9 CHRONIC OBSTRUCTIVE PULMONARY DISEASE, UNSPECIFIED COPD TYPE (HCC): Primary | ICD-10-CM

## 2024-01-26 PROCEDURE — 99214 OFFICE O/P EST MOD 30 MIN: CPT | Performed by: FAMILY MEDICINE

## 2024-01-26 RX ORDER — DOXYCYCLINE 100 MG/1
100 TABLET ORAL 2 TIMES DAILY
Qty: 14 TABLET | Refills: 0 | Status: SHIPPED | OUTPATIENT
Start: 2024-01-26 | End: 2024-02-02

## 2024-01-26 RX ORDER — NEOMYCIN SULFATE, POLYMYXIN B SULFATE AND DEXAMETHASONE 3.5; 10000; 1 MG/ML; [USP'U]/ML; MG/ML
SUSPENSION/ DROPS OPHTHALMIC
COMMUNITY
Start: 2024-01-16

## 2024-01-26 NOTE — PROGRESS NOTES
Name: Geovanna Leal      : 1955      MRN: [id number]  Encounter Provider: Fuad Avelar MD  Encounter Date: 2024   Encounter department: [facility] [address]     Assessment & Plan     1. Chronic obstructive pulmonary disease, unspecified COPD type (HCC)  -     doxycycline (ADOXA) 100 MG tablet; Take 1 tablet (100 mg total) by mouth 2 (two) times a day for 7 days    2. Bacterial URI  -     doxycycline (ADOXA) 100 MG tablet; Take 1 tablet (100 mg total) by mouth 2 (two) times a day for 7 days  Ongoing uri sytmpoms x 10 days with underlying COPD.   Empirically treat with doxy for 7 days.   Continue with supportive treatment.   To call if worsening.          Subjective      10 days of nasal congestion, coughing, sinus pressure, pnd. Worsening sytmpoms.   No sob, no wheezing, no fever, no chills. Known copd, doing well with Trelegy.   Covid testing negative at home x 2.       Review of Systems   Constitutional:  Negative for activity change, appetite change, fatigue and fever.   HENT:  Positive for congestion, postnasal drip, rhinorrhea, sinus pressure and sinus pain. Negative for sore throat and tinnitus.    Respiratory:  Positive for cough. Negative for chest tightness, shortness of breath, wheezing and stridor.        Current Outpatient Medications on File Prior to Visit   Medication Sig   • albuterol (ProAir HFA) 90 mcg/act inhaler Inhale 2 puffs every 4 (four) hours as needed for wheezing or shortness of breath   • atorvastatin (LIPITOR) 80 mg tablet Take 1 tablet by mouth once daily   • calcium carbonate (OS-FLASH) 600 MG tablet Take 600 mg by mouth 2 (two) times a day with meals   • carvedilol (COREG) 12.5 mg tablet Take 1 tablet by mouth twice daily   • cholecalciferol (VITAMIN D3) 1,000 units tablet Take 1,000 Units by mouth daily   • clopidogrel (PLAVIX) 75 mg tablet Take 1 tablet (75 mg total) by mouth daily   • escitalopram (LEXAPRO) 20 mg tablet Take 1/2  "(one-half) tablet by mouth once daily   • gabapentin (NEURONTIN) 400 mg capsule Take 2 capsules (800 mg total) by mouth 2 (two) times a day Am and HS   • isosorbide mononitrate (IMDUR) 30 mg 24 hr tablet Take 1 tablet by mouth once daily   • losartan (COZAAR) 50 mg tablet Take 1 tablet (50 mg total) by mouth daily after dinner   • neomycin-polymyxin-dexamethasone (MAXITROL) ophthalmic suspension INSTILL 1 DROP INTO EACH EYE THREE TIMES DAILY   • pantoprazole (PROTONIX) 40 mg tablet TAKE 1 TABLET BY MOUTH ONCE DAILY BEFORE BREAKFAST   • prednisoLONE acetate (PRED FORTE) 1 % ophthalmic suspension Administer 1 drop to both eyes   • Trelegy Ellipta 100-62.5-25 MCG/ACT inhaler INHALE 1 PUFF ONCE DAILY RINSE MOUTH AFTER USE   • ofloxacin (OCUFLOX) 0.3 % ophthalmic solution Administer 1 drop to both eyes (Patient not taking: Reported on 12/4/2023)   • [DISCONTINUED] dicyclomine (BENTYL) 10 mg capsule TAKE 1 CAPSULE BY MOUTH 4 TIMES DAILY BEFORE MEAL(S) AND AT BEDTIME   • [DISCONTINUED] famotidine (PEPCID) 40 MG tablet Take 1 tablet (40 mg total) by mouth daily at bedtime   • [DISCONTINUED] fluticasone (FLONASE) 50 mcg/act nasal spray 2 sprays into each nostril daily   • [DISCONTINUED] multivitamin (THERAGRAN) TABS Take 1 tablet by mouth daily       Objective     /72   Pulse 65   Temp 98.2 °F (36.8 °C)   Ht 5' 5\" (1.651 m)   Wt 78.2 kg (172 lb 6.4 oz)   SpO2 96%   BMI 28.69 kg/m²     Physical Exam  Vitals reviewed.   Constitutional:       Appearance: She is well-developed. She is ill-appearing.   HENT:      Head: Normocephalic.      Right Ear: Tympanic membrane and ear canal normal.      Left Ear: Tympanic membrane and ear canal normal.      Mouth/Throat:      Mouth: Mucous membranes are moist.      Tonsils: No tonsillar exudate or tonsillar abscesses.   Eyes:      Pupils: Pupils are equal, round, and reactive to light.   Cardiovascular:      Rate and Rhythm: Normal rate and regular rhythm.   Pulmonary:      " Effort: Pulmonary effort is normal.      Breath sounds: Normal breath sounds.   Musculoskeletal:      Cervical back: Normal range of motion and neck supple.   Skin:     Capillary Refill: Capillary refill takes less than 2 seconds.   Neurological:      General: No focal deficit present.      Mental Status: She is alert and oriented to person, place, and time.   Psychiatric:         Mood and Affect: Mood normal.         Behavior: Behavior normal.       Fuad Avelar MD      Depression Screening Follow-up Plan: Patient's depression screening was positive with a PHQ-2 score of . Their PHQ-9 score was 14. Patient assessed for underlying major depression. They have no active suicidal ideations. Brief counseling provided and recommend additional follow-up/re-evaluation next office visit.

## 2024-02-11 DIAGNOSIS — I20.9 ANGINAL PAIN (HCC): ICD-10-CM

## 2024-02-12 RX ORDER — ISOSORBIDE MONONITRATE 30 MG/1
TABLET, EXTENDED RELEASE ORAL
Qty: 90 TABLET | Refills: 0 | Status: SHIPPED | OUTPATIENT
Start: 2024-02-12

## 2024-02-25 DIAGNOSIS — F32.A DEPRESSION, UNSPECIFIED DEPRESSION TYPE: ICD-10-CM

## 2024-02-25 DIAGNOSIS — E78.5 DYSLIPIDEMIA: ICD-10-CM

## 2024-02-25 DIAGNOSIS — I25.10 CORONARY ARTERY DISEASE INVOLVING NATIVE CORONARY ARTERY OF NATIVE HEART WITHOUT ANGINA PECTORIS: ICD-10-CM

## 2024-02-25 RX ORDER — ESCITALOPRAM OXALATE 20 MG/1
TABLET ORAL
Qty: 30 TABLET | Refills: 0 | Status: SHIPPED | OUTPATIENT
Start: 2024-02-25

## 2024-02-25 RX ORDER — CLOPIDOGREL BISULFATE 75 MG/1
75 TABLET ORAL DAILY
Qty: 90 TABLET | Refills: 0 | Status: SHIPPED | OUTPATIENT
Start: 2024-02-25

## 2024-02-26 RX ORDER — ATORVASTATIN CALCIUM 80 MG/1
TABLET, FILM COATED ORAL
Qty: 90 TABLET | Refills: 0 | Status: SHIPPED | OUTPATIENT
Start: 2024-02-26

## 2024-03-17 DIAGNOSIS — I10 ESSENTIAL HYPERTENSION: Chronic | ICD-10-CM

## 2024-03-17 RX ORDER — LOSARTAN POTASSIUM 50 MG/1
100 TABLET ORAL DAILY
Qty: 180 TABLET | Refills: 1 | Status: SHIPPED | OUTPATIENT
Start: 2024-03-17

## 2024-04-06 DIAGNOSIS — R13.10 DYSPHAGIA, UNSPECIFIED TYPE: Chronic | ICD-10-CM

## 2024-04-06 RX ORDER — PANTOPRAZOLE SODIUM 40 MG/1
TABLET, DELAYED RELEASE ORAL
Qty: 90 TABLET | Refills: 0 | Status: SHIPPED | OUTPATIENT
Start: 2024-04-06

## 2024-05-04 ENCOUNTER — APPOINTMENT (OUTPATIENT)
Dept: LAB | Facility: HOSPITAL | Age: 69
End: 2024-05-04
Payer: MEDICARE

## 2024-05-04 DIAGNOSIS — R73.01 IFG (IMPAIRED FASTING GLUCOSE): ICD-10-CM

## 2024-05-04 DIAGNOSIS — R79.89 AZOTEMIA: ICD-10-CM

## 2024-05-04 DIAGNOSIS — N18.31 STAGE 3A CHRONIC KIDNEY DISEASE (HCC): Chronic | ICD-10-CM

## 2024-05-04 DIAGNOSIS — E78.5 DYSLIPIDEMIA: Chronic | ICD-10-CM

## 2024-05-04 LAB
ALBUMIN SERPL BCP-MCNC: 4.2 G/DL (ref 3.5–5)
ALP SERPL-CCNC: 92 U/L (ref 34–104)
ALT SERPL W P-5'-P-CCNC: 38 U/L (ref 7–52)
ANION GAP SERPL CALCULATED.3IONS-SCNC: 6 MMOL/L (ref 4–13)
AST SERPL W P-5'-P-CCNC: 32 U/L (ref 13–39)
BASOPHILS # BLD AUTO: 0.02 THOUSANDS/ÂΜL (ref 0–0.1)
BASOPHILS NFR BLD AUTO: 1 % (ref 0–1)
BILIRUB SERPL-MCNC: 0.77 MG/DL (ref 0.2–1)
BUN SERPL-MCNC: 19 MG/DL (ref 5–25)
CALCIUM SERPL-MCNC: 9.8 MG/DL (ref 8.4–10.2)
CHLORIDE SERPL-SCNC: 105 MMOL/L (ref 96–108)
CHOLEST SERPL-MCNC: 179 MG/DL
CO2 SERPL-SCNC: 28 MMOL/L (ref 21–32)
CREAT SERPL-MCNC: 1.25 MG/DL (ref 0.6–1.3)
EOSINOPHIL # BLD AUTO: 0.04 THOUSAND/ÂΜL (ref 0–0.61)
EOSINOPHIL NFR BLD AUTO: 1 % (ref 0–6)
ERYTHROCYTE [DISTWIDTH] IN BLOOD BY AUTOMATED COUNT: 14 % (ref 11.6–15.1)
EST. AVERAGE GLUCOSE BLD GHB EST-MCNC: 126 MG/DL
GFR SERPL CREATININE-BSD FRML MDRD: 43 ML/MIN/1.73SQ M
GLUCOSE P FAST SERPL-MCNC: 94 MG/DL (ref 65–99)
HBA1C MFR BLD: 6 %
HCT VFR BLD AUTO: 43.5 % (ref 34.8–46.1)
HDLC SERPL-MCNC: 56 MG/DL
HGB BLD-MCNC: 14.2 G/DL (ref 11.5–15.4)
IMM GRANULOCYTES # BLD AUTO: 0.02 THOUSAND/UL (ref 0–0.2)
IMM GRANULOCYTES NFR BLD AUTO: 1 % (ref 0–2)
LDLC SERPL CALC-MCNC: 65 MG/DL (ref 0–100)
LYMPHOCYTES # BLD AUTO: 1.32 THOUSANDS/ÂΜL (ref 0.6–4.47)
LYMPHOCYTES NFR BLD AUTO: 33 % (ref 14–44)
MCH RBC QN AUTO: 32.6 PG (ref 26.8–34.3)
MCHC RBC AUTO-ENTMCNC: 32.6 G/DL (ref 31.4–37.4)
MCV RBC AUTO: 100 FL (ref 82–98)
MONOCYTES # BLD AUTO: 0.43 THOUSAND/ÂΜL (ref 0.17–1.22)
MONOCYTES NFR BLD AUTO: 11 % (ref 4–12)
NEUTROPHILS # BLD AUTO: 2.13 THOUSANDS/ÂΜL (ref 1.85–7.62)
NEUTS SEG NFR BLD AUTO: 53 % (ref 43–75)
NRBC BLD AUTO-RTO: 0 /100 WBCS
PLATELET # BLD AUTO: 278 THOUSANDS/UL (ref 149–390)
PMV BLD AUTO: 9.2 FL (ref 8.9–12.7)
POTASSIUM SERPL-SCNC: 4.7 MMOL/L (ref 3.5–5.3)
PROT SERPL-MCNC: 6.8 G/DL (ref 6.4–8.4)
RBC # BLD AUTO: 4.36 MILLION/UL (ref 3.81–5.12)
SODIUM SERPL-SCNC: 139 MMOL/L (ref 135–147)
TRIGL SERPL-MCNC: 289 MG/DL
TSH SERPL DL<=0.05 MIU/L-ACNC: 2.66 UIU/ML (ref 0.45–4.5)
WBC # BLD AUTO: 3.96 THOUSAND/UL (ref 4.31–10.16)

## 2024-05-04 PROCEDURE — 83036 HEMOGLOBIN GLYCOSYLATED A1C: CPT

## 2024-05-04 PROCEDURE — 80053 COMPREHEN METABOLIC PANEL: CPT

## 2024-05-04 PROCEDURE — 80061 LIPID PANEL: CPT

## 2024-05-04 PROCEDURE — 85025 COMPLETE CBC W/AUTO DIFF WBC: CPT

## 2024-05-04 PROCEDURE — 36415 COLL VENOUS BLD VENIPUNCTURE: CPT

## 2024-05-05 DIAGNOSIS — I20.9 ANGINAL PAIN (HCC): ICD-10-CM

## 2024-05-05 DIAGNOSIS — I25.10 CORONARY ARTERY DISEASE INVOLVING NATIVE CORONARY ARTERY OF NATIVE HEART WITHOUT ANGINA PECTORIS: ICD-10-CM

## 2024-05-05 DIAGNOSIS — F32.A DEPRESSION, UNSPECIFIED DEPRESSION TYPE: ICD-10-CM

## 2024-05-06 ENCOUNTER — OFFICE VISIT (OUTPATIENT)
Dept: CARDIOLOGY CLINIC | Facility: CLINIC | Age: 69
End: 2024-05-06
Payer: MEDICARE

## 2024-05-06 VITALS
WEIGHT: 170 LBS | DIASTOLIC BLOOD PRESSURE: 62 MMHG | HEIGHT: 65 IN | HEART RATE: 68 BPM | BODY MASS INDEX: 28.32 KG/M2 | OXYGEN SATURATION: 98 % | SYSTOLIC BLOOD PRESSURE: 100 MMHG

## 2024-05-06 DIAGNOSIS — E78.5 DYSLIPIDEMIA: Chronic | ICD-10-CM

## 2024-05-06 DIAGNOSIS — I95.9 HYPOTENSION, UNSPECIFIED HYPOTENSION TYPE: ICD-10-CM

## 2024-05-06 DIAGNOSIS — I51.81 TAKOTSUBO CARDIOMYOPATHY: ICD-10-CM

## 2024-05-06 DIAGNOSIS — I10 ESSENTIAL HYPERTENSION: Primary | Chronic | ICD-10-CM

## 2024-05-06 PROCEDURE — 99213 OFFICE O/P EST LOW 20 MIN: CPT | Performed by: INTERNAL MEDICINE

## 2024-05-06 RX ORDER — ISOSORBIDE MONONITRATE 30 MG/1
TABLET, EXTENDED RELEASE ORAL
Qty: 90 TABLET | Refills: 1 | Status: SHIPPED | OUTPATIENT
Start: 2024-05-06

## 2024-05-06 RX ORDER — CARVEDILOL 12.5 MG/1
12.5 TABLET ORAL 2 TIMES DAILY
Qty: 180 TABLET | Refills: 1 | Status: SHIPPED | OUTPATIENT
Start: 2024-05-06

## 2024-05-06 RX ORDER — ESCITALOPRAM OXALATE 20 MG/1
TABLET ORAL
Qty: 30 TABLET | Refills: 5 | Status: SHIPPED | OUTPATIENT
Start: 2024-05-06 | End: 2024-05-13 | Stop reason: SDUPTHER

## 2024-05-06 NOTE — ASSESSMENT & PLAN NOTE
Hypertension, stable.  Some tendency for low blood pressure is also noted with mild symptoms of lightheadedness but no syncope.  I will be advising the patient to discontinue losartan and continue other medications.
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
## 2024-05-06 NOTE — PROGRESS NOTES
Assessment/Plan:    Essential hypertension  Hypertension, stable.  Some tendency for low blood pressure is also noted with mild symptoms of lightheadedness but no syncope.  I will be advising the patient to discontinue losartan and continue other medications.    Takotsubo cardiomyopathy  Takotsubo cardiomyopathy with no symptoms of angina.  No signs of heart failure.    Hypotension  Hypertension.  Continues to experience occasional hypotension with symptoms of lightheadedness.  As mentioned above I will be discontinuing losartan.    Dyslipidemia  Hyperlipidemia, stable.  Triglycerides remain somewhat elevated.       Diagnoses and all orders for this visit:    Essential hypertension    Takotsubo cardiomyopathy    Hypotension, unspecified hypotension type    Dyslipidemia          Subjective: Lightheadedness.     Patient ID: Geovanna Leal is a 69 y.o. female.    The patient presented to this office for the purpose of cardiac follow-up.  She is known to have a history of hypertension and hyperlipidemia as well as a history of Takotsubo cardiomyopathy.  The patient continues to experience an occasional instance of low blood pressure associated with feeling of lightheadedness.  It is typically remedied by the patient lying down for about a half an hour and drinking some water.  She did not actually pass out.  She has some dyspnea on exertion but denies any chest pain.  She has no lower extremity edema.        The following portions of the patient's history were reviewed and updated as appropriate: allergies, current medications, past family history, past medical history, past social history, past surgical history, and problem list.    Review of Systems   Respiratory:  Positive for shortness of breath. Negative for apnea, cough, chest tightness and wheezing.    Cardiovascular:  Negative for chest pain, palpitations and leg swelling.   Gastrointestinal:  Negative for abdominal pain.   Neurological:  Positive for  "light-headedness. Negative for dizziness.   Psychiatric/Behavioral: Negative.           Objective: Stable cardiac wise.      /62 (BP Location: Left arm, Patient Position: Sitting, Cuff Size: Standard)   Pulse 68   Ht 5' 5\" (1.651 m)   Wt 77.1 kg (170 lb)   SpO2 98%   BMI 28.29 kg/m²          Physical Exam  Vitals reviewed.   Constitutional:       General: She is not in acute distress.     Appearance: She is well-developed. She is not diaphoretic.   HENT:      Head: Normocephalic and atraumatic.   Neck:      Thyroid: No thyromegaly.      Vascular: No JVD.   Cardiovascular:      Rate and Rhythm: Normal rate and regular rhythm.      Heart sounds: S1 normal and S2 normal. No murmur heard.     No friction rub. No gallop.   Pulmonary:      Effort: Pulmonary effort is normal. No respiratory distress.      Breath sounds: No wheezing or rales.   Chest:      Chest wall: No tenderness.   Abdominal:      Palpations: Abdomen is soft.   Musculoskeletal:      Cervical back: Normal range of motion and neck supple.      Right lower leg: No edema.      Left lower leg: No edema.   Skin:     General: Skin is warm and dry.   Neurological:      Mental Status: She is oriented to person, place, and time.   Psychiatric:         Mood and Affect: Mood normal.         Behavior: Behavior normal.           "

## 2024-05-13 ENCOUNTER — OFFICE VISIT (OUTPATIENT)
Dept: FAMILY MEDICINE CLINIC | Facility: HOSPITAL | Age: 69
End: 2024-05-13
Payer: MEDICARE

## 2024-05-13 VITALS
HEART RATE: 58 BPM | TEMPERATURE: 97.7 F | OXYGEN SATURATION: 95 % | HEIGHT: 65 IN | SYSTOLIC BLOOD PRESSURE: 138 MMHG | DIASTOLIC BLOOD PRESSURE: 88 MMHG | BODY MASS INDEX: 28.66 KG/M2 | WEIGHT: 172 LBS

## 2024-05-13 DIAGNOSIS — G25.81 RESTLESS LEG: ICD-10-CM

## 2024-05-13 DIAGNOSIS — N18.31 STAGE 3A CHRONIC KIDNEY DISEASE (HCC): Chronic | ICD-10-CM

## 2024-05-13 DIAGNOSIS — F33.0 MILD EPISODE OF RECURRENT MAJOR DEPRESSIVE DISORDER (HCC): ICD-10-CM

## 2024-05-13 DIAGNOSIS — Z00.00 MEDICARE ANNUAL WELLNESS VISIT, SUBSEQUENT: ICD-10-CM

## 2024-05-13 DIAGNOSIS — D75.89 MACROCYTOSIS WITHOUT ANEMIA: ICD-10-CM

## 2024-05-13 DIAGNOSIS — F32.A DEPRESSION, UNSPECIFIED DEPRESSION TYPE: ICD-10-CM

## 2024-05-13 DIAGNOSIS — R91.1 NODULE OF LEFT LUNG: ICD-10-CM

## 2024-05-13 DIAGNOSIS — N18.31 CHRONIC KIDNEY DISEASE, STAGE 3A (HCC): ICD-10-CM

## 2024-05-13 DIAGNOSIS — E78.5 DYSLIPIDEMIA: Chronic | ICD-10-CM

## 2024-05-13 DIAGNOSIS — Z79.899 OTHER LONG TERM (CURRENT) DRUG THERAPY: ICD-10-CM

## 2024-05-13 DIAGNOSIS — Q07.00 ARNOLD-CHIARI MALFORMATION (HCC): ICD-10-CM

## 2024-05-13 DIAGNOSIS — R73.01 IFG (IMPAIRED FASTING GLUCOSE): Primary | ICD-10-CM

## 2024-05-13 DIAGNOSIS — D32.1 BENIGN TUMOR OF SPINAL MENINGIOMA (HCC): ICD-10-CM

## 2024-05-13 DIAGNOSIS — D72.819 LEUKOPENIA, UNSPECIFIED TYPE: ICD-10-CM

## 2024-05-13 PROCEDURE — G0439 PPPS, SUBSEQ VISIT: HCPCS | Performed by: INTERNAL MEDICINE

## 2024-05-13 PROCEDURE — 99214 OFFICE O/P EST MOD 30 MIN: CPT | Performed by: INTERNAL MEDICINE

## 2024-05-13 RX ORDER — BUPROPION HYDROCHLORIDE 150 MG/1
150 TABLET ORAL DAILY
Qty: 30 TABLET | Refills: 2 | Status: SHIPPED | OUTPATIENT
Start: 2024-05-13

## 2024-05-13 RX ORDER — ESCITALOPRAM OXALATE 20 MG/1
20 TABLET ORAL DAILY
Start: 2024-05-13

## 2024-05-13 NOTE — ASSESSMENT & PLAN NOTE
Mood not controlled - start WBXL 150 mg 1 tab PO q day, con't current Lexapro,  d/w pt that it takes 4-6 wks to get maximum benefit of med and that med has to be taken every day and to not miss doses of med, call with SE/new/worse mood/SI, re-eval in 6 wks

## 2024-05-13 NOTE — PATIENT INSTRUCTIONS
Medicare Preventive Visit Patient Instructions  Thank you for completing your Welcome to Medicare Visit or Medicare Annual Wellness Visit today. Your next wellness visit will be due in one year (5/14/2025).  The screening/preventive services that you may require over the next 5-10 years are detailed below. Some tests may not apply to you based off risk factors and/or age. Screening tests ordered at today's visit but not completed yet may show as past due. Also, please note that scanned in results may not display below.  Preventive Screenings:  Service Recommendations Previous Testing/Comments   Colorectal Cancer Screening  * Colonoscopy    * Fecal Occult Blood Test (FOBT)/Fecal Immunochemical Test (FIT)  * Fecal DNA/Cologuard Test  * Flexible Sigmoidoscopy Age: 45-75 years old   Colonoscopy: every 10 years (may be performed more frequently if at higher risk)  OR  FOBT/FIT: every 1 year  OR  Cologuard: every 3 years  OR  Sigmoidoscopy: every 5 years  Screening may be recommended earlier than age 45 if at higher risk for colorectal cancer. Also, an individualized decision between you and your healthcare provider will decide whether screening between the ages of 76-85 would be appropriate. Colonoscopy: 06/30/2022  FOBT/FIT: Not on file  Cologuard: Not on file  Sigmoidoscopy: Not on file    Screening Current     Breast Cancer Screening Age: 40+ years old  Frequency: every 1-2 years  Not required if history of left and right mastectomy Mammogram: 02/21/2023    Risks and Benefits Discussed  Due for Mammogram   Cervical Cancer Screening Between the ages of 21-29, pap smear recommended once every 3 years.   Between the ages of 30-65, can perform pap smear with HPV co-testing every 5 years.   Recommendations may differ for women with a history of total hysterectomy, cervical cancer, or abnormal pap smears in past. Pap Smear: 12/17/2020    Screening Not Indicated  Risks and Benefits Discussed   Hepatitis C Screening Once for  adults born between 1945 and 1965  More frequently in patients at high risk for Hepatitis C Hep C Antibody: 09/04/2019    Screening Current  Risks and Benefits Discussed   Diabetes Screening 1-2 times per year if you're at risk for diabetes or have pre-diabetes Fasting glucose: 94 mg/dL (5/4/2024)  A1C: 6.0 % (5/4/2024)  Screening Current  Risks and Benefits Discussed   Cholesterol Screening Once every 5 years if you don't have a lipid disorder. May order more often based on risk factors. Lipid panel: 05/04/2024    Screening Current  Risks and Benefits Discussed  History Lipid Disorder     Other Preventive Screenings Covered by Medicare:  Abdominal Aortic Aneurysm (AAA) Screening: covered once if your at risk. You're considered to be at risk if you have a family history of AAA.  Lung Cancer Screening: covers low dose CT scan once per year if you meet all of the following conditions: (1) Age 55-77; (2) No signs or symptoms of lung cancer; (3) Current smoker or have quit smoking within the last 15 years; (4) You have a tobacco smoking history of at least 20 pack years (packs per day multiplied by number of years you smoked); (5) You get a written order from a healthcare provider.  Glaucoma Screening: covered annually if you're considered high risk: (1) You have diabetes OR (2) Family history of glaucoma OR (3)  aged 50 and older OR (4)  American aged 65 and older  Osteoporosis Screening: covered every 2 years if you meet one of the following conditions: (1) You're estrogen deficient and at risk for osteoporosis based off medical history and other findings; (2) Have a vertebral abnormality; (3) On glucocorticoid therapy for more than 3 months; (4) Have primary hyperparathyroidism; (5) On osteoporosis medications and need to assess response to drug therapy.   Last bone density test (DXA Scan): 02/21/2023.  HIV Screening: covered annually if you're between the age of 15-65. Also covered annually  if you are younger than 15 and older than 65 with risk factors for HIV infection. For pregnant patients, it is covered up to 3 times per pregnancy.    Immunizations:  Immunization Recommendations   Influenza Vaccine Annual influenza vaccination during flu season is recommended for all persons aged >= 6 months who do not have contraindications   Pneumococcal Vaccine   * Pneumococcal conjugate vaccine = PCV13 (Prevnar 13), PCV15 (Vaxneuvance), PCV20 (Prevnar 20)  * Pneumococcal polysaccharide vaccine = PPSV23 (Pneumovax) Adults 19-63 yo with certain risk factors or if 65+ yo  If never received any pneumonia vaccine: recommend Prevnar 20 (PCV20)  Give PCV20 if previously received 1 dose of PCV13 or PPSV23   Hepatitis B Vaccine 3 dose series if at intermediate or high risk (ex: diabetes, end stage renal disease, liver disease)   Respiratory syncytial virus (RSV) Vaccine - COVERED BY MEDICARE PART D  * RSVPreF3 (Arexvy) CDC recommends that adults 60 years of age and older may receive a single dose of RSV vaccine using shared clinical decision-making (SCDM)   Tetanus (Td) Vaccine - COST NOT COVERED BY MEDICARE PART B Following completion of primary series, a booster dose should be given every 10 years to maintain immunity against tetanus. Td may also be given as tetanus wound prophylaxis.   Tdap Vaccine - COST NOT COVERED BY MEDICARE PART B Recommended at least once for all adults. For pregnant patients, recommended with each pregnancy.   Shingles Vaccine (Shingrix) - COST NOT COVERED BY MEDICARE PART B  2 shot series recommended in those 19 years and older who have or will have weakened immune systems or those 50 years and older     Health Maintenance Due:      Topic Date Due   • Breast Cancer Screening: Mammogram  02/21/2024   • DXA SCAN  02/21/2025   • Colorectal Cancer Screening  06/29/2027   • Hepatitis C Screening  Completed   • Lung Cancer Screening  Discontinued     Immunizations Due:      Topic Date Due   •  COVID-19 Vaccine (6 - 2023-24 season) 11/22/2023     Advance Directives   What are advance directives?  Advance directives are legal documents that state your wishes and plans for medical care. These plans are made ahead of time in case you lose your ability to make decisions for yourself. Advance directives can apply to any medical decision, such as the treatments you want, and if you want to donate organs.   What are the types of advance directives?  There are many types of advance directives, and each state has rules about how to use them. You may choose a combination of any of the following:  Living will:  This is a written record of the treatment you want. You can also choose which treatments you do not want, which to limit, and which to stop at a certain time. This includes surgery, medicine, IV fluid, and tube feedings.   Durable power of  for healthcare (DPAHC):  This is a written record that states who you want to make healthcare choices for you when you are unable to make them for yourself. This person, called a proxy, is usually a family member or a friend. You may choose more than 1 proxy.  Do not resuscitate (DNR) order:  A DNR order is used in case your heart stops beating or you stop breathing. It is a request not to have certain forms of treatment, such as CPR. A DNR order may be included in other types of advance directives.  Medical directive:  This covers the care that you want if you are in a coma, near death, or unable to make decisions for yourself. You can list the treatments you want for each condition. Treatment may include pain medicine, surgery, blood transfusions, dialysis, IV or tube feedings, and a ventilator (breathing machine).  Values history:  This document has questions about your views, beliefs, and how you feel and think about life. This information can help others choose the care that you would choose.  Why are advance directives important?  An advance directive helps  you control your care. Although spoken wishes may be used, it is better to have your wishes written down. Spoken wishes can be misunderstood, or not followed. Treatments may be given even if you do not want them. An advance directive may make it easier for your family to make difficult choices about your care.   Weight Management   Why it is important to manage your weight:  Being overweight increases your risk of health conditions such as heart disease, high blood pressure, type 2 diabetes, and certain types of cancer. It can also increase your risk for osteoarthritis, sleep apnea, and other respiratory problems. Aim for a slow, steady weight loss. Even a small amount of weight loss can lower your risk of health problems.  How to lose weight safely:  A safe and healthy way to lose weight is to eat fewer calories and get regular exercise. You can lose up about 1 pound a week by decreasing the number of calories you eat by 500 calories each day.   Healthy meal plan for weight management:  A healthy meal plan includes a variety of foods, contains fewer calories, and helps you stay healthy. A healthy meal plan includes the following:  Eat whole-grain foods more often.  A healthy meal plan should contain fiber. Fiber is the part of grains, fruits, and vegetables that is not broken down by your body. Whole-grain foods are healthy and provide extra fiber in your diet. Some examples of whole-grain foods are whole-wheat breads and pastas, oatmeal, brown rice, and bulgur.  Eat a variety of vegetables every day.  Include dark, leafy greens such as spinach, kale, carlos eduardo greens, and mustard greens. Eat yellow and orange vegetables such as carrots, sweet potatoes, and winter squash.   Eat a variety of fruits every day.  Choose fresh or canned fruit (canned in its own juice or light syrup) instead of juice. Fruit juice has very little or no fiber.  Eat low-fat dairy foods.  Drink fat-free (skim) milk or 1% milk. Eat fat-free  yogurt and low-fat cottage cheese. Try low-fat cheeses such as mozzarella and other reduced-fat cheeses.  Choose meat and other protein foods that are low in fat.  Choose beans or other legumes such as split peas or lentils. Choose fish, skinless poultry (chicken or turkey), or lean cuts of red meat (beef or pork). Before you cook meat or poultry, cut off any visible fat.   Use less fat and oil.  Try baking foods instead of frying them. Add less fat, such as margarine, sour cream, regular salad dressing and mayonnaise to foods. Eat fewer high-fat foods. Some examples of high-fat foods include french fries, doughnuts, ice cream, and cakes.  Eat fewer sweets.  Limit foods and drinks that are high in sugar. This includes candy, cookies, regular soda, and sweetened drinks.  Exercise:  Exercise at least 30 minutes per day on most days of the week. Some examples of exercise include walking, biking, dancing, and swimming. You can also fit in more physical activity by taking the stairs instead of the elevator or parking farther away from stores. Ask your healthcare provider about the best exercise plan for you.      © Copyright [employer] 2018 Information is for End User's use only and may not be sold, redistributed or otherwise used for commercial purposes. All illustrations and images included in CareNotes® are the copyrighted property of [employer] or [employer]      Medicare Preventive Visit Patient Instructions  Thank you for completing your Welcome to Medicare Visit or Medicare Annual Wellness Visit today. Your next wellness visit will be due in one year (5/14/2025).  The screening/preventive services that you may require over the next 5-10 years are detailed below. Some tests may not apply to you based off risk factors and/or age. Screening tests ordered at today's visit but not completed yet may show as past due. Also, please note that scanned in results may not display below.  Preventive  Screenings:  Service Recommendations Previous Testing/Comments   Colorectal Cancer Screening  * Colonoscopy    * Fecal Occult Blood Test (FOBT)/Fecal Immunochemical Test (FIT)  * Fecal DNA/Cologuard Test  * Flexible Sigmoidoscopy Age: 45-75 years old   Colonoscopy: every 10 years (may be performed more frequently if at higher risk)  OR  FOBT/FIT: every 1 year  OR  Cologuard: every 3 years  OR  Sigmoidoscopy: every 5 years  Screening may be recommended earlier than age 45 if at higher risk for colorectal cancer. Also, an individualized decision between you and your healthcare provider will decide whether screening between the ages of 76-85 would be appropriate. Colonoscopy: 06/30/2022  FOBT/FIT: Not on file  Cologuard: Not on file  Sigmoidoscopy: Not on file    Screening Current     Breast Cancer Screening Age: 40+ years old  Frequency: every 1-2 years  Not required if history of left and right mastectomy Mammogram: 02/21/2023    Risks and Benefits Discussed  Due for Mammogram   Cervical Cancer Screening Between the ages of 21-29, pap smear recommended once every 3 years.   Between the ages of 30-65, can perform pap smear with HPV co-testing every 5 years.   Recommendations may differ for women with a history of total hysterectomy, cervical cancer, or abnormal pap smears in past. Pap Smear: 12/17/2020    Screening Not Indicated  Risks and Benefits Discussed   Hepatitis C Screening Once for adults born between 1945 and 1965  More frequently in patients at high risk for Hepatitis C Hep C Antibody: 09/04/2019    Screening Current  Risks and Benefits Discussed   Diabetes Screening 1-2 times per year if you're at risk for diabetes or have pre-diabetes Fasting glucose: 94 mg/dL (5/4/2024)  A1C: 6.0 % (5/4/2024)  Screening Current  Risks and Benefits Discussed   Cholesterol Screening Once every 5 years if you don't have a lipid disorder. May order more often based on risk factors. Lipid panel: 05/04/2024    Screening  Current  Risks and Benefits Discussed  History Lipid Disorder     Other Preventive Screenings Covered by Medicare:  Abdominal Aortic Aneurysm (AAA) Screening: covered once if your at risk. You're considered to be at risk if you have a family history of AAA.  Lung Cancer Screening: covers low dose CT scan once per year if you meet all of the following conditions: (1) Age 55-77; (2) No signs or symptoms of lung cancer; (3) Current smoker or have quit smoking within the last 15 years; (4) You have a tobacco smoking history of at least 20 pack years (packs per day multiplied by number of years you smoked); (5) You get a written order from a healthcare provider.  Glaucoma Screening: covered annually if you're considered high risk: (1) You have diabetes OR (2) Family history of glaucoma OR (3)  aged 50 and older OR (4)  American aged 65 and older  Osteoporosis Screening: covered every 2 years if you meet one of the following conditions: (1) You're estrogen deficient and at risk for osteoporosis based off medical history and other findings; (2) Have a vertebral abnormality; (3) On glucocorticoid therapy for more than 3 months; (4) Have primary hyperparathyroidism; (5) On osteoporosis medications and need to assess response to drug therapy.   Last bone density test (DXA Scan): 02/21/2023.  HIV Screening: covered annually if you're between the age of 15-65. Also covered annually if you are younger than 15 and older than 65 with risk factors for HIV infection. For pregnant patients, it is covered up to 3 times per pregnancy.    Immunizations:  Immunization Recommendations   Influenza Vaccine Annual influenza vaccination during flu season is recommended for all persons aged >= 6 months who do not have contraindications   Pneumococcal Vaccine   * Pneumococcal conjugate vaccine = PCV13 (Prevnar 13), PCV15 (Vaxneuvance), PCV20 (Prevnar 20)  * Pneumococcal polysaccharide vaccine = PPSV23 (Pneumovax) Adults  19-65 yo with certain risk factors or if 65+ yo  If never received any pneumonia vaccine: recommend Prevnar 20 (PCV20)  Give PCV20 if previously received 1 dose of PCV13 or PPSV23   Hepatitis B Vaccine 3 dose series if at intermediate or high risk (ex: diabetes, end stage renal disease, liver disease)   Respiratory syncytial virus (RSV) Vaccine - COVERED BY MEDICARE PART D  * RSVPreF3 (Arexvy) CDC recommends that adults 60 years of age and older may receive a single dose of RSV vaccine using shared clinical decision-making (SCDM)   Tetanus (Td) Vaccine - COST NOT COVERED BY MEDICARE PART B Following completion of primary series, a booster dose should be given every 10 years to maintain immunity against tetanus. Td may also be given as tetanus wound prophylaxis.   Tdap Vaccine - COST NOT COVERED BY MEDICARE PART B Recommended at least once for all adults. For pregnant patients, recommended with each pregnancy.   Shingles Vaccine (Shingrix) - COST NOT COVERED BY MEDICARE PART B  2 shot series recommended in those 19 years and older who have or will have weakened immune systems or those 50 years and older     Health Maintenance Due:      Topic Date Due   • Breast Cancer Screening: Mammogram  02/21/2024   • DXA SCAN  02/21/2025   • Colorectal Cancer Screening  06/29/2027   • Hepatitis C Screening  Completed   • Lung Cancer Screening  Discontinued     Immunizations Due:      Topic Date Due   • COVID-19 Vaccine (6 - 2023-24 season) 11/22/2023     Advance Directives   What are advance directives?  Advance directives are legal documents that state your wishes and plans for medical care. These plans are made ahead of time in case you lose your ability to make decisions for yourself. Advance directives can apply to any medical decision, such as the treatments you want, and if you want to donate organs.   What are the types of advance directives?  There are many types of advance directives, and each state has rules about how  to use them. You may choose a combination of any of the following:  Living will:  This is a written record of the treatment you want. You can also choose which treatments you do not want, which to limit, and which to stop at a certain time. This includes surgery, medicine, IV fluid, and tube feedings.   Durable power of  for healthcare (DPAHC):  This is a written record that states who you want to make healthcare choices for you when you are unable to make them for yourself. This person, called a proxy, is usually a family member or a friend. You may choose more than 1 proxy.  Do not resuscitate (DNR) order:  A DNR order is used in case your heart stops beating or you stop breathing. It is a request not to have certain forms of treatment, such as CPR. A DNR order may be included in other types of advance directives.  Medical directive:  This covers the care that you want if you are in a coma, near death, or unable to make decisions for yourself. You can list the treatments you want for each condition. Treatment may include pain medicine, surgery, blood transfusions, dialysis, IV or tube feedings, and a ventilator (breathing machine).  Values history:  This document has questions about your views, beliefs, and how you feel and think about life. This information can help others choose the care that you would choose.  Why are advance directives important?  An advance directive helps you control your care. Although spoken wishes may be used, it is better to have your wishes written down. Spoken wishes can be misunderstood, or not followed. Treatments may be given even if you do not want them. An advance directive may make it easier for your family to make difficult choices about your care.   Weight Management   Why it is important to manage your weight:  Being overweight increases your risk of health conditions such as heart disease, high blood pressure, type 2 diabetes, and certain types of cancer. It can also  increase your risk for osteoarthritis, sleep apnea, and other respiratory problems. Aim for a slow, steady weight loss. Even a small amount of weight loss can lower your risk of health problems.  How to lose weight safely:  A safe and healthy way to lose weight is to eat fewer calories and get regular exercise. You can lose up about 1 pound a week by decreasing the number of calories you eat by 500 calories each day.   Healthy meal plan for weight management:  A healthy meal plan includes a variety of foods, contains fewer calories, and helps you stay healthy. A healthy meal plan includes the following:  Eat whole-grain foods more often.  A healthy meal plan should contain fiber. Fiber is the part of grains, fruits, and vegetables that is not broken down by your body. Whole-grain foods are healthy and provide extra fiber in your diet. Some examples of whole-grain foods are whole-wheat breads and pastas, oatmeal, brown rice, and bulgur.  Eat a variety of vegetables every day.  Include dark, leafy greens such as spinach, kale, carlos eduardo greens, and mustard greens. Eat yellow and orange vegetables such as carrots, sweet potatoes, and winter squash.   Eat a variety of fruits every day.  Choose fresh or canned fruit (canned in its own juice or light syrup) instead of juice. Fruit juice has very little or no fiber.  Eat low-fat dairy foods.  Drink fat-free (skim) milk or 1% milk. Eat fat-free yogurt and low-fat cottage cheese. Try low-fat cheeses such as mozzarella and other reduced-fat cheeses.  Choose meat and other protein foods that are low in fat.  Choose beans or other legumes such as split peas or lentils. Choose fish, skinless poultry (chicken or turkey), or lean cuts of red meat (beef or pork). Before you cook meat or poultry, cut off any visible fat.   Use less fat and oil.  Try baking foods instead of frying them. Add less fat, such as margarine, sour cream, regular salad dressing and mayonnaise to foods. Eat  fewer high-fat foods. Some examples of high-fat foods include french fries, doughnuts, ice cream, and cakes.  Eat fewer sweets.  Limit foods and drinks that are high in sugar. This includes candy, cookies, regular soda, and sweetened drinks.  Exercise:  Exercise at least 30 minutes per day on most days of the week. Some examples of exercise include walking, biking, dancing, and swimming. You can also fit in more physical activity by taking the stairs instead of the elevator or parking farther away from stores. Ask your healthcare provider about the best exercise plan for you.      © Copyright [employer] 2018 Information is for End User's use only and may not be sold, redistributed or otherwise used for commercial purposes. All illustrations and images included in CareNotes® are the copyrighted property of [employer] or [employer]

## 2024-05-13 NOTE — ASSESSMENT & PLAN NOTE
FBS wnl but A1c up - urged low sugar/carb diet and keep active, also encouraged to decrease ETOH intake - recheck BW in 6 mos - BW order given

## 2024-05-13 NOTE — ASSESSMENT & PLAN NOTE
Lab Results   Component Value Date    EGFR 43 05/04/2024    EGFR 47 11/04/2023    EGFR 41 07/29/2023    CREATININE 1.25 05/04/2024    CREATININE 1.16 11/04/2023    CREATININE 1.31 (H) 07/29/2023   Stable, importance of BP/BS control reviewed as was need to keep hydrated and avoid NSAIDs, ARB just stopped by Cardio, will follow

## 2024-05-13 NOTE — PROGRESS NOTES
Assessment and Plan:     Problem List Items Addressed This Visit          Respiratory    Nodule of left lung    Relevant Orders    CT chest wo contrast       Endocrine    IFG (impaired fasting glucose) - Primary     FBS wnl but A1c up - urged low sugar/carb diet and keep active, also encouraged to decrease ETOH intake - recheck BW in 6 mos - BW order given         Relevant Orders    Comprehensive metabolic panel    Hemoglobin A1C       Nervous and Auditory    Arnold-Chiari malformation (HCC) (Chronic)     No current Neuro symptoms, following with Neuro, call with concerns         Benign tumor of spinal meningioma (HCC)     Saw Neuro, no imaging indicated at this time, con't f/u and imaging as per Neuro            Genitourinary    Stage 3a chronic kidney disease (HCC) (Chronic)     Lab Results   Component Value Date    EGFR 43 05/04/2024    EGFR 47 11/04/2023    EGFR 41 07/29/2023    CREATININE 1.25 05/04/2024    CREATININE 1.16 11/04/2023    CREATININE 1.31 (H) 07/29/2023   Stable, importance of BP/BS control reviewed as was need to keep hydrated and avoid NSAIDs, ARB just stopped by Cardio, will follow         Relevant Orders    Comprehensive metabolic panel       Behavioral Health    Mild episode of recurrent major depressive disorder (HCC)     Mood not controlled - start WBXL 150 mg 1 tab PO q day, con't current Lexapro,  d/w pt that it takes 4-6 wks to get maximum benefit of med and that med has to be taken every day and to not miss doses of med, call with SE/new/worse mood/SI, re-eval in 6 wks           Relevant Medications    buPROPion (Wellbutrin XL) 150 mg 24 hr tablet    escitalopram (LEXAPRO) 20 mg tablet       Other    Dyslipidemia (Chronic)     LDL at goal, con't current statin, recheck FLP in 1 yr          Other Visit Diagnoses       Macrocytosis without anemia        Check B12/folate with labs - order given    Relevant Orders    Vitamin B12    Folate    Iron Panel (Includes Ferritin, Iron Sat%, Iron,  and TIBC)    CBC and differential    CBC and differential    Leukopenia, unspecified type        Recheck CBC in 2 wks and then in 6 mos, rest of CBC essentially nml, will monitor    Relevant Orders    Vitamin B12    Folate    Iron Panel (Includes Ferritin, Iron Sat%, Iron, and TIBC)    CBC and differential    CBC and differential    Restless leg        New symptom - check iron studies, will follow, T/C tx if iron nml and symptoms persist    Relevant Orders    Vitamin B12    Folate    Iron Panel (Includes Ferritin, Iron Sat%, Iron, and TIBC)    CBC and differential    Other long term (current) drug therapy        Relevant Orders    Vitamin B12    Folate    Iron Panel (Includes Ferritin, Iron Sat%, Iron, and TIBC)    Chronic kidney disease, stage 3a (HCC)        Relevant Orders    Iron Panel (Includes Ferritin, Iron Sat%, Iron, and TIBC)    Medicare annual wellness visit, subsequent        BMI 28.0-28.9,adult        Diet/exercise/decrease ETOH intake reviewed    Depression, unspecified depression type        Relevant Medications    buPROPion (Wellbutrin XL) 150 mg 24 hr tablet    escitalopram (LEXAPRO) 20 mg tablet            Depression Screening and Follow-up Plan: Patient's depression screening was positive with a PHQ-9 score of 7. Patient assessed for underlying major depression. Brief counseling provided and recommend additional follow-up/re-evaluation next office visit.       Preventive health issues were discussed with patient, and age appropriate screening tests were ordered as noted in patient's After Visit Summary.    Colonsocopy 6/22 - 5 yrs  Mammo 2/23 - order given for 2024  Dexa 2/23 - osteopenia    CUS 4/23 - 2 yrs    CT chest 12/23 - 3 mos  BW 5/24    Personalized health advice and appropriate referrals for health education or preventive services given if needed, as noted in patient's After Visit Summary.     History of Present Illness:     Patient presents for a Medicare Wellness Visit    HPI Pt here  for follow up appt/BW results and AWV    BW results were d/w pt in detail: FBS/A1C 94/6.0, BUN/Cr 19/1.25 (GFR 43), WBC 3.96 (diff wnl) and , rest of CMP/CBC/TSH were wnl, FLP with  and rest of FLP was wnl    Def of nml vs IFG vs DM was d/w pt in detail. Diet/exercise was reviewed - wgt     Nml WBC and MCV were reviewed.  She does not take a Vit B12/folate supplement at all.    Goal FLP was d/w pt in detail.  Diet/exercise reviewed as noted above.  She is taking her Atorvastatin daily as directed w/o Se.  She notes no stroke/TIA symptoms/CP.     Pt saw Neuro (Dr. Jason)  in Nov and Dec for f/u h/o Arnold Chairi, SAH, facial twitching and thoracic meningioma - OV notes reviewed.  No meds were changed and she remains on Plavix and Atorvastatin.      Pt saw Cardio (Dr. Eduadro) last week for f/u CM/HTN - OV note reviewed.  She had her Losartan stopped d/t hypotension and dizziness. She was told to f/u in 6 mos.  BP better today. She has had some dizziness since last week but is not sure if it is better.   She notes no CP/palp/LE edema.  She notes some SOB but it is not new.  She is on a beta blocker/statin/Plavix/Imdur.      Mood still a bit down. She feels she has no desire to clean her house. She feels tired a lot.  She is sleeping well.  She notes no sad mood but is frustrated with her wgt and has joined the gym.  She is exercising with a friend.  She is watching her diet but admits she is drinking too much [employer] Lemonade.  She denies SE with the Lexapro and denies missing doses/forgetting to take it.  She notes no SI.  She denies h/o seizures.            Patient Care Team:  Leelee Garcia DO as PCP - General  Binta Eduardo MD (Cardiology)  DO Gagan Gentile MD (Dermatology)  Aicha Jason MD (Neurology)  ANTONIETA Boles as Nurse Practitioner (Nurse Practitioner)     Review of Systems:     Review of Systems   Constitutional:  Positive for fatigue.  Negative for chills, fever and unexpected weight change.   HENT:  Positive for hearing loss. Negative for congestion and trouble swallowing.         B/L hearing aids   Eyes:  Negative for pain and discharge.   Respiratory:  Positive for shortness of breath. Negative for cough and wheezing.    Cardiovascular:  Negative for chest pain, palpitations and leg swelling.   Gastrointestinal:  Negative for abdominal pain, blood in stool, constipation, diarrhea and nausea.   Genitourinary:  Negative for difficulty urinating, dysuria, vaginal bleeding and vaginal pain.   Musculoskeletal:  Negative for back pain and neck pain.   Skin:  Negative for rash and wound.   Neurological:  Positive for dizziness. Negative for seizures, syncope and headaches.   Hematological:  Does not bruise/bleed easily.   Psychiatric/Behavioral:  Positive for dysphoric mood. Negative for confusion and suicidal ideas. The patient is not nervous/anxious.         Problem List:     Patient Active Problem List   Diagnosis    De Quervain's disease (tenosynovitis)    Essential hypertension    Sudden idiopathic hearing loss of right ear    Cervical stenosis of spinal canal    Plantar fasciitis    Lower back pain    IFG (impaired fasting glucose)    Dyslipidemia    Takotsubo cardiomyopathy    Carpal tunnel syndrome    Arnold-Chiari malformation (HCC)    Pancreatic cyst    COPD (chronic obstructive pulmonary disease) (HCC)    GERD (gastroesophageal reflux disease)    Hypotension    Stage 3a chronic kidney disease (HCC)    History of subarachnoid hemorrhage    Dysphagia    Carotid stenosis, bilateral    Intracranial atherosclerosis    Benign tumor of spinal meningioma (HCC)    Allergies    Hypersomnia, unspecified    Orthostatic hypotension    Mild episode of recurrent major depressive disorder (HCC)    Nodule of left lung      Past Medical and Surgical History:     Past Medical History:   Diagnosis Date    Arnold-Chiari malformation (HCC)     Breast lump      last assessed: Jan 22, 2013     COPD (chronic obstructive pulmonary disease) (HCC)     Coronary artery disease     Depression with anxiety     Facial twitching 07/21/2022    GERD (gastroesophageal reflux disease)     Gout     last assessed: Nov 26, 2012     Hair loss     last assessed: Dec 15, 2016     Hypotension     last assessed: Nov 10, 2014     Mitral valve disorder     Myocardial infarct, old     GER (obstructive sleep apnea)     Stroke ([facility])     SVT (supraventricular tachycardia)      Past Surgical History:   Procedure Laterality Date    BREAST BIOPSY Right 02/28/2017    US CORE BIOPSY--BENIGN    CARDIAC CATHETERIZATION      CATARACT EXTRACTION Bilateral 11/09/2023    CERVICAL DISCECTOMY      Spinal     CERVICAL LAMINECTOMY      C1 with chiari decompression     COLONOSCOPY      5 yrs - onset: May 31, 2011     CRANIOTOMY      INTRACAPSULAR CATARACT EXTRACTION Right 11/08/2023    INTRACAPSULAR CATARACT EXTRACTION Left 11/15/2023    POPLITEAL SYNOVIAL CYST EXCISION      TUBAL LIGATION      US GUIDED BREAST BIOPSY RIGHT COMPLETE Right 02/28/2017      Family History:     Family History   Problem Relation Age of Onset    Stroke Mother     Other Father         blood clot in vein & CABG     Heart disease Father     Prostate cancer Father     Hearing loss Father     Hypertension Father     No Known Problems Sister     No Known Problems Sister     No Known Problems Daughter     No Known Problems Daughter     Breast cancer Maternal Grandmother     No Known Problems Maternal Grandfather     No Known Problems Paternal Grandmother     No Known Problems Paternal Grandfather     Alcohol abuse Brother     Throat cancer Brother     Cancer Brother         Throat tongue    Hearing loss Brother     Kidney failure Brother         kidney failure d/t NSIADs on dialysis    Hearing loss Brother     Crohn's disease Maternal Aunt     No Known Problems Maternal Aunt     No Known Problems Maternal Aunt     Colon cancer Paternal Aunt      No Known Problems Paternal Aunt     No Known Problems Paternal Aunt     No Known Problems Paternal Aunt     Colon cancer Paternal Aunt     Cancer Paternal Aunt         Colon cancer      Social History:     Social History     Socioeconomic History    Marital status: /Civil Union     Spouse name: None    Number of children: None    Years of education: None    Highest education level: None   Occupational History    Occupation: working full time    Tobacco Use    Smoking status: Former     Current packs/day: 0.00     Average packs/day: 1.5 packs/day for 39.0 years (58.5 ttl pk-yrs)     Types: Cigarettes     Start date:      Quit date: 2008     Years since quittin.3    Smokeless tobacco: Never    Tobacco comments:     Patient quit   Vaping Use    Vaping status: Never Used   Substance and Sexual Activity    Alcohol use: Yes     Alcohol/week: 7.0 standard drinks of alcohol     Types: 7 Cans of beer per week    Drug use: No    Sexual activity: Not Currently     Partners: Male   Other Topics Concern    None   Social History Narrative    None     Social Determinants of Health     Financial Resource Strain: Low Risk  (2023)    Overall Financial Resource Strain (CARDIA)     Difficulty of Paying Living Expenses: Not hard at all   Food Insecurity: No Food Insecurity (2024)    Hunger Vital Sign     Worried About Running Out of Food in the Last Year: Never true     Ran Out of Food in the Last Year: Never true   Transportation Needs: No Transportation Needs (2024)    PRAPARE - Transportation     Lack of Transportation (Medical): No     Lack of Transportation (Non-Medical): No   Physical Activity: Not on file   Stress: Not on file   Social Connections: Not on file   Intimate Partner Violence: Not on file   Housing Stability: Low Risk  (2024)    Housing Stability Vital Sign     Unable to Pay for Housing in the Last Year: No     Number of Places Lived in the Last Year: 1     Unstable Housing in  the Last Year: No      Medications and Allergies:     Current Outpatient Medications   Medication Sig Dispense Refill    buPROPion (Wellbutrin XL) 150 mg 24 hr tablet Take 1 tablet (150 mg total) by mouth daily 30 tablet 2    escitalopram (LEXAPRO) 20 mg tablet Take 1 tablet (20 mg total) by mouth daily      albuterol (ProAir HFA) 90 mcg/act inhaler Inhale 2 puffs every 4 (four) hours as needed for wheezing or shortness of breath 8.5 g 2    atorvastatin (LIPITOR) 80 mg tablet Take 1 tablet by mouth once daily 90 tablet 0    calcium carbonate (OS-FLASH) 600 MG tablet Take 600 mg by mouth 2 (two) times a day with meals      carvedilol (COREG) 12.5 mg tablet Take 1 tablet by mouth twice daily 180 tablet 1    cholecalciferol (VITAMIN D3) 1,000 units tablet Take 1,000 Units by mouth daily      clopidogrel (PLAVIX) 75 mg tablet Take 1 tablet by mouth once daily 90 tablet 0    gabapentin (NEURONTIN) 400 mg capsule Take 2 capsules (800 mg total) by mouth 2 (two) times a day Am and  capsule 1    isosorbide mononitrate (IMDUR) 30 mg 24 hr tablet Take 1 tablet by mouth once daily 90 tablet 1    pantoprazole (PROTONIX) 40 mg tablet TAKE 1 TABLET BY MOUTH ONCE DAILY BEFORE BREAKFAST 90 tablet 0    Trelegy Ellipta 100-62.5-25 MCG/ACT inhaler INHALE 1 PUFF ONCE DAILY RINSE MOUTH AFTER USE 60 each 5     No current facility-administered medications for this visit.     Allergies   Allergen Reactions    Codeine Itching    Medical Tape Rash      Immunizations:     Immunization History   Administered Date(s) Administered    COVID-19 Moderna mRNA Vaccine 12 Yr+ 50 mcg/0.5 mL (Spikevax) 09/27/2023    COVID-19 PFIZER VACCINE 0.3 ML IM 03/09/2021, 04/01/2021, 10/29/2021    COVID-19 Pfizer Vac BIVALENT Markus-sucrose 12 Yr+ IM 10/08/2022    INFLUENZA 11/10/2014, 09/15/2015, 09/06/2016, 08/25/2018, 09/24/2019, 10/08/2022, 09/14/2023    Influenza Quadrivalent Preservative Free 3 years and older IM 11/10/2014, 09/15/2015, 09/06/2016,  12/19/2017    Influenza Quadrivalent, 6-35 Months IM 12/19/2017    Influenza, high dose seasonal 0.7 mL 09/13/2020, 09/16/2021    Influenza, seasonal, injectable 11/29/2012, 12/06/2013    Pneumococcal Conjugate 13-Valent 08/25/2018    Pneumococcal Polysaccharide PPV23 08/29/2019    Respiratory Syncytial Virus Vaccine (Recombinant, Adjuvanted) 09/14/2023    Tdap 12/19/2017    Zoster Vaccine Recombinant 09/13/2020, 02/07/2021      Health Maintenance:         Topic Date Due    Breast Cancer Screening: Mammogram  02/21/2024    DXA SCAN  02/21/2025    Colorectal Cancer Screening  06/29/2027    Hepatitis C Screening  Completed    Lung Cancer Screening  Discontinued         Topic Date Due    COVID-19 Vaccine (6 - 2023-24 season) 11/22/2023      Medicare Screening Tests and Risk Assessments:     Geovanna is here for her Subsequent Wellness visit. Last Medicare Wellness visit information reviewed, patient interviewed and updates made to the record today.      Health Risk Assessment:   Patient rates overall health as good. Patient feels that their physical health rating is same. Patient is satisfied with their life. Eyesight was rated as same. Hearing was rated as same. Patient feels that their emotional and mental health rating is same. Patients states they are sometimes angry. Patient states they are always unusually tired/fatigued. Pain experienced in the last 7 days has been none. Patient states that she has experienced no weight loss or gain in last 6 months.     Depression Screening:   PHQ-9 Score: 7      Fall Risk Screening:   In the past year, patient has experienced: no history of falling in past year      Urinary Incontinence Screening:   Patient has not leaked urine accidently in the last six months.     Home Safety:  Patient does not have trouble with stairs inside or outside of their home. Patient has working smoke alarms and has working carbon monoxide detector. Home safety hazards include: none.     Nutrition:    Current diet is Regular.     Medications:   Patient is currently taking over-the-counter supplements. OTC medications include: see medication list. Patient is able to manage medications.     Activities of Daily Living (ADLs)/Instrumental Activities of Daily Living (IADLs):   Walk and transfer into and out of bed and chair?: Yes  Dress and groom yourself?: Yes    Bathe or shower yourself?: Yes    Feed yourself? Yes  Do your laundry/housekeeping?: Yes  Manage your money, pay your bills and track your expenses?: Yes  Make your own meals?: Yes    Do your own shopping?: Yes    Previous Hospitalizations:   Any hospitalizations or ED visits within the last 12 months?: Yes    How many hospitalizations have you had in the last year?: 1-2    Hospitalization Comments: Community acquired pneumonia of left upper lobe of lung    Advance Care Planning:   Living will: No    Durable POA for healthcare: No    Advanced directive: No    ACP document given: Yes      Cognitive Screening:   Provider or family/friend/caregiver concerned regarding cognition?: No    PREVENTIVE SCREENINGS      Cardiovascular Screening:    General: Screening Current, Risks and Benefits Discussed and History Lipid Disorder      Diabetes Screening:     General: Screening Current and Risks and Benefits Discussed      Colorectal Cancer Screening:     General: Screening Current      Breast Cancer Screening:     General: Risks and Benefits Discussed    Due for: Mammogram        Cervical Cancer Screening:    General: Screening Not Indicated and Risks and Benefits Discussed      Osteoporosis Screening:    General: Screening Current and Risks and Benefits Discussed      Abdominal Aortic Aneurysm (AAA) Screening:        General: Risks and Benefits Discussed and Screening Not Indicated      Lung Cancer Screening:     General: Screening Not Indicated and Risks and Benefits Discussed      Hepatitis C Screening:    General: Screening Current and Risks and Benefits  "Discussed    Screening, Brief Intervention, and Referral to Treatment (SBIRT)    Screening      Single Item Drug Screening:  How often have you used an illegal drug (including marijuana) or a prescription medication for non-medical reasons in the past year? never    Single Item Drug Screen Score: 0  Interpretation: Negative screen for possible drug use disorder    Other Counseling Topics:   Car/seat belt/driving safety and regular weightbearing exercise.     Vision Screening    Right eye Left eye Both eyes   Without correction      With correction 20/30 20/30 20/30        Physical Exam:     /88   Pulse 58   Temp 97.7 °F (36.5 °C)   Ht 5' 5\" (1.651 m)   Wt 78 kg (172 lb)   SpO2 95%   BMI 28.62 kg/m²     Physical Exam  Vitals and nursing note reviewed.   Constitutional:       General: She is not in acute distress.     Appearance: She is well-developed. She is not ill-appearing.   HENT:      Head: Normocephalic and atraumatic.      Right Ear: Tympanic membrane and external ear normal. There is no impacted cerumen.      Left Ear: Tympanic membrane and external ear normal. There is no impacted cerumen.      Ears:      Comments: B/L hearing aids removed for exam     Mouth/Throat:      Mouth: Mucous membranes are moist.      Pharynx: Oropharynx is clear. No oropharyngeal exudate.   Eyes:      General:         Right eye: No discharge.         Left eye: No discharge.      Conjunctiva/sclera: Conjunctivae normal.   Neck:      Thyroid: No thyromegaly.      Vascular: Carotid bruit present.      Trachea: No tracheal deviation.   Cardiovascular:      Rate and Rhythm: Normal rate and regular rhythm.      Heart sounds: Normal heart sounds. No murmur heard.  Pulmonary:      Effort: Pulmonary effort is normal. No respiratory distress.      Breath sounds: Normal breath sounds. No wheezing, rhonchi or rales.   Abdominal:      General: There is no distension.      Palpations: Abdomen is soft.      Tenderness: There is no " abdominal tenderness. There is no guarding or rebound.   Musculoskeletal:         General: No deformity or signs of injury.      Cervical back: Neck supple.   Lymphadenopathy:      Cervical: No cervical adenopathy.   Skin:     General: Skin is warm and dry.      Coloration: Skin is not pale.      Findings: No bruising or rash.   Neurological:      General: No focal deficit present.      Mental Status: She is alert. Mental status is at baseline.      Motor: No abnormal muscle tone.      Gait: Gait normal.   Psychiatric:         Mood and Affect: Mood normal.         Behavior: Behavior normal.         Thought Content: Thought content normal.         Judgment: Judgment normal.          Leelee Garcia DO

## 2024-05-16 ENCOUNTER — TELEPHONE (OUTPATIENT)
Dept: NEUROLOGY | Facility: CLINIC | Age: 69
End: 2024-05-16

## 2024-05-16 NOTE — TELEPHONE ENCOUNTER
made a call for appt reminder with Dr Jason on 5/23/24 @9:30 am in [facility]. Patient confimed the appt.

## 2024-05-23 ENCOUNTER — OFFICE VISIT (OUTPATIENT)
Dept: NEUROLOGY | Facility: CLINIC | Age: 69
End: 2024-05-23
Payer: MEDICARE

## 2024-05-23 VITALS
WEIGHT: 172.4 LBS | OXYGEN SATURATION: 95 % | DIASTOLIC BLOOD PRESSURE: 64 MMHG | SYSTOLIC BLOOD PRESSURE: 120 MMHG | HEART RATE: 64 BPM | BODY MASS INDEX: 28.72 KG/M2 | TEMPERATURE: 97.8 F | HEIGHT: 65 IN | RESPIRATION RATE: 14 BRPM

## 2024-05-23 DIAGNOSIS — H81.93 VESTIBULAR DISORDER, BILATERAL: Primary | ICD-10-CM

## 2024-05-23 DIAGNOSIS — D32.1 BENIGN TUMOR OF SPINAL MENINGIOMA (HCC): ICD-10-CM

## 2024-05-23 DIAGNOSIS — I95.1 ORTHOSTATIC HYPOTENSION: ICD-10-CM

## 2024-05-23 DIAGNOSIS — I67.2 INTRACRANIAL ATHEROSCLEROSIS: ICD-10-CM

## 2024-05-23 PROCEDURE — 99215 OFFICE O/P EST HI 40 MIN: CPT | Performed by: PSYCHIATRY & NEUROLOGY

## 2024-05-23 NOTE — PROGRESS NOTES
Review of Systems   Constitutional:  Negative for appetite change, fatigue and fever.   HENT: Negative.  Negative for hearing loss, tinnitus, trouble swallowing and voice change.    Eyes: Negative.  Negative for photophobia, pain and visual disturbance.   Respiratory: Negative.  Negative for shortness of breath.    Cardiovascular: Negative.  Negative for palpitations.   Gastrointestinal: Negative.  Negative for nausea and vomiting.   Endocrine: Negative.  Negative for cold intolerance.   Genitourinary: Negative.  Negative for dysuria, frequency and urgency.   Musculoskeletal:  Negative for back pain, gait problem, myalgias, neck pain and neck stiffness.   Skin: Negative.  Negative for rash.   Allergic/Immunologic: Negative.    Neurological:  Positive for dizziness and light-headedness. Negative for tremors, seizures, syncope, facial asymmetry, speech difficulty, weakness, numbness and headaches.   Hematological: Negative.  Does not bruise/bleed easily.   Psychiatric/Behavioral: Negative.  Negative for confusion, hallucinations and sleep disturbance.    All other systems reviewed and are negative.

## 2024-05-23 NOTE — PATIENT INSTRUCTIONS
Plan:   Facial twitching resolved  Paroxysmal dizziness with positional changes but not consistent with BPPV, positive head thrust test suggest a peripheral vestibular pathology  - referral to ENT specialist (Dr. Daniel fellowship in neuro-otology); evaluation for Meniere's disease  - referral to physical therapy for vestibular rehab    Thoracic meningioma  - call the office if there is acute back pain, worsening weakness in the legs, difficulty with urination, may consider repeating imaging in [id number]    Intracranial atherosclerosis, increase risk of stroke  - continue with Plavix  - continue with atorvastatin    - follow-up in 6 months

## 2024-05-23 NOTE — PROGRESS NOTES
[facility]  Patient's Name: Geovanna Leal   Patient's : 1955   Visit Type: follow-up  Referring MD / PCP:  Leelee Garcia DO    Assessment:  Ms. Geovanna Leal is a 69 y.o. woman with episodic dizziness that is triggered by positional changes or if there is too much visual input (such as being in the grocery store and the walls of [employer]).  Episodic dizziness seems to last for a few seconds, but recur throughout the day, mostly when she is upright, getting up or walking.  Exam is notable for positive head thrust test (loss of visual fixation with correction saccade in both directions), negative supine roll and Beaufort-Hallpike test for nystagmus, but endorses dizziness when she sits up.  These findings suggest a peripheral vestibular pathology, not necessarily consistent with BPPV.  I recommend that she is seen by physical therapy for vestibular rehab but also a referral to ENT/neuro-otology evaluation (maybe there could be Meniere's disease).  In 2022, she had a SAH in the right calcarine fissure, probably associated with hypertensive emergency at the time.  She has an incidental meningioma in her thoracic spine.  The size of the meningioma had mild interval growth from  to , recent follow-up imaging of the thoracic spine shows a stable meningioma without cord compression.    In her last office visit, she had orthostatic hypotension, but after adjustment of her antihypertensive medications, the dizziness persisted.  She has not had a recurrence of her facial twitching.  She was found to have intracranial arterial atherosclerosis.  She had a SAH over the right parietal lobe (in 2022) possibly related to hypertension, CTA of head was not revealing of a vascular malformation.  She may continue with clopidogrel and atorvastatin for stroke prevention.   She has a stable thoracic meningioma since , no long tract signs.    Plan:   Paroxysmal dizziness  with positional changes but not consistent with BPPV, positive head thrust test suggest a peripheral vestibular pathology  - referral to ENT specialist (Dr. Daniel fellowship in neuro-otology); evaluation for Meniere's disease  - referral to physical therapy for vestibular rehab    Thoracic meningioma  - call the office if there is acute back pain, worsening weakness in the legs, difficulty with urination, may consider repeating imaging in [id number]    Intracranial atherosclerosis, increase risk of stroke  - continue with Plavix  - continue with atorvastatin    - follow-up in 6 months    Problem List Items Addressed This Visit          Cardiovascular and Mediastinum    Intracranial atherosclerosis    Orthostatic hypotension    Relevant Orders    Ambulatory Referral to Physical Therapy       Nervous and Auditory    Benign tumor of spinal meningioma (HCC)    Vestibular disorder, bilateral - Primary    Relevant Orders    Ambulatory Referral to Otolaryngology    Ambulatory Referral to Physical Therapy           Chief Complaint:   Chief Complaint   Patient presents with    Follow-up    Neurologic Problem     dizziness      HPI:    Geovanna Leal is a 69 y.o. right handed female here for follow-up evaluation of dizziness.      Interval History 5/23/2024  Since her last visit with me, her blood pressure medication was adjusted by discontinuing lorsartan.  She continues to get episodes of dizziness.  Mostly when she is grocery shopping, when she looks around she disoriented, anxiety (she is afraid that someone is looking at her about what is she doing), and she gets a burning feeling in her thighs.  She just takes a moment, close her eyes, deep breath and the symptoms go away.    There are no episodes of passing out or losing consciousness.  When she gets up from bed, she will get the same symptoms of feeling disoriented and burning in her legs.  She may get disoriented just walking around the house.  Symptoms last 2-5  minutes, she will have to sit down.    She has been tired and sleeping more.  She wakes up at 6:30AM, take care of her grandson, then she takes a nap at 1PM.  She sleep at 10:30PM and wake up at 6:30AM.  She sleeps through it.  She continues to feel tired during the day.    After she works out at [employer], she feels good.  But when she looks at the wall (excessive patterns she gets dizzy).    Her eyeglasses are updated with bifocals, she already had cataract surgery.    She had a sleep home study that was normal (no indication of obstructive sleep apnea).    She was put on Wellbutrin by her PCP for depression.  She has been on escitalopram for anxiety.    She mentions that she gets tinnitus but denies having a sensation of ear fullness.    Prior History:  Intake History 7/21/2022  For the past year Geovanna has noticed episodes of right facial twitching, sometimes it involves the eye, which last for about half an hour.  These are sometimes more present when she is tired or at the end of the day.  She would notice that the right eye would start twitching, sometimes there is pulling sensation over the right cheek to the point that the lip is pulled up, sometimes it is a facial spasm for a couple of minutes (twists her right face upwards and eye is forced closed).  There is no forced head turning or right hand jerking.  There is no facial numbness.   This happens about a couple of times a week.  They were becoming more frequent about a month ago.  These typically happen more in the evening.  It is not associated with stress but maybe more with when she feels tired at the end of the day.  She lost the sense of taste and smell when she had a cold about a year ago (not associated with COVID).  She continues to have lack of taste.  She lost hearing in her right ear about 4 years ago, she needed to get cortisone injections.  She continues to lose hearing in her right ear.  The last time she had hearing evaluation was 2  years ago.    She had a Chiari malformation, which affected one of her nerves for the tongue, so she has difficulty with chewing and swallowing.  She had a suboccipital decompression craniectomy in 1997.  She has chronic atrophy on the right side of her tongue.    She was previously on Tegretol for the Chiari but then she developed a neuropathy (she was not able to walk, she was sluggished, legs felt heavy).  She take gabapentin 800mg twice a day since her suboccipital decompression.      Interval History 11/10/2022  On 8/28/2022 she was admitted to [facility] for SAH, presented with sudden headache, CTA head found severe stenosis of the distal right cervical ICA segment, mild-moderate atheosclerotic plaque of the carotid bulbs, moderate-severe stenosis of the left vertebral artery.  Unknown etiology of SAH, maybe related to hypertensive emergency.  Follow-up CT head study showed resolution of right parasagittal parietal SAH. She was allowed to restart Plavix.  She recalled that she was coughing from pneumonia, on antibiotics and steroids, but coughing was very severe, then she developed worst headache of her life (felt head was ripping open).  There was no loss of consciousness, left sided weakness or numbness, or loss vision.  Since the last visit with me, she has not noticed right facial spasms, but she has been too busy to notice.  There may have been one facial spasm since the last visit.  These happen when she is driving or sitting watching television.  There have been no triggers.  She has been on gabapentin for her headaches and Chiari malformation, initially prescribed by Dr. Geovanna Woodward, in the early 2000.  She recalled being on Tegretol that caused her neuropathy symptoms.  When she stops the gabapentin she gets headaches and body ache.      Interval History 11/16/2023  She has not noticed recurrent right facial twitching, the last time she can remember it happening was about 10 months ago.   She has not  "noticed much in the way of headaches.  The last time she had to take a medication for a headache was about 6 weeks ago.  Prior to starting gabapentin, the headaches were severe (like the worse ice cream headache) headache that last a couple of hours, and nauseated.  Within a year she may get 2-3 of these severe headaches.    When she is shopping, she is looking around, turning her head, she gets lightheadness and dizziness.  The sensation of dizziness is not spinning or vertigo or sense of motion but more lightheaded.  It may take a few minutes for the sensation to go away.        Prior workup:  x  Imagin2023 - MRI thoracic spine w/wo  Normal signal within the thoracic cord  Enhancing elliptical intradural extra-medullary mass (meningioma) contiguous with the thecal sac at T11, no change from prior exam   Facet arthritic degenerative changes at T10-T11 in the right posterior aspect of the spinal cord, ligamentum flavum hypertrophy    10/16/2022 - MRI brain IAC w/wo  No CP angle mass or abnormal enhancement; bilateral 7th/8th nerves are unremarkable  MR brain unremarkable    2022 - MRI brain w/wo  Hyperintense T2/FLAIR signal in the right parieto-occipital region subarachnoid space of the calcarine fissure, no ischemia.    2022 - CTA head and neck  No saccular aneurysm  Severe stenosis of the distal right cervical ICA  Mild-moderate atherosclerotic plaques of both carotid bulbs (<50% stenosis)    7/15/2021 - MRI brain  Post-operative changes of suboccipital craniectomy for Chiari decompression    2021 - MRI thoracic spine  Normal signal in the thoracic cord  Thin elliptical intradural extra medullary mass contiguous within the thecal sac at T11, minor ventral displacement of the cord; likely a meningioma at T11    9/15/2015 - MRI brain  \"footprint of Chiari decompression\"    2014 - MRI cervical spine  Degenerative disc disease C5-6 with moderate central and moderate left more than right " "bilateral foraminal narrowing  Cerebellar tonsils are above the foramen magnum    1/26/2015 - MRI Thoracic spine  intradula mass at T11 vertebral level likely a meningioma, no cord compression or cord displacement    EEGs:  10/14/2022 Prolonged EEG study  Normal awake and drowsy    Labs:  Component      Latest Ref Rng 5/4/2024   Cholesterol      See Comment mg/dL 179    Triglycerides      See Comment mg/dL 289 (H)    HDL      >=50 mg/dL 56    LDL Calculated      0 - 100 mg/dL 65    Hemoglobin A1C      Normal 4.0-5.6%; PreDiabetic 5.7-6.4%; Diabetic >=6.5%; Glycemic control for adults with diabetes <7.0% % 6.0 (H)    eAG, EST AVG Glucose      mg/dl 126    TSH 3RD GENERATON      0.450 - 4.500 uIU/mL 2.660         General exam   /64 (BP Location: Left arm, Patient Position: Sitting, Cuff Size: Adult)   Pulse 64   Temp 97.8 °F (36.6 °C) (Temporal)   Resp 14   Ht 5' 5\" (1.651 m)   Wt 78.2 kg (172 lb 6.4 oz)   SpO2 95%   BMI 28.69 kg/m²    Appearance: normally developed, appears well  Carotids: not assessed  Cardiovascular:  RRR, no murmur  Pulmonary: clear to auscultation  Extremities: no edema    HEENT: moist mucus membranes / oral cavity   Fundoscopy: not assessed    Mental status  Orientation: alert and oriented to name, place, time  Fund of Knowledge: intact   Attention and Concentration: intact  Current and Remote Memory:intact  Language: spontaneous speech is normal and comprehension is intact    Cranial Nerves  CN 1: not tested  CN 2:  left pupil is larger than the right both are minimally reactive (since cataract surgery); left pupil is about 5mm and right pupil is 2mm;     CN 3, 4, 6: EOMI, no nystagmus  CN 5:sensation intact to all distribution V1, V2, V3  CN 7:muscles of facial expression are symmetric  CN 8: she has hearing aids  Head thrust test is positive in both directions with correction saccades (she has difficulty maintaining fixed position with passive head turns)  Elliot-Hallpike - negative " for nystagmus with her head down; when she sits back up she has a moment of dizziness (she denies vertigo but she endorses feeling disoriented)  Supine roll test - negative for nystagmus  She has more dizziness when she gets into the upright position.  CN 9, 10:no dysarthria present  CN 11:symmetric SCM with head turns  CN 12:not assessed    Motor:  Bulk, Tone: normal bulk, normal tone  Pronation: no pronator drift  Strength: Symmetric strength of the arms and legs, no lateralizing weakness    Abnormal movements: no abnormal movements are present    Sensory:  Lighttouch: intact in all limbs  Romberg: she feels wobbly but does not fall    Coordination:  FNF:FNF bilaterally intact  NEGAR:intact  FFM:intact  Gait/Station: when she stands up from a seated position, she has to take a moment to regain her balance, she reports that she has a sensation of dizziness, normal gait, and normal tandem gait    Reflexes:  Bilateral knees 1/4  Bilateral ankles 0/4    Past Medical/Surgical History:  Patient Active Problem List   Diagnosis    De Quervain's disease (tenosynovitis)    Essential hypertension    Sudden idiopathic hearing loss of right ear    Cervical stenosis of spinal canal    Plantar fasciitis    Lower back pain    IFG (impaired fasting glucose)    Dyslipidemia    Takotsubo cardiomyopathy    Carpal tunnel syndrome    Arnold-Chiari malformation (HCC)    Pancreatic cyst    COPD (chronic obstructive pulmonary disease) (HCC)    GERD (gastroesophageal reflux disease)    Hypotension    Stage 3a chronic kidney disease (HCC)    History of subarachnoid hemorrhage    Dysphagia    Carotid stenosis, bilateral    Intracranial atherosclerosis    Benign tumor of spinal meningioma (HCC)    Allergies    Hypersomnia, unspecified    Orthostatic hypotension    Mild episode of recurrent major depressive disorder (HCC)    Nodule of left lung    Vestibular disorder, bilateral       Past Surgical History:   Procedure Laterality Date    BREAST  BIOPSY Right 02/28/2017    US CORE BIOPSY--BENIGN    CARDIAC CATHETERIZATION      CATARACT EXTRACTION Bilateral 11/09/2023    CERVICAL DISCECTOMY      Spinal     CERVICAL LAMINECTOMY      C1 with chiari decompression     COLONOSCOPY      5 yrs - onset: May 31, 2011     CRANIOTOMY      INTRACAPSULAR CATARACT EXTRACTION Right 11/08/2023    INTRACAPSULAR CATARACT EXTRACTION Left 11/15/2023    POPLITEAL SYNOVIAL CYST EXCISION      TUBAL LIGATION      US GUIDED BREAST BIOPSY RIGHT COMPLETE Right 02/28/2017       Past Psychiatric History:  Depression: No  Anxiety: No  Psychosis: No    Medications:    Current Outpatient Medications:     albuterol (ProAir HFA) 90 mcg/act inhaler, Inhale 2 puffs every 4 (four) hours as needed for wheezing or shortness of breath, Disp: 8.5 g, Rfl: 2    atorvastatin (LIPITOR) 80 mg tablet, Take 1 tablet by mouth once daily, Disp: 90 tablet, Rfl: 0    buPROPion (Wellbutrin XL) 150 mg 24 hr tablet, Take 1 tablet (150 mg total) by mouth daily, Disp: 30 tablet, Rfl: 2    calcium carbonate (OS-FLASH) 600 MG tablet, Take 600 mg by mouth 2 (two) times a day with meals, Disp: , Rfl:     carvedilol (COREG) 12.5 mg tablet, Take 1 tablet by mouth twice daily, Disp: 180 tablet, Rfl: 1    cholecalciferol (VITAMIN D3) 1,000 units tablet, Take 1,000 Units by mouth daily, Disp: , Rfl:     clopidogrel (PLAVIX) 75 mg tablet, Take 1 tablet by mouth once daily, Disp: 90 tablet, Rfl: 0    escitalopram (LEXAPRO) 20 mg tablet, Take 1 tablet (20 mg total) by mouth daily, Disp: , Rfl:     gabapentin (NEURONTIN) 400 mg capsule, Take 2 capsules (800 mg total) by mouth 2 (two) times a day Am and HS, Disp: 360 capsule, Rfl: 1    isosorbide mononitrate (IMDUR) 30 mg 24 hr tablet, Take 1 tablet by mouth once daily, Disp: 90 tablet, Rfl: 1    pantoprazole (PROTONIX) 40 mg tablet, TAKE 1 TABLET BY MOUTH ONCE DAILY BEFORE BREAKFAST, Disp: 90 tablet, Rfl: 0    Trelegy Ellipta 100-62.5-25 MCG/ACT inhaler, INHALE 1 PUFF ONCE DAILY  RINSE MOUTH AFTER USE, Disp: 60 each, Rfl: 5    Allergies:  Allergies   Allergen Reactions    Codeine Itching    Medical Tape Rash       Family history:  Family History   Problem Relation Age of Onset    Stroke Mother     Other Father         blood clot in vein & CABG     Heart disease Father     Prostate cancer Father     Hearing loss Father     Hypertension Father     No Known Problems Sister     No Known Problems Sister     No Known Problems Daughter     No Known Problems Daughter     Breast cancer Maternal Grandmother     No Known Problems Maternal Grandfather     No Known Problems Paternal Grandmother     No Known Problems Paternal Grandfather     Alcohol abuse Brother     Throat cancer Brother     Cancer Brother         Throat tongue    Hearing loss Brother     Kidney failure Brother         kidney failure d/t NSIADs on dialysis    Hearing loss Brother     Crohn's disease Maternal Aunt     No Known Problems Maternal Aunt     No Known Problems Maternal Aunt     Colon cancer Paternal Aunt     No Known Problems Paternal Aunt     No Known Problems Paternal Aunt     No Known Problems Paternal Aunt     Colon cancer Paternal Aunt     Cancer Paternal Aunt         Colon cancer     There is no family history of seizure, epilepsy or developmental delay.      Social History  Living situation:  Lives with her   Work:  Retired manager at [employer], completed high school  Driving:  Yes   reports that she quit smoking about 16 years ago. Her smoking use included cigarettes. She started smoking about 55 years ago. She has a 58.5 pack-year smoking history. She has never used smokeless tobacco. She reports current alcohol use of about 7.0 standard drinks of alcohol per week. She reports that she does not use drugs. one can of beer a day.    Review of Systems  A review of at least 12 organ/systems was obtained by the medical assistant and reviewed by me, including additional positives/negatives:  Neurological:   Positive for dizziness and light-headedness.       The total amount of time spent with the patient along with pre-chart and post-chart preparation was 47 minutes on the calendar day of the date of service.  This included history taking, physical exam, review of ancillary testing, counseling provided to the patient regarding diagnosis, medications, treatment, and risk management, and other communication to the patient's providers and/or family.  Start time: 9:40AM  End time: 10:27AM

## 2024-05-28 DIAGNOSIS — I25.10 CORONARY ARTERY DISEASE INVOLVING NATIVE CORONARY ARTERY OF NATIVE HEART WITHOUT ANGINA PECTORIS: ICD-10-CM

## 2024-05-29 RX ORDER — CLOPIDOGREL BISULFATE 75 MG/1
75 TABLET ORAL DAILY
Qty: 90 TABLET | Refills: 0 | Status: SHIPPED | OUTPATIENT
Start: 2024-05-29

## 2024-06-04 ENCOUNTER — APPOINTMENT (OUTPATIENT)
Dept: LAB | Facility: HOSPITAL | Age: 69
End: 2024-06-04
Payer: MEDICARE

## 2024-06-04 DIAGNOSIS — D72.819 LEUKOPENIA, UNSPECIFIED TYPE: ICD-10-CM

## 2024-06-04 DIAGNOSIS — D75.89 MACROCYTOSIS WITHOUT ANEMIA: Primary | ICD-10-CM

## 2024-06-04 DIAGNOSIS — N18.31 CHRONIC KIDNEY DISEASE, STAGE 3A (HCC): ICD-10-CM

## 2024-06-04 DIAGNOSIS — D75.89 MACROCYTOSIS WITHOUT ANEMIA: ICD-10-CM

## 2024-06-04 DIAGNOSIS — Z79.899 OTHER LONG TERM (CURRENT) DRUG THERAPY: ICD-10-CM

## 2024-06-04 DIAGNOSIS — G25.81 RESTLESS LEG: ICD-10-CM

## 2024-06-04 LAB
BASOPHILS # BLD AUTO: 0.01 THOUSANDS/ÂΜL (ref 0–0.1)
BASOPHILS NFR BLD AUTO: 0 % (ref 0–1)
EOSINOPHIL # BLD AUTO: 0.05 THOUSAND/ÂΜL (ref 0–0.61)
EOSINOPHIL NFR BLD AUTO: 1 % (ref 0–6)
ERYTHROCYTE [DISTWIDTH] IN BLOOD BY AUTOMATED COUNT: 15.3 % (ref 11.6–15.1)
FERRITIN SERPL-MCNC: 36 NG/ML (ref 11–307)
FOLATE SERPL-MCNC: 6.2 NG/ML
HCT VFR BLD AUTO: 40.6 % (ref 34.8–46.1)
HGB BLD-MCNC: 13.5 G/DL (ref 11.5–15.4)
IMM GRANULOCYTES # BLD AUTO: 0.01 THOUSAND/UL (ref 0–0.2)
IMM GRANULOCYTES NFR BLD AUTO: 0 % (ref 0–2)
IRON SATN MFR SERPL: 30 % (ref 15–50)
IRON SERPL-MCNC: 115 UG/DL (ref 50–212)
LYMPHOCYTES # BLD AUTO: 1.2 THOUSANDS/ÂΜL (ref 0.6–4.47)
LYMPHOCYTES NFR BLD AUTO: 30 % (ref 14–44)
MCH RBC QN AUTO: 33.3 PG (ref 26.8–34.3)
MCHC RBC AUTO-ENTMCNC: 33.3 G/DL (ref 31.4–37.4)
MCV RBC AUTO: 100 FL (ref 82–98)
MONOCYTES # BLD AUTO: 0.49 THOUSAND/ÂΜL (ref 0.17–1.22)
MONOCYTES NFR BLD AUTO: 12 % (ref 4–12)
NEUTROPHILS # BLD AUTO: 2.24 THOUSANDS/ÂΜL (ref 1.85–7.62)
NEUTS SEG NFR BLD AUTO: 57 % (ref 43–75)
NRBC BLD AUTO-RTO: 0 /100 WBCS
PLATELET # BLD AUTO: 264 THOUSANDS/UL (ref 149–390)
PMV BLD AUTO: 9.1 FL (ref 8.9–12.7)
RBC # BLD AUTO: 4.06 MILLION/UL (ref 3.81–5.12)
TIBC SERPL-MCNC: 389 UG/DL (ref 250–450)
UIBC SERPL-MCNC: 274 UG/DL (ref 155–355)
VIT B12 SERPL-MCNC: 192 PG/ML (ref 180–914)
WBC # BLD AUTO: 4 THOUSAND/UL (ref 4.31–10.16)

## 2024-06-04 PROCEDURE — 83540 ASSAY OF IRON: CPT

## 2024-06-04 PROCEDURE — 82728 ASSAY OF FERRITIN: CPT

## 2024-06-04 PROCEDURE — 83550 IRON BINDING TEST: CPT

## 2024-06-04 PROCEDURE — 36415 COLL VENOUS BLD VENIPUNCTURE: CPT

## 2024-06-04 RX ORDER — LANOLIN ALCOHOL/MO/W.PET/CERES
1000 CREAM (GRAM) TOPICAL DAILY
Start: 2024-06-04

## 2024-06-06 DIAGNOSIS — G89.4 CHRONIC PAIN SYNDROME: ICD-10-CM

## 2024-06-07 RX ORDER — GABAPENTIN 400 MG/1
CAPSULE ORAL
Qty: 360 CAPSULE | Refills: 2 | Status: SHIPPED | OUTPATIENT
Start: 2024-06-07

## 2024-06-10 ENCOUNTER — EVALUATION (OUTPATIENT)
Facility: CLINIC | Age: 69
End: 2024-06-10
Payer: MEDICARE

## 2024-06-10 DIAGNOSIS — I95.1 ORTHOSTATIC HYPOTENSION: ICD-10-CM

## 2024-06-10 DIAGNOSIS — H81.93 VESTIBULAR DISORDER, BILATERAL: ICD-10-CM

## 2024-06-10 PROCEDURE — 97163 PT EVAL HIGH COMPLEX 45 MIN: CPT

## 2024-06-10 NOTE — PROGRESS NOTES
PT Evaluation     Today's date: 6/10/2024  Patient name: Geovanna Leal  : 1955  MRN: [id number]  Referring provider: Aicha Jason MD  Dx:   Encounter Diagnosis     ICD-10-CM    1. Vestibular disorder, bilateral  H81.93 Ambulatory Referral to Physical Therapy      2. Orthostatic hypotension  I95.1 Ambulatory Referral to Physical Therapy          Start Time: 1521  Stop Time: 1607  Total time in clinic (min): 46 minutes    Assessment  Impairments: abnormal gait, abnormal or restricted ROM, abnormal movement, activity intolerance, impaired balance, impaired physical strength, lacks appropriate home exercise program, safety issue and weight-bearing intolerance    Assessment details: Patient is a 61 y.o. year old female presenting to OPPT s/p diagnosis of Dizziness.  Patient is experiencing dizziness with ADLs and at risk for falls due to her dizziness.DHI shows moderate severity of dizziness, 44/100. Displays bilateral + head thrust with corrective saccade. FGA shows they are at risk for falls scoring, 20/30 with difficulty with head movement and reducing visual cues. Poor sensory and vestibular integration as measured by mCTSIB, completing condition 4 less than 30 seconds. Given written HEP with focus on static balance in corner and VORx1. She will benefit from skilled PT interventions to maximize return to PLOF and independence as able.  Thank you for this referral and the ability to participate in the care of this patient.       Prognosis: good    Goals  In 4 weeks, patient will:  1.  Demonstrate independence with initial balance HEP  2.  Improve mCTSIB condition 4 to at least 20 to demonstrate improved sensory re-weighting.    In 4-10 weeks, patient will:  1.  Report improved dizziness in busy environments  2.  Demonstrate consistent carryover with HEP and walking program.  3.  Improve gait speed by at least 0.20m/s to meet MCID value for vestibular hypofunction.  4.  Improve FGA by at least 4 points to  show improved dynamic balance.  5.  Improve DHI by at least 16 points to meet MCID value for people with vestibular disorders          Plan  Patient would benefit from: skilled physical therapy    Planned therapy interventions: neuromuscular re-education, manual therapy, patient education, home exercise program, therapeutic exercise, therapeutic activities and gait training    Frequency: 2x week  Duration in weeks: 8  Plan of Care beginning date: 6/10/2024  Plan of Care expiration date: 8/9/2024  Treatment plan discussed with: patient  Plan details: Reach out to neurologist regarding double vision in [place]      Subjective Evaluation    History of Present Illness  Mechanism of injury: 6/10 PT: Coming to PT for dizziness. Was recently at her [employer] party. Commotion was too much, felt like she was going to passout. When she was looking up/looking with her eyes will make her feel like she was going to pass out. Can't look out windows of elevated buildings.     Will have to center herself while using the grocery cart as she may feels this dizziness coming on in the grcery store.     When cleaning at home or drinking water with her head tilted back she gets dizzy.  Feels like she will faint but has never actually fainted.     Neurology: Interval History 5/23/2024  Since her last visit with me, her blood pressure medication was adjusted by discontinuing lorsartan.  She continues to get episodes of dizziness.  Mostly when she is grocery shopping, when she looks around she disoriented, anxiety (she is afraid that someone is looking at her about what is she doing), and she gets a burning feeling in her thighs.  She just takes a moment, close her eyes, deep breath and the symptoms go away.    There are no episodes of passing out or losing consciousness.  When she gets up from bed, she will get the same symptoms of feeling disoriented and burning in her legs.  She may get disoriented just walking around the  house.  Symptoms last 2-5 minutes, she will have to sit down.    She has been tired and sleeping more.  She wakes up at 6:30AM, take care of her grandson, then she takes a nap at 1PM.  She sleep at 10:30PM and wake up at 6:30AM.  She sleeps through it.  She continues to feel tired during the day.    After she works out at [employer], she feels good.  But when she looks at the wall (excessive patterns she gets dizzy).    Her eyeglasses are updated with bifocals, she already had cataract surgery.    She had a sleep home study that was normal (no indication of obstructive sleep apnea).    She was put on Wellbutrin by her PCP for depression.  She has been on escitalopram for anxiety.    She mentions that she gets tinnitus but denies having a sensation of ear fullness.    Patient Goals  Patient goals for therapy: improved balance and independence with ADLs/IADLs    Pain  No pain reported      Diagnostic Tests  MRI studies: abnormal    FCE comments: 1.  No IAC or CP angle pathology.     2.  Unremarkable MRI of the brain.     3.  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  Treatments  No previous or current treatments      Objective        Dysequilibrium: No  Lightheadedness: Yes  Vertigo: Yes  Rocking or Swaying: No         Oscillopsia: No  Diplopia: No  Motion sickness: No  Floating, Swimming, Disconnected: No    Exacerbation Factors:  Bending over: Yes  Turning Head: Yes  Rolling in bed: No  Walking: No  Looking up: Yes  Supine to/from sitting: No  Optokinetic movement: Yes  Walking in busy environment: Yes    Duration of Symptoms: 2-3min will center herself.     Concurrent Complaints:  Tinnitus:Yes  Aural Fullness:No  Known hearing loss:Yes - woke up one morning and couldn't hear out of R hear  Nausea, Vomiting: No  Altered Vision: No  Poor Concentration: No  Memory Loss: No  Peripheral Neuropathy:Yes  Cervical Pain: No   Headache: No      PHYSICAL  FINDINGS:  Oculomotor ROM :  Resting nystagmus: No  Gaze holding nystagmus No   Smooth pursuit Normal - Inc dizziness    Vertical Saccades:Normal - inc dizziness  Horizontal Saccades:Normal  Convergence: Abnormal    Cover/Uncover/Crosscover Test: Normal    Head thrust (room light): Abnormal when head turned fast to the right & left      MCTSIB  30 eyes open firm surface   30 eyes closed form surface  30 eyes open foam surface  6 eyes closed foam surface      DHI:   44/100      Positional testing: Right Left   [place] - Endorses double vision unchanging -   Roll test:       FGA: 20/30    Cervical ROM:  Flexion: WNL  Extension: WNL; however, dizziness with maintained position rpeorts she feels like she may passout (?)  Right rotation: WNL  Left rotation: WNL  Right lateral flexion:  Left lateral flexion:             Precautions: Hx of chiari malformation sx, Hx of SAH, t/s meningioma  Re-eval Date:  7/10/2024    Date 6/10       Visit Count 1       FOTO See IE       Pain In See IE       Pain Out                Testing        Dixhallpike/sidelying (-) BL / R DH reports of double vision       Roll test        DVA        FGA 20       DHI 44       Neuro Re-Ed        VORx1        Static balance        Dynamic balance                                        Ther Ex                                                                        Ther Activity                        Gait Training                        Modalities         prn

## 2024-06-13 ENCOUNTER — OFFICE VISIT (OUTPATIENT)
Facility: CLINIC | Age: 69
End: 2024-06-13
Payer: MEDICARE

## 2024-06-13 DIAGNOSIS — J18.9 COMMUNITY ACQUIRED PNEUMONIA OF RIGHT UPPER LOBE OF LUNG: ICD-10-CM

## 2024-06-13 DIAGNOSIS — H81.93 VESTIBULAR DISORDER, BILATERAL: Primary | ICD-10-CM

## 2024-06-13 DIAGNOSIS — I95.1 ORTHOSTATIC HYPOTENSION: ICD-10-CM

## 2024-06-13 PROCEDURE — 97112 NEUROMUSCULAR REEDUCATION: CPT

## 2024-06-13 NOTE — PROGRESS NOTES
"Daily Note     Today's date: 2024  Patient name: Geovanna Leal  : 1955  MRN: [id number]  Referring provider: Aicha Jason MD  Dx:   Encounter Diagnosis     ICD-10-CM    1. Vestibular disorder, bilateral  H81.93       2. Orthostatic hypotension  I95.1           Start Time: 1630  Stop Time: 1710  Total time in clinic (min): 40 minutes    Subjective: Geovanna feels balance exercise on HEP is easy, VORx1 does not make her that dizzy. Reports visual aura occasional \"non parels\" which may precede dizziness. Sometimes will get one sided headache.      Objective: See treatment diary below      Assessment:  More reproduction of dizziness with faster head movement or stronger visual stimuli. Tolerates small increases of dizziness throughout session fairly. Instructed on grounding techniques which she report bring down residual dizziness slightly. Occasional stepping strategy needed during session to regain balance. Path deviation and alterations in gait during walking trials. Mild-mod sway with compliant surface with head movement. Instructed on inc difficulty of HEP, pt verbalized agreement with modifications. Patient would benefit from continued PT      Plan: Continue per plan of care. Progress head movement pace. Visual stim.     Precautions: Hx of chiari malformation sx, Hx of SAH, t/s meningioma  Re-eval Date:  7/10/2024    Date 6/10 6/13      Visit Count 1 2      FOTO See IE       Pain In See IE       Pain Out                Testing        Dixhallpike/sidelying (-) BL / R DH reports of double vision       Roll test        DVA        FGA 20       DHI 44       Neuro Re-Ed        VORx1  45\" x2 horiz near    20ft x4 laps horiz CGA      Static balance  Cone  foam x5    Foam HT EO 30\" x4, 30\" x2 HN foam EO    Weight shifting a/p x20     Foam FA number wall 8 min total      Dynamic balance                                 HEP  Inc VORx1 duration, foam surface in corner      Ther Ex                      "                                                   Ther Activity                          Gait Training                        Modalities         prn

## 2024-06-14 RX ORDER — FLUTICASONE FUROATE, UMECLIDINIUM BROMIDE AND VILANTEROL TRIFENATATE 100; 62.5; 25 UG/1; UG/1; UG/1
1 POWDER RESPIRATORY (INHALATION) DAILY
Qty: 60 EACH | Refills: 5 | Status: SHIPPED | OUTPATIENT
Start: 2024-06-14

## 2024-06-16 DIAGNOSIS — E78.5 DYSLIPIDEMIA: ICD-10-CM

## 2024-06-16 RX ORDER — ATORVASTATIN CALCIUM 80 MG/1
TABLET, FILM COATED ORAL
Qty: 90 TABLET | Refills: 1 | Status: SHIPPED | OUTPATIENT
Start: 2024-06-16

## 2024-06-18 ENCOUNTER — OFFICE VISIT (OUTPATIENT)
Facility: CLINIC | Age: 69
End: 2024-06-18
Payer: MEDICARE

## 2024-06-18 DIAGNOSIS — H81.93 VESTIBULAR DISORDER, BILATERAL: Primary | ICD-10-CM

## 2024-06-18 DIAGNOSIS — I95.1 ORTHOSTATIC HYPOTENSION: ICD-10-CM

## 2024-06-18 PROCEDURE — 97112 NEUROMUSCULAR REEDUCATION: CPT

## 2024-06-18 NOTE — PROGRESS NOTES
"Daily Note     Today's date: 2024  Patient name: Geovanna Leal  : 1955  MRN: [id number]  Referring provider: Aicha Jason MD  Dx:   Encounter Diagnosis     ICD-10-CM    1. Vestibular disorder, bilateral  H81.93       2. Orthostatic hypotension  I95.1           Start Time: 1500  Stop Time: 1541  Total time in clinic (min): 41 minutes    Subjective: Had ENT appt. She feels went very well. MRI in July, VNG if needed.      Objective: See treatment diary below      Assessment:  Pt reports different sensation when distracted vs not distracted during balance training. Slowed gait with head movement and occasional path deviation. Seems to have more instability after stopping and requires UE support to re-stabilize. Discussed increasing duration of VORx1, pt in agreement. Patient would benefit from continued PT      Plan: Continue per plan of care. Dual task spot it. Head movement with viewing targets.     Precautions: Hx of chiari malformation sx, Hx of SAH, t/s meningioma  Re-eval Date:  7/10/2024    Date 6/10 6/13 6/18     Visit Count 1 2 3     FOTO See IE       Pain In See IE       Pain Out                Testing        Dixhallpike/sidelying (-) BL / R DH reports of double vision       Roll test        DVA        FGA 20       DHI 44       Neuro Re-Ed        VORx1  45\" x2 horiz near    20ft x4 laps horiz CGA 45\" x2 h/v near firm plain     Static balance  Cone  foam x5    Foam HT EO 30\" x4, 30\" x2 HN foam EO    Weight shifting a/p x20     Foam FA number wall 8 min total Mod tandem spot it HT 6min total Foam    Gait HT diagonal 20ft x4    Bwd stepping HT 20ft x4    Physioball bounce w HT 30\" x3     Dynamic balance                                 HEP  Inc VORx1 duration, foam surface in corner      Ther Ex                                                                        Ther Activity                          Gait Training                        Modalities         prn                          "

## 2024-06-20 ENCOUNTER — OFFICE VISIT (OUTPATIENT)
Facility: CLINIC | Age: 69
End: 2024-06-20
Payer: MEDICARE

## 2024-06-20 DIAGNOSIS — H81.93 VESTIBULAR DISORDER, BILATERAL: Primary | ICD-10-CM

## 2024-06-20 DIAGNOSIS — I95.1 ORTHOSTATIC HYPOTENSION: ICD-10-CM

## 2024-06-20 PROCEDURE — 97112 NEUROMUSCULAR REEDUCATION: CPT

## 2024-06-20 NOTE — PROGRESS NOTES
"Daily Note     Today's date: 2024  Patient name: Geovanna Leal  : 1955  MRN: [id number]  Referring provider: Aicha Jason MD  Dx:   Encounter Diagnosis     ICD-10-CM    1. Vestibular disorder, bilateral  H81.93       2. Orthostatic hypotension  I95.1           Start Time: 1500  Stop Time: 1540  Total time in clinic (min): 40 minutes    Subjective: Geovanna reports nothing new. Has been doing her exercises, going well making her slightly dizzy.       Objective: See treatment diary below      Assessment:  Pt reports inc symptoms with visuovestibular interaction such as use of blaze pods and busy environment training. Trialed optokinetic with some reproduction of symptoms, tolerated fairly for short duration with slight path deviation. Seems to respond well to somatosensory feedback to lessen symptoms and recenter herself. Patient would benefit from continued PT      Plan: Continue per plan of care. Dual task spot it. Head movement with viewing targets.     Precautions: Hx of chiari malformation sx, Hx of SAH, t/s meningioma  Re-eval Date:  7/10/2024    Date 6/10 6/13 6/18 6/20    Visit Count 1 2 3 4    FOTO See IE       Pain In See IE       Pain Out                Testing        Dixhallpike/sidelying (-) BL / R DH reports of double vision       Roll test        DVA        FGA 20       DHI 44       Neuro Re-Ed        VORx1  45\" x2 horiz near    20ft x4 laps horiz CGA 45\" x2 h/v near firm plain 45\" x2 h/v near foam plain    Gait ortho side 20ft x6    Static balance  Cone  foam x5    Foam HT EO 30\" x4, 30\" x2 HN foam EO    Weight shifting a/p x20     Foam FA number wall 8 min total Mod tandem spot it HT 6min total Foam    Gait HT diagonal 20ft x4    Bwd stepping HT 20ft x4    Physioball bounce w HT 30\" x3 A/p weight shift firm x20    Dynamic balance    Bwd stepping HT 20ft x4     Watching [employer] video 3min      Visuovestibular stimulation    Blaze pods x3 2 on mirror 2 on stool L/R cog task " SD 22 on foam                    HEP  Inc VORx1 duration, foam surface in corner      Ther Ex                                                                        Ther Activity                          Gait Training                        Modalities         prn

## 2024-06-22 ENCOUNTER — HOSPITAL ENCOUNTER (OUTPATIENT)
Dept: CT IMAGING | Facility: HOSPITAL | Age: 69
Discharge: HOME | End: 2024-06-22
Payer: MEDICARE

## 2024-06-22 DIAGNOSIS — R91.1 NODULE OF LEFT LUNG: ICD-10-CM

## 2024-06-22 PROCEDURE — 71250 CT THORAX DX C-: CPT

## 2024-06-24 ENCOUNTER — OFFICE VISIT (OUTPATIENT)
Dept: FAMILY MEDICINE CLINIC | Facility: HOSPITAL | Age: 69
End: 2024-06-24
Payer: MEDICARE

## 2024-06-24 VITALS
BODY MASS INDEX: 28.12 KG/M2 | DIASTOLIC BLOOD PRESSURE: 82 MMHG | HEIGHT: 65 IN | WEIGHT: 168.8 LBS | SYSTOLIC BLOOD PRESSURE: 140 MMHG | OXYGEN SATURATION: 96 % | HEART RATE: 54 BPM | TEMPERATURE: 96.5 F

## 2024-06-24 DIAGNOSIS — H81.93 VESTIBULAR DISORDER, BILATERAL: ICD-10-CM

## 2024-06-24 DIAGNOSIS — F33.0 MILD EPISODE OF RECURRENT MAJOR DEPRESSIVE DISORDER (HCC): Primary | ICD-10-CM

## 2024-06-24 DIAGNOSIS — J44.9 CHRONIC OBSTRUCTIVE PULMONARY DISEASE, UNSPECIFIED COPD TYPE (HCC): ICD-10-CM

## 2024-06-24 DIAGNOSIS — E53.8 DEFICIENCY OF VITAMIN B12: ICD-10-CM

## 2024-06-24 PROCEDURE — G2211 COMPLEX E/M VISIT ADD ON: HCPCS | Performed by: INTERNAL MEDICINE

## 2024-06-24 PROCEDURE — 99214 OFFICE O/P EST MOD 30 MIN: CPT | Performed by: INTERNAL MEDICINE

## 2024-06-24 RX ORDER — BUPROPION HYDROCHLORIDE 300 MG/1
300 TABLET ORAL DAILY
Qty: 90 TABLET | Refills: 0 | Status: SHIPPED | OUTPATIENT
Start: 2024-06-24

## 2024-06-24 RX ORDER — FLUTICASONE FUROATE, UMECLIDINIUM BROMIDE AND VILANTEROL TRIFENATATE 100; 62.5; 25 UG/1; UG/1; UG/1
1 POWDER RESPIRATORY (INHALATION) DAILY
Qty: 90 EACH | Refills: 2 | Status: SHIPPED | OUTPATIENT
Start: 2024-06-24

## 2024-06-24 RX ORDER — FLUTICASONE FUROATE, UMECLIDINIUM BROMIDE AND VILANTEROL TRIFENATATE 100; 62.5; 25 UG/1; UG/1; UG/1
1 POWDER RESPIRATORY (INHALATION) DAILY
Qty: 90 EACH | Refills: 2 | Status: SHIPPED | OUTPATIENT
Start: 2024-06-24 | End: 2024-06-24 | Stop reason: SDUPTHER

## 2024-06-24 NOTE — ASSESSMENT & PLAN NOTE
Mood still a bit up and down but feels WBXL has helped - will increase from 150 mg to 300 mg, con't current Lexapro,  d/w pt that it takes 4-6 wks to get maximum benefit of med and that med has to be taken every day and to not miss doses of med, call with SE/new/worse mood/SI, re-eval in 6 wk or sooner if needed

## 2024-06-24 NOTE — ASSESSMENT & PLAN NOTE
Saw both ENT and Neuro - started vestibular therapy w/benefit, has MRI brain ICA scheduled, has f/u with specialists, call with new/worse symptoms

## 2024-06-24 NOTE — PROGRESS NOTES
Ambulatory Visit  Name: Geovanna Leal      : 1955      MRN: [id number]  Encounter Provider: Leelee Garcia DO  Encounter Date: 2024   Encounter department: [facility] [address]     Assessment & Plan   1. Mild episode of recurrent major depressive disorder (HCC)  Assessment & Plan:  Mood still a bit up and down but feels WBXL has helped - will increase from 150 mg to 300 mg, con't current Lexapro,  d/w pt that it takes 4-6 wks to get maximum benefit of med and that med has to be taken every day and to not miss doses of med, call with SE/new/worse mood/SI, re-eval in 6 wk or sooner if needed    Orders:  -     buPROPion (Wellbutrin XL) 300 mg 24 hr tablet; Take 1 tablet (300 mg total) by mouth daily  2. Deficiency of vitamin B12  Assessment & Plan:  Started B12 1000 mcg PO q day, recheck B12 levels with BW in Nov - order given, will follow  Orders:  -     Vitamin B12; Future; Expected date: 2024  3. Vestibular disorder, bilateral  Assessment & Plan:  Saw both ENT and Neuro - started vestibular therapy w/benefit, has MRI brain ICA scheduled, has f/u with specialists, call with new/worse symptoms   4. Chronic obstructive pulmonary disease, unspecified COPD type (HCC)  Assessment & Plan:  Trelegy refilled upon request, call with Pulm concerns   Orders:  -     fluticasone-umeclidinium-vilanterol (Trelegy Ellipta) 100-62.5-25 mcg/actuation inhaler; Inhale 1 puff daily Rinse mouth after use     Colonsocopy  - 5 yrs    Mammo  - scheduled for     Dexa  - osteopenia    CUS  - 2 yrs    CT chest  - reading pending    BW  - has order for       History of Present Illness     HPI Pt here for follow up appt and BW results    Last visit in May pt was noting issues with mood. We subsequently started her WBXL 150 mg 1 tab PO q day.  She was told to con't her Lexapro. She is here for a mood/med check.  She is taking the WBXL as directed w/o significant  "SE. She feels the medication has helped.  She has a lot of health stressors with her spouse having a stroke and having to do a lot of different PT appts.  She notes sleep is up and down.     BW results were d/w pt in detail: B12 low nml, folate nml, iron/ferritin low nml.  She was advised to start B12 OTC 1000 mcg once daily.  She has been taking rx daily w/o SE.      Pt saw Neuro (Dr. Jason) in May for f/u vertigo - OV note reviewed.  Was felt her continued dizziness was c/w BPPV/peripheral vestibular pathology.  She was referred to vestibular therapy and ENT.  She was told to con't her Plavix/statin.  She was told to f/u in 6mos.  She has been doing vestibular therapy and feels it has helped.  She saw ENT  6/17/24 - OV note reviewed.  MRI brain andn IAC were ordered.        Review of Systems   Constitutional:  Negative for chills and fever.   HENT:  Positive for hearing loss. Negative for congestion.    Eyes:  Negative for pain and visual disturbance.   Respiratory:  Negative for cough and shortness of breath.    Cardiovascular:  Negative for chest pain, palpitations and leg swelling.   Gastrointestinal:  Negative for abdominal pain, constipation, diarrhea, nausea and vomiting.   Skin:  Negative for rash and wound.   Neurological:  Positive for dizziness. Negative for headaches.   Hematological:  Does not bruise/bleed easily.   Psychiatric/Behavioral:  Positive for dysphoric mood and sleep disturbance. Negative for confusion. The patient is nervous/anxious.        Objective     /82   Pulse (!) 54   Temp (!) 96.5 °F (35.8 °C)   Ht 5' 5\" (1.651 m)   Wt 76.6 kg (168 lb 12.8 oz)   SpO2 96%   BMI 28.09 kg/m²     Physical Exam  Vitals and nursing note reviewed.   Constitutional:       General: She is not in acute distress.     Appearance: She is not ill-appearing.   HENT:      Head: Normocephalic and atraumatic.   Eyes:      General:         Right eye: No discharge.         Left eye: No discharge.      " Conjunctiva/sclera: Conjunctivae normal.   Pulmonary:      Effort: Pulmonary effort is normal. No respiratory distress.   Skin:     Coloration: Skin is not pale.      Findings: No rash.   Neurological:      Gait: Gait normal.   Psychiatric:         Behavior: Behavior normal.         Thought Content: Thought content normal.         Judgment: Judgment normal.       Administrative Statements

## 2024-06-28 ENCOUNTER — APPOINTMENT (OUTPATIENT)
Facility: CLINIC | Age: 69
End: 2024-06-28
Payer: MEDICARE

## 2024-06-30 DIAGNOSIS — R13.10 DYSPHAGIA, UNSPECIFIED TYPE: Chronic | ICD-10-CM

## 2024-06-30 RX ORDER — PANTOPRAZOLE SODIUM 40 MG/1
TABLET, DELAYED RELEASE ORAL
Qty: 90 TABLET | Refills: 1 | Status: SHIPPED | OUTPATIENT
Start: 2024-06-30

## 2024-07-02 ENCOUNTER — OFFICE VISIT (OUTPATIENT)
Facility: CLINIC | Age: 69
End: 2024-07-02
Payer: MEDICARE

## 2024-07-02 DIAGNOSIS — H81.93 VESTIBULAR DISORDER, BILATERAL: Primary | ICD-10-CM

## 2024-07-02 DIAGNOSIS — I95.1 ORTHOSTATIC HYPOTENSION: ICD-10-CM

## 2024-07-02 PROCEDURE — 97112 NEUROMUSCULAR REEDUCATION: CPT

## 2024-07-02 NOTE — PROGRESS NOTES
"Daily Note     Today's date: 2024  Patient name: Geovanna Leal  : 1955  MRN: [id number]  Referring provider: Aicha Jason MD  Dx:   Encounter Diagnosis     ICD-10-CM    1. Vestibular disorder, bilateral  H81.93       2. Orthostatic hypotension  I95.1           Start Time: 1545  Stop Time: 1630  Total time in clinic (min): 45 minutes    Subjective: Geovanna [place] and fell from a divet, but was okay and not injured. Was challenging to her balance by the creek.       Objective: See treatment diary below      Assessment:  Geovanna shows improved tolerance to VORx1 and improved balance on compliant surfaces. Improving stability during walking trials as well with better gait pacing and less path deviation. Occasional UE support needed when feeling more dizzy from exercises but no loses of balance. Patient would benefit from continued PT      Plan: Continue per plan of care. Dual task spot it. Head movement with viewing targets.     Precautions: Hx of chiari malformation sx, Hx of SAH, t/s meningioma  Re-eval Date:  7/10/2024    Date 6/10 6/13 6/18 6/20 7/2   Visit Count 1 2 3 4 5   FOTO See IE       Pain In See IE       Pain Out                Testing        Dixhallpike/sidelying (-) BL / R DH reports of double vision       Roll test        DVA        FGA 20       DHI 44       Neuro Re-Ed        VORx1  45\" x2 horiz near    20ft x4 laps horiz CGA 45\" x2 h/v near firm plain 45\" x2 h/v near foam plain    Gait ortho side 20ft x6 60\" x2 h/v near foam plain    Gait 20ft x6     Static balance  Cone  foam x5    Foam HT EO 30\" x4, 30\" x2 HN foam EO    Weight shifting a/p x20     Foam FA number wall 8 min total Mod tandem spot it HT 6min total Foam    Gait HT diagonal 20ft x4    Bwd stepping HT 20ft x4    Physioball bounce w HT 30\" x3 A/p weight shift firm x20 Gait HT diagonal 20ft x6    Bwd stepping HT 20ft x4    7lb wilman HT/HN FT 60\" x1   Dynamic balance    Bwd " stepping HT 20ft x4     Watching grocery store video 3min  Lat step foam beam 6 laps    Watching grocery store video 4min     Visuovestibular stimulation    Blaze pods x3 2 on mirror 2 on stool L/R cog task NV 22 on foam                    HEP  Inc VORx1 duration, foam surface in corner      Ther Ex                                                                        Ther Activity                          Gait Training                        Modalities         prn

## 2024-07-05 ENCOUNTER — OFFICE VISIT (OUTPATIENT)
Facility: CLINIC | Age: 69
End: 2024-07-05
Payer: MEDICARE

## 2024-07-05 DIAGNOSIS — I95.1 ORTHOSTATIC HYPOTENSION: ICD-10-CM

## 2024-07-05 DIAGNOSIS — H81.93 VESTIBULAR DISORDER, BILATERAL: Primary | ICD-10-CM

## 2024-07-05 PROCEDURE — 97112 NEUROMUSCULAR REEDUCATION: CPT

## 2024-07-05 NOTE — PROGRESS NOTES
"Daily Note     Today's date: 2024  Patient name: Geovanna Leal  : 1955  MRN: [id number]  Referring provider: Aicha Jason MD  Dx:   Encounter Diagnosis     ICD-10-CM    1. Vestibular disorder, bilateral  H81.93       2. Orthostatic hypotension  I95.1           Start Time: 1400  Stop Time: 1440  Total time in clinic (min): 40 minutes    Subjective: Geovanna reports no change in dizziness.       Objective: See treatment diary below      Assessment:  Discussed VORx1 with busier background at home, pt in agreement. Showing overall better tolerance to balance exercises with minimal path deviation and sway. Some dizziness produced with VORc, as progressed less stoppages in gait. Continue to trial weighted holds for otolith training.  Patient would benefit from continued PT      Plan: Continue per plan of care. Dual task spot it. Head movement with viewing targets.     Precautions: Hx of chiari malformation sx, Hx of SAH, t/s meningioma  Re-eval Date:  7/10/2024    Date        Visit Count 6       FOTO See IE       Pain In See IE       Pain Out                Testing        Dixhallpike/sidelying (-) BL / R DH reports of double vision       Roll test        DVA        FGA 20       DHI 44       Neuro Re-Ed        VORx1 20\" x3 h/v near firm busy bg     Gait 20ft x6       Static balance 8lb wilman EO Foam FA 30\" x1, EC 60\" x1       Dynamic balance Lat step foam beam 8 laps, fwd 8 laps    Gait diagonal HT 20ft x6    HT tandem gait 20ft x6        Visuovestibular stimulation VORc 20ft x6 ball toss up/down                       HEP        Ther Ex                                                                        Ther Activity                        Gait Training                        Modalities                                   "

## 2024-07-09 ENCOUNTER — APPOINTMENT (OUTPATIENT)
Facility: CLINIC | Age: 69
End: 2024-07-09
Payer: MEDICARE

## 2024-07-11 ENCOUNTER — OFFICE VISIT (OUTPATIENT)
Facility: CLINIC | Age: 69
End: 2024-07-11
Payer: MEDICARE

## 2024-07-11 DIAGNOSIS — I95.1 ORTHOSTATIC HYPOTENSION: ICD-10-CM

## 2024-07-11 DIAGNOSIS — H81.93 VESTIBULAR DISORDER, BILATERAL: Primary | ICD-10-CM

## 2024-07-11 PROCEDURE — 97112 NEUROMUSCULAR REEDUCATION: CPT

## 2024-07-11 NOTE — PROGRESS NOTES
PT Re-Evaluation /progress update    Today's date: 2024  Patient name: Geovanna Leal  : 1955  MRN: [id number]  Referring provider: Aicha Jason MD  Dx:   Encounter Diagnosis     ICD-10-CM    1. Vestibular disorder, bilateral  H81.93       2. Orthostatic hypotension  I95.1             Start Time: 1457  Stop Time: 1535  Total time in clinic (min): 38 minutes    Assessment  Impairments: abnormal gait, abnormal or restricted ROM, abnormal movement, activity intolerance, impaired balance, impaired physical strength, lacks appropriate home exercise program, safety issue and weight-bearing intolerance    Assessment details: Patient is a 61 y.o. year old female presenting to OPPT s/p diagnosis of Dizziness.  Geovanna reports improvement in her dizziness symptoms, however, they have not resolved. Her symptoms are only triggered with busy environments or strong visual stimuli such as grocery stores or her grandson's video games. She reports better completion of grocery shopping recently. FGA and mCTSIB reflect improved vestibular input for balance. FGA places her above fall risk, . She still demonstrates dizziness that impacts her overall function. She will benefit from continued PT to address her dizziness and meet her goals. Continue per plan of care.     Prognosis: good    Goals  In 4 weeks, patient will:  1.  Demonstrate independence with initial balance HEP - met  2.  Improve mCTSIB condition 4 to at least 20 to demonstrate improved sensory re-weighting. - met    In 4-10 weeks, patient will:  1.  Report improved dizziness in busy environments - progressing  2.  Demonstrate consistent carryover with HEP and walking program.  3.  Improve gait speed by at least 0.20m/s to meet MCID value for vestibular hypofunction.  4.  Improve FGA by at least 4 points to show improved dynamic balance. - met  5.  Improve DHI by at least 16 points to meet MCID value for people with vestibular  disorders          Plan  Patient would benefit from: skilled physical therapy    Planned therapy interventions: neuromuscular re-education, manual therapy, patient education, home exercise program, therapeutic exercise, therapeutic activities and gait training    Frequency: 2x week  Duration in weeks: 8  Plan of Care beginning date: 6/10/2024  Plan of Care expiration date: 8/9/2024  Treatment plan discussed with: patient  Plan details: Reach out to neurologist regarding double vision in R maureen-hallpike        Subjective Evaluation    History of Present Illness  Mechanism of injury: 7/11: Dizziness is less especially in the grocery store. Flashing lights busy environments.    Triggered events now. When she is with her grandson him playing video games makes things worse. But overall she feels PT has been very beneficial.    6/10 PT: Coming to PT for dizziness. Was recently at her [employer] party. Commotion was too much, felt like she was going to passout. When she was looking up/looking with her eyes will make her feel like she was going to pass out. Can't look out windows of elevated buildings.     Will have to center herself while using the grocery cart as she may feels this dizziness coming on in the grcery store.     When cleaning at home or drinking water with her head tilted back she gets dizzy.  Feels like she will faint but has never actually fainted.     Neurology: Interval History 5/23/2024  Since her last visit with me, her blood pressure medication was adjusted by discontinuing lorsartan.  She continues to get episodes of dizziness.  Mostly when she is grocery shopping, when she looks around she disoriented, anxiety (she is afraid that someone is looking at her about what is she doing), and she gets a burning feeling in her thighs.  She just takes a moment, close her eyes, deep breath and the symptoms go away.    There are no episodes of passing out or losing consciousness.  When she gets up from bed,  she will get the same symptoms of feeling disoriented and burning in her legs.  She may get disoriented just walking around the house.  Symptoms last 2-5 minutes, she will have to sit down.    She has been tired and sleeping more.  She wakes up at 6:30AM, take care of her grandson, then she takes a nap at 1PM.  She sleep at 10:30PM and wake up at 6:30AM.  She sleeps through it.  She continues to feel tired during the day.    After she works out at [employer], she feels good.  But when she looks at the wall (excessive patterns she gets dizzy).    Her eyeglasses are updated with bifocals, she already had cataract surgery.    She had a sleep home study that was normal (no indication of obstructive sleep apnea).    She was put on Wellbutrin by her PCP for depression.  She has been on escitalopram for anxiety.    She mentions that she gets tinnitus but denies having a sensation of ear fullness.    Patient Goals  Patient goals for therapy: improved balance and independence with ADLs/IADLs    Pain  No pain reported      Diagnostic Tests  MRI studies: abnormal    FCE comments: 1.  No IAC or CP angle pathology.     2.  Unremarkable MRI of the brain.     3.  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  Treatments  No previous or current treatments        Objective        Dysequilibrium: No  Lightheadedness: Yes  Vertigo: Yes  Rocking or Swaying: No         Oscillopsia: No  Diplopia: No  Motion sickness: No  Floating, Swimming, Disconnected: No    Exacerbation Factors:  Bending over: Yes  Turning Head: Yes  Rolling in bed: No  Walking: No  Looking up: Yes  Supine to/from sitting: No  Optokinetic movement: Yes  Walking in busy environment: Yes    Duration of Symptoms: 2-3min will center herself.     Concurrent Complaints:  Tinnitus:Yes  Aural Fullness:No  Known hearing loss:Yes - woke up one morning and couldn't hear out of R hear  Nausea, Vomiting: No  Altered Vision: No  Poor  "Concentration: No  Memory Loss: No  Peripheral Neuropathy:Yes  Cervical Pain: No   Headache: No      PHYSICAL FINDINGS:  Oculomotor ROM :  Resting nystagmus: No  Gaze holding nystagmus No   Smooth pursuit Normal - Inc dizziness    Vertical Saccades:Normal - inc dizziness  Horizontal Saccades:Normal  Convergence: Abnormal    Cover/Uncover/Crosscover Test: Normal    Head thrust (room light): Abnormal when head turned fast to the right & left      MCTSIB  30 eyes open firm surface   30 eyes closed form surface  30 eyes open foam surface  30 eyes closed foam surface      DHI:   44/100      Positional testing: Right Left   [place] - Endorses double vision unchanging -   Roll test:       FGA: 24/30    Cervical ROM:  Flexion: WNL  Extension: WNL; however, dizziness with maintained position rpeorts she feels like she may passout (?)  Right rotation: WNL  Left rotation: WNL  Right lateral flexion:  Left lateral flexion:             Precautions: Hx of chiari malformation sx, Hx of SAH, t/s meningioma  Re-eval Date:  8/10/2024    Date 7/5 7/11      Visit Count 6 7      FOTO See IE       Pain In See IE       Pain Out                Testing        Dixhallpike/sidelying (-) BL / R DH reports of double vision       Roll test        DVA        FGA 24       DHI 44       Neuro Re-Ed        VORx1 20\" x3 h/v near firm busy bg     Gait 20ft x6 Gait 20ft x6      Static balance 8lb wilman EO Foam FA 30\" x1, EC 60\" x1 Firm 8lb wilman EO HT 30\" x2      Dynamic balance Lat step foam beam 8 laps, fwd 8 laps    Gait diagonal HT 20ft x6    HT tandem gait 20ft x6 Dual foam beams med hurdles 10 laps CGA       Visuovestibular stimulation VORc 20ft x6 ball toss up/down 2min x2 optokinetic videos on foam    VORc 20ft x4 ball toss up/down fwd/bwd                      HEP        Ther Ex                                                                        Ther Activity                        Gait Training                        Modalities             "

## 2024-07-13 ENCOUNTER — HOSPITAL ENCOUNTER (OUTPATIENT)
Dept: MRI IMAGING | Facility: HOSPITAL | Age: 69
Discharge: HOME | End: 2024-07-13
Payer: MEDICARE

## 2024-07-13 DIAGNOSIS — R42 DIZZINESS: ICD-10-CM

## 2024-07-13 DIAGNOSIS — D32.1 SPINAL MENINGIOMA (HCC): ICD-10-CM

## 2024-07-13 DIAGNOSIS — R26.89 IMBALANCE: ICD-10-CM

## 2024-07-13 DIAGNOSIS — H81.93 VESTIBULAR DISORDER, BILATERAL: ICD-10-CM

## 2024-07-13 PROCEDURE — A9585 GADOBUTROL INJECTION: HCPCS | Performed by: PHYSICIAN ASSISTANT

## 2024-07-13 PROCEDURE — 70553 MRI BRAIN STEM W/O & W/DYE: CPT

## 2024-07-13 RX ORDER — GADOBUTROL 604.72 MG/ML
7.5 INJECTION INTRAVENOUS
Status: COMPLETED | OUTPATIENT
Start: 2024-07-13 | End: 2024-07-13

## 2024-07-13 RX ADMIN — GADOBUTROL 7.5 ML: 604.72 INJECTION INTRAVENOUS at 15:07

## 2024-07-16 ENCOUNTER — HOSPITAL ENCOUNTER (OUTPATIENT)
Dept: MAMMOGRAPHY | Facility: CLINIC | Age: 69
Discharge: HOME | End: 2024-07-16
Payer: MEDICARE

## 2024-07-16 ENCOUNTER — APPOINTMENT (OUTPATIENT)
Facility: CLINIC | Age: 69
End: 2024-07-16
Payer: MEDICARE

## 2024-07-16 VITALS — HEIGHT: 65 IN | WEIGHT: 168 LBS | BODY MASS INDEX: 27.99 KG/M2

## 2024-07-16 DIAGNOSIS — Z12.31 ENCOUNTER FOR SCREENING MAMMOGRAM FOR MALIGNANT NEOPLASM OF BREAST: ICD-10-CM

## 2024-07-16 PROCEDURE — 77063 BREAST TOMOSYNTHESIS BI: CPT

## 2024-07-16 PROCEDURE — 77067 SCR MAMMO BI INCL CAD: CPT

## 2024-07-18 ENCOUNTER — OFFICE VISIT (OUTPATIENT)
Facility: CLINIC | Age: 69
End: 2024-07-18
Payer: MEDICARE

## 2024-07-18 DIAGNOSIS — H81.93 VESTIBULAR DISORDER, BILATERAL: Primary | ICD-10-CM

## 2024-07-18 DIAGNOSIS — I95.1 ORTHOSTATIC HYPOTENSION: ICD-10-CM

## 2024-07-18 PROCEDURE — 97112 NEUROMUSCULAR REEDUCATION: CPT

## 2024-07-18 NOTE — PROGRESS NOTES
"Daily Note     Today's date: 2024  Patient name: Geovanna Leal  : 1955  MRN: [id number]  Referring provider: Aicha Jason MD  Dx:   Encounter Diagnosis     ICD-10-CM    1. Vestibular disorder, bilateral  H81.93       2. Orthostatic hypotension  I95.1           Start Time: 1505  Stop Time: 1538  Total time in clinic (min): 33 minutes    Subjective: Geovanna reports her dizziness is still better. Still triggered by things like the grocery store, patterned carpet, or her grandson's video games.      Objective: See treatment diary below      Assessment: Shows some instability with optokinetic stimulation but able to remain upright. Busier backgrounds such as the rug and lucian bg for VOR increase her dizziness, takes a little time to resolve. When taking her time she shows better stability on foam surfaces, minimal sway with internal perturbations like ball toss. Instructed to keep HEP as is progress with duration of VOR as she is able, maintain freq 3x/day. Patient would benefit from continued PT      Plan: Continue per plan of care. VORx1. Busy bg training. Stability compliant surfaces. Optokinetics.     Precautions: Hx of chiari malformation sx, Hx of SAH, t/s meningioma  Re-eval Date:  8/10/2024    Date      Visit Count 6 7 8     FOTO See IE       Pain In See IE       Pain Out                Testing        Dixhallpike/sidelying (-) BL / R DH reports of double vision       Roll test        DVA        FGA 24       DHI 44       Neuro Re-Ed        VORx1 20\" x3 h/v near firm busy bg     Gait 20ft x6 Gait 20ft x6 20-30\" x1 near/far h/v firm busy bg    20ft x6 gait      Static balance 8lb wilman EO Foam FA 30\" x1, EC 60\" x1 Firm 8lb wilman EO HT 30\" x2 Foam 8lb wilman EO HT/HN30\" x2, EC 30\" x1    Foam ball toss w cog task x30     Dynamic balance Lat step foam beam 8 laps, fwd 8 laps    Gait diagonal HT 20ft x6    HT tandem gait 20ft x6 Dual foam beams med hurdles 10 laps CGA Foam beam lat step cone " tap 8 laps      Visuovestibular stimulation VORc 20ft x6 ball toss up/down 2min x2 optokinetic videos on foam    VORc 20ft x4 ball toss up/down fwd/bwd 2min x1 optokinetic video on foam                     HEP        Ther Ex                                                                        Ther Activity                        Gait Training                        Modalities

## 2024-07-30 ENCOUNTER — APPOINTMENT (OUTPATIENT)
Facility: CLINIC | Age: 69
End: 2024-07-30
Payer: MEDICARE

## 2024-08-01 ENCOUNTER — OFFICE VISIT (OUTPATIENT)
Facility: CLINIC | Age: 69
End: 2024-08-01
Payer: MEDICARE

## 2024-08-01 DIAGNOSIS — H81.93 VESTIBULAR DISORDER, BILATERAL: Primary | ICD-10-CM

## 2024-08-01 DIAGNOSIS — I95.1 ORTHOSTATIC HYPOTENSION: ICD-10-CM

## 2024-08-01 PROCEDURE — 97112 NEUROMUSCULAR REEDUCATION: CPT

## 2024-08-01 NOTE — PROGRESS NOTES
"Daily Note     Today's date: 2024  Patient name: Geovanna Leal  : 1955  MRN: [id number]  Referring provider: Aicha Jason MD  Dx:   Encounter Diagnosis     ICD-10-CM    1. Vestibular disorder, bilateral  H81.93       2. Orthostatic hypotension  I95.1           Start Time: 1630  Stop Time: 1715  Total time in clinic (min): 45 minutes    Subjective: Geovanna reports 1 episode of dizziness today, watching her grandson on the steps this was likely due to something height related. Grocery store has been pretty good.      Objective: See treatment diary below      Assessment: Continues to display visible sway with optokinetics, but seems to tolerate for longer duration without large spike in symptoms. Fwd stepping on foam beam with a few errors but more so with cognitive motor dual tasking. Eyes closed tasks with some sway and errors, more so dynamic than static. Discussed HEP with optokinetic videos 2-3min a day daily, pt in agreement. Patient would benefit from continued PT      Plan: Continue per plan of care. VORx1. Busy bg training. Stability compliant surfaces. Optokinetics.     Precautions: Hx of chiari malformation sx, Hx of SAH, t/s meningioma  Re-eval Date:  8/10/2024    Date     Visit Count 6 7 8 9    FOTO See IE       Pain In See IE       Pain Out                Testing        Dixhallpike/sidelying (-) BL / R DH reports of double vision       Roll test        DVA        FGA 24       DHI 44       Neuro Re-Ed        VORx1 20\" x3 h/v near firm busy bg     Gait 20ft x6 Gait 20ft x6 20-30\" x1 near/far h/v firm busy bg    20ft x6 gait  20ft x6 gait    Static balance 8lb wilman EO Foam FA 30\" x1, EC 60\" x1 Firm 8lb wilman EO HT 30\" x2 Foam 8lb wilman EO HT/HN30\" x2, EC 30\" x1    Foam ball toss w cog task x30 Foam 8lb wilman EO HT/HN 30\" x2 ea    Dynamic balance Lat step foam beam 8 laps, fwd 8 laps    Gait diagonal HT 20ft x6    HT tandem gait 20ft x6 Dual foam beams med hurdles 10 laps CGA Foam " beam lat step cone tap 8 laps Foam beam lat step EC 2 laps, foam beam fwd 8 laps, trialed cog motor dual task     Visuovestibular stimulation VORc 20ft x6 ball toss up/down 2min x2 optokinetic videos on foam    VORc 20ft x4 ball toss up/down fwd/bwd 2min x1 optokinetic video on foam 2min x1, 4min x1 optokinetic video on foam                    HEP        Ther Ex                                                                        Ther Activity                        Gait Training                        Modalities

## 2024-08-05 ENCOUNTER — OFFICE VISIT (OUTPATIENT)
Facility: CLINIC | Age: 69
End: 2024-08-05
Payer: MEDICARE

## 2024-08-05 DIAGNOSIS — H81.93 VESTIBULAR DISORDER, BILATERAL: Primary | ICD-10-CM

## 2024-08-05 DIAGNOSIS — I95.1 ORTHOSTATIC HYPOTENSION: ICD-10-CM

## 2024-08-05 PROCEDURE — 97112 NEUROMUSCULAR REEDUCATION: CPT

## 2024-08-05 NOTE — PROGRESS NOTES
"Daily Note     Today's date: 2024  Patient name: Geovanna Leal  : 1955  MRN: [id number]  Referring provider: Aicha Jason MD  Dx:   Encounter Diagnosis     ICD-10-CM    1. Vestibular disorder, bilateral  H81.93       2. Orthostatic hypotension  I95.1           Start Time: 1150  Stop Time: 1221  Total time in clinic (min): 31 minutes    Subjective: Geovanna states she was busy this weekend but did not notice too much dizziness.      Objective: See treatment diary below      Assessment: Turns iwht optokinetics seem to reproduce dizziness greater than overground turns/head movement, which she reports is still a big improvement. Overall shows improving tolerance to vestibular stimulation with less path deviation and continuation of gait. VORc or visual stimulation seems to provoke her symptoms the most. Patient would benefit from continued PT to improve dizziness.      Plan: Potential dc nv.     Precautions: Hx of chiari malformation sx, Hx of SAH, t/s meningioma  Re-eval Date:  8/10/2024    Date    Visit Count 6 7 8 9 10   FOTO See IE       Pain In See IE       Pain Out                Testing        Dixhallpike/sidelying (-) BL / R DH reports of double vision       Roll test        DVA        FGA 24       DHI 44       Neuro Re-Ed        VORx1 20\" x3 h/v near firm busy bg     Gait 20ft x6 Gait 20ft x6 20-30\" x1 near/far h/v firm busy bg    20ft x6 gait  20ft x6 gait 20ft x8 gait    30\" x2 near h firm busy bg   Static balance 8lb wilman EO Foam FA 30\" x1, EC 60\" x1 Firm 8lb wilman EO HT 30\" x2 Foam 8lb wilman EO HT/HN30\" x2, EC 30\" x1    Foam ball toss w cog task x30 Foam 8lb wilman EO HT/HN 30\" x2 ea Foam 8lb wilman spot it 5min, EC 30\" x2    VORC 10\" x2 firm FT   Dynamic balance Lat step foam beam 8 laps, fwd 8 laps    Gait diagonal HT 20ft x6    HT tandem gait 20ft x6 Dual foam beams med hurdles 10 laps CGA Foam beam lat step cone tap 8 laps Foam beam lat step EC 2 laps, foam beam fwd 8 laps, " trialed cog motor dual task 360* spin 20ft x8 laps    Foam beam fwd cog motor dual task 4 laps, 4 laps with ball hold no cog task    Tandem gait HT 20ft x6 laps    Bwd stepping HT 20ft x6 laps    Visuovestibular stimulation VORc 20ft x6 ball toss up/down 2min x2 optokinetic videos on foam    VORc 20ft x4 ball toss up/down fwd/bwd 2min x1 optokinetic video on foam 2min x1, 4min x1 optokinetic video on foam 2.5min optokinetic video on foam                   HEP        Ther Ex                                                                        Ther Activity                        Gait Training                        Modalities

## 2024-08-06 ENCOUNTER — APPOINTMENT (OUTPATIENT)
Facility: CLINIC | Age: 69
End: 2024-08-06
Payer: MEDICARE

## 2024-08-06 ENCOUNTER — OFFICE VISIT (OUTPATIENT)
Dept: FAMILY MEDICINE CLINIC | Facility: HOSPITAL | Age: 69
End: 2024-08-06
Payer: MEDICARE

## 2024-08-06 VITALS
TEMPERATURE: 97.4 F | WEIGHT: 166.2 LBS | DIASTOLIC BLOOD PRESSURE: 78 MMHG | HEART RATE: 66 BPM | SYSTOLIC BLOOD PRESSURE: 138 MMHG | BODY MASS INDEX: 27.69 KG/M2 | OXYGEN SATURATION: 95 % | HEIGHT: 65 IN

## 2024-08-06 DIAGNOSIS — R41.3 MEMORY IMPAIRMENT: ICD-10-CM

## 2024-08-06 DIAGNOSIS — H81.93 VESTIBULAR DISORDER, BILATERAL: ICD-10-CM

## 2024-08-06 DIAGNOSIS — F33.0 MILD EPISODE OF RECURRENT MAJOR DEPRESSIVE DISORDER (HCC): Primary | ICD-10-CM

## 2024-08-06 DIAGNOSIS — R93.89 ABNORMAL CT OF THE CHEST: ICD-10-CM

## 2024-08-06 DIAGNOSIS — Q07.00 ARNOLD-CHIARI MALFORMATION (HCC): Chronic | ICD-10-CM

## 2024-08-06 PROCEDURE — 99214 OFFICE O/P EST MOD 30 MIN: CPT | Performed by: INTERNAL MEDICINE

## 2024-08-06 PROCEDURE — G2211 COMPLEX E/M VISIT ADD ON: HCPCS | Performed by: INTERNAL MEDICINE

## 2024-08-06 NOTE — ASSESSMENT & PLAN NOTE
"Noting worsening memory issues and \"humming in ear\" with increase in dose, will wean off WBXL and monitor on just Lexapro, having some sleep issues so T/C Trazodone or Remeron if mood and sleep not better, re-eval in 3mos off the WBXL, call with new/worse mood/SI/worsening memory  "

## 2024-08-06 NOTE — ASSESSMENT & PLAN NOTE
MRI brain and IAC reviewed - no acute abnormality noted, doing well with vestibular therapy, has f/u with Neuro, call with new/worse symptoms/falls

## 2024-08-06 NOTE — PROGRESS NOTES
"Ambulatory Visit  Name: Geovanna Leal      : 1955      MRN: [id number]  Encounter Provider: Leelee Garcia DO  Encounter Date: 2024   Encounter department: [facility] [id number]     Assessment & Plan   1. Mild episode of recurrent major depressive disorder (HCC)  Assessment & Plan:  Noting worsening memory issues and \"humming in ear\" with increase in dose, will wean off WBXL and monitor on just Lexapro, having some sleep issues so T/C Trazodone or Remeron if mood and sleep not better, re-eval in 3mos off the WBXL, call with new/worse mood/SI/worsening memory  2. Vestibular disorder, bilateral  Assessment & Plan:  MRI brain and IAC reviewed - no acute abnormality noted, doing well with vestibular therapy, has f/u with Neuro, call with new/worse symptoms/falls  3. Arnold-Chiari malformation (HCC)  Assessment & Plan:  MRI brain reviewed - s/p craniectomy,  no acute abnormality ntoed  4. Abnormal CT of the chest  Comments:  Repeat CT chest reviewed - return to annual CT chest lung CA screening if indicated, reminded she is due for f/u with Pulm, call with resp symptoms  5. Memory impairment  Comments:  Pt feels this is related to increase in WBXL - will wean off rx, con't current Lexapro, just had MRI brain, re-eval in 3mos-if still w/memory issues will do MMSE, has had B12/folate/TSH checked recently - started on B12 supplement and has f/u BW in Nov       Colonoscopy  - 5 yrs     Mammo      Dexa  - osteopenia    CUS  - 2 yrs    CT chest  - annually      BW  - has order for       History of Present Illness     HPI Pt here for follow up appt    Last visit in  pt was noting mood was still a bit up and down and we subsequently increased her WBXL from 150 mg to 300 mg daily.  She was told to con't her Lexapro. She is here for a mood/med check.  She has been taking the higher dose of WBXL since last visit w/her Lexapro. She notes an increase in the " "humming in her ears and feels her memory is a bit worse. She note mood is still down and sad at times.  She notes issues with sleep a few times a week.      Pt had her MRI brain and IAC in mid July for f/u vestibular disorder - results reviewed : No cerebellopontine angle mass or abnormal enhancement. Stable hemosiderin deposition at the right posterior parietal cortex, sequela of prior subarachnoid hemorrhage. Status post inferior occipital craniectomy for Chiari I malformation. Pt has been doing well in vestibular therapy.  She has f/u with Neuro (Dr. Jason) in Dec. She has been doing well with PT.  She notes no red flag stroke/TIA/seizure symptoms.     CT chest results from June were reviewed with pt in detail today: Stable scarring in the periphery of bilateral upper lobes. Recommend resuming annual lung screening with low-dose CT.  Pt was notified of results briefly over the phone.  She notes no chronic cough/wheezing/SOB.  She remains on Trelegy but reports no recent use of rescue inhaler. She last saw Pulrita Licona) in Nov 24.       Review of Systems   Constitutional:  Negative for chills and fever.   Eyes:  Negative for pain and visual disturbance.   Respiratory:  Negative for cough, shortness of breath and wheezing.    Cardiovascular:  Negative for chest pain and palpitations.   Gastrointestinal:  Negative for abdominal pain and nausea.   Skin:  Negative for rash and wound.   Neurological:  Negative for dizziness, seizures, speech difficulty, weakness, light-headedness, numbness and headaches.   Hematological:  Does not bruise/bleed easily.   Psychiatric/Behavioral:  Positive for confusion and dysphoric mood. Negative for suicidal ideas.        Objective     /78   Pulse 66   Temp (!) 97.4 °F (36.3 °C)   Ht 5' 5\" (1.651 m)   Wt 75.4 kg (166 lb 3.2 oz)   SpO2 95%   BMI 27.66 kg/m²     Physical Exam  Vitals and nursing note reviewed.   Constitutional:       General: She is not in acute distress.     " Appearance: She is not ill-appearing.   HENT:      Head: Normocephalic and atraumatic.   Eyes:      General:         Right eye: No discharge.         Left eye: No discharge.      Conjunctiva/sclera: Conjunctivae normal.   Pulmonary:      Effort: Pulmonary effort is normal. No respiratory distress.   Skin:     Coloration: Skin is not pale.      Findings: No rash.   Psychiatric:         Behavior: Behavior normal.         Thought Content: Thought content normal.         Judgment: Judgment normal.       Administrative Statements

## 2024-08-08 ENCOUNTER — OFFICE VISIT (OUTPATIENT)
Facility: CLINIC | Age: 69
End: 2024-08-08
Payer: MEDICARE

## 2024-08-08 DIAGNOSIS — H81.93 VESTIBULAR DISORDER, BILATERAL: Primary | ICD-10-CM

## 2024-08-08 DIAGNOSIS — I95.1 ORTHOSTATIC HYPOTENSION: ICD-10-CM

## 2024-08-08 PROCEDURE — 97112 NEUROMUSCULAR REEDUCATION: CPT

## 2024-08-08 NOTE — PROGRESS NOTES
PT Discharge    Today's date: 2024  Patient name: Geovanna Leal  : 1955  MRN: [id number]  Referring provider: Aicha Jason MD  Dx:   Encounter Diagnosis     ICD-10-CM    1. Vestibular disorder, bilateral  H81.93       2. Orthostatic hypotension  I95.1               Start Time: 1630  Stop Time: 1650  Total time in clinic (min): 20 minutes    Assessment    Assessment details: Patient is a 61 y.o. year old female presenting to OPPT s/p diagnosis of Dizziness.  Geovanna reports good improvement in her symptoms regarding grocery stores and functional activities. She exhibits large drop in her DHI from 44/10 (moderate disability) to 12/100 (Mild disability). FGA also demonstrate good improvement 20/30 > 27/30 over course of PT. Instructed patient on when to continue with vestibular HEP, return as needed for dizziness. Pt demonstrates good understanding. Pt and PT mutually agree to dc from PT.    Goals  In 4 weeks, patient will:  1.  Demonstrate independence with initial balance HEP - met  2.  Improve mCTSIB condition 4 to at least 20 to demonstrate improved sensory re-weighting. - met    In 4-10 weeks, patient will:  1.  Report improved dizziness in busy environments - met  2.  Demonstrate consistent carryover with HEP and walking program. - met  3.  Improve gait speed by at least 0.20m/s to meet MCID value for vestibular hypofunction. - NT  4.  Improve FGA by at least 4 points to show improved dynamic balance. - met  5.  Improve DHI by at least 16 points to meet MCID value for people with vestibular disorders - met            Subjective Evaluation    History of Present Illness  Mechanism of injury: : Geovanna reports good improvement in her symptoms of dizziness. Feels PT has been helpful reach her goals and lessen her symptoms in grocery stores and during functional activities.    She does feel she still gets symptoms when watching her sons video games but this was not a big concern of hers.    :  Dizziness is less especially in the grocery store. Flashing lights busy environments.    Triggered events now. When she is with her grandson him playing video games makes things worse. But overall she feels PT has been very beneficial.    6/10 PT: Coming to PT for dizziness. Was recently at her [employer] party. Commotion was too much, felt like she was going to passout. When she was looking up/looking with her eyes will make her feel like she was going to pass out. Can't look out windows of elevated buildings.     Will have to center herself while using the grocery cart as she may feels this dizziness coming on in the grcery store.     When cleaning at home or drinking water with her head tilted back she gets dizzy.  Feels like she will faint but has never actually fainted.     Neurology: Interval History 5/23/2024  Since her last visit with me, her blood pressure medication was adjusted by discontinuing lorsartan.  She continues to get episodes of dizziness.  Mostly when she is grocery shopping, when she looks around she disoriented, anxiety (she is afraid that someone is looking at her about what is she doing), and she gets a burning feeling in her thighs.  She just takes a moment, close her eyes, deep breath and the symptoms go away.    There are no episodes of passing out or losing consciousness.  When she gets up from bed, she will get the same symptoms of feeling disoriented and burning in her legs.  She may get disoriented just walking around the house.  Symptoms last 2-5 minutes, she will have to sit down.    She has been tired and sleeping more.  She wakes up at 6:30AM, take care of her grandson, then she takes a nap at 1PM.  She sleep at 10:30PM and wake up at 6:30AM.  She sleeps through it.  She continues to feel tired during the day.    After she works out at [employer], she feels good.  But when she looks at the wall (excessive patterns she gets dizzy).    Her eyeglasses are updated with  bifocals, she already had cataract surgery.    She had a sleep home study that was normal (no indication of obstructive sleep apnea).    She was put on Wellbutrin by her PCP for depression.  She has been on escitalopram for anxiety.    She mentions that she gets tinnitus but denies having a sensation of ear fullness.    Patient Goals  Patient goals for therapy: improved balance and independence with ADLs/IADLs    Pain  No pain reported      Diagnostic Tests  MRI studies: abnormal    FCE comments: 1.  No IAC or CP angle pathology.     2.  Unremarkable MRI of the brain.     3.  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  Treatments  No previous or current treatments      Objective        Dysequilibrium: No  Lightheadedness: Yes  Vertigo: Yes  Rocking or Swaying: No         Oscillopsia: No  Diplopia: No  Motion sickness: No  Floating, Swimming, Disconnected: No    Exacerbation Factors:  Bending over: Yes  Turning Head: Yes  Rolling in bed: No  Walking: No  Looking up: Yes  Supine to/from sitting: No  Optokinetic movement: Yes  Walking in busy environment: Yes    Duration of Symptoms: 2-3min will center herself.     Concurrent Complaints:  Tinnitus:Yes  Aural Fullness:No  Known hearing loss:Yes - woke up one morning and couldn't hear out of R hear  Nausea, Vomiting: No  Altered Vision: No  Poor Concentration: No  Memory Loss: No  Peripheral Neuropathy:Yes  Cervical Pain: No   Headache: No      PHYSICAL FINDINGS:  Oculomotor ROM :  Resting nystagmus: No  Gaze holding nystagmus No   Smooth pursuit Normal - Inc dizziness    Vertical Saccades:Normal - inc dizziness  Horizontal Saccades:Normal  Convergence: Abnormal    Cover/Uncover/Crosscover Test: Normal    Head thrust (room light): Abnormal when head turned fast to the right & left      MCTSIB  30 eyes open firm surface   30 eyes closed form surface  30 eyes open foam surface  30 eyes closed foam surface      DHI:    12/100      Positional testing: Right Left   [place] - Endorses double vision unchanging -   Roll test:       FGA: 27/30    Cervical ROM:  Flexion: WNL  Extension: WNL; however, dizziness with maintained position rpeorts she feels like she may passout (?)  Right rotation: WNL  Left rotation: WNL  Right lateral flexion:  Left lateral flexion:             Precautions: Hx of chiari malformation sx, Hx of SAH, t/s meningioma  Re-eval Date:  8/10/2024    Date 8/8       Visit Count 11       FOTO See IE       Pain In See IE       Pain Out                Testing        Dixhallpike/sidelying (-) BL / R DH reports of double vision       Roll test        DVA        FGA 27       DHI 12       Neuro Re-Ed FGA, DHI, VVS       VORx1        Static balance        Dynamic balance         Visuovestibular stimulation                        HEP        Ther Ex                                                                        Ther Activity                        Gait Training                        Modalities

## 2024-08-25 DIAGNOSIS — I25.10 CORONARY ARTERY DISEASE INVOLVING NATIVE CORONARY ARTERY OF NATIVE HEART WITHOUT ANGINA PECTORIS: ICD-10-CM

## 2024-08-26 RX ORDER — CLOPIDOGREL BISULFATE 75 MG/1
75 TABLET ORAL DAILY
Qty: 90 TABLET | Refills: 0 | Status: SHIPPED | OUTPATIENT
Start: 2024-08-26

## 2024-10-08 DIAGNOSIS — R05.9 COUGH: ICD-10-CM

## 2024-10-10 ENCOUNTER — HOSPITAL ENCOUNTER (OUTPATIENT)
Dept: RADIOLOGY | Facility: HOSPITAL | Age: 69
End: 2024-10-10
Payer: MEDICARE

## 2024-10-10 ENCOUNTER — TELEPHONE (OUTPATIENT)
Dept: FAMILY MEDICINE CLINIC | Facility: HOSPITAL | Age: 69
End: 2024-10-10

## 2024-10-10 ENCOUNTER — OFFICE VISIT (OUTPATIENT)
Dept: FAMILY MEDICINE CLINIC | Facility: HOSPITAL | Age: 69
End: 2024-10-10
Payer: MEDICARE

## 2024-10-10 VITALS
HEART RATE: 68 BPM | BODY MASS INDEX: 26.69 KG/M2 | TEMPERATURE: 97.8 F | DIASTOLIC BLOOD PRESSURE: 80 MMHG | WEIGHT: 160.2 LBS | SYSTOLIC BLOOD PRESSURE: 132 MMHG | OXYGEN SATURATION: 93 % | HEIGHT: 65 IN

## 2024-10-10 DIAGNOSIS — J44.1 COPD WITH ACUTE EXACERBATION (HCC): ICD-10-CM

## 2024-10-10 DIAGNOSIS — J40 BRONCHITIS: ICD-10-CM

## 2024-10-10 DIAGNOSIS — J40 BRONCHITIS: Primary | ICD-10-CM

## 2024-10-10 LAB
SARS-COV-2 AG UPPER RESP QL IA: NEGATIVE
VALID CONTROL: NORMAL

## 2024-10-10 PROCEDURE — G2211 COMPLEX E/M VISIT ADD ON: HCPCS | Performed by: NURSE PRACTITIONER

## 2024-10-10 PROCEDURE — 99214 OFFICE O/P EST MOD 30 MIN: CPT | Performed by: NURSE PRACTITIONER

## 2024-10-10 PROCEDURE — 71046 X-RAY EXAM CHEST 2 VIEWS: CPT

## 2024-10-10 PROCEDURE — 87811 SARS-COV-2 COVID19 W/OPTIC: CPT | Performed by: NURSE PRACTITIONER

## 2024-10-10 RX ORDER — ALBUTEROL SULFATE 90 UG/1
2 INHALANT RESPIRATORY (INHALATION) EVERY 4 HOURS PRN
Qty: 8.5 G | Refills: 0 | Status: SHIPPED | OUTPATIENT
Start: 2024-10-10

## 2024-10-10 NOTE — TELEPHONE ENCOUNTER
Requested medication(s) are due for refill today: Yes  Patient has already received a courtesy refill: No  Other reason request has been forwarded to provider: looks like this was last prescribed in 2022, please advise, thank you

## 2024-10-10 NOTE — PROGRESS NOTES
"Ambulatory Visit  Name: Geovanna Leal      : 1955      MRN: [id number]  Encounter Provider: ANTONIETA Zepeda  Encounter Date: 10/10/2024   Encounter department: [facility] [address]     Assessment & Plan  Bronchitis  She appears unwell. Rapid COVID test is negative in office.   Will treat with antibiotic. Check stat chest xray.   Instructed to go to ER with any worsening sob.   Orders:    XR chest pa and lateral; Future    amoxicillin-clavulanate (AUGMENTIN) 875-125 mg per tablet; Take 1 tablet by mouth every 12 (twelve) hours for 10 days    POCT Rapid Covid Ag    COPD with acute exacerbation (HCC)  No bronchospasm on exam.   Prednisone causes HTN.   Will hold off but may need to add in. Requires lower doses.   Orders:    XR chest pa and lateral; Future       History of Present Illness     Has fever, cough, sob.  has been sick and was started on antibiotic. Temp up to 102 2 days ago. Last night temp 101. No wheezing. Has diarrhea. Not eating. Has lower back pain with leg pain. Has been lying around more. Has mild runny nose. Has COPD. Has been using Trelogy and albuterol inhalers. Symptoms started 5 days ago.         History obtained from : patient  Review of Systems   Constitutional:  Positive for fatigue and fever.   HENT:  Positive for rhinorrhea. Negative for congestion, ear pain and sore throat.    Respiratory:  Positive for cough and shortness of breath. Negative for wheezing.    Gastrointestinal:  Positive for diarrhea. Negative for abdominal pain, nausea and vomiting.   Musculoskeletal:  Positive for back pain.           Objective     /80 (BP Location: Left arm, Patient Position: Sitting, Cuff Size: Standard)   Pulse 68   Temp 97.8 °F (36.6 °C) (Tympanic)   Ht 5' 5\" (1.651 m)   Wt 72.7 kg (160 lb 3.2 oz)   SpO2 93%   BMI 26.66 kg/m²     Physical Exam  Vitals reviewed.   Constitutional:       Appearance: Normal appearance. She is ill-appearing.   HENT: "      Right Ear: Tympanic membrane, ear canal and external ear normal.      Left Ear: Tympanic membrane, ear canal and external ear normal.      Mouth/Throat:      Mouth: Mucous membranes are moist.      Pharynx: Oropharynx is clear.   Cardiovascular:      Rate and Rhythm: Normal rate and regular rhythm.      Heart sounds: Normal heart sounds. No murmur heard.  Pulmonary:      Effort: Pulmonary effort is normal.      Breath sounds: Rhonchi present.   Lymphadenopathy:      Cervical: No cervical adenopathy.   Skin:     General: Skin is warm and dry.   Neurological:      Mental Status: She is alert and oriented to person, place, and time.   Psychiatric:         Mood and Affect: Mood normal.         Behavior: Behavior normal.         Thought Content: Thought content normal.         Judgment: Judgment normal.

## 2024-10-15 ENCOUNTER — OFFICE VISIT (OUTPATIENT)
Age: 69
End: 2024-10-15
Payer: MEDICARE

## 2024-10-15 VITALS
HEIGHT: 65 IN | WEIGHT: 160.2 LBS | OXYGEN SATURATION: 92 % | DIASTOLIC BLOOD PRESSURE: 64 MMHG | SYSTOLIC BLOOD PRESSURE: 122 MMHG | BODY MASS INDEX: 26.69 KG/M2 | HEART RATE: 79 BPM

## 2024-10-15 DIAGNOSIS — F17.211 NICOTINE DEPENDENCE, CIGARETTES, IN REMISSION: ICD-10-CM

## 2024-10-15 DIAGNOSIS — R91.8 PULMONARY NODULES: ICD-10-CM

## 2024-10-15 DIAGNOSIS — J43.8 OTHER EMPHYSEMA (HCC): ICD-10-CM

## 2024-10-15 DIAGNOSIS — J43.8 OTHER EMPHYSEMA (HCC): Primary | ICD-10-CM

## 2024-10-15 PROCEDURE — 99214 OFFICE O/P EST MOD 30 MIN: CPT | Performed by: PHYSICIAN ASSISTANT

## 2024-10-15 RX ORDER — ALBUTEROL SULFATE 0.83 MG/ML
2.5 SOLUTION RESPIRATORY (INHALATION) EVERY 6 HOURS PRN
Qty: 1080 ML | Refills: 3 | Status: SHIPPED | OUTPATIENT
Start: 2024-10-15 | End: 2024-10-16 | Stop reason: SDUPTHER

## 2024-10-15 NOTE — PROGRESS NOTES
Assessment:    1. Emphysema  Nebulizer    albuterol (2.5 mg/3 mL) 0.083 % nebulizer solution      2. Pulmonary nodules        3. Nicotine dependence, cigarettes, in remission  CT lung screening program          Plan:   Patient presenting for follow-up, she's struggling a bit after battling respiratory viruses back to back with bronchitis each time. Currently on a course of Augmentin  She does not have any wheezing but does have significantly decreased breath sounds on exam.  She absolutely refuses steroids so we will first try around-the-clock nebulizer treatments.  She was able to take on the nebulizer machine today and was instructed on proper use.  Albuterol nebulizer solution sent to the pharmacy and she will use it every 6 hours, as she improves we will cut down and eventually go to as needed dosing  Declines cough medication  Reports under percent compliance with her Trelegy Ellipta we will continue 1 puff daily  We reviewed her last CT chest with stable scarring.  She is agreeable to continuing annual CT lung screening and is due June 2025.      Subjective:     Patient ID: Geovanna Leal is a 69 y.o. female.    Chief Complaint:  Geovanna is a very pleasant 69-year-old female presenting for follow-up.      In September and in early October she had been traveling and both times got sick.  Most recently had a case of bronchitis and was seen by her PCP who prescribed Augmentin.  She is feeling slightly better but still much more short of breath than usual.  She is exhausted and taking frequent naps throughout the day.  She is not coughing as much.  No fevers.  She is using her albuterol rescue inhaler up to 4-6 times per day.  Typically, she rarely requires the inhaler at all.    She states in the past when she was prescribed steroids, her blood pressure skyrocketed to over 200 and she had a subarachnoid hemorrhage so she absolutely refuses any steroids at all.        The following portions of the patient's  history were reviewed in this encounter and updated as appropriate: [unfilled]  Review of Systems   Constitutional:  Negative for fever.   Respiratory:  Positive for cough, chest tightness and shortness of breath.    All other systems reviewed and are negative.        Objective:    Physical Exam  Vitals reviewed.   Constitutional:       General: She is not in acute distress.     Appearance: She is not toxic-appearing.   HENT:      Head: Normocephalic and atraumatic.   Eyes:      General: No scleral icterus.  Cardiovascular:      Rate and Rhythm: Normal rate and regular rhythm.   Pulmonary:      Effort: Pulmonary effort is normal.      Breath sounds: No wheezing, rhonchi or rales.      Comments: Decreased air movement  Musculoskeletal:         General: No signs of injury.   Skin:     General: Skin is warm and dry.   Neurological:      General: No focal deficit present.      Mental Status: She is alert. Mental status is at baseline.   Psychiatric:         Mood and Affect: Mood normal.         Behavior: Behavior normal.         Lab Review:   Office Visit on 10/10/2024   Component Date Value    POCT SARS-CoV-2 Ag 10/10/2024 Negative     VALID CONTROL 10/10/2024 Valid

## 2024-10-16 ENCOUNTER — TELEPHONE (OUTPATIENT)
Age: 69
End: 2024-10-16

## 2024-10-16 NOTE — TELEPHONE ENCOUNTER
[facility] called to get a new script for the nebulizer solution.   They state that there was a glitch in their system yesterday and it would not allow them to reactivate the script. They state that the medication does not need a prior auth under Medicare part B

## 2024-10-16 NOTE — TELEPHONE ENCOUNTER
PA for albuterol (2.5 mg/3 mL) 0.083 % nebulizer solutionSUBMITTED     via    [x]CMM-KEY: [id number]  []Surescripts-Case ID #   []Availity-Auth ID # NDC #   []Faxed to plan   []Other website   []Phone call Case ID #     Office notes sent, clinical questions answered. Awaiting determination    Turnaround time for your insurance to make a decision on your Prior Authorization can take 7-21 business days.

## 2024-10-17 LAB
DME PARACHUTE DELIVERY DATE ACTUAL: NORMAL
DME PARACHUTE DELIVERY DATE REQUESTED: NORMAL
DME PARACHUTE ITEM DESCRIPTION: NORMAL
DME PARACHUTE ORDER STATUS: NORMAL
DME PARACHUTE SUPPLIER NAME: NORMAL
DME PARACHUTE SUPPLIER PHONE: NORMAL

## 2024-10-17 RX ORDER — ALBUTEROL SULFATE 0.83 MG/ML
2.5 SOLUTION RESPIRATORY (INHALATION) EVERY 6 HOURS PRN
Qty: 1080 ML | Refills: 3 | Status: SHIPPED | OUTPATIENT
Start: 2024-10-17

## 2024-10-17 NOTE — TELEPHONE ENCOUNTER
Medications that are used with DME will not be covered by Medicare Part D plans. Please use [place] to order nebulizer medications from a DME company. This process will select a DME company that participates with patients insurance and handle all processing and shipping. Retail pharmacies can not process these types of medication unless they submit it under Medicare Part B. If the pt wants to continue to use their local retail pharmacy, please call the pharmacy and have the pharmacist submit nebulizer medication under patient Medicare Part B plan. This is the quickest most efficient way to ensure the pt gets their medications in a timely manner.         PA for albuterol (2.5 mg/3 mL) 0.083 % nebulizer solution  DENIED    Reason:(Screenshot if applicable)        Message sent to office clinical pool Yes    Denial letter scanned into Media Yes    Appeal started No (Provider will need to decide if appeal is warranted and send clinical documentation to Prior Authorization Team for initiation.)    **Please follow up with your patient regarding denial and next steps**

## 2024-11-07 ENCOUNTER — RA CDI HCC (OUTPATIENT)
Dept: OTHER | Facility: HOSPITAL | Age: 69
End: 2024-11-07

## 2024-11-10 DIAGNOSIS — I20.9 ANGINAL PAIN (HCC): ICD-10-CM

## 2024-11-10 DIAGNOSIS — I25.10 CORONARY ARTERY DISEASE INVOLVING NATIVE CORONARY ARTERY OF NATIVE HEART WITHOUT ANGINA PECTORIS: ICD-10-CM

## 2024-11-11 ENCOUNTER — OFFICE VISIT (OUTPATIENT)
Dept: CARDIOLOGY CLINIC | Facility: CLINIC | Age: 69
End: 2024-11-11
Payer: MEDICARE

## 2024-11-11 VITALS
WEIGHT: 161.2 LBS | BODY MASS INDEX: 26.86 KG/M2 | HEART RATE: 85 BPM | DIASTOLIC BLOOD PRESSURE: 68 MMHG | SYSTOLIC BLOOD PRESSURE: 130 MMHG | HEIGHT: 65 IN

## 2024-11-11 DIAGNOSIS — I95.1 ORTHOSTATIC HYPOTENSION: ICD-10-CM

## 2024-11-11 DIAGNOSIS — I10 ESSENTIAL HYPERTENSION: Primary | Chronic | ICD-10-CM

## 2024-11-11 DIAGNOSIS — I51.81 TAKOTSUBO CARDIOMYOPATHY: ICD-10-CM

## 2024-11-11 DIAGNOSIS — E78.5 DYSLIPIDEMIA: Chronic | ICD-10-CM

## 2024-11-11 PROCEDURE — 99214 OFFICE O/P EST MOD 30 MIN: CPT | Performed by: INTERNAL MEDICINE

## 2024-11-11 NOTE — PROGRESS NOTES
Subjective:        Patient ID: Geovanna Leal is a 69 y.o. female.    Chief Complaint:  The patient presented to this office for the purpose of cardiac follow-up.  She is known to have a history of hypertension and hyperlipidemia as well as Takotsubo syndrome.  The patient has been feeling well from the cardiac standpoint denying any symptom of chest pain or shortness of breath.  She denies any symptoms of palpitation.  She does experience some lightheadedness but no feeling of fainting since her medications were modified by disc continuing losartan.  She has no leg edema.      The following portions of the patient's history were reviewed and updated as appropriate: allergies, current medications, past family history, past medical history, past social history, past surgical history, and problem list.  Review of Systems   Constitutional: Negative.   Cardiovascular: Negative.    Respiratory: Negative.     Psychiatric/Behavioral: Negative.            Objective:     Physical Exam  Vitals reviewed.   Constitutional:       Appearance: Normal appearance.   HENT:      Head: Normocephalic and atraumatic.   Cardiovascular:      Rate and Rhythm: Normal rate and regular rhythm.      Heart sounds: Normal heart sounds.   Pulmonary:      Effort: Pulmonary effort is normal.      Breath sounds: Normal breath sounds.   Musculoskeletal:      Right lower leg: No edema.      Left lower leg: No edema.   Skin:     General: Skin is warm and dry.   Neurological:      Mental Status: She is alert and oriented to person, place, and time.   Psychiatric:         Mood and Affect: Mood normal.         Behavior: Behavior normal.         Lab Review:   Office Visit on 10/15/2024   Component Date Value    Supplier Name 10/16/2024 [employer]     Supplier Phone Number 10/16/2024 [phone]     Order Status 10/16/2024 Delivery Successful     Delivery Request Date 10/16/2024 10/16/2024     Date Delivered  10/16/2024  10/17/2024     Item Description 10/16/2024 Nebulizer Compressor with Mouthpiece Only     Item Description 10/16/2024 Nebulizer Set, Reusable     Item Description 10/16/2024 Mouthpiece Only, N/A    Office Visit on 10/10/2024   Component Date Value    POCT SARS-CoV-2 Ag 10/10/2024 Negative     VALID CONTROL 10/10/2024 Valid          Assessment:       1. Essential hypertension        2. Takotsubo cardiomyopathy        3. Orthostatic hypotension        4. Dyslipidemia           Hypertension, stable and adequately controlled.  We will continue Coreg.    Takotsubo cardiomyopathy, stable.  No recurrent chest pain.    History of orthostatic hypotension with feeling of near syncope.  This has greatly improved since discontinuation of losartan.    Hyperlipidemia, stable.  We will continue atorvastatin.  Plan:       The patient is advised to continue present regimen.

## 2024-11-12 RX ORDER — CARVEDILOL 12.5 MG/1
12.5 TABLET ORAL 2 TIMES DAILY
Qty: 180 TABLET | Refills: 0 | Status: SHIPPED | OUTPATIENT
Start: 2024-11-12

## 2024-11-12 RX ORDER — ISOSORBIDE MONONITRATE 30 MG/1
TABLET, EXTENDED RELEASE ORAL
Qty: 90 TABLET | Refills: 0 | Status: SHIPPED | OUTPATIENT
Start: 2024-11-12

## 2024-11-12 NOTE — TELEPHONE ENCOUNTER
Refill must be reviewed and completed by the office or provider. The refill is unable to be approved or denied by the medication management team.    Patient last seen 05.2024 and was to return in 6M, appointment 2025 - Please review to see if the refill is appropriate.     Return in about 6 months (around 11/6/2024).

## 2024-11-15 ENCOUNTER — OFFICE VISIT (OUTPATIENT)
Dept: FAMILY MEDICINE CLINIC | Facility: HOSPITAL | Age: 69
End: 2024-11-15
Payer: MEDICARE

## 2024-11-15 VITALS
HEIGHT: 65 IN | TEMPERATURE: 97.1 F | OXYGEN SATURATION: 98 % | WEIGHT: 162.8 LBS | BODY MASS INDEX: 27.12 KG/M2 | DIASTOLIC BLOOD PRESSURE: 82 MMHG | SYSTOLIC BLOOD PRESSURE: 134 MMHG | HEART RATE: 60 BPM

## 2024-11-15 DIAGNOSIS — F33.0 MILD EPISODE OF RECURRENT MAJOR DEPRESSIVE DISORDER (HCC): ICD-10-CM

## 2024-11-15 DIAGNOSIS — I51.81 TAKOTSUBO CARDIOMYOPATHY: ICD-10-CM

## 2024-11-15 DIAGNOSIS — H81.93 VESTIBULAR DISORDER, BILATERAL: ICD-10-CM

## 2024-11-15 DIAGNOSIS — J44.9 CHRONIC OBSTRUCTIVE PULMONARY DISEASE, UNSPECIFIED COPD TYPE (HCC): Primary | ICD-10-CM

## 2024-11-15 DIAGNOSIS — I10 ESSENTIAL HYPERTENSION: Chronic | ICD-10-CM

## 2024-11-15 PROBLEM — R13.10 DYSPHAGIA: Chronic | Status: RESOLVED | Noted: 2022-08-29 | Resolved: 2024-11-15

## 2024-11-15 PROCEDURE — G2211 COMPLEX E/M VISIT ADD ON: HCPCS | Performed by: INTERNAL MEDICINE

## 2024-11-15 PROCEDURE — 99214 OFFICE O/P EST MOD 30 MIN: CPT | Performed by: INTERNAL MEDICINE

## 2024-11-15 RX ORDER — BUSPIRONE HYDROCHLORIDE 5 MG/1
5 TABLET ORAL 2 TIMES DAILY
Qty: 60 TABLET | Refills: 1 | Status: SHIPPED | OUTPATIENT
Start: 2024-11-15

## 2024-11-15 NOTE — ASSESSMENT & PLAN NOTE
BP better by end of appt, was good at Cardio appt this week as well, she is reporting home readings a bit high in 140-150's/80's, may need to restart Losartan but will con't current regimen for now, bring home cuff to appt in 4-6 wks and will check accuracy of cuff - call if home reading's con't to creep up

## 2024-11-15 NOTE — ASSESSMENT & PLAN NOTE
Pulm exam good today, has some resolving cold symptoms, con't nebulizer prn, con't Trelegy and f/u as per Pulm, has had flu vaccine, call with resp concerns

## 2024-11-15 NOTE — PROGRESS NOTES
Name: Geovanna Leal      : 1955      MRN: [id number]  Encounter Provider: Leelee Garcia DO  Encounter Date: 11/15/2024   Encounter department: [facility] [address]   :  Assessment & Plan  Chronic obstructive pulmonary disease, unspecified COPD type (HCC)  Pulm exam good today, has some resolving cold symptoms, con't nebulizer prn, con't Trelegy and f/u as per Pulm, has had flu vaccine, call with resp concerns       Takotsubo cardiomyopathy  Just saw Cardio, no current CV symptoms, on Coreg/Plavix and Imdur, call with CV symptoms, con't f/u as per Cardio       Essential hypertension  BP better by end of appt, was good at Cardio appt this week as well, she is reporting home readings a bit high in 140-150's/80's, may need to restart Losartan but will con't current regimen for now, bring home cuff to appt in 4-6 wks and will check accuracy of cuff - call if home reading's con't to creep up       Vestibular disorder, bilateral  Has had wonderful benefit with vestibular therapy, has f/u with neuro, call with recurrent symptoms       Mild episode of recurrent major depressive disorder (HCC)  Mood up and down (stress with  with health issues and fatigued with anhedonia), on max Lexapro, had SE with WBXL, will trial low dose Buspar 5 mg bid,  d/w pt that it takes 4-6 wks to get maximum benefit of med and that med has to be taken every day and to not miss doses of med, call with SE/new/worse mood/SI/panic attacks, re-eval in 4-6 wks  Orders:    busPIRone (BUSPAR) 5 mg tablet; Take 1 tablet (5 mg total) by mouth 2 (two) times a day       Colonoscopy  - 5 yrs     Mammo      Dexa  - osteopenia    CUS  - 2 yrs    CT chest  - annually and already ordered by Pulm      BW  - has order for  but was not done prior to appt today - order reprinted and urged to do prior to next appt      History of Present Illness     HPI  Pt here for follow up appt    Pt  "saw Gwendolyn BERRY (Shanda Howe) in Oct 24 for f/u emphysema and pulm nodules - OV note reviewed.  She had a resp infection in Oct and saw Regina and was tx with Augmentin.  She was unable to take Prednisone d/t SE (elevated BP) and Pulm recommended ATC nebs during symptoms.  No changes her made to her Trelegy. She has CT chest to be done June 25.  She notes mild cold symptoms again right now. She has not used her rescue inhaler recently but has been using her nebulizer 1-2 x's a day.  She notes no chronic cough/wheezing and her SOB is not new but it is \"harder to catch the breath a times\".      Pt saw Cardio (Dr. Eduardo) earlier this week for f/u Takotsubo CM and HTN - OV note reviewed.  NO meds were changed and no studies ordered.  She was told to f/u in 6 mos. She remains on Imdur, Coreg, Plavix and Atorvastatin.  BP a bit above goal today and meds were reviewed and no changes have occurred.  She denies missing doses of meds or SE with the meds.  She does check her BP outside the office but she notes her BP at home has been gradually going up and she is noting 140-150's/80's.  She notes no frequent dizziness/double vision/CP.  She has had some HA's when the BP was up.     Pt has f/u with Neuro in Dec for f/u dizziness.  She finished vestibular therapy and has done great with that.  She does some HEP still.      She is taking her Lexapro daily w/o SE. She notes feeling tired a lot and has no interest in doing anything.  She has no down/sad mood.  She notes some worries with her  having some health issues. She is sleeping well.  She feels tired all the time. She notes her appetite is a bit decreased.  She was on WBXL but had SE (worsening memory and \"humming in the ears\")    She did not do her BW yet - she has been taking the b12 supplement but feels no different since starting the rx.     Review of Systems   Constitutional:  Positive for fatigue. Negative for chills and fever.   HENT:  Negative for congestion and " "sore throat.    Eyes:  Negative for pain and visual disturbance.   Respiratory:  Positive for shortness of breath. Negative for cough and wheezing.    Cardiovascular:  Negative for chest pain, palpitations and leg swelling.   Gastrointestinal:  Negative for abdominal pain, constipation, diarrhea, nausea and vomiting.   Genitourinary:  Negative for difficulty urinating and dysuria.   Musculoskeletal:  Negative for gait problem.   Skin:  Negative for rash and wound.   Neurological:  Positive for headaches. Negative for dizziness and seizures.   Hematological:  Does not bruise/bleed easily.   Psychiatric/Behavioral:  Positive for dysphoric mood. The patient is nervous/anxious.           Objective   /82   Pulse 60   Temp (!) 97.1 °F (36.2 °C) (Tympanic)   Ht 5' 5\" (1.651 m)   Wt 73.8 kg (162 lb 12.8 oz)   SpO2 98%   BMI 27.09 kg/m²      Physical Exam  Vitals and nursing note reviewed.   Constitutional:       General: She is not in acute distress.     Appearance: She is well-developed. She is not ill-appearing.   HENT:      Head: Normocephalic and atraumatic.      Right Ear: External ear normal.      Left Ear: External ear normal.   Eyes:      General:         Right eye: No discharge.         Left eye: No discharge.      Conjunctiva/sclera: Conjunctivae normal.   Neck:      Thyroid: No thyromegaly.      Trachea: No tracheal deviation.   Cardiovascular:      Rate and Rhythm: Normal rate and regular rhythm.      Heart sounds: Normal heart sounds. No murmur heard.  Pulmonary:      Effort: Pulmonary effort is normal. No respiratory distress.      Breath sounds: Normal breath sounds. No wheezing, rhonchi or rales.   Abdominal:      General: There is no distension.      Palpations: Abdomen is soft.      Tenderness: There is no abdominal tenderness. There is no guarding or rebound.   Musculoskeletal:         General: No deformity or signs of injury.      Cervical back: Neck supple.   Skin:     General: Skin is warm " and dry.      Coloration: Skin is not pale.      Findings: No bruising or rash.   Neurological:      General: No focal deficit present.      Mental Status: She is alert. Mental status is at baseline.      Motor: No abnormal muscle tone.      Gait: Gait normal.   Psychiatric:         Mood and Affect: Mood normal.         Behavior: Behavior normal.         Thought Content: Thought content normal.         Judgment: Judgment normal.

## 2024-11-15 NOTE — ASSESSMENT & PLAN NOTE
Has had wonderful benefit with vestibular therapy, has f/u with neuro, call with recurrent symptoms

## 2024-11-15 NOTE — ASSESSMENT & PLAN NOTE
Just saw Cardio, no current CV symptoms, on Coreg/Plavix and Imdur, call with CV symptoms, con't f/u as per Cardio

## 2024-11-15 NOTE — ASSESSMENT & PLAN NOTE
Mood up and down (stress with  with health issues and fatigued with anhedonia), on max Lexapro, had SE with WBXL, will trial low dose Buspar 5 mg bid,  d/w pt that it takes 4-6 wks to get maximum benefit of med and that med has to be taken every day and to not miss doses of med, call with SE/new/worse mood/SI/panic attacks, re-eval in 4-6 wks  Orders:    busPIRone (BUSPAR) 5 mg tablet; Take 1 tablet (5 mg total) by mouth 2 (two) times a day
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
## 2024-12-05 ENCOUNTER — OFFICE VISIT (OUTPATIENT)
Dept: NEUROLOGY | Facility: CLINIC | Age: 69
End: 2024-12-05
Payer: MEDICARE

## 2024-12-05 VITALS
WEIGHT: 162.2 LBS | OXYGEN SATURATION: 95 % | BODY MASS INDEX: 27.02 KG/M2 | DIASTOLIC BLOOD PRESSURE: 62 MMHG | TEMPERATURE: 97.2 F | RESPIRATION RATE: 14 BRPM | HEART RATE: 61 BPM | HEIGHT: 65 IN | SYSTOLIC BLOOD PRESSURE: 118 MMHG

## 2024-12-05 DIAGNOSIS — I67.2 INTRACRANIAL ATHEROSCLEROSIS: ICD-10-CM

## 2024-12-05 DIAGNOSIS — H81.93 VESTIBULAR DISORDER, BILATERAL: Primary | ICD-10-CM

## 2024-12-05 DIAGNOSIS — D32.1 BENIGN TUMOR OF SPINAL MENINGIOMA (HCC): ICD-10-CM

## 2024-12-05 PROCEDURE — 99215 OFFICE O/P EST HI 40 MIN: CPT | Performed by: PSYCHIATRY & NEUROLOGY

## 2024-12-05 NOTE — ASSESSMENT & PLAN NOTE
Paroxysmal dizziness with positional changes due to bilateral vestibular neuropathy (unknown etiology; there have been cases of vestibular symptoms associated with prior Chiari malformation).  - continue with home exercise program as recommended by your physical therapist  - call the office if symptoms are worse

## 2024-12-05 NOTE — PATIENT INSTRUCTIONS
Paroxysmal dizziness with positional changes due to bilateral vestibular neuropathy (unknown etiology; there have been cases of vestibular symptoms associated with prior Chiari malformation).  - continue with home exercise program as recommended by your physical therapist  - call the office if symptoms are worse    Thoracic meningioma  - call the office if there is acute back pain, worsening weakness in the legs, difficulty with urination, may consider repeating imaging in [id number]    Intracranial atherosclerosis, increase risk of stroke  - continue with Plavix  - continue with high dose atorvastatin  - recommend completing carotid ultrasound next year    I discussed the warning signs of stroke with the patient.  I reviewed that stroke and transient ischemic attack are signs of acute neurologic impairment due to abnormal cerebrovascular pathology either from ischemia (lack of blood flow) or hemorrhage (excessive bleeding).  Warning signs include but are not limited to acute (immediate onset) speech impairment, inability to understand language, loss of vision, visual deficits, lateralizing weakness, facial weakness (facial droop), sensory loss, ataxia, gait imbalance, sensation of being uncoordinated, or changes in perception/sensorium.    Patient was instructed to call 911/EMS for immediate attention for stroke alert to be started.  It is important to know the time symptoms started or when the patient was last known well.      Follow-up in 1 year or as needed for new neurological symptoms

## 2024-12-05 NOTE — ASSESSMENT & PLAN NOTE
- continue with Plavix  - continue with high dose atorvastatin (her LDL is less than 70 mg/dL)  - recommend completing carotid ultrasound next year

## 2024-12-05 NOTE — ASSESSMENT & PLAN NOTE
- call the office if there is acute back pain, worsening weakness in the legs, difficulty with urination, may consider repeating imaging in [id number]

## 2024-12-07 ENCOUNTER — APPOINTMENT (OUTPATIENT)
Dept: LAB | Facility: HOSPITAL | Age: 69
End: 2024-12-07
Payer: MEDICARE

## 2024-12-07 DIAGNOSIS — E53.8 DEFICIENCY OF VITAMIN B12: ICD-10-CM

## 2024-12-07 DIAGNOSIS — R73.01 IFG (IMPAIRED FASTING GLUCOSE): ICD-10-CM

## 2024-12-07 DIAGNOSIS — D75.89 MACROCYTOSIS WITHOUT ANEMIA: ICD-10-CM

## 2024-12-07 DIAGNOSIS — N18.31 STAGE 3A CHRONIC KIDNEY DISEASE (HCC): Chronic | ICD-10-CM

## 2024-12-07 DIAGNOSIS — D72.819 LEUKOPENIA, UNSPECIFIED TYPE: ICD-10-CM

## 2024-12-07 LAB
ALBUMIN SERPL BCG-MCNC: 4.2 G/DL (ref 3.5–5)
ALP SERPL-CCNC: 97 U/L (ref 34–104)
ALT SERPL W P-5'-P-CCNC: 17 U/L (ref 7–52)
ANION GAP SERPL CALCULATED.3IONS-SCNC: 7 MMOL/L (ref 4–13)
AST SERPL W P-5'-P-CCNC: 17 U/L (ref 13–39)
BASOPHILS # BLD AUTO: 0.01 THOUSANDS/ÂΜL (ref 0–0.1)
BASOPHILS NFR BLD AUTO: 0 % (ref 0–1)
BILIRUB SERPL-MCNC: 0.95 MG/DL (ref 0.2–1)
BUN SERPL-MCNC: 14 MG/DL (ref 5–25)
CALCIUM SERPL-MCNC: 9.3 MG/DL (ref 8.4–10.2)
CHLORIDE SERPL-SCNC: 105 MMOL/L (ref 96–108)
CO2 SERPL-SCNC: 27 MMOL/L (ref 21–32)
CREAT SERPL-MCNC: 1.04 MG/DL (ref 0.6–1.3)
EOSINOPHIL # BLD AUTO: 0.05 THOUSAND/ÂΜL (ref 0–0.61)
EOSINOPHIL NFR BLD AUTO: 1 % (ref 0–6)
ERYTHROCYTE [DISTWIDTH] IN BLOOD BY AUTOMATED COUNT: 16.5 % (ref 11.6–15.1)
GFR SERPL CREATININE-BSD FRML MDRD: 54 ML/MIN/1.73SQ M
GLUCOSE P FAST SERPL-MCNC: 94 MG/DL (ref 65–99)
HCT VFR BLD AUTO: 45.2 % (ref 34.8–46.1)
HGB BLD-MCNC: 14.5 G/DL (ref 11.5–15.4)
IMM GRANULOCYTES # BLD AUTO: 0.03 THOUSAND/UL (ref 0–0.2)
IMM GRANULOCYTES NFR BLD AUTO: 1 % (ref 0–2)
LYMPHOCYTES # BLD AUTO: 1.34 THOUSANDS/ÂΜL (ref 0.6–4.47)
LYMPHOCYTES NFR BLD AUTO: 22 % (ref 14–44)
MCH RBC QN AUTO: 32.5 PG (ref 26.8–34.3)
MCHC RBC AUTO-ENTMCNC: 32.1 G/DL (ref 31.4–37.4)
MCV RBC AUTO: 101 FL (ref 82–98)
MONOCYTES # BLD AUTO: 0.57 THOUSAND/ÂΜL (ref 0.17–1.22)
MONOCYTES NFR BLD AUTO: 9 % (ref 4–12)
NEUTROPHILS # BLD AUTO: 4.22 THOUSANDS/ÂΜL (ref 1.85–7.62)
NEUTS SEG NFR BLD AUTO: 67 % (ref 43–75)
NRBC BLD AUTO-RTO: 0 /100 WBCS
PLATELET # BLD AUTO: 286 THOUSANDS/UL (ref 149–390)
PMV BLD AUTO: 9.5 FL (ref 8.9–12.7)
POTASSIUM SERPL-SCNC: 4.2 MMOL/L (ref 3.5–5.3)
PROT SERPL-MCNC: 6.8 G/DL (ref 6.4–8.4)
RBC # BLD AUTO: 4.46 MILLION/UL (ref 3.81–5.12)
SODIUM SERPL-SCNC: 139 MMOL/L (ref 135–147)
VIT B12 SERPL-MCNC: 737 PG/ML (ref 180–914)
WBC # BLD AUTO: 6.22 THOUSAND/UL (ref 4.31–10.16)

## 2024-12-07 PROCEDURE — 80053 COMPREHEN METABOLIC PANEL: CPT

## 2024-12-07 PROCEDURE — 85025 COMPLETE CBC W/AUTO DIFF WBC: CPT

## 2024-12-07 PROCEDURE — 83036 HEMOGLOBIN GLYCOSYLATED A1C: CPT

## 2024-12-07 PROCEDURE — 82607 VITAMIN B-12: CPT

## 2024-12-07 PROCEDURE — 36415 COLL VENOUS BLD VENIPUNCTURE: CPT

## 2024-12-08 LAB
EST. AVERAGE GLUCOSE BLD GHB EST-MCNC: 108 MG/DL
HBA1C MFR BLD: 5.4 %

## 2024-12-11 DIAGNOSIS — E78.5 DYSLIPIDEMIA: ICD-10-CM

## 2024-12-12 ENCOUNTER — OFFICE VISIT (OUTPATIENT)
Dept: FAMILY MEDICINE CLINIC | Facility: HOSPITAL | Age: 69
End: 2024-12-12
Payer: MEDICARE

## 2024-12-12 VITALS
HEIGHT: 65 IN | BODY MASS INDEX: 26.62 KG/M2 | HEART RATE: 61 BPM | WEIGHT: 159.8 LBS | DIASTOLIC BLOOD PRESSURE: 70 MMHG | SYSTOLIC BLOOD PRESSURE: 123 MMHG | OXYGEN SATURATION: 96 % | TEMPERATURE: 97.2 F

## 2024-12-12 DIAGNOSIS — N18.31 STAGE 3A CHRONIC KIDNEY DISEASE (HCC): Chronic | ICD-10-CM

## 2024-12-12 DIAGNOSIS — D32.1 BENIGN TUMOR OF SPINAL MENINGIOMA (HCC): ICD-10-CM

## 2024-12-12 DIAGNOSIS — R73.01 IFG (IMPAIRED FASTING GLUCOSE): ICD-10-CM

## 2024-12-12 DIAGNOSIS — I65.23 CAROTID STENOSIS, BILATERAL: ICD-10-CM

## 2024-12-12 DIAGNOSIS — F33.0 MILD EPISODE OF RECURRENT MAJOR DEPRESSIVE DISORDER (HCC): Primary | ICD-10-CM

## 2024-12-12 DIAGNOSIS — E53.8 DEFICIENCY OF VITAMIN B12: ICD-10-CM

## 2024-12-12 DIAGNOSIS — H81.93 VESTIBULAR DISORDER, BILATERAL: ICD-10-CM

## 2024-12-12 PROCEDURE — G2211 COMPLEX E/M VISIT ADD ON: HCPCS | Performed by: INTERNAL MEDICINE

## 2024-12-12 PROCEDURE — 99214 OFFICE O/P EST MOD 30 MIN: CPT | Performed by: INTERNAL MEDICINE

## 2024-12-12 RX ORDER — BUSPIRONE HYDROCHLORIDE 10 MG/1
10 TABLET ORAL 2 TIMES DAILY
Qty: 60 TABLET | Refills: 2 | Status: SHIPPED | OUTPATIENT
Start: 2024-12-12

## 2024-12-12 RX ORDER — ATORVASTATIN CALCIUM 80 MG/1
80 TABLET, FILM COATED ORAL DAILY
Qty: 90 TABLET | Refills: 1 | Status: SHIPPED | OUTPATIENT
Start: 2024-12-12

## 2024-12-12 NOTE — ASSESSMENT & PLAN NOTE
Lab Results   Component Value Date    EGFR 54 12/07/2024    EGFR 43 05/04/2024    EGFR 47 11/04/2023    CREATININE 1.04 12/07/2024    CREATININE 1.25 05/04/2024    CREATININE 1.16 11/04/2023   GFR stable, stressed BP/BS control and keeping hydrated, will follow every 6 mos or so

## 2024-12-12 NOTE — ASSESSMENT & PLAN NOTE
Chief complaint:   Chief Complaint   Patient presents with   • Implantable Defibrillator     Medtronic, BiV   Cardiomyopathy  Coronary artery disease    Vitals:  Visit Vitals   • /62   • Pulse 68   • Ht 5' 6\" (1.676 m)   • Wt 82.1 kg   • BMI 29.21 kg/m2       HISTORY OF PRESENT ILLNESS     HPI     Patient reports she had stent placed 2/11/2017.     Cardiomyopathy: No shortness of breath or lightheadedness. Has daily episodes of \"skipped/ hard thump\" beats, thinks they are PVC's.    Defibrillator: No pocket discomfort.   Coronary artery disease: Denies chest pain.     Other significant problems:  Patient Active Problem List    Diagnosis Date Noted   • Non-small cell lung cancer, left (CMS/HCC) 04/15/2015     Priority: Medium     Invasive well-differentiated adenocarcinoma of left upper and lower lobe status post wedge resection 3/25/2015 with 2 different tumors- 2 synchronous primaries [id number] or [id number].  Patient seen at [facility] and staged as 2 synchronous primaries     • Near syncope 11/11/2014     Priority: Medium           • Severe dysplasia of cervix (SHERIN III) - LEEP 12/13 - q 6 mo FU. Pap 6/15/16 normal cytology with negative HPV co-testing 11/04/2013     Priority: Medium     11/5/13- LEEP advised     • Coronary artery disease 03/22/2013     Priority: Medium     Cardiac cath: 2/11/2017 - stent to RCA, patent prior stents.   Cardiac cath: 2/1/2016 - new stenosis of mid and distal left circ artery - s/p stenting, new stenosis of JOSE R + PDA, patent stents in RCA + proximal left circ.   Myocardial perf. scan: 10/9/2015 - LVEF 31%, WNL.  Cardiac cath: 5/29/2015 - moderately diffuse disease, patent stents.   Myocardial perf. scan: 12/16/2014 - LVEF 40%, WNL.   Cardiac cath: 9/2011 - patent stents.      • Congestive heart failure 07/20/2012     Priority: Medium     NYHA class II     • Cardiomyopathy, ischemic 09/19/2011     Priority: Medium     s/p BiV ICD.   ECHO: 10/21/2016 - LVEF 34%.    ECHO:  Doing well, just saw Neuro and has finished PT, con't HEP, call with relapse in symptoms        12/7/2015 - LVEF 31%.   Sleep study: 12/22/2015 - no significant JAQUI.   ECHO: 5/18/2015 - LVEF 22%, inferior wall akinesis.    ECHO: 8/8/2014 - LVEF 35%, LA volume 27.2 ml.   ECHO: 9/17/2013 - LVEF 38%, LA volume 31.3 ml, mild MR, dilated IVC.    ECHO: 10/8/2012 - LVEF 38%, mild LAE, dilated IVC.    ECHO: 12/9/2011 - LVEF 40%, mild LAE     • Hypertension 09/19/2011     Priority: Medium     On lisinopril, Norvasc.     • BiV ICD, Medtronic 09/19/2011     Priority: Medium     Implanted  9/15/2011, generator change 10/24/2016.   Has remote box.   LRL increased to 60 bpm (5/15/2017).      • Hypothyroidism, postsurgical 04/28/2016     Priority: Low   • Post-surgical hypoparathyroidism (CMS/HCC) 01/28/2016     Priority: Low   • Snoring, sleep study no JAQUI 01/06/2016     Priority: Low     Sleep study: 12/22/2016 - no significant JAQUI.      • Surgical hypoparathyroidism (CMS/HCC) 12/20/2015     Priority: Low   • Post-surgical hypothyroidism 12/20/2015     Priority: Low   • History of tobacco abuse 10/22/2015     Priority: Low   • Thyroid nodule 09/07/2015     Priority: Low   • Hot flashes 08/20/2015     Priority: Low   • Vitamin D deficiency 08/20/2015     Priority: Low   • Calcific shoulder tendinitis 08/20/2015     Priority: Low   • COPD, moderate (CMS/HCC) 04/27/2015     Priority: Low   • Anxiety 02/02/2015     Priority: Low   • Depression 09/11/2014     Priority: Low   • Hypothyroidism 10/28/2013     Priority: Low     s/p thyroidectomy 12/24/2012.   On levothyroxine.      • Hypocalcemia 06/12/2013     Priority: Low   • Hypoparathyroidism (CMS/HCC) 01/24/2013     Priority: Low   • Unspecified vitamin D deficiency 01/24/2013     Priority: Low   • Substernal goiter 10/17/2012     Priority: Low   • Chronic renal insufficiency 08/15/2012     Priority: Low   • Human immunodeficiency virus (HIV) disease 09/19/2011     Priority: Low   • DM w/o complication type II 09/19/2011     Priority: Low   • Other and unspecified  hyperlipidemia 09/19/2011     Priority: Low       PAST MEDICAL, FAMILY AND SOCIAL HISTORY     Medications:  Current Outpatient Prescriptions   Medication   • furosemide (LASIX) 20 MG tablet   • insulin glargine (BASAGLAR KWIKPEN) 100 UNIT/ML pen-injector   • nitroGLYcerin (NITROSTAT) 0.4 MG sublingual tablet   • BD PEN NEEDLE BRIAN U/F 32G X 4 MM Misc   • Vitamin D, Ergocalciferol, 50680 units capsule   • EFFIENT 10 MG Tab   • omega-3 acid ethyl esters (LOVAZA) 1 g capsule   • calcitRIOL (ROCALTROL) 0.25 MCG capsule   • levothyroxine (SYNTHROID, LEVOTHROID) 137 MCG tablet   • carvedilol (COREG) 25 MG tablet   • albuterol-ipratropium 2.5 mg/0.5 mg (DUONEB) 0.5-2.5 (3) MG/3ML nebulizer solution   • VENTOLIN  (90 BASE) MCG/ACT inhaler   • fenofibrate 160 MG tablet   • pravastatin (PRAVACHOL) 20 MG tablet   • amLODIPine (NORVASC) 10 MG tablet   • ranolazine (RANEXA) 500 MG 12 hr tablet   • clonazePAM (KLONOPIN) 0.5 MG tablet   • HUMALOG KWIKPEN 100 UNIT/ML pen-injector   • BREO ELLIPTA 100-25 MCG/INH diskus inhaler   • DISPENSE   • lisinopril (ZESTRIL) 10 MG tablet   • nitroGLYcerin (NITRO-DUR) 0.4 MG/HR   • ergocalciferol (DRISDOL) 89734 UNITS capsule   • digoxin (LANOXIN) 0.125 MG tablet   • lidocaine (LIDODERM) 5 %   • THIOCTIC ACID 300 MG Cap   • Umeclidinium-Vilanterol (ANORO ELLIPTA) 62.5-25 MCG/INH AEROSOL POWDER, BREATH ACTIVATED   • Calcium Carb-Cholecalciferol (CALCIUM-VITAMIN D) 600-400 MG-UNIT Tab   • aspirin 81 MG EC tablet   • acetaminophen (TYLENOL) 500 MG tablet   • Coenzyme Q-10 100 MG Cap   • B Complex Vitamins (B COMPLEX PO)   • zidovudine (RETROVIR) 300 MG tablet   • lamiVUDine (EPIVIR) 150 MG tablet   • efavirenz (SUSTIVA) 600 MG tablet   • amoxicillin (AMOXIL) 875 MG tablet     No current facility-administered medications for this visit.      Facility-Administered Medications Ordered in Other Visits   Medication   • iopamidol (ISOVUE-370) 76 % injection       Allergies:  ALLERGIES:   Allergen  Reactions   • Coreg [Carvedilol] Other (See Comments)     H/a, PEREZ, chest pressure/pain   • Indocin Nausea & Vomiting   • Niaspan [Niacin (Antihyperlipidemic)] Other (See Comments)     Sore joints, cramping       Past Medical  History/Surgeries:  Past Medical History:   Diagnosis Date   • Acute bronchitis    • Anemia    • Anxiety    • Arthritis     Right hip   • Benign neoplasm of colon 10/18/13    Colonoscopy/Zhang   • Bronchitis    • CAD (coronary artery disease)     2 stents   • Cardiomyopathy (CMS/[facility]) 2011    EF 28%  Ischemic CMP     2013 EF 38%   2015 EF 22%   • Chronic pain     right hip due to arthritis and right shoulder from bone spur   • Congestive cardiac failure (CMS/[facility])    • COPD (chronic obstructive pulmonary disease) (CMS/[facility])    • Depression    • Diabetes     NIDDM, Type 2   • Difficult intubation     R/T Thyroid / Patient. Has had Thyroid Surgery since she was told this.    • Essential (primary) hypertension    • Family history of malignant neoplasm of gastrointestinal tract 10/18/13    Colonoscopy/Zhang   • Fracture 1987    left arm fx   • Gastroesophageal reflux disease    • HIV positive (CMS/[facility])    • [place] (hard of hearing)     Right   • Hyperlipemia    • ICD (implantable cardiac defibrillator) in place 9/2011    MEDTRONIC; PLACED 9/2011   • Liver disease     fatty liver   • Lung cancer (CMS/[facility]) 3/2015    Non Small Cell Adenocarcinoma   • MRSA (methicillin resistant Staphylococcus aureus)     POSITIVE NARES 8/21/2011, - Nares 9/9/2011 & - Nares 12/23/12   • Obesity    • Old myocardial infarction 2009 & 2010 & 2011    X 3   • Other specified gastritis without mention of hemorrhage 10/18/13    EGD/Zhang   • Otitis media    • Pneumonia    • RAD (reactive airway disease)    • Renal insufficiency    • Sinusitis, chronic    • Tattoo of skin     X 2   • Thyroid condition    • Tobacco abuse     smokes 1 packs daily    • Urinary incontinence     STRESS   • VT (ventricular tachycardia) (CMS/[facility]) 2011     followed by ICD Implant   • Wears partial dentures     full upper, lower partial       Past Surgical History:   Procedure Laterality Date   • ABDOMEN SURGERY      Uretoablasion   • APPENDECTOMY     • CARDIAC CATHERIZATION  2015    patent stents EF 22%   • COLONOSCOPY REMOVE LESION BY SNARE  10/18/2013    rectal polyp (hyperplastic)   Dr. Zhang 10/18/2018 + fhx colon CA   • CONIZATION CERVIX,LOOP ELECTRD  13    SHERIN III    • ENDOMETRIAL ABLATION      Dr. Werner   • EP ICD IMPLANT  9/15/2011    MEDTRONIC   • ESOPHAGOGASTRODUODENOSCOPY TRANSORAL FLEX W/BX SINGLE OR MULT  10/18/2013    erosive gastritis    Dr. Zhang w/bx   • ICD GENERATOR CHANGE  10/24/2016   • PTCA  2016    drug eluting stents X 2 Circ   • PTCA  2016    drug eluting stent for in stent re-stenosis Circ   • THORACOSCOPY SURG WEDG RESEC LUNG Left 3/25/15    Left thoracoscopy and image-guided wedge resection of needle localized lung nodules in the upper lobe and lower lobe, and lymph node biopsy   • TONSILLECTOMY     • TOTAL THYROIDECTOMY  2012   • TUBAL LIGATION      Dr. Werner       Family History:  Family History   Problem Relation Age of Onset   • Cancer Father      colon, mets to esophagus   • Heart attack Father       AT AGE 73 YRS OF MI   • Heart disease Father    • Cancer Mother 69     ADENOCARCINOMA-CA OF UNKNOWN PRIMARY   • Hypertension Mother    • Heart attack Paternal Aunt       AT AGE 76 YRS OF MI   • Heart failure Paternal Aunt    • Heart disease Paternal Aunt    • Cancer Sister 46     ADENOCARCINOMA-CA UNKNOWN PRIMARY   • Thyroid Sister        Social History:  Social History   Substance Use Topics   • Smoking status: Former Smoker     Packs/day: 1.00     Years: 30.00     Types: Cigarettes     Quit date: 3/24/2015   • Smokeless tobacco: Never Used   • Alcohol use No       REVIEW OF SYSTEMS     Review of Systems   Constitutional: Negative for fatigue.      A problem-pertinent review of systems was  performed and aside from what is mentioned in the HPI, all other review of systems are within normal limits.     PHYSICAL EXAM     Physical Exam   Constitutional: She is oriented to person, place, and time. She appears well-developed and well-nourished. No distress.   Eyes: No scleral icterus.   Neck: Normal range of motion.   Cardiovascular: Normal rate.    Device incision well healed without erythema or induration    Pulmonary/Chest: Effort normal. No stridor. No respiratory distress.   Abdominal: She exhibits no distension.   Neurological: She is alert and oriented to person, place, and time.   Skin: Skin is warm and dry. No rash noted. She is not diaphoretic. No erythema. No pallor.   Psychiatric: She has a normal mood and affect. Her behavior is normal. Judgment and thought content normal.        WBC (K/mcL)   Date Value   05/08/2017 7.5     RBC (mil/mcL)   Date Value   05/08/2017 2.57 (L)     HCT (%)   Date Value   05/08/2017 28.9 (L)     HGB (g/dL)   Date Value   05/08/2017 9.8 (L)     PLT   Date Value   05/08/2017 267 K/mcL   02/16/2014 217 K/mcL   07/12/2012 242 K/uL       Sodium (mmol/L)   Date Value   05/08/2017 141     Potassium (mmol/L)   Date Value   05/08/2017 4.3     Chloride (mmol/L)   Date Value   05/08/2017 106     Glucose (mg/dL)   Date Value   05/08/2017 100 (H)     CALCIUM (mg/dL)   Date Value   05/08/2017 9.4   07/12/2012 8.5     CO2 (mmol/L)   Date Value   07/12/2012 19 (L)     Carbon Dioxide (mmol/L)   Date Value   05/08/2017 17 (L)     BUN (mg/dL)   Date Value   05/08/2017 60 (H)     Creatinine (mg/dL)   Date Value   05/08/2017 2.10 (H)       TSH (mcUnits/mL)   Date Value   12/01/2016 0.595     Cardiac cath: 2/11/2017 - stent to RCA, patent prior stents.     ASSESSMENT/PLAN     1. Symptomatic palpitations with PVC's.   No syncope/ sustained tachycardia.   Very low PVC burden (0.4 hours, on ICD check). Patient reassured. Already on coreg 25 mg BID. Will increase lower rate limit to 60 bpm  (patient feels heart slowing prior to PVC) for symptom relief.     BiV ICD, Medtronic  I personally reviewed the device interrogation/programming as recorded with this encounter and agree with the documentation.     Normal device programming.    Lower rate limit increased from 50 bpm to 60 bpm as above.      Uses remote monitoring, will send transmission in 3 months with next office visit in 6 months.     Congestive heart failure  Clinically, heart failure is well compensated without evidence of significant volume overload. On device interrogation, optivol is subthreshold, reflective of optimal fluid status.  Continue present management.    Coronary artery disease  Chest pain free.     Return in about 6 months (around 11/15/2017) for remote check in 3 months 08/14/17, , Medtronic ICD.     On 5/15/2017, I, Taty Turner RN scribed the services personally performed by Dr. Jak MD    The documentation recorded by the scribe accurately and completely reflects the service(s) I personally performed and the decisions made by me.

## 2024-12-12 NOTE — ASSESSMENT & PLAN NOTE
Just saw Neuro, has f/u CUS ordered, on Plavix and statin, will follow, to ED with stroke TIA/symptoms

## 2024-12-12 NOTE — ASSESSMENT & PLAN NOTE
Both FBS/A1C back to nml range, recheck in 1 yr, healthy diet and regular exercise encouraged, will follow

## 2024-12-12 NOTE — PROGRESS NOTES
Name: Geovanna Leal      : 1955      MRN: [id number]  Encounter Provider: Leelee Garcia DO  Encounter Date: 2024   Encounter department: [facility] [address]   :  Assessment & Plan  Mild episode of recurrent major depressive disorder (HCC)  Mood not much better with starting low dose Buspar, will increase from 5 mg bid to 10 mg bid,  d/w pt that it takes 4-6 wks to get maximum benefit of med and that med has to be taken every day and to not miss doses of med, call with SE/new/worse mood/SI, re-eval in 6-8 wks  Orders:    busPIRone (BUSPAR) 10 mg tablet; Take 1 tablet (10 mg total) by mouth 2 (two) times a day    IFG (impaired fasting glucose)  Both FBS/A1C back to nml range, recheck in 1 yr, healthy diet and regular exercise encouraged, will follow       Deficiency of vitamin B12  B12 now wnl, con't current supplement, will follow        Vestibular disorder, bilateral  Doing well, just saw Neuro and has finished PT, con't HEP, call with relapse in symptoms       Benign tumor of spinal meningioma (HCC)  Just saw Neuro, con't imaging and f/u as per Neuro       Carotid stenosis, bilateral  Just saw Neuro, has f/u CUS ordered, on Plavix and statin, will follow, to ED with stroke TIA/symptoms       Stage 3a chronic kidney disease (HCC)  Lab Results   Component Value Date    EGFR 54 2024    EGFR 43 2024    EGFR 47 2023    CREATININE 1.04 2024    CREATININE 1.25 2024    CREATININE 1.16 2023   GFR stable, stressed BP/BS control and keeping hydrated, will follow every 6 mos or so          Colonoscopy  - 5 yrs     Mammo      Dexa  - osteopenia    CUS  - 2 yrs    CT chest  - annually and already ordered by Gwendolyn KHOURY       History of Present Illness     HPI Pt here for follow up appt and BW results    Last visit in Nov pt was noting some issues with up and down mood. We subsequently started her on Buspar 5 mg 1 tab  "PO bid. She was told to con't her current Lexapro. She is here for a mood/med check. She has been taking the Buspar as directed since last visit w/o significant SE.  She does not feel the medication has helped much with mood. She still feels down and sad but notes no great anxiety. She is sleeping well.      BW results were d/w pt in detail: FBS/A1C 94/5.4, BUN/Cr up at 14/1.04 (GFR 54), rest of CMP and B12 were wnl, CBC with  and RDW 16.5 but WBC, H/H and plt wnl    Def of nml vs IFG vs DM was d/w pt in detail. Diet/exercise was reviewed - wgt down 7 lbs.  She is watching her diet more but is not exercising.     Goal range for B12 reviewed. Current supplement reviewed.  She notes no SE with the supplement.     Pt saw Neuro (Dr. Jason) last week for f/u vestibular disorder, carotid atherosclerosis, and meningioma of spine - OV note reviewed. She was encouraged to con't HEP and call with new/worse dizziness. Was noted imaging of spine for meningioma f/u would be addressed in 2025-2182. She was told to do CUS next  year and con't Plavix and statin.      Review of Systems   Constitutional:  Negative for chills, fever and unexpected weight change.   HENT:  Negative for congestion and sore throat.    Eyes:  Negative for pain and visual disturbance.   Respiratory:  Negative for cough and shortness of breath.    Cardiovascular:  Negative for chest pain and palpitations.   Gastrointestinal:  Negative for abdominal pain, blood in stool, constipation, diarrhea, nausea and vomiting.   Musculoskeletal:  Negative for gait problem.   Skin:  Negative for rash and wound.   Neurological:  Negative for dizziness, syncope and headaches.   Hematological:  Does not bruise/bleed easily.   Psychiatric/Behavioral:  Positive for dysphoric mood.        Objective   /70 (BP Location: Left arm, Patient Position: Sitting, Cuff Size: Standard)   Pulse 61   Temp (!) 97.2 °F (36.2 °C) (Tympanic)   Ht 5' 5\" (1.651 m)   Wt 72.5 kg (159 " lb 12.8 oz)   SpO2 96%   BMI 26.59 kg/m²      Physical Exam  Vitals and nursing note reviewed.   Constitutional:       General: She is not in acute distress.     Appearance: She is well-developed. She is not ill-appearing.   HENT:      Head: Normocephalic and atraumatic.   Eyes:      General:         Right eye: No discharge.         Left eye: No discharge.      Conjunctiva/sclera: Conjunctivae normal.   Neck:      Thyroid: No thyromegaly.      Trachea: No tracheal deviation.   Pulmonary:      Effort: Pulmonary effort is normal. No respiratory distress.   Skin:     Findings: No bruising or rash.   Neurological:      General: No focal deficit present.      Mental Status: She is alert. Mental status is at baseline.      Motor: No abnormal muscle tone.      Gait: Gait normal.   Psychiatric:         Mood and Affect: Mood normal.         Behavior: Behavior normal.         Thought Content: Thought content normal.         Judgment: Judgment normal.

## 2024-12-12 NOTE — ASSESSMENT & PLAN NOTE
Mood not much better with starting low dose Buspar, will increase from 5 mg bid to 10 mg bid,  d/w pt that it takes 4-6 wks to get maximum benefit of med and that med has to be taken every day and to not miss doses of med, call with SE/new/worse mood/SI, re-eval in 6-8 wks  Orders:    busPIRone (BUSPAR) 10 mg tablet; Take 1 tablet (10 mg total) by mouth 2 (two) times a day

## 2024-12-22 DIAGNOSIS — R13.10 DYSPHAGIA, UNSPECIFIED TYPE: Chronic | ICD-10-CM

## 2024-12-22 DIAGNOSIS — I25.10 CORONARY ARTERY DISEASE INVOLVING NATIVE CORONARY ARTERY OF NATIVE HEART WITHOUT ANGINA PECTORIS: ICD-10-CM

## 2024-12-24 RX ORDER — CLOPIDOGREL BISULFATE 75 MG/1
75 TABLET ORAL DAILY
Qty: 90 TABLET | Refills: 1 | Status: SHIPPED | OUTPATIENT
Start: 2024-12-24

## 2024-12-24 RX ORDER — PANTOPRAZOLE SODIUM 40 MG/1
TABLET, DELAYED RELEASE ORAL
Qty: 90 TABLET | Refills: 1 | Status: SHIPPED | OUTPATIENT
Start: 2024-12-24

## 2025-02-07 ENCOUNTER — HOSPITAL ENCOUNTER (OUTPATIENT)
Dept: RADIOLOGY | Facility: HOSPITAL | Age: 70
End: 2025-02-07
Payer: MEDICARE

## 2025-02-07 ENCOUNTER — OFFICE VISIT (OUTPATIENT)
Dept: FAMILY MEDICINE CLINIC | Facility: HOSPITAL | Age: 70
End: 2025-02-07
Payer: MEDICARE

## 2025-02-07 VITALS
TEMPERATURE: 97.7 F | HEART RATE: 65 BPM | SYSTOLIC BLOOD PRESSURE: 89 MMHG | WEIGHT: 162.2 LBS | DIASTOLIC BLOOD PRESSURE: 64 MMHG | BODY MASS INDEX: 27.02 KG/M2 | HEIGHT: 65 IN | OXYGEN SATURATION: 96 %

## 2025-02-07 DIAGNOSIS — M54.50 ACUTE BILATERAL LOW BACK PAIN WITHOUT SCIATICA: ICD-10-CM

## 2025-02-07 DIAGNOSIS — Z78.0 OSTEOPENIA AFTER MENOPAUSE: ICD-10-CM

## 2025-02-07 DIAGNOSIS — R33.9 URINARY RETENTION: ICD-10-CM

## 2025-02-07 DIAGNOSIS — M85.80 OSTEOPENIA AFTER MENOPAUSE: ICD-10-CM

## 2025-02-07 DIAGNOSIS — Q07.00 ARNOLD-CHIARI MALFORMATION (HCC): ICD-10-CM

## 2025-02-07 DIAGNOSIS — F33.0 MILD EPISODE OF RECURRENT MAJOR DEPRESSIVE DISORDER (HCC): Primary | ICD-10-CM

## 2025-02-07 DIAGNOSIS — N18.31 STAGE 3A CHRONIC KIDNEY DISEASE (HCC): Chronic | ICD-10-CM

## 2025-02-07 DIAGNOSIS — D32.1 BENIGN TUMOR OF SPINAL MENINGIOMA (HCC): ICD-10-CM

## 2025-02-07 DIAGNOSIS — I95.9 HYPOTENSION, UNSPECIFIED HYPOTENSION TYPE: ICD-10-CM

## 2025-02-07 DIAGNOSIS — J44.9 CHRONIC OBSTRUCTIVE PULMONARY DISEASE, UNSPECIFIED COPD TYPE (HCC): ICD-10-CM

## 2025-02-07 LAB
SL AMB  POCT GLUCOSE, UA: NORMAL
SL AMB LEUKOCYTE ESTERASE,UA: NORMAL
SL AMB POCT BILIRUBIN,UA: NORMAL
SL AMB POCT BLOOD,UA: NORMAL
SL AMB POCT CLARITY,UA: CLEAR
SL AMB POCT COLOR,UA: YELLOW
SL AMB POCT KETONES,UA: NORMAL
SL AMB POCT NITRITE,UA: NORMAL
SL AMB POCT PH,UA: 6
SL AMB POCT SPECIFIC GRAVITY,UA: 1.01
SL AMB POCT URINE PROTEIN: NORMAL
SL AMB POCT UROBILINOGEN: NORMAL

## 2025-02-07 PROCEDURE — G2211 COMPLEX E/M VISIT ADD ON: HCPCS | Performed by: INTERNAL MEDICINE

## 2025-02-07 PROCEDURE — 72110 X-RAY EXAM L-2 SPINE 4/>VWS: CPT

## 2025-02-07 PROCEDURE — 81002 URINALYSIS NONAUTO W/O SCOPE: CPT | Performed by: INTERNAL MEDICINE

## 2025-02-07 PROCEDURE — 99214 OFFICE O/P EST MOD 30 MIN: CPT | Performed by: INTERNAL MEDICINE

## 2025-02-07 NOTE — ASSESSMENT & PLAN NOTE
Symptoms with back pain and urinary retention concerning, MRI of T-spine just done 11/2023, Neuro exam nml, will follow

## 2025-02-07 NOTE — PROGRESS NOTES
Name: Geovanna Leal      : 1955      MRN: [id number]  Encounter Provider: Leelee Garcia DO  Encounter Date: 2025   Encounter department: [facility] PRIMARY CARE [address]   :  Assessment & Plan  Mild episode of recurrent major depressive disorder (HCC)  Mood better with increase in Buspar, pt happy with current regimen and feels no med changes are needed so will con't current regimen, recheck in 3 mos and if still controlled will spread visits out further, call with new/worse mood in the interm     Acute bilateral low back pain without sciatica  Exam nml today but concerned with progressive urinary retention, UA in office neg, Neuro exam nml, will refer to PT and and check Xray L-Spine, MRI likely in the future, discussed increase in Gabapentin if needed but for now will stick with Tylenol and heat/ice, stretches,massage and trial PT - if no better or symptoms worsen pt was told to call, red flag cauda equina symptoms reviewed - to ED if they occur  Orders:    XR spine lumbar minimum 4 views non injury; Future    POCT urine dip    Ambulatory Referral to Physical Therapy; Future    Urinary retention    Orders:    US kidney and bladder with pvr; Future    Stage 3a chronic kidney disease (HCC)  Lab Results   Component Value Date    EGFR 54 2024    EGFR 43 2024    EGFR 47 2023    CREATININE 1.04 2024    CREATININE 1.25 2024    CREATININE 1.16 2023   Urged to stop Ibuprofen if poss and try and stick with Tylenol d/t her CKD stage 3, urged to keep hydrated, will follow         Hypotension, unspecified hypotension type  BP very low today but pt w/o symptoms and reporting much higher readings at home and are nml, will con't current BP meds for now but pt was urged to call asap if symptoms occur and was told to check BP at home bid for the next 4-5 days and was told to call if BP<100/60 OR again if symptoms occur, will follow       Benign tumor of spinal  "meningioma (HCC)  Symptoms with back pain and urinary retention concerning, MRI of T-spine just done 11/2023, Neuro exam nml, will follow       Chronic obstructive pulmonary disease, unspecified COPD type (HCC)  Grandson and  ill but pt currently w/o resp symptoms, urged to watch closely with her COPD, con't current inhalers, call with resp concerns       Arnold-Chiari malformation (HCC)  MRI brain just done 7/24, follows with Neuro regularly, no red flag Neuro symptoms present in regards to her Chiari       Osteopenia after menopause  Dexa due - order given  Orders:    DXA bone density spine hip and pelvis; Future         Colonoscopy 6/22 - 5 yrs     Mammo 7/24     Dexa 2/23 - osteopenia - order given for 2025    CUS 4/23 - 2 yrs - ordered by Neuro     CT chest 6/24 - annually and already ordered by Pulm      BW 12/24 - annually      History of Present Illness   HPI Pt here for follow up appt    Last visit in Dec to was noting con't down/sad mood. We subsequently increased her Buspar from 5 mg to 10 mg bid. She is here for a mood/med check.  She has been taking higher dose of Buspar since last visit w/o significant SE. She feels more ambition and is getting out and doing more. She notes no down/sad mood. She is sleeping well and her appetite is getting better. She is happy with current regimen.     Pt noting 3 wks of B/L low back pain. She notes no specific fall/trauma/new activity. Pain is described as \"sharp\" and is constant. Pain is worse with standing and walking and better with sitting still.  She notes no numbness in the LE and denies pins and needles but notes \"my legs feel like they are going to pass out \" and she has some weakness \"like when you stand up and are lightheaded but in my legs\". She notes no saddle anesthesia and denies loss of bowel or bladder control. She notes when she is urinating she has to move around while sitting on the toilet to urinate. She notes stream will start then stop and " "she has to change position to get stream going again.  These urinary symptoms happened in the past once or twice a month or so but now are daily.  Currently she is using Ibuprofen w/some benefit.  She is on Gabapentin 400 mg 2 tab bid.  She has a known thoracic meningioma and was not sure if these symptoms were related to that.     BP low today. She does check BP at home and reports she is usually 120-130/70 -80.  She notes some dizziness today. She has been drinking a lot of fluids and is eating well.    Pt notes no recently cold symptoms or resp infections with her COPD. She denies any recent use of her rescue inhaler. Her  and grandson have some cold symptoms.    MRI of brain done July 24 for her Heraclioari. She follows with Neuro regularly. She notes no issues with HA's/seizures.      Review of Systems   Constitutional:  Positive for fatigue. Negative for chills and fever.   HENT:  Negative for congestion and sore throat.    Eyes:  Negative for pain and visual disturbance.   Respiratory:  Negative for cough, shortness of breath and wheezing.    Cardiovascular:  Negative for chest pain, palpitations and leg swelling.   Gastrointestinal:  Negative for abdominal pain, blood in stool, constipation, diarrhea, nausea and vomiting.   Genitourinary:  Positive for difficulty urinating. Negative for dysuria, flank pain and hematuria.   Musculoskeletal:  Positive for back pain and gait problem.   Skin:  Negative for rash and wound.   Neurological:  Positive for weakness. Negative for dizziness, syncope, light-headedness, numbness and headaches.   Hematological:  Does not bruise/bleed easily.   Psychiatric/Behavioral:  Negative for dysphoric mood and sleep disturbance. The patient is not nervous/anxious.        Objective   BP (!) 89/64 (BP Location: Left arm, Patient Position: Sitting, Cuff Size: Standard)   Pulse 65   Temp 97.7 °F (36.5 °C) (Tympanic)   Ht 5' 5\" (1.651 m)   Wt 73.6 kg (162 lb 3.2 oz)   SpO2 96%   " BMI 26.99 kg/m²      Physical Exam  Vitals and nursing note reviewed.   Constitutional:       General: She is not in acute distress.     Appearance: She is well-developed. She is not ill-appearing.   HENT:      Head: Normocephalic and atraumatic.      Right Ear: External ear normal.      Left Ear: External ear normal.      Mouth/Throat:      Mouth: Mucous membranes are moist.      Pharynx: Oropharynx is clear. No oropharyngeal exudate.   Eyes:      General:         Right eye: No discharge.         Left eye: No discharge.      Conjunctiva/sclera: Conjunctivae normal.   Neck:      Thyroid: No thyromegaly.      Trachea: No tracheal deviation.   Cardiovascular:      Rate and Rhythm: Normal rate and regular rhythm.      Heart sounds: Normal heart sounds. No murmur heard.  Pulmonary:      Effort: Pulmonary effort is normal. No respiratory distress.      Breath sounds: Normal breath sounds. No wheezing, rhonchi or rales.   Abdominal:      General: There is no distension.      Palpations: Abdomen is soft.      Tenderness: There is no abdominal tenderness. There is no guarding or rebound.   Musculoskeletal:         General: No tenderness, deformity or signs of injury.      Cervical back: Neck supple.   Skin:     General: Skin is warm and dry.      Coloration: Skin is not pale.      Findings: No rash.   Neurological:      General: No focal deficit present.      Mental Status: She is alert. Mental status is at baseline.      Sensory: No sensory deficit.      Motor: No weakness or abnormal muscle tone.      Gait: Gait abnormal.      Deep Tendon Reflexes: Reflexes normal.      Comments: 5/5 B/L LE muscle strength, 2/4 patellar DTR B/L LE, + SLR test on L, gait slightly stooped and a bit unsteady upon standing, able to toe walk and heel walk with pain but NO weakness    Psychiatric:         Mood and Affect: Mood normal.         Behavior: Behavior normal.         Thought Content: Thought content normal.         Judgment: Judgment  normal.

## 2025-02-07 NOTE — ASSESSMENT & PLAN NOTE
MRI brain just done 7/24, follows with Neuro regularly, no red flag Neuro symptoms present in regards to her Chiari

## 2025-02-07 NOTE — ASSESSMENT & PLAN NOTE
Lab Results   Component Value Date    EGFR 54 12/07/2024    EGFR 43 05/04/2024    EGFR 47 11/04/2023    CREATININE 1.04 12/07/2024    CREATININE 1.25 05/04/2024    CREATININE 1.16 11/04/2023   Urged to stop Ibuprofen if poss and try and stick with Tylenol d/t her CKD stage 3, urged to keep hydrated, will follow

## 2025-02-07 NOTE — ASSESSMENT & PLAN NOTE
Grandson and  ill but pt currently w/o resp symptoms, urged to watch closely with her COPD, con't current inhalers, call with resp concerns

## 2025-02-07 NOTE — ASSESSMENT & PLAN NOTE
Exam nml today but concerned with progressive urinary retention, UA in office neg, Neuro exam nml, will refer to PT and and check Xray L-Spine, MRI likely in the future, discussed increase in Gabapentin if needed but for now will stick with Tylenol and heat/ice, stretches,massage and trial PT - if no better or symptoms worsen pt was told to call, red flag cauda equina symptoms reviewed - to ED if they occur  Orders:    XR spine lumbar minimum 4 views non injury; Future    POCT urine dip    Ambulatory Referral to Physical Therapy; Future

## 2025-02-07 NOTE — ASSESSMENT & PLAN NOTE
Mood better with increase in Buspar, pt happy with current regimen and feels no med changes are needed so will con't current regimen, recheck in 3 mos and if still controlled will spread visits out further, call with new/worse mood in the interm

## 2025-02-07 NOTE — ASSESSMENT & PLAN NOTE
BP very low today but pt w/o symptoms and reporting much higher readings at home and are nml, will con't current BP meds for now but pt was urged to call asap if symptoms occur and was told to check BP at home bid for the next 4-5 days and was told to call if BP<100/60 OR again if symptoms occur, will follow

## 2025-02-12 ENCOUNTER — APPOINTMENT (EMERGENCY)
Dept: CT IMAGING | Facility: HOSPITAL | Age: 70
DRG: 177 | End: 2025-02-12
Payer: MEDICARE

## 2025-02-12 ENCOUNTER — APPOINTMENT (EMERGENCY)
Dept: RADIOLOGY | Facility: HOSPITAL | Age: 70
DRG: 177 | End: 2025-02-12
Payer: MEDICARE

## 2025-02-12 ENCOUNTER — OFFICE VISIT (OUTPATIENT)
Dept: URGENT CARE | Facility: CLINIC | Age: 70
End: 2025-02-12

## 2025-02-12 ENCOUNTER — RESULTS FOLLOW-UP (OUTPATIENT)
Dept: FAMILY MEDICINE CLINIC | Facility: HOSPITAL | Age: 70
End: 2025-02-12

## 2025-02-12 ENCOUNTER — HOSPITAL ENCOUNTER (INPATIENT)
Facility: HOSPITAL | Age: 70
LOS: 5 days | Discharge: HOME | DRG: 177 | End: 2025-02-17
Attending: EMERGENCY MEDICINE | Admitting: INTERNAL MEDICINE
Payer: MEDICARE

## 2025-02-12 VITALS
OXYGEN SATURATION: 91 % | HEART RATE: 65 BPM | SYSTOLIC BLOOD PRESSURE: 109 MMHG | DIASTOLIC BLOOD PRESSURE: 57 MMHG | TEMPERATURE: 98.5 F | RESPIRATION RATE: 20 BRPM

## 2025-02-12 DIAGNOSIS — J10.1 INFLUENZA A: ICD-10-CM

## 2025-02-12 DIAGNOSIS — J96.01 ACUTE RESPIRATORY FAILURE WITH HYPOXIA (HCC): ICD-10-CM

## 2025-02-12 DIAGNOSIS — J18.9 PNEUMONIA: Primary | ICD-10-CM

## 2025-02-12 DIAGNOSIS — R09.02 HYPOXIA: Primary | ICD-10-CM

## 2025-02-12 DIAGNOSIS — R09.02 HYPOXIA: ICD-10-CM

## 2025-02-12 DIAGNOSIS — U07.1 COVID: ICD-10-CM

## 2025-02-12 DIAGNOSIS — R06.02 SHORTNESS OF BREATH: ICD-10-CM

## 2025-02-12 DIAGNOSIS — R68.89 FLU-LIKE SYMPTOMS: ICD-10-CM

## 2025-02-12 PROBLEM — J44.1 CHRONIC OBSTRUCTIVE PULMONARY DISEASE WITH ACUTE EXACERBATION (HCC): Status: ACTIVE | Noted: 2019-07-18

## 2025-02-12 PROBLEM — J11.1 FLU: Status: ACTIVE | Noted: 2025-02-12

## 2025-02-12 LAB
2HR DELTA HS TROPONIN: -1 NG/L
ALBUMIN SERPL BCG-MCNC: 3.3 G/DL (ref 3.5–5)
ALP SERPL-CCNC: 90 U/L (ref 34–104)
ALT SERPL W P-5'-P-CCNC: 15 U/L (ref 7–52)
ANION GAP SERPL CALCULATED.3IONS-SCNC: 10 MMOL/L (ref 4–13)
AST SERPL W P-5'-P-CCNC: 31 U/L (ref 13–39)
BASOPHILS # BLD AUTO: 0.02 THOUSANDS/ΜL (ref 0–0.1)
BASOPHILS NFR BLD AUTO: 0 % (ref 0–1)
BILIRUB DIRECT SERPL-MCNC: 0.16 MG/DL (ref 0–0.2)
BILIRUB SERPL-MCNC: 0.64 MG/DL (ref 0.2–1)
BNP SERPL-MCNC: 329 PG/ML (ref 0–100)
BUN SERPL-MCNC: 25 MG/DL (ref 5–25)
CALCIUM SERPL-MCNC: 9 MG/DL (ref 8.4–10.2)
CARDIAC TROPONIN I PNL SERPL HS: 40 NG/L (ref ?–50)
CARDIAC TROPONIN I PNL SERPL HS: 41 NG/L (ref ?–50)
CHLORIDE SERPL-SCNC: 102 MMOL/L (ref 96–108)
CK SERPL-CCNC: 76 U/L (ref 26–192)
CO2 SERPL-SCNC: 23 MMOL/L (ref 21–32)
CREAT SERPL-MCNC: 1.15 MG/DL (ref 0.6–1.3)
CRP SERPL QL: 302.9 MG/L
EOSINOPHIL # BLD AUTO: 0.16 THOUSAND/ΜL (ref 0–0.61)
EOSINOPHIL NFR BLD AUTO: 3 % (ref 0–6)
ERYTHROCYTE [DISTWIDTH] IN BLOOD BY AUTOMATED COUNT: 15.9 % (ref 11.6–15.1)
FLUAV RNA RESP QL NAA+PROBE: POSITIVE
FLUBV RNA RESP QL NAA+PROBE: NEGATIVE
GFR SERPL CREATININE-BSD FRML MDRD: 47 ML/MIN/1.73SQ M
GLUCOSE SERPL-MCNC: 108 MG/DL (ref 65–140)
HCT VFR BLD AUTO: 38.2 % (ref 34.8–46.1)
HGB BLD-MCNC: 12.9 G/DL (ref 11.5–15.4)
IMM GRANULOCYTES # BLD AUTO: 0.07 THOUSAND/UL (ref 0–0.2)
IMM GRANULOCYTES NFR BLD AUTO: 1 % (ref 0–2)
LACTATE SERPL-SCNC: 1.3 MMOL/L (ref 0.5–2)
LYMPHOCYTES # BLD AUTO: 0.5 THOUSANDS/ΜL (ref 0.6–4.47)
LYMPHOCYTES NFR BLD AUTO: 9 % (ref 14–44)
MAGNESIUM SERPL-MCNC: 1.9 MG/DL (ref 1.9–2.7)
MCH RBC QN AUTO: 33.2 PG (ref 26.8–34.3)
MCHC RBC AUTO-ENTMCNC: 33.8 G/DL (ref 31.4–37.4)
MCV RBC AUTO: 98 FL (ref 82–98)
MONOCYTES # BLD AUTO: 0.58 THOUSAND/ΜL (ref 0.17–1.22)
MONOCYTES NFR BLD AUTO: 11 % (ref 4–12)
NEUTROPHILS # BLD AUTO: 4.1 THOUSANDS/ΜL (ref 1.85–7.62)
NEUTS SEG NFR BLD AUTO: 76 % (ref 43–75)
NRBC BLD AUTO-RTO: 0 /100 WBCS
PLATELET # BLD AUTO: 364 THOUSANDS/UL (ref 149–390)
PMV BLD AUTO: 9.2 FL (ref 8.9–12.7)
POTASSIUM SERPL-SCNC: 3.4 MMOL/L (ref 3.5–5.3)
PROCALCITONIN SERPL-MCNC: 5.49 NG/ML
PROT SERPL-MCNC: 6.8 G/DL (ref 6.4–8.4)
RBC # BLD AUTO: 3.89 MILLION/UL (ref 3.81–5.12)
RSV RNA RESP QL NAA+PROBE: NEGATIVE
SARS-COV-2 RNA RESP QL NAA+PROBE: POSITIVE
SODIUM SERPL-SCNC: 135 MMOL/L (ref 135–147)
WBC # BLD AUTO: 5.43 THOUSAND/UL (ref 4.31–10.16)

## 2025-02-12 PROCEDURE — 99285 EMERGENCY DEPT VISIT HI MDM: CPT | Performed by: EMERGENCY MEDICINE

## 2025-02-12 PROCEDURE — 84145 PROCALCITONIN (PCT): CPT | Performed by: EMERGENCY MEDICINE

## 2025-02-12 PROCEDURE — G0463 HOSPITAL OUTPT CLINIC VISIT: HCPCS

## 2025-02-12 PROCEDURE — 94640 AIRWAY INHALATION TREATMENT: CPT

## 2025-02-12 PROCEDURE — 96365 THER/PROPH/DIAG IV INF INIT: CPT

## 2025-02-12 PROCEDURE — 93005 ELECTROCARDIOGRAM TRACING: CPT

## 2025-02-12 PROCEDURE — 85025 COMPLETE CBC W/AUTO DIFF WBC: CPT | Performed by: EMERGENCY MEDICINE

## 2025-02-12 PROCEDURE — 0241U HB NFCT DS VIR RESP RNA 4 TRGT: CPT

## 2025-02-12 PROCEDURE — 99285 EMERGENCY DEPT VISIT HI MDM: CPT

## 2025-02-12 PROCEDURE — 82550 ASSAY OF CK (CPK): CPT | Performed by: EMERGENCY MEDICINE

## 2025-02-12 PROCEDURE — 71045 X-RAY EXAM CHEST 1 VIEW: CPT

## 2025-02-12 PROCEDURE — 99223 1ST HOSP IP/OBS HIGH 75: CPT | Performed by: INTERNAL MEDICINE

## 2025-02-12 PROCEDURE — 99213 OFFICE O/P EST LOW 20 MIN: CPT

## 2025-02-12 PROCEDURE — 83605 ASSAY OF LACTIC ACID: CPT | Performed by: EMERGENCY MEDICINE

## 2025-02-12 PROCEDURE — XW033E5 INTRODUCTION OF REMDESIVIR ANTI-INFECTIVE INTO PERIPHERAL VEIN, PERCUTANEOUS APPROACH, NEW TECHNOLOGY GROUP 5: ICD-10-PCS | Performed by: INTERNAL MEDICINE

## 2025-02-12 PROCEDURE — 80076 HEPATIC FUNCTION PANEL: CPT | Performed by: EMERGENCY MEDICINE

## 2025-02-12 PROCEDURE — 84484 ASSAY OF TROPONIN QUANT: CPT | Performed by: EMERGENCY MEDICINE

## 2025-02-12 PROCEDURE — 71275 CT ANGIOGRAPHY CHEST: CPT

## 2025-02-12 PROCEDURE — 96367 TX/PROPH/DG ADDL SEQ IV INF: CPT

## 2025-02-12 PROCEDURE — 36415 COLL VENOUS BLD VENIPUNCTURE: CPT | Performed by: EMERGENCY MEDICINE

## 2025-02-12 PROCEDURE — 83735 ASSAY OF MAGNESIUM: CPT | Performed by: EMERGENCY MEDICINE

## 2025-02-12 PROCEDURE — 87040 BLOOD CULTURE FOR BACTERIA: CPT | Performed by: EMERGENCY MEDICINE

## 2025-02-12 PROCEDURE — 83880 ASSAY OF NATRIURETIC PEPTIDE: CPT | Performed by: EMERGENCY MEDICINE

## 2025-02-12 PROCEDURE — 86140 C-REACTIVE PROTEIN: CPT | Performed by: EMERGENCY MEDICINE

## 2025-02-12 PROCEDURE — 80048 BASIC METABOLIC PNL TOTAL CA: CPT | Performed by: EMERGENCY MEDICINE

## 2025-02-12 PROCEDURE — 94664 DEMO&/EVAL PT USE INHALER: CPT

## 2025-02-12 PROCEDURE — 84484 ASSAY OF TROPONIN QUANT: CPT | Performed by: INTERNAL MEDICINE

## 2025-02-12 PROCEDURE — 94760 N-INVAS EAR/PLS OXIMETRY 1: CPT

## 2025-02-12 RX ORDER — PANTOPRAZOLE SODIUM 40 MG/1
40 TABLET, DELAYED RELEASE ORAL
Status: DISCONTINUED | OUTPATIENT
Start: 2025-02-13 | End: 2025-02-17 | Stop reason: HOSPADM

## 2025-02-12 RX ORDER — ACETAMINOPHEN 325 MG/1
650 TABLET ORAL EVERY 6 HOURS PRN
Status: DISCONTINUED | OUTPATIENT
Start: 2025-02-12 | End: 2025-02-17 | Stop reason: HOSPADM

## 2025-02-12 RX ORDER — SODIUM CHLORIDE FOR INHALATION 0.9 %
3 VIAL, NEBULIZER (ML) INHALATION
Status: DISCONTINUED | OUTPATIENT
Start: 2025-02-12 | End: 2025-02-12

## 2025-02-12 RX ORDER — LEVALBUTEROL INHALATION SOLUTION 1.25 MG/3ML
1.25 SOLUTION RESPIRATORY (INHALATION)
Status: DISCONTINUED | OUTPATIENT
Start: 2025-02-12 | End: 2025-02-12

## 2025-02-12 RX ORDER — GABAPENTIN 400 MG/1
400 CAPSULE ORAL 2 TIMES DAILY
Status: DISCONTINUED | OUTPATIENT
Start: 2025-02-12 | End: 2025-02-17 | Stop reason: HOSPADM

## 2025-02-12 RX ORDER — CEFEPIME HYDROCHLORIDE 2 G/50ML
2000 INJECTION, SOLUTION INTRAVENOUS ONCE
Status: COMPLETED | OUTPATIENT
Start: 2025-02-12 | End: 2025-02-12

## 2025-02-12 RX ORDER — BUSPIRONE HYDROCHLORIDE 10 MG/1
10 TABLET ORAL 2 TIMES DAILY
Status: DISCONTINUED | OUTPATIENT
Start: 2025-02-12 | End: 2025-02-17 | Stop reason: HOSPADM

## 2025-02-12 RX ORDER — ISOSORBIDE MONONITRATE 30 MG/1
30 TABLET, EXTENDED RELEASE ORAL DAILY
Status: DISCONTINUED | OUTPATIENT
Start: 2025-02-13 | End: 2025-02-17 | Stop reason: HOSPADM

## 2025-02-12 RX ORDER — ALBUTEROL SULFATE 0.83 MG/ML
2.5 SOLUTION RESPIRATORY (INHALATION) EVERY 6 HOURS PRN
Status: DISCONTINUED | OUTPATIENT
Start: 2025-02-12 | End: 2025-02-17 | Stop reason: HOSPADM

## 2025-02-12 RX ORDER — GUAIFENESIN 600 MG/1
600 TABLET, EXTENDED RELEASE ORAL 2 TIMES DAILY
Status: DISCONTINUED | OUTPATIENT
Start: 2025-02-12 | End: 2025-02-17 | Stop reason: HOSPADM

## 2025-02-12 RX ORDER — OSELTAMIVIR PHOSPHATE 30 MG/1
30 CAPSULE ORAL EVERY 12 HOURS SCHEDULED
Status: COMPLETED | OUTPATIENT
Start: 2025-02-13 | End: 2025-02-17

## 2025-02-12 RX ORDER — BUDESONIDE 0.5 MG/2ML
0.5 INHALANT ORAL
Status: DISCONTINUED | OUTPATIENT
Start: 2025-02-12 | End: 2025-02-17 | Stop reason: HOSPADM

## 2025-02-12 RX ORDER — ATORVASTATIN CALCIUM 40 MG/1
80 TABLET, FILM COATED ORAL
Status: DISCONTINUED | OUTPATIENT
Start: 2025-02-12 | End: 2025-02-17 | Stop reason: HOSPADM

## 2025-02-12 RX ORDER — CLOPIDOGREL BISULFATE 75 MG/1
75 TABLET ORAL DAILY
Status: DISCONTINUED | OUTPATIENT
Start: 2025-02-13 | End: 2025-02-17 | Stop reason: HOSPADM

## 2025-02-12 RX ORDER — ONDANSETRON 2 MG/ML
4 INJECTION INTRAMUSCULAR; INTRAVENOUS EVERY 6 HOURS PRN
Status: DISCONTINUED | OUTPATIENT
Start: 2025-02-12 | End: 2025-02-17 | Stop reason: HOSPADM

## 2025-02-12 RX ORDER — ALBUTEROL SULFATE 90 UG/1
2 INHALANT RESPIRATORY (INHALATION) EVERY 4 HOURS PRN
Status: DISCONTINUED | OUTPATIENT
Start: 2025-02-12 | End: 2025-02-12

## 2025-02-12 RX ORDER — ALBUTEROL SULFATE 0.83 MG/ML
5 SOLUTION RESPIRATORY (INHALATION) ONCE
Status: COMPLETED | OUTPATIENT
Start: 2025-02-12 | End: 2025-02-12

## 2025-02-12 RX ORDER — METHYLPREDNISOLONE SODIUM SUCCINATE 40 MG/ML
40 INJECTION, POWDER, LYOPHILIZED, FOR SOLUTION INTRAMUSCULAR; INTRAVENOUS EVERY 8 HOURS
Status: DISCONTINUED | OUTPATIENT
Start: 2025-02-12 | End: 2025-02-14

## 2025-02-12 RX ORDER — HEPARIN SODIUM 5000 [USP'U]/ML
5000 INJECTION, SOLUTION INTRAVENOUS; SUBCUTANEOUS EVERY 8 HOURS SCHEDULED
Status: DISCONTINUED | OUTPATIENT
Start: 2025-02-12 | End: 2025-02-17 | Stop reason: HOSPADM

## 2025-02-12 RX ORDER — CEFTRIAXONE 1 G/50ML
1000 INJECTION, SOLUTION INTRAVENOUS EVERY 24 HOURS
Status: DISCONTINUED | OUTPATIENT
Start: 2025-02-20 | End: 2025-02-17

## 2025-02-12 RX ORDER — AZITHROMYCIN 500 MG/1
500 TABLET, FILM COATED ORAL EVERY 24 HOURS
Status: COMPLETED | OUTPATIENT
Start: 2025-02-13 | End: 2025-02-14

## 2025-02-12 RX ORDER — OSELTAMIVIR PHOSPHATE 75 MG/1
75 CAPSULE ORAL ONCE
Status: COMPLETED | OUTPATIENT
Start: 2025-02-12 | End: 2025-02-12

## 2025-02-12 RX ORDER — CARVEDILOL 12.5 MG/1
12.5 TABLET ORAL 2 TIMES DAILY
Status: DISCONTINUED | OUTPATIENT
Start: 2025-02-12 | End: 2025-02-17 | Stop reason: HOSPADM

## 2025-02-12 RX ORDER — FORMOTEROL FUMARATE DIHYDRATE 20 UG/2ML
20 SOLUTION RESPIRATORY (INHALATION)
Status: DISCONTINUED | OUTPATIENT
Start: 2025-02-12 | End: 2025-02-17 | Stop reason: HOSPADM

## 2025-02-12 RX ORDER — ESCITALOPRAM OXALATE 20 MG/1
20 TABLET ORAL DAILY
Status: DISCONTINUED | OUTPATIENT
Start: 2025-02-13 | End: 2025-02-17 | Stop reason: HOSPADM

## 2025-02-12 RX ORDER — LEVALBUTEROL INHALATION SOLUTION 1.25 MG/3ML
1.25 SOLUTION RESPIRATORY (INHALATION)
Status: DISCONTINUED | OUTPATIENT
Start: 2025-02-12 | End: 2025-02-17 | Stop reason: HOSPADM

## 2025-02-12 RX ADMIN — LEVALBUTEROL HYDROCHLORIDE 1.25 MG: 1.25 SOLUTION RESPIRATORY (INHALATION) at 19:50

## 2025-02-12 RX ADMIN — GABAPENTIN 400 MG: 400 CAPSULE ORAL at 18:45

## 2025-02-12 RX ADMIN — CARVEDILOL 12.5 MG: 12.5 TABLET, FILM COATED ORAL at 18:40

## 2025-02-12 RX ADMIN — CEFEPIME HYDROCHLORIDE 2000 MG: 2 INJECTION, SOLUTION INTRAVENOUS at 15:10

## 2025-02-12 RX ADMIN — IPRATROPIUM BROMIDE 0.5 MG: 0.5 SOLUTION RESPIRATORY (INHALATION) at 19:50

## 2025-02-12 RX ADMIN — IPRATROPIUM BROMIDE 0.5 MG: 0.5 SOLUTION RESPIRATORY (INHALATION) at 14:54

## 2025-02-12 RX ADMIN — ALBUTEROL SULFATE 5 MG: 2.5 SOLUTION RESPIRATORY (INHALATION) at 17:14

## 2025-02-12 RX ADMIN — IPRATROPIUM BROMIDE 0.5 MG: 0.5 SOLUTION RESPIRATORY (INHALATION) at 17:14

## 2025-02-12 RX ADMIN — OSELTAMAVIR PHOSPHATE 75 MG: 75 CAPSULE ORAL at 17:51

## 2025-02-12 RX ADMIN — FORMOTEROL FUMARATE 20 MCG: 20 SOLUTION RESPIRATORY (INHALATION) at 19:49

## 2025-02-12 RX ADMIN — AZITHROMYCIN MONOHYDRATE 500 MG: 500 INJECTION, POWDER, LYOPHILIZED, FOR SOLUTION INTRAVENOUS at 16:00

## 2025-02-12 RX ADMIN — IOHEXOL 85 ML: 350 INJECTION, SOLUTION INTRAVENOUS at 15:53

## 2025-02-12 RX ADMIN — ALBUTEROL SULFATE 5 MG: 2.5 SOLUTION RESPIRATORY (INHALATION) at 14:54

## 2025-02-12 RX ADMIN — REMDESIVIR 200 MG: 100 INJECTION, POWDER, LYOPHILIZED, FOR SOLUTION INTRAVENOUS at 18:33

## 2025-02-12 RX ADMIN — BUSPIRONE HYDROCHLORIDE 10 MG: 10 TABLET ORAL at 22:26

## 2025-02-12 RX ADMIN — METHYLPREDNISOLONE SODIUM SUCCINATE 40 MG: 40 INJECTION, POWDER, FOR SOLUTION INTRAMUSCULAR; INTRAVENOUS at 18:41

## 2025-02-12 RX ADMIN — ATORVASTATIN CALCIUM 80 MG: 40 TABLET, FILM COATED ORAL at 18:41

## 2025-02-12 RX ADMIN — BUDESONIDE 0.5 MG: 0.5 INHALANT ORAL at 19:50

## 2025-02-12 RX ADMIN — HEPARIN SODIUM 5000 UNITS: 5000 INJECTION INTRAVENOUS; SUBCUTANEOUS at 22:27

## 2025-02-12 RX ADMIN — GUAIFENESIN 600 MG: 600 TABLET, EXTENDED RELEASE ORAL at 18:41

## 2025-02-12 NOTE — PROGRESS NOTES
[facility]        NAME: Geovanna Leal is a 69 y.o. female  : 1955    MRN: [id number]  DATE: 2025  TIME: 5:49 PM    Assessment and Plan   Hypoxia [R09.02]  1. Hypoxia  Transfer to other facility      2. Shortness of breath  Transfer to other facility      3. Flu-like symptoms  Transfer to other facility          Patient's resting SpO2 93-96% in clinic, drops to 90% while talking and to 88% with ambulation. Patient notes [facility] COPD states SpO2 usually ~95%. She is not on home O2. Patient agreeable to seek further evaluation and management of her symptoms in the ED. EMS transport offered, patient declined. She is alert, oriented, and capable of making her own medical decisions. Spouse comfortable taking patient to [facility] now via private vehicle.      Patient Instructions     You are to go to the ED for further evaluation of your symptoms.    Follow-up with your PCP after the ED.     If tests are performed, our office will contact you with results only if changes need to made to the care plan discussed with you at the visit. You can review your full results on [facility].      Chief Complaint     Chief Complaint   Patient presents with    Cold Like Symptoms     Patient started  night with a fever, cough, chest congestion and fatigue          History of Present Illness       69-year-old female presenting with fatigue, fevers, congestion, and a cough x 3 days. Feels short of breath and can hear herself wheezing. No chest pain. Spouse recently ill with similar symptoms.        Review of Systems   Review of Systems   Constitutional:  Positive for chills, fatigue and fever.   HENT:  Positive for congestion. Negative for ear pain and sore throat.    Eyes:  Negative for discharge and redness.   Respiratory:  Positive for cough, shortness of breath and wheezing.    Cardiovascular:  Negative for chest pain, palpitations and leg swelling.   Gastrointestinal:  Negative for abdominal  pain, diarrhea, nausea and vomiting.   Neurological:  Negative for dizziness, light-headedness and headaches.         Current Medications     No current facility-administered medications for this visit.    Current Outpatient Medications:     albuterol (2.5 mg/3 mL) 0.083 % nebulizer solution, Take 3 mL (2.5 mg total) by nebulization every 6 (six) hours as needed for wheezing or shortness of breath, Disp: 1080 mL, Rfl: 3    albuterol (ProAir HFA) 90 mcg/act inhaler, Inhale 2 puffs every 4 (four) hours as needed for wheezing or shortness of breath, Disp: 8.5 g, Rfl: 0    atorvastatin (LIPITOR) 80 mg tablet, Take 1 tablet by mouth once daily, Disp: 90 tablet, Rfl: 1    busPIRone (BUSPAR) 10 mg tablet, Take 1 tablet (10 mg total) by mouth 2 (two) times a day, Disp: 60 tablet, Rfl: 2    calcium carbonate (OS-FLASH) 600 MG tablet, Take 600 mg by mouth daily, Disp: , Rfl:     carvedilol (COREG) 12.5 mg tablet, Take 1 tablet by mouth twice daily, Disp: 180 tablet, Rfl: 0    cholecalciferol (VITAMIN D3) 1,000 units tablet, Take 1,000 Units by mouth daily, Disp: , Rfl:     clopidogrel (PLAVIX) 75 mg tablet, Take 1 tablet by mouth once daily, Disp: 90 tablet, Rfl: 1    escitalopram (LEXAPRO) 20 mg tablet, Take 1 tablet (20 mg total) by mouth daily, Disp: , Rfl:     fluticasone-umeclidinium-vilanterol (Trelegy Ellipta) 100-62.5-25 mcg/actuation inhaler, Inhale 1 puff daily Rinse mouth after use, Disp: 90 each, Rfl: 2    gabapentin (NEURONTIN) 400 mg capsule, TAKE 2 CAPSULES BY MOUTH TWICE DAILY (IN THE MORNING AND AT BEDTIME), Disp: 360 capsule, Rfl: 2    isosorbide mononitrate (IMDUR) 30 mg 24 hr tablet, Take 1 tablet by mouth once daily, Disp: 90 tablet, Rfl: 0    pantoprazole (PROTONIX) 40 mg tablet, TAKE 1 TABLET BY MOUTH ONCE DAILY BEFORE BREAKFAST, Disp: 90 tablet, Rfl: 1    vitamin B-12 (VITAMIN B-12) 1,000 mcg tablet, Take 1 tablet (1,000 mcg total) by mouth daily, Disp: , Rfl:     Facility-Administered Medications  Ordered in Other Visits:     oseltamivir (TAMIFLU) capsule 75 mg, 75 mg, Oral, Once, Raven Weiner MD    Current Allergies     Allergies as of 02/12/2025 - Reviewed 02/12/2025   Allergen Reaction Noted    Codeine Itching 02/22/2017    Medical tape Rash 03/16/2015            The following portions of the patient's history were reviewed and updated as appropriate: allergies, current medications, past family history, past medical history, past social history, past surgical history and problem list.     Past Medical History:   Diagnosis Date    Arnold-Chiari malformation (HCC)     Breast lump     last assessed: Jan 22, 2013     COPD (chronic obstructive pulmonary disease) ([facility])     Dysphagia 08/29/2022    Facial twitching 07/21/2022    GERD (gastroesophageal reflux disease)     Gout     last assessed: Nov 26, 2012     Hair loss     last assessed: Dec 15, 2016     Hypotension     last assessed: Nov 10, 2014     Mitral valve disorder     Myocardial infarct, old     GER (obstructive sleep apnea)     Stroke (Formerly [facility])     SVT (supraventricular tachycardia) (Formerly [facility])        Past Surgical History:   Procedure Laterality Date    BREAST BIOPSY Right 02/28/2017    US CORE BIOPSY--BENIGN    CARDIAC CATHETERIZATION      CATARACT EXTRACTION Bilateral 11/09/2023    CERVICAL DISCECTOMY      Spinal     CERVICAL LAMINECTOMY      C1 with chiari decompression     COLONOSCOPY      5 yrs - onset: May 31, 2011     CRANIOTOMY      INTRACAPSULAR CATARACT EXTRACTION Right 11/08/2023    INTRACAPSULAR CATARACT EXTRACTION Left 11/15/2023    POPLITEAL SYNOVIAL CYST EXCISION      TUBAL LIGATION      US GUIDED BREAST BIOPSY RIGHT COMPLETE Right 02/28/2017       Family History   Problem Relation Age of Onset    Stroke Mother     Other Father         blood clot in vein & CABG     Heart disease Father     Prostate cancer Father     Hearing loss Father     Hypertension Father     No Known Problems Sister     No Known Problems Sister     No Known  Problems Daughter     No Known Problems Daughter     Breast cancer Maternal Grandmother     No Known Problems Maternal Grandfather     No Known Problems Paternal Grandmother     No Known Problems Paternal Grandfather     Alcohol abuse Brother     Throat cancer Brother     Cancer Brother         Throat tongue    Hearing loss Brother     Kidney failure Brother         kidney failure d/t NSIADs on dialysis    Hearing loss Brother     Crohn's disease Maternal Aunt     No Known Problems Maternal Aunt     No Known Problems Maternal Aunt     Colon cancer Paternal Aunt     No Known Problems Paternal Aunt     No Known Problems Paternal Aunt     No Known Problems Paternal Aunt     Colon cancer Paternal Aunt     Cancer Paternal Aunt         Colon cancer         Medications have been verified.        Objective   /57   Pulse 65   Temp 98.5 °F (36.9 °C)   Resp 20   SpO2 91%        Physical Exam     Physical Exam  Vitals and nursing note reviewed.   Constitutional:       General: She is not in acute distress.     Appearance: She is not diaphoretic.   HENT:      Head: Normocephalic and atraumatic.      Mouth/Throat:      Mouth: Mucous membranes are moist.   Cardiovascular:      Rate and Rhythm: Normal rate and regular rhythm.   Pulmonary:      Effort: Pulmonary effort is normal.      Breath sounds: Rhonchi (RLL/RUL) present. No wheezing.   Musculoskeletal:         General: Normal range of motion.      Cervical back: Normal range of motion and neck supple.   Skin:     General: Skin is warm and dry.      Capillary Refill: Capillary refill takes less than 2 seconds.   Neurological:      Mental Status: She is alert and oriented to person, place, and time.      Gait: Gait normal.

## 2025-02-12 NOTE — ASSESSMENT & PLAN NOTE
Tested positive for COVID on 2/12  On 2 L nasal cannula oxygen given hypoxia  Troponin normal, BNP slightly elevated  Does not meet sepsis criteria  Start remdesivir

## 2025-02-12 NOTE — ASSESSMENT & PLAN NOTE
Worsening cough, wheezing in the setting of COVID/flu/pneumonia  On Trelegy as home medication    Plan:  Start Solu-Medrol 40 IV every 8 hours  Arnav Horton scheduled  Budesonide/formoterol  Azithromycin x 3 doses

## 2025-02-12 NOTE — ED PROVIDER NOTES
Time reflects when diagnosis was documented in both MDM as applicable and the Disposition within this note       Time User Action Codes Description Comment    2/12/2025  5:21 PM Raven Weiner Add [J18.9] Pneumonia     2/12/2025  5:21 PM Raven Weiner Add [U07.1] COVID     2/12/2025  5:21 PM Raven Weiner Add [J10.1] Influenza A     2/12/2025  5:21 PM Raven Weiner Add [R09.02] Hypoxia           ED Disposition       ED Disposition   Admit    Condition   Stable    Date/Time   Wed Feb 12, 2025  5:21 PM    Comment   Case was discussed with Dr. Winchester and the patient's admission status was agreed to be Admission Status: inpatient status to the service of Dr. Winchester .               Assessment & Plan       Medical Decision Making  Patient presents with cough, sob and wheezing. Labs show patient is positive for flu and covid. Patient with pneumonia noted on CXR. Placed on oxygen in ED, nebs, cefepime and azithromycin. Also will start tamiflu. Patient will require admission as she is hypoxic while in the ED.     Amount and/or Complexity of Data Reviewed  Independent Historian: spouse     Details: Spouse at bedside who states he was ill last week.  External Data Reviewed: notes.     Details: See ED course.  Labs: ordered. Decision-making details documented in ED Course.  Radiology: ordered and independent interpretation performed.     Details: CXR reviewed by me. Patient with RUL pneumonia noted on CXR.    Risk  Prescription drug management.  Decision regarding hospitalization.        ED Course as of 02/12/25 1723   Wed Feb 12, 2025   1438 Patient currently 92% on Room Air.   1442 SARS-COV-2(!): Positive   1442 INFLU A PCR(!): Positive   1443 Review of external immunization records shows that patient had both COVID-vaccine and influenza vaccine this year in September 2024.   1651 Patient sat dropped to 88%. Placed on 2 LPM nasal cannula. Sat improved to 97%. Patient does have some diffuse  wheezes.       Medications   oseltamivir (TAMIFLU) capsule 75 mg (has no administration in time range)   albuterol inhalation solution 5 mg (5 mg Nebulization Given 2/12/25 1454)   ipratropium (ATROVENT) 0.02 % inhalation solution 0.5 mg (0.5 mg Nebulization Given 2/12/25 1454)   cefepime (MAXIPIME) IVPB (premix in dextrose) 2,000 mg 50 mL (0 mg Intravenous Stopped 2/12/25 1540)   azithromycin (ZITHROMAX) 500 mg in sodium chloride 0.9% 250mL IVPB 500 mg (0 mg Intravenous Stopped 2/12/25 1700)   iohexol (OMNIPAQUE) 350 MG/ML injection (MULTI-DOSE) 100 mL (85 mL Intravenous Given 2/12/25 1553)   albuterol inhalation solution 5 mg (5 mg Nebulization Given 2/12/25 1714)   ipratropium (ATROVENT) 0.02 % inhalation solution 0.5 mg (0.5 mg Nebulization Given 2/12/25 1714)       ED Risk Strat Scores                          SBIRT 22yo+      Flowsheet Row Most Recent Value   Initial Alcohol Screen: US AUDIT-C     1. How often do you have a drink containing alcohol? 3 Filed at: 02/12/2025 1326   2. How many drinks containing alcohol do you have on a typical day you are drinking?  0 Filed at: 02/12/2025 1326   3b. FEMALE Any Age, or MALE 65+: How often do you have 4 or more drinks on one occassion? 0 Filed at: 02/12/2025 1326   Audit-C Score 3 Filed at: 02/12/2025 1326   BERNADINE: How many times in the past year have you...    Used an illegal drug or used a prescription medication for non-medical reasons? Never Filed at: 02/12/2025 1326                            History of Present Illness       Chief Complaint   Patient presents with    Flu Symptoms     Patient presents to the ER with flu-like symptoms since Sunday night.  Patient went to urgent care and the colin pulsox they could get was 91%.       Past Medical History:   Diagnosis Date    Arnold-Chiari malformation (HCC)     Breast lump     last assessed: Jan 22, 2013     COPD (chronic obstructive pulmonary disease) (HCC)     Dysphagia 08/29/2022    Facial twitching 07/21/2022     GERD (gastroesophageal reflux disease)     Gout     last assessed: Nov 26, 2012     Hair loss     last assessed: Dec 15, 2016     Hypotension     last assessed: Nov 10, 2014     Mitral valve disorder     Myocardial infarct, old     GER (obstructive sleep apnea)     Stroke (HCC)     SVT (supraventricular tachycardia) (HCC)       Past Surgical History:   Procedure Laterality Date    BREAST BIOPSY Right 02/28/2017    US CORE BIOPSY--BENIGN    CARDIAC CATHETERIZATION      CATARACT EXTRACTION Bilateral 11/09/2023    CERVICAL DISCECTOMY      Spinal     CERVICAL LAMINECTOMY      C1 with chiari decompression     COLONOSCOPY      5 yrs - onset: May 31, 2011     CRANIOTOMY      INTRACAPSULAR CATARACT EXTRACTION Right 11/08/2023    INTRACAPSULAR CATARACT EXTRACTION Left 11/15/2023    POPLITEAL SYNOVIAL CYST EXCISION      TUBAL LIGATION      US GUIDED BREAST BIOPSY RIGHT COMPLETE Right 02/28/2017      Family History   Problem Relation Age of Onset    Stroke Mother     Other Father         blood clot in vein & CABG     Heart disease Father     Prostate cancer Father     Hearing loss Father     Hypertension Father     No Known Problems Sister     No Known Problems Sister     No Known Problems Daughter     No Known Problems Daughter     Breast cancer Maternal Grandmother     No Known Problems Maternal Grandfather     No Known Problems Paternal Grandmother     No Known Problems Paternal Grandfather     Alcohol abuse Brother     Throat cancer Brother     Cancer Brother         Throat tongue    Hearing loss Brother     Kidney failure Brother         kidney failure d/t NSIADs on dialysis    Hearing loss Brother     Crohn's disease Maternal Aunt     No Known Problems Maternal Aunt     No Known Problems Maternal Aunt     Colon cancer Paternal Aunt     No Known Problems Paternal Aunt     No Known Problems Paternal Aunt     No Known Problems Paternal Aunt     Colon cancer Paternal Aunt     Cancer Paternal Aunt         Colon cancer       Social History     Tobacco Use    Smoking status: Former     Current packs/day: 0.00     Average packs/day: 1.5 packs/day for 39.0 years (58.5 ttl pk-yrs)     Types: Cigarettes     Start date:      Quit date: 2008     Years since quittin.1    Smokeless tobacco: Never    Tobacco comments:     Patient quit   Vaping Use    Vaping status: Never Used   Substance Use Topics    Alcohol use: Yes     Alcohol/week: 7.0 standard drinks of alcohol     Types: 7 Cans of beer per week     Comment: socially    Drug use: Never      E-Cigarette/Vaping    E-Cigarette Use Never User       E-Cigarette/Vaping Substances    Nicotine No     THC No     CBD No     Flavoring No     Other No     Unknown No       I have reviewed and agree with the history as documented.     This is a 69-year-old female who presents to the emergency department with complaint of cough over the last 3 days.  recently ill as well. Patient with worsening SOB. Patient has had flu and covid vaccine this year. Was seen in office today and noted to be hypoxic with ambulation. Using nebs QID with mild improvement of symptoms. No chest pain. Patient at risk for pneumonia, flu, covid, dehydration, COPD exacerbation.         Review of Systems   Constitutional:  Negative for chills and fever.   HENT:  Negative for congestion, ear discharge, ear pain, facial swelling, mouth sores, rhinorrhea, sore throat and trouble swallowing.    Eyes:  Negative for redness and itching.   Respiratory:  Positive for cough, shortness of breath and wheezing. Negative for chest tightness.    Cardiovascular:  Negative for chest pain and palpitations.   Gastrointestinal:  Negative for abdominal pain, nausea and vomiting.   Musculoskeletal:  Negative for neck pain and neck stiffness.   Skin:  Negative for pallor and rash.   Neurological:  Negative for weakness, light-headedness and numbness.           Objective       ED Triage Vitals [25 1325]   Temperature Pulse Blood  Pressure Respirations SpO2 Patient Position - Orthostatic VS   97.9 °F (36.6 °C) 66 127/62 20 93 % Sitting      Temp Source Heart Rate Source BP Location FiO2 (%) Pain Score    Tympanic Monitor Right arm -- No Pain      Vitals      Date and Time Temp Pulse SpO2 Resp BP Pain Score FACES Pain Rating User   02/12/25 1600 -- 77 87 % placed on O2, provider aware 20 180/81 -- -- RD   02/12/25 1530 -- 70 91 % 18 157/69 -- -- RD   02/12/25 1500 -- 63 100 % 18 164/67 -- -- RD   02/12/25 1430 -- 62 92 % 18 143/64 -- -- RD   02/12/25 1400 -- 66 93 % 18 128/63 -- -- RD   02/12/25 1325 97.9 °F (36.6 °C) 66 93 % 20 127/62 No Pain -- CAC            Physical Exam  Vitals and nursing note reviewed.   Constitutional:       General: She is not in acute distress.     Appearance: She is well-developed.   HENT:      Head: Normocephalic and atraumatic.      Nose: Nose normal.   Eyes:      General: No scleral icterus.     Conjunctiva/sclera: Conjunctivae normal.   Cardiovascular:      Rate and Rhythm: Normal rate and regular rhythm.      Heart sounds: Normal heart sounds.   Pulmonary:      Effort: Pulmonary effort is normal. No respiratory distress.      Breath sounds: No stridor. Rhonchi (on right) present. No wheezing or rales.   Abdominal:      General: There is no distension.      Palpations: Abdomen is soft.      Tenderness: There is no abdominal tenderness. There is no guarding or rebound.   Musculoskeletal:         General: No deformity.   Skin:     General: Skin is warm and dry.      Findings: No rash.   Neurological:      Mental Status: She is alert and oriented to person, place, and time.   Psychiatric:         Thought Content: Thought content normal.         Results Reviewed       Procedure Component Value Units Date/Time    CK [[id number]]  (Normal) Collected: 02/12/25 1446    Lab Status: Final result Specimen: Blood from Arm, Right Updated: 02/12/25 1611     Total CK 76 U/L     C-reactive protein [[id number]]  (Abnormal)  Collected: 02/12/25 1446    Lab Status: Final result Specimen: Blood from Arm, Right Updated: 02/12/25 1611     .9 mg/L     Procalcitonin [[id number]]  (Abnormal) Collected: 02/12/25 1446    Lab Status: Final result Specimen: Blood from Arm, Right Updated: 02/12/25 1611     Procalcitonin 5.49 ng/ml     HS Troponin I 4hr [[id number]]     Lab Status: No result Specimen: Blood     Lactic acid, plasma (w/reflex if result > 2.0) [[id number]]  (Normal) Collected: 02/12/25 1505    Lab Status: Final result Specimen: Blood from Arm, Right Updated: 02/12/25 1547     LACTIC ACID 1.3 mmol/L     Narrative:      Result may be elevated if tourniquet was used during collection.    Blood culture #1 [[id number]] Collected: 02/12/25 1505    Lab Status: In process Specimen: Blood from Arm, Right Updated: 02/12/25 1528    Blood culture #2 [[id number]] Collected: 02/12/25 1505    Lab Status: In process Specimen: Blood from Arm, Left Updated: 02/12/25 1528    B-Type Natriuretic Peptide(BNP) [[id number]]  (Abnormal) Collected: 02/12/25 1446    Lab Status: Final result Specimen: Blood from Arm, Right Updated: 02/12/25 1520      pg/mL     HS Troponin 0hr (reflex protocol) [[id number]]  (Normal) Collected: 02/12/25 1446    Lab Status: Final result Specimen: Blood from Arm, Right Updated: 02/12/25 1519     hs TnI 0hr 41 ng/L     HS Troponin I 2hr [[id number]]     Lab Status: No result Specimen: Blood     Basic metabolic panel [[id number]]  (Abnormal) Collected: 02/12/25 1446    Lab Status: Final result Specimen: Blood from Arm, Right Updated: 02/12/25 1513     Sodium 135 mmol/L      Potassium 3.4 mmol/L      Chloride 102 mmol/L      CO2 23 mmol/L      ANION GAP 10 mmol/L      BUN 25 mg/dL      Creatinine 1.15 mg/dL      Glucose 108 mg/dL      Calcium 9.0 mg/dL      eGFR 47 ml/min/1.73sq m     Narrative:      National Kid[employer] guidelines for Chronic Kidney Disease (CKD):     Stage 1 with normal or high GFR (GFR > 90  mL/min/1.73 square meters)    Stage 2 Mild CKD (GFR = 60-89 mL/min/1.73 square meters)    Stage 3A Moderate CKD (GFR = 45-59 mL/min/1.73 square meters)    Stage 3B Moderate CKD (GFR = 30-44 mL/min/1.73 square meters)    Stage 4 Severe CKD (GFR = 15-29 mL/min/1.73 square meters)    Stage 5 End Stage CKD (GFR <15 mL/min/1.73 square meters)  Note: GFR calculation is accurate only with a steady state creatinine    Hepatic function panel [[id number]]  (Abnormal) Collected: 02/12/25 1446    Lab Status: Final result Specimen: Blood from Arm, Right Updated: 02/12/25 1513     Total Bilirubin 0.64 mg/dL      Bilirubin, Direct 0.16 mg/dL      Alkaline Phosphatase 90 U/L      AST 31 U/L      ALT 15 U/L      Total Protein 6.8 g/dL      Albumin 3.3 g/dL     Magnesium [[id number]]  (Normal) Collected: 02/12/25 1446    Lab Status: Final result Specimen: Blood from Arm, Right Updated: 02/12/25 1513     Magnesium 1.9 mg/dL     CBC and differential [[id number]]  (Abnormal) Collected: 02/12/25 1446    Lab Status: Final result Specimen: Blood from Arm, Right Updated: 02/12/25 1458     WBC 5.43 Thousand/uL      RBC 3.89 Million/uL      Hemoglobin 12.9 g/dL      Hematocrit 38.2 %      MCV 98 fL      MCH 33.2 pg      MCHC 33.8 g/dL      RDW 15.9 %      MPV 9.2 fL      Platelets 364 Thousands/uL      nRBC 0 /100 WBCs      Segmented % 76 %      Immature Grans % 1 %      Lymphocytes % 9 %      Monocytes % 11 %      Eosinophils Relative 3 %      Basophils Relative 0 %      Absolute Neutrophils 4.10 Thousands/µL      Absolute Immature Grans 0.07 Thousand/uL      Absolute Lymphocytes 0.50 Thousands/µL      Absolute Monocytes 0.58 Thousand/µL      Eosinophils Absolute 0.16 Thousand/µL      Basophils Absolute 0.02 Thousands/µL     FLU/RSV/COVID - if FLU/RSV clinically relevant (2hr TAT) [[id number]]  (Abnormal) Collected: 02/12/25 1329    Lab Status: Final result Specimen: Nares from Nose Updated: 02/12/25 1420     SARS-CoV-2 Positive     INFLUENZA A  PCR Positive     INFLUENZA B PCR Negative     RSV PCR Negative    Narrative:      This test has been performed using the CoV-2/Flu/RSV plus assay on the [employer] platform. This test has been validated by the  and verified by the performing laboratory.     This test is designed to amplify and detect the following: nucleocapsid (N), envelope (E), and RNA-dependent RNA polymerase (RdRP) genes of the SARS-CoV-2 genome; matrix (M), basic polymerase (PB2), and acidic protein (PA) segments of the influenza A genome; matrix (M) and non-structural protein (NS) segments of the influenza B genome, and the nucleocapsid genes of RSV A and RSV B.     Positive results are indicative of the presence of Flu A, Flu B, RSV, and/or SARS-CoV-2 RNA. Positive results for SARS-CoV-2 or suspected novel influenza should be reported to state, local, or federal health departments according to local reporting requirements.      All results should be assessed in conjunction with clinical presentation and other laboratory markers for clinical management.     FOR PEDIATRIC PATIENTS - copy/paste COVID Guidelines URL to browser: [email or web address]               CTA chest pe study   Final Interpretation by Jay Jay Garcia MD (02/12 8866)      1.  New, dense consolidation throughout the right upper lobe, consistent with pneumonia. However, there is new thickening along the walls of the right upper lobe bullae. A short-term follow-up posttreatment chest CT is recommended to assess for    resolution. There is associated low-grade lymphadenopathy, which may represent reactive changes. Underlying malignancy cannot be entirely excluded.   2.  New small right pleural effusion.   3.  Diffuse hepatic steatosis.                  Workstation performed: [id number]         XR chest 1 view portable   Final Interpretation by Melecio Lucia MD (02/12 1171)      Right upper lobe  consolidation likely representing pneumonia with severe underlying emphysema. See subsequent CT.            Workstation performed: [id number]             Procedures    ED Medication and Procedure Management   Prior to Admission Medications   Prescriptions Last Dose Informant Patient Reported? Taking?   albuterol (2.5 mg/3 mL) 0.083 % nebulizer solution  Self No No   Sig: Take 3 mL (2.5 mg total) by nebulization every 6 (six) hours as needed for wheezing or shortness of breath   albuterol (ProAir HFA) 90 mcg/act inhaler  Self No No   Sig: Inhale 2 puffs every 4 (four) hours as needed for wheezing or shortness of breath   atorvastatin (LIPITOR) 80 mg tablet   No No   Sig: Take 1 tablet by mouth once daily   busPIRone (BUSPAR) 10 mg tablet   No No   Sig: Take 1 tablet (10 mg total) by mouth 2 (two) times a day   calcium carbonate (OS-FLASH) 600 MG tablet  Self Yes No   Sig: Take 600 mg by mouth daily   carvedilol (COREG) 12.5 mg tablet  Self No No   Sig: Take 1 tablet by mouth twice daily   cholecalciferol (VITAMIN D3) 1,000 units tablet  Self Yes No   Sig: Take 1,000 Units by mouth daily   clopidogrel (PLAVIX) 75 mg tablet   No No   Sig: Take 1 tablet by mouth once daily   escitalopram (LEXAPRO) 20 mg tablet  Self No No   Sig: Take 1 tablet (20 mg total) by mouth daily   fluticasone-umeclidinium-vilanterol (Trelegy Ellipta) 100-62.5-25 mcg/actuation inhaler  Self No No   Sig: Inhale 1 puff daily Rinse mouth after use   gabapentin (NEURONTIN) 400 mg capsule  Self No No   Sig: TAKE 2 CAPSULES BY MOUTH TWICE DAILY (IN THE MORNING AND AT BEDTIME)   isosorbide mononitrate (IMDUR) 30 mg 24 hr tablet  Self No No   Sig: Take 1 tablet by mouth once daily   pantoprazole (PROTONIX) 40 mg tablet   No No   Sig: TAKE 1 TABLET BY MOUTH ONCE DAILY BEFORE BREAKFAST   vitamin B-12 (VITAMIN B-12) 1,000 mcg tablet  Self No No   Sig: Take 1 tablet (1,000 mcg total) by mouth daily      Facility-Administered Medications: None     Patient's  Medications   Discharge Prescriptions    No medications on file     No discharge procedures on file.  ED SEPSIS DOCUMENTATION   Time reflects when diagnosis was documented in both MDM as applicable and the Disposition within this note       Time User Action Codes Description Comment    2/12/2025  5:21 PM Raven Weiner Add [J18.9] Pneumonia     2/12/2025  5:21 PM Raven Weiner Add [U07.1] COVID     2/12/2025  5:21 PM Raven Weiner Add [J10.1] Influenza A     2/12/2025  5:21 PM Raven Weiner Add [R09.02] Hypoxia            Initial Sepsis Screening       Row Name 02/12/25 3342                Is the patient's history suggestive of a new or worsening infection? Yes (Proceed)  -RP        Suspected source of infection pneumonia  -RP        Indicate SIRS criteria --        Are two or more of the above signs & symptoms of infection both present and new to the patient? No  -RP                  User Key  (r) = Recorded By, (t) = Taken By, (c) = Cosigned By      Initials Name Provider Type    RP Raven Weiner MD Physician                       Raven Weiner MD  02/12/25 7458

## 2025-02-12 NOTE — SEPSIS NOTE
Sepsis Note   Geovanna Leal 69 y.o. female MRN: [id number]  Unit/Bed#: OVR 01 Encounter: [id number]       Initial Sepsis Screening       Row Name 02/12/25 1625                Is the patient's history suggestive of a new or worsening infection? Yes (Proceed)  -RP        Suspected source of infection pneumonia  -RP        Indicate SIRS criteria --        Are two or more of the above signs & symptoms of infection both present and new to the patient? No  -RP                  User Key  (r) = Recorded By, (t) = Taken By, (c) = Cosigned By      Initials Name Provider Type    RP Raven Weiner MD Physician                        Body mass index is 26.46 kg/m².  Wt Readings from Last 1 Encounters:   02/12/25 72.1 kg (159 lb)     IBW (Ideal Body Weight): 57 kg    Ideal body weight: 57 kg (125 lb 10.6 oz)  Adjusted ideal body weight: 63 kg (139 lb)

## 2025-02-12 NOTE — RESPIRATORY THERAPY NOTE
RT Protocol Note  Geoavnna Leal 69 y.o. female MRN: [id number]  Unit/Bed#: OVR 01 Encounter: [id number]    Assessment    Principal Problem:    Acute respiratory failure with hypoxia ([facility])  Active Problems:    Essential hypertension    Dyslipidemia    Chronic obstructive pulmonary disease with acute exacerbation (HCC)    Pneumonia due to infectious organism    Mild episode of recurrent major depressive disorder ([facility])    COVID-19 virus infection    Flu      Home Pulmonary Medications:  Trelegy Ellipta, Albuterol RAJESH mancia        Past Medical History:   Diagnosis Date    Arnold-Chiari malformation ([facility])     Breast lump     last assessed: 2013     COPD (chronic obstructive pulmonary disease) ([facility])     Dysphagia 2022    Facial twitching 2022    GERD (gastroesophageal reflux disease)     Gout     last assessed: 2012     Hair loss     last assessed: Dec 15, 2016     Hypotension     last assessed: Nov 10, 2014     Mitral valve disorder     Myocardial infarct, old     GER (obstructive sleep apnea)     Stroke ([facility])     SVT (supraventricular tachycardia) ([facility])      Social History     Socioeconomic History    Marital status: /Civil Union     Spouse name: None    Number of children: None    Years of education: None    Highest education level: None   Occupational History    Occupation: working full time    Tobacco Use    Smoking status: Former     Current packs/day: 0.00     Average packs/day: 1.5 packs/day for 39.0 years (58.5 ttl pk-yrs)     Types: Cigarettes     Start date:      Quit date: 2008     Years since quittin.1    Smokeless tobacco: Never    Tobacco comments:     Patient quit   Vaping Use    Vaping status: Never Used   Substance and Sexual Activity    Alcohol use: Yes     Alcohol/week: 7.0 standard drinks of alcohol     Types: 7 Cans of beer per week     Comment: socially    Drug use: Never    Sexual activity: Not Currently     Partners: Male   Other Topics Concern     "None   Social History Narrative    None     Social Drivers of Health     Financial Resource Strain: Low Risk  (5/5/2023)    Overall Financial Resource Strain (CARDIA)     Difficulty of Paying Living Expenses: Not hard at all   Food Insecurity: No Food Insecurity (5/13/2024)    Nursing - Inadequate Food Risk Classification     Worried About Running Out of Food in the Last Year: Never true     Ran Out of Food in the Last Year: Never true     Ran Out of Food in the Last Year: Not on file   Transportation Needs: No Transportation Needs (5/13/2024)    PRAPARE - Transportation     Lack of Transportation (Medical): No     Lack of Transportation (Non-Medical): No   Physical Activity: Not on file   Stress: Not on file   Social Connections: Not on file   Intimate Partner Violence: Not on file   Housing Stability: Low Risk  (5/13/2024)    Housing Stability Vital Sign     Unable to Pay for Housing in the Last Year: No     Number of Times Moved in the Last Year: 1     Homeless in the Last Year: No       Subjective         Objective    Physical Exam:   Assessment Type: Assess only  General Appearance: Alert, Awake  Respiratory Pattern: Tachypneic, Dyspnea with exertion  Chest Assessment: Chest expansion symmetrical  Bilateral Breath Sounds: Coarse, Expiratory wheezes  Cough: Non-productive, Dry, Strong  O2 Device: 1L    Vitals:  Blood pressure (!) 180/81, pulse 70, temperature 97.9 °F (36.6 °C), temperature source Tympanic, resp. rate (!) 24, height 5' 5\" (1.651 m), weight 72.1 kg (159 lb), SpO2 92%.          Imaging and other studies: Results Review Statement: I reviewed radiology reports from this admission including: chest xray.    O2 Device: 1L     Plan    Respiratory Plan: Mild Distress pathway        Resp Comments: TID 1.25mg Xopenex with 0.5mg Atroven ordered by Attending. Increased frequency to Q6H. Continue BID 0.5mg Pulmicort and 20mcg Perforomist as ordered by Attending. Add Q6PRN 2.5mg Albuterol for SOB and wheezing. "

## 2025-02-12 NOTE — ASSESSMENT & PLAN NOTE
Shortness of breath and cough  CT chest showed new dense consolidation throughout the right upper lobe consistent with pneumonia.  However there is a new thickening along the walls of the right upper lobe bullae short-term follow-up posttreatment CT chest is recommended to assess for resolution.  Received cefepime and azithromycin in the emergency department  Procalcitonin 5.4  Start ceftriaxone  Mucinex, inhalers

## 2025-02-12 NOTE — ASSESSMENT & PLAN NOTE
Oxygen saturation 87% at room air  In the context of pneumonia/COVID/flu/COPD exacerbation  Wean her oxygen to maintain saturation greater than 88%

## 2025-02-12 NOTE — H&P
H&P - Hospitalist   Name: Geovanna Leal 69 y.o. female I MRN: [id number]  Unit/Bed#: O[facility] 01 I Date of Admission: 2/12/2025   Date of Service: 2/12/2025 I Hospital Day: 0     Assessment & Plan  Acute respiratory failure with hypoxia (HCC)  Oxygen saturation 87% at room air  In the context of pneumonia/COVID/flu/COPD exacerbation  Wean her oxygen to maintain saturation greater than 88%  Essential hypertension  Continue Coreg 12.5 mg twice daily  Monitor blood pressure  Dyslipidemia  Continue atorvastatin  Chronic obstructive pulmonary disease with acute exacerbation (HCC)  Worsening cough, wheezing in the setting of COVID/flu/pneumonia  On Trelegy as home medication    Plan:  Start Solu-Medrol 40 IV every 8 hours  DuoNebs, Mucinex scheduled  Budesonide/formoterol  Azithromycin x 3 doses  Pneumonia due to infectious organism  Shortness of breath and cough  CT chest showed new dense consolidation throughout the right upper lobe consistent with pneumonia.  However there is a new thickening along the walls of the right upper lobe bullae short-term follow-up posttreatment CT chest is recommended to assess for resolution.  Received cefepime and azithromycin in the emergency department  Procalcitonin 5.4  Start ceftriaxone  Mucinex, inhalers  Mild episode of recurrent major depressive disorder (HCC)  Continue Lexapro and BuSpar  COVID-19 virus infection  Tested positive for COVID on 2/12  On 2 L nasal cannula oxygen given hypoxia  Troponin normal, BNP slightly elevated  Does not meet sepsis criteria  Start remdesivir  Flu  Positive for flu A  Start Tamiflu renally dosed      VTE Pharmacologic Prophylaxis: VTE Score: 6 High Risk (Score >/= 5) - Pharmacological DVT Prophylaxis Ordered: heparin. Sequential Compression Devices Ordered.  Code Status: Level 1 - Full Code   Discussion with family: Patient declined call to .     Anticipated Length of Stay: Patient will be admitted on an inpatient basis with an  anticipated length of stay of greater than 2 midnights secondary to pneumonia, COPD, flu, COVID.    History of Present Illness   Chief Complaint: Cough, shortness of breath fever    Geovanna Leal is a 69 y.o. female with a PMH of hypertension, depression, B12 deficiency, benign tumor of spinal meningioma, carotid stenosis, who presents with shortness of breath, cough and fever.  Patient reported symptoms started about Sunday and is progressively worse.     Tested positive for flu and COVID in the emergency department.  Also imaging with pneumonia.    Patient was admitted for further management.    Review of Systems   Constitutional:  Positive for fever.   Respiratory:  Positive for cough and shortness of breath. Negative for chest tightness.    Cardiovascular:  Negative for chest pain.   Gastrointestinal:  Negative for abdominal pain, constipation, diarrhea, nausea and vomiting.   All other systems reviewed and are negative.      Historical Information   Past Medical History:   Diagnosis Date    Arnold-Chiari malformation (HCC)     Breast lump     last assessed: Jan 22, 2013     COPD (chronic obstructive pulmonary disease) (Formerly [facility])     Dysphagia 08/29/2022    Facial twitching 07/21/2022    GERD (gastroesophageal reflux disease)     Gout     last assessed: Nov 26, 2012     Hair loss     last assessed: Dec 15, 2016     Hypotension     last assessed: Nov 10, 2014     Mitral valve disorder     Myocardial infarct, old     GER (obstructive sleep apnea)     Stroke (Formerly [facility])     SVT (supraventricular tachycardia) (Formerly [facility])      Past Surgical History:   Procedure Laterality Date    BREAST BIOPSY Right 02/28/2017     CORE BIOPSY--BENIGN    CARDIAC CATHETERIZATION      CATARACT EXTRACTION Bilateral 11/09/2023    CERVICAL DISCECTOMY      Spinal     CERVICAL LAMINECTOMY      C1 with chiari decompression     COLONOSCOPY      5 yrs - onset: May 31, 2011     CRANIOTOMY      INTRACAPSULAR CATARACT EXTRACTION Right 11/08/2023     INTRACAPSULAR CATARACT EXTRACTION Left 11/15/2023    POPLITEAL SYNOVIAL CYST EXCISION      TUBAL LIGATION      US GUIDED BREAST BIOPSY RIGHT COMPLETE Right 2017     Social History     Tobacco Use    Smoking status: Former     Current packs/day: 0.00     Average packs/day: 1.5 packs/day for 39.0 years (58.5 ttl pk-yrs)     Types: Cigarettes     Start date:      Quit date: 2008     Years since quittin.1    Smokeless tobacco: Never    Tobacco comments:     Patient quit   Vaping Use    Vaping status: Never Used   Substance and Sexual Activity    Alcohol use: Yes     Alcohol/week: 7.0 standard drinks of alcohol     Types: 7 Cans of beer per week     Comment: socially    Drug use: Never    Sexual activity: Not Currently     Partners: Male     E-Cigarette/Vaping    E-Cigarette Use Never User      E-Cigarette/Vaping Substances    Nicotine No     THC No     CBD No     Flavoring No     Other No     Unknown No      Family History   Problem Relation Age of Onset    Stroke Mother     Other Father         blood clot in vein & CABG     Heart disease Father     Prostate cancer Father     Hearing loss Father     Hypertension Father     No Known Problems Sister     No Known Problems Sister     No Known Problems Daughter     No Known Problems Daughter     Breast cancer Maternal Grandmother     No Known Problems Maternal Grandfather     No Known Problems Paternal Grandmother     No Known Problems Paternal Grandfather     Alcohol abuse Brother     Throat cancer Brother     Cancer Brother         Throat tongue    Hearing loss Brother     Kidney failure Brother         kidney failure d/t NSIADs on dialysis    Hearing loss Brother     Crohn's disease Maternal Aunt     No Known Problems Maternal Aunt     No Known Problems Maternal Aunt     Colon cancer Paternal Aunt     No Known Problems Paternal Aunt     No Known Problems Paternal Aunt     No Known Problems Paternal Aunt     Colon cancer Paternal Aunt     Cancer Paternal  Aunt         Colon cancer     Social History:  Marital Status: /Civil Union   Occupation: Retired  Patient Pre-hospital Living Situation: Home  Patient Pre-hospital Level of Mobility: walks  Patient Pre-hospital Diet Restrictions: none     Meds/Allergies   I have reviewed home medications with patient personally.  Prior to Admission medications    Medication Sig Start Date End Date Taking? Authorizing Provider   atorvastatin (LIPITOR) 80 mg tablet Take 1 tablet by mouth once daily 12/12/24  Yes Binta Eduardo MD   busPIRone (BUSPAR) 10 mg tablet Take 1 tablet (10 mg total) by mouth 2 (two) times a day 12/12/24  Yes Leelee Garcia DO   calcium carbonate (OS-FLASH) 600 MG tablet Take 600 mg by mouth daily   Yes Historical Provider, MD   carvedilol (COREG) 12.5 mg tablet Take 1 tablet by mouth twice daily 11/12/24  Yes Binta Eduardo MD   cholecalciferol (VITAMIN D3) 1,000 units tablet Take 1,000 Units by mouth daily   Yes Historical Provider, MD   clopidogrel (PLAVIX) 75 mg tablet Take 1 tablet by mouth once daily 12/24/24  Yes Leelee Garcia DO   escitalopram (LEXAPRO) 20 mg tablet Take 1 tablet (20 mg total) by mouth daily 5/13/24  Yes Leelee Garcia DO   fluticasone-umeclidinium-vilanterol (Trelegy Ellipta) 100-62.5-25 mcg/actuation inhaler Inhale 1 puff daily Rinse mouth after use 6/24/24  Yes Leelee Garcia DO   gabapentin (NEURONTIN) 400 mg capsule TAKE 2 CAPSULES BY MOUTH TWICE DAILY (IN THE MORNING AND AT BEDTIME) 6/7/24  Yes Leelee Garcia DO   isosorbide mononitrate (IMDUR) 30 mg 24 hr tablet Take 1 tablet by mouth once daily 11/12/24  Yes Binta Eduardo MD   pantoprazole (PROTONIX) 40 mg tablet TAKE 1 TABLET BY MOUTH ONCE DAILY BEFORE BREAKFAST 12/24/24  Yes Leelee Garcia DO   vitamin B-12 (VITAMIN B-12) 1,000 mcg tablet Take 1 tablet (1,000 mcg total) by mouth daily 6/4/24  Yes Leelee Garcia DO   albuterol (2.5 mg/3 mL) 0.083 %  nebulizer solution Take 3 mL (2.5 mg total) by nebulization every 6 (six) hours as needed for wheezing or shortness of breath 10/17/24   Shanda Howe PA-C   albuterol (ProAir HFA) 90 mcg/act inhaler Inhale 2 puffs every 4 (four) hours as needed for wheezing or shortness of breath 10/10/24   Leelee Garcia DO   dicyclomine (BENTYL) 10 mg capsule TAKE 1 CAPSULE BY MOUTH 4 TIMES DAILY BEFORE MEAL(S) AND AT BEDTIME 3/12/22 1/17/23  Leelee Garcia DO   famotidine (PEPCID) 40 MG tablet Take 1 tablet (40 mg total) by mouth daily at bedtime 6/30/22 1/17/23  Cassidy Swenson MD   fluticasone (FLONASE) 50 mcg/act nasal spray 2 sprays into each nostril daily 12/5/19 1/17/23  Leelee Garcia DO   multivitamin (THERAGRAN) TABS Take 1 tablet by mouth daily  1/17/23  Historical Provider, MD     Allergies   Allergen Reactions    Codeine Itching    Medical Tape Rash       Objective :  Temp:  [97.9 °F (36.6 °C)-98.5 °F (36.9 °C)] 97.9 °F (36.6 °C)  HR:  [62-77] 77  BP: (109-180)/(57-81) 180/81  Resp:  [18-20] 20  SpO2:  [87 %-100 %] 87 %  O2 Device: None (Room air)    Physical Exam  Vitals and nursing note reviewed.   Constitutional:       General: She is not in acute distress.     Appearance: She is not diaphoretic.   HENT:      Head: Normocephalic.   Eyes:      General:         Right eye: No discharge.         Left eye: No discharge.   Cardiovascular:      Rate and Rhythm: Normal rate and regular rhythm.   Pulmonary:      Effort: Pulmonary effort is normal. No respiratory distress.      Breath sounds: Wheezing present. No rhonchi or rales.   Abdominal:      General: There is no distension.      Palpations: Abdomen is soft.      Tenderness: There is no abdominal tenderness. There is no guarding or rebound.   Musculoskeletal:      Cervical back: Normal range of motion.      Right lower leg: No edema.      Left lower leg: No edema.   Skin:     General: Skin is warm.   Neurological:      Mental Status: She  is alert and oriented to person, place, and time.   Psychiatric:         Mood and Affect: Mood normal.         Behavior: Behavior normal.         Thought Content: Thought content normal.         Judgment: Judgment normal.          Lines/Drains:            Lab Results: I have reviewed the following results:  Results from last 7 days   Lab Units 02/12/25  1446   WBC Thousand/uL 5.43   HEMOGLOBIN g/dL 12.9   HEMATOCRIT % 38.2   PLATELETS Thousands/uL 364   SEGS PCT % 76*   LYMPHO PCT % 9*   MONO PCT % 11   EOS PCT % 3     Results from last 7 days   Lab Units 02/12/25  1446   SODIUM mmol/L 135   POTASSIUM mmol/L 3.4*   CHLORIDE mmol/L 102   CO2 mmol/L 23   BUN mg/dL 25   CREATININE mg/dL 1.15   ANION GAP mmol/L 10   CALCIUM mg/dL 9.0   ALBUMIN g/dL 3.3*   TOTAL BILIRUBIN mg/dL 0.64   ALK PHOS U/L 90   ALT U/L 15   AST U/L 31   GLUCOSE RANDOM mg/dL 108             Lab Results   Component Value Date    HGBA1C 5.4 12/07/2024    HGBA1C 6.0 (H) 05/04/2024    HGBA1C 5.8 (H) 11/04/2023     Results from last 7 days   Lab Units 02/12/25  1505 02/12/25  1446   LACTIC ACID mmol/L 1.3  --    PROCALCITONIN ng/ml  --  5.49*       Imaging Results Review: I reviewed radiology reports from this admission including: chest xray and CT chest.  Other Study Results Review: EKG was reviewed.     Administrative Statements       ** Please Note: This note has been constructed using a voice recognition system. **

## 2025-02-12 NOTE — PATIENT INSTRUCTIONS
You are to go to the ED for further evaluation of your symptoms.    Follow-up with your PCP after the ED.

## 2025-02-13 LAB
ANION GAP SERPL CALCULATED.3IONS-SCNC: 10 MMOL/L (ref 4–13)
ATRIAL RATE: 70 BPM
BUN SERPL-MCNC: 21 MG/DL (ref 5–25)
CALCIUM SERPL-MCNC: 8.2 MG/DL (ref 8.4–10.2)
CHLORIDE SERPL-SCNC: 105 MMOL/L (ref 96–108)
CO2 SERPL-SCNC: 20 MMOL/L (ref 21–32)
CREAT SERPL-MCNC: 0.94 MG/DL (ref 0.6–1.3)
ERYTHROCYTE [DISTWIDTH] IN BLOOD BY AUTOMATED COUNT: 15.9 % (ref 11.6–15.1)
GFR SERPL CREATININE-BSD FRML MDRD: 61 ML/MIN/1.73SQ M
GLUCOSE SERPL-MCNC: 137 MG/DL (ref 65–140)
HCT VFR BLD AUTO: 35 % (ref 34.8–46.1)
HGB BLD-MCNC: 11.7 G/DL (ref 11.5–15.4)
MCH RBC QN AUTO: 32.7 PG (ref 26.8–34.3)
MCHC RBC AUTO-ENTMCNC: 33.4 G/DL (ref 31.4–37.4)
MCV RBC AUTO: 98 FL (ref 82–98)
P AXIS: 55 DEGREES
PLATELET # BLD AUTO: 351 THOUSANDS/UL (ref 149–390)
PMV BLD AUTO: 9.7 FL (ref 8.9–12.7)
POTASSIUM SERPL-SCNC: 3.6 MMOL/L (ref 3.5–5.3)
PR INTERVAL: 128 MS
PROCALCITONIN SERPL-MCNC: 3.22 NG/ML
QRS AXIS: 59 DEGREES
QRSD INTERVAL: 90 MS
QT INTERVAL: 434 MS
QTC INTERVAL: 468 MS
RBC # BLD AUTO: 3.58 MILLION/UL (ref 3.81–5.12)
SODIUM SERPL-SCNC: 135 MMOL/L (ref 135–147)
T WAVE AXIS: 40 DEGREES
VENTRICULAR RATE: 70 BPM
WBC # BLD AUTO: 3.79 THOUSAND/UL (ref 4.31–10.16)

## 2025-02-13 PROCEDURE — 94640 AIRWAY INHALATION TREATMENT: CPT

## 2025-02-13 PROCEDURE — 85027 COMPLETE CBC AUTOMATED: CPT | Performed by: INTERNAL MEDICINE

## 2025-02-13 PROCEDURE — 99232 SBSQ HOSP IP/OBS MODERATE 35: CPT | Performed by: INTERNAL MEDICINE

## 2025-02-13 PROCEDURE — 93010 ELECTROCARDIOGRAM REPORT: CPT | Performed by: INTERNAL MEDICINE

## 2025-02-13 PROCEDURE — 94760 N-INVAS EAR/PLS OXIMETRY 1: CPT

## 2025-02-13 PROCEDURE — 80048 BASIC METABOLIC PNL TOTAL CA: CPT | Performed by: INTERNAL MEDICINE

## 2025-02-13 PROCEDURE — 84145 PROCALCITONIN (PCT): CPT | Performed by: INTERNAL MEDICINE

## 2025-02-13 RX ADMIN — OSELTAMIVIR PHOSPHATE 30 MG: 30 CAPSULE ORAL at 07:50

## 2025-02-13 RX ADMIN — PANTOPRAZOLE SODIUM 40 MG: 40 TABLET, DELAYED RELEASE ORAL at 05:06

## 2025-02-13 RX ADMIN — BUDESONIDE 0.5 MG: 0.5 INHALANT ORAL at 20:12

## 2025-02-13 RX ADMIN — HEPARIN SODIUM 5000 UNITS: 5000 INJECTION INTRAVENOUS; SUBCUTANEOUS at 21:57

## 2025-02-13 RX ADMIN — Medication 1000 UNITS: at 07:50

## 2025-02-13 RX ADMIN — LEVALBUTEROL HYDROCHLORIDE 1.25 MG: 1.25 SOLUTION RESPIRATORY (INHALATION) at 20:12

## 2025-02-13 RX ADMIN — FORMOTEROL FUMARATE 20 MCG: 20 SOLUTION RESPIRATORY (INHALATION) at 20:12

## 2025-02-13 RX ADMIN — BUSPIRONE HYDROCHLORIDE 10 MG: 10 TABLET ORAL at 07:50

## 2025-02-13 RX ADMIN — GABAPENTIN 400 MG: 400 CAPSULE ORAL at 07:50

## 2025-02-13 RX ADMIN — HEPARIN SODIUM 5000 UNITS: 5000 INJECTION INTRAVENOUS; SUBCUTANEOUS at 05:06

## 2025-02-13 RX ADMIN — CARVEDILOL 12.5 MG: 12.5 TABLET, FILM COATED ORAL at 07:50

## 2025-02-13 RX ADMIN — ISOSORBIDE MONONITRATE 30 MG: 30 TABLET, EXTENDED RELEASE ORAL at 07:50

## 2025-02-13 RX ADMIN — LEVALBUTEROL HYDROCHLORIDE 1.25 MG: 1.25 SOLUTION RESPIRATORY (INHALATION) at 08:06

## 2025-02-13 RX ADMIN — CYANOCOBALAMIN TAB 500 MCG 1000 MCG: 500 TAB at 07:50

## 2025-02-13 RX ADMIN — IPRATROPIUM BROMIDE 0.5 MG: 0.5 SOLUTION RESPIRATORY (INHALATION) at 02:52

## 2025-02-13 RX ADMIN — CLOPIDOGREL 75 MG: 75 TABLET ORAL at 07:50

## 2025-02-13 RX ADMIN — LEVALBUTEROL HYDROCHLORIDE 1.25 MG: 1.25 SOLUTION RESPIRATORY (INHALATION) at 02:52

## 2025-02-13 RX ADMIN — FORMOTEROL FUMARATE 20 MCG: 20 SOLUTION RESPIRATORY (INHALATION) at 08:07

## 2025-02-13 RX ADMIN — AZITHROMYCIN DIHYDRATE 500 MG: 500 TABLET ORAL at 14:11

## 2025-02-13 RX ADMIN — METHYLPREDNISOLONE SODIUM SUCCINATE 40 MG: 40 INJECTION, POWDER, FOR SOLUTION INTRAMUSCULAR; INTRAVENOUS at 10:54

## 2025-02-13 RX ADMIN — IPRATROPIUM BROMIDE 0.5 MG: 0.5 SOLUTION RESPIRATORY (INHALATION) at 14:29

## 2025-02-13 RX ADMIN — CARVEDILOL 12.5 MG: 12.5 TABLET, FILM COATED ORAL at 19:13

## 2025-02-13 RX ADMIN — ESCITALOPRAM OXALATE 20 MG: 20 TABLET ORAL at 07:50

## 2025-02-13 RX ADMIN — GUAIFENESIN 600 MG: 600 TABLET, EXTENDED RELEASE ORAL at 07:50

## 2025-02-13 RX ADMIN — METHYLPREDNISOLONE SODIUM SUCCINATE 40 MG: 40 INJECTION, POWDER, FOR SOLUTION INTRAMUSCULAR; INTRAVENOUS at 19:12

## 2025-02-13 RX ADMIN — HEPARIN SODIUM 5000 UNITS: 5000 INJECTION INTRAVENOUS; SUBCUTANEOUS at 14:11

## 2025-02-13 RX ADMIN — OSELTAMIVIR PHOSPHATE 30 MG: 30 CAPSULE ORAL at 21:58

## 2025-02-13 RX ADMIN — IPRATROPIUM BROMIDE 0.5 MG: 0.5 SOLUTION RESPIRATORY (INHALATION) at 08:06

## 2025-02-13 RX ADMIN — BUDESONIDE 0.5 MG: 0.5 INHALANT ORAL at 08:06

## 2025-02-13 RX ADMIN — IPRATROPIUM BROMIDE 0.5 MG: 0.5 SOLUTION RESPIRATORY (INHALATION) at 20:12

## 2025-02-13 RX ADMIN — METHYLPREDNISOLONE SODIUM SUCCINATE 40 MG: 40 INJECTION, POWDER, FOR SOLUTION INTRAMUSCULAR; INTRAVENOUS at 03:33

## 2025-02-13 RX ADMIN — GUAIFENESIN 600 MG: 600 TABLET, EXTENDED RELEASE ORAL at 19:12

## 2025-02-13 RX ADMIN — BUSPIRONE HYDROCHLORIDE 10 MG: 10 TABLET ORAL at 21:58

## 2025-02-13 RX ADMIN — GABAPENTIN 400 MG: 400 CAPSULE ORAL at 19:12

## 2025-02-13 RX ADMIN — ATORVASTATIN CALCIUM 80 MG: 40 TABLET, FILM COATED ORAL at 19:12

## 2025-02-13 RX ADMIN — REMDESIVIR 100 MG: 100 INJECTION, POWDER, LYOPHILIZED, FOR SOLUTION INTRAVENOUS at 19:13

## 2025-02-13 RX ADMIN — LEVALBUTEROL HYDROCHLORIDE 1.25 MG: 1.25 SOLUTION RESPIRATORY (INHALATION) at 14:29

## 2025-02-13 NOTE — PLAN OF CARE
Problem: PAIN - ADULT  Goal: Verbalizes/displays adequate comfort level or baseline comfort level  Description: Interventions:  - Encourage patient to monitor pain and request assistance  - Assess pain using appropriate pain scale  - Administer analgesics based on type and severity of pain and evaluate response  - Implement non-pharmacological measures as appropriate and evaluate response  - Consider cultural and social influences on pain and pain management  - Notify physician/advanced practitioner if interventions unsuccessful or patient reports new pain  Outcome: Progressing     Problem: INFECTION - ADULT  Goal: Absence or prevention of progression during hospitalization  Description: INTERVENTIONS:  - Assess and monitor for signs and symptoms of infection  - Monitor lab/diagnostic results  - Monitor all insertion sites, i.e. indwelling lines, tubes, and drains  - Monitor endotracheal if appropriate and nasal secretions for changes in amount and color  - [place] appropriate cooling/warming therapies per order  - Administer medications as ordered  - Instruct and encourage patient and family to use good hand hygiene technique  - Identify and instruct in appropriate isolation precautions for identified infection/condition  Outcome: Progressing  Goal: Absence of fever/infection during neutropenic period  Description: INTERVENTIONS:  - Monitor WBC    Outcome: Progressing

## 2025-02-13 NOTE — PLAN OF CARE
Problem: PAIN - ADULT  Goal: Verbalizes/displays adequate comfort level or baseline comfort level  Description: Interventions:  - Encourage patient to monitor pain and request assistance  - Assess pain using appropriate pain scale  - Administer analgesics based on type and severity of pain and evaluate response  - Implement non-pharmacological measures as appropriate and evaluate response  - Consider cultural and social influences on pain and pain management  - Notify physician/advanced practitioner if interventions unsuccessful or patient reports new pain  Outcome: Progressing     Problem: INFECTION - ADULT  Goal: Absence or prevention of progression during hospitalization  Description: INTERVENTIONS:  - Assess and monitor for signs and symptoms of infection  - Monitor lab/diagnostic results  - Monitor all insertion sites, i.e. indwelling lines, tubes, and drains  - Monitor endotracheal if appropriate and nasal secretions for changes in amount and color  - [place] appropriate cooling/warming therapies per order  - Administer medications as ordered  - Instruct and encourage patient and family to use good hand hygiene technique  - Identify and instruct in appropriate isolation precautions for identified infection/condition  Outcome: Progressing  Goal: Absence of fever/infection during neutropenic period  Description: INTERVENTIONS:  - Monitor WBC    Outcome: Progressing     Problem: SAFETY ADULT  Goal: Patient will remain free of falls  Description: INTERVENTIONS:  - Educate patient/family on patient safety including physical limitations  - Instruct patient to call for assistance with activity   - Consult OT/PT to assist with strengthening/mobility   - Keep Call bell within reach  - Keep bed low and locked with side rails adjusted as appropriate  - Keep care items and personal belongings within reach  - Initiate and maintain comfort rounds  - Make Fall Risk Sign visible to staff  - Offer Toileting every 2 Hours,  in advance of need  - Initiate/Maintain bed alarm  - Obtain necessary fall risk management equipment:   - Apply yellow socks and bracelet for high fall risk patients  - Consider moving patient to room near nurses station  Outcome: Progressing  Goal: Maintain or return to baseline ADL function  Description: INTERVENTIONS:  -  Assess patient's ability to carry out ADLs; assess patient's baseline for ADL function and identify physical deficits which impact ability to perform ADLs (bathing, care of mouth/teeth, toileting, grooming, dressing, etc.)  - Assess/evaluate cause of self-care deficits   - Assess range of motion  - Assess patient's mobility; develop plan if impaired  - Assess patient's need for assistive devices and provide as appropriate  - Encourage maximum independence but intervene and supervise when necessary  - Involve family in performance of ADLs  - Assess for home care needs following discharge   - Consider OT consult to assist with ADL evaluation and planning for discharge  - Provide patient education as appropriate  Outcome: Progressing  Goal: Maintains/Returns to pre admission functional level  Description: INTERVENTIONS:  - Perform AM-PAC 6 Click Basic Mobility/ Daily Activity assessment daily.  - Set and communicate daily mobility goal to care team and patient/family/caregiver.   - Collaborate with rehabilitation services on mobility goals if consulted  - Perform Range of Motion 2 times a day.  - Reposition patient every 2 hours.  - Dangle patient 2 times a day  - Stand patient 2 times a day  - Ambulate patient 2 times a day  - Out of bed to chair 2 times a day   - Out of bed for meals 2  Problem: DISCHARGE PLANNING  Goal: Discharge to home or other facility with appropriate resources  Description: INTERVENTIONS:  - Identify barriers to discharge w/patient and caregiver  - Arrange for needed discharge resources and transportation as appropriate  - Identify discharge learning needs (meds, wound  care, etc.)  - Arrange for interpretive services to assist at discharge as needed  - Refer to Case Management Department for coordinating discharge planning if the patient needs post-hospital services based on physician/advanced practitioner order or complex needs related to functional status, cognitive ability, or social support system  Outcome: Progressing     Problem: Knowledge Deficit  Goal: Patient/family/caregiver demonstrates understanding of disease process, treatment plan, medications, and discharge instructions  Description: Complete learning assessment and assess knowledge base.  Interventions:  - Provide teaching at level of understanding  - Provide teaching via preferred learning methods  Outcome: Progressing     Problem: RESPIRATORY - ADULT  Goal: Achieves optimal ventilation and oxygenation  Description: INTERVENTIONS:  - Assess for changes in respiratory status  - Assess for changes in mentation and behavior  - Position to facilitate oxygenation and minimize respiratory effort  - Oxygen administered by appropriate delivery if ordered  - Initiate smoking cessation education as indicated  - Encourage broncho-pulmonary hygiene including cough, deep breathe, Incentive Spirometry  - Assess the need for suctioning and aspirate as needed  - Assess and instruct to report SOB or any respiratory difficulty  - Respiratory Therapy support as indicated  Outcome: Progressing    times a day  - Out of bed for toileting  - Record patient progress and toleration of activity level   Outcome: Progressing

## 2025-02-13 NOTE — ASSESSMENT & PLAN NOTE
Shortness of breath and cough  CT chest showed new dense consolidation throughout the right upper lobe consistent with pneumonia.  However there is a new thickening along the walls of the right upper lobe bullae short-term follow-up posttreatment CT chest is recommended to assess for resolution.  Received cefepime and azithromycin in the emergency department  Procalcitonin 5.4  Continue ceftriaxone  Mucinex, inhalers

## 2025-02-13 NOTE — PROGRESS NOTES
Report given to RUTH Mckoy. Pt transported to [facility] via stretcher with PCA. Belongings with pt.

## 2025-02-13 NOTE — ASSESSMENT & PLAN NOTE
Oxygen saturation 87% at room air  In the context of pneumonia/COVID/flu/COPD exacerbation  Wean her oxygen to maintain saturation greater than 88%.  Currently on 2 L nasal cannula oxygen saturating well

## 2025-02-13 NOTE — ASSESSMENT & PLAN NOTE
Tested positive for COVID on 2/12  On 2 L nasal cannula oxygen given hypoxia  Troponin normal, BNP slightly elevated  Does not meet sepsis criteria  Started remdesivir

## 2025-02-13 NOTE — ASSESSMENT & PLAN NOTE
Presented with worsening cough, wheezing in the setting of COVID/flu/pneumonia  On Trelegy as home medication  Feels improved    Plan:  Continue Solu-Medrol 40 IV every 8 hours will start to wean her tomorrow  DuoNebs, Mucinex scheduled  Budesonide/formoterol  Azithromycin x 3 doses

## 2025-02-13 NOTE — PROGRESS NOTES
Progress Note - Hospitalist   Name: Geovanna Leal 69 y.o. female I MRN: [id number]  Unit/Bed#: -01 I Date of Admission: 2/12/2025   Date of Service: 2/13/2025 I Hospital Day: 1    Assessment & Plan  Acute respiratory failure with hypoxia (HCC)  Oxygen saturation 87% at room air  In the context of pneumonia/COVID/flu/COPD exacerbation  Wean her oxygen to maintain saturation greater than 88%.  Currently on 2 L nasal cannula oxygen saturating well  Essential hypertension  Continue Coreg 12.5 mg twice daily  Blood pressure stable  Monitor blood pressure  Dyslipidemia  Continue atorvastatin  Chronic obstructive pulmonary disease with acute exacerbation (HCC)  Presented with worsening cough, wheezing in the setting of COVID/flu/pneumonia  On Trelegy as home medication  Feels improved    Plan:  Continue Solu-Medrol 40 IV every 8 hours will start to wean her tomorrow  DuoNebs, Mucinex scheduled  Budesonide/formoterol  Azithromycin x 3 doses  Pneumonia due to infectious organism  Shortness of breath and cough  CT chest showed new dense consolidation throughout the right upper lobe consistent with pneumonia.  However there is a new thickening along the walls of the right upper lobe bullae short-term follow-up posttreatment CT chest is recommended to assess for resolution.  Received cefepime and azithromycin in the emergency department  Procalcitonin 5.4  Continue ceftriaxone  Mucinex, inhalers  Mild episode of recurrent major depressive disorder (HCC)  Continue Lexapro and BuSpar  COVID-19 virus infection  Tested positive for COVID on 2/12  On 2 L nasal cannula oxygen given hypoxia  Troponin normal, BNP slightly elevated  Does not meet sepsis criteria  Started remdesivir  Flu  Positive for flu A  Started Tamiflu renally dosed    VTE Pharmacologic Prophylaxis: VTE Score: 6 High Risk (Score >/= 5) - Pharmacological DVT Prophylaxis Ordered: heparin. Sequential Compression Devices Ordered.    Mobility:   Basic Mobility  Inpatient Raw Score: 22  JH-HLM Goal: 7: Walk 25 feet or more  JH-HLM Achieved: 6: Walk 10 steps or more  JH-HLM Goal achieved. Continue to encourage appropriate mobility.    Patient Centered Rounds: I performed bedside rounds with nursing staff today.   Discussions with Specialists or Other Care Team Provider: maddi    Education and Discussions with Family / Patient: Updated  () at bedside.    Current Length of Stay: 1 day(s)  Current Patient Status: Inpatient   Certification Statement: The patient will continue to require additional inpatient hospital stay due to respiratory failure  Discharge Plan: Anticipate discharge in 48 hrs to home.    Code Status: Level 1 - Full Code    Subjective   Patient feels improved.  Intermittent cough however she is much better.    Objective :  Temp:  [96.6 °F (35.9 °C)-98.5 °F (36.9 °C)] 97.6 °F (36.4 °C)  HR:  [62-77] 64  BP: (109-222)/(57-91) 154/84  Resp:  [18-24] 24  SpO2:  [87 %-100 %] 95 %  O2 Device: Nasal cannula  Nasal Cannula O2 Flow Rate (L/min):  [1 L/min-2 L/min] 2 L/min    Body mass index is 26.46 kg/m².     Input and Output Summary (last 24 hours):     Intake/Output Summary (Last 24 hours) at 2/13/2025 1200  Last data filed at 2/13/2025 0857  Gross per 24 hour   Intake 540 ml   Output --   Net 540 ml       Physical Exam  Vitals and nursing note reviewed.   Constitutional:       General: She is not in acute distress.     Appearance: She is not diaphoretic.   HENT:      Head: Normocephalic.   Eyes:      General:         Right eye: No discharge.         Left eye: No discharge.   Cardiovascular:      Rate and Rhythm: Normal rate and regular rhythm.   Pulmonary:      Effort: Pulmonary effort is normal. No respiratory distress.      Breath sounds: Wheezing present. No rhonchi or rales.   Abdominal:      General: There is no distension.      Palpations: Abdomen is soft.      Tenderness: There is no abdominal tenderness. There is no guarding or rebound.    Musculoskeletal:      Cervical back: Normal range of motion.      Right lower leg: No edema.      Left lower leg: No edema.   Skin:     General: Skin is warm.   Neurological:      Mental Status: She is alert and oriented to person, place, and time.   Psychiatric:         Mood and Affect: Mood normal.         Behavior: Behavior normal.         Thought Content: Thought content normal.         Judgment: Judgment normal.           Lines/Drains:              Lab Results: I have reviewed the following results:   Results from last 7 days   Lab Units 02/13/25  0340 02/12/25  1446   WBC Thousand/uL 3.79* 5.43   HEMOGLOBIN g/dL 11.7 12.9   HEMATOCRIT % 35.0 38.2   PLATELETS Thousands/uL 351 364   SEGS PCT %  --  76*   LYMPHO PCT %  --  9*   MONO PCT %  --  11   EOS PCT %  --  3     Results from last 7 days   Lab Units 02/13/25  0340 02/12/25  1446   SODIUM mmol/L 135 135   POTASSIUM mmol/L 3.6 3.4*   CHLORIDE mmol/L 105 102   CO2 mmol/L 20* 23   BUN mg/dL 21 25   CREATININE mg/dL 0.94 1.15   ANION GAP mmol/L 10 10   CALCIUM mg/dL 8.2* 9.0   ALBUMIN g/dL  --  3.3*   TOTAL BILIRUBIN mg/dL  --  0.64   ALK PHOS U/L  --  90   ALT U/L  --  15   AST U/L  --  31   GLUCOSE RANDOM mg/dL 137 108                 Results from last 7 days   Lab Units 02/13/25  0340 02/12/25  1505 02/12/25  1446   LACTIC ACID mmol/L  --  1.3  --    PROCALCITONIN ng/ml 3.22*  --  5.49*       Recent Cultures (last 7 days):   Results from last 7 days   Lab Units 02/12/25  1505   BLOOD CULTURE  Received in Microbiology Lab. Culture in Progress.  Received in Microbiology Lab. Culture in Progress.       Imaging Results Review: No pertinent imaging studies reviewed.  Other Study Results Review: No additional pertinent studies reviewed.    Last 24 Hours Medication List:     Current Facility-Administered Medications:     acetaminophen (TYLENOL) tablet 650 mg, Q6H PRN    albuterol inhalation solution 2.5 mg, Q6H PRN    atorvastatin (LIPITOR) tablet 80 mg, After  Dinner    azithromycin (ZITHROMAX) tablet 500 mg, Q24H    budesonide (PULMICORT) inhalation solution 0.5 mg, Q12H    busPIRone (BUSPAR) tablet 10 mg, BID    carvedilol (COREG) tablet 12.5 mg, BID    [START ON 2/20/2025] cefTRIAXone (ROCEPHIN) IVPB (premix in dextrose) 1,000 mg 50 mL, Q24H    Cholecalciferol (VITAMIN D3) tablet 1,000 Units, Daily    clopidogrel (PLAVIX) tablet 75 mg, Daily    cyanocobalamin (VITAMIN B-12) tablet 1,000 mcg, Daily    escitalopram (LEXAPRO) tablet 20 mg, Daily    formoterol (PERFOROMIST) nebulizer solution 20 mcg, Q12H    gabapentin (NEURONTIN) capsule 400 mg, BID    guaiFENesin (MUCINEX) 12 hr tablet 600 mg, BID    heparin (porcine) subcutaneous injection 5,000 Units, Q8H ADELFO    ipratropium (ATROVENT) 0.02 % inhalation solution 0.5 mg, Q6H    isosorbide mononitrate (IMDUR) 24 hr tablet 30 mg, Daily    levalbuterol (XOPENEX) inhalation solution 1.25 mg, Q6H **AND** [DISCONTINUED] sodium chloride 0.9 % inhalation solution 3 mL, TID    methylPREDNISolone sodium succinate (Solu-MEDROL) injection 40 mg, Q8H    ondansetron (ZOFRAN) injection 4 mg, Q6H PRN    oseltamivir (TAMIFLU) capsule 30 mg, Q12H ADELFO    pantoprazole (PROTONIX) EC tablet 40 mg, Early Morning    [COMPLETED] remdesivir (Veklury) 200 mg in sodium chloride 0.9 % 290 mL IVPB, Q24H **FOLLOWED BY** remdesivir (Veklury) 100 mg in sodium chloride 0.9 % 270 mL IVPB, Q24H    Administrative Statements   Today, Patient Was Seen By: Francine Winchester MD      **Please Note: This note may have been constructed using a voice recognition system.**

## 2025-02-13 NOTE — CASE MANAGEMENT
Case Management Assessment & Discharge Planning Note    Patient name Geovanna Leal  Location /-01 MRN [id number]  : 1955 Date 2025       Current Admission Date: 2025  Current Admission Diagnosis:Acute respiratory failure with hypoxia (HCC)   Patient Active Problem List    Diagnosis Date Noted Date Diagnosed    COVID-19 virus infection 2025     Flu 2025     Acute respiratory failure with hypoxia (HCC) 2025     Other emphysema (HCC) 10/15/2024     Deficiency of vitamin B12 2024     Vestibular disorder, bilateral 2024     Mild episode of recurrent major depressive disorder (HCC) 2024     Nodule of left lung 2024     Orthostatic hypotension 2023     Pneumonia due to infectious organism 2023     Hypersomnia, unspecified      Allergies 2023     Intracranial atherosclerosis 11/10/2022     Benign tumor of spinal meningioma (HCC) 11/10/2022     Carotid stenosis, bilateral 2022     History of subarachnoid hemorrhage 2022     Stage 3a chronic kidney disease (HCC) 2021     Hypotension      GERD (gastroesophageal reflux disease) 2019     Chronic obstructive pulmonary disease with acute exacerbation (HCC) 2019     Pancreatic cyst 2019     De Quervain's disease (tenosynovitis) 2018     IFG (impaired fasting glucose) 2017     Sudden idiopathic hearing loss of right ear 2017     Takotsubo cardiomyopathy 06/15/2017     Lower back pain 2016     Cervical stenosis of spinal canal 2015     Carpal tunnel syndrome 2014     Plantar fasciitis 2012     Dyslipidemia 2012     Arnold-Chiari malformation (HCC) 2012     Essential hypertension 2012       LOS (days): 1  Geometric Mean LOS (GMLOS) (days): 4.6  Days to GMLOS:3.9     OBJECTIVE:    Risk of Unplanned Readmission Score: 14.51         Current admission status: Inpatient       Preferred Pharmacy:    [facility] - [address]  [address]  [place] [address]  Phone: [phone] Fax: [phone]    Primary Care Provider: eLelee Garcia DO    Primary Insurance: MEDICARE  Secondary Insurance: AETNA    ASSESSMENT:  Active Health Care Proxies       Narayan Leal Health Care Representative - Spouse   Primary Phone: [phone] (Mobile)  Home Phone: [phone]                 Advance Directives  Does patient have a Health Care POA?: No  Was patient offered paperwork?: Yes (declined)  Does patient currently have a Health Care decision maker?: Yes, please see Health Care Proxy section  Does patient have Advance Directives?: No  Was patient offered paperwork?: Yes (declined)  Primary Contact: Narayan ()         Readmission Root Cause  30 Day Readmission: No    Patient Information  Admitted from:: Home  Mental Status: Alert  During Assessment patient was accompanied by: Not accompanied during assessment  Assessment information provided by:: Patient  Primary Caregiver: Self  Support Systems: Spouse/significant other  County of Residence: [place]  What city do you live in?: [place]  Home entry access options. Select all that apply.: Stairs  Number of steps to enter home.: 2  Type of Current Residence: 2 story home  Upon entering residence, is there a bedroom on the main floor (no further steps)?: Yes  Upon entering residence, is there a bathroom on the main floor (no further steps)?: Yes  Living Arrangements: Lives w/ Spouse/significant other  Is patient a ?: No    Activities of Daily Living Prior to Admission  Functional Status: Independent  Completes ADLs independently?: Yes  Ambulates independently?: Yes  Does patient use assisted devices?: No  Does patient currently own DME?: No  Does patient have a history of Outpatient Therapy (PT/OT)?: No  Does the patient have a history of Short-Term Rehab?: No  Does patient have a history of HHC?: No  Does patient  currently have HHC?: No    Patient Information Continued  Does patient have prescription coverage?: Yes  Does patient receive dialysis treatments?: Yes  Does patient have a history of substance abuse?: No  Does patient have a history of Mental Health Diagnosis?: No    Means of Transportation  Means of Transport to Appts:: Drives Self    DISCHARGE DETAILS:    Discharge planning discussed with:: patient  Freedom of Choice: Yes  Comments - Freedom of Choice: no anticipated dc needs  CM contacted family/caregiver?: No- see comments (reports being in contact with her )  Were Treatment Team discharge recommendations reviewed with patient/caregiver?: Yes  Did patient/caregiver verbalize understanding of patient care needs?: Yes  Were patient/caregiver advised of the risks associated with not following Treatment Team discharge recommendations?: Yes    Requested Home Health Care         Is the patient interested in HHC at discharge?: No    DME Referral Provided  Referral made for DME?: No    Treatment Team Recommendation: Home  Discharge Destination Plan:: Home  Transport at Discharge : Family     Additional Comments: Met with pt to discuss the role of CM and to discuss any help pt may need prior to dc. Pt lives with her  in a 2 story home with a 1st floor setup and 2 ADENIKE. Pt performed ADL's indptly pta, no use of DME. No hx of HHC or rehab. No hx of mental health or D&A treatment. Pt's preferred pharmacy is [email or web address] in [place]. Pt drives. Pt's  will transport home at dc.

## 2025-02-14 LAB
ANION GAP SERPL CALCULATED.3IONS-SCNC: 10 MMOL/L (ref 4–13)
BUN SERPL-MCNC: 28 MG/DL (ref 5–25)
CALCIUM SERPL-MCNC: 8.8 MG/DL (ref 8.4–10.2)
CHLORIDE SERPL-SCNC: 106 MMOL/L (ref 96–108)
CO2 SERPL-SCNC: 21 MMOL/L (ref 21–32)
CREAT SERPL-MCNC: 0.98 MG/DL (ref 0.6–1.3)
ERYTHROCYTE [DISTWIDTH] IN BLOOD BY AUTOMATED COUNT: 15.9 % (ref 11.6–15.1)
GFR SERPL CREATININE-BSD FRML MDRD: 58 ML/MIN/1.73SQ M
GLUCOSE SERPL-MCNC: 184 MG/DL (ref 65–140)
HCT VFR BLD AUTO: 36.5 % (ref 34.8–46.1)
HGB BLD-MCNC: 12 G/DL (ref 11.5–15.4)
MCH RBC QN AUTO: 32.3 PG (ref 26.8–34.3)
MCHC RBC AUTO-ENTMCNC: 32.9 G/DL (ref 31.4–37.4)
MCV RBC AUTO: 98 FL (ref 82–98)
PLATELET # BLD AUTO: 422 THOUSANDS/UL (ref 149–390)
PMV BLD AUTO: 9.4 FL (ref 8.9–12.7)
POTASSIUM SERPL-SCNC: 3.5 MMOL/L (ref 3.5–5.3)
RBC # BLD AUTO: 3.72 MILLION/UL (ref 3.81–5.12)
SODIUM SERPL-SCNC: 137 MMOL/L (ref 135–147)
WBC # BLD AUTO: 7.21 THOUSAND/UL (ref 4.31–10.16)

## 2025-02-14 PROCEDURE — 94640 AIRWAY INHALATION TREATMENT: CPT

## 2025-02-14 PROCEDURE — 85027 COMPLETE CBC AUTOMATED: CPT | Performed by: INTERNAL MEDICINE

## 2025-02-14 PROCEDURE — 94760 N-INVAS EAR/PLS OXIMETRY 1: CPT

## 2025-02-14 PROCEDURE — 99232 SBSQ HOSP IP/OBS MODERATE 35: CPT | Performed by: INTERNAL MEDICINE

## 2025-02-14 PROCEDURE — 80048 BASIC METABOLIC PNL TOTAL CA: CPT | Performed by: INTERNAL MEDICINE

## 2025-02-14 RX ORDER — METHYLPREDNISOLONE SODIUM SUCCINATE 40 MG/ML
40 INJECTION, POWDER, LYOPHILIZED, FOR SOLUTION INTRAMUSCULAR; INTRAVENOUS EVERY 12 HOURS
Status: DISCONTINUED | OUTPATIENT
Start: 2025-02-14 | End: 2025-02-14

## 2025-02-14 RX ORDER — METHYLPREDNISOLONE SODIUM SUCCINATE 40 MG/ML
40 INJECTION, POWDER, LYOPHILIZED, FOR SOLUTION INTRAMUSCULAR; INTRAVENOUS EVERY 12 HOURS
Status: COMPLETED | OUTPATIENT
Start: 2025-02-14 | End: 2025-02-14

## 2025-02-14 RX ORDER — PREDNISONE 20 MG/1
40 TABLET ORAL DAILY
Status: DISCONTINUED | OUTPATIENT
Start: 2025-02-15 | End: 2025-02-17 | Stop reason: HOSPADM

## 2025-02-14 RX ADMIN — IPRATROPIUM BROMIDE 0.5 MG: 0.5 SOLUTION RESPIRATORY (INHALATION) at 09:08

## 2025-02-14 RX ADMIN — REMDESIVIR 100 MG: 100 INJECTION, POWDER, LYOPHILIZED, FOR SOLUTION INTRAVENOUS at 17:39

## 2025-02-14 RX ADMIN — OSELTAMIVIR PHOSPHATE 30 MG: 30 CAPSULE ORAL at 20:16

## 2025-02-14 RX ADMIN — HEPARIN SODIUM 5000 UNITS: 5000 INJECTION INTRAVENOUS; SUBCUTANEOUS at 14:03

## 2025-02-14 RX ADMIN — METHYLPREDNISOLONE SODIUM SUCCINATE 40 MG: 40 INJECTION, POWDER, FOR SOLUTION INTRAMUSCULAR; INTRAVENOUS at 14:00

## 2025-02-14 RX ADMIN — GABAPENTIN 400 MG: 400 CAPSULE ORAL at 17:39

## 2025-02-14 RX ADMIN — GUAIFENESIN 600 MG: 600 TABLET, EXTENDED RELEASE ORAL at 10:11

## 2025-02-14 RX ADMIN — BUSPIRONE HYDROCHLORIDE 10 MG: 10 TABLET ORAL at 10:14

## 2025-02-14 RX ADMIN — ATORVASTATIN CALCIUM 80 MG: 40 TABLET, FILM COATED ORAL at 17:39

## 2025-02-14 RX ADMIN — PANTOPRAZOLE SODIUM 40 MG: 40 TABLET, DELAYED RELEASE ORAL at 06:45

## 2025-02-14 RX ADMIN — ISOSORBIDE MONONITRATE 30 MG: 30 TABLET, EXTENDED RELEASE ORAL at 10:11

## 2025-02-14 RX ADMIN — CARVEDILOL 12.5 MG: 12.5 TABLET, FILM COATED ORAL at 10:12

## 2025-02-14 RX ADMIN — METHYLPREDNISOLONE SODIUM SUCCINATE 40 MG: 40 INJECTION, POWDER, FOR SOLUTION INTRAMUSCULAR; INTRAVENOUS at 02:53

## 2025-02-14 RX ADMIN — HEPARIN SODIUM 5000 UNITS: 5000 INJECTION INTRAVENOUS; SUBCUTANEOUS at 20:16

## 2025-02-14 RX ADMIN — BUDESONIDE 0.5 MG: 0.5 INHALANT ORAL at 09:08

## 2025-02-14 RX ADMIN — AZITHROMYCIN DIHYDRATE 500 MG: 500 TABLET ORAL at 14:03

## 2025-02-14 RX ADMIN — IPRATROPIUM BROMIDE 0.5 MG: 0.5 SOLUTION RESPIRATORY (INHALATION) at 14:04

## 2025-02-14 RX ADMIN — LEVALBUTEROL HYDROCHLORIDE 1.25 MG: 1.25 SOLUTION RESPIRATORY (INHALATION) at 02:43

## 2025-02-14 RX ADMIN — IPRATROPIUM BROMIDE 0.5 MG: 0.5 SOLUTION RESPIRATORY (INHALATION) at 20:28

## 2025-02-14 RX ADMIN — HEPARIN SODIUM 5000 UNITS: 5000 INJECTION INTRAVENOUS; SUBCUTANEOUS at 06:45

## 2025-02-14 RX ADMIN — CARVEDILOL 12.5 MG: 12.5 TABLET, FILM COATED ORAL at 17:39

## 2025-02-14 RX ADMIN — IPRATROPIUM BROMIDE 0.5 MG: 0.5 SOLUTION RESPIRATORY (INHALATION) at 02:42

## 2025-02-14 RX ADMIN — BUSPIRONE HYDROCHLORIDE 10 MG: 10 TABLET ORAL at 20:16

## 2025-02-14 RX ADMIN — GABAPENTIN 400 MG: 400 CAPSULE ORAL at 10:11

## 2025-02-14 RX ADMIN — OSELTAMIVIR PHOSPHATE 30 MG: 30 CAPSULE ORAL at 10:11

## 2025-02-14 RX ADMIN — CLOPIDOGREL 75 MG: 75 TABLET ORAL at 10:12

## 2025-02-14 RX ADMIN — GUAIFENESIN 600 MG: 600 TABLET, EXTENDED RELEASE ORAL at 17:39

## 2025-02-14 RX ADMIN — LEVALBUTEROL HYDROCHLORIDE 1.25 MG: 1.25 SOLUTION RESPIRATORY (INHALATION) at 20:28

## 2025-02-14 RX ADMIN — ESCITALOPRAM OXALATE 20 MG: 20 TABLET ORAL at 10:15

## 2025-02-14 RX ADMIN — FORMOTEROL FUMARATE 20 MCG: 20 SOLUTION RESPIRATORY (INHALATION) at 20:27

## 2025-02-14 RX ADMIN — Medication 1000 UNITS: at 10:11

## 2025-02-14 RX ADMIN — LEVALBUTEROL HYDROCHLORIDE 1.25 MG: 1.25 SOLUTION RESPIRATORY (INHALATION) at 14:04

## 2025-02-14 RX ADMIN — LEVALBUTEROL HYDROCHLORIDE 1.25 MG: 1.25 SOLUTION RESPIRATORY (INHALATION) at 09:08

## 2025-02-14 RX ADMIN — CYANOCOBALAMIN TAB 500 MCG 1000 MCG: 500 TAB at 10:11

## 2025-02-14 RX ADMIN — BUDESONIDE 0.5 MG: 0.5 INHALANT ORAL at 20:28

## 2025-02-14 RX ADMIN — FORMOTEROL FUMARATE 20 MCG: 20 SOLUTION RESPIRATORY (INHALATION) at 09:08

## 2025-02-14 NOTE — ASSESSMENT & PLAN NOTE
Oxygen saturation 87% at room air on admission  In the context of pneumonia/COVID/flu/COPD exacerbation  On 1 L nasal cannula oxygen saturating 96% this morning.  Weaned her off room air.  Ambulatory pulse ox

## 2025-02-14 NOTE — CASE MANAGEMENT
Case Management Discharge Planning Note    Patient name Geovanna Leal  Location /-01 MRN [id number]  : 1955 Date 2025       Current Admission Date: 2025  Current Admission Diagnosis:Acute respiratory failure with hypoxia (HCC)   Patient Active Problem List    Diagnosis Date Noted Date Diagnosed    COVID-19 virus infection 2025     Flu 2025     Acute respiratory failure with hypoxia (HCC) 2025     Other emphysema (HCC) 10/15/2024     Deficiency of vitamin B12 2024     Vestibular disorder, bilateral 2024     Mild episode of recurrent major depressive disorder (HCC) 2024     Nodule of left lung 2024     Orthostatic hypotension 2023     Pneumonia due to infectious organism 2023     Hypersomnia, unspecified      Allergies 2023     Intracranial atherosclerosis 11/10/2022     Benign tumor of spinal meningioma (HCC) 11/10/2022     Carotid stenosis, bilateral 2022     History of subarachnoid hemorrhage 2022     Stage 3a chronic kidney disease (HCC) 2021     Hypotension      GERD (gastroesophageal reflux disease) 2019     Chronic obstructive pulmonary disease with acute exacerbation (HCC) 2019     Pancreatic cyst 2019     De Quervain's disease (tenosynovitis) 2018     IFG (impaired fasting glucose) 2017     Sudden idiopathic hearing loss of right ear 2017     Takotsubo cardiomyopathy 06/15/2017     Lower back pain 2016     Cervical stenosis of spinal canal 2015     Carpal tunnel syndrome 2014     Plantar fasciitis 2012     Dyslipidemia 2012     Arnold-Chiari malformation (HCC) 2012     Essential hypertension 2012       LOS (days): 2  Geometric Mean LOS (GMLOS) (days): 4.6  Days to GMLOS:2.8     OBJECTIVE:  Risk of Unplanned Readmission Score: 13.28         Current admission status: Inpatient   Preferred Pharmacy:   [id number] -  [facility] - [address]  [address]  [place] [address]  Phone: [phone] Fax: [phone]    Primary Care Provider: Leelee Garcia DO    Primary Insurance: MEDICARE  Secondary Insurance: AETNA    DISCHARGE DETAILS:     As per patient care rounds, patient may be ready for discharge in 24 hours. Spoke with patient at [phone] she reports being on 1 liter O2 and she does not have O2 at home. She will go home with her spouse to a 2 story home with a 1st floor set up. Her spouse will transport her home.                                                                                   IMM Given (Date):: 02/14/25  IMM Given to:: Patient
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
## 2025-02-14 NOTE — PLAN OF CARE
Problem: PAIN - ADULT  Goal: Verbalizes/displays adequate comfort level or baseline comfort level  Description: Interventions:  - Encourage patient to monitor pain and request assistance  - Assess pain using appropriate pain scale  - Administer analgesics based on type and severity of pain and evaluate response  - Implement non-pharmacological measures as appropriate and evaluate response  - Consider cultural and social influences on pain and pain management  - Notify physician/advanced practitioner if interventions unsuccessful or patient reports new pain  2/13/2025 2106 by Magali Graham RN  Outcome: Progressing  2/13/2025 2106 by Magali Graham RN  Outcome: Progressing     Problem: INFECTION - ADULT  Goal: Absence or prevention of progression during hospitalization  Description: INTERVENTIONS:  - Assess and monitor for signs and symptoms of infection  - Monitor lab/diagnostic results  - Monitor all insertion sites, i.e. indwelling lines, tubes, and drains  - Monitor endotracheal if appropriate and nasal secretions for changes in amount and color  - [place] appropriate cooling/warming therapies per order  - Administer medications as ordered  - Instruct and encourage patient and family to use good hand hygiene technique  - Identify and instruct in appropriate isolation precautions for identified infection/condition  2/13/2025 2106 by Magali Graham RN  Outcome: Progressing  2/13/2025 2106 by Magali Graham RN  Outcome: Progressing     Problem: RESPIRATORY - ADULT  Goal: Achieves optimal ventilation and oxygenation  Description: INTERVENTIONS:  - Assess for changes in respiratory status  - Assess for changes in mentation and behavior  - Position to facilitate oxygenation and minimize respiratory effort  - Oxygen administered by appropriate delivery if ordered  - Initiate smoking cessation education as indicated  - Encourage broncho-pulmonary hygiene including cough, deep breathe, Incentive Spirometry  - Assess the  need for suctioning and aspirate as needed  - Assess and instruct to report SOB or any respiratory difficulty  - Respiratory Therapy support as indicated  2/13/2025 2106 by Magali Graham RN  Outcome: Progressing  2/13/2025 2106 by Magali Graham RN  Outcome: Progressing

## 2025-02-14 NOTE — PLAN OF CARE
Problem: PAIN - ADULT  Goal: Verbalizes/displays adequate comfort level or baseline comfort level  Description: Interventions:  - Encourage patient to monitor pain and request assistance  - Assess pain using appropriate pain scale  - Administer analgesics based on type and severity of pain and evaluate response  - Implement non-pharmacological measures as appropriate and evaluate response  - Consider cultural and social influences on pain and pain management  - Notify physician/advanced practitioner if interventions unsuccessful or patient reports new pain  Outcome: Progressing     Problem: INFECTION - ADULT  Goal: Absence or prevention of progression during hospitalization  Description: INTERVENTIONS:  - Assess and monitor for signs and symptoms of infection  - Monitor lab/diagnostic results  - Monitor all insertion sites, i.e. indwelling lines, tubes, and drains  - Monitor endotracheal if appropriate and nasal secretions for changes in amount and color  - [place] appropriate cooling/warming therapies per order  - Administer medications as ordered  - Instruct and encourage patient and family to use good hand hygiene technique  - Identify and instruct in appropriate isolation precautions for identified infection/condition  Outcome: Progressing     Problem: RESPIRATORY - ADULT  Goal: Achieves optimal ventilation and oxygenation  Description: INTERVENTIONS:  - Assess for changes in respiratory status  - Assess for changes in mentation and behavior  - Position to facilitate oxygenation and minimize respiratory effort  - Oxygen administered by appropriate delivery if ordered  - Initiate smoking cessation education as indicated  - Encourage broncho-pulmonary hygiene including cough, deep breathe, Incentive Spirometry  - Assess the need for suctioning and aspirate as needed  - Assess and instruct to report SOB or any respiratory difficulty  - Respiratory Therapy support as indicated  Outcome: Progressing

## 2025-02-14 NOTE — ASSESSMENT & PLAN NOTE
Tested positive for COVID on 2/12  On 2 L nasal cannula oxygen given hypoxia  Troponin normal, BNP slightly elevated  Does not meet sepsis criteria  Started remdesivir-continue

## 2025-02-14 NOTE — ASSESSMENT & PLAN NOTE
Presented with worsening cough, wheezing in the setting of COVID/flu/pneumonia  On Trelegy as home medication  Feeling much better.  Shortness of breath with ambulation only.    Plan:  Last dose of IV Solu-Medrol today.  Will transition to prednisone 40 mg daily starting tomorrow  Arnav Horton scheduled  Budesonide/formoterol  Azithromycin x 3 doses

## 2025-02-15 LAB
ANION GAP SERPL CALCULATED.3IONS-SCNC: 8 MMOL/L (ref 4–13)
BUN SERPL-MCNC: 34 MG/DL (ref 5–25)
CALCIUM SERPL-MCNC: 8.4 MG/DL (ref 8.4–10.2)
CHLORIDE SERPL-SCNC: 107 MMOL/L (ref 96–108)
CO2 SERPL-SCNC: 21 MMOL/L (ref 21–32)
CREAT SERPL-MCNC: 0.92 MG/DL (ref 0.6–1.3)
ERYTHROCYTE [DISTWIDTH] IN BLOOD BY AUTOMATED COUNT: 16.1 % (ref 11.6–15.1)
GFR SERPL CREATININE-BSD FRML MDRD: 62 ML/MIN/1.73SQ M
GLUCOSE SERPL-MCNC: 159 MG/DL (ref 65–140)
HCT VFR BLD AUTO: 34.8 % (ref 34.8–46.1)
HGB BLD-MCNC: 11.3 G/DL (ref 11.5–15.4)
MCH RBC QN AUTO: 31.9 PG (ref 26.8–34.3)
MCHC RBC AUTO-ENTMCNC: 32.5 G/DL (ref 31.4–37.4)
MCV RBC AUTO: 98 FL (ref 82–98)
PLATELET # BLD AUTO: 436 THOUSANDS/UL (ref 149–390)
PMV BLD AUTO: 9.2 FL (ref 8.9–12.7)
POTASSIUM SERPL-SCNC: 3.4 MMOL/L (ref 3.5–5.3)
RBC # BLD AUTO: 3.54 MILLION/UL (ref 3.81–5.12)
SODIUM SERPL-SCNC: 136 MMOL/L (ref 135–147)
WBC # BLD AUTO: 9.62 THOUSAND/UL (ref 4.31–10.16)

## 2025-02-15 PROCEDURE — 99232 SBSQ HOSP IP/OBS MODERATE 35: CPT | Performed by: INTERNAL MEDICINE

## 2025-02-15 PROCEDURE — 94760 N-INVAS EAR/PLS OXIMETRY 1: CPT

## 2025-02-15 PROCEDURE — 80048 BASIC METABOLIC PNL TOTAL CA: CPT | Performed by: INTERNAL MEDICINE

## 2025-02-15 PROCEDURE — 85027 COMPLETE CBC AUTOMATED: CPT | Performed by: INTERNAL MEDICINE

## 2025-02-15 PROCEDURE — 94640 AIRWAY INHALATION TREATMENT: CPT

## 2025-02-15 RX ORDER — AMLODIPINE BESYLATE 5 MG/1
5 TABLET ORAL DAILY
Status: DISCONTINUED | OUTPATIENT
Start: 2025-02-16 | End: 2025-02-17 | Stop reason: HOSPADM

## 2025-02-15 RX ORDER — HYDRALAZINE HYDROCHLORIDE 20 MG/ML
10 INJECTION INTRAMUSCULAR; INTRAVENOUS EVERY 6 HOURS PRN
Status: DISCONTINUED | OUTPATIENT
Start: 2025-02-15 | End: 2025-02-17 | Stop reason: HOSPADM

## 2025-02-15 RX ORDER — POTASSIUM CHLORIDE 1500 MG/1
40 TABLET, EXTENDED RELEASE ORAL ONCE
Status: COMPLETED | OUTPATIENT
Start: 2025-02-15 | End: 2025-02-15

## 2025-02-15 RX ADMIN — HEPARIN SODIUM 5000 UNITS: 5000 INJECTION INTRAVENOUS; SUBCUTANEOUS at 05:09

## 2025-02-15 RX ADMIN — CARVEDILOL 12.5 MG: 12.5 TABLET, FILM COATED ORAL at 16:55

## 2025-02-15 RX ADMIN — IPRATROPIUM BROMIDE 0.5 MG: 0.5 SOLUTION RESPIRATORY (INHALATION) at 19:40

## 2025-02-15 RX ADMIN — HEPARIN SODIUM 5000 UNITS: 5000 INJECTION INTRAVENOUS; SUBCUTANEOUS at 20:16

## 2025-02-15 RX ADMIN — LEVALBUTEROL HYDROCHLORIDE 1.25 MG: 1.25 SOLUTION RESPIRATORY (INHALATION) at 14:07

## 2025-02-15 RX ADMIN — BUSPIRONE HYDROCHLORIDE 10 MG: 10 TABLET ORAL at 20:16

## 2025-02-15 RX ADMIN — Medication 1000 UNITS: at 07:50

## 2025-02-15 RX ADMIN — LEVALBUTEROL HYDROCHLORIDE 1.25 MG: 1.25 SOLUTION RESPIRATORY (INHALATION) at 08:29

## 2025-02-15 RX ADMIN — CARVEDILOL 12.5 MG: 12.5 TABLET, FILM COATED ORAL at 07:50

## 2025-02-15 RX ADMIN — GUAIFENESIN 600 MG: 600 TABLET, EXTENDED RELEASE ORAL at 16:55

## 2025-02-15 RX ADMIN — ESCITALOPRAM OXALATE 20 MG: 20 TABLET ORAL at 07:52

## 2025-02-15 RX ADMIN — FORMOTEROL FUMARATE 20 MCG: 20 SOLUTION RESPIRATORY (INHALATION) at 08:30

## 2025-02-15 RX ADMIN — PREDNISONE 40 MG: 20 TABLET ORAL at 07:50

## 2025-02-15 RX ADMIN — CLOPIDOGREL 75 MG: 75 TABLET ORAL at 07:52

## 2025-02-15 RX ADMIN — ATORVASTATIN CALCIUM 80 MG: 40 TABLET, FILM COATED ORAL at 16:55

## 2025-02-15 RX ADMIN — BUDESONIDE 0.5 MG: 0.5 INHALANT ORAL at 19:40

## 2025-02-15 RX ADMIN — OSELTAMIVIR PHOSPHATE 30 MG: 30 CAPSULE ORAL at 20:16

## 2025-02-15 RX ADMIN — BUSPIRONE HYDROCHLORIDE 10 MG: 10 TABLET ORAL at 07:50

## 2025-02-15 RX ADMIN — GUAIFENESIN 600 MG: 600 TABLET, EXTENDED RELEASE ORAL at 07:50

## 2025-02-15 RX ADMIN — ISOSORBIDE MONONITRATE 30 MG: 30 TABLET, EXTENDED RELEASE ORAL at 07:52

## 2025-02-15 RX ADMIN — HYDRALAZINE HYDROCHLORIDE 10 MG: 20 INJECTION INTRAMUSCULAR; INTRAVENOUS at 16:23

## 2025-02-15 RX ADMIN — HEPARIN SODIUM 5000 UNITS: 5000 INJECTION INTRAVENOUS; SUBCUTANEOUS at 13:15

## 2025-02-15 RX ADMIN — REMDESIVIR 100 MG: 100 INJECTION, POWDER, LYOPHILIZED, FOR SOLUTION INTRAVENOUS at 17:56

## 2025-02-15 RX ADMIN — IPRATROPIUM BROMIDE 0.5 MG: 0.5 SOLUTION RESPIRATORY (INHALATION) at 08:29

## 2025-02-15 RX ADMIN — FORMOTEROL FUMARATE 20 MCG: 20 SOLUTION RESPIRATORY (INHALATION) at 19:40

## 2025-02-15 RX ADMIN — IPRATROPIUM BROMIDE 0.5 MG: 0.5 SOLUTION RESPIRATORY (INHALATION) at 14:07

## 2025-02-15 RX ADMIN — CYANOCOBALAMIN TAB 500 MCG 1000 MCG: 500 TAB at 07:52

## 2025-02-15 RX ADMIN — LEVALBUTEROL HYDROCHLORIDE 1.25 MG: 1.25 SOLUTION RESPIRATORY (INHALATION) at 19:40

## 2025-02-15 RX ADMIN — GABAPENTIN 400 MG: 400 CAPSULE ORAL at 07:52

## 2025-02-15 RX ADMIN — POTASSIUM CHLORIDE 40 MEQ: 1500 TABLET, EXTENDED RELEASE ORAL at 07:50

## 2025-02-15 RX ADMIN — GABAPENTIN 400 MG: 400 CAPSULE ORAL at 16:55

## 2025-02-15 RX ADMIN — PANTOPRAZOLE SODIUM 40 MG: 40 TABLET, DELAYED RELEASE ORAL at 05:09

## 2025-02-15 RX ADMIN — BUDESONIDE 0.5 MG: 0.5 INHALANT ORAL at 08:30

## 2025-02-15 RX ADMIN — OSELTAMIVIR PHOSPHATE 30 MG: 30 CAPSULE ORAL at 07:52

## 2025-02-15 NOTE — ASSESSMENT & PLAN NOTE
Continue Coreg 12.5 mg twice daily  Had 1 episode of high blood pressure today  Monitor blood pressure

## 2025-02-15 NOTE — ASSESSMENT & PLAN NOTE
Presented with worsening cough, wheezing in the setting of COVID/flu/pneumonia  On Trelegy as home medication  1 episode of shortness of breath this morning.  Ambulated well to the bathroom without shortness of breath    Plan:  Continue prednisone 40 mg daily  DuoNebs, Mucinex scheduled  Budesonide/formoterol  Completed azithromycin

## 2025-02-15 NOTE — PLAN OF CARE
Problem: PAIN - ADULT  Goal: Verbalizes/displays adequate comfort level or baseline comfort level  Description: Interventions:  - Encourage patient to monitor pain and request assistance  - Assess pain using appropriate pain scale  - Administer analgesics based on type and severity of pain and evaluate response  - Implement non-pharmacological measures as appropriate and evaluate response  - Consider cultural and social influences on pain and pain management  - Notify physician/advanced practitioner if interventions unsuccessful or patient reports new pain  Outcome: Progressing     Problem: INFECTION - ADULT  Goal: Absence or prevention of progression during hospitalization  Description: INTERVENTIONS:  - Assess and monitor for signs and symptoms of infection  - Monitor lab/diagnostic results  - Monitor all insertion sites, i.e. indwelling lines, tubes, and drains  - Monitor endotracheal if appropriate and nasal secretions for changes in amount and color  - [place] appropriate cooling/warming therapies per order  - Administer medications as ordered  - Instruct and encourage patient and family to use good hand hygiene technique  - Identify and instruct in appropriate isolation precautions for identified infection/condition  Outcome: Progressing  Goal: Absence of fever/infection during neutropenic period  Description: INTERVENTIONS:  - Monitor WBC    Outcome: Progressing     Problem: SAFETY ADULT  Goal: Maintain or return to baseline ADL function  Description: INTERVENTIONS:  -  Assess patient's ability to carry out ADLs; assess patient's baseline for ADL function and identify physical deficits which impact ability to perform ADLs (bathing, care of mouth/teeth, toileting, grooming, dressing, etc.)  - Assess/evaluate cause of self-care deficits   - Assess range of motion  - Assess patient's mobility; develop plan if impaired  - Assess patient's need for assistive devices and provide as appropriate  - Encourage  maximum independence but intervene and supervise when necessary  - Involve family in performance of ADLs  - Assess for home care needs following discharge   - Consider OT consult to assist with ADL evaluation and planning for discharge  - Provide patient education as appropriate  Outcome: Progressing

## 2025-02-15 NOTE — PROGRESS NOTES
Progress Note - Hospitalist   Name: Geovanna Leal 69 y.o. female I MRN: [id number]  Unit/Bed#: -01 I Date of Admission: 2/12/2025   Date of Service: 2/15/2025 I Hospital Day: 3    Assessment & Plan  Acute respiratory failure with hypoxia (HCC)  Oxygen saturation 87% at room air on admission  In the context of pneumonia/COVID/flu/COPD exacerbation  Patient reported shortness of breath this morning and was placed on 2 L nasal cannula oxygen however her oxygen was greater than 90% prior to this.  Wean off at room air  Home O2 eval  Essential hypertension  Continue Coreg 12.5 mg twice daily  Had 1 episode of high blood pressure today  Monitor blood pressure  Dyslipidemia  Continue atorvastatin  Chronic obstructive pulmonary disease with acute exacerbation (HCC)  Presented with worsening cough, wheezing in the setting of COVID/flu/pneumonia  On Trelegy as home medication  1 episode of shortness of breath this morning.  Ambulated well to the bathroom without shortness of breath    Plan:  Continue prednisone 40 mg daily  DuReshma Mucinex scheduled  Budesonide/formoterol  Completed azithromycin  Pneumonia due to infectious organism  Shortness of breath and cough  CT chest showed new dense consolidation throughout the right upper lobe consistent with pneumonia.  However there is a new thickening along the walls of the right upper lobe bullae short-term follow-up posttreatment CT chest is recommended to assess for resolution.  Received cefepime and azithromycin in the emergency department  Procalcitonin 5.4  Continue ceftriaxone  Mucinex, inhalers  Mild episode of recurrent major depressive disorder (HCC)  Continue Lexapro and BuSpar  COVID-19 virus infection  Tested positive for COVID on 2/12  On 2 L nasal cannula oxygen given hypoxia  Troponin normal, BNP slightly elevated  Does not meet sepsis criteria  Started remdesivir-continue  Flu  Positive for flu A  Continue Tamiflu renally dosed    VTE Pharmacologic  Prophylaxis: VTE Score: 6 High Risk (Score >/= 5) - Pharmacological DVT Prophylaxis Ordered: heparin. Sequential Compression Devices Ordered.    Mobility:   Basic Mobility Inpatient Raw Score: 24  JH-HLM Goal: 8: Walk 250 feet or more  JH-HLM Achieved: 7: Walk 25 feet or more  JH-HLM Goal achieved. Continue to encourage appropriate mobility.    Patient Centered Rounds: I performed bedside rounds with nursing staff today.   Discussions with Specialists or Other Care Team Provider:     Education and Discussions with Family / Patient: Patient declined call to .     Current Length of Stay: 3 day(s)  Current Patient Status: Inpatient   Certification Statement: The patient will continue to require additional inpatient hospital stay due to copd   Discharge Plan: Anticipate discharge tomorrow to home.    Code Status: Level 1 - Full Code    Subjective     Felt better since admission, this morning had an episode when she felt more short of breath however had stable oxygen saturation.  Intermittent cough, no chest pain.    Objective :  Temp:  [96.4 °F (35.8 °C)-97.4 °F (36.3 °C)] 96.4 °F (35.8 °C)  HR:  [55-67] 63  BP: (128-181)/(69-93) 163/92  Resp:  [15-18] 18  SpO2:  [88 %-95 %] 94 %  O2 Device: Nasal cannula  Nasal Cannula O2 Flow Rate (L/min):  [1 L/min-2 L/min] 2 L/min    Body mass index is 26.45 kg/m².     Input and Output Summary (last 24 hours):     Intake/Output Summary (Last 24 hours) at 2/15/2025 1109  Last data filed at 2/14/2025 2001  Gross per 24 hour   Intake 420 ml   Output --   Net 420 ml       Physical Exam  Vitals and nursing note reviewed.   Constitutional:       General: She is not in acute distress.     Appearance: She is not diaphoretic.   HENT:      Head: Normocephalic.   Eyes:      General:         Right eye: No discharge.         Left eye: No discharge.   Cardiovascular:      Rate and Rhythm: Normal rate and regular rhythm.   Pulmonary:      Effort: Pulmonary effort is normal. No  respiratory distress.      Breath sounds: Wheezing present. No rhonchi or rales.   Abdominal:      General: There is no distension.      Palpations: Abdomen is soft.      Tenderness: There is no abdominal tenderness. There is no guarding or rebound.   Musculoskeletal:      Cervical back: Normal range of motion.      Right lower leg: No edema.      Left lower leg: No edema.   Skin:     General: Skin is warm.   Neurological:      Mental Status: She is alert and oriented to person, place, and time.   Psychiatric:         Mood and Affect: Mood normal.         Behavior: Behavior normal.         Thought Content: Thought content normal.         Judgment: Judgment normal.           Lines/Drains:              Lab Results: I have reviewed the following results:   Results from last 7 days   Lab Units 02/15/25  0512 02/13/25  0340 02/12/25  1446   WBC Thousand/uL 9.62   < > 5.43   HEMOGLOBIN g/dL 11.3*   < > 12.9   HEMATOCRIT % 34.8   < > 38.2   PLATELETS Thousands/uL 436*   < > 364   SEGS PCT %  --   --  76*   LYMPHO PCT %  --   --  9*   MONO PCT %  --   --  11   EOS PCT %  --   --  3    < > = values in this interval not displayed.     Results from last 7 days   Lab Units 02/15/25  0512 02/13/25  0340 02/12/25  1446   SODIUM mmol/L 136   < > 135   POTASSIUM mmol/L 3.4*   < > 3.4*   CHLORIDE mmol/L 107   < > 102   CO2 mmol/L 21   < > 23   BUN mg/dL 34*   < > 25   CREATININE mg/dL 0.92   < > 1.15   ANION GAP mmol/L 8   < > 10   CALCIUM mg/dL 8.4   < > 9.0   ALBUMIN g/dL  --   --  3.3*   TOTAL BILIRUBIN mg/dL  --   --  0.64   ALK PHOS U/L  --   --  90   ALT U/L  --   --  15   AST U/L  --   --  31   GLUCOSE RANDOM mg/dL 159*   < > 108    < > = values in this interval not displayed.                 Results from last 7 days   Lab Units 02/13/25  0340 02/12/25  1505 02/12/25  1446   LACTIC ACID mmol/L  --  1.3  --    PROCALCITONIN ng/ml 3.22*  --  5.49*       Recent Cultures (last 7 days):   Results from last 7 days   Lab Units  02/12/25  1505   BLOOD CULTURE  No Growth at 48 hrs.  No Growth at 48 hrs.       Imaging Results Review: No pertinent imaging studies reviewed.  Other Study Results Review: No additional pertinent studies reviewed.    Last 24 Hours Medication List:     Current Facility-Administered Medications:     acetaminophen (TYLENOL) tablet 650 mg, Q6H PRN    albuterol inhalation solution 2.5 mg, Q6H PRN    atorvastatin (LIPITOR) tablet 80 mg, After Dinner    budesonide (PULMICORT) inhalation solution 0.5 mg, Q12H    busPIRone (BUSPAR) tablet 10 mg, BID    carvedilol (COREG) tablet 12.5 mg, BID    [START ON 2/20/2025] cefTRIAXone (ROCEPHIN) IVPB (premix in dextrose) 1,000 mg 50 mL, Q24H    Cholecalciferol (VITAMIN D3) tablet 1,000 Units, Daily    clopidogrel (PLAVIX) tablet 75 mg, Daily    cyanocobalamin (VITAMIN B-12) tablet 1,000 mcg, Daily    escitalopram (LEXAPRO) tablet 20 mg, Daily    formoterol (PERFOROMIST) nebulizer solution 20 mcg, Q12H    gabapentin (NEURONTIN) capsule 400 mg, BID    guaiFENesin (MUCINEX) 12 hr tablet 600 mg, BID    heparin (porcine) subcutaneous injection 5,000 Units, Q8H ADELFO    ipratropium (ATROVENT) 0.02 % inhalation solution 0.5 mg, Q6H    isosorbide mononitrate (IMDUR) 24 hr tablet 30 mg, Daily    levalbuterol (XOPENEX) inhalation solution 1.25 mg, Q6H **AND** [DISCONTINUED] sodium chloride 0.9 % inhalation solution 3 mL, TID    ondansetron (ZOFRAN) injection 4 mg, Q6H PRN    oseltamivir (TAMIFLU) capsule 30 mg, Q12H ADELFO    pantoprazole (PROTONIX) EC tablet 40 mg, Early Morning    predniSONE tablet 40 mg, Daily    [COMPLETED] remdesivir (Veklury) 200 mg in sodium chloride 0.9 % 290 mL IVPB, Q24H **FOLLOWED BY** remdesivir (Veklury) 100 mg in sodium chloride 0.9 % 270 mL IVPB, Q24H    Administrative Statements   Today, Patient Was Seen By: Francine Winchester MD      **Please Note: This note may have been constructed using a voice recognition system.**

## 2025-02-15 NOTE — PLAN OF CARE
Problem: PAIN - ADULT  Goal: Verbalizes/displays adequate comfort level or baseline comfort level  Description: Interventions:  - Encourage patient to monitor pain and request assistance  - Assess pain using appropriate pain scale  - Administer analgesics based on type and severity of pain and evaluate response  - Implement non-pharmacological measures as appropriate and evaluate response  - Consider cultural and social influences on pain and pain management  - Notify physician/advanced practitioner if interventions unsuccessful or patient reports new pain  Outcome: Progressing     Problem: INFECTION - ADULT  Goal: Absence or prevention of progression during hospitalization  Description: INTERVENTIONS:  - Assess and monitor for signs and symptoms of infection  - Monitor lab/diagnostic results  - Monitor all insertion sites, i.e. indwelling lines, tubes, and drains  - Monitor endotracheal if appropriate and nasal secretions for changes in amount and color  - [place] appropriate cooling/warming therapies per order  - Administer medications as ordered  - Instruct and encourage patient and family to use good hand hygiene technique  - Identify and instruct in appropriate isolation precautions for identified infection/condition  Outcome: Progressing  Goal: Absence of fever/infection during neutropenic period  Description: INTERVENTIONS:  - Monitor WBC    Outcome: Progressing     Problem: SAFETY ADULT  Goal: Patient will remain free of falls  Description: INTERVENTIONS:  - Educate patient/family on patient safety including physical limitations  - Instruct patient to call for assistance with activity   - Consult OT/PT to assist with strengthening/mobility   - Keep Call bell within reach  - Keep bed low and locked with side rails adjusted as appropriate  - Keep care items and personal belongings within reach  - Initiate and maintain comfort rounds  - Make Fall Risk Sign visible to staff  - Offer Toileting every 2 Hours,  in advance of need  - Initiate/Maintain bed/chair alarm  - Obtain necessary fall risk management equipment: yellow  bracelet and yellow socks   - Apply yellow socks and bracelet for high fall risk patients  - Consider moving patient to room near nurses station  Outcome: Progressing  Goal: Maintain or return to baseline ADL function  Description: INTERVENTIONS:  -  Assess patient's ability to carry out ADLs; assess patient's baseline for ADL function and identify physical deficits which impact ability to perform ADLs (bathing, care of mouth/teeth, toileting, grooming, dressing, etc.)  - Assess/evaluate cause of self-care deficits   - Assess range of motion  - Assess patient's mobility; develop plan if impaired  - Assess patient's need for assistive devices and provide as appropriate  - Encourage maximum independence but intervene and supervise when necessary  - Involve family in performance of ADLs  - Assess for home care needs following discharge   - Consider OT consult to assist with ADL evaluation and planning for discharge  - Provide patient education as appropriate  Outcome: Progressing  Goal: Maintains/Returns to pre admission functional level  Description: INTERVENTIONS:  - Perform AM-PAC 6 Click Basic Mobility/ Daily Activity assessment daily.  - Set and communicate daily mobility goal to care team and patient/family/caregiver.   - Collaborate with rehabilitation services on mobility goals if consulted  - Perform Range of Motion 3 times a day.  - Reposition patient every 2 hours.  - Dangle patient 3 times a day  - Stand patient 3 times a day  - Ambulate patient 3 times a day  - Out of bed to chair 3 times a day   - Out of bed for meals 3 times a day  - Out of bed for toileting  - Record patient progress and toleration of activity level   Outcome: Progressing     Problem: DISCHARGE PLANNING  Goal: Discharge to home or other facility with appropriate resources  Description: INTERVENTIONS:  - Identify barriers  to discharge w/patient and caregiver  - Arrange for needed discharge resources and transportation as appropriate  - Identify discharge learning needs (meds, wound care, etc.)  - Arrange for interpretive services to assist at discharge as needed  - Refer to Case Management Department for coordinating discharge planning if the patient needs post-hospital services based on physician/advanced practitioner order or complex needs related to functional status, cognitive ability, or social support system  Outcome: Progressing     Problem: Knowledge Deficit  Goal: Patient/family/caregiver demonstrates understanding of disease process, treatment plan, medications, and discharge instructions  Description: Complete learning assessment and assess knowledge base.  Interventions:  - Provide teaching at level of understanding  - Provide teaching via preferred learning methods  Outcome: Progressing     Problem: RESPIRATORY - ADULT  Goal: Achieves optimal ventilation and oxygenation  Description: INTERVENTIONS:  - Assess for changes in respiratory status  - Assess for changes in mentation and behavior  - Position to facilitate oxygenation and minimize respiratory effort  - Oxygen administered by appropriate delivery if ordered  - Initiate smoking cessation education as indicated  - Encourage broncho-pulmonary hygiene including cough, deep breathe, Incentive Spirometry  - Assess the need for suctioning and aspirate as needed  - Assess and instruct to report SOB or any respiratory difficulty  - Respiratory Therapy support as indicated  Outcome: Progressing

## 2025-02-15 NOTE — ASSESSMENT & PLAN NOTE
Oxygen saturation 87% at room air on admission  In the context of pneumonia/COVID/flu/COPD exacerbation  Patient reported shortness of breath this morning and was placed on 2 L nasal cannula oxygen however her oxygen was greater than 90% prior to this.  Wean off at room air  Home O2 eval

## 2025-02-16 ENCOUNTER — APPOINTMENT (INPATIENT)
Dept: RADIOLOGY | Facility: HOSPITAL | Age: 70
DRG: 177 | End: 2025-02-16
Payer: MEDICARE

## 2025-02-16 LAB
ANION GAP SERPL CALCULATED.3IONS-SCNC: 7 MMOL/L (ref 4–13)
BUN SERPL-MCNC: 28 MG/DL (ref 5–25)
CALCIUM SERPL-MCNC: 8 MG/DL (ref 8.4–10.2)
CHLORIDE SERPL-SCNC: 110 MMOL/L (ref 96–108)
CO2 SERPL-SCNC: 21 MMOL/L (ref 21–32)
CREAT SERPL-MCNC: 0.84 MG/DL (ref 0.6–1.3)
ERYTHROCYTE [DISTWIDTH] IN BLOOD BY AUTOMATED COUNT: 16.5 % (ref 11.6–15.1)
GFR SERPL CREATININE-BSD FRML MDRD: 70 ML/MIN/1.73SQ M
GLUCOSE SERPL-MCNC: 129 MG/DL (ref 65–140)
HCT VFR BLD AUTO: 36.3 % (ref 34.8–46.1)
HGB BLD-MCNC: 12.2 G/DL (ref 11.5–15.4)
MCH RBC QN AUTO: 33 PG (ref 26.8–34.3)
MCHC RBC AUTO-ENTMCNC: 33.6 G/DL (ref 31.4–37.4)
MCV RBC AUTO: 98 FL (ref 82–98)
PLATELET # BLD AUTO: 443 THOUSANDS/UL (ref 149–390)
PMV BLD AUTO: 9.1 FL (ref 8.9–12.7)
POTASSIUM SERPL-SCNC: 3.5 MMOL/L (ref 3.5–5.3)
RBC # BLD AUTO: 3.7 MILLION/UL (ref 3.81–5.12)
SODIUM SERPL-SCNC: 138 MMOL/L (ref 135–147)
WBC # BLD AUTO: 9.96 THOUSAND/UL (ref 4.31–10.16)

## 2025-02-16 PROCEDURE — 94760 N-INVAS EAR/PLS OXIMETRY 1: CPT

## 2025-02-16 PROCEDURE — 80048 BASIC METABOLIC PNL TOTAL CA: CPT | Performed by: INTERNAL MEDICINE

## 2025-02-16 PROCEDURE — 71045 X-RAY EXAM CHEST 1 VIEW: CPT

## 2025-02-16 PROCEDURE — 94761 N-INVAS EAR/PLS OXIMETRY MLT: CPT

## 2025-02-16 PROCEDURE — 99232 SBSQ HOSP IP/OBS MODERATE 35: CPT | Performed by: INTERNAL MEDICINE

## 2025-02-16 PROCEDURE — 85027 COMPLETE CBC AUTOMATED: CPT | Performed by: INTERNAL MEDICINE

## 2025-02-16 PROCEDURE — 94640 AIRWAY INHALATION TREATMENT: CPT

## 2025-02-16 RX ORDER — FUROSEMIDE 10 MG/ML
40 INJECTION INTRAMUSCULAR; INTRAVENOUS ONCE
Status: COMPLETED | OUTPATIENT
Start: 2025-02-16 | End: 2025-02-16

## 2025-02-16 RX ADMIN — GUAIFENESIN 600 MG: 600 TABLET, EXTENDED RELEASE ORAL at 17:40

## 2025-02-16 RX ADMIN — BUSPIRONE HYDROCHLORIDE 10 MG: 10 TABLET ORAL at 20:06

## 2025-02-16 RX ADMIN — Medication 1000 UNITS: at 07:54

## 2025-02-16 RX ADMIN — CLOPIDOGREL 75 MG: 75 TABLET ORAL at 07:54

## 2025-02-16 RX ADMIN — IPRATROPIUM BROMIDE 0.5 MG: 0.5 SOLUTION RESPIRATORY (INHALATION) at 03:37

## 2025-02-16 RX ADMIN — GABAPENTIN 400 MG: 400 CAPSULE ORAL at 07:54

## 2025-02-16 RX ADMIN — PANTOPRAZOLE SODIUM 40 MG: 40 TABLET, DELAYED RELEASE ORAL at 05:32

## 2025-02-16 RX ADMIN — REMDESIVIR 100 MG: 100 INJECTION, POWDER, LYOPHILIZED, FOR SOLUTION INTRAVENOUS at 18:36

## 2025-02-16 RX ADMIN — IPRATROPIUM BROMIDE 0.5 MG: 0.5 SOLUTION RESPIRATORY (INHALATION) at 13:48

## 2025-02-16 RX ADMIN — FUROSEMIDE 40 MG: 10 INJECTION, SOLUTION INTRAVENOUS at 13:18

## 2025-02-16 RX ADMIN — LEVALBUTEROL HYDROCHLORIDE 1.25 MG: 1.25 SOLUTION RESPIRATORY (INHALATION) at 19:47

## 2025-02-16 RX ADMIN — HEPARIN SODIUM 5000 UNITS: 5000 INJECTION INTRAVENOUS; SUBCUTANEOUS at 20:06

## 2025-02-16 RX ADMIN — PREDNISONE 40 MG: 20 TABLET ORAL at 07:54

## 2025-02-16 RX ADMIN — ATORVASTATIN CALCIUM 80 MG: 40 TABLET, FILM COATED ORAL at 17:39

## 2025-02-16 RX ADMIN — IPRATROPIUM BROMIDE 0.5 MG: 0.5 SOLUTION RESPIRATORY (INHALATION) at 07:43

## 2025-02-16 RX ADMIN — FORMOTEROL FUMARATE 20 MCG: 20 SOLUTION RESPIRATORY (INHALATION) at 07:43

## 2025-02-16 RX ADMIN — LEVALBUTEROL HYDROCHLORIDE 1.25 MG: 1.25 SOLUTION RESPIRATORY (INHALATION) at 13:48

## 2025-02-16 RX ADMIN — GABAPENTIN 400 MG: 400 CAPSULE ORAL at 17:40

## 2025-02-16 RX ADMIN — CARVEDILOL 12.5 MG: 12.5 TABLET, FILM COATED ORAL at 07:54

## 2025-02-16 RX ADMIN — BUDESONIDE 0.5 MG: 0.5 INHALANT ORAL at 07:43

## 2025-02-16 RX ADMIN — ISOSORBIDE MONONITRATE 30 MG: 30 TABLET, EXTENDED RELEASE ORAL at 07:54

## 2025-02-16 RX ADMIN — AMLODIPINE BESYLATE 5 MG: 5 TABLET ORAL at 07:54

## 2025-02-16 RX ADMIN — IPRATROPIUM BROMIDE 0.5 MG: 0.5 SOLUTION RESPIRATORY (INHALATION) at 19:46

## 2025-02-16 RX ADMIN — GUAIFENESIN 600 MG: 600 TABLET, EXTENDED RELEASE ORAL at 07:54

## 2025-02-16 RX ADMIN — FORMOTEROL FUMARATE 20 MCG: 20 SOLUTION RESPIRATORY (INHALATION) at 19:47

## 2025-02-16 RX ADMIN — ESCITALOPRAM OXALATE 20 MG: 20 TABLET ORAL at 07:54

## 2025-02-16 RX ADMIN — LEVALBUTEROL HYDROCHLORIDE 1.25 MG: 1.25 SOLUTION RESPIRATORY (INHALATION) at 07:43

## 2025-02-16 RX ADMIN — BUDESONIDE 0.5 MG: 0.5 INHALANT ORAL at 19:47

## 2025-02-16 RX ADMIN — LEVALBUTEROL HYDROCHLORIDE 1.25 MG: 1.25 SOLUTION RESPIRATORY (INHALATION) at 03:37

## 2025-02-16 RX ADMIN — HEPARIN SODIUM 5000 UNITS: 5000 INJECTION INTRAVENOUS; SUBCUTANEOUS at 13:18

## 2025-02-16 RX ADMIN — OSELTAMIVIR PHOSPHATE 30 MG: 30 CAPSULE ORAL at 07:54

## 2025-02-16 RX ADMIN — HEPARIN SODIUM 5000 UNITS: 5000 INJECTION INTRAVENOUS; SUBCUTANEOUS at 05:32

## 2025-02-16 RX ADMIN — CYANOCOBALAMIN TAB 500 MCG 1000 MCG: 500 TAB at 07:54

## 2025-02-16 RX ADMIN — BUSPIRONE HYDROCHLORIDE 10 MG: 10 TABLET ORAL at 07:54

## 2025-02-16 RX ADMIN — OSELTAMIVIR PHOSPHATE 30 MG: 30 CAPSULE ORAL at 20:06

## 2025-02-16 RX ADMIN — CARVEDILOL 12.5 MG: 12.5 TABLET, FILM COATED ORAL at 17:40

## 2025-02-16 NOTE — ASSESSMENT & PLAN NOTE
Presented with worsening cough, wheezing in the setting of COVID/flu/pneumonia  On Trelegy as home medication  Oxygen saturation in the 87- 88 at room air this morning however drops with ambulation  Received IV steroids however transition to oral prednisone 2/15  Patient reported she feels she cannot take deep full breaths  Some wheezing this morning    Plan:  Continue prednisone 40 mg daily  DuoNebs, Mucinex scheduled  Budesonide/formoterol  Completed azithromycin  Will give 40 IV Lasix trial as patient had elevated blood pressure yesterday and slightly elevated BNP on presentation.

## 2025-02-16 NOTE — ASSESSMENT & PLAN NOTE
Oxygen saturation 87% at room air on admission  In the context of pneumonia/COVID/flu/COPD exacerbation  Intermittent shortness of breath mostly with exertion.  Home O2 eval qualified patient for home oxygen.  Required 3 L at rest and 6 L with exertion  Wean off oxygen to maintain saturation greater than 88%  Recheck CXR

## 2025-02-16 NOTE — PLAN OF CARE
Problem: PAIN - ADULT  Goal: Verbalizes/displays adequate comfort level or baseline comfort level  Description: Interventions:  - Encourage patient to monitor pain and request assistance  - Assess pain using appropriate pain scale  - Administer analgesics based on type and severity of pain and evaluate response  - Implement non-pharmacological measures as appropriate and evaluate response  - Consider cultural and social influences on pain and pain management  - Notify physician/advanced practitioner if interventions unsuccessful or patient reports new pain  Outcome: Progressing     Problem: INFECTION - ADULT  Goal: Absence or prevention of progression during hospitalization  Description: INTERVENTIONS:  - Assess and monitor for signs and symptoms of infection  - Monitor lab/diagnostic results  - Monitor all insertion sites, i.e. indwelling lines, tubes, and drains  - Monitor endotracheal if appropriate and nasal secretions for changes in amount and color  - [place] appropriate cooling/warming therapies per order  - Administer medications as ordered  - Instruct and encourage patient and family to use good hand hygiene technique  - Identify and instruct in appropriate isolation precautions for identified infection/condition  Outcome: Progressing  Goal: Absence of fever/infection during neutropenic period  Description: INTERVENTIONS:  - Monitor WBC    Outcome: Progressing     Problem: SAFETY ADULT  Goal: Patient will remain free of falls  Description: INTERVENTIONS:  - Educate patient/family on patient safety including physical limitations  - Instruct patient to call for assistance with activity   - Consult OT/PT to assist with strengthening/mobility   - Keep Call bell within reach  - Keep bed low and locked with side rails adjusted as appropriate  - Keep care items and personal belongings within reach  - Initiate and maintain comfort rounds  - Make Fall Risk Sign visible to staff  - Offer Toileting every 2 Hours,  in advance of need  - Initiate/Maintain bed/chair alarm  - Obtain necessary fall risk management equipment: yellow bracelet and yellow socks   - Apply yellow socks and bracelet for high fall risk patients  - Consider moving patient to room near nurses station  Outcome: Progressing  Goal: Maintain or return to baseline ADL function  Description: INTERVENTIONS:  -  Assess patient's ability to carry out ADLs; assess patient's baseline for ADL function and identify physical deficits which impact ability to perform ADLs (bathing, care of mouth/teeth, toileting, grooming, dressing, etc.)  - Assess/evaluate cause of self-care deficits   - Assess range of motion  - Assess patient's mobility; develop plan if impaired  - Assess patient's need for assistive devices and provide as appropriate  - Encourage maximum independence but intervene and supervise when necessary  - Involve family in performance of ADLs  - Assess for home care needs following discharge   - Consider OT consult to assist with ADL evaluation and planning for discharge  - Provide patient education as appropriate  Outcome: Progressing  Goal: Maintains/Returns to pre admission functional level  Description: INTERVENTIONS:  - Perform AM-PAC 6 Click Basic Mobility/ Daily Activity assessment daily.  - Set and communicate daily mobility goal to care team and patient/family/caregiver.   - Collaborate with rehabilitation services on mobility goals if consulted  - Perform Range of Motion 3 times a day.  - Reposition patient every 2 hours.  - Dangle patient 3 times a day  - Stand patient 3 times a day  - Ambulate patient 3 times a day  - Out of bed to chair 3 times a day   - Out of bed for meals 3 times a day  - Out of bed for toileting  - Record patient progress and toleration of activity level   Outcome: Progressing     Problem: DISCHARGE PLANNING  Goal: Discharge to home or other facility with appropriate resources  Description: INTERVENTIONS:  - Identify barriers to  discharge w/patient and caregiver  - Arrange for needed discharge resources and transportation as appropriate  - Identify discharge learning needs (meds, wound care, etc.)  - Arrange for interpretive services to assist at discharge as needed  - Refer to Case Management Department for coordinating discharge planning if the patient needs post-hospital services based on physician/advanced practitioner order or complex needs related to functional status, cognitive ability, or social support system  Outcome: Progressing     Problem: Knowledge Deficit  Goal: Patient/family/caregiver demonstrates understanding of disease process, treatment plan, medications, and discharge instructions  Description: Complete learning assessment and assess knowledge base.  Interventions:  - Provide teaching at level of understanding  - Provide teaching via preferred learning methods  Outcome: Progressing     Problem: RESPIRATORY - ADULT  Goal: Achieves optimal ventilation and oxygenation  Description: INTERVENTIONS:  - Assess for changes in respiratory status  - Assess for changes in mentation and behavior  - Position to facilitate oxygenation and minimize respiratory effort  - Oxygen administered by appropriate delivery if ordered  - Initiate smoking cessation education as indicated  - Encourage broncho-pulmonary hygiene including cough, deep breathe, Incentive Spirometry  - Assess the need for suctioning and aspirate as needed  - Assess and instruct to report SOB or any respiratory difficulty  - Respiratory Therapy support as indicated  Outcome: Progressing     Problem: Potential for Falls  Goal: Patient will remain free of falls  Description: INTERVENTIONS:  - Educate patient/family on patient safety including physical limitations  - Instruct patient to call for assistance with activity   - Consult OT/PT to assist with strengthening/mobility   - Keep Call bell within reach  - Keep bed low and locked with side rails adjusted as  appropriate  - Keep care items and personal belongings within reach  - Initiate and maintain comfort rounds  - Make Fall Risk Sign visible to staff  - Offer Toileting every 2 Hours, in advance of need  - Initiate/Maintain bed/chair alarm  - Obtain necessary fall risk management equipment: yellow bracelet and yellow socks   - Apply yellow socks and bracelet for high fall risk patients  - Consider moving patient to room near nurses station  Outcome: Progressing

## 2025-02-16 NOTE — PROGRESS NOTES
Progress Note - Hospitalist   Name: Geovanna Leal 69 y.o. female I MRN: [id number]  Unit/Bed#: -01 I Date of Admission: 2/12/2025   Date of Service: 2/16/2025 I Hospital Day: 4    Assessment & Plan  Acute respiratory failure with hypoxia (HCC)  Oxygen saturation 87% at room air on admission  In the context of pneumonia/COVID/flu/COPD exacerbation  Intermittent shortness of breath mostly with exertion.  Home O2 eval qualified patient for home oxygen.  Required 3 L at rest and 6 L with exertion  Wean off oxygen to maintain saturation greater than 88%  Recheck CXR   Essential hypertension  Continue Coreg 12.5 mg twice daily  Intermittent elevated blood pressure  Started amlodipine 5 mg daily.  Monitor blood pressure might need HCTZ on discharge given elevated maps  Dyslipidemia  Continue atorvastatin  Chronic obstructive pulmonary disease with acute exacerbation (HCC)  Presented with worsening cough, wheezing in the setting of COVID/flu/pneumonia  On Trelegy as home medication  Oxygen saturation in the 87- 88 at room air this morning however drops with ambulation  Received IV steroids however transition to oral prednisone 2/15  Patient reported she feels she cannot take deep full breaths  Some wheezing this morning    Plan:  Continue prednisone 40 mg daily  DuoNebs, Mucinex scheduled  Budesonide/formoterol  Completed azithromycin  Will give 40 IV Lasix trial as patient had elevated blood pressure yesterday and slightly elevated BNP on presentation.  Pneumonia due to infectious organism  Shortness of breath and cough  CT chest showed new dense consolidation throughout the right upper lobe consistent with pneumonia.  However there is a new thickening along the walls of the right upper lobe bullae short-term follow-up posttreatment CT chest is recommended to assess for resolution.  Received cefepime and azithromycin in the emergency department  Procalcitonin 5.4  Continue ceftriaxone  Mucinex, inhalers  Mild  episode of recurrent major depressive disorder (HCC)  Continue Lexapro and BuSpar  COVID-19 virus infection  Tested positive for COVID on 2/12  On 2 L nasal cannula oxygen given hypoxia  Troponin normal, BNP slightly elevated  Does not meet sepsis criteria  Started remdesivir-continue  Flu  Positive for flu A  Continue Tamiflu renally dosed    VTE Pharmacologic Prophylaxis: VTE Score: 6 High Risk (Score >/= 5) - Pharmacological DVT Prophylaxis Ordered: heparin. Sequential Compression Devices Ordered.    Mobility:   Basic Mobility Inpatient Raw Score: 24  JH-HLM Goal: 8: Walk 250 feet or more  JH-HLM Achieved: 7: Walk 25 feet or more  JH-HLM Goal achieved. Continue to encourage appropriate mobility.    Patient Centered Rounds: I performed bedside rounds with nursing staff today.   Discussions with Specialists or Other Care Team Provider:     Education and Discussions with Family / Patient: Patient declined call to .     Current Length of Stay: 4 day(s)  Current Patient Status: Inpatient   Certification Statement: The patient will continue to require additional inpatient hospital stay due to copd , respiratory failure   Discharge Plan: Anticipate discharge tomorrow to home.    Code Status: Level 1 - Full Code    Subjective     Intermittent shortness of breath, mostly with ambulation.  Felt she cannot take deep, full breaths.  Intermittent dry cough    Objective :  Temp:  [96.6 °F (35.9 °C)] 96.6 °F (35.9 °C)  HR:  [63-71] 70  BP: (157-185)/(76-90) 157/83  SpO2:  [90 %-96 %] 95 %  O2 Device: None (Room air)    Body mass index is 26.45 kg/m².     Input and Output Summary (last 24 hours):     Intake/Output Summary (Last 24 hours) at 2/16/2025 1250  Last data filed at 2/16/2025 1201  Gross per 24 hour   Intake 1471 ml   Output --   Net 1471 ml       Physical Exam  Vitals and nursing note reviewed.   Constitutional:       General: She is not in acute distress.     Appearance: She is not diaphoretic.   HENT:       Head: Normocephalic.   Eyes:      General:         Right eye: No discharge.         Left eye: No discharge.   Cardiovascular:      Rate and Rhythm: Normal rate and regular rhythm.   Pulmonary:      Effort: Pulmonary effort is normal. No respiratory distress.      Breath sounds: Wheezing present. No rhonchi or rales.   Abdominal:      General: There is no distension.      Palpations: Abdomen is soft.      Tenderness: There is no abdominal tenderness. There is no guarding or rebound.   Musculoskeletal:      Cervical back: Normal range of motion.      Right lower leg: No edema.      Left lower leg: No edema.   Skin:     General: Skin is warm.   Neurological:      Mental Status: She is alert and oriented to person, place, and time.   Psychiatric:         Mood and Affect: Mood normal.         Behavior: Behavior normal.         Thought Content: Thought content normal.         Judgment: Judgment normal.           Lines/Drains:              Lab Results: I have reviewed the following results:   Results from last 7 days   Lab Units 02/16/25 0242 02/13/25  0340 02/12/25  1446   WBC Thousand/uL 9.96   < > 5.43   HEMOGLOBIN g/dL 12.2   < > 12.9   HEMATOCRIT % 36.3   < > 38.2   PLATELETS Thousands/uL 443*   < > 364   SEGS PCT %  --   --  76*   LYMPHO PCT %  --   --  9*   MONO PCT %  --   --  11   EOS PCT %  --   --  3    < > = values in this interval not displayed.     Results from last 7 days   Lab Units 02/16/25  0242 02/13/25  0340 02/12/25  1446   SODIUM mmol/L 138   < > 135   POTASSIUM mmol/L 3.5   < > 3.4*   CHLORIDE mmol/L 110*   < > 102   CO2 mmol/L 21   < > 23   BUN mg/dL 28*   < > 25   CREATININE mg/dL 0.84   < > 1.15   ANION GAP mmol/L 7   < > 10   CALCIUM mg/dL 8.0*   < > 9.0   ALBUMIN g/dL  --   --  3.3*   TOTAL BILIRUBIN mg/dL  --   --  0.64   ALK PHOS U/L  --   --  90   ALT U/L  --   --  15   AST U/L  --   --  31   GLUCOSE RANDOM mg/dL 129   < > 108    < > = values in this interval not displayed.                  Results from last 7 days   Lab Units 02/13/25  0340 02/12/25  1505 02/12/25  1446   LACTIC ACID mmol/L  --  1.3  --    PROCALCITONIN ng/ml 3.22*  --  5.49*       Recent Cultures (last 7 days):   Results from last 7 days   Lab Units 02/12/25  1505   BLOOD CULTURE  No Growth at 72 hrs.  No Growth at 72 hrs.       Imaging Results Review: No pertinent imaging studies reviewed.  Other Study Results Review: No additional pertinent studies reviewed.    Last 24 Hours Medication List:     Current Facility-Administered Medications:     acetaminophen (TYLENOL) tablet 650 mg, Q6H PRN    albuterol inhalation solution 2.5 mg, Q6H PRN    amLODIPine (NORVASC) tablet 5 mg, Daily    atorvastatin (LIPITOR) tablet 80 mg, After Dinner    budesonide (PULMICORT) inhalation solution 0.5 mg, Q12H    busPIRone (BUSPAR) tablet 10 mg, BID    carvedilol (COREG) tablet 12.5 mg, BID    [START ON 2/20/2025] cefTRIAXone (ROCEPHIN) IVPB (premix in dextrose) 1,000 mg 50 mL, Q24H    Cholecalciferol (VITAMIN D3) tablet 1,000 Units, Daily    clopidogrel (PLAVIX) tablet 75 mg, Daily    cyanocobalamin (VITAMIN B-12) tablet 1,000 mcg, Daily    escitalopram (LEXAPRO) tablet 20 mg, Daily    formoterol (PERFOROMIST) nebulizer solution 20 mcg, Q12H    furosemide (LASIX) injection 40 mg, Once    gabapentin (NEURONTIN) capsule 400 mg, BID    guaiFENesin (MUCINEX) 12 hr tablet 600 mg, BID    heparin (porcine) subcutaneous injection 5,000 Units, Q8H ADELFO    hydrALAZINE (APRESOLINE) injection 10 mg, Q6H PRN    ipratropium (ATROVENT) 0.02 % inhalation solution 0.5 mg, Q6H    isosorbide mononitrate (IMDUR) 24 hr tablet 30 mg, Daily    levalbuterol (XOPENEX) inhalation solution 1.25 mg, Q6H **AND** [DISCONTINUED] sodium chloride 0.9 % inhalation solution 3 mL, TID    ondansetron (ZOFRAN) injection 4 mg, Q6H PRN    oseltamivir (TAMIFLU) capsule 30 mg, Q12H ADELFO    pantoprazole (PROTONIX) EC tablet 40 mg, Early Morning    predniSONE tablet 40 mg, Daily    [COMPLETED]  remdesivir (Veklury) 200 mg in sodium chloride 0.9 % 290 mL IVPB, Q24H **FOLLOWED BY** remdesivir (Veklury) 100 mg in sodium chloride 0.9 % 270 mL IVPB, Q24H    Administrative Statements   Today, Patient Was Seen By: Francine Winchester MD      **Please Note: This note may have been constructed using a voice recognition system.**

## 2025-02-16 NOTE — RESPIRATORY THERAPY NOTE
Home Oxygen Qualifying Test     Patient name: Geovanna Leal        : 1955   Date of Test:  2025  Diagnosis:    Home Oxygen Test:    Medicare Guidelines require item(s) 1-5 on all ambulatory patients or 1 and 2 on non-ambulatory patients.    1. Baseline SPO2 on Room Air at rest 87 %   If <= 88% on Room Air add O2 via NC to obtain SpO2 >=88%. If LPM needed, document LPM 3 needed to reach =>88%    SPO2 during exertion on Room Air N/A  %  During exertion monitor SPO2. If SPO2 increases >=89%, do not add supplemental oxygen    SPO2 on Oxygen at Rest 93 % at 3 LPM    SPO2 during exertion on Oxygen 95 % at 6 LPM    Test performed during exertion activity.      [x]  Supplemental Home Oxygen is indicated.    []  Client does not qualify for home oxygen.    Respiratory Additional Notes- pt needs 3L at rest and 6L to ambulate    Garry Walker, RT

## 2025-02-16 NOTE — ASSESSMENT & PLAN NOTE
Continue Coreg 12.5 mg twice daily  Intermittent elevated blood pressure  Started amlodipine 5 mg daily.  Monitor blood pressure might need HCTZ on discharge given elevated maps

## 2025-02-16 NOTE — PLAN OF CARE
Problem: PAIN - ADULT  Goal: Verbalizes/displays adequate comfort level or baseline comfort level  Description: Interventions:  - Encourage patient to monitor pain and request assistance  - Assess pain using appropriate pain scale  - Administer analgesics based on type and severity of pain and evaluate response  - Implement non-pharmacological measures as appropriate and evaluate response  - Consider cultural and social influences on pain and pain management  - Notify physician/advanced practitioner if interventions unsuccessful or patient reports new pain  Outcome: Progressing     Problem: INFECTION - ADULT  Goal: Absence or prevention of progression during hospitalization  Description: INTERVENTIONS:  - Assess and monitor for signs and symptoms of infection  - Monitor lab/diagnostic results  - Monitor all insertion sites, i.e. indwelling lines, tubes, and drains  - Monitor endotracheal if appropriate and nasal secretions for changes in amount and color  - [place] appropriate cooling/warming therapies per order  - Administer medications as ordered  - Instruct and encourage patient and family to use good hand hygiene technique  - Identify and instruct in appropriate isolation precautions for identified infection/condition  Outcome: Progressing  Goal: Absence of fever/infection during neutropenic period  Description: INTERVENTIONS:  - Monitor WBC    Outcome: Progressing     Problem: SAFETY ADULT  Goal: Patient will remain free of falls  Description: INTERVENTIONS:  - Educate patient/family on patient safety including physical limitations  - Instruct patient to call for assistance with activity   - Consult OT/PT to assist with strengthening/mobility   - Keep Call bell within reach  - Keep bed low and locked with side rails adjusted as appropriate  - Keep care items and personal belongings within reach  - Initiate and maintain comfort rounds  - Make Fall Risk Sign visible to staff  - Offer Toileting every x Hours,  in advance of need  - Initiate/Maintain bedalarm  - Obtain necessary fall risk management equipment: x  - Apply yellow socks and bracelet for high fall risk patients  - Consider moving patient to room near nurses station  Outcome: Progressing  Goal: Maintain or return to baseline ADL function  Description: INTERVENTIONS:  -  Assess patient's ability to carry out ADLs; assess patient's baseline for ADL function and identify physical deficits which impact ability to perform ADLs (bathing, care of mouth/teeth, toileting, grooming, dressing, etc.)  - Assess/evaluate cause of self-care deficits   - Assess range of motion  - Assess patient's mobility; develop plan if impaired  - Assess patient's need for assistive devices and provide as appropriate  - Encourage maximum independence but intervene and supervise when necessary  - Involve family in performance of ADLs  - Assess for home care needs following discharge   - Consider OT consult to assist with ADL evaluation and planning for discharge  - Provide patient education as appropriate  Outcome: Progressing  Goal: Maintains/Returns to pre admission functional level  Description: INTERVENTIONS:  - Perform AM-PAC 6 Click Basic Mobility/ Daily Activity assessment daily.  - Set and communicate daily mobility goal to care team and patient/family/caregiver.   - Collaborate with rehabilitation services on mobility goals if consulted  - Perform Range of Motion x times a day.  - Reposition patient every x hours.  - Dangle patient x times a day  - Stand patient x times a day  - Ambulate patient x times a day  - Out of bed to chair x times a day   - Out of bed for meals x times a day  - Out of bed for toileting  - Record patient progress and toleration of activity level   Outcome: Progressing     Problem: DISCHARGE PLANNING  Goal: Discharge to home or other facility with appropriate resources  Description: INTERVENTIONS:  - Identify barriers to discharge w/patient and caregiver  -  Arrange for needed discharge resources and transportation as appropriate  - Identify discharge learning needs (meds, wound care, etc.)  - Arrange for interpretive services to assist at discharge as needed  - Refer to Case Management Department for coordinating discharge planning if the patient needs post-hospital services based on physician/advanced practitioner order or complex needs related to functional status, cognitive ability, or social support system  Outcome: Progressing     Problem: Knowledge Deficit  Goal: Patient/family/caregiver demonstrates understanding of disease process, treatment plan, medications, and discharge instructions  Description: Complete learning assessment and assess knowledge base.  Interventions:  - Provide teaching at level of understanding  - Provide teaching via preferred learning methods  Outcome: Progressing     Problem: RESPIRATORY - ADULT  Goal: Achieves optimal ventilation and oxygenation  Description: INTERVENTIONS:  - Assess for changes in respiratory status  - Assess for changes in mentation and behavior  - Position to facilitate oxygenation and minimize respiratory effort  - Oxygen administered by appropriate delivery if ordered  - Initiate smoking cessation education as indicated  - Encourage broncho-pulmonary hygiene including cough, deep breathe, Incentive Spirometry  - Assess the need for suctioning and aspirate as needed  - Assess and instruct to report SOB or any respiratory difficulty  - Respiratory Therapy support as indicated  Outcome: Progressing     Problem: Potential for Falls  Goal: Patient will remain free of falls  Description: INTERVENTIONS:  - Educate patient/family on patient safety including physical limitations  - Instruct patient to call for assistance with activity   - Consult OT/PT to assist with strengthening/mobility   - Keep Call bell within reach  - Keep bed low and locked with side rails adjusted as appropriate  - Keep care items and personal  belongings within reach  - Initiate and maintain comfort rounds  - Make Fall Risk Sign visible to staff  - Offer Toileting every x Hours, in advance of need  - Initiate/Maintain bedalarm  - Obtain necessary fall risk management equipment: x  - Apply yellow socks and bracelet for high fall risk patients  - Consider moving patient to room near nurses station  Outcome: Progressing

## 2025-02-17 VITALS
WEIGHT: 158.95 LBS | TEMPERATURE: 98.8 F | BODY MASS INDEX: 26.48 KG/M2 | HEART RATE: 65 BPM | HEIGHT: 65 IN | DIASTOLIC BLOOD PRESSURE: 66 MMHG | OXYGEN SATURATION: 98 % | RESPIRATION RATE: 21 BRPM | SYSTOLIC BLOOD PRESSURE: 138 MMHG

## 2025-02-17 LAB
ANION GAP SERPL CALCULATED.3IONS-SCNC: 8 MMOL/L (ref 4–13)
BACTERIA BLD CULT: NORMAL
BACTERIA BLD CULT: NORMAL
BUN SERPL-MCNC: 22 MG/DL (ref 5–25)
CALCIUM SERPL-MCNC: 8 MG/DL (ref 8.4–10.2)
CHLORIDE SERPL-SCNC: 106 MMOL/L (ref 96–108)
CO2 SERPL-SCNC: 24 MMOL/L (ref 21–32)
CREAT SERPL-MCNC: 0.82 MG/DL (ref 0.6–1.3)
DME PARACHUTE DELIVERY DATE ACTUAL: NORMAL
DME PARACHUTE DELIVERY DATE EXPECTED: NORMAL
DME PARACHUTE DELIVERY DATE REQUESTED: NORMAL
DME PARACHUTE DELIVERY NOTE: NORMAL
DME PARACHUTE ITEM DESCRIPTION: NORMAL
DME PARACHUTE ORDER STATUS: NORMAL
DME PARACHUTE SUPPLIER NAME: NORMAL
DME PARACHUTE SUPPLIER PHONE: NORMAL
ERYTHROCYTE [DISTWIDTH] IN BLOOD BY AUTOMATED COUNT: 16.3 % (ref 11.6–15.1)
GFR SERPL CREATININE-BSD FRML MDRD: 72 ML/MIN/1.73SQ M
GLUCOSE SERPL-MCNC: 129 MG/DL (ref 65–140)
HCT VFR BLD AUTO: 36.9 % (ref 34.8–46.1)
HGB BLD-MCNC: 12.6 G/DL (ref 11.5–15.4)
MCH RBC QN AUTO: 32.4 PG (ref 26.8–34.3)
MCHC RBC AUTO-ENTMCNC: 34.1 G/DL (ref 31.4–37.4)
MCV RBC AUTO: 95 FL (ref 82–98)
PLATELET # BLD AUTO: 482 THOUSANDS/UL (ref 149–390)
PMV BLD AUTO: 9.1 FL (ref 8.9–12.7)
POTASSIUM SERPL-SCNC: 3.3 MMOL/L (ref 3.5–5.3)
RBC # BLD AUTO: 3.89 MILLION/UL (ref 3.81–5.12)
SODIUM SERPL-SCNC: 138 MMOL/L (ref 135–147)
WBC # BLD AUTO: 8.09 THOUSAND/UL (ref 4.31–10.16)

## 2025-02-17 PROCEDURE — 94640 AIRWAY INHALATION TREATMENT: CPT

## 2025-02-17 PROCEDURE — 99239 HOSP IP/OBS DSCHRG MGMT >30: CPT | Performed by: INTERNAL MEDICINE

## 2025-02-17 PROCEDURE — 94760 N-INVAS EAR/PLS OXIMETRY 1: CPT

## 2025-02-17 PROCEDURE — 85027 COMPLETE CBC AUTOMATED: CPT | Performed by: INTERNAL MEDICINE

## 2025-02-17 PROCEDURE — 80048 BASIC METABOLIC PNL TOTAL CA: CPT | Performed by: INTERNAL MEDICINE

## 2025-02-17 RX ORDER — GUAIFENESIN 600 MG/1
600 TABLET, EXTENDED RELEASE ORAL 2 TIMES DAILY
Qty: 60 TABLET | Refills: 0 | Status: SHIPPED | OUTPATIENT
Start: 2025-02-17

## 2025-02-17 RX ORDER — CEFDINIR 300 MG/1
300 CAPSULE ORAL EVERY 12 HOURS SCHEDULED
Qty: 10 CAPSULE | Refills: 0 | Status: SHIPPED | OUTPATIENT
Start: 2025-02-17 | End: 2025-02-22

## 2025-02-17 RX ORDER — PREDNISONE 20 MG/1
40 TABLET ORAL DAILY
Qty: 10 TABLET | Refills: 0 | Status: SHIPPED | OUTPATIENT
Start: 2025-02-18 | End: 2025-02-23

## 2025-02-17 RX ORDER — POTASSIUM CHLORIDE 1500 MG/1
40 TABLET, EXTENDED RELEASE ORAL ONCE
Status: COMPLETED | OUTPATIENT
Start: 2025-02-17 | End: 2025-02-17

## 2025-02-17 RX ORDER — AMLODIPINE BESYLATE 5 MG/1
5 TABLET ORAL DAILY
Qty: 30 TABLET | Refills: 0 | Status: SHIPPED | OUTPATIENT
Start: 2025-02-18

## 2025-02-17 RX ORDER — CEFTRIAXONE 1 G/50ML
1000 INJECTION, SOLUTION INTRAVENOUS EVERY 24 HOURS
Status: DISCONTINUED | OUTPATIENT
Start: 2025-02-17 | End: 2025-02-17 | Stop reason: HOSPADM

## 2025-02-17 RX ADMIN — HEPARIN SODIUM 5000 UNITS: 5000 INJECTION INTRAVENOUS; SUBCUTANEOUS at 14:24

## 2025-02-17 RX ADMIN — ISOSORBIDE MONONITRATE 30 MG: 30 TABLET, EXTENDED RELEASE ORAL at 09:06

## 2025-02-17 RX ADMIN — ESCITALOPRAM OXALATE 20 MG: 20 TABLET ORAL at 09:06

## 2025-02-17 RX ADMIN — Medication 1000 UNITS: at 09:06

## 2025-02-17 RX ADMIN — GUAIFENESIN 600 MG: 600 TABLET, EXTENDED RELEASE ORAL at 09:06

## 2025-02-17 RX ADMIN — CARVEDILOL 12.5 MG: 12.5 TABLET, FILM COATED ORAL at 09:06

## 2025-02-17 RX ADMIN — CYANOCOBALAMIN TAB 500 MCG 1000 MCG: 500 TAB at 09:06

## 2025-02-17 RX ADMIN — PANTOPRAZOLE SODIUM 40 MG: 40 TABLET, DELAYED RELEASE ORAL at 04:57

## 2025-02-17 RX ADMIN — HEPARIN SODIUM 5000 UNITS: 5000 INJECTION INTRAVENOUS; SUBCUTANEOUS at 05:12

## 2025-02-17 RX ADMIN — BUSPIRONE HYDROCHLORIDE 10 MG: 10 TABLET ORAL at 09:06

## 2025-02-17 RX ADMIN — CLOPIDOGREL 75 MG: 75 TABLET ORAL at 09:06

## 2025-02-17 RX ADMIN — CEFTRIAXONE 1000 MG: 1 INJECTION, SOLUTION INTRAVENOUS at 09:09

## 2025-02-17 RX ADMIN — IPRATROPIUM BROMIDE 0.5 MG: 0.5 SOLUTION RESPIRATORY (INHALATION) at 13:40

## 2025-02-17 RX ADMIN — LEVALBUTEROL HYDROCHLORIDE 1.25 MG: 1.25 SOLUTION RESPIRATORY (INHALATION) at 07:29

## 2025-02-17 RX ADMIN — BUDESONIDE 0.5 MG: 0.5 INHALANT ORAL at 07:30

## 2025-02-17 RX ADMIN — GABAPENTIN 400 MG: 400 CAPSULE ORAL at 09:06

## 2025-02-17 RX ADMIN — LEVALBUTEROL HYDROCHLORIDE 1.25 MG: 1.25 SOLUTION RESPIRATORY (INHALATION) at 02:30

## 2025-02-17 RX ADMIN — PREDNISONE 40 MG: 20 TABLET ORAL at 09:06

## 2025-02-17 RX ADMIN — FORMOTEROL FUMARATE 20 MCG: 20 SOLUTION RESPIRATORY (INHALATION) at 07:30

## 2025-02-17 RX ADMIN — LEVALBUTEROL HYDROCHLORIDE 1.25 MG: 1.25 SOLUTION RESPIRATORY (INHALATION) at 13:40

## 2025-02-17 RX ADMIN — AMLODIPINE BESYLATE 5 MG: 5 TABLET ORAL at 09:06

## 2025-02-17 RX ADMIN — IPRATROPIUM BROMIDE 0.5 MG: 0.5 SOLUTION RESPIRATORY (INHALATION) at 02:30

## 2025-02-17 RX ADMIN — IPRATROPIUM BROMIDE 0.5 MG: 0.5 SOLUTION RESPIRATORY (INHALATION) at 07:29

## 2025-02-17 RX ADMIN — OSELTAMIVIR PHOSPHATE 30 MG: 30 CAPSULE ORAL at 09:06

## 2025-02-17 RX ADMIN — POTASSIUM CHLORIDE 40 MEQ: 1500 TABLET, EXTENDED RELEASE ORAL at 09:06

## 2025-02-17 NOTE — PLAN OF CARE
Problem: PAIN - ADULT  Goal: Verbalizes/displays adequate comfort level or baseline comfort level  Description: Interventions:  - Encourage patient to monitor pain and request assistance  - Assess pain using appropriate pain scale  - Administer analgesics based on type and severity of pain and evaluate response  - Implement non-pharmacological measures as appropriate and evaluate response  - Consider cultural and social influences on pain and pain management  - Notify physician/advanced practitioner if interventions unsuccessful or patient reports new pain  Outcome: Progressing     Problem: INFECTION - ADULT  Goal: Absence or prevention of progression during hospitalization  Description: INTERVENTIONS:  - Assess and monitor for signs and symptoms of infection  - Monitor lab/diagnostic results  - Monitor all insertion sites, i.e. indwelling lines, tubes, and drains  - Monitor endotracheal if appropriate and nasal secretions for changes in amount and color  - [place] appropriate cooling/warming therapies per order  - Administer medications as ordered  - Instruct and encourage patient and family to use good hand hygiene technique  - Identify and instruct in appropriate isolation precautions for identified infection/condition  Outcome: Progressing  Goal: Absence of fever/infection during neutropenic period  Description: INTERVENTIONS:  - Monitor WBC    Outcome: Progressing     Problem: SAFETY ADULT  Goal: Maintain or return to baseline ADL function  Description: INTERVENTIONS:  -  Assess patient's ability to carry out ADLs; assess patient's baseline for ADL function and identify physical deficits which impact ability to perform ADLs (bathing, care of mouth/teeth, toileting, grooming, dressing, etc.)  - Assess/evaluate cause of self-care deficits   - Assess range of motion  - Assess patient's mobility; develop plan if impaired  - Assess patient's need for assistive devices and provide as appropriate  - Encourage  maximum independence but intervene and supervise when necessary  - Involve family in performance of ADLs  - Assess for home care needs following discharge   - Consider OT consult to assist with ADL evaluation and planning for discharge  - Provide patient education as appropriate  Outcome: Progressing

## 2025-02-17 NOTE — CASE MANAGEMENT
Case Management Discharge Planning Note    Patient name Geovanna Leal  Location /-01 MRN [id number]  : 1955 Date 2025       Current Admission Date: 2025  Current Admission Diagnosis:Acute respiratory failure with hypoxia (HCC)   Patient Active Problem List    Diagnosis Date Noted Date Diagnosed    COVID-19 virus infection 2025     Flu 2025     Acute respiratory failure with hypoxia (HCC) 2025     Other emphysema (HCC) 10/15/2024     Deficiency of vitamin B12 2024     Vestibular disorder, bilateral 2024     Mild episode of recurrent major depressive disorder (HCC) 2024     Nodule of left lung 2024     Orthostatic hypotension 2023     Pneumonia due to infectious organism 2023     Hypersomnia, unspecified      Allergies 2023     Intracranial atherosclerosis 11/10/2022     Benign tumor of spinal meningioma (HCC) 11/10/2022     Carotid stenosis, bilateral 2022     History of subarachnoid hemorrhage 2022     Stage 3a chronic kidney disease (HCC) 2021     Hypotension      GERD (gastroesophageal reflux disease) 2019     Chronic obstructive pulmonary disease with acute exacerbation (HCC) 2019     Pancreatic cyst 2019     De Quervain's disease (tenosynovitis) 2018     IFG (impaired fasting glucose) 2017     Sudden idiopathic hearing loss of right ear 2017     Takotsubo cardiomyopathy 06/15/2017     Lower back pain 2016     Cervical stenosis of spinal canal 2015     Carpal tunnel syndrome 2014     Plantar fasciitis 2012     Dyslipidemia 2012     Arnold-Chiari malformation (HCC) 2012     Essential hypertension 2012       LOS (days): 5  Geometric Mean LOS (GMLOS) (days): 4.6  Days to GMLOS:-0.3     OBJECTIVE:  Risk of Unplanned Readmission Score: 14.48         Current admission status: Inpatient   Preferred Pharmacy:   [id number]  - [facility] - [address]  [address]  [place] [address]  Phone: [phone] Fax: [phone]    Primary Care Provider: Leelee Garcia DO    Primary Insurance: MEDICARE  Secondary Insurance: AETNA    DISCHARGE DETAILS:     CM received approval from [place] to deliver portable tank. CM delivered portable O2 tank to pt in her room. CM received verbal consent to sign delivery ticket as pt is on airborne, contact, and droplet precautions. CM placed delivery ticket in [employer] folder. CM updated [place].

## 2025-02-17 NOTE — CASE MANAGEMENT
Case Management Discharge Planning Note    Patient name Geovanna Leal  Location /-01 MRN [id number]  : 1955 Date 2025       Current Admission Date: 2025  Current Admission Diagnosis:Acute respiratory failure with hypoxia (HCC)   Patient Active Problem List    Diagnosis Date Noted Date Diagnosed    COVID-19 virus infection 2025     Flu 2025     Acute respiratory failure with hypoxia (HCC) 2025     Other emphysema (HCC) 10/15/2024     Deficiency of vitamin B12 2024     Vestibular disorder, bilateral 2024     Mild episode of recurrent major depressive disorder (HCC) 2024     Nodule of left lung 2024     Orthostatic hypotension 2023     Pneumonia due to infectious organism 2023     Hypersomnia, unspecified      Allergies 2023     Intracranial atherosclerosis 11/10/2022     Benign tumor of spinal meningioma (HCC) 11/10/2022     Carotid stenosis, bilateral 2022     History of subarachnoid hemorrhage 2022     Stage 3a chronic kidney disease (HCC) 2021     Hypotension      GERD (gastroesophageal reflux disease) 2019     Chronic obstructive pulmonary disease with acute exacerbation (HCC) 2019     Pancreatic cyst 2019     De Quervain's disease (tenosynovitis) 2018     IFG (impaired fasting glucose) 2017     Sudden idiopathic hearing loss of right ear 2017     Takotsubo cardiomyopathy 06/15/2017     Lower back pain 2016     Cervical stenosis of spinal canal 2015     Carpal tunnel syndrome 2014     Plantar fasciitis 2012     Dyslipidemia 2012     Arnold-Chiari malformation (HCC) 2012     Essential hypertension 2012       LOS (days): 5  Geometric Mean LOS (GMLOS) (days): 4.6  Days to GMLOS:-0.2     OBJECTIVE:  Risk of Unplanned Readmission Score: 14.48         Current admission status: Inpatient   Preferred Pharmacy:   [id number]  - [place] - [address]  [address]  [place] [address]  Phone: [phone] Fax: [phone]    Primary Care Provider: Leelee Garcia DO    Primary Insurance: MEDICARE  Secondary Insurance: AETNA    DISCHARGE DETAILS:     Patient is in need of home O2 at 3l at rest and 6L w/ ambulating. CM placed order in [place]. Awaiting approval to deliver portable tank.                                 DME Referral Provided  Referral made for DME?: Yes  DME referral completed for the following items:: Home Oxygen concentrator, Portable Oxygen concentrator  DME Supplier Name:: [employer]    Other Referral/Resources/Interventions Provided:  Interventions: DME

## 2025-02-17 NOTE — PLAN OF CARE
Problem: PAIN - ADULT  Goal: Verbalizes/displays adequate comfort level or baseline comfort level  Description: Interventions:  - Encourage patient to monitor pain and request assistance  - Assess pain using appropriate pain scale  - Administer analgesics based on type and severity of pain and evaluate response  - Implement non-pharmacological measures as appropriate and evaluate response  - Consider cultural and social influences on pain and pain management  - Notify physician/advanced practitioner if interventions unsuccessful or patient reports new pain  Outcome: Progressing     Problem: INFECTION - ADULT  Goal: Absence or prevention of progression during hospitalization  Description: INTERVENTIONS:  - Assess and monitor for signs and symptoms of infection  - Monitor lab/diagnostic results  - Monitor all insertion sites, i.e. indwelling lines, tubes, and drains  - Monitor endotracheal if appropriate and nasal secretions for changes in amount and color  - Cameron appropriate cooling/warming therapies per order  - Administer medications as ordered  - Instruct and encourage patient and family to use good hand hygiene technique  - Identify and instruct in appropriate isolation precautions for identified infection/condition  Outcome: Progressing     Problem: RESPIRATORY - ADULT  Goal: Achieves optimal ventilation and oxygenation  Description: INTERVENTIONS:  - Assess for changes in respiratory status  - Assess for changes in mentation and behavior  - Position to facilitate oxygenation and minimize respiratory effort  - Oxygen administered by appropriate delivery if ordered  - Initiate smoking cessation education as indicated  - Encourage broncho-pulmonary hygiene including cough, deep breathe, Incentive Spirometry  - Assess the need for suctioning and aspirate as needed  - Assess and instruct to report SOB or any respiratory difficulty  - Respiratory Therapy support as indicated  Outcome: Progressing

## 2025-02-17 NOTE — DISCHARGE INSTR - AVS FIRST PAGE
Please your PCP within 7 days of discharge to discuss recent hospitalization  Continue to take antibiotics cefdinir 300 mg twice daily for 5 days  Please follow-up with pulmonology

## 2025-02-18 ENCOUNTER — TRANSITIONAL CARE MANAGEMENT (OUTPATIENT)
Dept: FAMILY MEDICINE CLINIC | Facility: HOSPITAL | Age: 70
End: 2025-02-18

## 2025-02-19 ENCOUNTER — OFFICE VISIT (OUTPATIENT)
Age: 70
End: 2025-02-19
Payer: MEDICARE

## 2025-02-19 VITALS
TEMPERATURE: 96.8 F | DIASTOLIC BLOOD PRESSURE: 70 MMHG | HEART RATE: 96 BPM | WEIGHT: 160 LBS | SYSTOLIC BLOOD PRESSURE: 130 MMHG | BODY MASS INDEX: 26.66 KG/M2 | OXYGEN SATURATION: 95 % | HEIGHT: 65 IN

## 2025-02-19 DIAGNOSIS — J96.01 ACUTE RESPIRATORY FAILURE WITH HYPOXIA (HCC): Primary | ICD-10-CM

## 2025-02-19 DIAGNOSIS — J44.1 CHRONIC OBSTRUCTIVE PULMONARY DISEASE WITH ACUTE EXACERBATION (HCC): ICD-10-CM

## 2025-02-19 DIAGNOSIS — U07.1 COVID-19 VIRUS INFECTION: ICD-10-CM

## 2025-02-19 DIAGNOSIS — J18.9 PNEUMONIA OF RIGHT UPPER LOBE DUE TO INFECTIOUS ORGANISM: ICD-10-CM

## 2025-02-19 DIAGNOSIS — J11.1 FLU: ICD-10-CM

## 2025-02-19 PROCEDURE — 99214 OFFICE O/P EST MOD 30 MIN: CPT | Performed by: PHYSICIAN ASSISTANT

## 2025-02-19 NOTE — PROGRESS NOTES
Follow-up  Visit - Pulmonary Medicine   Name: Geovanna Leal      : 1955      MRN: [id number]  Encounter Provider: Shanda Howe PA-C  Encounter Date: 2025   [facility]  :  Assessment & Plan  Acute respiratory failure with hypoxia (HCC)  2/2 COPD exacerbation and pneumonia triggered by covid and flu a infection  3 L at rest 6 w exertion  Encouraged her frequent pulse ox monitoring to ensure sats > 88%  Return in 1-2 weeks for repeat 6MW  Call with any change in symptoms or concerns    Orders:    Ambulatory referral to Pulmonology    CT chest without contrast; Future    Chronic obstructive pulmonary disease with acute exacerbation (HCC)  Continue prednisone as prescribed by hospitalist  Call with failure to improve/worsening symptoms we can send more prednisone to pharmacy  Continue Trelegy 100  Albuterol nebs       Pneumonia of right upper lobe due to infectious organism  Discussed with pt she will need f/u imaging to ensure resolution  Still taking cefdinir. Complete course.  Orders:    CT chest without contrast; Future    COVID-19 virus infection  No further directed treatment indicated       Flu  No further directed treatment indicated         Return in about 10 days (around 3/1/2025) for 6mw in 1-2 weeks.    History of Present Illness   Geovanna Leal is a 69 y.o. female who presents for follow-up as scheduled however actually also here after recent hospitalization for covid and flu. She was discharged only 2 days ago, still adjusting to being back at home. Taking everything as prescribed. She is very tired. Her breathing is not near baseline but better than it was before. She isn't coughing up a terrible amount of mucous/phlegm. No fevers after leaving hospital. No new concerns. She's been sick a lot over the past year.     Previously she refused steroids. She states in the past when she was prescribed steroids, her blood pressure  skyrocketed to over 200 and she had a subarachnoid hemorrhage however seems to be tolerating prednisone OK at this time.     Review of Systems   Constitutional:  Positive for fatigue. Negative for fever.   Respiratory:  Positive for cough and shortness of breath.    All other systems reviewed and are negative.      Aside from what is mentioned in the HPI, ROS is otherwise negative    Past Medical History   Past Medical History:   Diagnosis Date    Arnold-Chiari malformation (HCC)     Breast lump     last assessed: Jan 22, 2013     COPD (chronic obstructive pulmonary disease) ([facility])     Dysphagia 08/29/2022    Facial twitching 07/21/2022    GERD (gastroesophageal reflux disease)     Gout     last assessed: Nov 26, 2012     Hair loss     last assessed: Dec 15, 2016     Hypotension     last assessed: Nov 10, 2014     Mitral valve disorder     Myocardial infarct, old     GER (obstructive sleep apnea)     Stroke ([facility])     SVT (supraventricular tachycardia) ([facility])      Past Surgical History:   Procedure Laterality Date    BREAST BIOPSY Right 02/28/2017    US CORE BIOPSY--BENIGN    CARDIAC CATHETERIZATION      CATARACT EXTRACTION Bilateral 11/09/2023    CERVICAL DISCECTOMY      Spinal     CERVICAL LAMINECTOMY      C1 with chiari decompression     COLONOSCOPY      5 yrs - onset: May 31, 2011     CRANIOTOMY      INTRACAPSULAR CATARACT EXTRACTION Right 11/08/2023    INTRACAPSULAR CATARACT EXTRACTION Left 11/15/2023    POPLITEAL SYNOVIAL CYST EXCISION      TUBAL LIGATION      US GUIDED BREAST BIOPSY RIGHT COMPLETE Right 02/28/2017     Family History   Problem Relation Age of Onset    Stroke Mother     Other Father         blood clot in vein & CABG     Heart disease Father     Prostate cancer Father     Hearing loss Father     Hypertension Father     No Known Problems Sister     No Known Problems Sister     No Known Problems Daughter     No Known Problems Daughter     Breast cancer Maternal Grandmother     No Known Problems  Maternal Grandfather     No Known Problems Paternal Grandmother     No Known Problems Paternal Grandfather     Alcohol abuse Brother     Throat cancer Brother     Cancer Brother         Throat tongue    Hearing loss Brother     Kidney failure Brother         kidney failure d/t NSIADs on dialysis    Hearing loss Brother     Crohn's disease Maternal Aunt     No Known Problems Maternal Aunt     No Known Problems Maternal Aunt     Colon cancer Paternal Aunt     No Known Problems Paternal Aunt     No Known Problems Paternal Aunt     No Known Problems Paternal Aunt     Colon cancer Paternal Aunt     Cancer Paternal Aunt         Colon cancer      reports that she quit smoking about 17 years ago. Her smoking use included cigarettes. She started smoking about 56 years ago. She has a 58.5 pack-year smoking history. She has never used smokeless tobacco. She reports current alcohol use of about 7.0 standard drinks of alcohol per week. She reports that she does not use drugs.  Current Outpatient Medications   Medication Instructions    albuterol (ProAir HFA) 90 mcg/act inhaler 2 puffs, Inhalation, Every 4 hours PRN    albuterol 2.5 mg, Nebulization, Every 6 hours PRN    amLODIPine (NORVASC) 5 mg, Oral, Daily    atorvastatin (LIPITOR) 80 mg, Oral, Daily    busPIRone (BUSPAR) 10 mg, Oral, 2 times daily    calcium carbonate (OS-FALSH) 600 mg, Daily    carvedilol (COREG) 12.5 mg, Oral, 2 times daily    cefdinir (OMNICEF) 300 mg, Oral, Every 12 hours scheduled    cholecalciferol (VITAMIN D3) 1,000 Units, Daily    clopidogrel (PLAVIX) 75 mg, Oral, Daily    escitalopram (LEXAPRO) 20 mg, Oral, Daily    fluticasone-umeclidinium-vilanterol (Trelegy Ellipta) 100-62.5-25 mcg/actuation inhaler 1 puff, Inhalation, Daily, Rinse mouth after use    gabapentin (NEURONTIN) 400 mg capsule TAKE 2 CAPSULES BY MOUTH TWICE DAILY (IN THE MORNING AND AT BEDTIME)    guaiFENesin (MUCINEX) 600 mg, Oral, 2 times daily    isosorbide mononitrate (IMDUR) 30 mg 24  "hr tablet Take 1 tablet by mouth once daily    pantoprazole (PROTONIX) 40 mg tablet TAKE 1 TABLET BY MOUTH ONCE DAILY BEFORE BREAKFAST    predniSONE 40 mg, Oral, Daily    vitamin B-12 (VITAMIN B-12) 1,000 mcg, Oral, Daily     Allergies   Allergen Reactions    Codeine Itching    Medical Tape Rash         Medical History Reviewed by provider this encounter:     .    Objective   /70 (BP Location: Left arm, Patient Position: Sitting, Cuff Size: Standard)   Pulse 96   Temp (!) 96.8 °F (36 °C) (Tympanic Core)   Ht 5' 5\" (1.651 m)   Wt 72.6 kg (160 lb)   SpO2 95%   BMI 26.63 kg/m²     Physical Exam  Vitals reviewed.   Constitutional:       General: She is not in acute distress.     Appearance: She is not toxic-appearing.   HENT:      Head: Normocephalic and atraumatic.   Eyes:      General: No scleral icterus.  Cardiovascular:      Rate and Rhythm: Normal rate and regular rhythm.   Pulmonary:      Effort: Pulmonary effort is normal.      Breath sounds: Wheezing present.   Musculoskeletal:         General: No signs of injury.   Skin:     General: Skin is warm and dry.   Neurological:      General: No focal deficit present.      Mental Status: She is alert. Mental status is at baseline.   Psychiatric:         Mood and Affect: Mood normal.         Behavior: Behavior normal.         Diagnostic Data:  Labs: I personally reviewed the most recent laboratory data pertinent to today's visit.  Reviewed hospital labs including CRP    Radiology results:  Radiology Results Review: I have reviewed radiology reports from hospital including: CT chest.  Shanda Howe PA-C      "

## 2025-02-19 NOTE — ASSESSMENT & PLAN NOTE
2/2 COPD exacerbation and pneumonia triggered by covid and flu a infection  3 L at rest 6 w exertion  Encouraged her frequent pulse ox monitoring to ensure sats > 88%  Return in 1-2 weeks for repeat 6MW  Call with any change in symptoms or concerns    Orders:    Ambulatory referral to Pulmonology    CT chest without contrast; Future

## 2025-02-19 NOTE — ASSESSMENT & PLAN NOTE
Discussed with pt she will need f/u imaging to ensure resolution  Still taking cefdinir. Complete course.  Orders:    CT chest without contrast; Future

## 2025-02-19 NOTE — ASSESSMENT & PLAN NOTE
Continue prednisone as prescribed by hospitalist  Call with failure to improve/worsening symptoms we can send more prednisone to pharmacy  Continue Trelegy 100  Albuterol nebs

## 2025-02-23 DIAGNOSIS — I20.9 ANGINAL PAIN (HCC): ICD-10-CM

## 2025-02-23 DIAGNOSIS — I25.10 CORONARY ARTERY DISEASE INVOLVING NATIVE CORONARY ARTERY OF NATIVE HEART WITHOUT ANGINA PECTORIS: ICD-10-CM

## 2025-02-23 NOTE — ASSESSMENT & PLAN NOTE
Continue Coreg 12.5 mg twice daily  Intermittent elevated blood pressure  Started amlodipine 5 mg daily.

## 2025-02-23 NOTE — DISCHARGE SUMMARY
Discharge Summary - Hospitalist   Name: Geovanna Leal 69 y.o. female I MRN: [id number]  Unit/Bed#: -01 I Date of Admission: 2/12/2025   Date of Service: 2/23/2025 I Hospital Day: 5     Assessment & Plan  Acute respiratory failure with hypoxia (HCC)  Oxygen saturation 87% at room air on admission  In the context of pneumonia/COVID/flu/COPD exacerbation  Intermittent shortness of breath mostly with exertion.  Home O2 eval qualified patient for home oxygen.  Required 3 L at rest and 6 L with exertion  Wean off oxygen to maintain saturation greater than 88%    Essential hypertension  Continue Coreg 12.5 mg twice daily  Intermittent elevated blood pressure  Started amlodipine 5 mg daily.    Dyslipidemia  Continue atorvastatin  Chronic obstructive pulmonary disease with acute exacerbation (HCC)  Presented with worsening cough, wheezing in the setting of COVID/flu/pneumonia  On Trelegy as home medication  Oxygen saturation in the 87- 88 at room air this morning however drops with ambulation  Received IV steroids however transition to oral prednisone 2/15  Patient reported she feels she cannot take deep full breaths  Some wheezing this morning    Plan:  Continue prednisone 40 mg daily  DuoNebs, Mucinex scheduled  Budesonide/formoterol  Completed azithromycin  Will give 40 IV Lasix trial as patient had elevated blood pressure yesterday and slightly elevated BNP on presentation.  Pneumonia due to infectious organism  Shortness of breath and cough  CT chest showed new dense consolidation throughout the right upper lobe consistent with pneumonia.  However there is a new thickening along the walls of the right upper lobe bullae short-term follow-up posttreatment CT chest is recommended to assess for resolution.  Received cefepime and azithromycin in the emergency department  Procalcitonin 5.4  Continue ceftriaxone  Mucinex, inhalers  Mild episode of recurrent major depressive disorder (HCC)  Continue Lexapro and  BuSpar  COVID-19 virus infection  Tested positive for COVID on 2/12  On 2 L nasal cannula oxygen given hypoxia  Troponin normal, BNP slightly elevated  Does not meet sepsis criteria  Started remdesivir-continue  Flu  Positive for flu A  Continue Tamiflu renally dosed     Admission Date: 2/12/2025 1349  Discharge Date: 02/23/25  Admitting Diagnosis: Pneumonia [J18.9]  Influenza A [J10.1]  Hypoxia [R09.02]  Flu-like symptoms [R68.89]  COVID [U07.1]  Discharge Diagnosis:   Medical Problems       Resolved Problems  Date Reviewed: 2/7/2025   None         HPI: as per, Francine Winchester MD , on 2/12 Geovanna Leal is a 69 y.o. female with a PMH of hypertension, depression, B12 deficiency, benign tumor of spinal meningioma, carotid stenosis, who presents with shortness of breath, cough and fever.  Patient reported symptoms started about Sunday and is progressively worse.      Tested positive for flu and COVID in the emergency department.  Also imaging with pneumonia.     Patient was admitted for further managemen    Procedures Performed: No orders of the defined types were placed in this encounter.      Summary of Hospital Course: Patient presented with acute hypoxic respiratory failure because of COPD flare and pneumonia.  Patient was given IV steroids, IV antibiotics and scheduled nebulizations with improvement.    Significant Findings, Care, Treatment and Services Provided: see above     Complications: none    Condition at Discharge: stable       Discharge instructions/Information to patient and family:   See After Visit Summary (AVS) for information provided to patient and family.      Provisions for Follow-Up Care:  See after visit summary for information related to follow-up care and any pertinent home health orders.      PCP: Leelee Garcia DO    Disposition: Home    Planned Readmission: No     Discharge Medications:  See after visit summary for reconciled discharge medications provided to patient and  family.      Discharge Statement:  I have spent a total time of 36 minutes in caring for this patient on the day of the visit/encounter. >30 minutes of time was spent on: Instructions for management, Patient and family education, Risk factor reductions, Counseling / Coordination of care, Documenting in the medical record, Reviewing / ordering tests, medicine, procedures  , and Communicating with other healthcare professionals .

## 2025-02-23 NOTE — ASSESSMENT & PLAN NOTE
Oxygen saturation 87% at room air on admission  In the context of pneumonia/COVID/flu/COPD exacerbation  Intermittent shortness of breath mostly with exertion.  Home O2 eval qualified patient for home oxygen.  Required 3 L at rest and 6 L with exertion  Wean off oxygen to maintain saturation greater than 88%

## 2025-02-24 ENCOUNTER — APPOINTMENT (OUTPATIENT)
Dept: RADIOLOGY | Facility: HOSPITAL | Age: 70
End: 2025-02-24
Payer: MEDICARE

## 2025-02-24 ENCOUNTER — HOSPITAL ENCOUNTER (EMERGENCY)
Facility: HOSPITAL | Age: 70
End: 2025-02-24
Attending: EMERGENCY MEDICINE | Admitting: EMERGENCY MEDICINE
Payer: MEDICARE

## 2025-02-24 ENCOUNTER — ANESTHESIA EVENT (OUTPATIENT)
Dept: PERIOP | Facility: HOSPITAL | Age: 70
End: 2025-02-24
Payer: MEDICARE

## 2025-02-24 ENCOUNTER — ANESTHESIA (OUTPATIENT)
Dept: PERIOP | Facility: HOSPITAL | Age: 70
End: 2025-02-24
Payer: MEDICARE

## 2025-02-24 ENCOUNTER — APPOINTMENT (EMERGENCY)
Dept: CT IMAGING | Facility: HOSPITAL | Age: 70
End: 2025-02-24
Payer: MEDICARE

## 2025-02-24 ENCOUNTER — HOSPITAL ENCOUNTER (OUTPATIENT)
Facility: HOSPITAL | Age: 70
Setting detail: OUTPATIENT SURGERY
Discharge: HOME | End: 2025-02-25
Attending: UROLOGY | Admitting: UROLOGY
Payer: MEDICARE

## 2025-02-24 VITALS
RESPIRATION RATE: 21 BRPM | OXYGEN SATURATION: 91 % | HEART RATE: 60 BPM | DIASTOLIC BLOOD PRESSURE: 70 MMHG | TEMPERATURE: 97.8 F | SYSTOLIC BLOOD PRESSURE: 158 MMHG

## 2025-02-24 DIAGNOSIS — N17.9 AKI (ACUTE KIDNEY INJURY) (HCC): ICD-10-CM

## 2025-02-24 DIAGNOSIS — N13.39 OTHER HYDRONEPHROSIS: ICD-10-CM

## 2025-02-24 DIAGNOSIS — N13.1 HYDRONEPHROSIS DUE TO OBSTRUCTION OF URETER: Primary | ICD-10-CM

## 2025-02-24 DIAGNOSIS — N20.1 URETERAL CALCULUS, RIGHT: Primary | ICD-10-CM

## 2025-02-24 DIAGNOSIS — J96.01 ACUTE RESPIRATORY FAILURE WITH HYPOXIA (HCC): ICD-10-CM

## 2025-02-24 DIAGNOSIS — J90 PLEURAL EFFUSION: ICD-10-CM

## 2025-02-24 LAB
2HR DELTA HS TROPONIN: 0 NG/L
ALBUMIN SERPL BCG-MCNC: 3.2 G/DL (ref 3.5–5)
ALP SERPL-CCNC: 85 U/L (ref 34–104)
ALT SERPL W P-5'-P-CCNC: 21 U/L (ref 7–52)
ANION GAP SERPL CALCULATED.3IONS-SCNC: 8 MMOL/L (ref 4–13)
AST SERPL W P-5'-P-CCNC: 15 U/L (ref 13–39)
BACTERIA UR QL AUTO: NORMAL /HPF
BASOPHILS # BLD AUTO: 0.02 THOUSANDS/ÂΜL (ref 0–0.1)
BASOPHILS NFR BLD AUTO: 0 % (ref 0–1)
BILIRUB SERPL-MCNC: 1.26 MG/DL (ref 0.2–1)
BILIRUB UR QL STRIP: NEGATIVE
BUN SERPL-MCNC: 16 MG/DL (ref 5–25)
CALCIUM ALBUM COR SERPL-MCNC: 9.6 MG/DL (ref 8.3–10.1)
CALCIUM SERPL-MCNC: 9 MG/DL (ref 8.4–10.2)
CARDIAC TROPONIN I PNL SERPL HS: 6 NG/L (ref ?–50)
CARDIAC TROPONIN I PNL SERPL HS: 6 NG/L (ref ?–50)
CHLORIDE SERPL-SCNC: 98 MMOL/L (ref 96–108)
CLARITY UR: CLEAR
CO2 SERPL-SCNC: 30 MMOL/L (ref 21–32)
COLOR UR: YELLOW
CREAT SERPL-MCNC: 1.27 MG/DL (ref 0.6–1.3)
EOSINOPHIL # BLD AUTO: 0.04 THOUSAND/ÂΜL (ref 0–0.61)
EOSINOPHIL NFR BLD AUTO: 0 % (ref 0–6)
ERYTHROCYTE [DISTWIDTH] IN BLOOD BY AUTOMATED COUNT: 15.8 % (ref 11.6–15.1)
GFR SERPL CREATININE-BSD FRML MDRD: 42 ML/MIN/1.73SQ M
GLUCOSE SERPL-MCNC: 118 MG/DL (ref 65–140)
GLUCOSE UR STRIP-MCNC: NEGATIVE MG/DL
HCT VFR BLD AUTO: 39.9 % (ref 34.8–46.1)
HGB BLD-MCNC: 13.2 G/DL (ref 11.5–15.4)
HGB UR QL STRIP.AUTO: ABNORMAL
IMM GRANULOCYTES # BLD AUTO: 0.11 THOUSAND/UL (ref 0–0.2)
IMM GRANULOCYTES NFR BLD AUTO: 1 % (ref 0–2)
KETONES UR STRIP-MCNC: NEGATIVE MG/DL
LACTATE SERPL-SCNC: 1.5 MMOL/L (ref 0.5–2)
LACTATE SERPL-SCNC: 2.2 MMOL/L (ref 0.5–2)
LEUKOCYTE ESTERASE UR QL STRIP: NEGATIVE
LIPASE SERPL-CCNC: 55 U/L (ref 11–82)
LYMPHOCYTES # BLD AUTO: 0.71 THOUSANDS/ÂΜL (ref 0.6–4.47)
LYMPHOCYTES NFR BLD AUTO: 7 % (ref 14–44)
MCH RBC QN AUTO: 32.8 PG (ref 26.8–34.3)
MCHC RBC AUTO-ENTMCNC: 33.1 G/DL (ref 31.4–37.4)
MCV RBC AUTO: 99 FL (ref 82–98)
MONOCYTES # BLD AUTO: 0.86 THOUSAND/ÂΜL (ref 0.17–1.22)
MONOCYTES NFR BLD AUTO: 8 % (ref 4–12)
NEUTROPHILS # BLD AUTO: 9.06 THOUSANDS/ÂΜL (ref 1.85–7.62)
NEUTS SEG NFR BLD AUTO: 84 % (ref 43–75)
NITRITE UR QL STRIP: NEGATIVE
NON-SQ EPI CELLS URNS QL MICRO: NORMAL /HPF
NRBC BLD AUTO-RTO: 0 /100 WBCS
PH UR STRIP.AUTO: 7.5 [PH]
PLATELET # BLD AUTO: 513 THOUSANDS/UL (ref 149–390)
PMV BLD AUTO: 9.2 FL (ref 8.9–12.7)
POTASSIUM SERPL-SCNC: 3.4 MMOL/L (ref 3.5–5.3)
PROT SERPL-MCNC: 6.4 G/DL (ref 6.4–8.4)
PROT UR STRIP-MCNC: ABNORMAL MG/DL
RBC # BLD AUTO: 4.02 MILLION/UL (ref 3.81–5.12)
RBC #/AREA URNS AUTO: NORMAL /HPF
SODIUM SERPL-SCNC: 136 MMOL/L (ref 135–147)
SP GR UR STRIP.AUTO: <1.005 (ref 1–1.03)
UROBILINOGEN UR STRIP-ACNC: <2 MG/DL
WBC # BLD AUTO: 10.8 THOUSAND/UL (ref 4.31–10.16)
WBC #/AREA URNS AUTO: NORMAL /HPF

## 2025-02-24 PROCEDURE — 84484 ASSAY OF TROPONIN QUANT: CPT

## 2025-02-24 PROCEDURE — 80053 COMPREHEN METABOLIC PANEL: CPT

## 2025-02-24 PROCEDURE — 99285 EMERGENCY DEPT VISIT HI MDM: CPT

## 2025-02-24 PROCEDURE — 74018 RADEX ABDOMEN 1 VIEW: CPT

## 2025-02-24 PROCEDURE — 96374 THER/PROPH/DIAG INJ IV PUSH: CPT

## 2025-02-24 PROCEDURE — 85025 COMPLETE CBC W/AUTO DIFF WBC: CPT

## 2025-02-24 PROCEDURE — 96375 TX/PRO/DX INJ NEW DRUG ADDON: CPT

## 2025-02-24 PROCEDURE — 83605 ASSAY OF LACTIC ACID: CPT

## 2025-02-24 PROCEDURE — C1758 CATHETER, URETERAL: HCPCS | Performed by: UROLOGY

## 2025-02-24 PROCEDURE — 83690 ASSAY OF LIPASE: CPT

## 2025-02-24 PROCEDURE — C1769 GUIDE WIRE: HCPCS | Performed by: UROLOGY

## 2025-02-24 PROCEDURE — 52332 CYSTOSCOPY AND TREATMENT: CPT | Performed by: UROLOGY

## 2025-02-24 PROCEDURE — NC001 PR NO CHARGE: Performed by: UROLOGY

## 2025-02-24 PROCEDURE — 74177 CT ABD & PELVIS W/CONTRAST: CPT

## 2025-02-24 PROCEDURE — 81001 URINALYSIS AUTO W/SCOPE: CPT

## 2025-02-24 PROCEDURE — 36415 COLL VENOUS BLD VENIPUNCTURE: CPT

## 2025-02-24 PROCEDURE — C2625 STENT, NON-COR, TEM W/DEL SY: HCPCS | Performed by: UROLOGY

## 2025-02-24 PROCEDURE — 93005 ELECTROCARDIOGRAM TRACING: CPT

## 2025-02-24 PROCEDURE — 94664 DEMO&/EVAL PT USE INHALER: CPT

## 2025-02-24 PROCEDURE — 96361 HYDRATE IV INFUSION ADD-ON: CPT

## 2025-02-24 PROCEDURE — 94760 N-INVAS EAR/PLS OXIMETRY 1: CPT

## 2025-02-24 DEVICE — INLAY OPTIMA URETERAL STENT W/O GUIDEWIRE
Type: IMPLANTABLE DEVICE | Site: URETER | Status: FUNCTIONAL
Brand: BARD® INLAY OPTIMA® URETERAL STENT

## 2025-02-24 RX ORDER — PHENAZOPYRIDINE HYDROCHLORIDE 100 MG/1
100 TABLET, FILM COATED ORAL 3 TIMES DAILY PRN
Status: DISCONTINUED | OUTPATIENT
Start: 2025-02-24 | End: 2025-02-25 | Stop reason: HOSPADM

## 2025-02-24 RX ORDER — LIDOCAINE HYDROCHLORIDE 10 MG/ML
INJECTION, SOLUTION EPIDURAL; INFILTRATION; INTRACAUDAL; PERINEURAL AS NEEDED
Status: DISCONTINUED | OUTPATIENT
Start: 2025-02-24 | End: 2025-02-24

## 2025-02-24 RX ORDER — FLUTICASONE FUROATE AND VILANTEROL 100; 25 UG/1; UG/1
1 POWDER RESPIRATORY (INHALATION) DAILY
Status: DISCONTINUED | OUTPATIENT
Start: 2025-02-25 | End: 2025-02-25 | Stop reason: HOSPADM

## 2025-02-24 RX ORDER — MORPHINE SULFATE 4 MG/ML
4 INJECTION, SOLUTION INTRAMUSCULAR; INTRAVENOUS EVERY 4 HOURS PRN
Status: DISCONTINUED | OUTPATIENT
Start: 2025-02-24 | End: 2025-02-25 | Stop reason: HOSPADM

## 2025-02-24 RX ORDER — ENOXAPARIN SODIUM 100 MG/ML
30 INJECTION SUBCUTANEOUS DAILY
Status: DISCONTINUED | OUTPATIENT
Start: 2025-02-24 | End: 2025-02-25 | Stop reason: HOSPADM

## 2025-02-24 RX ORDER — ESCITALOPRAM OXALATE 20 MG/1
20 TABLET ORAL DAILY
Status: DISCONTINUED | OUTPATIENT
Start: 2025-02-24 | End: 2025-02-25 | Stop reason: HOSPADM

## 2025-02-24 RX ORDER — ISOSORBIDE MONONITRATE 30 MG/1
30 TABLET, EXTENDED RELEASE ORAL DAILY
Status: DISCONTINUED | OUTPATIENT
Start: 2025-02-24 | End: 2025-02-25 | Stop reason: HOSPADM

## 2025-02-24 RX ORDER — ONDANSETRON 2 MG/ML
4 INJECTION INTRAMUSCULAR; INTRAVENOUS EVERY 8 HOURS PRN
Status: DISCONTINUED | OUTPATIENT
Start: 2025-02-24 | End: 2025-02-25 | Stop reason: HOSPADM

## 2025-02-24 RX ORDER — PANTOPRAZOLE SODIUM 40 MG/1
40 TABLET, DELAYED RELEASE ORAL
Status: DISCONTINUED | OUTPATIENT
Start: 2025-02-25 | End: 2025-02-25 | Stop reason: HOSPADM

## 2025-02-24 RX ORDER — SODIUM CHLORIDE, SODIUM LACTATE, POTASSIUM CHLORIDE, CALCIUM CHLORIDE 600; 310; 30; 20 MG/100ML; MG/100ML; MG/100ML; MG/100ML
100 INJECTION, SOLUTION INTRAVENOUS CONTINUOUS
Status: DISCONTINUED | OUTPATIENT
Start: 2025-02-24 | End: 2025-02-25

## 2025-02-24 RX ORDER — ALBUTEROL SULFATE 0.83 MG/ML
2.5 SOLUTION RESPIRATORY (INHALATION) EVERY 6 HOURS PRN
Status: DISCONTINUED | OUTPATIENT
Start: 2025-02-24 | End: 2025-02-25 | Stop reason: HOSPADM

## 2025-02-24 RX ORDER — CEFAZOLIN SODIUM 2 G/50ML
2000 SOLUTION INTRAVENOUS ONCE
Status: COMPLETED | OUTPATIENT
Start: 2025-02-24 | End: 2025-02-24

## 2025-02-24 RX ORDER — ATORVASTATIN CALCIUM 80 MG/1
80 TABLET, FILM COATED ORAL DAILY
Status: DISCONTINUED | OUTPATIENT
Start: 2025-02-24 | End: 2025-02-25 | Stop reason: HOSPADM

## 2025-02-24 RX ORDER — EPHEDRINE SULFATE 50 MG/ML
INJECTION INTRAVENOUS AS NEEDED
Status: DISCONTINUED | OUTPATIENT
Start: 2025-02-24 | End: 2025-02-24

## 2025-02-24 RX ORDER — ONDANSETRON 2 MG/ML
INJECTION INTRAMUSCULAR; INTRAVENOUS AS NEEDED
Status: DISCONTINUED | OUTPATIENT
Start: 2025-02-24 | End: 2025-02-24

## 2025-02-24 RX ORDER — OXYBUTYNIN CHLORIDE 5 MG/1
5 TABLET ORAL 3 TIMES DAILY PRN
Status: DISCONTINUED | OUTPATIENT
Start: 2025-02-24 | End: 2025-02-25 | Stop reason: HOSPADM

## 2025-02-24 RX ORDER — HYDROCODONE BITARTRATE AND ACETAMINOPHEN 5; 325 MG/1; MG/1
1 TABLET ORAL EVERY 6 HOURS PRN
Refills: 0 | Status: DISCONTINUED | OUTPATIENT
Start: 2025-02-24 | End: 2025-02-25 | Stop reason: HOSPADM

## 2025-02-24 RX ORDER — HYDROMORPHONE HCL IN WATER/PF 6 MG/30 ML
0.2 PATIENT CONTROLLED ANALGESIA SYRINGE INTRAVENOUS
Status: DISCONTINUED | OUTPATIENT
Start: 2025-02-24 | End: 2025-02-24 | Stop reason: HOSPADM

## 2025-02-24 RX ORDER — KETOROLAC TROMETHAMINE 30 MG/ML
15 INJECTION, SOLUTION INTRAMUSCULAR; INTRAVENOUS ONCE
Status: COMPLETED | OUTPATIENT
Start: 2025-02-24 | End: 2025-02-24

## 2025-02-24 RX ORDER — CEFAZOLIN SODIUM 2 G/50ML
2000 SOLUTION INTRAVENOUS ONCE
Status: CANCELLED | OUTPATIENT
Start: 2025-02-24 | End: 2025-02-24

## 2025-02-24 RX ORDER — AMLODIPINE BESYLATE 5 MG/1
5 TABLET ORAL DAILY
Status: DISCONTINUED | OUTPATIENT
Start: 2025-02-24 | End: 2025-02-25 | Stop reason: HOSPADM

## 2025-02-24 RX ORDER — SODIUM CHLORIDE, SODIUM LACTATE, POTASSIUM CHLORIDE, CALCIUM CHLORIDE 600; 310; 30; 20 MG/100ML; MG/100ML; MG/100ML; MG/100ML
INJECTION, SOLUTION INTRAVENOUS CONTINUOUS PRN
Status: DISCONTINUED | OUTPATIENT
Start: 2025-02-24 | End: 2025-02-24

## 2025-02-24 RX ORDER — FENTANYL CITRATE 50 UG/ML
INJECTION, SOLUTION INTRAMUSCULAR; INTRAVENOUS AS NEEDED
Status: DISCONTINUED | OUTPATIENT
Start: 2025-02-24 | End: 2025-02-24

## 2025-02-24 RX ORDER — ONDANSETRON 2 MG/ML
4 INJECTION INTRAMUSCULAR; INTRAVENOUS ONCE AS NEEDED
Status: DISCONTINUED | OUTPATIENT
Start: 2025-02-24 | End: 2025-02-24 | Stop reason: HOSPADM

## 2025-02-24 RX ORDER — PROPOFOL 10 MG/ML
INJECTION, EMULSION INTRAVENOUS AS NEEDED
Status: DISCONTINUED | OUTPATIENT
Start: 2025-02-24 | End: 2025-02-24

## 2025-02-24 RX ORDER — BUSPIRONE HYDROCHLORIDE 10 MG/1
10 TABLET ORAL 2 TIMES DAILY
Status: DISCONTINUED | OUTPATIENT
Start: 2025-02-24 | End: 2025-02-25 | Stop reason: HOSPADM

## 2025-02-24 RX ORDER — POTASSIUM CHLORIDE 1500 MG/1
20 TABLET, EXTENDED RELEASE ORAL ONCE
Status: COMPLETED | OUTPATIENT
Start: 2025-02-24 | End: 2025-02-24

## 2025-02-24 RX ORDER — HYDROMORPHONE HCL/PF 1 MG/ML
0.2 SYRINGE (ML) INJECTION ONCE
Status: COMPLETED | OUTPATIENT
Start: 2025-02-24 | End: 2025-02-24

## 2025-02-24 RX ORDER — FENTANYL CITRATE/PF 50 MCG/ML
25 SYRINGE (ML) INJECTION
Status: DISCONTINUED | OUTPATIENT
Start: 2025-02-24 | End: 2025-02-24 | Stop reason: HOSPADM

## 2025-02-24 RX ORDER — GABAPENTIN 400 MG/1
400 CAPSULE ORAL 2 TIMES DAILY
Status: DISCONTINUED | OUTPATIENT
Start: 2025-02-24 | End: 2025-02-25 | Stop reason: HOSPADM

## 2025-02-24 RX ORDER — CEPHALEXIN 500 MG/1
500 CAPSULE ORAL EVERY 6 HOURS SCHEDULED
Qty: 12 CAPSULE | Refills: 0 | Status: SHIPPED | OUTPATIENT
Start: 2025-02-24 | End: 2025-02-27

## 2025-02-24 RX ORDER — SODIUM CHLORIDE 450 MG/100ML
125 INJECTION, SOLUTION INTRAVENOUS CONTINUOUS
Status: DISCONTINUED | OUTPATIENT
Start: 2025-02-24 | End: 2025-02-25

## 2025-02-24 RX ORDER — CLOPIDOGREL BISULFATE 75 MG/1
75 TABLET ORAL DAILY
Status: DISCONTINUED | OUTPATIENT
Start: 2025-02-24 | End: 2025-02-25 | Stop reason: HOSPADM

## 2025-02-24 RX ORDER — DEXAMETHASONE SODIUM PHOSPHATE 10 MG/ML
INJECTION, SOLUTION INTRAMUSCULAR; INTRAVENOUS AS NEEDED
Status: DISCONTINUED | OUTPATIENT
Start: 2025-02-24 | End: 2025-02-24

## 2025-02-24 RX ORDER — PREDNISONE 20 MG/1
40 TABLET ORAL DAILY
Status: DISCONTINUED | OUTPATIENT
Start: 2025-02-24 | End: 2025-02-25 | Stop reason: HOSPADM

## 2025-02-24 RX ORDER — CARVEDILOL 12.5 MG/1
12.5 TABLET ORAL 2 TIMES DAILY
Status: DISCONTINUED | OUTPATIENT
Start: 2025-02-24 | End: 2025-02-25 | Stop reason: HOSPADM

## 2025-02-24 RX ORDER — GUAIFENESIN 600 MG/1
600 TABLET, EXTENDED RELEASE ORAL 2 TIMES DAILY
Status: DISCONTINUED | OUTPATIENT
Start: 2025-02-24 | End: 2025-02-25 | Stop reason: HOSPADM

## 2025-02-24 RX ADMIN — GUAIFENESIN 600 MG: 600 TABLET, EXTENDED RELEASE ORAL at 16:45

## 2025-02-24 RX ADMIN — POTASSIUM CHLORIDE 20 MEQ: 1500 TABLET, EXTENDED RELEASE ORAL at 11:20

## 2025-02-24 RX ADMIN — ESCITALOPRAM OXALATE 20 MG: 20 TABLET ORAL at 20:06

## 2025-02-24 RX ADMIN — ATORVASTATIN CALCIUM 80 MG: 80 TABLET, FILM COATED ORAL at 16:26

## 2025-02-24 RX ADMIN — EPHEDRINE SULFATE 10 MG: 50 INJECTION, SOLUTION INTRAVENOUS at 14:14

## 2025-02-24 RX ADMIN — PREDNISONE 40 MG: 20 TABLET ORAL at 16:26

## 2025-02-24 RX ADMIN — CEFTRIAXONE SODIUM 1000 MG: 10 INJECTION, POWDER, FOR SOLUTION INTRAVENOUS at 21:19

## 2025-02-24 RX ADMIN — DEXAMETHASONE SODIUM PHOSPHATE 10 MG: 10 INJECTION, SOLUTION INTRAMUSCULAR; INTRAVENOUS at 14:09

## 2025-02-24 RX ADMIN — FENTANYL CITRATE 25 MCG: 50 INJECTION INTRAMUSCULAR; INTRAVENOUS at 14:12

## 2025-02-24 RX ADMIN — CEFAZOLIN SODIUM 2000 MG: 2 SOLUTION INTRAVENOUS at 14:04

## 2025-02-24 RX ADMIN — CLOPIDOGREL BISULFATE 75 MG: 75 TABLET, FILM COATED ORAL at 16:26

## 2025-02-24 RX ADMIN — ONDANSETRON 4 MG: 2 INJECTION INTRAMUSCULAR; INTRAVENOUS at 14:09

## 2025-02-24 RX ADMIN — IOHEXOL 100 ML: 350 INJECTION, SOLUTION INTRAVENOUS at 10:09

## 2025-02-24 RX ADMIN — KETOROLAC TROMETHAMINE 15 MG: 30 INJECTION, SOLUTION INTRAMUSCULAR at 09:17

## 2025-02-24 RX ADMIN — ENOXAPARIN SODIUM 30 MG: 30 INJECTION SUBCUTANEOUS at 17:09

## 2025-02-24 RX ADMIN — SODIUM CHLORIDE, SODIUM LACTATE, POTASSIUM CHLORIDE, AND CALCIUM CHLORIDE 100 ML/HR: .6; .31; .03; .02 INJECTION, SOLUTION INTRAVENOUS at 16:27

## 2025-02-24 RX ADMIN — SODIUM CHLORIDE 500 ML: 0.9 INJECTION, SOLUTION INTRAVENOUS at 09:16

## 2025-02-24 RX ADMIN — PROPOFOL 150 MG: 10 INJECTION, EMULSION INTRAVENOUS at 14:01

## 2025-02-24 RX ADMIN — SODIUM CHLORIDE, SODIUM LACTATE, POTASSIUM CHLORIDE, AND CALCIUM CHLORIDE: .6; .31; .03; .02 INJECTION, SOLUTION INTRAVENOUS at 13:53

## 2025-02-24 RX ADMIN — AMLODIPINE BESYLATE 5 MG: 5 TABLET ORAL at 16:26

## 2025-02-24 RX ADMIN — BUSPIRONE HYDROCHLORIDE 10 MG: 10 TABLET ORAL at 20:08

## 2025-02-24 RX ADMIN — CARVEDILOL 12.5 MG: 12.5 TABLET, FILM COATED ORAL at 17:09

## 2025-02-24 RX ADMIN — LIDOCAINE HYDROCHLORIDE 50 MG: 10 INJECTION, SOLUTION EPIDURAL; INFILTRATION; INTRACAUDAL; PERINEURAL at 14:01

## 2025-02-24 RX ADMIN — HYDROMORPHONE HYDROCHLORIDE 0.2 MG: 1 INJECTION, SOLUTION INTRAMUSCULAR; INTRAVENOUS; SUBCUTANEOUS at 11:20

## 2025-02-24 RX ADMIN — GABAPENTIN 400 MG: 400 CAPSULE ORAL at 16:45

## 2025-02-24 RX ADMIN — SODIUM CHLORIDE 125 ML/HR: 0.45 INJECTION, SOLUTION INTRAVENOUS at 16:56

## 2025-02-24 RX ADMIN — ISOSORBIDE MONONITRATE 30 MG: 30 TABLET, EXTENDED RELEASE ORAL at 16:26

## 2025-02-24 NOTE — ANESTHESIA POSTPROCEDURE EVALUATION
Post-Op Assessment Note    CV Status:  Stable  Pain Score: 0    Pain management: adequate       Mental Status:  Alert and awake   Hydration Status:  Euvolemic   PONV Controlled:  Controlled   Airway Patency:  Patent     Post Op Vitals Reviewed: Yes    No anethesia notable event occurred.    Staff: CRNA           Last Filed PACU Vitals:  Vitals Value Taken Time   Temp 97.7 °F (36.5 °C) 02/24/25 1427   Pulse 56 02/24/25 1427   /63 02/24/25 1427   Resp 14 02/24/25 1427   SpO2 98 % 02/24/25 1427

## 2025-02-24 NOTE — H&P
H&P Exam - Urology   Geovanna Leal 1955 [id number]      Assessment/Plan     Assessment:  Collection of fragments distal right ureter causing hydronephrosis, in aggregate 1 cm.  No sign of infection in urine but lactic acid is elevated 2.2 with mild leukocytosis and relative ANGELINE with the increase in her creatinine 1.27 from baseline of 0.82...  Plan:  Cystoscopy right ureteral stent placement for decompression.  This will be a staged procedure.    History of Present Illness   HPI:  The patient presents for the above procedure in transfer from [facility]... The risks of bleeding, infection, damage to the urinary tract and need for additional procedures has been explained and informed consent given.    Past Medical History:   Diagnosis Date    Arnold-Chiari malformation (HCC)     Breast lump     last assessed: Jan 22, 2013     COPD (chronic obstructive pulmonary disease) ([facility])     Dysphagia 08/29/2022    Facial twitching 07/21/2022    GERD (gastroesophageal reflux disease)     Gout     last assessed: Nov 26, 2012     Hair loss     last assessed: Dec 15, 2016     Hypotension     last assessed: Nov 10, 2014     Mitral valve disorder     Myocardial infarct, old     GER (obstructive sleep apnea)     Stroke ([facility])     SVT (supraventricular tachycardia) ([facility])        Past Surgical History:   Procedure Laterality Date    BREAST BIOPSY Right 02/28/2017    US CORE BIOPSY--BENIGN    CARDIAC CATHETERIZATION      CATARACT EXTRACTION Bilateral 11/09/2023    CERVICAL DISCECTOMY      Spinal     CERVICAL LAMINECTOMY      C1 with chiari decompression     COLONOSCOPY      5 yrs - onset: May 31, 2011     CRANIOTOMY      INTRACAPSULAR CATARACT EXTRACTION Right 11/08/2023    INTRACAPSULAR CATARACT EXTRACTION Left 11/15/2023    POPLITEAL SYNOVIAL CYST EXCISION      TUBAL LIGATION      US GUIDED BREAST BIOPSY RIGHT COMPLETE Right 02/28/2017       shoulder    Review of systems:    General: No fever.  CVS: No chest pain or new  SOB.  Abdomen: No diarrhea or blood in stool.  : No new voiding difficulties.  Neuro: No syncope/weakness/loss of sensation/paresis  Ophthalmologic: No new visual changes.  Ortho: No new back/joint pains.    Social History- as per previous notes.        Family History: Family history non-contributory    Meds/Allergies   PTA meds:   Prior to Admission Medications   Prescriptions Last Dose Informant Patient Reported? Taking?   albuterol (2.5 mg/3 mL) 0.083 % nebulizer solution  Self No No   Sig: Take 3 mL (2.5 mg total) by nebulization every 6 (six) hours as needed for wheezing or shortness of breath   albuterol (ProAir HFA) 90 mcg/act inhaler  Self No No   Sig: Inhale 2 puffs every 4 (four) hours as needed for wheezing or shortness of breath   Patient not taking: Reported on 2/19/2025   amLODIPine (NORVASC) 5 mg tablet   No No   Sig: Take 1 tablet (5 mg total) by mouth daily   atorvastatin (LIPITOR) 80 mg tablet   No No   Sig: Take 1 tablet by mouth once daily   busPIRone (BUSPAR) 10 mg tablet   No No   Sig: Take 1 tablet (10 mg total) by mouth 2 (two) times a day   calcium carbonate (OS-FLASH) 600 MG tablet  Self Yes No   Sig: Take 600 mg by mouth daily   carvedilol (COREG) 12.5 mg tablet  Self No No   Sig: Take 1 tablet by mouth twice daily   cholecalciferol (VITAMIN D3) 1,000 units tablet  Self Yes No   Sig: Take 1,000 Units by mouth daily   clopidogrel (PLAVIX) 75 mg tablet   No No   Sig: Take 1 tablet by mouth once daily   escitalopram (LEXAPRO) 20 mg tablet  Self No No   Sig: Take 1 tablet (20 mg total) by mouth daily   fluticasone-umeclidinium-vilanterol (Trelegy Ellipta) 100-62.5-25 mcg/actuation inhaler  Self No No   Sig: Inhale 1 puff daily Rinse mouth after use   gabapentin (NEURONTIN) 400 mg capsule  Self No No   Sig: TAKE 2 CAPSULES BY MOUTH TWICE DAILY (IN THE MORNING AND AT BEDTIME)   guaiFENesin (MUCINEX) 600 mg 12 hr tablet   No No   Sig: Take 1 tablet (600 mg total) by mouth 2 (two) times a day    isosorbide mononitrate (IMDUR) 30 mg 24 hr tablet  Self No No   Sig: Take 1 tablet by mouth once daily   pantoprazole (PROTONIX) 40 mg tablet   No No   Sig: TAKE 1 TABLET BY MOUTH ONCE DAILY BEFORE BREAKFAST   predniSONE 20 mg tablet   No No   Sig: Take 2 tablets (40 mg total) by mouth daily for 5 days   vitamin B-12 (VITAMIN B-12) 1,000 mcg tablet  Self No No   Sig: Take 1 tablet (1,000 mcg total) by mouth daily      Facility-Administered Medications: None         Objective   Vitals: There were no vitals taken for this visit.    No intake/output data recorded.          Physical Exam:    Awake, alert, in NAD.    HEENT: Atraumatic, Normocephalic    Lungs: Normal Respiratory effort, no wheezes,stridor or rales.  CTA bilaterally    CVS- RRR normal heart sounds    Abdomen: Nondistended.    Extremiites: Normal ROM, no cyanosis/edema.      Lab Results: I have personally reviewed pertinent reports.    Imaging: Results Review Statement: I personally reviewed the following image studies/reports in PACS and discussed pertinent findings with Radiology: CT abdomen/pelvis. My interpretation of the radiology images/reports is: Concur.

## 2025-02-24 NOTE — RESPIRATORY THERAPY NOTE
RT Protocol Note  Geovanna Leal 69 y.o. female MRN: [id number]  Unit/Bed#: -01 Encounter: [id number]    Assessment    Principal Problem:    Other hydronephrosis  Active Problems:    Essential hypertension    Dyslipidemia    GERD (gastroesophageal reflux disease)    Stage 3a chronic kidney disease ([facility])      Home Pulmonary Medications:  Albuterol prn       Past Medical History:   Diagnosis Date    Arnold-Chiari malformation ([facility])     Breast lump     last assessed: 2013     COPD (chronic obstructive pulmonary disease) ([facility])     Dysphagia 2022    Facial twitching 2022    GERD (gastroesophageal reflux disease)     Gout     last assessed: 2012     Hair loss     last assessed: Dec 15, 2016     Hypotension     last assessed: Nov 10, 2014     Mitral valve disorder     Myocardial infarct, old     GER (obstructive sleep apnea)     Stroke ([facility])     SVT (supraventricular tachycardia) ([facility])      Social History     Socioeconomic History    Marital status: /Civil Union     Spouse name: Not on file    Number of children: Not on file    Years of education: Not on file    Highest education level: Not on file   Occupational History    Occupation: working full time    Tobacco Use    Smoking status: Former     Current packs/day: 0.00     Average packs/day: 1.5 packs/day for 39.0 years (58.5 ttl pk-yrs)     Types: Cigarettes     Start date:      Quit date: 2008     Years since quittin.1    Smokeless tobacco: Never    Tobacco comments:     Patient quit   Vaping Use    Vaping status: Never Used   Substance and Sexual Activity    Alcohol use: Yes     Alcohol/week: 7.0 standard drinks of alcohol     Types: 7 Cans of beer per week     Comment: socially    Drug use: Never    Sexual activity: Not Currently     Partners: Male   Other Topics Concern    Not on file   Social History Narrative    Not on file     Social Drivers of Health     Financial Resource Strain: Low Risk  (2023)     Overall Financial Resource Strain (CARDIA)     Difficulty of Paying Living Expenses: Not hard at all   Food Insecurity: No Food Insecurity (2025)    Nursing - Inadequate Food Risk Classification     Worried About Running Out of Food in the Last Year: Never true     Ran Out of Food in the Last Year: Never true     Ran Out of Food in the Last Year: Never true   Transportation Needs: No Transportation Needs (2025)    Nursing - Transportation Risk Classification     Lack of Transportation: Not on file     Lack of Transportation: No   Physical Activity: Not on file   Stress: Not on file   Social Connections: Not on file   Intimate Partner Violence: Unknown (2025)    Nursing IPS     Feels Physically and Emotionally Safe: Not on file     Physically Hurt by Someone: Not on file     Humiliated or Emotionally Abused by Someone: Not on file     Physically Hurt by Someone: No     Hurt or Threatened by Someone: No   Housing Stability: Unknown (2025)    Nursing: Inadequate Housing Risk Classification     Has Housing: Not on file     Worried About Losing Housing: Not on file     Unable to Get Utilities: Not on file     Unable to Pay for Housing in the Last Year: No     Has Housin       Subjective         Objective    Physical Exam:   Assessment Type: Assess only  General Appearance: Alert, Awake  Respiratory Pattern: Normal  Chest Assessment: Chest expansion symmetrical  Bilateral Breath Sounds: Diminished  Cough: None    Vitals:  Blood pressure 159/70, pulse 61, temperature 97.9 °F (36.6 °C), resp. rate 16, SpO2 96%.          Imaging and other studies: Results Review Statement: I reviewed radiology reports from this admission including: CT abdomen/pelvis.          Plan    Respiratory Plan: Home Bronchodilator Patient pathway        Resp Comments: pt assesed for RCP. pt admitted for Cystoscopy right ureteral stent placement for decompression. pt with hx of copd.as per pt she is wearing 3ln/c from last  week.pt takes prn albuterol at home.will follow up as per RCP at this time.

## 2025-02-24 NOTE — TREATMENT PLAN
Patient is presenting to the emergency department due to right lower quadrant abdominal pain that started this morning.     past medical history of hypertension, dyslipidemia, depression, and recent hospitalization to the hospital 2/12-2/23 due to acute respiratory failure with hypoxia secondary to COPD exacerbation, COVID, flu and pneumonia.     BUN 16  creatinine 1.27   CT shows: Densities measuring 10 x 3 mm at the distal right ureter/right ureterovesical junction likely representing multiple calculi. This is associated with obstruction of the right kidney causing moderate hydronephrosis, hydroureter and diminished enhancement of the kidney   Urine pending  Lactic acid 2.2  Wbc 10.80    Afebrile, vital signs stable. Discussed with Dr Coyle. Per PAC, pt cannot be a boomerang    Plan:  Npo  Transfer to [facility] for right stent today  Admit to medicine due to recent respiratory failure and covid

## 2025-02-24 NOTE — ED PROVIDER NOTES
Time reflects when diagnosis was documented in both MDM as applicable and the Disposition within this note       Time User Action Codes Description Comment    2/24/2025 11:04 AM ConnerBel jaramillo IFEOMA Add [N13.39] Other hydronephrosis     2/24/2025 11:34 AM Grkg, Sana Add [N13.1] Hydronephrosis due to obstruction of ureter     2/24/2025 11:34 AM Grygiel, Sana Add [N17.9] ANGELINE (acute kidney injury) (HCC)     2/24/2025 11:34 AM Grygiel, Sana Add [J90] Pleural effusion     2/24/2025 11:35 AM Grygiel, Sana Modify [N13.39] Other hydronephrosis     2/24/2025 11:35 AM Grygiel, Sana Modify [N13.1] Hydronephrosis due to obstruction of ureter           ED Disposition       ED Disposition   Transfer to Another Facility-In Network    Condition   --    Date/Time   Mon Feb 24, 2025 11:35 AM    Comment   Geovanna Leal should be transferred out to [facility].               Assessment & Plan       Medical Decision Making  Differential diagnosis includes, but is not limited to, renal colic, obstructive uropathy, diverticular disease, constipation, ileus, bowel obstruction, appendicitis, electrolyte disturbances, dysrhythmias, etc. Will obtain labs and imaging. Other additional interventions at this time include IVF and pain medication.    BUN 16, creatinine 1.27, Lactic acid 2.2, WBC 10.8.  Patient is afebrile. CT reveals densities measuring 10 x 3 mm at the distal right ureter/right uterovesical junction, likely representing multiple calculi. This is also associated with moderate hydronephrosis, hydroureter and diminished renal enhancement.  Other findings as detailed below.    The patient case was discussed with  urology, Dr. Coyle, who recommends patient be transferred to  for surgical intervention. Patient is agreeable to plan.    Amount and/or Complexity of Data Reviewed  Labs: ordered. Decision-making details documented in ED Course.  Radiology: ordered.    Risk  Prescription drug management.        ED Course as of 02/25/25 6291    Mon Feb 24, 2025   0950 LACTIC ACID(!): 2.2       Medications   sodium chloride 0.9 % bolus 500 mL (0 mL Intravenous Stopped 2/24/25 1016)   ketorolac (TORADOL) injection 15 mg (15 mg Intravenous Given 2/24/25 0917)   potassium chloride (Klor-Con M20) CR tablet 20 mEq (20 mEq Oral Given 2/24/25 1120)   iohexol (OMNIPAQUE) 350 MG/ML injection (MULTI-DOSE) 100 mL (100 mL Intravenous Given 2/24/25 1009)   HYDROmorphone (DILAUDID) injection 0.2 mg (0.2 mg Intravenous Given 2/24/25 1120)       ED Risk Strat Scores   HEART Risk Score      Flowsheet Row Most Recent Value   Heart Score Risk Calculator    History 0 Filed at: 02/24/2025 1130   ECG 0 Filed at: 02/24/2025 1130   Age 2 Filed at: 02/24/2025 1130   Risk Factors 1 Filed at: 02/24/2025 1130   Troponin 0 Filed at: 02/24/2025 1130   HEART Score 3 Filed at: 02/24/2025 1130          HEART Risk Score      Flowsheet Row Most Recent Value   Heart Score Risk Calculator    History 0 Filed at: 02/24/2025 1130   ECG 0 Filed at: 02/24/2025 1130   Age 2 Filed at: 02/24/2025 1130   Risk Factors 1 Filed at: 02/24/2025 1130   Troponin 0 Filed at: 02/24/2025 1130   HEART Score 3 Filed at: 02/24/2025 1130                              SBIRT 22yo+      Flowsheet Row Most Recent Value   Initial Alcohol Screen: US AUDIT-C     1. How often do you have a drink containing alcohol? 3 Filed at: 02/24/2025 0836   2. How many drinks containing alcohol do you have on a typical day you are drinking?  1 Filed at: 02/24/2025 0836   3a. Male UNDER 65: How often do you have five or more drinks on one occasion? 0 Filed at: 02/24/2025 0836   3b. FEMALE Any Age, or MALE 65+: How often do you have 4 or more drinks on one occassion? 0 Filed at: 02/24/2025 0836   Audit-C Score 4 Filed at: 02/24/2025 0836   BERNADINE: How many times in the past year have you...    Used an illegal drug or used a prescription medication for non-medical reasons? Never Filed at: 02/24/2025 0836                            History  of Present Illness       Chief Complaint   Patient presents with    Abdominal Pain     Pt c/o right sided upper abd pain. Pain woke her up this morning. Patient was vomiting yesterday but didn't start with the pain until this morning       Past Medical History:   Diagnosis Date    Arnold-Chiari malformation (HCC)     Breast lump     last assessed: Jan 22, 2013     COPD (chronic obstructive pulmonary disease) ([facility])     Dysphagia 08/29/2022    Facial twitching 07/21/2022    GERD (gastroesophageal reflux disease)     Gout     last assessed: Nov 26, 2012     Hair loss     last assessed: Dec 15, 2016     Hypotension     last assessed: Nov 10, 2014     Mitral valve disorder     Myocardial infarct, old     GER (obstructive sleep apnea)     Stroke ([facility])     SVT (supraventricular tachycardia) ([facility])       Past Surgical History:   Procedure Laterality Date    BREAST BIOPSY Right 02/28/2017    US CORE BIOPSY--BENIGN    CARDIAC CATHETERIZATION      CATARACT EXTRACTION Bilateral 11/09/2023    CERVICAL DISCECTOMY      Spinal     CERVICAL LAMINECTOMY      C1 with chiari decompression     COLONOSCOPY      5 yrs - onset: May 31, 2011     CRANIOTOMY      INTRACAPSULAR CATARACT EXTRACTION Right 11/08/2023    INTRACAPSULAR CATARACT EXTRACTION Left 11/15/2023    POPLITEAL SYNOVIAL CYST EXCISION      SD CYSTOURETHROSCOPY W/URETERAL CATHETERIZATION Right 2/24/2025    Procedure: CYSTOSCOPY URETEROSCOPY WITH INSERTION RIGHT 6X26 STENT URETERAL;  Surgeon: Aaron Coyle MD;  Location: [facility];  Service: Urology    TUBAL LIGATION      US GUIDED BREAST BIOPSY RIGHT COMPLETE Right 02/28/2017      Family History   Problem Relation Age of Onset    Stroke Mother     Other Father         blood clot in vein & CABG     Heart disease Father     Prostate cancer Father     Hearing loss Father     Hypertension Father     No Known Problems Sister     No Known Problems Sister     No Known Problems Daughter     No Known Problems Daughter     Breast cancer  Maternal Grandmother     No Known Problems Maternal Grandfather     No Known Problems Paternal Grandmother     No Known Problems Paternal Grandfather     Alcohol abuse Brother     Throat cancer Brother     Cancer Brother         Throat tongue    Hearing loss Brother     Kidney failure Brother         kidney failure d/t NSIADs on dialysis    Hearing loss Brother     Crohn's disease Maternal Aunt     No Known Problems Maternal Aunt     No Known Problems Maternal Aunt     Colon cancer Paternal Aunt     No Known Problems Paternal Aunt     No Known Problems Paternal Aunt     No Known Problems Paternal Aunt     Colon cancer Paternal Aunt     Cancer Paternal Aunt         Colon cancer      Social History     Tobacco Use    Smoking status: Former     Current packs/day: 0.00     Average packs/day: 1.5 packs/day for 39.0 years (58.5 ttl pk-yrs)     Types: Cigarettes     Start date:      Quit date: 2008     Years since quittin.1    Smokeless tobacco: Never    Tobacco comments:     Patient quit   Vaping Use    Vaping status: Never Used   Substance Use Topics    Alcohol use: Yes     Alcohol/week: 7.0 standard drinks of alcohol     Types: 7 Cans of beer per week     Comment: socially    Drug use: Never      E-Cigarette/Vaping    E-Cigarette Use Never User       E-Cigarette/Vaping Substances    Nicotine No     THC No     CBD No     Flavoring No     Other No     Unknown No       I have reviewed and agree with the history as documented.     The patient is a 69-year-old female with past medical history of hypertension, dyslipidemia, depression, and recent hospitalization to the hospital - due to acute respiratory failure with hypoxia secondary to COPD exacerbation, COVID, flu and pneumonia. Patient is presenting to the emergency department due to right lower quadrant abdominal pain that started abruptly this morning. The patient reports she had several episodes of emesis last night. Denies hematemesis. She also  reports several, small bowel movements today.  Denies melena, hematochezia, fever, chills, chest pain, shortness of breath, dysuria, frequency, urgency, hematuria.      Abdominal Pain  Associated symptoms: nausea and vomiting    Associated symptoms: no chest pain, no chills, no cough, no dysuria, no fever, no hematuria, no shortness of breath and no sore throat        Review of Systems   Constitutional:  Negative for chills and fever.   HENT:  Negative for ear pain and sore throat.    Eyes:  Negative for pain and visual disturbance.   Respiratory:  Negative for cough and shortness of breath.    Cardiovascular:  Negative for chest pain and palpitations.   Gastrointestinal:  Positive for abdominal pain, nausea and vomiting.   Genitourinary:  Negative for difficulty urinating, dysuria, frequency, hematuria and urgency.   Musculoskeletal:  Negative for arthralgias, back pain and myalgias.   Skin:  Negative for color change and rash.   Neurological:  Negative for seizures and syncope.   All other systems reviewed and are negative.          Objective       ED Triage Vitals [02/24/25 0837]   Temperature Pulse Blood Pressure Respirations SpO2 Patient Position - Orthostatic VS   97.8 °F (36.6 °C) (!) 52 161/70 22 97 % Sitting      Temp Source Heart Rate Source BP Location FiO2 (%) Pain Score    Temporal Monitor Right arm -- 9      Vitals      Date and Time Temp Pulse SpO2 Resp BP Pain Score FACES Pain Rating User   02/24/25 1120 -- -- -- -- -- 7 -- EW 02/24/25 1100 -- 60 91 % 21 158/70 -- -- EW 02/24/25 1057 -- -- -- -- -- 7 -- EW 02/24/25 1045 -- 57 -- 17 -- -- -- EW 02/24/25 1030 -- 60 -- 16 149/67 -- -- EW 02/24/25 1015 -- 69 -- 28 155/69 -- -- EW 02/24/25 1000 -- 57 -- 13 140/65 -- -- EW 02/24/25 0945 -- 60 -- 18 -- -- -- EW 02/24/25 0930 -- -- -- 22 181/79 -- -- EW 02/24/25 0925 -- -- -- -- -- No Pain -- EW 02/24/25 0917 -- -- -- -- -- 9 -- EW 02/24/25 0837 97.8 °F (36.6 °C) 52 97 % patient states  shes been using 3L o2 at home since being diagnosed with covid/pneumonia last week 22 161/70 9 -- AM            Physical Exam  Vitals and nursing note reviewed.   Constitutional:       General: She is not in acute distress.     Appearance: She is well-developed. She is not ill-appearing.   HENT:      Head: Normocephalic and atraumatic.   Eyes:      Conjunctiva/sclera: Conjunctivae normal.   Cardiovascular:      Rate and Rhythm: Normal rate and regular rhythm.      Heart sounds: No murmur heard.  Pulmonary:      Effort: Pulmonary effort is normal. No respiratory distress.      Breath sounds: Normal breath sounds.   Abdominal:      General: Abdomen is flat.      Palpations: Abdomen is soft.      Tenderness: There is abdominal tenderness in the right lower quadrant. There is no right CVA tenderness, left CVA tenderness, guarding or rebound. Negative signs include Nixon's sign and Rovsing's sign.   Musculoskeletal:         General: No swelling.      Cervical back: Neck supple.   Skin:     General: Skin is warm and dry.      Capillary Refill: Capillary refill takes less than 2 seconds.   Neurological:      Mental Status: She is alert.   Psychiatric:         Mood and Affect: Mood normal.         Results Reviewed       Procedure Component Value Units Date/Time    HS Troponin I 2hr [[id number]]  (Normal) Collected: 02/24/25 1127    Lab Status: Final result Specimen: Blood from Arm, Left Updated: 02/24/25 1158     hs TnI 2hr 6 ng/L      Delta 2hr hsTnI 0 ng/L     Lactic acid 2 Hours [[id number]]  (Normal) Collected: 02/24/25 1127    Lab Status: Final result Specimen: Blood from Arm, Left Updated: 02/24/25 1152     LACTIC ACID 1.5 mmol/L     Narrative:      Result may be elevated if tourniquet was used during collection.    Urine Microscopic [[id number]]  (Normal) Collected: 02/24/25 1127    Lab Status: Final result Specimen: Urine, Clean Catch Updated: 02/24/25 1149     RBC, UA 1-2 /hpf      WBC, UA None Seen /hpf       Epithelial Cells Occasional /hpf      Bacteria, UA None Seen /hpf     UA w Reflex to Microscopic w Reflex to Culture [[id number]]  (Abnormal) Collected: 02/24/25 1127    Lab Status: Final result Specimen: Urine, Clean Catch Updated: 02/24/25 1144     Color, UA Yellow     Clarity, UA Clear     Specific Gravity, UA <1.005     pH, UA 7.5     Leukocytes, UA Negative     Nitrite, UA Negative     Protein, UA Trace mg/dl      Glucose, UA Negative mg/dl      Ketones, UA Negative mg/dl      Urobilinogen, UA <2.0 mg/dl      Bilirubin, UA Negative     Occult Blood, UA Trace    HS Troponin 0hr (reflex protocol) [[id number]]  (Normal) Collected: 02/24/25 0918    Lab Status: Final result Specimen: Blood from Arm, Left Updated: 02/24/25 0953     hs TnI 0hr 6 ng/L     Lactic acid, plasma (w/reflex if result > 2.0) [[id number]]  (Abnormal) Collected: 02/24/25 0918    Lab Status: Final result Specimen: Blood from Arm, Left Updated: 02/24/25 0950     LACTIC ACID 2.2 mmol/L     Narrative:      Result may be elevated if tourniquet was used during collection.    Comprehensive metabolic panel [[id number]]  (Abnormal) Collected: 02/24/25 0918    Lab Status: Final result Specimen: Blood from Arm, Left Updated: 02/24/25 0949     Sodium 136 mmol/L      Potassium 3.4 mmol/L      Chloride 98 mmol/L      CO2 30 mmol/L      ANION GAP 8 mmol/L      BUN 16 mg/dL      Creatinine 1.27 mg/dL      Glucose 118 mg/dL      Calcium 9.0 mg/dL      Corrected Calcium 9.6 mg/dL      AST 15 U/L      ALT 21 U/L      Alkaline Phosphatase 85 U/L      Total Protein 6.4 g/dL      Albumin 3.2 g/dL      Total Bilirubin 1.26 mg/dL      eGFR 42 ml/min/1.73sq m     Narrative:      National Kid[employer] Foundation guidelines for Chronic Kidney Disease (CKD):     Stage 1 with normal or high GFR (GFR > 90 mL/min/1.73 square meters)    Stage 2 Mild CKD (GFR = 60-89 mL/min/1.73 square meters)    Stage 3A Moderate CKD (GFR = 45-59 mL/min/1.73 square meters)    Stage 3B Moderate  CKD (GFR = 30-44 mL/min/1.73 square meters)    Stage 4 Severe CKD (GFR = 15-29 mL/min/1.73 square meters)    Stage 5 End Stage CKD (GFR <15 mL/min/1.73 square meters)  Note: GFR calculation is accurate only with a steady state creatinine    Lipase [[id number]]  (Normal) Collected: 02/24/25 0918    Lab Status: Final result Specimen: Blood from Arm, Left Updated: 02/24/25 0949     Lipase 55 u/L     CBC and differential [[id number]]  (Abnormal) Collected: 02/24/25 0918    Lab Status: Final result Specimen: Blood from Arm, Left Updated: 02/24/25 0940     WBC 10.80 Thousand/uL      RBC 4.02 Million/uL      Hemoglobin 13.2 g/dL      Hematocrit 39.9 %      MCV 99 fL      MCH 32.8 pg      MCHC 33.1 g/dL      RDW 15.8 %      MPV 9.2 fL      Platelets 513 Thousands/uL      nRBC 0 /100 WBCs      Segmented % 84 %      Immature Grans % 1 %      Lymphocytes % 7 %      Monocytes % 8 %      Eosinophils Relative 0 %      Basophils Relative 0 %      Absolute Neutrophils 9.06 Thousands/µL      Absolute Immature Grans 0.11 Thousand/uL      Absolute Lymphocytes 0.71 Thousands/µL      Absolute Monocytes 0.86 Thousand/µL      Eosinophils Absolute 0.04 Thousand/µL      Basophils Absolute 0.02 Thousands/µL             XR abdomen 1 view kub   Final Interpretation by Kosta Barton DO (02/25 0601)      Fluoroscopic guidance provided for right retrograde pyelogram.      Please see separate procedure report for additional details.         Workstation performed: [id number]         CT abdomen pelvis with contrast   Final Interpretation by Flavio Paige MD (02/24 1033)      Densities measuring 10 x 3 mm at the distal right ureter/right ureterovesical junction likely representing multiple calculi. This is associated with obstruction of the right kidney causing moderate hydronephrosis, hydroureter and diminished enhancement    of the kidney.      Incidental small right pleural effusion.      The study was marked in EPIC for immediate  notification.      Workstation performed: [id number]             ECG 12 Lead Documentation Only    Date/Time: 2/24/2025 9:17 AM    Performed by: Sana Montes PA-C  Authorized by: Sana Montes PA-C    Indications / Diagnosis:  Abd pain  ECG reviewed by me, the ED Provider: yes (shaylee)    Patient location:  ED  Rate:     ECG rate:  57    ECG rate assessment: bradycardic    Rhythm:     Rhythm: sinus bradycardia        ED Medication and Procedure Management   Prior to Admission Medications   Prescriptions Last Dose Informant Patient Reported? Taking?   albuterol (2.5 mg/3 mL) 0.083 % nebulizer solution  Self No No   Sig: Take 3 mL (2.5 mg total) by nebulization every 6 (six) hours as needed for wheezing or shortness of breath   albuterol (ProAir HFA) 90 mcg/act inhaler  Self No No   Sig: Inhale 2 puffs every 4 (four) hours as needed for wheezing or shortness of breath   amLODIPine (NORVASC) 5 mg tablet   No No   Sig: Take 1 tablet (5 mg total) by mouth daily   atorvastatin (LIPITOR) 80 mg tablet   No No   Sig: Take 1 tablet by mouth once daily   busPIRone (BUSPAR) 10 mg tablet   No No   Sig: Take 1 tablet (10 mg total) by mouth 2 (two) times a day   calcium carbonate (OS-FLASH) 600 MG tablet  Self Yes No   Sig: Take 600 mg by mouth daily   cholecalciferol (VITAMIN D3) 1,000 units tablet  Self Yes No   Sig: Take 1,000 Units by mouth daily   clopidogrel (PLAVIX) 75 mg tablet   No No   Sig: Take 1 tablet by mouth once daily   escitalopram (LEXAPRO) 20 mg tablet  Self No No   Sig: Take 1 tablet (20 mg total) by mouth daily   fluticasone-umeclidinium-vilanterol (Trelegy Ellipta) 100-62.5-25 mcg/actuation inhaler  Self No No   Sig: Inhale 1 puff daily Rinse mouth after use   gabapentin (NEURONTIN) 400 mg capsule  Self No No   Sig: TAKE 2 CAPSULES BY MOUTH TWICE DAILY (IN THE MORNING AND AT BEDTIME)   guaiFENesin (MUCINEX) 600 mg 12 hr tablet   No No   Sig: Take 1 tablet (600 mg total) by mouth 2 (two) times a day    pantoprazole (PROTONIX) 40 mg tablet   No No   Sig: TAKE 1 TABLET BY MOUTH ONCE DAILY BEFORE BREAKFAST   vitamin B-12 (VITAMIN B-12) 1,000 mcg tablet  Self No No   Sig: Take 1 tablet (1,000 mcg total) by mouth daily      Facility-Administered Medications: None     Discharge Medication List as of 2/24/2025 12:48 PM        CONTINUE these medications which have NOT CHANGED    Details   albuterol (2.5 mg/3 mL) 0.083 % nebulizer solution Take 3 mL (2.5 mg total) by nebulization every 6 (six) hours as needed for wheezing or shortness of breath, Starting Thu 10/17/2024, Normal      albuterol (ProAir HFA) 90 mcg/act inhaler Inhale 2 puffs every 4 (four) hours as needed for wheezing or shortness of breath, Starting Thu 10/10/2024, Normal      amLODIPine (NORVASC) 5 mg tablet Take 1 tablet (5 mg total) by mouth daily, Starting Tue 2/18/2025, Normal      atorvastatin (LIPITOR) 80 mg tablet Take 1 tablet by mouth once daily, Starting Thu 12/12/2024, Normal      busPIRone (BUSPAR) 10 mg tablet Take 1 tablet (10 mg total) by mouth 2 (two) times a day, Starting Thu 12/12/2024, Normal      calcium carbonate (OS-FLASH) 600 MG tablet Take 600 mg by mouth daily, Historical Med      cholecalciferol (VITAMIN D3) 1,000 units tablet Take 1,000 Units by mouth daily, Historical Med      clopidogrel (PLAVIX) 75 mg tablet Take 1 tablet by mouth once daily, Starting Tue 12/24/2024, Normal      escitalopram (LEXAPRO) 20 mg tablet Take 1 tablet (20 mg total) by mouth daily, Starting Mon 5/13/2024, No Print      fluticasone-umeclidinium-vilanterol (Trelegy Ellipta) 100-62.5-25 mcg/actuation inhaler Inhale 1 puff daily Rinse mouth after use, Starting Mon 6/24/2024, Normal      gabapentin (NEURONTIN) 400 mg capsule TAKE 2 CAPSULES BY MOUTH TWICE DAILY (IN THE MORNING AND AT BEDTIME), Normal      guaiFENesin (MUCINEX) 600 mg 12 hr tablet Take 1 tablet (600 mg total) by mouth 2 (two) times a day, Starting Mon 2/17/2025, Normal      pantoprazole  (PROTONIX) 40 mg tablet TAKE 1 TABLET BY MOUTH ONCE DAILY BEFORE BREAKFAST, Normal      vitamin B-12 (VITAMIN B-12) 1,000 mcg tablet Take 1 tablet (1,000 mcg total) by mouth daily, Starting Tue 6/4/2024, No Print      carvedilol (COREG) 12.5 mg tablet Take 1 tablet by mouth twice daily, Starting Tue 11/12/2024, Normal      isosorbide mononitrate (IMDUR) 30 mg 24 hr tablet Take 1 tablet by mouth once daily, Normal           STOP taking these medications       predniSONE 20 mg tablet Comments:   Reason for Stopping:             No discharge procedures on file.  ED SEPSIS DOCUMENTATION   Time reflects when diagnosis was documented in both MDM as applicable and the Disposition within this note       Time User Action Codes Description Comment    2/24/2025 11:04 AM Bel Dolan Add [N13.39] Other hydronephrosis     2/24/2025 11:34 AM Sana Montes Add [N13.1] Hydronephrosis due to obstruction of ureter     2/24/2025 11:34 AM Sana Montes Add [N17.9] ANGELINE (acute kidney injury) (HCC)     2/24/2025 11:34 AM Sana Montes Add [J90] Pleural effusion     2/24/2025 11:35 AM Sana Montes Modify [N13.39] Other hydronephrosis     2/24/2025 11:35 AM Sana Montes Modify [N13.1] Hydronephrosis due to obstruction of ureter                  Sana Montes PA-C  02/25/25 3422

## 2025-02-24 NOTE — EMTALA/ACUTE CARE TRANSFER
[facility] EMERGENCY DEPARTMENT  [address] [facility]DONATO MAT[facility]WSOhio[facility] [address]  Dept: [phone]      EMTALA TRANSFER CONSENT    NAME Geovanna Leal                                         1955                              MRN [id number]    I have been informed of my rights regarding examination, treatment, and transfer   by Dr. Rah Perez DO    Benefits: Specialized equipment and/or services available at the receiving facility (Include comment)________________________, Continuity of care (Urology)    Risks: Potential for delay in receiving treatment, Potential deterioration of medical condition, Loss of IV, Increased discomfort during transfer, Possible worsening of condition or death during transfer      Consent for Transfer:  I acknowledge that my medical condition has been evaluated and explained to me by the emergency department physician or other qualified medical person and/or my attending physician, who has recommended that I be transferred to the service of  Accepting Physician: Dr. Melgar at Accepting Facility Name, City & State : [facility]. The above potential benefits of such transfer, the potential risks associated with such transfer, and the probable risks of not being transferred have been explained to me, and I fully understand them.  The doctor has explained that, in my case, the benefits of transfer outweigh the risks.  I agree to be transferred.    I authorize the performance of emergency medical procedures and treatments upon me in both transit and upon arrival at the receiving facility.  Additionally, I authorize the release of any and all medical records to the receiving facility and request they be transported with me, if possible.  I understand that the safest mode of transportation during a medical emergency is an ambulance and that the Hospital advocates the use of this mode of transport. Risks of traveling to the receiving facility by car,  including absence of medical control, life sustaining equipment, such as oxygen, and medical personnel has been explained to me and I fully understand them.    (JESSE CORRECT BOX BELOW)  [  ]  I consent to the stated transfer and to be transported by ambulance/helicopter.  [  ]  I consent to the stated transfer, but refuse transportation by ambulance and accept full responsibility for my transportation by car.  I understand the risks of non-ambulance transfers and I exonerate the Hospital and its staff from any deterioration in my condition that results from this refusal.    X___________________________________________    DATE  25  TIME________  Signature of patient or legally responsible individual signing on patient behalf           RELATIONSHIP TO PATIENT_________________________          Provider Certification    NAME Geovanna Leal                                         1955                              MRN [id number]    A medical screening exam was performed on the above named patient.  Based on the examination:    Condition Necessitating Transfer The primary encounter diagnosis was Hydronephrosis due to obstruction of ureter. Diagnoses of Other hydronephrosis, ANGELINE (acute kidney injury) (HCC), and Pleural effusion were also pertinent to this visit.    Patient Condition: The patient has been stabilized such that within reasonable medical probability, no material deterioration of the patient condition or the condition of the unborn child(ina) is likely to result from the transfer    Reason for Transfer: Level of Care needed not available at this facility (Urology)    Transfer Requirements: Facility [facility]   Space available and qualified personnel available for treatment as acknowledged by    Agreed to accept transfer and to provide appropriate medical treatment as acknowledged by       Dr. Melgar  Appropriate medical records of the examination and treatment of the patient are provided  at the time of transfer   STAFF INITIAL WHEN COMPLETED _______  Transfer will be performed by qualified personnel from    and appropriate transfer equipment as required, including the use of necessary and appropriate life support measures.    Provider Certification: I have examined the patient and explained the following risks and benefits of being transferred/refusing transfer to the patient/family:  General risk, such as traffic hazards, adverse weather conditions, rough terrain or turbulence, possible failure of equipment (including vehicle or aircraft), or consequences of actions of persons outside the control of the transport personnel, Unanticipated needs of medical equipment and personnel during transport, Risk of worsening condition      Based on these reasonable risks and benefits to the patient and/or the unborn child(ina), and based upon the information available at the time of the patient’s examination, I certify that the medical benefits reasonably to be expected from the provision of appropriate medical treatments at another medical facility outweigh the increasing risks, if any, to the individual’s medical condition, and in the case of labor to the unborn child, from effecting the transfer.    X____________________________________________ DATE 02/24/25        TIME_______      ORIGINAL - SEND TO MEDICAL RECORDS   COPY - SEND WITH PATIENT DURING TRANSFER

## 2025-02-24 NOTE — QUICK NOTE
Patient underwent right ureteral stent placement for leukocytosis, lactic acidosis, ANGELINE and has distal right ureteral calculi.  She will need future definitive right ureteroscopy laser lithotripsy and right ureteral stent placement in the office has been tasked to set this up for the next couple weeks  notified.  Patient is being admitted outpatient no charge bed on ceftriaxone, can be discharged tomorrow if labs normalize and afebrile.

## 2025-02-24 NOTE — ASSESSMENT & PLAN NOTE
CT abdomen pelvis with densities measuring 10 x 3 mm at the distal right ureter/right ureterovesical junction likely representing multiple calculi. This is associated with obstruction of the right kidney causing moderate hydronephrosis, hydroureter and diminished enhancement   of the kidney.  S/p

## 2025-02-24 NOTE — OP NOTE
OPERATIVE REPORT  PATIENT NAME: Geovanna Leal    :  1955  MRN: [id number]  Pt Location: BE CYSTO ROOM 01    SURGERY DATE: 2025    Surgeons and Role:     * Aaron Coyle MD - Primary    Preop Diagnosis:  Other hydronephrosis [N13.39]  Distal right ureteral calculi      Post-Op Diagnosis Codes:     * Other hydronephrosis [N13.39]  Distal right ureteral calculi      Procedure(s):  Right - CYSTOSCOPY URETEROSCOPY WITH INSERTION RIGHT 6X26 STENT URETERAL with no string    Specimen(s):  * No specimens in log *    Estimated Blood Loss:   Minimal    Drains:  Ureteral Internal Stent Right ureter 6 Fr. (Active)   Number of days: 0       Anesthesia Type:   General    Operative Indications:  Other hydronephrosis [N13.39]  Distal right ureteral calculi    Operative Findings:  Strong nephro ureterogram present from retained dye.  Stent placed using that for guidance, contrast began to flow copiously through it after placement.  Grade 3 cystocele and rectocele.  She will need future cystoscopy right ureteroscopy laser lithotripsy and right ureteral stent exchange.      Complications:   None    Procedure and Technique:  69-year-old woman transferred from [facility] for management of distal right ureteral calculi.  Afebrile but had flank pain, nausea and vomiting and lactic acidosis with ANGELINE and leukocytosis.  She was therefore set up for cystoscopy right ureteral stent placement.  She understands that this is a staged procedure and she will need future outpatient cystoscopy ureteroscopy and laser lithotripsy of the right side.  The procedure itself was explained and she gives informed consent.    The patient was brought to the operating room and identified properly.  LMA was induced the patient was prepped and draped in the dorsolithotomy position in the usual fashion.  A timeout was performed.  Cystoscopy was carried out with a 22 Frisian cystoscopy sheath and 30 degree lens.  I did notice that the patient  has a grade 3 rectocele and cystocele.  The urethra itself was normal without stricture.  The bladder was 1-2+ trabeculated.  Orifices were sunken due to the cystocele but otherwise orthotopic and intact.  No stones tumors or other lesions seen in the bladder.  Fluoroscopy revealed a dense nephro ureterogram from retained tress due to distal calculi but I could not see any calculi.  I placed a wire without difficulty up to the renal pelvis by the nephrogram, and then deployed a 6 Persian by 26 cm stent and coils were established in the renal pelvis and the bladder.  Contrast began to drain very heavily.  No purulence seen.  The bladder was drained with a cystoscopy sheath.  No string left attached.   I was present for the entire procedure. and A qualified resident physician was not available.    Patient Disposition:  PACU  and extubated and stable             SIGNATURE: Aaron Coyle MD  DATE: February 24, 2025  TIME: 3:02 PM

## 2025-02-24 NOTE — ANESTHESIA PREPROCEDURE EVALUATION
Procedure:  CYSTOSCOPY RETROGRADE PYELOGRAM WITH INSERTION STENT URETERAL (Right: Bladder)    Relevant Problems   ANESTHESIA   (-) History of anesthesia complications      CARDIO   (+) Carotid stenosis, bilateral   (+) Essential hypertension   (+) Intracranial atherosclerosis   (-) Chest pain   (-) WILBURN (dyspnea on exertion)      GI/HEPATIC   (+) GERD (gastroesophageal reflux disease) (Well controlled)   (+) Pancreatic cyst      /RENAL   (+) Stage 3a chronic kidney disease (HCC)      MUSCULOSKELETAL   (+) Lower back pain      NEURO/PSYCH   (+) Benign tumor of spinal meningioma (HCC)   (+) Mild episode of recurrent major depressive disorder (HCC)      PULMONARY   (+) Chronic obstructive pulmonary disease with acute exacerbation (HCC)   (+) Other emphysema (HCC)   (+) Pneumonia due to infectious organism      Neurology/Sleep   (+) Arnold-Chiari malformation (HCC)      Flu and covid positive 2/12/2025- 2-32L O2 at home    TTE 2023    Left Ventricle: Left ventricular cavity size is normal. Wall thickness is normal. The left ventricular ejection fraction is 65%. Systolic function is normal. Global longitudinal strain is normal at -25%. Wall motion is normal. Diastolic function is mildly abnormal, consistent with grade I (abnormal) relaxation.    Mitral Valve: There is mild annular calcification. There is trace regurgitation.    Tricuspid Valve: There is mild regurgitation.    Pulmonary Artery: The pulmonary artery systolic pressure is normal.    MRI brain 7/2024  Stable hemosiderin deposition at the right posterior parietal cortex, sequela of prior subarachnoid hemorrhage.     Status post inferior occipital craniectomy for Chiari I malformation.  Physical Exam    Airway    Mallampati score: II  TM Distance: >3 FB  Neck ROM: full     Dental       Cardiovascular      Pulmonary      Other Findings  post-pubertal.      Anesthesia Plan  ASA Score- 3     Anesthesia Type- general with ASA Monitors.         Additional Monitors:      Airway Plan: LMA.           Plan Factors-Exercise tolerance (METS): >4 METS.    Chart reviewed. EKG reviewed.  Existing labs reviewed. Patient summary reviewed.                  Induction- intravenous.    Postoperative Plan-         Informed Consent- Anesthetic plan and risks discussed with patient.  I personally reviewed this patient with the CRNA. Discussed and agreed on the Anesthesia Plan with the CRNA..      NPO Status:  Vitals Value Taken Time   Date of last liquid 02/24/25 02/24/25 1330   Time of last liquid 0000 02/24/25 1330   Date of last solid 02/24/25 02/24/25 1330   Time of last solid 0000 02/24/25 1330

## 2025-02-25 ENCOUNTER — TELEPHONE (OUTPATIENT)
Age: 70
End: 2025-02-25

## 2025-02-25 VITALS
SYSTOLIC BLOOD PRESSURE: 159 MMHG | TEMPERATURE: 97.6 F | DIASTOLIC BLOOD PRESSURE: 73 MMHG | RESPIRATION RATE: 18 BRPM | HEART RATE: 72 BPM | OXYGEN SATURATION: 96 %

## 2025-02-25 DIAGNOSIS — Z96.0 URETERAL STENT PRESENT: Primary | ICD-10-CM

## 2025-02-25 PROBLEM — D72.829 LEUKOCYTOSIS: Status: ACTIVE | Noted: 2025-02-25

## 2025-02-25 PROBLEM — E87.20 LACTIC ACIDOSIS: Status: ACTIVE | Noted: 2025-02-25

## 2025-02-25 PROBLEM — D64.9 ANEMIA: Status: ACTIVE | Noted: 2025-02-25

## 2025-02-25 LAB
ALBUMIN SERPL BCG-MCNC: 2.7 G/DL (ref 3.5–5)
ALP SERPL-CCNC: 76 U/L (ref 34–104)
ALT SERPL W P-5'-P-CCNC: 15 U/L (ref 7–52)
ANION GAP SERPL CALCULATED.3IONS-SCNC: 8 MMOL/L (ref 4–13)
AST SERPL W P-5'-P-CCNC: 15 U/L (ref 13–39)
BILIRUB SERPL-MCNC: 0.5 MG/DL (ref 0.2–1)
BUN SERPL-MCNC: 18 MG/DL (ref 5–25)
CALCIUM ALBUM COR SERPL-MCNC: 9.1 MG/DL (ref 8.3–10.1)
CALCIUM SERPL-MCNC: 8.1 MG/DL (ref 8.4–10.2)
CHLORIDE SERPL-SCNC: 102 MMOL/L (ref 96–108)
CO2 SERPL-SCNC: 26 MMOL/L (ref 21–32)
CREAT SERPL-MCNC: 1.16 MG/DL (ref 0.6–1.3)
ERYTHROCYTE [DISTWIDTH] IN BLOOD BY AUTOMATED COUNT: 15.7 % (ref 11.6–15.1)
GFR SERPL CREATININE-BSD FRML MDRD: 47 ML/MIN/1.73SQ M
GLUCOSE SERPL-MCNC: 183 MG/DL (ref 65–140)
HCT VFR BLD AUTO: 32.9 % (ref 34.8–46.1)
HCT VFR BLD AUTO: 33.7 % (ref 34.8–46.1)
HGB BLD-MCNC: 10.9 G/DL (ref 11.5–15.4)
HGB BLD-MCNC: 11.2 G/DL (ref 11.5–15.4)
LACTATE SERPL-SCNC: 1.5 MMOL/L (ref 0.5–2)
MCH RBC QN AUTO: 32.3 PG (ref 26.8–34.3)
MCHC RBC AUTO-ENTMCNC: 33.1 G/DL (ref 31.4–37.4)
MCV RBC AUTO: 98 FL (ref 82–98)
PLATELET # BLD AUTO: 400 THOUSANDS/UL (ref 149–390)
PLATELET # BLD AUTO: 419 THOUSANDS/UL (ref 149–390)
PMV BLD AUTO: 8.9 FL (ref 8.9–12.7)
PMV BLD AUTO: 9.3 FL (ref 8.9–12.7)
POTASSIUM SERPL-SCNC: 4.2 MMOL/L (ref 3.5–5.3)
PROT SERPL-MCNC: 5.7 G/DL (ref 6.4–8.4)
RBC # BLD AUTO: 3.37 MILLION/UL (ref 3.81–5.12)
SODIUM SERPL-SCNC: 136 MMOL/L (ref 135–147)
WBC # BLD AUTO: 6.85 THOUSAND/UL (ref 4.31–10.16)

## 2025-02-25 PROCEDURE — 80053 COMPREHEN METABOLIC PANEL: CPT | Performed by: UROLOGY

## 2025-02-25 PROCEDURE — 83605 ASSAY OF LACTIC ACID: CPT | Performed by: UROLOGY

## 2025-02-25 PROCEDURE — NC001 PR NO CHARGE: Performed by: NURSE PRACTITIONER

## 2025-02-25 PROCEDURE — 94760 N-INVAS EAR/PLS OXIMETRY 1: CPT

## 2025-02-25 PROCEDURE — 85014 HEMATOCRIT: CPT | Performed by: NURSE PRACTITIONER

## 2025-02-25 PROCEDURE — 85027 COMPLETE CBC AUTOMATED: CPT | Performed by: UROLOGY

## 2025-02-25 PROCEDURE — 85049 AUTOMATED PLATELET COUNT: CPT | Performed by: UROLOGY

## 2025-02-25 PROCEDURE — 94664 DEMO&/EVAL PT USE INHALER: CPT

## 2025-02-25 PROCEDURE — 85018 HEMOGLOBIN: CPT | Performed by: NURSE PRACTITIONER

## 2025-02-25 PROCEDURE — 99238 HOSP IP/OBS DSCHRG MGMT 30/<: CPT | Performed by: NURSE PRACTITIONER

## 2025-02-25 RX ORDER — ISOSORBIDE MONONITRATE 30 MG/1
30 TABLET, EXTENDED RELEASE ORAL DAILY
Qty: 90 TABLET | Refills: 1 | Status: SHIPPED | OUTPATIENT
Start: 2025-02-25

## 2025-02-25 RX ORDER — POLYETHYLENE GLYCOL 3350 17 G/17G
17 POWDER, FOR SOLUTION ORAL DAILY
Status: DISCONTINUED | OUTPATIENT
Start: 2025-02-25 | End: 2025-02-25 | Stop reason: HOSPADM

## 2025-02-25 RX ORDER — POLYETHYLENE GLYCOL 3350 17 G/17G
17 POWDER, FOR SOLUTION ORAL DAILY
Qty: 20 EACH | Refills: 0 | Status: SHIPPED | OUTPATIENT
Start: 2025-02-25

## 2025-02-25 RX ORDER — TAMSULOSIN HYDROCHLORIDE 0.4 MG/1
0.4 CAPSULE ORAL
Qty: 30 CAPSULE | Refills: 1 | Status: SHIPPED | OUTPATIENT
Start: 2025-02-25

## 2025-02-25 RX ORDER — OXYBUTYNIN CHLORIDE 5 MG/1
5 TABLET ORAL 3 TIMES DAILY PRN
Qty: 30 TABLET | Refills: 1 | Status: SHIPPED | OUTPATIENT
Start: 2025-02-25

## 2025-02-25 RX ORDER — PREDNISONE 20 MG/1
40 TABLET ORAL DAILY
Qty: 10 TABLET | Refills: 0 | Status: SHIPPED | OUTPATIENT
Start: 2025-02-25 | End: 2025-03-02

## 2025-02-25 RX ORDER — CARVEDILOL 12.5 MG/1
12.5 TABLET ORAL 2 TIMES DAILY
Qty: 180 TABLET | Refills: 1 | Status: SHIPPED | OUTPATIENT
Start: 2025-02-25

## 2025-02-25 RX ADMIN — FLUTICASONE FUROATE AND VILANTEROL TRIFENATATE 1 PUFF: 100; 25 POWDER RESPIRATORY (INHALATION) at 10:37

## 2025-02-25 RX ADMIN — ATORVASTATIN CALCIUM 80 MG: 80 TABLET, FILM COATED ORAL at 10:36

## 2025-02-25 RX ADMIN — ESCITALOPRAM OXALATE 20 MG: 20 TABLET ORAL at 10:38

## 2025-02-25 RX ADMIN — GABAPENTIN 400 MG: 400 CAPSULE ORAL at 10:36

## 2025-02-25 RX ADMIN — ENOXAPARIN SODIUM 30 MG: 30 INJECTION SUBCUTANEOUS at 10:36

## 2025-02-25 RX ADMIN — PREDNISONE 40 MG: 20 TABLET ORAL at 10:36

## 2025-02-25 RX ADMIN — SODIUM CHLORIDE 125 ML/HR: 0.45 INJECTION, SOLUTION INTRAVENOUS at 01:31

## 2025-02-25 RX ADMIN — AMLODIPINE BESYLATE 5 MG: 5 TABLET ORAL at 10:36

## 2025-02-25 RX ADMIN — GUAIFENESIN 600 MG: 600 TABLET, EXTENDED RELEASE ORAL at 10:36

## 2025-02-25 RX ADMIN — CARVEDILOL 12.5 MG: 12.5 TABLET, FILM COATED ORAL at 10:36

## 2025-02-25 RX ADMIN — UMECLIDINIUM 1 PUFF: 62.5 AEROSOL, POWDER ORAL at 10:37

## 2025-02-25 RX ADMIN — ISOSORBIDE MONONITRATE 30 MG: 30 TABLET, EXTENDED RELEASE ORAL at 10:36

## 2025-02-25 RX ADMIN — CLOPIDOGREL BISULFATE 75 MG: 75 TABLET, FILM COATED ORAL at 10:36

## 2025-02-25 RX ADMIN — PANTOPRAZOLE SODIUM 40 MG: 40 TABLET, DELAYED RELEASE ORAL at 05:49

## 2025-02-25 RX ADMIN — BUSPIRONE HYDROCHLORIDE 10 MG: 10 TABLET ORAL at 10:38

## 2025-02-25 RX ADMIN — POLYETHYLENE GLYCOL 3350 17 G: 17 POWDER, FOR SOLUTION ORAL at 10:52

## 2025-02-25 NOTE — TELEPHONE ENCOUNTER
Patient called in stating the discharge packet states to take the Flomax PRN but the medication was not sent to her pharmacy. Please advise.

## 2025-02-25 NOTE — ASSESSMENT & PLAN NOTE
ANGELINE on stage III CKD  Presenting creatinine 1.27 (baseline 0.82-0.98)  Creat 1.16 status post right ureteral stent placement and hydration  Avoid nephrotoxins

## 2025-02-25 NOTE — PROGRESS NOTES
Progress Note - Urology   Name: Geovanna Leal 69 y.o. female I MRN: [id number]  Unit/Bed#: -01 I Date of Admission: 2/24/2025   Date of Service: 2/25/2025 I Hospital Day: 0    Assessment & Plan  Other hydronephrosis  CT: Collection of distal fragments right ureter causing hydronephrosis  S/p cystoscopy right ureteral stent placement only for leukocytosis, ANGELINE and lactic acidosis  Lactic acid 1.5, trended down from 2.2  WBC 6.85, trended down from 10.80  Creat 1.16 trending down from 1.27 (baseline 0.8-0.98)  Afebrile, vital signs stable  Definitive stone management at interval  Essential hypertension  Amlodipine 5 mg daily  Coreg 12.5 mg twice daily  Imdur 30 mg daily  Dyslipidemia  Lipitor 80 mg daily  GERD (gastroesophageal reflux disease)  Protonix 40 mg daily  Stage 3a chronic kidney disease (HCC)  Lab Results   Component Value Date    EGFR 47 02/25/2025    EGFR 42 02/24/2025    EGFR 72 02/17/2025    CREATININE 1.16 02/25/2025    CREATININE 1.27 02/24/2025    CREATININE 0.82 02/17/2025     Mild episode of recurrent major depressive disorder (HCC)  Lexapro 20 mg daily  Anemia  Hgb 10.9, decreased from 13.2  Likely dilutional, minimal blood loss during surgical procedure  Repeat H&H 11.2/33.7  Lactic acidosis  Lactic acid 2.2  Repeat lactic acid after fluids and ureteral stent placement 1.5 x 2  Leukocytosis  10.8  Improved to 6.85 s/p IV antibiotics and right ureteral stent placement  Afebrile, vital signs stable  Discharge home on oral antibiotics  ANGELINE (acute kidney injury) (HCC)  ANGELINE on stage III CKD  Presenting creatinine 1.27 (baseline 0.82-0.98)  Creat 1.16 status post right ureteral stent placement and hydration  Avoid nephrotoxins        Subjective   Patient resting in bed offers no complaints other than mild flank discomfort secondary to stent colic.  Reviewed kidney stone prevention measures and stent colic symptoms and expectations.  She is aware surgery scheduler will reach out to her for  definitive stone management at interval.    Objective :  Temp:  [97.6 °F (36.4 °C)-98.7 °F (37.1 °C)] 97.6 °F (36.4 °C)  HR:  [52-69] 60  BP: (101-181)/(41-79) 101/41  Resp:  [12-28] 16  SpO2:  [87 %-99 %] 94 %  O2 Device: Nasal cannula    I/O         02/23 0701  02/24 0700 02/24 0701  02/25 0700 02/25 0701  02/26 0700    I.V.  1300     Total Intake  1300     Net  +1300                  Lines/Drains/Airways       Active Status       Name Placement date Placement time Site Days    Ureteral Internal Stent Right ureter 6 Fr. 02/24/25  1415  Right ureter  less than 1                  Physical Exam  Vitals reviewed.   Constitutional:       General: She is not in acute distress.     Appearance: Normal appearance. She is not ill-appearing, toxic-appearing or diaphoretic.   HENT:      Head: Normocephalic and atraumatic.   Eyes:      General: No scleral icterus.  Cardiovascular:      Rate and Rhythm: Normal rate and regular rhythm.      Heart sounds: Normal heart sounds.   Pulmonary:      Effort: Pulmonary effort is normal. No respiratory distress.      Breath sounds: Rhonchi present. No wheezing or rales.   Abdominal:      General: Abdomen is flat. There is no distension.      Palpations: Abdomen is soft.      Tenderness: There is no abdominal tenderness.   Musculoskeletal:         General: No swelling.      Cervical back: Normal range of motion.   Skin:     General: Skin is warm and dry.      Coloration: Skin is not jaundiced or pale.      Findings: No rash.   Neurological:      General: No focal deficit present.      Mental Status: She is alert and oriented to person, place, and time.      Gait: Gait normal.   Psychiatric:         Mood and Affect: Mood normal.         Behavior: Behavior normal.         Thought Content: Thought content normal.         Judgment: Judgment normal.           Lab Results: I have reviewed the following results:  Recent Labs     02/24/25  0918 02/24/25  1127 02/25/25  0551   WBC 10.80*  --  6.85    HGB 13.2  --  10.9*   HCT 39.9  --  32.9*   *  --  419*   SODIUM 136  --  136   K 3.4*  --  4.2   CL 98  --  102   CO2 30  --  26   BUN 16  --  18   CREATININE 1.27  --  1.16   GLUC 118  --  183*   AST 15  --  15   ALT 21  --  15   ALB 3.2*  --  2.7*   TBILI 1.26*  --  0.50   ALKPHOS 85  --  76   HSTNI0 6  --   --    HSTNI2  --  6  --    LACTICACID 2.2* 1.5 1.5       Imaging Results Review: I reviewed radiology reports from this admission including: CT abdomen/pelvis.  Other Study Results Review: No additional pertinent studies reviewed.    VTE Pharmacologic Prophylaxis: VTE covered by:  enoxaparin, Subcutaneous, 30 mg at 02/24/25 6858     VTE Mechanical Prophylaxis: sequential compression device

## 2025-02-25 NOTE — DISCHARGE INSTR - AVS FIRST PAGE
You underwent cystoscopy (looking inside the bladder) and  ureteroscopy (looking inside the  ureter) and placement of ureteral stent.    WHAT IS A STENT?  At the end of the procedure, your doctor may place a stent into your ureter. A stent is a thin, flexible piece of plastic that will hold open your ureter. This allows your kidney to drain easily. The stent is about 12 inches long and looks and feels like a thin piece of spaghetti.    AFTER THE PROCEDURE  After the procedure you may experience the following symptoms. All of these are normal and should resolve within 1 or 2 days after your stent is removed.  1) Urinary frequency (urinating more often than usual)  2) Urinary urgency (the sensation that you need to urinate right away)  3) Painful urination (this can be pain in your bladder or in your back when  you urinate)  4) Blood in your urine ( a stent can irritate the lining of your bladder causing it to bleed)  5) Back/Flank pain, especially with urination    ° Your stent may cause you a lot of discomfort or blood in the urine this is normal  ° Please take oxybutynin 3 times a day as needed for discomfort  ° You can take Pyridium 3 times a day as needed for discomfort, this will color your urine yellow-orange  ° Please take Flomax daily to help relax the ureter  ° You can take additional ibuprofen if needed for any discomfort  ° Make sure you are drinking at least 2 L of water a day unless you have a fluid restriction    General Instructions   1. Stent: You have a stent in your  ureter.    2. Bathing: You may shower and bathe as per usual.   3. Diet: You may resume your pre-operative diet   4. Activity: Walk as much as possible. No strenuous exercise or work for the first 5 days. From then on, slowly increase your level of activity back to normal.   5. If you currently smoke or use tobacco product, consider quitting. There are resources available to help you stop. Please ask your provider.   6. Report fever  101.5 or higher, pain not controlled by medications, or anything you believe warrants medical attention      Dietary Management of Kidney Stone Disease    The dietary recommendations for most people who make kidney stones (especially the most common calcium oxalate stones) are uncomplicated and are not too tedious or bland.  Most importantly, the following recommendations also promote better health for a variety of reasons.    FLUIDS:  The single most important change for the majority patients is the need to greatly increase fluid intake.  You should at least produce two liters (about two quarts) of urine each day.  Depending on the heat outdoors and your level of physical activity, this usually means consuming ten, 10 ounce glasses (100 ounces) of fluid per day.  Water is always a good choice, but other drinks including tea, coffee, soda, and juice are also allowed as long as no one beverage becomes the sole source of fluid.    CALCIUM:  There is excellent evidence that calcium should not be avoided, but instead moderated.  A range of 600 to 1,100 mg of calcium per day, especially consumed at meals is probably a reasonable target. (i.e. 2-3 dairy servings per day) This might include small servings of yogurt, milk or ice cream.  This amount helps avoid over-absorption of oxalate from the digestive tract and also allows for healthy bone maintenance.    SODIUM (SALT):  Too much salt in your diet (both from the shaker and in the prepared foods that we buy) is bad for your blood pressure, bad for your heart, and also increases the amount of calcium in your urine.  A reasonable sodium restriction to 2,000-2,500mg/day (about the amount in one teaspoon) is an excellent target.  You should get into the habit of reading the “Nutrients” labels on all the foods that you eat and watch out for the foods that have a high sodium content (snack foods, smoked or processed foods, caned foods).  Fresh and frozen foods usually have the  least amount of sodium.    PROTEIN:  High protein diets from animal meat (beef, chicken, pork, fish) also increases the rate of kidney stone formation and is equally unhealthy for your heart.  All patients should moderate their meat intake to 3-7 ounces per day, and particularly stay away from red meat protein.    OXALATE:  Most stone-formers should avoid heavy intake of oxalate-rich foods.  These include green roughage (spinach, mustard, kale), strawberries, chocolate, tea, iced tea, and nuts.  In addition, heavy, excess doses of Vitamin C can also produce surges in urinary oxalate levels and should be avoided.     ** THESE FOODS ARE HIGH IN OXALATE - TRY TO LIMIT HOW MUCH OF THESE YOU EAT:  Coke and Pepsi  Nuts  Dark Leafy Greens:     Spinach and Kale, Rhubarb, Chard  Asparagus  Beets  Sweet potatoes   Blueberries, Strawberries   Dark tea (Green tea is okay)  Tofu      DRINK 3 QUARTS (96 Oz.) LIQUIDS EACH DAY - ALL LIQUIDS COUNT        ADD LEMON JUICE 3 OZ. DAILY - Fresh squeezed or lemon juice concentrate - Not MinuteMaid, [place], Crystal Lite, etc.        ** Recipe for HENRIQUEZ'S OLDBEVERLY TYME LEMONADE:         One ounce of lemon juice, glass of water, sweetener of your choice    Coffee is okay!  Cranberry juice is good to prevent infections, but does not help for stones.    BARE-BONES RECOMMENDATIONS:  Fluids, fluids, fluids.    Low salt diet (your primary care doctor will love you).  Moderate red meat intake.  Moderate calcium (dairy products), especially with meals.

## 2025-02-25 NOTE — ASSESSMENT & PLAN NOTE
CT: Collection of distal fragments right ureter causing hydronephrosis  S/p cystoscopy right ureteral stent placement only for leukocytosis, ANGELINE and lactic acidosis  Lactic acid 1.5, trended down from 2.2  WBC 6.85, trended down from 10.80  Creat 1.16 trending down from 1.27 (baseline 0.8-0.98)  Afebrile, vital signs stable  Definitive stone management at interval

## 2025-02-25 NOTE — ASSESSMENT & PLAN NOTE
Hgb 10.9, decreased from 13.2  Likely dilutional, minimal blood loss during surgical procedure  Repeat H&H 11.2/33.7

## 2025-02-25 NOTE — PLAN OF CARE
Problem: INFECTION - ADULT  Goal: Absence or prevention of progression during hospitalization  Description: INTERVENTIONS:  - Assess and monitor for signs and symptoms of infection  - Monitor lab/diagnostic results  - Monitor all insertion sites, i.e. indwelling lines, tubes, and drains  - Monitor endotracheal if appropriate and nasal secretions for changes in amount and color  - [place] appropriate cooling/warming therapies per order  - Administer medications as ordered  - Instruct and encourage patient and family to use good hand hygiene technique  - Identify and instruct in appropriate isolation precautions for identified infection/condition  Outcome: Progressing  Goal: Absence of fever/infection during neutropenic period  Description: INTERVENTIONS:  - Monitor WBC    Outcome: Progressing     Problem: SAFETY ADULT  Goal: Patient will remain free of falls  Description: INTERVENTIONS:  - Educate patient/family on patient safety including physical limitations  - Instruct patient to call for assistance with activity   - Consult OT/PT to assist with strengthening/mobility   - Keep Call bell within reach  - Keep bed low and locked with side rails adjusted as appropriate  - Keep care items and personal belongings within reach  - Initiate and maintain comfort rounds  - Make Fall Risk Sign visible to staff  - Offer Toileting every 2 Hours, in advance of need  - Initiate/Maintain bed alarm  - Obtain necessary fall risk management equipment  - Apply yellow socks and bracelet for high fall risk patients  - Consider moving patient to room near nurses station  Outcome: Progressing  Goal: Maintain or return to baseline ADL function  Description: INTERVENTIONS:  -  Assess patient's ability to carry out ADLs; assess patient's baseline for ADL function and identify physical deficits which impact ability to perform ADLs (bathing, care of mouth/teeth, toileting, grooming, dressing, etc.)  - Assess/evaluate cause of self-care  deficits   - Assess range of motion  - Assess patient's mobility; develop plan if impaired  - Assess patient's need for assistive devices and provide as appropriate  - Encourage maximum independence but intervene and supervise when necessary  - Involve family in performance of ADLs  - Assess for home care needs following discharge   - Consider OT consult to assist with ADL evaluation and planning for discharge  - Provide patient education as appropriate  Outcome: Progressing  Goal: Maintains/Returns to pre admission functional level  Description: INTERVENTIONS:  - Perform AM-PAC 6 Click Basic Mobility/ Daily Activity assessment daily.  - Set and communicate daily mobility goal to care team and patient/family/caregiver.   - Collaborate with rehabilitation services on mobility goals if consulted  - Perform Range of Motion 4 times a day.  - Reposition patient every 2 hours.  - Dangle patient 4 times a day  - Stand patient 3 times a day  - Ambulate patient 3 times a day  - Out of bed to chair 3 times a day   - Out of bed for meals 3 times a day  - Out of bed for toileting  - Record patient progress and toleration of activity level   Outcome: Progressing     Problem: DISCHARGE PLANNING  Goal: Discharge to home or other facility with appropriate resources  Description: INTERVENTIONS:  - Identify barriers to discharge w/patient and caregiver  - Arrange for needed discharge resources and transportation as appropriate  - Identify discharge learning needs (meds, wound care, etc.)  - Arrange for interpretive services to assist at discharge as needed  - Refer to Case Management Department for coordinating discharge planning if the patient needs post-hospital services based on physician/advanced practitioner order or complex needs related to functional status, cognitive ability, or social support system  Outcome: Progressing     Problem: Knowledge Deficit  Goal: Patient/family/caregiver demonstrates understanding of disease  process, treatment plan, medications, and discharge instructions  Description: Complete learning assessment and assess knowledge base.  Interventions:  - Provide teaching at level of understanding  - Provide teaching via preferred learning methods  Outcome: Progressing

## 2025-02-25 NOTE — ASSESSMENT & PLAN NOTE
10.8  Improved to 6.85 s/p IV antibiotics and right ureteral stent placement  Afebrile, vital signs stable  Discharge home on oral antibiotics

## 2025-02-25 NOTE — ASSESSMENT & PLAN NOTE
Lab Results   Component Value Date    EGFR 47 02/25/2025    EGFR 42 02/24/2025    EGFR 72 02/17/2025    CREATININE 1.16 02/25/2025    CREATININE 1.27 02/24/2025    CREATININE 0.82 02/17/2025

## 2025-02-25 NOTE — DISCHARGE SUMMARY
Discharge Summary - Geovanna Leal 69 y.o. female MRN: [id number]    Unit/Bed#: -01 Encounter: [id number]    Admission Date: 2/24/2025     Discharge Date: 02/25/25    HPI: Geovanna Leal is a 69 y.o. female who presented for right ureteral calculi, status post cystoscopy and right ureteral stent insertion by Dr. Coyle.      Procedure(s):  CYSTOSCOPY URETEROSCOPY WITH INSERTION RIGHT 6X26 STENT URETERAL  Surgeon(s):  Aaron Coyle MD  2/24/2025    Hospital Course: Admitted overnight due to leukocytosis and lactic acidosis now improved    Discharge Diagnosis: Other hydronephrosis    Condition at Discharge: good    Discharge Medications:  See after visit summary for reconciled discharge medications provided to patient and family.  Patient was discharged home on home medications with the addition of Keflex, oxybutynin.     Discharge instructions/Information to patient and family:   See after visit summary for written and verbal information which has been provided to patient and family.      Provisions for Follow-Up Care:  See after visit summary for information related to follow-up care and any pertinent home health orders.      Disposition: Home    Planned Readmission: No    Discharge Statement   I spent 20 minutes discharging the patient. This time was spent on the day of discharge. I had direct contact with the patient on the day of discharge. Additional documentation is required if more than 30 minutes were spent on discharge.     Signature:   ANTONIETA Godinez  Date: 2/25/2025 Time: 10:34 AM

## 2025-02-25 NOTE — ANESTHESIA POSTPROCEDURE EVALUATION
Post-Op Assessment Note    Last Filed PACU Vitals:  Vitals Value Taken Time   Temp 98.7 °F (37.1 °C) 02/24/25 1445   Pulse 63 02/24/25 1534   /74 02/24/25 1530   Resp 39 02/24/25 1534   SpO2 95 % 02/24/25 1534   Vitals shown include unfiled device data.    Modified Seema:     Vitals Value Taken Time   Activity 2 02/24/25 1530   Respiration 2 02/24/25 1530   Circulation 2 02/24/25 1530   Consciousness 2 02/24/25 1530   Oxygen Saturation 1 02/24/25 1530     Modified Seema Score: 9

## 2025-02-25 NOTE — CASE MANAGEMENT
Case Management Assessment & Discharge Planning Note    Patient name Geovanna Leal  Location /-01 MRN [id number]  : 1955 Date 2025       Current Admission Date: 2025  Current Admission Diagnosis:Other hydronephrosis   Patient Active Problem List    Diagnosis Date Noted Date Diagnosed    Anemia 2025     Other hydronephrosis 2025     COVID-19 virus infection 2025     Flu 2025     Acute respiratory failure with hypoxia (HCC) 2025     Other emphysema (HCC) 10/15/2024     Deficiency of vitamin B12 2024     Vestibular disorder, bilateral 2024     Mild episode of recurrent major depressive disorder (HCC) 2024     Nodule of left lung 2024     Orthostatic hypotension 2023     Pneumonia due to infectious organism 2023     Hypersomnia, unspecified      Allergies 2023     Intracranial atherosclerosis 11/10/2022     Benign tumor of spinal meningioma (HCC) 11/10/2022     Carotid stenosis, bilateral 2022     History of subarachnoid hemorrhage 2022     Stage 3a chronic kidney disease (HCC) 2021     Hypotension      GERD (gastroesophageal reflux disease) 2019     Chronic obstructive pulmonary disease with acute exacerbation (HCC) 2019     Pancreatic cyst 2019     De Quervain's disease (tenosynovitis) 2018     IFG (impaired fasting glucose) 2017     Sudden idiopathic hearing loss of right ear 2017     Takotsubo cardiomyopathy 06/15/2017     Lower back pain 2016     Cervical stenosis of spinal canal 2015     Carpal tunnel syndrome 2014     Plantar fasciitis 2012     Dyslipidemia 2012     Arnold-Chiari malformation (HCC) 2012     Essential hypertension 2012       LOS (days): 0  Geometric Mean LOS (GMLOS) (days):   Days to GMLOS:     OBJECTIVE:       Current admission status: Outpatient Surgery    Preferred Pharmacy:   [facility]  [id number] - [address]  [address]  [place] [address]  Phone: [phone] Fax: [phone]    Primary Care Provider: Leelee Garcia DO    Primary Insurance: MEDICARE  Secondary Insurance: AETNA    ASSESSMENT:  Active Health Care Proxies       Narayan Leal Health Care Representative - Spouse   Primary Phone: [phone] (Mobile)  Home Phone: [phone]                 Patient Information  Admitted from:: Home  Mental Status: Alert  During Assessment patient was accompanied by: Not accompanied during assessment  Assessment information provided by:: Patient  Primary Caregiver: Self  Support Systems: Spouse/significant other, Family members  Home entry access options. Select all that apply.: Stairs  Number of steps to enter home.: 2  Type of Current Residence: [address] [place] home  Upon entering residence, is there a bedroom on the main floor (no further steps)?: Yes  Upon entering residence, is there a bathroom on the main floor (no further steps)?: Yes  Living Arrangements: Lives w/ Spouse/significant other    Activities of Daily Living Prior to Admission  Functional Status: Independent  Completes ADLs independently?: Yes  Ambulates independently?: Yes  Does patient use assisted devices?: Yes  Assisted Devices (DME) used: Home Oxygen concentrator, Portable Oxygen tanks  DME Company Name (respiratory supplies): [employer]  O2 Rate(s): 3L  Does patient currently own DME?: Yes  What DME does the patient currently own?: Home Oxygen concentrator, Portable Oxygen tanks  Does patient have a history of Outpatient Therapy (PT/OT)?: Yes  Does the patient have a history of Short-Term Rehab?: No  Does patient have a history of HHC?: No  Does patient currently have HHC?: No    Patient Information Continued  Does patient have prescription coverage?: Yes  Does patient have a history of substance abuse?: No  Does patient have a history of Mental Health Diagnosis?: No    Means of  Transportation  Means of Transport to Appts:: Drives Self          DISCHARGE DETAILS:    Discharge planning discussed with:: Pt at bedside    Additional Comments: CM intake assessment completed with pt at bedside. Pt resides with her spouse in a ranch style home with 2 ADENIKE. Pt reports to own home 02 set-up: portable tanks and 02 concentrator supplied through [employer], pt states she typically wears 2-3L via NC at baseline as needed. Pt reports to be IPTA with no further use of DME or HHC/STR hx. Pt still drives, however has transport support available as would be needed. CM team will continue to follow and remain available for discharge coordination.

## 2025-02-27 ENCOUNTER — TELEPHONE (OUTPATIENT)
Dept: UROLOGY | Facility: CLINIC | Age: 70
End: 2025-02-27

## 2025-02-27 NOTE — TELEPHONE ENCOUNTER
Post Op Note    Geovanna Leal is a 69 y.o. female s/p CYSTOSCOPY URETEROSCOPY WITH INSERTION RIGHT 6X26 STENT URETERAL (Right: Bladder)  performed 2/24/2025 by Dr. Coyle while on call.    How would you rate your pain on a scale from 1 to 10, 10 being the worst pain ever? Just occasional cramping     Have you had a fever? No    Have your bowel movements been regular? Yes    Do you have any difficulty urinating? No    Do you have any other questions or concerns that I can address at this time?     Patient states she is doing well at this time. She denies any concerns. Reviewed stent symptoms and prn medication along with increased hydration. Patient is aware she will receive a call from the  to schedule next procedure. She will contact the office with questions or concerns in the meantime.

## 2025-02-28 LAB
ATRIAL RATE: 57 BPM
P AXIS: 62 DEGREES
PR INTERVAL: 146 MS
QRS AXIS: 59 DEGREES
QRSD INTERVAL: 74 MS
QT INTERVAL: 460 MS
QTC INTERVAL: 447 MS
T WAVE AXIS: 58 DEGREES
VENTRICULAR RATE: 57 BPM

## 2025-02-28 PROCEDURE — 93010 ELECTROCARDIOGRAM REPORT: CPT | Performed by: INTERNAL MEDICINE

## 2025-03-04 ENCOUNTER — OFFICE VISIT (OUTPATIENT)
Age: 70
End: 2025-03-04
Payer: MEDICARE

## 2025-03-04 VITALS
DIASTOLIC BLOOD PRESSURE: 62 MMHG | TEMPERATURE: 98.5 F | SYSTOLIC BLOOD PRESSURE: 114 MMHG | WEIGHT: 156 LBS | HEART RATE: 62 BPM | OXYGEN SATURATION: 99 % | HEIGHT: 65 IN | BODY MASS INDEX: 25.99 KG/M2

## 2025-03-04 DIAGNOSIS — R09.02 HYPOXIA: Primary | ICD-10-CM

## 2025-03-04 DIAGNOSIS — J18.9 PNEUMONIA OF RIGHT UPPER LOBE DUE TO INFECTIOUS ORGANISM: ICD-10-CM

## 2025-03-04 DIAGNOSIS — J44.1 CHRONIC OBSTRUCTIVE PULMONARY DISEASE WITH ACUTE EXACERBATION (HCC): ICD-10-CM

## 2025-03-04 PROCEDURE — 94618 PULMONARY STRESS TESTING: CPT | Performed by: PHYSICIAN ASSISTANT

## 2025-03-04 PROCEDURE — 99214 OFFICE O/P EST MOD 30 MIN: CPT | Performed by: PHYSICIAN ASSISTANT

## 2025-03-04 NOTE — PROGRESS NOTES
Follow-up  Visit - Pulmonary Medicine   Name: Geovanna Leal      : 1955      MRN: [id number]  Encounter Provider: Shanda Howe PA-C  Encounter Date: 3/4/2025   Encounter department: [facility]  :  Assessment & Plan  Hypoxia  Improving! Was discharged from the hospital last month on 3 L at rest/6 L with exertion after admission for COPD exacerbation 2/2 Flu, COVID, and PNA  Today's 6MW shows she no longer needs daytime O2  She has found benefit from nocturnal usage. We will check FREDERIC and see if continuing O2 during hours of sleep would be indicated for her. Prior sleep study did not show GER.  She will continue to monitor her pulse oximeter  Orders:    POCT Oxygen Titration    Pulse oximetry overnight    Chronic obstructive pulmonary disease with acute exacerbation (HCC)  Getting close to her baseline.  No need for further prednisone  Continue Trelegy 100 one puff daily  Albuterol PRN       Pneumonia of right upper lobe due to infectious organism  Antibiotics completed. She will get her chest CT scheduled. Due for later this month.          No follow-ups on file.    History of Present Illness   Geovanna Leal is a 69 y.o. female who presents for follow-up. Recent hospitalization (last month) as discussed above.    She is very tired but her breathing is much better. Sometimes needs a quick rest after doing chores around the house. Also just had urology procedure done.     Minimal cough. No fevers or chills.    Review of Systems   Constitutional:  Positive for fatigue. Negative for chills and fever.   Respiratory:  Positive for shortness of breath.    All other systems reviewed and are negative.      Aside from what is mentioned in the HPI, ROS is otherwise negative    Past Medical History   Past Medical History:   Diagnosis Date    Arnold-Chiari malformation (HCC)     Breast lump     last assessed: 2013     COPD (chronic obstructive pulmonary disease) (HCC)      Dysphagia 08/29/2022    Facial twitching 07/21/2022    GERD (gastroesophageal reflux disease)     Gout     last assessed: Nov 26, 2012     Hair loss     last assessed: Dec 15, 2016     Hypotension     last assessed: Nov 10, 2014     Mitral valve disorder     Myocardial infarct, old     GER (obstructive sleep apnea)     Stroke (HCC)     SVT (supraventricular tachycardia) (HCC)      Past Surgical History:   Procedure Laterality Date    BREAST BIOPSY Right 02/28/2017    US CORE BIOPSY--BENIGN    CARDIAC CATHETERIZATION      CATARACT EXTRACTION Bilateral 11/09/2023    CERVICAL DISCECTOMY      Spinal     CERVICAL LAMINECTOMY      C1 with chiari decompression     COLONOSCOPY      5 yrs - onset: May 31, 2011     CRANIOTOMY      INTRACAPSULAR CATARACT EXTRACTION Right 11/08/2023    INTRACAPSULAR CATARACT EXTRACTION Left 11/15/2023    POPLITEAL SYNOVIAL CYST EXCISION      OR CYSTOURETHROSCOPY W/URETERAL CATHETERIZATION Right 2/24/2025    Procedure: CYSTOSCOPY URETEROSCOPY WITH INSERTION RIGHT 6X26 STENT URETERAL;  Surgeon: Aaron Coyle MD;  Location: [facility];  Service: Urology    TUBAL LIGATION      US GUIDED BREAST BIOPSY RIGHT COMPLETE Right 02/28/2017     Family History   Problem Relation Age of Onset    Stroke Mother     Other Father         blood clot in vein & CABG     Heart disease Father     Prostate cancer Father     Hearing loss Father     Hypertension Father     No Known Problems Sister     No Known Problems Sister     No Known Problems Daughter     No Known Problems Daughter     Breast cancer Maternal Grandmother     No Known Problems Maternal Grandfather     No Known Problems Paternal Grandmother     No Known Problems Paternal Grandfather     Alcohol abuse Brother     Throat cancer Brother     Cancer Brother         Throat tongue    Hearing loss Brother     Kidney failure Brother         kidney failure d/t NSIADs on dialysis    Hearing loss Brother     Crohn's disease Maternal Aunt     No Known Problems  Maternal Aunt     No Known Problems Maternal Aunt     Colon cancer Paternal Aunt     No Known Problems Paternal Aunt     No Known Problems Paternal Aunt     No Known Problems Paternal Aunt     Colon cancer Paternal Aunt     Cancer Paternal Aunt         Colon cancer      reports that she quit smoking about 17 years ago. Her smoking use included cigarettes. She started smoking about 56 years ago. She has a 58.5 pack-year smoking history. She has never used smokeless tobacco. She reports current alcohol use of about 7.0 standard drinks of alcohol per week. She reports that she does not use drugs.  Current Outpatient Medications   Medication Instructions    albuterol (ProAir HFA) 90 mcg/act inhaler 2 puffs, Inhalation, Every 4 hours PRN    albuterol 2.5 mg, Nebulization, Every 6 hours PRN    amLODIPine (NORVASC) 5 mg, Oral, Daily    atorvastatin (LIPITOR) 80 mg, Oral, Daily    busPIRone (BUSPAR) 10 mg, Oral, 2 times daily    calcium carbonate (OS-FLASH) 600 mg, Daily    carvedilol (COREG) 12.5 mg, Oral, 2 times daily    cholecalciferol (VITAMIN D3) 1,000 Units, Daily    clopidogrel (PLAVIX) 75 mg, Oral, Daily    escitalopram (LEXAPRO) 20 mg, Oral, Daily    fluticasone-umeclidinium-vilanterol (Trelegy Ellipta) 100-62.5-25 mcg/actuation inhaler 1 puff, Inhalation, Daily, Rinse mouth after use    gabapentin (NEURONTIN) 400 mg capsule TAKE 2 CAPSULES BY MOUTH TWICE DAILY (IN THE MORNING AND AT BEDTIME)    guaiFENesin (MUCINEX) 600 mg, Oral, 2 times daily    isosorbide mononitrate (IMDUR) 30 mg, Oral, Daily    oxybutynin (DITROPAN) 5 mg, Oral, 3 times daily PRN    pantoprazole (PROTONIX) 40 mg tablet TAKE 1 TABLET BY MOUTH ONCE DAILY BEFORE BREAKFAST    polyethylene glycol (MIRALAX) 17 g, Oral, Daily    tamsulosin (FLOMAX) 0.4 mg, Oral, Daily with dinner    vitamin B-12 (VITAMIN B-12) 1,000 mcg, Oral, Daily     Allergies   Allergen Reactions    Codeine Itching    Medical Tape Rash         Medical History Reviewed by provider  "this encounter:     .    Objective   /62 (BP Location: Left arm, Patient Position: Sitting, Cuff Size: Large)   Pulse 62   Temp 98.5 °F (36.9 °C) (Tympanic Core)   Ht 5' 5\" (1.651 m)   Wt 70.8 kg (156 lb)   SpO2 99%   BMI 25.96 kg/m²     Physical Exam  Vitals reviewed.   Constitutional:       General: She is not in acute distress.     Appearance: She is not toxic-appearing.   HENT:      Head: Normocephalic and atraumatic.   Eyes:      General: No scleral icterus.  Cardiovascular:      Rate and Rhythm: Normal rate and regular rhythm.   Pulmonary:      Effort: Pulmonary effort is normal.      Breath sounds: Normal breath sounds. No wheezing, rhonchi or rales.   Musculoskeletal:         General: No signs of injury.   Skin:     General: Skin is warm and dry.   Neurological:      General: No focal deficit present.      Mental Status: She is alert. Mental status is at baseline.   Psychiatric:         Mood and Affect: Mood normal.         Behavior: Behavior normal.         Diagnostic Data:  Labs: I personally reviewed the most recent laboratory data pertinent to today's visit.  No significant culture results    Radiology results:  Radiology Results Review: I have reviewed radiology reports from hospital including: CT chest.      PFT/spirometry results:  No results found for: \"FEV1\", \"FVC\", \"VNG7GXK\", \"TLC\", \"DLCO\"   Moderate obstruction    Oximetry testing:  Discussed above    Shanda Howe PA-C      "

## 2025-03-04 NOTE — ASSESSMENT & PLAN NOTE
Improving! Was discharged from the hospital last month on 3 L at rest/6 L with exertion after admission for COPD exacerbation 2/2 Flu, COVID, and PNA  Today's 6MW shows she no longer needs daytime O2  She has found benefit from nocturnal usage. We will check FREDERIC and see if continuing O2 during hours of sleep would be indicated for her. Prior sleep study did not show GER.  She will continue to monitor her pulse oximeter  Orders:    POCT Oxygen Titration    Pulse oximetry overnight

## 2025-03-04 NOTE — ASSESSMENT & PLAN NOTE
Getting close to her baseline.  No need for further prednisone  Continue Trelegy 100 one puff daily  Albuterol PRN

## 2025-03-10 ENCOUNTER — OFFICE VISIT (OUTPATIENT)
Dept: FAMILY MEDICINE CLINIC | Facility: HOSPITAL | Age: 70
End: 2025-03-10
Payer: MEDICARE

## 2025-03-10 VITALS
HEIGHT: 65 IN | BODY MASS INDEX: 26.19 KG/M2 | HEART RATE: 82 BPM | OXYGEN SATURATION: 95 % | DIASTOLIC BLOOD PRESSURE: 64 MMHG | SYSTOLIC BLOOD PRESSURE: 122 MMHG | WEIGHT: 157.2 LBS | TEMPERATURE: 97.7 F

## 2025-03-10 DIAGNOSIS — N20.1 URETERAL STONE: Primary | ICD-10-CM

## 2025-03-10 DIAGNOSIS — G89.4 CHRONIC PAIN SYNDROME: ICD-10-CM

## 2025-03-10 PROCEDURE — G2211 COMPLEX E/M VISIT ADD ON: HCPCS

## 2025-03-10 PROCEDURE — 99213 OFFICE O/P EST LOW 20 MIN: CPT

## 2025-03-10 RX ORDER — GABAPENTIN 400 MG/1
800 CAPSULE ORAL 3 TIMES DAILY
Qty: 360 CAPSULE | Refills: 2 | Status: SHIPPED | OUTPATIENT
Start: 2025-03-10

## 2025-03-10 NOTE — PROGRESS NOTES
Transition of Care Visit  Name: Geovanna Leal      : 1955      MRN: [id number]  Encounter Provider: Giselle Perkins DO  Encounter Date: 3/10/2025   Encounter department: [facility] [address]    Assessment & Plan  Chronic pain syndrome    Orders:    gabapentin (NEURONTIN) 400 mg capsule; Take 2 capsules (800 mg total) by mouth 3 (three) times a day         History of Present Illness   {?Quick Links Encounters * My Last Note * Last Note in Specialty * Snapshot * Since Last Visit * History :32301}  Transitional Care Management Review:   Geovanna Leal is a 69 y.o. female here for TCM follow up.     During the TCM phone call patient stated:  TCM Call       Date and time call was made  2025  8:39 AM    Hospital care reviewed  Records reviewed    Patient was hospitialized at  [facility]    Date of Admission  25    Date of discharge  25    Diagnosis  Acute respiratory failure with hypoxia    Disposition  Home    Were the patients medications reviewed and updated  No    Current Symptoms  None          TCM Call       Post hospital issues  None    Should patient be enrolled in anticoag monitoring?  No    Scheduled for follow up?  Yes    Did you obtain your prescribed medications  Yes    Do you need help managing your prescriptions or medications  No    Is transportation to your appointment needed  No    I have advised the patient to call PCP with any new or worsening symptoms  Mónica Chairez MA    Living Arrangements  Spouse or Significiant other    Support System  Family    Do you have social support  Yes, as much as I need    Are you recieving any outpatient services  No    Are you recieving home care services  No    Are you using any community resources  No    Current waiver services  No    Have you fallen in the last 12 months  No    Interperter language line needed  No    Counseling  Patient          HPI  Review of Systems  Objective {?Quick Links  "Trend Vitals * Enter New Vitals * Results Review * Timeline (Adult) * Labs * Imaging * Cardiology * Procedures * Lung Cancer Screening * Surgical eConsent :51299}  /64   Pulse 82   Temp 97.7 °F (36.5 °C) (Tympanic)   Ht 5' 5\" (1.651 m)   Wt 71.3 kg (157 lb 3.2 oz)   SpO2 95%   BMI 26.16 kg/m²     Physical Exam  Medications have been reviewed by provider in current encounter    {Administrative / Billing Section (Optional):28884}    Giselle Perkins, DO  Family Medicine    "

## 2025-03-12 ENCOUNTER — TELEPHONE (OUTPATIENT)
Age: 70
End: 2025-03-12

## 2025-03-12 NOTE — TELEPHONE ENCOUNTER
Patient is calling because they would like to D/C the oxygen at night the patient is feeling great and doing well please advise

## 2025-03-14 PROBLEM — J18.9 PNEUMONIA DUE TO INFECTIOUS ORGANISM: Status: RESOLVED | Noted: 2023-06-02 | Resolved: 2025-03-14

## 2025-03-14 PROBLEM — J11.1 FLU: Status: RESOLVED | Noted: 2025-02-12 | Resolved: 2025-03-14

## 2025-03-18 ENCOUNTER — TELEPHONE (OUTPATIENT)
Dept: UROLOGY | Facility: CLINIC | Age: 70
End: 2025-03-18

## 2025-03-20 ENCOUNTER — HOSPITAL ENCOUNTER (OUTPATIENT)
Dept: BONE DENSITY | Facility: CLINIC | Age: 70
Discharge: HOME | End: 2025-03-20
Payer: MEDICARE

## 2025-03-20 ENCOUNTER — PREP FOR PROCEDURE (OUTPATIENT)
Dept: UROLOGY | Facility: CLINIC | Age: 70
End: 2025-03-20

## 2025-03-20 ENCOUNTER — APPOINTMENT (OUTPATIENT)
Dept: LAB | Facility: CLINIC | Age: 70
End: 2025-03-20
Payer: MEDICARE

## 2025-03-20 DIAGNOSIS — M85.80 OSTEOPENIA AFTER MENOPAUSE: ICD-10-CM

## 2025-03-20 DIAGNOSIS — Z78.0 OSTEOPENIA AFTER MENOPAUSE: ICD-10-CM

## 2025-03-20 DIAGNOSIS — R39.89 SUSPECTED UTI: Primary | ICD-10-CM

## 2025-03-20 PROCEDURE — 87086 URINE CULTURE/COLONY COUNT: CPT

## 2025-03-20 PROCEDURE — 77080 DXA BONE DENSITY AXIAL: CPT

## 2025-03-20 NOTE — TELEPHONE ENCOUNTER
Spoke with patient and confirmed surgery date of: 3/31  Type of surgery: #5  Operating physician: Dr. Colye  Location of surgery: [place]    Verbally went over prep with patient on: lmom  NPO  Bowel prep? No  Hospital calls afternoon prior with arrival time -Calls Friday afternoon for Monday surgeries  Patient needs ride to and from surgery (outpatient)   Pre-op testing to be done 2 weeks prior to surgery  (N/A)  Blood thinners: Plavix  no hold needed  Clearances needed: (None)    Mailed/emailed to patient on:  Copy of packet scanned into Media on:  Labs in packet  Soap / Bowel prep in packet  Pre-op & Post-op in packet  Dates of H&P and post-op if needed    Consent: on admit

## 2025-03-20 NOTE — TELEPHONE ENCOUNTER
The lab called to ask if the patient needed any pre op testing. Pt presented herself stating that she needed to provide a urine sample but no orders are in chart. Surgery scheduler was messaged via TEAMS and will place urine order.    No further action needed at this time.

## 2025-03-21 LAB — BACTERIA UR CULT: NORMAL

## 2025-03-23 DIAGNOSIS — F33.0 MILD EPISODE OF RECURRENT MAJOR DEPRESSIVE DISORDER (HCC): ICD-10-CM

## 2025-03-24 ENCOUNTER — HOSPITAL ENCOUNTER (OUTPATIENT)
Dept: CT IMAGING | Facility: HOSPITAL | Age: 70
Discharge: HOME | End: 2025-03-24
Payer: MEDICARE

## 2025-03-24 DIAGNOSIS — J18.9 PNEUMONIA OF RIGHT UPPER LOBE DUE TO INFECTIOUS ORGANISM: ICD-10-CM

## 2025-03-24 DIAGNOSIS — J96.01 ACUTE RESPIRATORY FAILURE WITH HYPOXIA (HCC): ICD-10-CM

## 2025-03-24 PROCEDURE — 71250 CT THORAX DX C-: CPT

## 2025-03-25 RX ORDER — BUSPIRONE HYDROCHLORIDE 10 MG/1
10 TABLET ORAL 2 TIMES DAILY
Qty: 60 TABLET | Refills: 2 | Status: SHIPPED | OUTPATIENT
Start: 2025-03-25

## 2025-03-26 ENCOUNTER — TELEPHONE (OUTPATIENT)
Age: 70
End: 2025-03-26

## 2025-03-26 NOTE — TELEPHONE ENCOUNTER
Please let patient know her overnight pulse ox shows she should continue to use her oxygen while sleeping.    Thank you.

## 2025-03-27 NOTE — TELEPHONE ENCOUNTER
Called and spoke with Geovanna. Relayed Provider's msg regarding FREDERIC and O2. Geovanna expressed understanding but also inquired about next step. As no info for a f/u is in recent note, the Recall is for a June appt. With both Dr. Gupta and Shanda listed as providers. As Shanda will be unavailable, a f/u was scheduled with Dr. Gupta for 5/23/25 @ 1:20pm as nothing was available in June.   Pt thankful for call and appt.

## 2025-03-30 ENCOUNTER — ANESTHESIA EVENT (OUTPATIENT)
Dept: PERIOP | Facility: HOSPITAL | Age: 70
End: 2025-03-30

## 2025-03-30 NOTE — ANESTHESIA PREPROCEDURE EVALUATION
Procedure:  CYSTOSCOPY URETEROSCOPY WITH LITHOTRIPSY HOLMIUM LASER, RETROGRADE PYELOGRAM AND INSERTION STENT URETERAL (Right: Ureter)    Relevant Problems   CARDIO   (+) Carotid stenosis, bilateral   (+) Essential hypertension   (+) Intracranial atherosclerosis      GI/HEPATIC   (+) GERD (gastroesophageal reflux disease)   (+) Pancreatic cyst      /RENAL   (+) ANGELINE (acute kidney injury) (HCC)   (+) Other hydronephrosis   (+) Stage 3a chronic kidney disease (HCC)      HEMATOLOGY   (+) Anemia      MUSCULOSKELETAL   (+) Lower back pain      NEURO/PSYCH   (+) Benign tumor of spinal meningioma (HCC)   (+) Mild episode of recurrent major depressive disorder (HCC)      PULMONARY   (+) Acute respiratory failure with hypoxia (HCC)   (+) Chronic obstructive pulmonary disease with acute exacerbation (HCC)   (+) Other emphysema (HCC)      Endocrine   (+) IFG (impaired fasting glucose)      Neurology/Sleep   (+) Arnold-Chiari malformation (HCC)      Orthopedic/Musculoskeletal   (+) Cervical stenosis of spinal canal      Cardiovascular/Peripheral Vascular   (+) Takotsubo cardiomyopathy      Other   (+) Dyslipidemia      TTE 10/12/23:     Left Ventricle: Left ventricular cavity size is normal. Wall thickness is normal. The left ventricular ejection fraction is 65%. Systolic function is normal. Global longitudinal strain is normal at -25%. Wall motion is normal. Diastolic function is mildly abnormal, consistent with grade I (abnormal) relaxation.  •  Mitral Valve: There is mild annular calcification. There is trace regurgitation.  •  Tricuspid Valve: There is mild regurgitation.  •  Pulmonary Artery: The pulmonary artery systolic pressure is normal.  •  Prior TTE study available for comparison. Prior study date: 5/15/2019. No significant changes noted compared to the prior study.    5/15/23:  SUMMARY:  -  Stress results: Duration of exercise was 6 min. Target heart rate was not achieved. There was normal resting blood pressure  with a hypertensive response to stress. There was no chest pain during stress.  -  ECG conclusions: The stress ECG was negative for ischemia and normal.  -  Perfusion imaging: There were no perfusion defects.  -  Gated SPECT: The calculated left ventricular ejection fraction was 68 %. There was no left ventricular regional abnormality.     IMPRESSIONS: Normal study after maximal exercise without reproduction of symptoms. Myocardial perfusion imaging was normal at rest and with stress. Diagnostic sensitivity was limited by submaximal stress.               Component  Ref Range & Units (hover) 2/25/25 0551 2/24/25 0918 2/17/25 0305 2/16/25 0242 2/15/25 0512 2/14/25 0258 2/13/25 0340   Sodium 136 136 138 138 136 137 135   Potassium 4.2 3.4 Low  3.3 Low  3.5 3.4 Low  3.5 3.6   Chloride 102 98 106 110 High  107 106 105   CO2 26 30 24 21 21 21 20 Low    ANION GAP 8 8 8 7 8 10 10   BUN 18 16 22 28 High  34 High  28 High  21   Creatinine 1.16 1.27 CM 0.82 CM 0.84 CM 0.92 CM 0.98 CM 0.94 CM   Comment: Standardized to IDMS reference method   Glucose 183 High  118    High   High   CM      Calcium 8.1 Low  9.0 8.0 Low  8.0 Low  8.4 8.8 8.2 Low    Corrected Calcium 9.1 9.6        AST 15 15        ALT 15 21 CM           Alkaline Phosphatase 76 85        Total Protein 5.7 Low  6.4        Albumin 2.7 Low  3.2 Low         Total Bilirubin 0.50 1.26 High  CM           eGFR 47 42 72 70 62 58 61                  Component  Ref Range & Units (hover) 2/25/25 0551 2/24/25 0918 2/17/25 0305 2/16/25 0242 2/15/25 0512 2/14/25 0258 2/13/25 0340   WBC 6.85 10.80 High  8.09 9.96 9.62 7.21 3.79 Low    RBC 3.37 Low  4.02 3.89 3.70 Low  3.54 Low  3.72 Low  3.58 Low    Hemoglobin 10.9 Low  13.2 12.6 12.2 11.3 Low  12.0 11.7   Hematocrit 32.9 Low  39.9 36.9 36.3 34.8 36.5 35.0        Pulmonary consult 3/4/25 for hypoxia:  Hypoxia  Improving! Was discharged from the hospital last month on 3 L at rest/6 L with exertion  after admission for COPD exacerbation 2/2 Flu, COVID, and PNA  Today's 6MW shows she no longer needs daytime O2  She has found benefit from nocturnal usage. We will check FREDERIC and see if continuing O2 during hours of sleep would be indicated for her. Prior sleep study did not show GER.  She will continue to monitor her pulse oximeter  Orders:  •  POCT Oxygen Titration  •  Pulse oximetry overnight     Chronic obstructive pulmonary disease with acute exacerbation (HCC)  Getting close to her baseline.  No need for further prednisone  Continue Trelegy 100 one puff daily  Albuterol PRN     Pneumonia of right upper lobe due to infectious organism  Antibiotics completed. She will get her chest CT scheduled. Due for later this month.           Anesthesia Plan  ASA Score- 3     Anesthesia Type- general with ASA Monitors.         Additional Monitors:     Airway Plan: LMA.    Comment: Pre-op bronchodilator by nebulizer.       Plan Factors-    Chart reviewed. EKG reviewed. Imaging results reviewed.  Patient summary reviewed.    Patient is a current smoker.  Patient did not smoke on day of surgery.            Induction- intravenous.    Postoperative Plan- Plan for postoperative opioid use. Planned trial extubation    Perioperative Resuscitation Plan - Level 1 - Full Code.       Informed Consent-     NPO Status:  No vitals data found for the desired time range.

## 2025-03-31 ENCOUNTER — PREP FOR PROCEDURE (OUTPATIENT)
Dept: UROLOGY | Facility: CLINIC | Age: 70
End: 2025-03-31

## 2025-03-31 ENCOUNTER — ANESTHESIA (OUTPATIENT)
Dept: PERIOP | Facility: HOSPITAL | Age: 70
End: 2025-03-31

## 2025-03-31 DIAGNOSIS — N20.0 RENAL CALCULI: Primary | ICD-10-CM

## 2025-03-31 PROBLEM — N20.1 URETERAL CALCULUS, RIGHT: Status: ACTIVE | Noted: 2025-03-31

## 2025-04-04 ENCOUNTER — RESULTS FOLLOW-UP (OUTPATIENT)
Age: 70
End: 2025-04-04

## 2025-04-04 DIAGNOSIS — R93.89 ABNORMAL CHEST CT: Primary | ICD-10-CM

## 2025-04-06 ENCOUNTER — ANESTHESIA EVENT (OUTPATIENT)
Dept: PERIOP | Facility: HOSPITAL | Age: 70
End: 2025-04-06
Payer: MEDICARE

## 2025-04-07 ENCOUNTER — TELEPHONE (OUTPATIENT)
Dept: UROLOGY | Facility: CLINIC | Age: 70
End: 2025-04-07

## 2025-04-07 ENCOUNTER — HOSPITAL ENCOUNTER (OUTPATIENT)
Facility: HOSPITAL | Age: 70
Setting detail: OUTPATIENT SURGERY
Discharge: HOME | End: 2025-04-07
Attending: UROLOGY | Admitting: UROLOGY
Payer: MEDICARE

## 2025-04-07 ENCOUNTER — HOSPITAL ENCOUNTER (OUTPATIENT)
Dept: RADIOLOGY | Facility: HOSPITAL | Age: 70
Discharge: HOME | End: 2025-04-07
Payer: MEDICARE

## 2025-04-07 ENCOUNTER — ANESTHESIA (OUTPATIENT)
Dept: PERIOP | Facility: HOSPITAL | Age: 70
End: 2025-04-07
Payer: MEDICARE

## 2025-04-07 VITALS
WEIGHT: 159 LBS | OXYGEN SATURATION: 96 % | BODY MASS INDEX: 26.49 KG/M2 | SYSTOLIC BLOOD PRESSURE: 135 MMHG | HEIGHT: 65 IN | TEMPERATURE: 97.3 F | DIASTOLIC BLOOD PRESSURE: 56 MMHG | HEART RATE: 62 BPM | RESPIRATION RATE: 16 BRPM

## 2025-04-07 DIAGNOSIS — N20.1 URETERAL CALCULUS, RIGHT: Primary | ICD-10-CM

## 2025-04-07 DIAGNOSIS — N20.0 RENAL CALCULUS, RIGHT: ICD-10-CM

## 2025-04-07 PROCEDURE — 52351 CYSTOURETERO & OR PYELOSCOPE: CPT | Performed by: UROLOGY

## 2025-04-07 PROCEDURE — C1769 GUIDE WIRE: HCPCS | Performed by: UROLOGY

## 2025-04-07 PROCEDURE — 52332 CYSTOSCOPY AND TREATMENT: CPT | Performed by: UROLOGY

## 2025-04-07 PROCEDURE — 74018 RADEX ABDOMEN 1 VIEW: CPT

## 2025-04-07 PROCEDURE — NC001 PR NO CHARGE: Performed by: UROLOGY

## 2025-04-07 PROCEDURE — C2625 STENT, NON-COR, TEM W/DEL SY: HCPCS | Performed by: UROLOGY

## 2025-04-07 RX ORDER — ONDANSETRON 2 MG/ML
4 INJECTION INTRAMUSCULAR; INTRAVENOUS ONCE AS NEEDED
Status: DISCONTINUED | OUTPATIENT
Start: 2025-04-07 | End: 2025-04-07 | Stop reason: HOSPADM

## 2025-04-07 RX ORDER — SODIUM CHLORIDE, SODIUM LACTATE, POTASSIUM CHLORIDE, CALCIUM CHLORIDE 600; 310; 30; 20 MG/100ML; MG/100ML; MG/100ML; MG/100ML
INJECTION, SOLUTION INTRAVENOUS CONTINUOUS PRN
Status: DISCONTINUED | OUTPATIENT
Start: 2025-04-07 | End: 2025-04-07

## 2025-04-07 RX ORDER — HYDROCODONE BITARTRATE AND ACETAMINOPHEN 5; 325 MG/1; MG/1
1 TABLET ORAL EVERY 6 HOURS PRN
Status: DISCONTINUED | OUTPATIENT
Start: 2025-04-07 | End: 2025-04-07 | Stop reason: HOSPADM

## 2025-04-07 RX ORDER — DEXAMETHASONE SODIUM PHOSPHATE 10 MG/ML
INJECTION, SOLUTION INTRAMUSCULAR; INTRAVENOUS AS NEEDED
Status: DISCONTINUED | OUTPATIENT
Start: 2025-04-07 | End: 2025-04-07

## 2025-04-07 RX ORDER — PHENAZOPYRIDINE HYDROCHLORIDE 200 MG/1
200 TABLET, FILM COATED ORAL 3 TIMES DAILY PRN
Qty: 30 TABLET | Refills: 0 | Status: SHIPPED | OUTPATIENT
Start: 2025-04-07

## 2025-04-07 RX ORDER — ONDANSETRON 2 MG/ML
INJECTION INTRAMUSCULAR; INTRAVENOUS AS NEEDED
Status: DISCONTINUED | OUTPATIENT
Start: 2025-04-07 | End: 2025-04-07

## 2025-04-07 RX ORDER — ALBUTEROL SULFATE 0.83 MG/ML
2.5 SOLUTION RESPIRATORY (INHALATION) ONCE
Status: DISCONTINUED | OUTPATIENT
Start: 2025-04-07 | End: 2025-04-07 | Stop reason: HOSPADM

## 2025-04-07 RX ORDER — PHENAZOPYRIDINE HYDROCHLORIDE 100 MG/1
100 TABLET, FILM COATED ORAL 3 TIMES DAILY PRN
Status: DISCONTINUED | OUTPATIENT
Start: 2025-04-07 | End: 2025-04-07 | Stop reason: HOSPADM

## 2025-04-07 RX ORDER — FENTANYL CITRATE 50 UG/ML
INJECTION, SOLUTION INTRAMUSCULAR; INTRAVENOUS AS NEEDED
Status: DISCONTINUED | OUTPATIENT
Start: 2025-04-07 | End: 2025-04-07

## 2025-04-07 RX ORDER — HYDROCODONE BITARTRATE AND ACETAMINOPHEN 5; 325 MG/1; MG/1
1-2 TABLET ORAL EVERY 6 HOURS PRN
Qty: 5 TABLET | Refills: 0 | Status: SHIPPED | OUTPATIENT
Start: 2025-04-07

## 2025-04-07 RX ORDER — FENTANYL CITRATE/PF 50 MCG/ML
25 SYRINGE (ML) INJECTION
Status: DISCONTINUED | OUTPATIENT
Start: 2025-04-07 | End: 2025-04-07 | Stop reason: HOSPADM

## 2025-04-07 RX ORDER — PROPOFOL 10 MG/ML
INJECTION, EMULSION INTRAVENOUS AS NEEDED
Status: DISCONTINUED | OUTPATIENT
Start: 2025-04-07 | End: 2025-04-07

## 2025-04-07 RX ORDER — HYDROMORPHONE HCL IN WATER/PF 6 MG/30 ML
0.2 PATIENT CONTROLLED ANALGESIA SYRINGE INTRAVENOUS
Status: DISCONTINUED | OUTPATIENT
Start: 2025-04-07 | End: 2025-04-07 | Stop reason: HOSPADM

## 2025-04-07 RX ORDER — LIDOCAINE HYDROCHLORIDE 10 MG/ML
INJECTION, SOLUTION EPIDURAL; INFILTRATION; INTRACAUDAL; PERINEURAL AS NEEDED
Status: DISCONTINUED | OUTPATIENT
Start: 2025-04-07 | End: 2025-04-07

## 2025-04-07 RX ORDER — MAGNESIUM HYDROXIDE 1200 MG/15ML
LIQUID ORAL AS NEEDED
Status: DISCONTINUED | OUTPATIENT
Start: 2025-04-07 | End: 2025-04-07 | Stop reason: HOSPADM

## 2025-04-07 RX ORDER — SODIUM CHLORIDE, SODIUM LACTATE, POTASSIUM CHLORIDE, CALCIUM CHLORIDE 600; 310; 30; 20 MG/100ML; MG/100ML; MG/100ML; MG/100ML
20 INJECTION, SOLUTION INTRAVENOUS CONTINUOUS
Status: DISCONTINUED | OUTPATIENT
Start: 2025-04-07 | End: 2025-04-07 | Stop reason: HOSPADM

## 2025-04-07 RX ORDER — EPHEDRINE SULFATE 50 MG/ML
INJECTION INTRAVENOUS AS NEEDED
Status: DISCONTINUED | OUTPATIENT
Start: 2025-04-07 | End: 2025-04-07

## 2025-04-07 RX ORDER — OXYBUTYNIN CHLORIDE 5 MG/1
5 TABLET ORAL 3 TIMES DAILY PRN
Status: DISCONTINUED | OUTPATIENT
Start: 2025-04-07 | End: 2025-04-07 | Stop reason: HOSPADM

## 2025-04-07 RX ORDER — CEFAZOLIN SODIUM 2 G/50ML
2000 SOLUTION INTRAVENOUS ONCE
Status: COMPLETED | OUTPATIENT
Start: 2025-04-07 | End: 2025-04-07

## 2025-04-07 RX ORDER — CEPHALEXIN 500 MG/1
500 CAPSULE ORAL EVERY 6 HOURS SCHEDULED
Qty: 12 CAPSULE | Refills: 0 | Status: SHIPPED | OUTPATIENT
Start: 2025-04-07 | End: 2025-04-10

## 2025-04-07 RX ADMIN — SODIUM CHLORIDE, SODIUM LACTATE, POTASSIUM CHLORIDE, AND CALCIUM CHLORIDE 20 ML/HR: .6; .31; .03; .02 INJECTION, SOLUTION INTRAVENOUS at 09:40

## 2025-04-07 RX ADMIN — PROPOFOL 200 MG: 10 INJECTION, EMULSION INTRAVENOUS at 12:43

## 2025-04-07 RX ADMIN — DEXAMETHASONE SODIUM PHOSPHATE 10 MG: 10 INJECTION, SOLUTION INTRAMUSCULAR; INTRAVENOUS at 12:57

## 2025-04-07 RX ADMIN — ONDANSETRON 4 MG: 2 INJECTION INTRAMUSCULAR; INTRAVENOUS at 12:59

## 2025-04-07 RX ADMIN — EPHEDRINE SULFATE 10 MG: 50 INJECTION, SOLUTION INTRAVENOUS at 12:53

## 2025-04-07 RX ADMIN — CEFAZOLIN SODIUM 2000 MG: 2 SOLUTION INTRAVENOUS at 12:49

## 2025-04-07 RX ADMIN — FENTANYL CITRATE 25 MCG: 50 INJECTION INTRAMUSCULAR; INTRAVENOUS at 12:50

## 2025-04-07 RX ADMIN — SODIUM CHLORIDE, SODIUM LACTATE, POTASSIUM CHLORIDE, AND CALCIUM CHLORIDE: .6; .31; .03; .02 INJECTION, SOLUTION INTRAVENOUS at 12:28

## 2025-04-07 RX ADMIN — LIDOCAINE HYDROCHLORIDE 80 MG: 10 INJECTION, SOLUTION EPIDURAL; INFILTRATION; INTRACAUDAL; PERINEURAL at 12:43

## 2025-04-07 NOTE — H&P
H&P Exam - Urology   Geovanna Leal 1955 [id number]      Assessment/Plan     Assessment:  69-year-old woman stented by me on the right side with distal right ureteral calculi and had UTI at the same time February 24, 2025.  Presented for ureteroscopy March 31, 2025, but patient had severe orthostatic hypotension.  She has since been seen by cardiology and cleared and has had medications adjusted..  Plan:  Cystoscopy, right ureteroscopy, laser lithotripsy, right ureteral stent exchange.    History of Present Illness   HPI:  The patient presents for the above procedure.. The risks of bleeding, infection, damage to the urinary tract and need for additional procedures has been explained and informed consent given.    Past Medical History:   Diagnosis Date    Anxiety     Arnold-Chiari malformation (HCC)     Breast lump     last assessed: Jan 22, 2013     Chronic kidney disease     stage 3    COPD (chronic obstructive pulmonary disease) ([facility])     Depression     Dysphagia 08/29/2022    Facial twitching 07/21/2022    GERD (gastroesophageal reflux disease)     Gout     last assessed: Nov 26, 2012     Hair loss     last assessed: Dec 15, 2016     History of transfusion     Hyperlipidemia     Hypertension     Hypotension     last assessed: Nov 10, 2014     Kidney stone     Mitral valve disorder     Myocardial infarct, old     GER (obstructive sleep apnea)     Pneumonia     Stroke ([facility])     SVT (supraventricular tachycardia) ([facility])        Past Surgical History:   Procedure Laterality Date    BREAST BIOPSY Right 02/28/2017    US CORE BIOPSY--BENIGN    CARDIAC CATHETERIZATION      CATARACT EXTRACTION Bilateral 11/09/2023    CERVICAL DISCECTOMY      Spinal     CERVICAL LAMINECTOMY      C1 with chiari decompression     COLONOSCOPY      5 yrs - onset: May 31, 2011     CRANIOTOMY      INTRACAPSULAR CATARACT EXTRACTION Right 11/08/2023    INTRACAPSULAR CATARACT EXTRACTION Left 11/15/2023    POPLITEAL SYNOVIAL CYST EXCISION       KS CYSTOURETHROSCOPY W/URETERAL CATHETERIZATION Right 2/24/2025    Procedure: CYSTOSCOPY URETEROSCOPY WITH INSERTION RIGHT 6X26 STENT URETERAL;  Surgeon: Aaron Coyle MD;  Location: [facility];  Service: Urology    TUBAL LIGATION      US GUIDED BREAST BIOPSY RIGHT COMPLETE Right 02/28/2017       shoulder    Review of systems:    General: No fever.  CVS: No chest pain or new SOB.  Abdomen: No diarrhea or blood in stool.  : No new voiding difficulties.  Neuro: No syncope/weakness/loss of sensation/paresis  Ophthalmologic: No new visual changes.  Ortho: No new back/joint pains.    Social History- as per previous notes.        Family History: Family history non-contributory    Meds/Allergies   PTA meds:   Prior to Admission Medications   Prescriptions Last Dose Informant Patient Reported? Taking?   albuterol (2.5 mg/3 mL) 0.083 % nebulizer solution 4/6/2025 Self No Yes   Sig: Take 3 mL (2.5 mg total) by nebulization every 6 (six) hours as needed for wheezing or shortness of breath   albuterol (ProAir HFA) 90 mcg/act inhaler 4/6/2025 Morning Self No Yes   Sig: Inhale 2 puffs every 4 (four) hours as needed for wheezing or shortness of breath   amLODIPine (NORVASC) 5 mg tablet   No No   Sig: Take 1 tablet (5 mg total) by mouth daily   atorvastatin (LIPITOR) 80 mg tablet 4/7/2025 Morning  No Yes   Sig: Take 1 tablet by mouth once daily   Patient taking differently: Take 80 mg by mouth daily at bedtime   busPIRone (BUSPAR) 10 mg tablet 4/7/2025 Morning  No Yes   Sig: Take 1 tablet by mouth twice daily   calcium carbonate (OS-FLASH) 600 MG tablet 3/24/2025 Self Yes No   Sig: Take 600 mg by mouth daily   carvedilol (COREG) 12.5 mg tablet 4/7/2025 at  5:00 AM  No Yes   Sig: Take 1 tablet by mouth twice daily   cholecalciferol (VITAMIN D3) 1,000 units tablet 3/24/2025 Self Yes No   Sig: Take 1,000 Units by mouth daily   clopidogrel (PLAVIX) 75 mg tablet 4/7/2025 at  5:00 AM  No Yes   Sig: Take 1 tablet by mouth once daily  "  escitalopram (LEXAPRO) 20 mg tablet 4/7/2025 at  5:00 AM Self No Yes   Sig: Take 1 tablet (20 mg total) by mouth daily   fluticasone-umeclidinium-vilanterol (Trelegy Ellipta) 100-62.5-25 mcg/actuation inhaler 4/7/2025 at  5:00 AM Self No Yes   Sig: Inhale 1 puff daily Rinse mouth after use   gabapentin (NEURONTIN) 400 mg capsule 4/7/2025 at  5:00 AM  No Yes   Sig: Take 2 capsules (800 mg total) by mouth 3 (three) times a day   Patient taking differently: Take 800 mg by mouth 2 (two) times a day   isosorbide mononitrate (IMDUR) 30 mg 24 hr tablet 4/7/2025 at  5:00 AM  No Yes   Sig: Take 1 tablet by mouth once daily   oxybutynin (DITROPAN) 5 mg tablet 4/6/2025  No Yes   Sig: Take 1 tablet (5 mg total) by mouth 3 (three) times a day as needed (Bladder spasms)   pantoprazole (PROTONIX) 40 mg tablet 4/6/2025 Morning  No Yes   Sig: TAKE 1 TABLET BY MOUTH ONCE DAILY BEFORE BREAKFAST   polyethylene glycol (MIRALAX) 17 g packet 4/5/2025  No No   Sig: Take 17 g by mouth daily   tamsulosin (FLOMAX) 0.4 mg 4/6/2025 Evening  No Yes   Sig: Take 1 capsule (0.4 mg total) by mouth daily with dinner   vitamin B-12 (VITAMIN B-12) 1,000 mcg tablet 3/24/2025 Self No No   Sig: Take 1 tablet (1,000 mcg total) by mouth daily      Facility-Administered Medications: None         Objective   Vitals: /69 (BP Location: Right arm)   Pulse 66   Temp 97.6 °F (36.4 °C) (Temporal)   Resp 18   Ht 5' 5\" (1.651 m)   Wt 72.1 kg (159 lb)   SpO2 97%   BMI 26.46 kg/m²     No intake/output data recorded.          Physical Exam:    Awake, alert, in NAD.    HEENT: Atraumatic, Normocephalic    Lungs: Normal Respiratory effort, no wheezes,stridor or rales.  Clear to auscultation bilaterally    CVS- RRR normal heart sounds    Abdomen: Nondistended.    Extremiites: Normal ROM, no cyanosis/edema.      Lab Results: I have personally reviewed pertinent reports.    Imaging: Results Review Statement: I personally reviewed the following image studies in " PACS and associated radiology reports: CT abdomen/pelvis. My interpretation of the radiology images/reports is: Concur.

## 2025-04-07 NOTE — ANESTHESIA POSTPROCEDURE EVALUATION
Post-Op Assessment Note    CV Status:  Stable  Pain Score: 0    Pain management: adequate       Mental Status:  Awake   Hydration Status:  Stable   PONV Controlled:  None   Airway Patency:  Patent     Post Op Vitals Reviewed: Yes    No anethesia notable event occurred.    Staff: CRNA, Anesthesiologist           Last Filed PACU Vitals:  Vitals Value Taken Time   Temp 97.2 °F (36.2 °C) 04/07/25 1308   Pulse 60 04/07/25 1310   /56 04/07/25 1310   Resp 9 04/07/25 1310   SpO2 99 % 04/07/25 1310   Vitals shown include unfiled device data.

## 2025-04-07 NOTE — OP NOTE
OPERATIVE REPORT  PATIENT NAME: Geovanna Leal    :  1955  MRN: [id number]  Pt Location: [facility]    SURGERY DATE: 2025    Surgeons and Role:     * Aaron Coyle MD - Primary    Preop Diagnosis:  Stented distal right ureteral calculi, only 5 mm total    Postoperative diagnosis:  Stented distal right ureteral calculi, no calculi found    Procedure(s):  Cystoscopy, right ureteroscopy, right ureteral stent exchange    Specimen(s):  * No specimens in log *    Estimated Blood Loss:   Minimal    Drains:  Ureteral Internal Stent Right ureter 6 Fr. (Active)   Number of days: 42       Anesthesia Type:   General    Operative Indications:  Stented distal right ureteral calculi    Operative Findings:  No stones in the distal ureter.  There was a collection of tiny fragments there previously, most likely came out with the stent either preoperatively or with changing the stent.  Ureter completely clear to the renal pelvis.      Complications:   None    Procedure and Technique:  69-year-old woman status post right ureteral stenting by me when she was septic for right distal ureteral calculi.  She is here for definitive treatment with cystoscopy right ureteroscopy laser lithotripsy stone basket extraction and right ureteral stent exchange.  The procedure as well as the risks of bleeding infection damage urinary tract and need for additional procedures were explained and she gives informed consent.         The patient was brought to the operating room and identified properly. The patient was prepared and draped in the dorsolithotomy position after general anesthesia was induced, and a time out performed. Care was taken during positioning to protect all extremities.    Cystoscopy was performed with 22 Vietnamese cysto sheath and 30  degree lens. The urethra was normal without stricture. The bladder was smooth, nontrabeculated and there were no stones, tumors or other abnormalities. The 0.035 inch wire was fed into the  indwelling right ureteral stent after it was brought out with a grasping forcep to the urethral meatus and fed up into the renal pelvis. Fluoroscopy revealed a no radiopaque stone.  The rigid ureteroscope was used to perform ureteroscopy, and no stone was seen even with placing the scope all the way up to the UPJ, so the scope was withdrawn.  A 6 [place]ian by 26 cm ureteral stent with string was deployed, and coils were established in the renal pelvis and bladder. The bladder was drained with the cysto sheath, the stent string taped to the lowerabdomen, and the pt awakened.                I was present for the entire procedure. and A qualified resident physician was not available.    Patient Disposition:  PACU  and extubated and stable             SIGNATURE: Aaron Coyle MD  DATE: April 7, 2025  TIME: 1:03 PM

## 2025-04-07 NOTE — ANESTHESIA PREPROCEDURE EVALUATION
Procedure:  CYSTOSCOPY URETEROSCOPY WITH LITHOTRIPSY HOLMIUM LASER, RETROGRADE PYELOGRAM AND INSERTION STENT URETERAL (Right: Bladder)    Relevant Problems   CARDIO   (+) Carotid stenosis, bilateral   (+) Essential hypertension   (+) Intracranial atherosclerosis      GI/HEPATIC   (+) GERD (gastroesophageal reflux disease)   (+) Pancreatic cyst      /RENAL   (+) ANGELINE (acute kidney injury) (HCC)   (+) Other hydronephrosis   (+) Stage 3a chronic kidney disease (HCC)      HEMATOLOGY   (+) Anemia      MUSCULOSKELETAL   (+) Lower back pain      NEURO/PSYCH   (+) Benign tumor of spinal meningioma (HCC)   (+) Mild episode of recurrent major depressive disorder (HCC)      PULMONARY   (+) Acute respiratory failure with hypoxia (HCC)   (+) Chronic obstructive pulmonary disease with acute exacerbation (HCC)   (+) Other emphysema (HCC)      Neurology/Sleep   (+) Arnold-Chiari malformation (HCC)   (+) History of subarachnoid hemorrhage      Urinary   (+) Ureteral calculus, right      Blood   (+) Leukocytosis      Orthopedic/Musculoskeletal   (+) Cervical stenosis of spinal canal      Cardiovascular/Peripheral Vascular   (+) Hypotension   (+) Orthostatic hypotension   (+) Takotsubo cardiomyopathy      Other   (+) COVID-19 virus infection        NM 5/15/19:  SUMMARY:  -  Stress results: Duration of exercise was 6 min. Target heart rate was not achieved. There was normal resting blood pressure with a hypertensive response to stress. There was no chest pain during stress.  -  ECG conclusions: The stress ECG was negative for ischemia and normal.  -  Perfusion imaging: There were no perfusion defects.  -  Gated SPECT: The calculated left ventricular ejection fraction was 68 %. There was no left ventricular regional abnormality.     IMPRESSIONS: Normal study after maximal exercise without reproduction of symptoms. Myocardial perfusion imaging was normal at rest and with stress. Diagnostic sensitivity was limited by submaximal stress.      Prepared and signed by     Binta Eduardo MD  Signed 05/15/2019 15:12:06                 Physical Exam    Airway    Mallampati score: IV  TM Distance: >3 FB  Neck ROM: full     Dental       Cardiovascular      Pulmonary      Other Findings  post-pubertal.    TTE 12/4/23:    Left Ventricle: Left ventricular cavity size is normal. Wall thickness is normal. The left ventricular ejection fraction is 65%. Systolic function is normal. Global longitudinal strain is normal at -25%. Wall motion is normal. Diastolic function is mildly abnormal, consistent with grade I (abnormal) relaxation.  •  Mitral Valve: There is mild annular calcification. There is trace regurgitation.  •  Tricuspid Valve: There is mild regurgitation.  •  Pulmonary Artery: The pulmonary artery systolic pressure is normal.  •  Prior TTE study available for comparison. Prior study date: 5/15/2019. No significant changes noted compared to the prior study.     Strain was performed to quantify interventricular dyssynchrony and evaluate components of myocardial function due to CAD, cardiomyopathy. Results from the utilization of Strain Analysis are listed in the report below.             Component  Ref Range & Units (hover) 2/25/25 0551 2/24/25 0918 2/17/25 0305 2/16/25 0242 2/15/25 0512 2/14/25 0258 2/13/25 0340   Sodium 136 136 138 138 136 137 135   Potassium 4.2 3.4 Low  3.3 Low  3.5 3.4 Low  3.5 3.6   Chloride 102 98 106 110 High  107 106 105   CO2 26 30 24 21 21 21 20 Low    ANION GAP 8 8 8 7 8 10 10   BUN 18 16 22 28 High  34 High  28 High  21   Creatinine 1.16 1.27 CM 0.82 CM 0.84 CM 0.92 CM 0.98 CM 0.94 CM      Glucose 183 High  118    High   High   CM      Calcium 8.1 Low  9.0 8.0 Low  8.0 Low  8.4 8.8 8.2 Low    Corrected Calcium 9.1 9.6        AST 15 15        ALT 15 21 CM           Alkaline Phosphatase 76 85        Total Protein 5.7 Low  6.4        Albumin 2.7 Low  3.2 Low         Total Bilirubin 0.50 1.26 High  CM            eGFR 47 42 72 70 62 58 61     Anesthesia Plan  ASA Score- 3     Anesthesia Type- general with ASA Monitors.         Additional Monitors:     Airway Plan: LMA.           Plan Factors-    Chart reviewed. EKG reviewed. Imaging results reviewed. Existing labs reviewed. Patient summary reviewed.    Patient is not a current smoker.  Patient did not smoke on day of surgery.            Induction- intravenous.    Postoperative Plan- Plan for postoperative opioid use. Planned trial extubation    Perioperative Resuscitation Plan - Level 1 - Full Code.       Informed Consent- Anesthetic plan and risks discussed with patient.  I personally reviewed this patient with the CRNA. Discussed and agreed on the Anesthesia Plan with the CRNA..      NPO Status:  No vitals data found for the desired time range.

## 2025-04-07 NOTE — TELEPHONE ENCOUNTER
----- Message from Aaron Coyle MD sent at 4/7/2025  1:06 PM EDT -----  Please have patient follow-up for stent removal in 1 week with the nurses, then see somebody in 6 months with a kidney bladder ultrasound

## 2025-04-07 NOTE — ANESTHESIA POSTPROCEDURE EVALUATION
Post-Op Assessment Note    Last Filed PACU Vitals:  Vitals Value Taken Time   Temp 97.3 °F (36.3 °C) 04/07/25 1340   Pulse 62 04/07/25 1341   /59 04/07/25 1340   Resp 13 04/07/25 1341   SpO2 97 % 04/07/25 1341   Vitals shown include unfiled device data.    Modified Seema:     Vitals Value Taken Time   Activity 2 04/07/25 1340   Respiration 2 04/07/25 1340   Circulation 2 04/07/25 1340   Consciousness 2 04/07/25 1340   Oxygen Saturation 2 04/07/25 1340     Modified Seema Score: 10

## 2025-04-07 NOTE — DISCHARGE INSTR - AVS FIRST PAGE
Expect to see blood in the urine, and to experience urgency/frequency/burning with urination and dribbling.  This is normal after urological procedures.  Is also normal to experience some nausea after these procedures.  Go back your regular diet carefully.     It is normal to feel pain in the kidney when urinating and when the bladder is filling due to urine refluxing up to the kidney because of the open stent.  The stent is necessary to keep the ureter open to allow clots and swelling to resolve and to allow the kidney to drain properly after instrumentation.  No stones were seen in the ureter.  These probably came out for the procedure or when I was changing the stent.  Call for fever greater that 101.5, inability to urinate, prolonged nausea and vomiting, or severe pain not relieved by pain medications..     Keep stent string taped- if it becomes dislodged or gets pulled out early, that is OK- simply remove it completely by pulling on string until it is completely out. No driving/operating machinery for 24 hours, and while taking narcotics. Take over the counter remedy of choice to avoid constipation. Drink plenty of fluids.

## 2025-04-08 NOTE — TELEPHONE ENCOUNTER
Post Op Note    Geovanna Leal is a 69 y.o. female s/p CYSTOSCOPY URETEROSCOPY WITH EXCHANGE STENT URETERAL (Right: Bladder)  performed 4/7/2025 by Dr. Coyle.      How would you rate your pain on a scale from 1 to 10, 10 being the worst pain ever? 0    Have you had a fever? No    Have your bowel movements been regular? Yes    Do you have any difficulty urinating? No    Do you have any other questions or concerns that I can address at this time?     Patient states she is doing very well at this time and she denies any pain or concerns. We reviewed stent symptoms, medications, conservative measures, increased hydration, and stent with string removal. Patient will plan to remove her stent on Saturday. She was scheduled for a 6 month follow up with HERBERTH Mena in the [place] office per her request and was provided with the central scheduling phone number to schedule a renal US 2 weeks prior to her appointment.     Plan to contact patient on Monday 4/14 to ensure stent was removed.

## 2025-04-14 NOTE — TELEPHONE ENCOUNTER
Called and spoke with Geovanna who confirmed her stent with string was removed without difficulty and she is doing well at this time. She will follow up as scheduled with an US to be completed 2 weeks prior to the October follow up.

## 2025-04-21 ENCOUNTER — HOSPITAL ENCOUNTER (INPATIENT)
Facility: HOSPITAL | Age: 70
LOS: 3 days | Discharge: HOME | DRG: 315 | End: 2025-04-25
Attending: EMERGENCY MEDICINE | Admitting: INTERNAL MEDICINE
Payer: MEDICARE

## 2025-04-21 ENCOUNTER — APPOINTMENT (EMERGENCY)
Dept: RADIOLOGY | Facility: HOSPITAL | Age: 70
DRG: 315 | End: 2025-04-21
Payer: MEDICARE

## 2025-04-21 ENCOUNTER — TELEPHONE (OUTPATIENT)
Age: 70
End: 2025-04-21

## 2025-04-21 DIAGNOSIS — I25.10 CORONARY ARTERY DISEASE INVOLVING NATIVE CORONARY ARTERY OF NATIVE HEART WITHOUT ANGINA PECTORIS: ICD-10-CM

## 2025-04-21 DIAGNOSIS — F33.0 MILD EPISODE OF RECURRENT MAJOR DEPRESSIVE DISORDER (HCC): ICD-10-CM

## 2025-04-21 DIAGNOSIS — N20.1 URETERAL CALCULUS, RIGHT: ICD-10-CM

## 2025-04-21 DIAGNOSIS — R79.89 ELEVATED TROPONIN: ICD-10-CM

## 2025-04-21 DIAGNOSIS — J44.9 CHRONIC OBSTRUCTIVE PULMONARY DISEASE, UNSPECIFIED COPD TYPE (HCC): ICD-10-CM

## 2025-04-21 DIAGNOSIS — R13.10 DYSPHAGIA, UNSPECIFIED TYPE: Chronic | ICD-10-CM

## 2025-04-21 DIAGNOSIS — R06.09 EXERTIONAL DYSPNEA: Primary | ICD-10-CM

## 2025-04-21 DIAGNOSIS — E78.5 DYSLIPIDEMIA: ICD-10-CM

## 2025-04-21 DIAGNOSIS — I16.1 HYPERTENSIVE EMERGENCY: ICD-10-CM

## 2025-04-21 LAB
2HR DELTA HS TROPONIN: 1038 NG/L
4HR DELTA HS TROPONIN: 604 NG/L
ALBUMIN SERPL BCG-MCNC: 3.8 G/DL (ref 3.5–5)
ALP SERPL-CCNC: 83 U/L (ref 34–104)
ALT SERPL W P-5'-P-CCNC: 9 U/L (ref 7–52)
ANION GAP SERPL CALCULATED.3IONS-SCNC: 9 MMOL/L (ref 4–13)
AST SERPL W P-5'-P-CCNC: 15 U/L (ref 13–39)
BASOPHILS # BLD AUTO: 0.02 THOUSANDS/ÂΜL (ref 0–0.1)
BASOPHILS NFR BLD AUTO: 0 % (ref 0–1)
BILIRUB SERPL-MCNC: 0.94 MG/DL (ref 0.2–1)
BUN SERPL-MCNC: 12 MG/DL (ref 5–25)
CALCIUM SERPL-MCNC: 9.3 MG/DL (ref 8.4–10.2)
CARDIAC TROPONIN I PNL SERPL HS: 1835 NG/L (ref ?–50)
CARDIAC TROPONIN I PNL SERPL HS: 2439 NG/L (ref ?–50)
CARDIAC TROPONIN I PNL SERPL HS: 2873 NG/L (ref ?–50)
CHLORIDE SERPL-SCNC: 104 MMOL/L (ref 96–108)
CO2 SERPL-SCNC: 24 MMOL/L (ref 21–32)
CREAT SERPL-MCNC: 0.97 MG/DL (ref 0.6–1.3)
EOSINOPHIL # BLD AUTO: 0.08 THOUSAND/ÂΜL (ref 0–0.61)
EOSINOPHIL NFR BLD AUTO: 1 % (ref 0–6)
ERYTHROCYTE [DISTWIDTH] IN BLOOD BY AUTOMATED COUNT: 15.5 % (ref 11.6–15.1)
GFR SERPL CREATININE-BSD FRML MDRD: 58 ML/MIN/1.73SQ M
GLUCOSE SERPL-MCNC: 123 MG/DL (ref 65–140)
HCT VFR BLD AUTO: 38.4 % (ref 34.8–46.1)
HGB BLD-MCNC: 12.5 G/DL (ref 11.5–15.4)
IMM GRANULOCYTES # BLD AUTO: 0.05 THOUSAND/UL (ref 0–0.2)
IMM GRANULOCYTES NFR BLD AUTO: 1 % (ref 0–2)
LYMPHOCYTES # BLD AUTO: 1.38 THOUSANDS/ÂΜL (ref 0.6–4.47)
LYMPHOCYTES NFR BLD AUTO: 16 % (ref 14–44)
MCH RBC QN AUTO: 32.5 PG (ref 26.8–34.3)
MCHC RBC AUTO-ENTMCNC: 32.6 G/DL (ref 31.4–37.4)
MCV RBC AUTO: 100 FL (ref 82–98)
MONOCYTES # BLD AUTO: 0.56 THOUSAND/ÂΜL (ref 0.17–1.22)
MONOCYTES NFR BLD AUTO: 6 % (ref 4–12)
NEUTROPHILS # BLD AUTO: 6.71 THOUSANDS/ÂΜL (ref 1.85–7.62)
NEUTS SEG NFR BLD AUTO: 76 % (ref 43–75)
NRBC BLD AUTO-RTO: 0 /100 WBCS
PLATELET # BLD AUTO: 358 THOUSANDS/UL (ref 149–390)
PMV BLD AUTO: 8.6 FL (ref 8.9–12.7)
POTASSIUM SERPL-SCNC: 3.4 MMOL/L (ref 3.5–5.3)
PROT SERPL-MCNC: 6.6 G/DL (ref 6.4–8.4)
RBC # BLD AUTO: 3.85 MILLION/UL (ref 3.81–5.12)
SODIUM SERPL-SCNC: 137 MMOL/L (ref 135–147)
WBC # BLD AUTO: 8.8 THOUSAND/UL (ref 4.31–10.16)

## 2025-04-21 PROCEDURE — 84484 ASSAY OF TROPONIN QUANT: CPT | Performed by: EMERGENCY MEDICINE

## 2025-04-21 PROCEDURE — 80053 COMPREHEN METABOLIC PANEL: CPT | Performed by: EMERGENCY MEDICINE

## 2025-04-21 PROCEDURE — 99285 EMERGENCY DEPT VISIT HI MDM: CPT | Performed by: EMERGENCY MEDICINE

## 2025-04-21 PROCEDURE — 93005 ELECTROCARDIOGRAM TRACING: CPT

## 2025-04-21 PROCEDURE — 99223 1ST HOSP IP/OBS HIGH 75: CPT | Performed by: INTERNAL MEDICINE

## 2025-04-21 PROCEDURE — 99285 EMERGENCY DEPT VISIT HI MDM: CPT

## 2025-04-21 PROCEDURE — 99215 OFFICE O/P EST HI 40 MIN: CPT | Performed by: INTERNAL MEDICINE

## 2025-04-21 PROCEDURE — 71045 X-RAY EXAM CHEST 1 VIEW: CPT

## 2025-04-21 PROCEDURE — 84484 ASSAY OF TROPONIN QUANT: CPT | Performed by: INTERNAL MEDICINE

## 2025-04-21 PROCEDURE — 36415 COLL VENOUS BLD VENIPUNCTURE: CPT | Performed by: EMERGENCY MEDICINE

## 2025-04-21 PROCEDURE — 85025 COMPLETE CBC W/AUTO DIFF WBC: CPT | Performed by: EMERGENCY MEDICINE

## 2025-04-21 RX ORDER — CARVEDILOL 12.5 MG/1
12.5 TABLET ORAL 2 TIMES DAILY
Status: DISCONTINUED | OUTPATIENT
Start: 2025-04-21 | End: 2025-04-22

## 2025-04-21 RX ORDER — ESCITALOPRAM OXALATE 10 MG/1
10 TABLET ORAL DAILY
Status: DISCONTINUED | OUTPATIENT
Start: 2025-04-21 | End: 2025-04-21

## 2025-04-21 RX ORDER — BUSPIRONE HYDROCHLORIDE 10 MG/1
10 TABLET ORAL 2 TIMES DAILY
Status: DISCONTINUED | OUTPATIENT
Start: 2025-04-21 | End: 2025-04-25 | Stop reason: HOSPADM

## 2025-04-21 RX ORDER — PANTOPRAZOLE SODIUM 40 MG/1
TABLET, DELAYED RELEASE ORAL
Qty: 90 TABLET | Refills: 0 | Status: SHIPPED | OUTPATIENT
Start: 2025-04-21

## 2025-04-21 RX ORDER — CLOPIDOGREL BISULFATE 75 MG/1
75 TABLET ORAL DAILY
Qty: 90 TABLET | Refills: 0 | Status: SHIPPED | OUTPATIENT
Start: 2025-04-21

## 2025-04-21 RX ORDER — PANTOPRAZOLE SODIUM 40 MG/1
40 TABLET, DELAYED RELEASE ORAL ONCE
Status: COMPLETED | OUTPATIENT
Start: 2025-04-21 | End: 2025-04-21

## 2025-04-21 RX ORDER — FLUTICASONE FUROATE, UMECLIDINIUM BROMIDE AND VILANTEROL TRIFENATATE 100; 62.5; 25 UG/1; UG/1; UG/1
1 POWDER RESPIRATORY (INHALATION) DAILY
Qty: 180 EACH | Refills: 0 | Status: SHIPPED | OUTPATIENT
Start: 2025-04-21

## 2025-04-21 RX ORDER — FLUTICASONE FUROATE AND VILANTEROL 100; 25 UG/1; UG/1
1 POWDER RESPIRATORY (INHALATION) DAILY
Status: DISCONTINUED | OUTPATIENT
Start: 2025-04-21 | End: 2025-04-25 | Stop reason: HOSPADM

## 2025-04-21 RX ORDER — ASPIRIN 81 MG/1
324 TABLET, CHEWABLE ORAL ONCE
Status: COMPLETED | OUTPATIENT
Start: 2025-04-21 | End: 2025-04-21

## 2025-04-21 RX ORDER — PANTOPRAZOLE SODIUM 40 MG/1
40 TABLET, DELAYED RELEASE ORAL
Status: DISCONTINUED | OUTPATIENT
Start: 2025-04-22 | End: 2025-04-25 | Stop reason: HOSPADM

## 2025-04-21 RX ORDER — ATORVASTATIN CALCIUM 80 MG/1
80 TABLET, FILM COATED ORAL DAILY
Qty: 90 TABLET | Refills: 0 | Status: SHIPPED | OUTPATIENT
Start: 2025-04-21

## 2025-04-21 RX ORDER — ESCITALOPRAM OXALATE 20 MG/1
TABLET ORAL
Qty: 30 TABLET | Refills: 0 | Status: SHIPPED | OUTPATIENT
Start: 2025-04-21

## 2025-04-21 RX ORDER — CLOPIDOGREL BISULFATE 75 MG/1
75 TABLET ORAL ONCE
Status: COMPLETED | OUTPATIENT
Start: 2025-04-21 | End: 2025-04-21

## 2025-04-21 RX ORDER — HEPARIN SODIUM 5000 [USP'U]/ML
5000 INJECTION, SOLUTION INTRAVENOUS; SUBCUTANEOUS EVERY 8 HOURS SCHEDULED
Status: DISCONTINUED | OUTPATIENT
Start: 2025-04-21 | End: 2025-04-25 | Stop reason: HOSPADM

## 2025-04-21 RX ORDER — CARVEDILOL 12.5 MG/1
12.5 TABLET ORAL 2 TIMES DAILY WITH MEALS
Status: DISCONTINUED | OUTPATIENT
Start: 2025-04-21 | End: 2025-04-21

## 2025-04-21 RX ORDER — ALBUTEROL SULFATE 90 UG/1
2 INHALANT RESPIRATORY (INHALATION) EVERY 4 HOURS PRN
Status: DISCONTINUED | OUTPATIENT
Start: 2025-04-21 | End: 2025-04-25 | Stop reason: HOSPADM

## 2025-04-21 RX ORDER — BUSPIRONE HYDROCHLORIDE 10 MG/1
10 TABLET ORAL 2 TIMES DAILY
Status: DISCONTINUED | OUTPATIENT
Start: 2025-04-21 | End: 2025-04-21

## 2025-04-21 RX ORDER — ACETAMINOPHEN 325 MG/1
650 TABLET ORAL EVERY 6 HOURS PRN
Status: DISCONTINUED | OUTPATIENT
Start: 2025-04-21 | End: 2025-04-25 | Stop reason: HOSPADM

## 2025-04-21 RX ORDER — ALBUTEROL SULFATE 0.83 MG/ML
2.5 SOLUTION RESPIRATORY (INHALATION) EVERY 6 HOURS PRN
Status: DISCONTINUED | OUTPATIENT
Start: 2025-04-21 | End: 2025-04-25 | Stop reason: HOSPADM

## 2025-04-21 RX ORDER — ESCITALOPRAM OXALATE 20 MG/1
20 TABLET ORAL DAILY
Status: DISCONTINUED | OUTPATIENT
Start: 2025-04-22 | End: 2025-04-25 | Stop reason: HOSPADM

## 2025-04-21 RX ORDER — GABAPENTIN 400 MG/1
800 CAPSULE ORAL 2 TIMES DAILY
Status: DISCONTINUED | OUTPATIENT
Start: 2025-04-21 | End: 2025-04-25 | Stop reason: HOSPADM

## 2025-04-21 RX ORDER — CLOPIDOGREL BISULFATE 75 MG/1
75 TABLET ORAL DAILY
Status: DISCONTINUED | OUTPATIENT
Start: 2025-04-22 | End: 2025-04-25 | Stop reason: HOSPADM

## 2025-04-21 RX ORDER — ATORVASTATIN CALCIUM 40 MG/1
80 TABLET, FILM COATED ORAL
Status: DISCONTINUED | OUTPATIENT
Start: 2025-04-21 | End: 2025-04-25 | Stop reason: HOSPADM

## 2025-04-21 RX ORDER — ACETAMINOPHEN 325 MG/1
975 TABLET ORAL ONCE
Status: COMPLETED | OUTPATIENT
Start: 2025-04-21 | End: 2025-04-21

## 2025-04-21 RX ORDER — ISOSORBIDE MONONITRATE 30 MG/1
30 TABLET, EXTENDED RELEASE ORAL DAILY
Status: DISCONTINUED | OUTPATIENT
Start: 2025-04-21 | End: 2025-04-21

## 2025-04-21 RX ADMIN — CARVEDILOL 12.5 MG: 12.5 TABLET, FILM COATED ORAL at 17:06

## 2025-04-21 RX ADMIN — PANTOPRAZOLE SODIUM 40 MG: 40 TABLET, DELAYED RELEASE ORAL at 10:37

## 2025-04-21 RX ADMIN — BUSPIRONE HYDROCHLORIDE 10 MG: 10 TABLET ORAL at 10:38

## 2025-04-21 RX ADMIN — ATORVASTATIN CALCIUM 80 MG: 40 TABLET, FILM COATED ORAL at 17:07

## 2025-04-21 RX ADMIN — CYANOCOBALAMIN TAB 500 MCG 1000 MCG: 500 TAB at 14:24

## 2025-04-21 RX ADMIN — CLOPIDOGREL BISULFATE 75 MG: 75 TABLET ORAL at 10:38

## 2025-04-21 RX ADMIN — GABAPENTIN 800 MG: 300 CAPSULE ORAL at 10:36

## 2025-04-21 RX ADMIN — CARVEDILOL 12.5 MG: 12.5 TABLET, FILM COATED ORAL at 10:38

## 2025-04-21 RX ADMIN — UMECLIDINIUM 1 PUFF: 62.5 AEROSOL, POWDER ORAL at 14:36

## 2025-04-21 RX ADMIN — BUSPIRONE HYDROCHLORIDE 10 MG: 10 TABLET ORAL at 17:06

## 2025-04-21 RX ADMIN — ASPIRIN 81 MG CHEWABLE TABLET 324 MG: 81 TABLET CHEWABLE at 09:04

## 2025-04-21 RX ADMIN — ACETAMINOPHEN 650 MG: 325 TABLET, FILM COATED ORAL at 22:53

## 2025-04-21 RX ADMIN — HEPARIN SODIUM 5000 UNITS: 5000 INJECTION INTRAVENOUS; SUBCUTANEOUS at 22:53

## 2025-04-21 RX ADMIN — FLUTICASONE FUROATE AND VILANTEROL TRIFENATATE 1 PUFF: 100; 25 POWDER RESPIRATORY (INHALATION) at 14:36

## 2025-04-21 RX ADMIN — ISOSORBIDE MONONITRATE 30 MG: 30 TABLET, EXTENDED RELEASE ORAL at 10:37

## 2025-04-21 RX ADMIN — ESCITALOPRAM OXALATE 10 MG: 10 TABLET ORAL at 10:37

## 2025-04-21 RX ADMIN — Medication 1000 UNITS: at 14:24

## 2025-04-21 RX ADMIN — ACETAMINOPHEN 975 MG: 325 TABLET, FILM COATED ORAL at 10:54

## 2025-04-21 RX ADMIN — GABAPENTIN 800 MG: 400 CAPSULE ORAL at 17:07

## 2025-04-21 RX ADMIN — HEPARIN SODIUM 5000 UNITS: 5000 INJECTION INTRAVENOUS; SUBCUTANEOUS at 14:24

## 2025-04-21 NOTE — PLAN OF CARE
Problem: Potential for Falls  Goal: Patient will remain free of falls  Description: INTERVENTIONS:- Educate patient/family on patient safety including physical limitations- Instruct patient to call for assistance with activity - Consult OT/PT to assist with strengthening/mobility - Keep Call bell within reach- Keep bed low and locked with side rails adjusted as appropriate- Keep care items and personal belongings within reach- Initiate and maintain comfort rounds- Make Fall Risk Sign visible to staff- Offer Toileting every 2 Hours, in advance of need- Initiate/Maintain bed alarm- Obtain necessary fall risk management equipment: socks- Apply yellow socks and bracelet for high fall risk patients- Consider moving patient to room near nurses station  INTERVENTIONS:- Educate patient/family on patient safety including physical limitations- Instruct patient to call for assistance with activity - Consult OT/PT to assist with strengthening/mobility - Keep Call bell within reach- Keep bed low and locked with side rails adjusted as appropriate- Keep care items and personal belongings within reach- Initiate and maintain comfort rounds- Make Fall Risk Sign visible to staff- Offer Toileting every 2 Hours, in advance of need- Initiate/Maintain bed alarm- Obtain necessary fall risk management equipment: socks- Apply yellow socks and bracelet for high fall risk patients- Consider moving patient to room near nurses station  Outcome: Progressing     Problem: PAIN - ADULT  Goal: Verbalizes/displays adequate comfort level or baseline comfort level  Description: Interventions:- Encourage patient to monitor pain and request assistance- Assess pain using appropriate pain scale- Administer analgesics based on type and severity of pain and evaluate response- Implement non-pharmacological measures as appropriate and evaluate response- Consider cultural and social influences on pain and pain management- Notify physician/advanced practitioner  if interventions unsuccessful or patient reports new pain  Outcome: Progressing     Problem: INFECTION - ADULT  Goal: Absence or prevention of progression during hospitalization  Description: INTERVENTIONS:- Assess and monitor for signs and symptoms of infection- Monitor lab/diagnostic results- Monitor all insertion sites, i.e. indwelling lines, tubes, and drains- Monitor endotracheal if appropriate and nasal secretions for changes in amount and color- [place] appropriate cooling/warming therapies per order- Administer medications as ordered- Instruct and encourage patient and family to use good hand hygiene technique- Identify and instruct in appropriate isolation precautions for identified infection/condition  Outcome: Progressing     Problem: SAFETY ADULT  Goal: Patient will remain free of falls  Description: INTERVENTIONS:- Educate patient/family on patient safety including physical limitations- Instruct patient to call for assistance with activity - Consult OT/PT to assist with strengthening/mobility - Keep Call bell within reach- Keep bed low and locked with side rails adjusted as appropriate- Keep care items and personal belongings within reach- Initiate and maintain comfort rounds- Make Fall Risk Sign visible to staff- Offer Toileting every 2 Hours, in advance of need- Initiate/Maintain bed alarm- Obtain necessary fall risk management equipment: socks- Apply yellow socks and bracelet for high fall risk patients- Consider moving patient to room near nurses station  INTERVENTIONS:- Educate patient/family on patient safety including physical limitations- Instruct patient to call for assistance with activity - Consult OT/PT to assist with strengthening/mobility - Keep Call bell within reach- Keep bed low and locked with side rails adjusted as appropriate- Keep care items and personal belongings within reach- Initiate and maintain comfort rounds- Make Fall Risk Sign visible to staff- Offer Toileting every 2  Hours, in advance of need- Initiate/Maintain bed alarm- Obtain necessary fall risk management equipment: socks- Apply yellow socks and bracelet for high fall risk patients- Consider moving patient to room near nurses station  Outcome: Progressing  Goal: Maintain or return to baseline ADL function  Description: INTERVENTIONS:-  Assess patient's ability to carry out ADLs; assess patient's baseline for ADL function and identify physical deficits which impact ability to perform ADLs (bathing, care of mouth/teeth, toileting, grooming, dressing, etc.)- Assess/evaluate cause of self-care deficits - Assess range of motion- Assess patient's mobility; develop plan if impaired- Assess patient's need for assistive devices and provide as appropriate- Encourage maximum independence but intervene and supervise when necessary- Involve family in performance of ADLs- Assess for home care needs following discharge - Consider OT consult to assist with ADL evaluation and planning for discharge- Provide patient education as appropriate  Outcome: Progressing  Goal: Maintains/Returns to pre admission functional level  Description: INTERVENTIONS:- Perform AM-PAC 6 Click Basic Mobility/ Daily Activity assessment daily.- Set and communicate daily mobility goal to care team and patient/family/caregiver. - Collaborate with rehabilitation services on mobility goals if consulted- Perform Range of Motion 3 times a day.- Reposition patient every 2 hours.- Dangle patient 3 times a day- Stand patient 3 times a day- Ambulate patient 3 times a day- Out of bed to chair 3 times a day - Out of bed for meals 3 times a day- Out of bed for toileting- Record patient progress and toleration of activity level   Outcome: Progressing     Problem: DISCHARGE PLANNING  Goal: Discharge to home or other facility with appropriate resources  Description: INTERVENTIONS:- Identify barriers to discharge w/patient and caregiver- Arrange for needed discharge resources and  transportation as appropriate- Identify discharge learning needs (meds, wound care, etc.)- Arrange for interpretive services to assist at discharge as needed- Refer to Case Management Department for coordinating discharge planning if the patient needs post-hospital services based on physician/advanced practitioner order or complex needs related to functional status, cognitive ability, or social support system  Outcome: Progressing

## 2025-04-21 NOTE — ASSESSMENT & PLAN NOTE
Mildly elevated blood pressure since admission, improved  Continue Coreg 12.5 mg twice daily  Monitor blood pressure

## 2025-04-21 NOTE — ASSESSMENT & PLAN NOTE
Patient with a history of MI  -without occlusive disease per cardiology notes.  Patient maintained on carvedilol, Plavix, isosorbide, Lipitor outpatient

## 2025-04-21 NOTE — ASSESSMENT & PLAN NOTE
Last echocardiogram in 2023 showed EF of 65%, grade 1 diastolic dysfunction, trace MR mild annular calcification, mild TR.  Troponin 1835<2873<2439  EKG normal sinus rhythm -no ischemic changes  Patient reports mild intermittent chest pressure, she states it feels as though hand is being placed on chest wall, denies any associated symptoms or any relieving symptoms.  Echocardiogram pending
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
## 2025-04-21 NOTE — ASSESSMENT & PLAN NOTE
CXR: Increased patchy density in the right lower lobe with probable pleural effusion. Pneumonia is not excluded. Stable right upper lobe consolidation with underlying bullous change compared with the recent CT, improved from the prior x-ray.
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
## 2025-04-21 NOTE — ED NOTES
Patient requesting to eat prior to taking daily medications. Diet order placed and dietary called for ela Colon RN  04/21/25 0907       Mel Colon RN  04/21/25 0908
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
## 2025-04-22 ENCOUNTER — TELEPHONE (OUTPATIENT)
Age: 70
End: 2025-04-22

## 2025-04-22 ENCOUNTER — APPOINTMENT (OUTPATIENT)
Dept: NON INVASIVE DIAGNOSTICS | Facility: HOSPITAL | Age: 70
DRG: 315 | End: 2025-04-22
Payer: MEDICARE

## 2025-04-22 ENCOUNTER — TELEPHONE (OUTPATIENT)
Dept: UROLOGY | Facility: CLINIC | Age: 70
End: 2025-04-22

## 2025-04-22 PROBLEM — R09.02 HYPOXIA: Status: ACTIVE | Noted: 2025-04-22

## 2025-04-22 LAB
ANION GAP SERPL CALCULATED.3IONS-SCNC: 8 MMOL/L (ref 4–13)
AORTIC ROOT: 3.4 CM
ASCENDING AORTA: 3.3 CM
ATRIAL RATE: 70 BPM
ATRIAL RATE: 74 BPM
ATRIAL RATE: 79 BPM
BSA FOR ECHO PROCEDURE: 1.81 M2
BUN SERPL-MCNC: 14 MG/DL (ref 5–25)
CALCIUM SERPL-MCNC: 8.7 MG/DL (ref 8.4–10.2)
CHLORIDE SERPL-SCNC: 107 MMOL/L (ref 96–108)
CO2 SERPL-SCNC: 23 MMOL/L (ref 21–32)
CREAT SERPL-MCNC: 1.06 MG/DL (ref 0.6–1.3)
DME PARACHUTE DELIVERY DATE REQUESTED: NORMAL
DME PARACHUTE ITEM DESCRIPTION: NORMAL
DME PARACHUTE ORDER STATUS: NORMAL
DME PARACHUTE SUPPLIER NAME: NORMAL
DME PARACHUTE SUPPLIER PHONE: NORMAL
E WAVE DECELERATION TIME: 180 MS
E/A RATIO: 0.58
ERYTHROCYTE [DISTWIDTH] IN BLOOD BY AUTOMATED COUNT: 15.5 % (ref 11.6–15.1)
FRACTIONAL SHORTENING: 36 (ref 28–44)
GFR SERPL CREATININE-BSD FRML MDRD: 52 ML/MIN/1.73SQ M
GLUCOSE SERPL-MCNC: 118 MG/DL (ref 65–140)
HCT VFR BLD AUTO: 34.9 % (ref 34.8–46.1)
HGB BLD-MCNC: 11.3 G/DL (ref 11.5–15.4)
INTERVENTRICULAR SEPTUM IN DIASTOLE (PARASTERNAL SHORT AXIS VIEW): 1.1 CM
INTERVENTRICULAR SEPTUM: 1.1 CM (ref 0.6–1.1)
LAAS-AP2: 16 CM2
LAAS-AP4: 20.5 CM2
LEFT ATRIUM SIZE: 3.6 CM
LEFT ATRIUM VOLUME (MOD BIPLANE): 47 ML
LEFT ATRIUM VOLUME INDEX (MOD BIPLANE): 26 ML/M2
LEFT INTERNAL DIMENSION IN SYSTOLE: 2.9 CM (ref 2.1–4)
LEFT VENTRICLE DIASTOLIC VOLUME (MOD BIPLANE): 127 ML
LEFT VENTRICLE DIASTOLIC VOLUME INDEX (MOD BIPLANE): 70.2 ML/M2
LEFT VENTRICLE SYSTOLIC VOLUME (MOD BIPLANE): 66 ML
LEFT VENTRICLE SYSTOLIC VOLUME INDEX (MOD BIPLANE): 36.5 ML/M2
LEFT VENTRICULAR INTERNAL DIMENSION IN DIASTOLE: 4.5 CM (ref 3.5–6)
LEFT VENTRICULAR POSTERIOR WALL IN END DIASTOLE: 1 CM
LEFT VENTRICULAR STROKE VOLUME: 64 ML
LV EF BIPLANE MOD: 48 %
LV EF US.2D.A4C+ESTIMATED: 42 %
LVSV (TEICH): 64 ML
MCH RBC QN AUTO: 31.8 PG (ref 26.8–34.3)
MCHC RBC AUTO-ENTMCNC: 32.4 G/DL (ref 31.4–37.4)
MCV RBC AUTO: 98 FL (ref 82–98)
MV E'TISSUE VEL-LAT: 7 CM/S
MV E'TISSUE VEL-SEP: 7 CM/S
MV PEAK A VEL: 0.99 M/S
MV PEAK E VEL: 57 CM/S
MV STENOSIS PRESSURE HALF TIME: 52 MS
MV VALVE AREA P 1/2 METHOD: 4.2
P AXIS: 52 DEGREES
P AXIS: 59 DEGREES
P AXIS: 64 DEGREES
PLATELET # BLD AUTO: 335 THOUSANDS/UL (ref 149–390)
PMV BLD AUTO: 8.4 FL (ref 8.9–12.7)
POTASSIUM SERPL-SCNC: 4 MMOL/L (ref 3.5–5.3)
PR INTERVAL: 138 MS
PR INTERVAL: 142 MS
PR INTERVAL: 142 MS
QRS AXIS: 47 DEGREES
QRS AXIS: 60 DEGREES
QRS AXIS: 62 DEGREES
QRSD INTERVAL: 76 MS
QRSD INTERVAL: 78 MS
QRSD INTERVAL: 82 MS
QT INTERVAL: 408 MS
QT INTERVAL: 426 MS
QT INTERVAL: 432 MS
QTC INTERVAL: 466 MS
QTC INTERVAL: 467 MS
QTC INTERVAL: 472 MS
RA PRESSURE ESTIMATED: 3 MMHG
RBC # BLD AUTO: 3.55 MILLION/UL (ref 3.81–5.12)
RIGHT ATRIUM AREA SYSTOLE A4C: 14.7 CM2
RIGHT VENTRICLE ID DIMENSION: 3.3 CM
RV PSP: 40 MMHG
SL CV LEFT ATRIUM LENGTH A2C: 5.5 CM
SL CV LV EF: 45
SL CV PED ECHO LEFT VENTRICLE DIASTOLIC VOLUME (MOD BIPLANE) 2D: 95 ML
SL CV PED ECHO LEFT VENTRICLE SYSTOLIC VOLUME (MOD BIPLANE) 2D: 31 ML
SODIUM SERPL-SCNC: 138 MMOL/L (ref 135–147)
T WAVE AXIS: 31 DEGREES
T WAVE AXIS: 38 DEGREES
T WAVE AXIS: 41 DEGREES
TR MAX PG: 37 MMHG
TR PEAK VELOCITY: 3.1 M/S
TRICUSPID ANNULAR PLANE SYSTOLIC EXCURSION: 2.1 CM
TRICUSPID VALVE PEAK REGURGITATION VELOCITY: 3.06 M/S
VENTRICULAR RATE: 70 BPM
VENTRICULAR RATE: 74 BPM
VENTRICULAR RATE: 79 BPM
WBC # BLD AUTO: 7.08 THOUSAND/UL (ref 4.31–10.16)

## 2025-04-22 PROCEDURE — 93010 ELECTROCARDIOGRAM REPORT: CPT | Performed by: INTERNAL MEDICINE

## 2025-04-22 PROCEDURE — 99232 SBSQ HOSP IP/OBS MODERATE 35: CPT | Performed by: INTERNAL MEDICINE

## 2025-04-22 PROCEDURE — 85027 COMPLETE CBC AUTOMATED: CPT | Performed by: INTERNAL MEDICINE

## 2025-04-22 PROCEDURE — 93306 TTE W/DOPPLER COMPLETE: CPT

## 2025-04-22 PROCEDURE — 94761 N-INVAS EAR/PLS OXIMETRY MLT: CPT

## 2025-04-22 PROCEDURE — 80048 BASIC METABOLIC PNL TOTAL CA: CPT | Performed by: INTERNAL MEDICINE

## 2025-04-22 PROCEDURE — 93356 MYOCRD STRAIN IMG SPCKL TRCK: CPT | Performed by: INTERNAL MEDICINE

## 2025-04-22 PROCEDURE — 93356 MYOCRD STRAIN IMG SPCKL TRCK: CPT

## 2025-04-22 PROCEDURE — 93306 TTE W/DOPPLER COMPLETE: CPT | Performed by: INTERNAL MEDICINE

## 2025-04-22 RX ORDER — CARVEDILOL 12.5 MG/1
25 TABLET ORAL 2 TIMES DAILY
Status: DISCONTINUED | OUTPATIENT
Start: 2025-04-22 | End: 2025-04-23

## 2025-04-22 RX ORDER — FUROSEMIDE 10 MG/ML
80 INJECTION INTRAMUSCULAR; INTRAVENOUS ONCE
Status: COMPLETED | OUTPATIENT
Start: 2025-04-22 | End: 2025-04-22

## 2025-04-22 RX ORDER — LISINOPRIL 5 MG/1
2.5 TABLET ORAL DAILY
Status: DISCONTINUED | OUTPATIENT
Start: 2025-04-22 | End: 2025-04-23

## 2025-04-22 RX ORDER — ISOSORBIDE MONONITRATE 30 MG/1
30 TABLET, EXTENDED RELEASE ORAL DAILY
Status: DISCONTINUED | OUTPATIENT
Start: 2025-04-22 | End: 2025-04-25 | Stop reason: HOSPADM

## 2025-04-22 RX ORDER — ALBUMIN (HUMAN) 12.5 G/50ML
12.5 SOLUTION INTRAVENOUS ONCE
Status: COMPLETED | OUTPATIENT
Start: 2025-04-22 | End: 2025-04-22

## 2025-04-22 RX ORDER — POTASSIUM CHLORIDE 1500 MG/1
40 TABLET, EXTENDED RELEASE ORAL ONCE
Status: COMPLETED | OUTPATIENT
Start: 2025-04-22 | End: 2025-04-22

## 2025-04-22 RX ADMIN — ISOSORBIDE MONONITRATE 30 MG: 30 TABLET, EXTENDED RELEASE ORAL at 15:39

## 2025-04-22 RX ADMIN — HEPARIN SODIUM 5000 UNITS: 5000 INJECTION INTRAVENOUS; SUBCUTANEOUS at 22:33

## 2025-04-22 RX ADMIN — BUSPIRONE HYDROCHLORIDE 10 MG: 10 TABLET ORAL at 17:12

## 2025-04-22 RX ADMIN — CARVEDILOL 25 MG: 12.5 TABLET, FILM COATED ORAL at 17:13

## 2025-04-22 RX ADMIN — ALBUMIN (HUMAN) 12.5 G: 0.25 INJECTION, SOLUTION INTRAVENOUS at 22:33

## 2025-04-22 RX ADMIN — HEPARIN SODIUM 5000 UNITS: 5000 INJECTION INTRAVENOUS; SUBCUTANEOUS at 15:34

## 2025-04-22 RX ADMIN — PANTOPRAZOLE SODIUM 40 MG: 40 TABLET, DELAYED RELEASE ORAL at 06:28

## 2025-04-22 RX ADMIN — Medication 1000 UNITS: at 09:45

## 2025-04-22 RX ADMIN — ATORVASTATIN CALCIUM 80 MG: 40 TABLET, FILM COATED ORAL at 15:31

## 2025-04-22 RX ADMIN — ACETAMINOPHEN 650 MG: 325 TABLET, FILM COATED ORAL at 09:46

## 2025-04-22 RX ADMIN — GABAPENTIN 800 MG: 400 CAPSULE ORAL at 17:13

## 2025-04-22 RX ADMIN — UMECLIDINIUM 1 PUFF: 62.5 AEROSOL, POWDER ORAL at 09:51

## 2025-04-22 RX ADMIN — ESCITALOPRAM OXALATE 20 MG: 20 TABLET ORAL at 09:46

## 2025-04-22 RX ADMIN — FLUTICASONE FUROATE AND VILANTEROL TRIFENATATE 1 PUFF: 100; 25 POWDER RESPIRATORY (INHALATION) at 09:51

## 2025-04-22 RX ADMIN — FUROSEMIDE 80 MG: 10 INJECTION, SOLUTION INTRAVENOUS at 18:41

## 2025-04-22 RX ADMIN — POTASSIUM CHLORIDE 40 MEQ: 1500 TABLET, EXTENDED RELEASE ORAL at 15:35

## 2025-04-22 RX ADMIN — CLOPIDOGREL 75 MG: 75 TABLET ORAL at 09:45

## 2025-04-22 RX ADMIN — BUSPIRONE HYDROCHLORIDE 10 MG: 10 TABLET ORAL at 09:44

## 2025-04-22 RX ADMIN — LISINOPRIL 2.5 MG: 5 TABLET ORAL at 15:31

## 2025-04-22 RX ADMIN — CARVEDILOL 25 MG: 12.5 TABLET, FILM COATED ORAL at 10:10

## 2025-04-22 RX ADMIN — CYANOCOBALAMIN TAB 500 MCG 1000 MCG: 500 TAB at 09:44

## 2025-04-22 RX ADMIN — HEPARIN SODIUM 5000 UNITS: 5000 INJECTION INTRAVENOUS; SUBCUTANEOUS at 06:28

## 2025-04-22 NOTE — PLAN OF CARE
Problem: Potential for Falls  Goal: Patient will remain free of falls  Description: INTERVENTIONS:- Educate patient/family on patient safety including physical limitations- Instruct patient to call for assistance with activity - Consult OT/PT to assist with strengthening/mobility - Keep Call bell within reach- Keep bed low and locked with side rails adjusted as appropriate- Keep care items and personal belongings within reach- Initiate and maintain comfort rounds- Make Fall Risk Sign visible to staff- Offer Toileting every 2 Hours, in advance of need- Initiate/Maintain bed alarm- Obtain necessary fall risk management equipment: socks- Apply yellow socks and bracelet for high fall risk patients- Consider moving patient to room near nurses station  INTERVENTIONS:- Educate patient/family on patient safety including physical limitations- Instruct patient to call for assistance with activity - Consult OT/PT to assist with strengthening/mobility - Keep Call bell within reach- Keep bed low and locked with side rails adjusted as appropriate- Keep care items and personal belongings within reach- Initiate and maintain comfort rounds- Make Fall Risk Sign visible to staff- Offer Toileting every 2 Hours, in advance of need- Initiate/Maintain bed alarm- Obtain necessary fall risk management equipment: socks- Apply yellow socks and bracelet for high fall risk patients- Consider moving patient to room near nurses station  Outcome: Progressing     Problem: PAIN - ADULT  Goal: Verbalizes/displays adequate comfort level or baseline comfort level  Description: Interventions:- Encourage patient to monitor pain and request assistance- Assess pain using appropriate pain scale- Administer analgesics based on type and severity of pain and evaluate response- Implement non-pharmacological measures as appropriate and evaluate response- Consider cultural and social influences on pain and pain management- Notify physician/advanced practitioner  if interventions unsuccessful or patient reports new pain  Outcome: Progressing     Problem: SAFETY ADULT  Goal: Patient will remain free of falls  Description: INTERVENTIONS:- Educate patient/family on patient safety including physical limitations- Instruct patient to call for assistance with activity - Consult OT/PT to assist with strengthening/mobility - Keep Call bell within reach- Keep bed low and locked with side rails adjusted as appropriate- Keep care items and personal belongings within reach- Initiate and maintain comfort rounds- Make Fall Risk Sign visible to staff- Offer Toileting every 2 Hours, in advance of need- Initiate/Maintain bed alarm- Obtain necessary fall risk management equipment: socks- Apply yellow socks and bracelet for high fall risk patients- Consider moving patient to room near nurses station  INTERVENTIONS:- Educate patient/family on patient safety including physical limitations- Instruct patient to call for assistance with activity - Consult OT/PT to assist with strengthening/mobility - Keep Call bell within reach- Keep bed low and locked with side rails adjusted as appropriate- Keep care items and personal belongings within reach- Initiate and maintain comfort rounds- Make Fall Risk Sign visible to staff- Offer Toileting every 2 Hours, in advance of need- Initiate/Maintain bed alarm- Obtain necessary fall risk management equipment: socks- Apply yellow socks and bracelet for high fall risk patients- Consider moving patient to room near nurses station  Outcome: Progressing  Goal: Maintain or return to baseline ADL function  Description: INTERVENTIONS:-  Assess patient's ability to carry out ADLs; assess patient's baseline for ADL function and identify physical deficits which impact ability to perform ADLs (bathing, care of mouth/teeth, toileting, grooming, dressing, etc.)- Assess/evaluate cause of self-care deficits - Assess range of motion- Assess patient's mobility; develop plan if  impaired- Assess patient's need for assistive devices and provide as appropriate- Encourage maximum independence but intervene and supervise when necessary- Involve family in performance of ADLs- Assess for home care needs following discharge - Consider OT consult to assist with ADL evaluation and planning for discharge- Provide patient education as appropriate  Outcome: Progressing  Goal: Maintains/Returns to pre admission functional level  Description: INTERVENTIONS:- Perform AM-PAC 6 Click Basic Mobility/ Daily Activity assessment daily.- Set and communicate daily mobility goal to care team and patient/family/caregiver. - Collaborate with rehabilitation services on mobility goals if consulted- Perform Range of Motion 3 times a day.- Reposition patient every 2 hours.- Dangle patient 3 times a day- Stand patient 3 times a day- Ambulate patient 3 times a day- Out of bed to chair 3 times a day - Out of bed for meals 3 times a day- Out of bed for toileting- Record patient progress and toleration of activity level   Outcome: Progressing     Problem: DISCHARGE PLANNING  Goal: Discharge to home or other facility with appropriate resources  Description: INTERVENTIONS:- Identify barriers to discharge w/patient and caregiver- Arrange for needed discharge resources and transportation as appropriate- Identify discharge learning needs (meds, wound care, etc.)- Arrange for interpretive services to assist at discharge as needed- Refer to Case Management Department for coordinating discharge planning if the patient needs post-hospital services based on physician/advanced practitioner order or complex needs related to functional status, cognitive ability, or social support system  Outcome: Progressing     Problem: Knowledge Deficit  Goal: Patient/family/caregiver demonstrates understanding of disease process, treatment plan, medications, and discharge instructions  Description: Complete learning assessment and assess knowledge  base.Interventions:- Provide teaching at level of understanding- Provide teaching via preferred learning methods  Outcome: Progressing

## 2025-04-22 NOTE — ASSESSMENT & PLAN NOTE
Last echocardiogram in 2023 showed EF of 65%, grade 1 diastolic dysfunction, trace MR mild annular calcification, mild TR.  Troponin 1835<2873<2439  EKG normal sinus rhythm -no ischemic changes  Patient reports mild intermittent chest pressure, she states it feels as though hand is being placed on chest wall, denies any associated symptoms or any relieving symptoms.  Echocardiogram: evidence of Takotsubo's cardiomyopathy -formal read pending

## 2025-04-22 NOTE — ASSESSMENT & PLAN NOTE
Lab Results   Component Value Date    HSTNI0 1,835 (H) 04/21/2025    HSTNI2 2,873 (H) 04/21/2025    HSTNID2 1,038 (H) 04/21/2025    HSTNI4 2,439 (H) 04/21/2025    HSTNID4 604 (H) 04/21/2025      Patient reported some chest heaviness however EKG without ischemic findings.  In the setting of Takotsubo cardiomyopathy  Coreg was increased to 25 mg daily, was started on lisinopril 2.5 mg.  Continue Imdur.  Status post one-time dose IV Lasix today  Patient will follow-up with cardiology outpatient and will have a repeat echo in 1 month.
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
## 2025-04-22 NOTE — CASE MANAGEMENT
Case Management Progress Note    Patient name Geovanna Leal  Location /-01 MRN [id number]  : 1955 Date 2025       LOS (days): 0  Geometric Mean LOS (GMLOS) (days):   Days to GMLOS:        OBJECTIVE:        Current admission status: Inpatient  Preferred Pharmacy:   [id number]  [facility] - [address]  [address]  [place] [address]  Phone: [phone] Fax: [phone]    Primary Care Provider: Leelee Garcia DO    Primary Insurance: MEDICARE  Secondary Insurance: AETNA    PROGRESS NOTE:  Referral sent to [facility] via [place] for home O2 and nebulizer. Await determination. CM to follow.
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
## 2025-04-22 NOTE — ASSESSMENT & PLAN NOTE
Patient with a history of MI  -without occlusive disease per cardiology notes.  Patient maintained on carvedilol, Plavix, isosorbide, Lipitor outpatient    Universal Safety Interventions

## 2025-04-22 NOTE — ASSESSMENT & PLAN NOTE
Mildly elevated blood pressure since admission, improved  Coreg was increased to 25 mg daily per cardiology.  Also she was started on lisinopril 2.5 mg  Continue Imdur  Monitor blood pressure

## 2025-04-22 NOTE — RESPIRATORY THERAPY NOTE
Home Oxygen Qualifying Test     Patient name: Geovanna Leal        : 1955   Date of Test:  2025  Diagnosis:    Home Oxygen Test:    **Medicare Guidelines require item(s) 1-5 on all ambulatory patients or 1 and 2 on non-ambulatory patients.    1. Baseline SPO2 on Room Air at rest 89 %   If <= 88% on Room Air add O2 via [place] to obtain SpO2 >=88%. If LPM needed, document LPM  needed to reach =>88%    SPO2 during exertion on Room Air 87  %  During exertion monitor SPO2. If SPO2 increases >=89%, do not add supplemental oxygen    SPO2 on Oxygen at Rest : N/A    SPO2 during exertion on Oxygen 90 % at 1 LPM    Test performed during exertion activity.      [x]  Supplemental Home Oxygen is indicated.    []  Client does not qualify for home oxygen.        Abimael Perez, RT

## 2025-04-22 NOTE — PROGRESS NOTES
Progress Note - Cardiology   Name: Geovanna Leal 69 y.o. female I MRN: [id number]  Unit/Bed#: -01 I Date of Admission: 4/21/2025   Date of Service: 4/22/2025 I Hospital Day: 0    Assessment & Plan  Elevated troponin  Last echocardiogram in 2023 showed EF of 65%, grade 1 diastolic dysfunction, trace MR mild annular calcification, mild TR.  Troponin [id number]  EKG normal sinus rhythm -no ischemic changes  Patient reports mild intermittent chest pressure, she states it feels as though hand is being placed on chest wall, denies any associated symptoms or any relieving symptoms.  Echocardiogram: evidence of Takotsubo's cardiomyopathy -formal read pending  Essential hypertension  Treated with carvedilol - increased today to 25 mg   Dyslipidemia  Treated with atorvastatin 80 mg daily  COPD (chronic obstructive pulmonary disease) (HCC)  CXR: Increased patchy density in the right lower lobe with probable pleural effusion. Pneumonia is not excluded. Stable right upper lobe consolidation with underlying bullous change compared with the recent CT, improved from the prior x-ray.  Coronary artery disease involving native coronary artery of native heart without angina pectoris  Patient with a history of MI  -without occlusive disease per cardiology notes.  Patient maintained on carvedilol, Plavix, isosorbide, Lipitor outpatient   Plan /recommendations  Continue Imdur, Lipitor, Plavix upon discharge  Carvedilol increased to 25 mg twice daily today -continue  Start Lisinopril 2.5 mg  daily   Repeat echo in one month  Will make follow up with Dr Eduardo on May 12 th       Subjective   Patient remains with intermittent chest pressure - less than yesterday       Objective :  Temp:  [97.8 °F (36.6 °C)-98.7 °F (37.1 °C)] 98.7 °F (37.1 °C)  HR:  [72-89] 82  BP: (140-146)/(82-88) 140/82  Resp:  [12-21] 12  SpO2:  [89 %-95 %] 89 %  O2 Device: Nasal cannula  Nasal Cannula O2 Flow Rate (L/min):  [2 L/min] 2 L/min  Orthostatic Blood  Pressures      Flowsheet Row Most Recent Value   Blood Pressure 140/82 filed at 04/22/2025 0935   Patient Position - Orthostatic VS Lying filed at 04/22/2025 0716          First Weight: Weight - Scale: 74 kg (163 lb 3.2 oz) (04/21/25 1140)  Vitals:    04/21/25 1140 04/22/25 0935   Weight: 74 kg (163 lb 3.2 oz) 73.9 kg (163 lb)     Physical Exam  Constitutional:       Appearance: Normal appearance. She is obese.   HENT:      Head: Normocephalic and atraumatic.      Mouth/Throat:      Mouth: Mucous membranes are dry.   Cardiovascular:      Rate and Rhythm: Normal rate and regular rhythm.      Pulses: Normal pulses.      Heart sounds: Normal heart sounds.   Pulmonary:      Effort: Pulmonary effort is normal.      Breath sounds: Rhonchi present.   Abdominal:      General: Bowel sounds are normal.      Palpations: Abdomen is soft.   Musculoskeletal:      Cervical back: Normal range of motion.      Right lower leg: No edema.      Left lower leg: No edema.   Skin:     General: Skin is warm and dry.      Capillary Refill: Capillary refill takes less than 2 seconds.   Neurological:      General: No focal deficit present.      Mental Status: She is alert and oriented to person, place, and time. Mental status is at baseline.   Psychiatric:         Mood and Affect: Mood normal.         Lab Results: I have reviewed the following results:  Results from last 7 days   Lab Units 04/22/25  0636 04/21/25  0809   WBC Thousand/uL 7.08 8.80   HEMOGLOBIN g/dL 11.3* 12.5   HEMATOCRIT % 34.9 38.4   PLATELETS Thousands/uL 335 358     Results from last 7 days   Lab Units 04/22/25  0636 04/21/25  0809   POTASSIUM mmol/L 4.0 3.4*   CHLORIDE mmol/L 107 104   CO2 mmol/L 23 24   BUN mg/dL 14 12   CREATININE mg/dL 1.06 0.97   CALCIUM mg/dL 8.7 9.3         Lab Results   Component Value Date    HGBA1C 5.4 12/07/2024     Lab Results   Component Value Date    CKTOTAL 76 02/12/2025       Imaging Results Review: I reviewed radiology reports from this  admission including: Echocardiogram.  Other Study Results Review: EKG was reviewed.     VTE Pharmacologic Prophylaxis: VTE covered by:  heparin (porcine), Subcutaneous, 5,000 Units at 04/22/25 0628     VTE Mechanical Prophylaxis: sequential compression device

## 2025-04-22 NOTE — TELEPHONE ENCOUNTER
Please review.    Patient requesting refill and according to Medication chart.    tamsulosin (FLOMAX) 0.4 mg [[id number]]  DISCONTINUED    Order Details    Dose: 0.4 mg Route: Oral Frequency: Daily with dinner   Dispense Quantity: 30 capsule Refills: 1          Sig: Take 1 capsule (0.4 mg total) by mouth daily with dinner         Start Date: 02/25/25 End Date: 04/07/25   Discontinued by: Aaron Coyle MD on 4/7/2025 16:04

## 2025-04-22 NOTE — PLAN OF CARE
Problem: Potential for Falls  Goal: Patient will remain free of falls  Description: INTERVENTIONS:- Educate patient/family on patient safety including physical limitations- Instruct patient to call for assistance with activity - Consult OT/PT to assist with strengthening/mobility - Keep Call bell within reach- Keep bed low and locked with side rails adjusted as appropriate- Keep care items and personal belongings within reach- Initiate and maintain comfort rounds- Make Fall Risk Sign visible to staff- Offer Toileting every 2 Hours, in advance of need- Initiate/Maintain bed alarm- Obtain necessary fall risk management equipment: socks- Apply yellow socks and bracelet for high fall risk patients- Consider moving patient to room near nurses station  INTERVENTIONS:- Educate patient/family on patient safety including physical limitations- Instruct patient to call for assistance with activity - Consult OT/PT to assist with strengthening/mobility - Keep Call bell within reach- Keep bed low and locked with side rails adjusted as appropriate- Keep care items and personal belongings within reach- Initiate and maintain comfort rounds- Make Fall Risk Sign visible to staff- Offer Toileting every 2 Hours, in advance of need- Initiate/Maintain bed alarm- Obtain necessary fall risk management equipment: socks- Apply yellow socks and bracelet for high fall risk patients- Consider moving patient to room near nurses station  Outcome: Progressing     Problem: PAIN - ADULT  Goal: Verbalizes/displays adequate comfort level or baseline comfort level  Description: Interventions:- Encourage patient to monitor pain and request assistance- Assess pain using appropriate pain scale- Administer analgesics based on type and severity of pain and evaluate response- Implement non-pharmacological measures as appropriate and evaluate response- Consider cultural and social influences on pain and pain management- Notify physician/advanced practitioner  if interventions unsuccessful or patient reports new pain  Outcome: Progressing     Problem: INFECTION - ADULT  Goal: Absence or prevention of progression during hospitalization  Description: INTERVENTIONS:- Assess and monitor for signs and symptoms of infection- Monitor lab/diagnostic results- Monitor all insertion sites, i.e. indwelling lines, tubes, and drains- Monitor endotracheal if appropriate and nasal secretions for changes in amount and color- [place] appropriate cooling/warming therapies per order- Administer medications as ordered- Instruct and encourage patient and family to use good hand hygiene technique- Identify and instruct in appropriate isolation precautions for identified infection/condition  Outcome: Progressing     Problem: SAFETY ADULT  Goal: Patient will remain free of falls  Description: INTERVENTIONS:- Educate patient/family on patient safety including physical limitations- Instruct patient to call for assistance with activity - Consult OT/PT to assist with strengthening/mobility - Keep Call bell within reach- Keep bed low and locked with side rails adjusted as appropriate- Keep care items and personal belongings within reach- Initiate and maintain comfort rounds- Make Fall Risk Sign visible to staff- Offer Toileting every 2 Hours, in advance of need- Initiate/Maintain bed alarm- Obtain necessary fall risk management equipment: socks- Apply yellow socks and bracelet for high fall risk patients- Consider moving patient to room near nurses station  INTERVENTIONS:- Educate patient/family on patient safety including physical limitations- Instruct patient to call for assistance with activity - Consult OT/PT to assist with strengthening/mobility - Keep Call bell within reach- Keep bed low and locked with side rails adjusted as appropriate- Keep care items and personal belongings within reach- Initiate and maintain comfort rounds- Make Fall Risk Sign visible to staff- Offer Toileting every 2  Hours, in advance of need- Initiate/Maintain bed alarm- Obtain necessary fall risk management equipment: socks- Apply yellow socks and bracelet for high fall risk patients- Consider moving patient to room near nurses station  Outcome: Progressing  Goal: Maintain or return to baseline ADL function  Description: INTERVENTIONS:-  Assess patient's ability to carry out ADLs; assess patient's baseline for ADL function and identify physical deficits which impact ability to perform ADLs (bathing, care of mouth/teeth, toileting, grooming, dressing, etc.)- Assess/evaluate cause of self-care deficits - Assess range of motion- Assess patient's mobility; develop plan if impaired- Assess patient's need for assistive devices and provide as appropriate- Encourage maximum independence but intervene and supervise when necessary- Involve family in performance of ADLs- Assess for home care needs following discharge - Consider OT consult to assist with ADL evaluation and planning for discharge- Provide patient education as appropriate  Outcome: Progressing  Goal: Maintains/Returns to pre admission functional level  Description: INTERVENTIONS:- Perform AM-PAC 6 Click Basic Mobility/ Daily Activity assessment daily.- Set and communicate daily mobility goal to care team and patient/family/caregiver. - Collaborate with rehabilitation services on mobility goals if consulted- Perform Range of Motion 3 times a day.- Reposition patient every 2 hours.- Dangle patient 3 times a day- Stand patient 3 times a day- Ambulate patient 3 times a day- Out of bed to chair 3 times a day - Out of bed for meals 3 times a day- Out of bed for toileting- Record patient progress and toleration of activity level   Outcome: Progressing     Problem: DISCHARGE PLANNING  Goal: Discharge to home or other facility with appropriate resources  Description: INTERVENTIONS:- Identify barriers to discharge w/patient and caregiver- Arrange for needed discharge resources and  transportation as appropriate- Identify discharge learning needs (meds, wound care, etc.)- Arrange for interpretive services to assist at discharge as needed- Refer to Case Management Department for coordinating discharge planning if the patient needs post-hospital services based on physician/advanced practitioner order or complex needs related to functional status, cognitive ability, or social support system  Outcome: Progressing     Problem: Knowledge Deficit  Goal: Patient/family/caregiver demonstrates understanding of disease process, treatment plan, medications, and discharge instructions  Description: Complete learning assessment and assess knowledge base.Interventions:- Provide teaching at level of understanding- Provide teaching via preferred learning methods  Outcome: Progressing

## 2025-04-23 LAB
ANION GAP SERPL CALCULATED.3IONS-SCNC: 10 MMOL/L (ref 4–13)
BUN SERPL-MCNC: 15 MG/DL (ref 5–25)
CALCIUM SERPL-MCNC: 9.3 MG/DL (ref 8.4–10.2)
CHLORIDE SERPL-SCNC: 102 MMOL/L (ref 96–108)
CO2 SERPL-SCNC: 26 MMOL/L (ref 21–32)
CREAT SERPL-MCNC: 1.19 MG/DL (ref 0.6–1.3)
DME PARACHUTE DELIVERY DATE ACTUAL: NORMAL
DME PARACHUTE DELIVERY DATE EXPECTED: NORMAL
DME PARACHUTE DELIVERY DATE REQUESTED: NORMAL
DME PARACHUTE ITEM DESCRIPTION: NORMAL
DME PARACHUTE ORDER STATUS: NORMAL
DME PARACHUTE SUPPLIER NAME: NORMAL
DME PARACHUTE SUPPLIER PHONE: NORMAL
GFR SERPL CREATININE-BSD FRML MDRD: 46 ML/MIN/1.73SQ M
GLUCOSE SERPL-MCNC: 100 MG/DL (ref 65–140)
MAGNESIUM SERPL-MCNC: 1.7 MG/DL (ref 1.9–2.7)
POTASSIUM SERPL-SCNC: 3.7 MMOL/L (ref 3.5–5.3)
SODIUM SERPL-SCNC: 138 MMOL/L (ref 135–147)

## 2025-04-23 PROCEDURE — 83735 ASSAY OF MAGNESIUM: CPT

## 2025-04-23 PROCEDURE — 99232 SBSQ HOSP IP/OBS MODERATE 35: CPT | Performed by: INTERNAL MEDICINE

## 2025-04-23 PROCEDURE — 80048 BASIC METABOLIC PNL TOTAL CA: CPT | Performed by: INTERNAL MEDICINE

## 2025-04-23 RX ORDER — LANOLIN ALCOHOL/MO/W.PET/CERES
400 CREAM (GRAM) TOPICAL 2 TIMES DAILY
Status: COMPLETED | OUTPATIENT
Start: 2025-04-23 | End: 2025-04-23

## 2025-04-23 RX ORDER — MAGNESIUM SULFATE HEPTAHYDRATE 40 MG/ML
2 INJECTION, SOLUTION INTRAVENOUS ONCE
Status: COMPLETED | OUTPATIENT
Start: 2025-04-23 | End: 2025-04-23

## 2025-04-23 RX ORDER — CARVEDILOL 12.5 MG/1
12.5 TABLET ORAL 2 TIMES DAILY
Status: DISCONTINUED | OUTPATIENT
Start: 2025-04-23 | End: 2025-04-25 | Stop reason: HOSPADM

## 2025-04-23 RX ADMIN — Medication 400 MG: at 18:02

## 2025-04-23 RX ADMIN — BUSPIRONE HYDROCHLORIDE 10 MG: 10 TABLET ORAL at 18:01

## 2025-04-23 RX ADMIN — CLOPIDOGREL 75 MG: 75 TABLET ORAL at 09:38

## 2025-04-23 RX ADMIN — CYANOCOBALAMIN TAB 500 MCG 1000 MCG: 500 TAB at 09:38

## 2025-04-23 RX ADMIN — Medication 1000 UNITS: at 09:38

## 2025-04-23 RX ADMIN — HEPARIN SODIUM 5000 UNITS: 5000 INJECTION INTRAVENOUS; SUBCUTANEOUS at 13:20

## 2025-04-23 RX ADMIN — GABAPENTIN 800 MG: 400 CAPSULE ORAL at 09:37

## 2025-04-23 RX ADMIN — GABAPENTIN 800 MG: 400 CAPSULE ORAL at 17:58

## 2025-04-23 RX ADMIN — HEPARIN SODIUM 5000 UNITS: 5000 INJECTION INTRAVENOUS; SUBCUTANEOUS at 06:19

## 2025-04-23 RX ADMIN — ESCITALOPRAM OXALATE 20 MG: 20 TABLET ORAL at 09:37

## 2025-04-23 RX ADMIN — Medication 6 MG: at 23:43

## 2025-04-23 RX ADMIN — Medication 400 MG: at 11:23

## 2025-04-23 RX ADMIN — HEPARIN SODIUM 5000 UNITS: 5000 INJECTION INTRAVENOUS; SUBCUTANEOUS at 21:28

## 2025-04-23 RX ADMIN — FLUTICASONE FUROATE AND VILANTEROL TRIFENATATE 1 PUFF: 100; 25 POWDER RESPIRATORY (INHALATION) at 09:40

## 2025-04-23 RX ADMIN — SODIUM CHLORIDE 500 ML: 0.9 INJECTION, SOLUTION INTRAVENOUS at 13:20

## 2025-04-23 RX ADMIN — UMECLIDINIUM 1 PUFF: 62.5 AEROSOL, POWDER ORAL at 09:40

## 2025-04-23 RX ADMIN — BUSPIRONE HYDROCHLORIDE 10 MG: 10 TABLET ORAL at 09:37

## 2025-04-23 RX ADMIN — MAGNESIUM SULFATE HEPTAHYDRATE 2 G: 40 INJECTION, SOLUTION INTRAVENOUS at 14:16

## 2025-04-23 RX ADMIN — ATORVASTATIN CALCIUM 80 MG: 40 TABLET, FILM COATED ORAL at 17:58

## 2025-04-23 RX ADMIN — PANTOPRAZOLE SODIUM 40 MG: 40 TABLET, DELAYED RELEASE ORAL at 06:19
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
## 2025-04-23 NOTE — CASE MANAGEMENT
Case Management Progress Note    Patient name Geovanna Leal  Location /-01 MRN [id number]  : 1955 Date 2025       LOS (days): 1  Geometric Mean LOS (GMLOS) (days): 2.7  Days to GMLOS:1.8        OBJECTIVE:        Current admission status: Inpatient  Preferred Pharmacy:   [facility] - [address]  [place] [address]  Phone: [phone] Fax: [phone]    Primary Care Provider: Leelee Garcia DO    Primary Insurance: MEDICARE  Secondary Insurance: [employer]    PROGRESS NOTE: Nebulizer and O2 tank delivered to pt in room. Delivery tickets signed.

## 2025-04-23 NOTE — ASSESSMENT & PLAN NOTE
Mildly elevated blood pressure on admission,  Coreg was increased to 25 mg daily per cardiology on 4/22.   Also she was started on lisinopril 2.5 mg.  Received one-time dose of 80 IV Lasix on 4/22.  Episodes of low blood pressure today.  Patient also lightheaded with ambulation.  Will decrease Coreg back to 12.5 mg twice daily, discontinue lisinopril.  500 cc normal saline bolus today.    Monitor blood pressure.  If stable likely discharge tomorrow.

## 2025-04-23 NOTE — ASSESSMENT & PLAN NOTE
Patient with a history of Takotsubo in the early 2000's echocardiogram from 2023 shows recovery  Echocardiogram this admission: EF 45%, grade 1 diastolic dysfunction, akinesis in the apical anterior apical septal, apical inferior, apical lateral and apex -hypokinesis in the mid anterior, mid anteroseptal, mid inferior septal, mid inferior, mid inferior lateral and mid anterior lateral -mild TR, mitral annular calcification

## 2025-04-23 NOTE — ASSESSMENT & PLAN NOTE
Echo showed LVEF 45%, diastolic dysfunction 1, akinesis of apical anterior, apical septal, apical inferior and apical lateral and affects wall  Continue Coreg decreased to 12.5 mg twice daily today, Imdur, aspirin, atorvastatin.  Patient will follow-up with cardiology outpatient, will have repeat echo in 1 month.

## 2025-04-23 NOTE — ASSESSMENT & PLAN NOTE
Lab Results   Component Value Date    HSTNI0 1,835 (H) 04/21/2025    HSTNI2 2,873 (H) 04/21/2025    HSTNID2 1,038 (H) 04/21/2025    HSTNI4 2,439 (H) 04/21/2025    HSTNID4 604 (H) 04/21/2025      Patient reported some chest heaviness however EKG without ischemic findings.  In the setting of Takotsubo cardiomyopathy

## 2025-04-23 NOTE — PLAN OF CARE
Problem: Potential for Falls  Goal: Patient will remain free of falls  Description: INTERVENTIONS:- Educate patient/family on patient safety including physical limitations- Instruct patient to call for assistance with activity - Consult OT/PT to assist with strengthening/mobility - Keep Call bell within reach- Keep bed low and locked with side rails adjusted as appropriate- Keep care items and personal belongings within reach- Initiate and maintain comfort rounds- Make Fall Risk Sign visible to staff- Offer Toileting every 2 Hours, in advance of need- Initiate/Maintain bed alarm- Obtain necessary fall risk management equipment: socks- Apply yellow socks and bracelet for high fall risk patients- Consider moving patient to room near nurses station  INTERVENTIONS:- Educate patient/family on patient safety including physical limitations- Instruct patient to call for assistance with activity - Consult OT/PT to assist with strengthening/mobility - Keep Call bell within reach- Keep bed low and locked with side rails adjusted as appropriate- Keep care items and personal belongings within reach- Initiate and maintain comfort rounds- Make Fall Risk Sign visible to staff- Offer Toileting every 2 Hours, in advance of need- Initiate/Maintain bed alarm- Obtain necessary fall risk management equipment: socks- Apply yellow socks and bracelet for high fall risk patients- Consider moving patient to room near nurses station  Outcome: Progressing     Problem: PAIN - ADULT  Goal: Verbalizes/displays adequate comfort level or baseline comfort level  Description: Interventions:- Encourage patient to monitor pain and request assistance- Assess pain using appropriate pain scale- Administer analgesics based on type and severity of pain and evaluate response- Implement non-pharmacological measures as appropriate and evaluate response- Consider cultural and social influences on pain and pain management- Notify physician/advanced practitioner  if interventions unsuccessful or patient reports new pain  Outcome: Progressing     Problem: INFECTION - ADULT  Goal: Absence or prevention of progression during hospitalization  Description: INTERVENTIONS:- Assess and monitor for signs and symptoms of infection- Monitor lab/diagnostic results- Monitor all insertion sites, i.e. indwelling lines, tubes, and drains- Monitor endotracheal if appropriate and nasal secretions for changes in amount and color- [place] appropriate cooling/warming therapies per order- Administer medications as ordered- Instruct and encourage patient and family to use good hand hygiene technique- Identify and instruct in appropriate isolation precautions for identified infection/condition  Outcome: Progressing     Problem: SAFETY ADULT  Goal: Patient will remain free of falls  Description: INTERVENTIONS:- Educate patient/family on patient safety including physical limitations- Instruct patient to call for assistance with activity - Consult OT/PT to assist with strengthening/mobility - Keep Call bell within reach- Keep bed low and locked with side rails adjusted as appropriate- Keep care items and personal belongings within reach- Initiate and maintain comfort rounds- Make Fall Risk Sign visible to staff- Offer Toileting every 2 Hours, in advance of need- Initiate/Maintain bed alarm- Obtain necessary fall risk management equipment: socks- Apply yellow socks and bracelet for high fall risk patients- Consider moving patient to room near nurses station  INTERVENTIONS:- Educate patient/family on patient safety including physical limitations- Instruct patient to call for assistance with activity - Consult OT/PT to assist with strengthening/mobility - Keep Call bell within reach- Keep bed low and locked with side rails adjusted as appropriate- Keep care items and personal belongings within reach- Initiate and maintain comfort rounds- Make Fall Risk Sign visible to staff- Offer Toileting every 2  Hours, in advance of need- Initiate/Maintain bed alarm- Obtain necessary fall risk management equipment: socks- Apply yellow socks and bracelet for high fall risk patients- Consider moving patient to room near nurses station  Outcome: Progressing  Goal: Maintain or return to baseline ADL function  Description: INTERVENTIONS:-  Assess patient's ability to carry out ADLs; assess patient's baseline for ADL function and identify physical deficits which impact ability to perform ADLs (bathing, care of mouth/teeth, toileting, grooming, dressing, etc.)- Assess/evaluate cause of self-care deficits - Assess range of motion- Assess patient's mobility; develop plan if impaired- Assess patient's need for assistive devices and provide as appropriate- Encourage maximum independence but intervene and supervise when necessary- Involve family in performance of ADLs- Assess for home care needs following discharge - Consider OT consult to assist with ADL evaluation and planning for discharge- Provide patient education as appropriate  Outcome: Progressing  Goal: Maintains/Returns to pre admission functional level  Description: INTERVENTIONS:- Perform AM-PAC 6 Click Basic Mobility/ Daily Activity assessment daily.- Set and communicate daily mobility goal to care team and patient/family/caregiver. - Collaborate with rehabilitation services on mobility goals if consulted- Perform Range of Motion 3 times a day.- Reposition patient every 2 hours.- Dangle patient 3 times a day- Stand patient 3 times a day- Ambulate patient 3 times a day- Out of bed to chair 3 times a day - Out of bed for meals 3 times a day- Out of bed for toileting- Record patient progress and toleration of activity level   Outcome: Progressing     Problem: DISCHARGE PLANNING  Goal: Discharge to home or other facility with appropriate resources  Description: INTERVENTIONS:- Identify barriers to discharge w/patient and caregiver- Arrange for needed discharge resources and  transportation as appropriate- Identify discharge learning needs (meds, wound care, etc.)- Arrange for interpretive services to assist at discharge as needed- Refer to Case Management Department for coordinating discharge planning if the patient needs post-hospital services based on physician/advanced practitioner order or complex needs related to functional status, cognitive ability, or social support system  Outcome: Progressing     Problem: Knowledge Deficit  Goal: Patient/family/caregiver demonstrates understanding of disease process, treatment plan, medications, and discharge instructions  Description: Complete learning assessment and assess knowledge base.Interventions:- Provide teaching at level of understanding- Provide teaching via preferred learning methods  Outcome: Progressing

## 2025-04-23 NOTE — PLAN OF CARE
Problem: Potential for Falls  Goal: Patient will remain free of falls  Description: INTERVENTIONS:- Educate patient/family on patient safety including physical limitations- Instruct patient to call for assistance with activity - Consult OT/PT to assist with strengthening/mobility - Keep Call bell within reach- Keep bed low and locked with side rails adjusted as appropriate- Keep care items and personal belongings within reach- Initiate and maintain comfort rounds- Make Fall Risk Sign visible to staff- Offer Toileting every 2 Hours, in advance of need- Initiate/Maintain bed alarm- Obtain necessary fall risk management equipment: socks- Apply yellow socks and bracelet for high fall risk patients- Consider moving patient to room near nurses station  INTERVENTIONS:- Educate patient/family on patient safety including physical limitations- Instruct patient to call for assistance with activity - Consult OT/PT to assist with strengthening/mobility - Keep Call bell within reach- Keep bed low and locked with side rails adjusted as appropriate- Keep care items and personal belongings within reach- Initiate and maintain comfort rounds- Make Fall Risk Sign visible to staff- Offer Toileting every 2 Hours, in advance of need- Initiate/Maintain bed alarm- Obtain necessary fall risk management equipment: socks- Apply yellow socks and bracelet for high fall risk patients- Consider moving patient to room near nurses station  Outcome: Progressing     Problem: PAIN - ADULT  Goal: Verbalizes/displays adequate comfort level or baseline comfort level  Description: Interventions:- Encourage patient to monitor pain and request assistance- Assess pain using appropriate pain scale- Administer analgesics based on type and severity of pain and evaluate response- Implement non-pharmacological measures as appropriate and evaluate response- Consider cultural and social influences on pain and pain management- Notify physician/advanced practitioner  if interventions unsuccessful or patient reports new pain  Outcome: Progressing     Problem: INFECTION - ADULT  Goal: Absence or prevention of progression during hospitalization  Description: INTERVENTIONS:- Assess and monitor for signs and symptoms of infection- Monitor lab/diagnostic results- Monitor all insertion sites, i.e. indwelling lines, tubes, and drains- Monitor endotracheal if appropriate and nasal secretions for changes in amount and color- [place] appropriate cooling/warming therapies per order- Administer medications as ordered- Instruct and encourage patient and family to use good hand hygiene technique- Identify and instruct in appropriate isolation precautions for identified infection/condition  Outcome: Progressing     Problem: SAFETY ADULT  Goal: Patient will remain free of falls  Description: INTERVENTIONS:- Educate patient/family on patient safety including physical limitations- Instruct patient to call for assistance with activity - Consult OT/PT to assist with strengthening/mobility - Keep Call bell within reach- Keep bed low and locked with side rails adjusted as appropriate- Keep care items and personal belongings within reach- Initiate and maintain comfort rounds- Make Fall Risk Sign visible to staff- Offer Toileting every 2 Hours, in advance of need- Initiate/Maintain bed alarm- Obtain necessary fall risk management equipment: socks- Apply yellow socks and bracelet for high fall risk patients- Consider moving patient to room near nurses station  INTERVENTIONS:- Educate patient/family on patient safety including physical limitations- Instruct patient to call for assistance with activity - Consult OT/PT to assist with strengthening/mobility - Keep Call bell within reach- Keep bed low and locked with side rails adjusted as appropriate- Keep care items and personal belongings within reach- Initiate and maintain comfort rounds- Make Fall Risk Sign visible to staff- Offer Toileting every 2  Hours, in advance of need- Initiate/Maintain bed alarm- Obtain necessary fall risk management equipment: socks- Apply yellow socks and bracelet for high fall risk patients- Consider moving patient to room near nurses station  Outcome: Progressing  Goal: Maintain or return to baseline ADL function  Description: INTERVENTIONS:-  Assess patient's ability to carry out ADLs; assess patient's baseline for ADL function and identify physical deficits which impact ability to perform ADLs (bathing, care of mouth/teeth, toileting, grooming, dressing, etc.)- Assess/evaluate cause of self-care deficits - Assess range of motion- Assess patient's mobility; develop plan if impaired- Assess patient's need for assistive devices and provide as appropriate- Encourage maximum independence but intervene and supervise when necessary- Involve family in performance of ADLs- Assess for home care needs following discharge - Consider OT consult to assist with ADL evaluation and planning for discharge- Provide patient education as appropriate  Outcome: Progressing  Goal: Maintains/Returns to pre admission functional level  Description: INTERVENTIONS:- Perform AM-PAC 6 Click Basic Mobility/ Daily Activity assessment daily.- Set and communicate daily mobility goal to care team and patient/family/caregiver. - Collaborate with rehabilitation services on mobility goals if consulted- Perform Range of Motion 3 times a day.- Reposition patient every 2 hours.- Dangle patient 3 times a day- Stand patient 3 times a day- Ambulate patient 3 times a day- Out of bed to chair 3 times a day - Out of bed for meals 3 times a day- Out of bed for toileting- Record patient progress and toleration of activity level   Outcome: Progressing     Problem: DISCHARGE PLANNING  Goal: Discharge to home or other facility with appropriate resources  Description: INTERVENTIONS:- Identify barriers to discharge w/patient and caregiver- Arrange for needed discharge resources and  transportation as appropriate- Identify discharge learning needs (meds, wound care, etc.)- Arrange for interpretive services to assist at discharge as needed- Refer to Case Management Department for coordinating discharge planning if the patient needs post-hospital services based on physician/advanced practitioner order or complex needs related to functional status, cognitive ability, or social support system  Outcome: Progressing     Problem: Knowledge Deficit  Goal: Patient/family/caregiver demonstrates understanding of disease process, treatment plan, medications, and discharge instructions  Description: Complete learning assessment and assess knowledge base.Interventions:- Provide teaching at level of understanding- Provide teaching via preferred learning methods  Outcome: Progressing

## 2025-04-23 NOTE — PROGRESS NOTES
Progress Note - Cardiology   Name: Geovanna Leal 69 y.o. female I MRN: [id number]  Unit/Bed#: -01 I Date of Admission: 4/21/2025   Date of Service: 4/23/2025 I Hospital Day: 1    Assessment & Plan  Elevated troponin  Last echocardiogram in 2023 showed EF of 65%, grade 1 diastolic dysfunction, trace MR mild annular calcification, mild TR.  Troponin [id number]  EKG normal sinus rhythm -no ischemic changes  Patient initially reported mild intermittent chest pressure, she states it feels as though hand is being placed on chest wall, denies any associated symptoms or any relieving symptoms. -Denies any further chest pain this morning    Takotsubo cardiomyopathy  Patient with a history of Takotsubo in the early 2000's echocardiogram from 2023 shows recovery  Echocardiogram this admission: EF 45%, grade 1 diastolic dysfunction, akinesis in the apical anterior apical septal, apical inferior, apical lateral and apex -hypokinesis in the mid anterior, mid anteroseptal, mid inferior septal, mid inferior, mid inferior lateral and mid anterior lateral -mild TR, mitral annular calcification    Essential hypertension  Treated with carvedilol - increased today to 25 mg   Dyslipidemia  Treated with atorvastatin 80 mg daily  COPD (chronic obstructive pulmonary disease) (HCC)  CXR: Increased patchy density in the right lower lobe with probable pleural effusion. Pneumonia is not excluded. Stable right upper lobe consolidation with underlying bullous change compared with the recent CT, improved from the prior x-ray.  Coronary artery disease involving native coronary artery of native heart without angina pectoris  Patient with a history of MI  -without occlusive disease per cardiology notes.  Patient maintained on carvedilol, Plavix, isosorbide, Lipitor outpatient   Hypoxia    Plan  Patient cleared for discharge -follow-up already scheduled  Will repeat echocardiogram in 1 month -already scheduled  Discontinue lisinopril 2.5  mg -patient symptomatic and hypotensive  Continue carvedilol 25 mg twice daily, Plavix, Imdur, atorvastatin upon discharge      Subjective   Patient denies any pain today -ready for discharge    Objective :  Temp:  [98.6 °F (37 °C)-99.8 °F (37.7 °C)] 99.3 °F (37.4 °C)  HR:  [65-82] 65  BP: ()/(49-82) 107/55  Resp:  [12-25] 12  SpO2:  [85 %-93 %] 93 %  O2 Device: Nasal cannula  Nasal Cannula O2 Flow Rate (L/min):  [1 L/min-2 L/min] 2 L/min  Orthostatic Blood Pressures      Flowsheet Row Most Recent Value   Blood Pressure 107/55 filed at 04/23/2025 0701   Patient Position - Orthostatic VS Lying filed at 04/23/2025 0701          First Weight: Weight - Scale: 74 kg (163 lb 3.2 oz) (04/21/25 1140)  Vitals:    04/21/25 1140 04/22/25 0935   Weight: 74 kg (163 lb 3.2 oz) 73.9 kg (163 lb)     Physical Exam  Constitutional:       Appearance: Normal appearance. She is obese.   HENT:      Head: Normocephalic and atraumatic.      Nose: Nose normal.      Mouth/Throat:      Mouth: Mucous membranes are dry.   Cardiovascular:      Rate and Rhythm: Normal rate and regular rhythm.      Pulses: Normal pulses.      Heart sounds: Normal heart sounds.   Pulmonary:      Effort: Pulmonary effort is normal.      Breath sounds: Examination of the right-middle field reveals decreased breath sounds. Examination of the left-middle field reveals decreased breath sounds. Examination of the right-lower field reveals decreased breath sounds. Decreased breath sounds present.   Abdominal:      General: Bowel sounds are normal.      Palpations: Abdomen is soft.   Musculoskeletal:      Cervical back: Normal range of motion.      Right lower leg: No edema.      Left lower leg: No edema.   Skin:     General: Skin is warm and dry.      Capillary Refill: Capillary refill takes less than 2 seconds.   Neurological:      General: No focal deficit present.      Mental Status: She is alert.   Psychiatric:         Mood and Affect: Mood normal.          Behavior: Behavior normal.         Lab Results: I have reviewed the following results:  Results from last 7 days   Lab Units 04/22/25  0636 04/21/25  0809   WBC Thousand/uL 7.08 8.80   HEMOGLOBIN g/dL 11.3* 12.5   HEMATOCRIT % 34.9 38.4   PLATELETS Thousands/uL 335 358     Results from last 7 days   Lab Units 04/23/25  0520 04/22/25  0636 04/21/25  0809   POTASSIUM mmol/L 3.7 4.0 3.4*   CHLORIDE mmol/L 102 107 104   CO2 mmol/L 26 23 24   BUN mg/dL 15 14 12   CREATININE mg/dL 1.19 1.06 0.97   CALCIUM mg/dL 9.3 8.7 9.3         Lab Results   Component Value Date    HGBA1C 5.4 12/07/2024     Lab Results   Component Value Date    CKTOTAL 76 02/12/2025       Imaging Results Review: No pertinent imaging studies reviewed.  Other Study Results Review: No additional pertinent studies reviewed.    VTE Pharmacologic Prophylaxis: VTE covered by:  heparin (porcine), Subcutaneous, 5,000 Units at 04/23/25 0619     VTE Mechanical Prophylaxis: sequential compression device

## 2025-04-23 NOTE — PLAN OF CARE
Problem: PAIN - ADULT  Goal: Verbalizes/displays adequate comfort level or baseline comfort level  Description: Interventions:- Encourage patient to monitor pain and request assistance- Assess pain using appropriate pain scale- Administer analgesics based on type and severity of pain and evaluate response- Implement non-pharmacological measures as appropriate and evaluate response- Consider cultural and social influences on pain and pain management- Notify physician/advanced practitioner if interventions unsuccessful or patient reports new pain  Outcome: Progressing     Problem: INFECTION - ADULT  Goal: Absence or prevention of progression during hospitalization  Description: INTERVENTIONS:- Assess and monitor for signs and symptoms of infection- Monitor lab/diagnostic results- Monitor all insertion sites, i.e. indwelling lines, tubes, and drains- Monitor endotracheal if appropriate and nasal secretions for changes in amount and color- [place] appropriate cooling/warming therapies per order- Administer medications as ordered- Instruct and encourage patient and family to use good hand hygiene technique- Identify and instruct in appropriate isolation precautions for identified infection/condition  Outcome: Progressing

## 2025-04-23 NOTE — ASSESSMENT & PLAN NOTE
Last echocardiogram in 2023 showed EF of 65%, grade 1 diastolic dysfunction, trace MR mild annular calcification, mild TR.  Troponin [id number]  EKG normal sinus rhythm -no ischemic changes  Patient initially reported mild intermittent chest pressure, she states it feels as though hand is being placed on chest wall, denies any associated symptoms or any relieving symptoms. -Denies any further chest pain this morning

## 2025-04-24 ENCOUNTER — APPOINTMENT (INPATIENT)
Dept: ULTRASOUND IMAGING | Facility: HOSPITAL | Age: 70
DRG: 315 | End: 2025-04-24
Payer: MEDICARE

## 2025-04-24 ENCOUNTER — APPOINTMENT (INPATIENT)
Dept: NON INVASIVE DIAGNOSTICS | Facility: HOSPITAL | Age: 70
DRG: 315 | End: 2025-04-24
Payer: MEDICARE

## 2025-04-24 LAB
ANION GAP SERPL CALCULATED.3IONS-SCNC: 8 MMOL/L (ref 4–13)
AORTIC ROOT: 3.2 CM
ASCENDING AORTA: 3.4 CM
ATRIAL RATE: 60 BPM
ATRIAL RATE: 66 BPM
ATRIAL RATE: 67 BPM
ATRIAL RATE: 70 BPM
BSA FOR ECHO PROCEDURE: 1.81 M2
BUN SERPL-MCNC: 18 MG/DL (ref 5–25)
CALCIUM SERPL-MCNC: 8.7 MG/DL (ref 8.4–10.2)
CARDIAC TROPONIN I PNL SERPL HS: 64 NG/L (ref 8–18)
CHLORIDE SERPL-SCNC: 105 MMOL/L (ref 96–108)
CO2 SERPL-SCNC: 24 MMOL/L (ref 21–32)
CREAT SERPL-MCNC: 1.19 MG/DL (ref 0.6–1.3)
E WAVE DECELERATION TIME: 165 MS
E/A RATIO: 0.91
ERYTHROCYTE [DISTWIDTH] IN BLOOD BY AUTOMATED COUNT: 15.9 % (ref 11.6–15.1)
FRACTIONAL SHORTENING: 39 (ref 28–44)
GFR SERPL CREATININE-BSD FRML MDRD: 46 ML/MIN/1.73SQ M
GLUCOSE SERPL-MCNC: 112 MG/DL (ref 65–140)
GLUCOSE SERPL-MCNC: 195 MG/DL (ref 65–140)
HCT VFR BLD AUTO: 33.5 % (ref 34.8–46.1)
HGB BLD-MCNC: 10.6 G/DL (ref 11.5–15.4)
INTERVENTRICULAR SEPTUM IN DIASTOLE (PARASTERNAL SHORT AXIS VIEW): 1.1 CM
INTERVENTRICULAR SEPTUM: 1.1 CM (ref 0.6–1.1)
LAAS-AP2: 12.1 CM2
LAAS-AP4: 19.9 CM2
LEFT ATRIUM SIZE: 3.5 CM
LEFT ATRIUM VOLUME (MOD BIPLANE): 49 ML
LEFT ATRIUM VOLUME INDEX (MOD BIPLANE): 27.1 ML/M2
LEFT INTERNAL DIMENSION IN SYSTOLE: 2.8 CM (ref 2.1–4)
LEFT VENTRICLE DIASTOLIC VOLUME (MOD BIPLANE): 100 ML
LEFT VENTRICLE DIASTOLIC VOLUME INDEX (MOD BIPLANE): 55.2 ML/M2
LEFT VENTRICLE SYSTOLIC VOLUME (MOD BIPLANE): 46 ML
LEFT VENTRICLE SYSTOLIC VOLUME INDEX (MOD BIPLANE): 25.4 ML/M2
LEFT VENTRICULAR INTERNAL DIMENSION IN DIASTOLE: 4.6 CM (ref 3.5–6)
LEFT VENTRICULAR POSTERIOR WALL IN END DIASTOLE: 1.3 CM
LEFT VENTRICULAR STROKE VOLUME: 66 ML
LV EF BIPLANE MOD: 54 %
LV EF US.2D.A4C+ESTIMATED: 52 %
LVSV (TEICH): 66 ML
MCH RBC QN AUTO: 32 PG (ref 26.8–34.3)
MCHC RBC AUTO-ENTMCNC: 31.6 G/DL (ref 31.4–37.4)
MCV RBC AUTO: 101 FL (ref 82–98)
MV E'TISSUE VEL-LAT: 9 CM/S
MV E'TISSUE VEL-SEP: 7 CM/S
MV PEAK A VEL: 0.99 M/S
MV PEAK E VEL: 90 CM/S
MV STENOSIS PRESSURE HALF TIME: 48 MS
MV VALVE AREA P 1/2 METHOD: 4.58
P AXIS: 41 DEGREES
P AXIS: 42 DEGREES
P AXIS: 51 DEGREES
P AXIS: 55 DEGREES
PLATELET # BLD AUTO: 320 THOUSANDS/UL (ref 149–390)
PMV BLD AUTO: 8.7 FL (ref 8.9–12.7)
POTASSIUM SERPL-SCNC: 3.8 MMOL/L (ref 3.5–5.3)
PR INTERVAL: 126 MS
PR INTERVAL: 132 MS
PR INTERVAL: 136 MS
PR INTERVAL: 146 MS
QRS AXIS: 49 DEGREES
QRS AXIS: 55 DEGREES
QRS AXIS: 60 DEGREES
QRS AXIS: 64 DEGREES
QRSD INTERVAL: 76 MS
QRSD INTERVAL: 80 MS
QRSD INTERVAL: 84 MS
QRSD INTERVAL: 88 MS
QT INTERVAL: 522 MS
QT INTERVAL: 524 MS
QT INTERVAL: 526 MS
QT INTERVAL: 554 MS
QTC INTERVAL: 547 MS
QTC INTERVAL: 553 MS
QTC INTERVAL: 554 MS
QTC INTERVAL: 568 MS
RBC # BLD AUTO: 3.31 MILLION/UL (ref 3.81–5.12)
RIGHT ATRIUM AREA SYSTOLE A4C: 15.6 CM2
RIGHT VENTRICLE ID DIMENSION: 3.7 CM
SL CV LEFT ATRIUM LENGTH A2C: 3.8 CM
SL CV LV EF: 55
SL CV PED ECHO LEFT VENTRICLE DIASTOLIC VOLUME (MOD BIPLANE) 2D: 96 ML
SL CV PED ECHO LEFT VENTRICLE SYSTOLIC VOLUME (MOD BIPLANE) 2D: 30 ML
SODIUM SERPL-SCNC: 137 MMOL/L (ref 135–147)
T WAVE AXIS: 213 DEGREES
T WAVE AXIS: 228 DEGREES
T WAVE AXIS: 231 DEGREES
T WAVE AXIS: 241 DEGREES
TR MAX PG: 25 MMHG
TR PEAK VELOCITY: 2.5 M/S
TRICUSPID ANNULAR PLANE SYSTOLIC EXCURSION: 1.9 CM
TRICUSPID VALVE PEAK REGURGITATION VELOCITY: 2.52 M/S
VENTRICULAR RATE: 60 BPM
VENTRICULAR RATE: 66 BPM
VENTRICULAR RATE: 67 BPM
VENTRICULAR RATE: 70 BPM
WBC # BLD AUTO: 5.68 THOUSAND/UL (ref 4.31–10.16)

## 2025-04-24 PROCEDURE — 85027 COMPLETE CBC AUTOMATED: CPT | Performed by: INTERNAL MEDICINE

## 2025-04-24 PROCEDURE — 80048 BASIC METABOLIC PNL TOTAL CA: CPT | Performed by: INTERNAL MEDICINE

## 2025-04-24 PROCEDURE — 76705 ECHO EXAM OF ABDOMEN: CPT

## 2025-04-24 PROCEDURE — 99232 SBSQ HOSP IP/OBS MODERATE 35: CPT | Performed by: INTERNAL MEDICINE

## 2025-04-24 PROCEDURE — NC001 PR NO CHARGE

## 2025-04-24 PROCEDURE — 82948 REAGENT STRIP/BLOOD GLUCOSE: CPT

## 2025-04-24 PROCEDURE — 93308 TTE F-UP OR LMTD: CPT

## 2025-04-24 PROCEDURE — 93321 DOPPLER ECHO F-UP/LMTD STD: CPT | Performed by: INTERNAL MEDICINE

## 2025-04-24 PROCEDURE — 84484 ASSAY OF TROPONIN QUANT: CPT | Performed by: INTERNAL MEDICINE

## 2025-04-24 PROCEDURE — 93308 TTE F-UP OR LMTD: CPT | Performed by: INTERNAL MEDICINE

## 2025-04-24 PROCEDURE — 93321 DOPPLER ECHO F-UP/LMTD STD: CPT

## 2025-04-24 PROCEDURE — 93325 DOPPLER ECHO COLOR FLOW MAPG: CPT

## 2025-04-24 PROCEDURE — 93005 ELECTROCARDIOGRAM TRACING: CPT

## 2025-04-24 PROCEDURE — 93325 DOPPLER ECHO COLOR FLOW MAPG: CPT | Performed by: INTERNAL MEDICINE

## 2025-04-24 RX ORDER — SODIUM CHLORIDE, SODIUM GLUCONATE, SODIUM ACETATE, POTASSIUM CHLORIDE, MAGNESIUM CHLORIDE, SODIUM PHOSPHATE, DIBASIC, AND POTASSIUM PHOSPHATE .53; .5; .37; .037; .03; .012; .00082 G/100ML; G/100ML; G/100ML; G/100ML; G/100ML; G/100ML; G/100ML
1000 INJECTION, SOLUTION INTRAVENOUS ONCE
Status: DISCONTINUED | OUTPATIENT
Start: 2025-04-24 | End: 2025-04-24

## 2025-04-24 RX ORDER — NITROGLYCERIN 0.4 MG/1
0.4 TABLET SUBLINGUAL
Status: DISCONTINUED | OUTPATIENT
Start: 2025-04-24 | End: 2025-04-25 | Stop reason: HOSPADM

## 2025-04-24 RX ORDER — SODIUM CHLORIDE, SODIUM GLUCONATE, SODIUM ACETATE, POTASSIUM CHLORIDE, MAGNESIUM CHLORIDE, SODIUM PHOSPHATE, DIBASIC, AND POTASSIUM PHOSPHATE .53; .5; .37; .037; .03; .012; .00082 G/100ML; G/100ML; G/100ML; G/100ML; G/100ML; G/100ML; G/100ML
500 INJECTION, SOLUTION INTRAVENOUS ONCE
Status: COMPLETED | OUTPATIENT
Start: 2025-04-24 | End: 2025-04-24

## 2025-04-24 RX ADMIN — ISOSORBIDE MONONITRATE 30 MG: 30 TABLET, EXTENDED RELEASE ORAL at 08:41

## 2025-04-24 RX ADMIN — BUSPIRONE HYDROCHLORIDE 10 MG: 10 TABLET ORAL at 18:27

## 2025-04-24 RX ADMIN — CLOPIDOGREL 75 MG: 75 TABLET ORAL at 08:41

## 2025-04-24 RX ADMIN — Medication 1000 UNITS: at 08:41

## 2025-04-24 RX ADMIN — CYANOCOBALAMIN TAB 500 MCG 1000 MCG: 500 TAB at 08:41

## 2025-04-24 RX ADMIN — CARVEDILOL 12.5 MG: 12.5 TABLET, FILM COATED ORAL at 08:41

## 2025-04-24 RX ADMIN — ATORVASTATIN CALCIUM 80 MG: 40 TABLET, FILM COATED ORAL at 18:27

## 2025-04-24 RX ADMIN — HEPARIN SODIUM 5000 UNITS: 5000 INJECTION INTRAVENOUS; SUBCUTANEOUS at 21:20

## 2025-04-24 RX ADMIN — PANTOPRAZOLE SODIUM 40 MG: 40 TABLET, DELAYED RELEASE ORAL at 06:27

## 2025-04-24 RX ADMIN — UMECLIDINIUM 1 PUFF: 62.5 AEROSOL, POWDER ORAL at 09:51

## 2025-04-24 RX ADMIN — HEPARIN SODIUM 5000 UNITS: 5000 INJECTION INTRAVENOUS; SUBCUTANEOUS at 15:24

## 2025-04-24 RX ADMIN — SODIUM CHLORIDE, SODIUM GLUCONATE, SODIUM ACETATE, POTASSIUM CHLORIDE, MAGNESIUM CHLORIDE, SODIUM PHOSPHATE, DIBASIC, AND POTASSIUM PHOSPHATE 500 ML: .53; .5; .37; .037; .03; .012; .00082 INJECTION, SOLUTION INTRAVENOUS at 10:11

## 2025-04-24 RX ADMIN — Medication 6 MG: at 21:20

## 2025-04-24 RX ADMIN — HEPARIN SODIUM 5000 UNITS: 5000 INJECTION INTRAVENOUS; SUBCUTANEOUS at 06:27

## 2025-04-24 RX ADMIN — GABAPENTIN 800 MG: 400 CAPSULE ORAL at 08:41

## 2025-04-24 RX ADMIN — ESCITALOPRAM OXALATE 20 MG: 20 TABLET ORAL at 08:41

## 2025-04-24 RX ADMIN — GABAPENTIN 800 MG: 400 CAPSULE ORAL at 18:27

## 2025-04-24 RX ADMIN — NITROGLYCERIN 0.4 MG: 0.4 TABLET SUBLINGUAL at 09:50

## 2025-04-24 RX ADMIN — FLUTICASONE FUROATE AND VILANTEROL TRIFENATATE 1 PUFF: 100; 25 POWDER RESPIRATORY (INHALATION) at 09:51

## 2025-04-24 RX ADMIN — BUSPIRONE HYDROCHLORIDE 10 MG: 10 TABLET ORAL at 08:41

## 2025-04-24 NOTE — PLAN OF CARE
Problem: Potential for Falls  Goal: Patient will remain free of falls  Description: INTERVENTIONS:- Educate patient/family on patient safety including physical limitations- Instruct patient to call for assistance with activity - Consult OT/PT to assist with strengthening/mobility - Keep Call bell within reach- Keep bed low and locked with side rails adjusted as appropriate- Keep care items and personal belongings within reach- Initiate and maintain comfort rounds- Make Fall Risk Sign visible to staff- Offer Toileting every 2 Hours, in advance of need- Initiate/Maintain bed alarm- Obtain necessary fall risk management equipment: socks- Apply yellow socks and bracelet for high fall risk patients- Consider moving patient to room near nurses station  INTERVENTIONS:- Educate patient/family on patient safety including physical limitations- Instruct patient to call for assistance with activity - Consult OT/PT to assist with strengthening/mobility - Keep Call bell within reach- Keep bed low and locked with side rails adjusted as appropriate- Keep care items and personal belongings within reach- Initiate and maintain comfort rounds- Make Fall Risk Sign visible to staff- Offer Toileting every 2 Hours, in advance of need- Initiate/Maintain bed alarm- Obtain necessary fall risk management equipment: socks- Apply yellow socks and bracelet for high fall risk patients- Consider moving patient to room near nurses station  Outcome: Progressing     Problem: PAIN - ADULT  Goal: Verbalizes/displays adequate comfort level or baseline comfort level  Description: Interventions:- Encourage patient to monitor pain and request assistance- Assess pain using appropriate pain scale- Administer analgesics based on type and severity of pain and evaluate response- Implement non-pharmacological measures as appropriate and evaluate response- Consider cultural and social influences on pain and pain management- Notify physician/advanced practitioner  if interventions unsuccessful or patient reports new pain  Outcome: Progressing     Problem: Knowledge Deficit  Goal: Patient/family/caregiver demonstrates understanding of disease process, treatment plan, medications, and discharge instructions  Description: Complete learning assessment and assess knowledge base.Interventions:- Provide teaching at level of understanding- Provide teaching via preferred learning methods  Outcome: Progressing

## 2025-04-24 NOTE — PROGRESS NOTES
"Cardiology Progress Note   Geovanna Leal 69 y.o. female MRN: [id number]    Unit/Bed#: -01 Encounter: [id number]      Assessment:   Takotsubo Cardiomyopathy, EF 45%  Elevated troponin secondary to #1  Coronary artery calcifications  Hypertension   Hyperlipidemia  COPD    Plan/Discussion:   Geovanna complained of 2-3/10 epigastric discomfort following breakfast this AM. She was given her AM meds without relief. Repeat EKG showed deepened T wave inversions compared to her prior EKG, though no ST changes noted. Ordered for repeat random troponin and given SL NTG x1. Following SL NTG become acutely hypotensive with BP 61/38 with patient reporting LH but denied pre-syncope. RRT called. Placed in Trendelenburg position repeat BP improved to 108/58. Bolus IVF ordered. Repeat troponin resulted 64 (prior troponin 2400). Repeat EKG without acute change form earlier this AM. CP resolved. Of note, had similar profound drop in SBP with addition of low dose ACEI. Now discontinued.     No present indication for urgent transfer for ischemic evaluation  Monitor symptoms/telemetry for another 24 hours. Repeat limited TTE, EKG and random troponin this afternoon.   Continue current Rx regimen: Coreg 12.5 mg BID, Lipitor 80 mg QD, Imdur 30 mg QD, Plavix 75mg QD  Hold off on ACEI/ARB due to profound BP drop (w/ Lisinopril 2.5mg dose) this admission    Subjective:   Patient seen and examined. Overnight events reviewed. Patient denies any acute complaints at this time.     Objective:     Vitals: Blood pressure 108/55, pulse 61, temperature 99 °F (37.2 °C), resp. rate 16, height 5' 5\" (1.651 m), weight 73.9 kg (163 lb), SpO2 94%., Body mass index is 27.12 kg/m².,   Orthostatic Blood Pressures      Flowsheet Row Most Recent Value   Blood Pressure 108/55 filed at 04/24/2025 1045   Patient Position - Orthostatic VS Lying filed at 04/24/2025 6144              Intake/Output Summary (Last 24 hours) at 4/24/2025 1127  Last data filed at " 4/24/2025 0845  Gross per 24 hour   Intake 960 ml   Output 1400 ml   Net -440 ml         Physical Exam:    GEN: Geovanna Leal appears well, alert and oriented x 3, pleasant and cooperative   HEENT: Sclera anicteric, conjunctivae pink, mucous membranes moist. Oropharynx clear.   NECK: supple, no significant JVD, Trachea midline, no thyromegaly.   HEART: regular rhythm, normal S1 and S2, no murmurs, clicks, gallops or rubs   LUNGS: clear to auscultation bilaterally; no wheezes, rales, or rhonchi. No increased work of breathing or signs of respiratory distress.   ABDOMEN: Soft, nontender, nondistended  EXTREMITIES: Skin warm and well perfused, no clubbing, cyanosis, or edema.  NEURO: No focal findings. Normal speech. Mood and affect normal.   SKIN: Normal without suspicious lesions on exposed skin.      Medications:      Current Facility-Administered Medications:     acetaminophen (TYLENOL) tablet 650 mg, 650 mg, Oral, Q6H PRN, Francine Winchester MD, 650 mg at 04/22/25 0946    albuterol (PROVENTIL HFA,VENTOLIN HFA) inhaler 2 puff, 2 puff, Inhalation, Q4H PRN, Francine Winchester MD    albuterol inhalation solution 2.5 mg, 2.5 mg, Nebulization, Q6H PRN, Francine Winchester MD    atorvastatin (LIPITOR) tablet 80 mg, 80 mg, Oral, Daily With Dinner, Francine Winchester MD, 80 mg at 04/23/25 1758    busPIRone (BUSPAR) tablet 10 mg, 10 mg, Oral, BID, Francine Winchester MD, 10 mg at 04/24/25 0841    carvedilol (COREG) tablet 12.5 mg, 12.5 mg, Oral, BID, Francine Winchester MD, 12.5 mg at 04/24/25 0841    Cholecalciferol (VITAMIN D3) tablet 1,000 Units, 1,000 Units, Oral, Daily, Francine Winchester MD, 1,000 Units at 04/24/25 0841    clopidogrel (PLAVIX) tablet 75 mg, 75 mg, Oral, Daily, Francine Winchester MD, 75 mg at 04/24/25 0841    cyanocobalamin (VITAMIN B-12) tablet 1,000 mcg, 1,000 mcg, Oral, Daily, Francine Winchester MD, 1,000 mcg at 04/24/25 0841    escitalopram (LEXAPRO) tablet 20 mg, 20 mg, Oral,  Daily, Francine Winchester MD, 20 mg at 04/24/25 0841    Fluticasone Furoate-Vilanterol 100-25 mcg/actuation 1 puff, 1 puff, Inhalation, Daily, 1 puff at 04/24/25 0951 **AND** umeclidinium 62.5 mcg/actuation inhaler AEPB 1 puff, 1 puff, Inhalation, Daily, Francine Winchester MD, 1 puff at 04/24/25 0951    gabapentin (NEURONTIN) capsule 800 mg, 800 mg, Oral, BID, Francine Winchester MD, 800 mg at 04/24/25 0841    heparin (porcine) subcutaneous injection 5,000 Units, 5,000 Units, Subcutaneous, Q8H ADELFO, Francine Winchester MD, 5,000 Units at 04/24/25 0627    isosorbide mononitrate (IMDUR) 24 hr tablet 30 mg, 30 mg, Oral, Daily, ANTONIETA Galeano, 30 mg at 04/24/25 0841    melatonin tablet 6 mg, 6 mg, Oral, HS, Rosana Hudson PA-C, 6 mg at 04/23/25 2343    nitroglycerin (NITROSTAT) SL tablet 0.4 mg, 0.4 mg, Sublingual, Q5 Min PRN, Jhon Inman PA-C, 0.4 mg at 04/24/25 0950    pantoprazole (PROTONIX) EC tablet 40 mg, 40 mg, Oral, Early Morning, Francine Winchester MD, 40 mg at 04/24/25 0627     Labs & Results:        Results from last 7 days   Lab Units 04/24/25 0427 04/22/25  0636 04/21/25  0809   WBC Thousand/uL 5.68 7.08 8.80   HEMOGLOBIN g/dL 10.6* 11.3* 12.5   HEMATOCRIT % 33.5* 34.9 38.4   PLATELETS Thousands/uL 320 335 358         Results from last 7 days   Lab Units 04/24/25  0427 04/23/25  0520 04/22/25  0636 04/21/25  0809   POTASSIUM mmol/L 3.8 3.7 4.0 3.4*   CHLORIDE mmol/L 105 102 107 104   CO2 mmol/L 24 26 23 24   BUN mg/dL 18 15 14 12   CREATININE mg/dL 1.19 1.19 1.06 0.97   CALCIUM mg/dL 8.7 9.3 8.7 9.3   ALK PHOS U/L  --   --   --  83   ALT U/L  --   --   --  9   AST U/L  --   --   --  15         Results from last 7 days   Lab Units 04/23/25  0520   MAGNESIUM mg/dL 1.7*
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
## 2025-04-24 NOTE — ASSESSMENT & PLAN NOTE
Mildly elevated blood pressure on admission,  Coreg was increased to 25 mg daily per cardiology on 4/22.   Also she was started on lisinopril 2.5 mg.  Received one-time dose of 80 IV Lasix on 4/22.  Patient had episodes of blood pressure after Coreg was increased and lisinopril 2.5 mg was added.  Lisinopril was discontinued and Coreg was decreased back to 12.5 mg twice daily.  Patient did well and blood pressure has remained stable until this morning after nitroglycerin  Monitor blood pressure
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
## 2025-04-24 NOTE — ASSESSMENT & PLAN NOTE
Echo showed LVEF 45%, diastolic dysfunction 1, akinesis of apical anterior, apical septal, apical inferior and apical lateral and affects wall  Continue Coreg 12.5 mg twice daily , Imdur, plavix, atorvastatin.  Patient had substernal/epigastric pressure this morning, EKG showed negative T waves and random troponin slightly elevated.  Received nitroglycerin and the pain subsided.  Limited echo today showed improvement in the LVEF to 55%.  Patient will follow-up outpatient with cardiology

## 2025-04-24 NOTE — ASSESSMENT & PLAN NOTE
And episodes of hypotension during hospitalization after Coreg was increased and lisinopril 2.5 was added.  Lisinopril eventually was discontinued and Coreg was cut back to her home dose.  Blood pressure has been stable since however patient had chest pain this morning and received nitroglycerin sublingual.  She felt very lightheaded and blood pressure dropped in the 60s.  She was a rapid response  With Trendelenburg and IV fluids her blood pressure improved.  Avoid ACE/ARB's/nitroglycerin or any medications change to avoid hypotension.
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
## 2025-04-25 VITALS
BODY MASS INDEX: 27.16 KG/M2 | HEART RATE: 66 BPM | RESPIRATION RATE: 18 BRPM | SYSTOLIC BLOOD PRESSURE: 122 MMHG | TEMPERATURE: 98.2 F | OXYGEN SATURATION: 92 % | HEIGHT: 65 IN | WEIGHT: 163 LBS | DIASTOLIC BLOOD PRESSURE: 56 MMHG

## 2025-04-25 LAB
ANION GAP SERPL CALCULATED.3IONS-SCNC: 9 MMOL/L (ref 4–13)
BUN SERPL-MCNC: 15 MG/DL (ref 5–25)
CALCIUM SERPL-MCNC: 9 MG/DL (ref 8.4–10.2)
CHLORIDE SERPL-SCNC: 107 MMOL/L (ref 96–108)
CO2 SERPL-SCNC: 22 MMOL/L (ref 21–32)
CREAT SERPL-MCNC: 1.01 MG/DL (ref 0.6–1.3)
GFR SERPL CREATININE-BSD FRML MDRD: 56 ML/MIN/1.73SQ M
GLUCOSE SERPL-MCNC: 103 MG/DL (ref 65–140)
POTASSIUM SERPL-SCNC: 4.3 MMOL/L (ref 3.5–5.3)
SODIUM SERPL-SCNC: 138 MMOL/L (ref 135–147)

## 2025-04-25 PROCEDURE — 80048 BASIC METABOLIC PNL TOTAL CA: CPT | Performed by: INTERNAL MEDICINE

## 2025-04-25 PROCEDURE — 99239 HOSP IP/OBS DSCHRG MGMT >30: CPT | Performed by: INTERNAL MEDICINE

## 2025-04-25 RX ADMIN — PANTOPRAZOLE SODIUM 40 MG: 40 TABLET, DELAYED RELEASE ORAL at 05:42

## 2025-04-25 RX ADMIN — CLOPIDOGREL 75 MG: 75 TABLET ORAL at 09:01

## 2025-04-25 RX ADMIN — GABAPENTIN 800 MG: 400 CAPSULE ORAL at 09:01

## 2025-04-25 RX ADMIN — UMECLIDINIUM 1 PUFF: 62.5 AEROSOL, POWDER ORAL at 09:04

## 2025-04-25 RX ADMIN — Medication 1000 UNITS: at 09:01

## 2025-04-25 RX ADMIN — ISOSORBIDE MONONITRATE 30 MG: 30 TABLET, EXTENDED RELEASE ORAL at 09:00

## 2025-04-25 RX ADMIN — ESCITALOPRAM OXALATE 20 MG: 20 TABLET ORAL at 09:01

## 2025-04-25 RX ADMIN — CARVEDILOL 12.5 MG: 12.5 TABLET, FILM COATED ORAL at 09:01

## 2025-04-25 RX ADMIN — BUSPIRONE HYDROCHLORIDE 10 MG: 10 TABLET ORAL at 09:00

## 2025-04-25 RX ADMIN — FLUTICASONE FUROATE AND VILANTEROL TRIFENATATE 1 PUFF: 100; 25 POWDER RESPIRATORY (INHALATION) at 09:04

## 2025-04-25 RX ADMIN — HEPARIN SODIUM 5000 UNITS: 5000 INJECTION INTRAVENOUS; SUBCUTANEOUS at 05:42

## 2025-04-25 RX ADMIN — CYANOCOBALAMIN TAB 500 MCG 1000 MCG: 500 TAB at 09:01

## 2025-04-25 NOTE — ASSESSMENT & PLAN NOTE
Mildly elevated blood pressure on admission,  Coreg was increased to 25 mg daily per cardiology on 4/22.   Also she was started on lisinopril 2.5 mg.  Received one-time dose of 80 IV Lasix on 4/22.  Patient had episodes of blood pressure after Coreg was increased and lisinopril 2.5 mg was added.  Lisinopril was discontinued and Coreg was decreased back to 12.5 mg twice daily.  Patient did well and blood pressure has remained stable until after nitroglycerin  Blood pressure now stable

## 2025-04-25 NOTE — ASSESSMENT & PLAN NOTE
Lab Results   Component Value Date    HSTNI0 1,835 (H) 04/21/2025    HSTNI2 2,873 (H) 04/21/2025    HSTNID2 1,038 (H) 04/21/2025    HSTNI4 2,439 (H) 04/21/2025    HSTNID4 604 (H) 04/21/2025      Patient reported some chest heaviness however EKG without ischemic findings.  Improved   In the setting of Takotsubo cardiomyopathy

## 2025-04-25 NOTE — PLAN OF CARE
Problem: Potential for Falls  Goal: Patient will remain free of falls  Description: INTERVENTIONS:- Educate patient/family on patient safety including physical limitations- Instruct patient to call for assistance with activity - Consult OT/PT to assist with strengthening/mobility - Keep Call bell within reach- Keep bed low and locked with side rails adjusted as appropriate- Keep care items and personal belongings within reach- Initiate and maintain comfort rounds- Make Fall Risk Sign visible to staff- Offer Toileting every 2 Hours, in advance of need- Initiate/Maintain bed alarm- Obtain necessary fall risk management equipment: socks- Apply yellow socks and bracelet for high fall risk patients- Consider moving patient to room near nurses station  INTERVENTIONS:- Educate patient/family on patient safety including physical limitations- Instruct patient to call for assistance with activity - Consult OT/PT to assist with strengthening/mobility - Keep Call bell within reach- Keep bed low and locked with side rails adjusted as appropriate- Keep care items and personal belongings within reach- Initiate and maintain comfort rounds- Make Fall Risk Sign visible to staff- Offer Toileting every 2 Hours, in advance of need- Initiate/Maintain bed alarm- Obtain necessary fall risk management equipment: socks- Apply yellow socks and bracelet for high fall risk patients- Consider moving patient to room near nurses station  Outcome: Progressing     Problem: PAIN - ADULT  Goal: Verbalizes/displays adequate comfort level or baseline comfort level  Description: Interventions:- Encourage patient to monitor pain and request assistance- Assess pain using appropriate pain scale- Administer analgesics based on type and severity of pain and evaluate response- Implement non-pharmacological measures as appropriate and evaluate response- Consider cultural and social influences on pain and pain management- Notify physician/advanced practitioner  if interventions unsuccessful or patient reports new pain  Outcome: Progressing     Problem: INFECTION - ADULT  Goal: Absence or prevention of progression during hospitalization  Description: INTERVENTIONS:- Assess and monitor for signs and symptoms of infection- Monitor lab/diagnostic results- Monitor all insertion sites, i.e. indwelling lines, tubes, and drains- Monitor endotracheal if appropriate and nasal secretions for changes in amount and color- [place] appropriate cooling/warming therapies per order- Administer medications as ordered- Instruct and encourage patient and family to use good hand hygiene technique- Identify and instruct in appropriate isolation precautions for identified infection/condition  Outcome: Progressing     Problem: SAFETY ADULT  Goal: Patient will remain free of falls  Description: INTERVENTIONS:- Educate patient/family on patient safety including physical limitations- Instruct patient to call for assistance with activity - Consult OT/PT to assist with strengthening/mobility - Keep Call bell within reach- Keep bed low and locked with side rails adjusted as appropriate- Keep care items and personal belongings within reach- Initiate and maintain comfort rounds- Make Fall Risk Sign visible to staff- Offer Toileting every 2 Hours, in advance of need- Initiate/Maintain bed alarm- Obtain necessary fall risk management equipment: socks- Apply yellow socks and bracelet for high fall risk patients- Consider moving patient to room near nurses station  INTERVENTIONS:- Educate patient/family on patient safety including physical limitations- Instruct patient to call for assistance with activity - Consult OT/PT to assist with strengthening/mobility - Keep Call bell within reach- Keep bed low and locked with side rails adjusted as appropriate- Keep care items and personal belongings within reach- Initiate and maintain comfort rounds- Make Fall Risk Sign visible to staff- Offer Toileting every 2  Hours, in advance of need- Initiate/Maintain bed alarm- Obtain necessary fall risk management equipment: socks- Apply yellow socks and bracelet for high fall risk patients- Consider moving patient to room near nurses station  Outcome: Progressing  Goal: Maintain or return to baseline ADL function  Description: INTERVENTIONS:-  Assess patient's ability to carry out ADLs; assess patient's baseline for ADL function and identify physical deficits which impact ability to perform ADLs (bathing, care of mouth/teeth, toileting, grooming, dressing, etc.)- Assess/evaluate cause of self-care deficits - Assess range of motion- Assess patient's mobility; develop plan if impaired- Assess patient's need for assistive devices and provide as appropriate- Encourage maximum independence but intervene and supervise when necessary- Involve family in performance of ADLs- Assess for home care needs following discharge - Consider OT consult to assist with ADL evaluation and planning for discharge- Provide patient education as appropriate  Outcome: Progressing  Goal: Maintains/Returns to pre admission functional level  Description: INTERVENTIONS:- Perform AM-PAC 6 Click Basic Mobility/ Daily Activity assessment daily.- Set and communicate daily mobility goal to care team and patient/family/caregiver. - Collaborate with rehabilitation services on mobility goals if consulted- Perform Range of Motion 3 times a day.- Reposition patient every 2 hours.- Dangle patient 3 times a day- Stand patient 3 times a day- Ambulate patient 3 times a day- Out of bed to chair 3 times a day - Out of bed for meals 3 times a day- Out of bed for toileting- Record patient progress and toleration of activity level   Outcome: Progressing
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
## 2025-04-25 NOTE — ASSESSMENT & PLAN NOTE
Echo showed LVEF 45%, diastolic dysfunction 1, akinesis of apical anterior, apical septal, apical inferior and apical lateral and affects wall  Continue Coreg 12.5 mg twice daily , Imdur, plavix, atorvastatin.  Patient had substernal/epigastric pressure 4/24, EKG showed negative T waves and random troponin slightly elevated very much improved compared to prior.  Received nitroglycerin and the pain subsided.  Limited echo today showed improvement in the LVEF to 55%.  Patient will follow-up outpatient with cardiology
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
## 2025-04-28 ENCOUNTER — TRANSITIONAL CARE MANAGEMENT (OUTPATIENT)
Dept: FAMILY MEDICINE CLINIC | Facility: HOSPITAL | Age: 70
End: 2025-04-28

## 2025-04-28 ENCOUNTER — TELEPHONE (OUTPATIENT)
Age: 70
End: 2025-04-28

## 2025-04-28 LAB
ATRIAL RATE: 60 BPM
ATRIAL RATE: 67 BPM
ATRIAL RATE: 70 BPM
P AXIS: 42 DEGREES
P AXIS: 51 DEGREES
P AXIS: 55 DEGREES
PR INTERVAL: 126 MS
PR INTERVAL: 136 MS
PR INTERVAL: 146 MS
QRS AXIS: 49 DEGREES
QRS AXIS: 60 DEGREES
QRS AXIS: 64 DEGREES
QRSD INTERVAL: 76 MS
QRSD INTERVAL: 80 MS
QRSD INTERVAL: 88 MS
QT INTERVAL: 524 MS
QT INTERVAL: 526 MS
QT INTERVAL: 554 MS
QTC INTERVAL: 553 MS
QTC INTERVAL: 554 MS
QTC INTERVAL: 568 MS
T WAVE AXIS: 213 DEGREES
T WAVE AXIS: 231 DEGREES
T WAVE AXIS: 241 DEGREES
VENTRICULAR RATE: 60 BPM
VENTRICULAR RATE: 67 BPM
VENTRICULAR RATE: 70 BPM

## 2025-04-28 PROCEDURE — 93010 ELECTROCARDIOGRAM REPORT: CPT | Performed by: INTERNAL MEDICINE

## 2025-04-28 NOTE — TELEPHONE ENCOUNTER
Pt was DC from [facility] and called to sched a TCM. My call was disconnected, please call pt to sched, thank you.

## 2025-05-01 ENCOUNTER — OFFICE VISIT (OUTPATIENT)
Dept: FAMILY MEDICINE CLINIC | Facility: HOSPITAL | Age: 70
End: 2025-05-01
Payer: MEDICARE

## 2025-05-01 VITALS
SYSTOLIC BLOOD PRESSURE: 130 MMHG | HEIGHT: 65 IN | HEART RATE: 56 BPM | WEIGHT: 156 LBS | RESPIRATION RATE: 16 BRPM | DIASTOLIC BLOOD PRESSURE: 70 MMHG | BODY MASS INDEX: 25.99 KG/M2 | OXYGEN SATURATION: 94 %

## 2025-05-01 DIAGNOSIS — R09.02 HYPOXIA: ICD-10-CM

## 2025-05-01 DIAGNOSIS — I51.81 TAKOTSUBO CARDIOMYOPATHY: ICD-10-CM

## 2025-05-01 DIAGNOSIS — Z76.89 ENCOUNTER FOR SUPPORT AND COORDINATION OF TRANSITION OF CARE: Primary | ICD-10-CM

## 2025-05-01 DIAGNOSIS — I95.2 HYPOTENSION DUE TO DRUGS: ICD-10-CM

## 2025-05-01 DIAGNOSIS — J43.8 OTHER EMPHYSEMA (HCC): ICD-10-CM

## 2025-05-01 DIAGNOSIS — I25.10 CORONARY ARTERY DISEASE INVOLVING NATIVE CORONARY ARTERY OF NATIVE HEART WITHOUT ANGINA PECTORIS: ICD-10-CM

## 2025-05-01 DIAGNOSIS — I10 ESSENTIAL HYPERTENSION: Chronic | ICD-10-CM

## 2025-05-01 DIAGNOSIS — R05.9 COUGH: ICD-10-CM

## 2025-05-01 PROCEDURE — 99496 TRANSJ CARE MGMT HIGH F2F 7D: CPT | Performed by: NURSE PRACTITIONER

## 2025-05-01 RX ORDER — ALBUTEROL SULFATE 0.83 MG/ML
2.5 SOLUTION RESPIRATORY (INHALATION) EVERY 6 HOURS PRN
Qty: 1080 ML | Refills: 3 | Status: SHIPPED | OUTPATIENT
Start: 2025-05-01

## 2025-05-01 RX ORDER — ALBUTEROL SULFATE 90 UG/1
2 INHALANT RESPIRATORY (INHALATION) EVERY 4 HOURS PRN
Qty: 8.5 G | Refills: 1 | Status: SHIPPED | OUTPATIENT
Start: 2025-05-01

## 2025-05-01 NOTE — ASSESSMENT & PLAN NOTE
History of CAD/MI.  Reports medication adherence to Carbilev at all 12.5 mg p.o. twice daily, Imdur 30 mg p.o. daily, Plavix and statin.  Followed closely by cardiology.
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
## 2025-05-01 NOTE — ASSESSMENT & PLAN NOTE
Blood pressure acceptable at 130/70 with medication adherence to Coreg 12.5 mg p.o. twice daily as well as Imdur 30 mg p.o. daily.  See Takotsubo cardiomyopathy A/P.
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
## 2025-05-07 ENCOUNTER — OFFICE VISIT (OUTPATIENT)
Dept: CARDIOLOGY CLINIC | Facility: CLINIC | Age: 70
End: 2025-05-07
Payer: MEDICARE

## 2025-05-07 VITALS
SYSTOLIC BLOOD PRESSURE: 128 MMHG | DIASTOLIC BLOOD PRESSURE: 66 MMHG | BODY MASS INDEX: 26.33 KG/M2 | HEART RATE: 66 BPM | HEIGHT: 65 IN | WEIGHT: 158 LBS

## 2025-05-07 DIAGNOSIS — I51.81 TAKOTSUBO CARDIOMYOPATHY: ICD-10-CM

## 2025-05-07 DIAGNOSIS — I20.9 ANGINAL PAIN (HCC): Primary | ICD-10-CM

## 2025-05-07 PROCEDURE — 99214 OFFICE O/P EST MOD 30 MIN: CPT | Performed by: PHYSICIAN ASSISTANT
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
## 2025-05-08 ENCOUNTER — TELEPHONE (OUTPATIENT)
Age: 70
End: 2025-05-08
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
## 2025-05-08 NOTE — TELEPHONE ENCOUNTER
I need some specifics here - what is start and end date and is it continuous time off or intermittent time off just for appts and testing?
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
## 2025-05-13 ENCOUNTER — OFFICE VISIT (OUTPATIENT)
Dept: FAMILY MEDICINE CLINIC | Facility: HOSPITAL | Age: 70
End: 2025-05-13
Payer: MEDICARE

## 2025-05-13 VITALS
HEIGHT: 65 IN | OXYGEN SATURATION: 98 % | WEIGHT: 156.4 LBS | DIASTOLIC BLOOD PRESSURE: 80 MMHG | HEART RATE: 61 BPM | TEMPERATURE: 97.8 F | BODY MASS INDEX: 26.06 KG/M2 | SYSTOLIC BLOOD PRESSURE: 161 MMHG

## 2025-05-13 DIAGNOSIS — Z12.31 ENCOUNTER FOR SCREENING MAMMOGRAM FOR BREAST CANCER: Primary | ICD-10-CM

## 2025-05-13 DIAGNOSIS — I10 ESSENTIAL HYPERTENSION: Chronic | ICD-10-CM

## 2025-05-13 DIAGNOSIS — E78.5 DYSLIPIDEMIA: Chronic | ICD-10-CM

## 2025-05-13 DIAGNOSIS — I51.81 TAKOTSUBO CARDIOMYOPATHY: ICD-10-CM

## 2025-05-13 DIAGNOSIS — F33.0 MILD EPISODE OF RECURRENT MAJOR DEPRESSIVE DISORDER (HCC): ICD-10-CM

## 2025-05-13 DIAGNOSIS — Z00.00 MEDICARE ANNUAL WELLNESS VISIT, SUBSEQUENT: ICD-10-CM

## 2025-05-13 DIAGNOSIS — J43.8 OTHER EMPHYSEMA (HCC): ICD-10-CM

## 2025-05-13 DIAGNOSIS — I95.2 HYPOTENSION DUE TO DRUGS: ICD-10-CM

## 2025-05-13 DIAGNOSIS — E53.8 DEFICIENCY OF VITAMIN B12: ICD-10-CM

## 2025-05-13 DIAGNOSIS — R73.9 HYPERGLYCEMIA: ICD-10-CM

## 2025-05-13 DIAGNOSIS — N18.31 STAGE 3A CHRONIC KIDNEY DISEASE (HCC): Chronic | ICD-10-CM

## 2025-05-13 PROCEDURE — 99214 OFFICE O/P EST MOD 30 MIN: CPT | Performed by: INTERNAL MEDICINE

## 2025-05-13 PROCEDURE — G2211 COMPLEX E/M VISIT ADD ON: HCPCS | Performed by: INTERNAL MEDICINE

## 2025-05-13 PROCEDURE — G0439 PPPS, SUBSEQ VISIT: HCPCS | Performed by: INTERNAL MEDICINE

## 2025-05-13 RX ORDER — BUSPIRONE HYDROCHLORIDE 15 MG/1
15 TABLET ORAL 2 TIMES DAILY
Qty: 60 TABLET | Refills: 2 | Status: SHIPPED | OUTPATIENT
Start: 2025-05-13

## 2025-05-13 NOTE — ASSESSMENT & PLAN NOTE
FLP to be done with next labs - BW order given, con't current statin, will follow  Orders:    CBC and differential    Comprehensive metabolic panel    Hemoglobin A1C    Lipid panel    TSH, 3rd generation with Free T4 reflex    Vitamin B12
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
## 2025-05-13 NOTE — ASSESSMENT & PLAN NOTE
Con't current supplement, recheck levels with next labs - BW order given, will follow  Orders:    Vitamin B12

## 2025-05-13 NOTE — ASSESSMENT & PLAN NOTE
Echo with EF 45% right after event, Repeat Echo done while IP and EF improved to 55%, UTT ACE/ARB, on Coreg, just saw Cardio, has stress test ordered d/t some persistent intermittent CP, will follow  Orders:    CBC and differential    Comprehensive metabolic panel    Hemoglobin A1C    Lipid panel    TSH, 3rd generation with Free T4 reflex    Vitamin B12
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
## 2025-05-13 NOTE — ASSESSMENT & PLAN NOTE
Lab Results   Component Value Date    EGFR 56 04/25/2025    EGFR 46 04/24/2025    EGFR 46 04/23/2025    CREATININE 1.01 04/25/2025    CREATININE 1.19 04/24/2025    CREATININE 1.19 04/23/2025     BW every 6-12 mos or so - order given, UTT ACE/ARB, will follow  Orders:    CBC and differential    Comprehensive metabolic panel    Hemoglobin A1C    Lipid panel    TSH, 3rd generation with Free T4 reflex    Vitamin B12
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
## 2025-05-13 NOTE — PATIENT INSTRUCTIONS
Medicare Preventive Visit Patient Instructions  Thank you for completing your Welcome to Medicare Visit or Medicare Annual Wellness Visit today. Your next wellness visit will be due in one year (5/14/2026).  The screening/preventive services that you may require over the next 5-10 years are detailed below. Some tests may not apply to you based off risk factors and/or age. Screening tests ordered at today's visit but not completed yet may show as past due. Also, please note that scanned in results may not display below.  Preventive Screenings:  Service Recommendations Previous Testing/Comments   Colorectal Cancer Screening  * Colonoscopy    * Fecal Occult Blood Test (FOBT)/Fecal Immunochemical Test (FIT)  * Fecal DNA/Cologuard Test  * Flexible Sigmoidoscopy Age: 45-75 years old   Colonoscopy: every 10 years (may be performed more frequently if at higher risk)  OR  FOBT/FIT: every 1 year  OR  Cologuard: every 3 years  OR  Sigmoidoscopy: every 5 years  Screening may be recommended earlier than age 45 if at higher risk for colorectal cancer. Also, an individualized decision between you and your healthcare provider will decide whether screening between the ages of 76-85 would be appropriate. Colonoscopy: 06/30/2022  FOBT/FIT: Not on file  Cologuard: Not on file  Sigmoidoscopy: Not on file    Screening Current     Breast Cancer Screening Age: 40+ years old  Frequency: every 1-2 years  Not required if history of left and right mastectomy Mammogram: 07/16/2024    Screening Current   Cervical Cancer Screening Between the ages of 21-29, pap smear recommended once every 3 years.   Between the ages of 30-65, can perform pap smear with HPV co-testing every 5 years.   Recommendations may differ for women with a history of total hysterectomy, cervical cancer, or abnormal pap smears in past. Pap Smear: 12/17/2020    Screening Not Indicated   Hepatitis C Screening Once for adults born between 1945 and 1965  More frequently in  patients at high risk for Hepatitis C Hep C Antibody: 09/04/2019    Screening Current   Diabetes Screening 1-2 times per year if you're at risk for diabetes or have pre-diabetes Fasting glucose: 94 mg/dL (12/7/2024)  A1C: 5.4 % (12/7/2024)  Screening Current   Cholesterol Screening Once every 5 years if you don't have a lipid disorder. May order more often based on risk factors. Lipid panel: 05/04/2024    Screening Current     Other Preventive Screenings Covered by Medicare:  Abdominal Aortic Aneurysm (AAA) Screening: covered once if your at risk. You're considered to be at risk if you have a family history of AAA.  Lung Cancer Screening: covers low dose CT scan once per year if you meet all of the following conditions: (1) Age 55-77; (2) No signs or symptoms of lung cancer; (3) Current smoker or have quit smoking within the last 15 years; (4) You have a tobacco smoking history of at least 20 pack years (packs per day multiplied by number of years you smoked); (5) You get a written order from a healthcare provider.  Glaucoma Screening: covered annually if you're considered high risk: (1) You have diabetes OR (2) Family history of glaucoma OR (3)  aged 50 and older OR (4)  American aged 65 and older  Osteoporosis Screening: covered every 2 years if you meet one of the following conditions: (1) You're estrogen deficient and at risk for osteoporosis based off medical history and other findings; (2) Have a vertebral abnormality; (3) On glucocorticoid therapy for more than 3 months; (4) Have primary hyperparathyroidism; (5) On osteoporosis medications and need to assess response to drug therapy.   Last bone density test (DXA Scan): 03/20/2025.  HIV Screening: covered annually if you're between the age of 15-65. Also covered annually if you are younger than 15 and older than 65 with risk factors for HIV infection. For pregnant patients, it is covered up to 3 times per  pregnancy.    Immunizations:  Immunization Recommendations   Influenza Vaccine Annual influenza vaccination during flu season is recommended for all persons aged >= 6 months who do not have contraindications   Pneumococcal Vaccine   * Pneumococcal conjugate vaccine = PCV13 (Prevnar 13), PCV15 (Vaxneuvance), PCV20 (Prevnar 20)  * Pneumococcal polysaccharide vaccine = PPSV23 (Pneumovax) Adults 19-63 yo with certain risk factors or if 65+ yo  If never received any pneumonia vaccine: recommend Prevnar 20 (PCV20)  Give PCV20 if previously received 1 dose of PCV13 or PPSV23   Hepatitis B Vaccine 3 dose series if at intermediate or high risk (ex: diabetes, end stage renal disease, liver disease)   Respiratory syncytial virus (RSV) Vaccine - COVERED BY MEDICARE PART D  * RSVPreF3 (Arexvy) CDC recommends that adults 60 years of age and older may receive a single dose of RSV vaccine using shared clinical decision-making (SCDM)   Tetanus (Td) Vaccine - COST NOT COVERED BY MEDICARE PART B Following completion of primary series, a booster dose should be given every 10 years to maintain immunity against tetanus. Td may also be given as tetanus wound prophylaxis.   Tdap Vaccine - COST NOT COVERED BY MEDICARE PART B Recommended at least once for all adults. For pregnant patients, recommended with each pregnancy.   Shingles Vaccine (Shingrix) - COST NOT COVERED BY MEDICARE PART B  2 shot series recommended in those 19 years and older who have or will have weakened immune systems or those 50 years and older     Health Maintenance Due:      Topic Date Due   • Breast Cancer Screening: Mammogram  07/16/2025   • DXA SCAN  03/20/2027   • Colorectal Cancer Screening  06/29/2027   • Hepatitis C Screening  Completed   • Lung Cancer Screening  Discontinued     Immunizations Due:      Topic Date Due   • COVID-19 Vaccine (7 - 2024-25 season) 03/12/2025     Advance Directives   What are advance directives?  Advance directives are legal documents  that state your wishes and plans for medical care. These plans are made ahead of time in case you lose your ability to make decisions for yourself. Advance directives can apply to any medical decision, such as the treatments you want, and if you want to donate organs.   What are the types of advance directives?  There are many types of advance directives, and each state has rules about how to use them. You may choose a combination of any of the following:  Living will:  This is a written record of the treatment you want. You can also choose which treatments you do not want, which to limit, and which to stop at a certain time. This includes surgery, medicine, IV fluid, and tube feedings.   Durable power of  for healthcare (DPAHC):  This is a written record that states who you want to make healthcare choices for you when you are unable to make them for yourself. This person, called a proxy, is usually a family member or a friend. You may choose more than 1 proxy.  Do not resuscitate (DNR) order:  A DNR order is used in case your heart stops beating or you stop breathing. It is a request not to have certain forms of treatment, such as CPR. A DNR order may be included in other types of advance directives.  Medical directive:  This covers the care that you want if you are in a coma, near death, or unable to make decisions for yourself. You can list the treatments you want for each condition. Treatment may include pain medicine, surgery, blood transfusions, dialysis, IV or tube feedings, and a ventilator (breathing machine).  Values history:  This document has questions about your views, beliefs, and how you feel and think about life. This information can help others choose the care that you would choose.  Why are advance directives important?  An advance directive helps you control your care. Although spoken wishes may be used, it is better to have your wishes written down. Spoken wishes can be misunderstood, or  not followed. Treatments may be given even if you do not want them. An advance directive may make it easier for your family to make difficult choices about your care.   Weight Management   Why it is important to manage your weight:  Being overweight increases your risk of health conditions such as heart disease, high blood pressure, type 2 diabetes, and certain types of cancer. It can also increase your risk for osteoarthritis, sleep apnea, and other respiratory problems. Aim for a slow, steady weight loss. Even a small amount of weight loss can lower your risk of health problems.  How to lose weight safely:  A safe and healthy way to lose weight is to eat fewer calories and get regular exercise. You can lose up about 1 pound a week by decreasing the number of calories you eat by 500 calories each day.   Healthy meal plan for weight management:  A healthy meal plan includes a variety of foods, contains fewer calories, and helps you stay healthy. A healthy meal plan includes the following:  Eat whole-grain foods more often.  A healthy meal plan should contain fiber. Fiber is the part of grains, fruits, and vegetables that is not broken down by your body. Whole-grain foods are healthy and provide extra fiber in your diet. Some examples of whole-grain foods are whole-wheat breads and pastas, oatmeal, brown rice, and bulgur.  Eat a variety of vegetables every day.  Include dark, leafy greens such as spinach, kale, carlos eduardo greens, and mustard greens. Eat yellow and orange vegetables such as carrots, sweet potatoes, and winter squash.   Eat a variety of fruits every day.  Choose fresh or canned fruit (canned in its own juice or light syrup) instead of juice. Fruit juice has very little or no fiber.  Eat low-fat dairy foods.  Drink fat-free (skim) milk or 1% milk. Eat fat-free yogurt and low-fat cottage cheese. Try low-fat cheeses such as mozzarella and other reduced-fat cheeses.  Choose meat and other protein foods that  are low in fat.  Choose beans or other legumes such as split peas or lentils. Choose fish, skinless poultry (chicken or turkey), or lean cuts of red meat (beef or pork). Before you cook meat or poultry, cut off any visible fat.   Use less fat and oil.  Try baking foods instead of frying them. Add less fat, such as margarine, sour cream, regular salad dressing and mayonnaise to foods. Eat fewer high-fat foods. Some examples of high-fat foods include french fries, doughnuts, ice cream, and cakes.  Eat fewer sweets.  Limit foods and drinks that are high in sugar. This includes candy, cookies, regular soda, and sweetened drinks.  Exercise:  Exercise at least 30 minutes per day on most days of the week. Some examples of exercise include walking, biking, dancing, and swimming. You can also fit in more physical activity by taking the stairs instead of the elevator or parking farther away from stores. Ask your healthcare provider about the best exercise plan for you.      © Copyright [employer] 2018 Information is for End User's use only and may not be sold, redistributed or otherwise used for commercial purposes. All illustrations and images included in CareNotes® are the copyrighted property of [employer] Inc[employer] or [employer]

## 2025-05-14 NOTE — ASSESSMENT & PLAN NOTE
· Continue home coreg, imdur, plavix  · Denies any associated chest pain  · HS troponins negative  · Echo 2019 nl EF 55-65% 2 plates in back for repair of the spine    stent placed near bile duct 2 plates in back for repair of the spine    stent placed near bile duct/None

## 2025-05-23 ENCOUNTER — OFFICE VISIT (OUTPATIENT)
Age: 70
End: 2025-05-23
Payer: MEDICARE

## 2025-05-23 VITALS
WEIGHT: 157 LBS | HEART RATE: 65 BPM | SYSTOLIC BLOOD PRESSURE: 146 MMHG | TEMPERATURE: 97.5 F | OXYGEN SATURATION: 95 % | BODY MASS INDEX: 26.13 KG/M2 | DIASTOLIC BLOOD PRESSURE: 70 MMHG

## 2025-05-23 DIAGNOSIS — J43.8 OTHER EMPHYSEMA (HCC): Primary | ICD-10-CM

## 2025-05-23 DIAGNOSIS — J96.01 ACUTE RESPIRATORY FAILURE WITH HYPOXIA (HCC): ICD-10-CM

## 2025-05-23 PROCEDURE — 99213 OFFICE O/P EST LOW 20 MIN: CPT | Performed by: INTERNAL MEDICINE

## 2025-05-23 NOTE — PROGRESS NOTES
Name: Geovanna Leal      : 1955      MRN: [id number]  Encounter Provider: Juan Gupta MD  Encounter Date: 2025   Encounter department: [facility] PULMONARY [facility]      Overview:  Patient is 70-year-old female with history of COPD and recent hospitalization for pneumonia as reasons around supple oxygen who presents routine follow.  Overall, patient is improved significantly from her recent bout for pneumonia.  She has no significant plaints rates her respiratory status.  She continues oxygen at night.  Patient was diagnosed with Takotsubo's cardiomyopathy but this has improved as well.    Assessment & Plan  Other emphysema (HCC)  -  continue with Trelegy   -  not appropriate to zack watt at this [facility]  -  evaluating alternate options               -  consider triple vs laba/lama  -  quit smoking in        Acute respiratory failure with hypoxia (HCC)  -  using supplemental oxygen at night  -  may continue current settings.         Assessment & Plan  1. Respiratory issues.  Her condition appears to be stable with the current treatment regimen, which includes Trelegy, albuterol, and a nebulizer. She is also utilizing oxygen therapy during the night. There is no evidence of frequent exacerbations that would necessitate the introduction of additional therapeutic agents or interventions. Her recent bout of pneumonia seems to have resolved, as indicated by her improved clinical status. A repeat CT scan is scheduled for 2025, which will provide further insight into her condition. The results of this upcoming CT scan will guide the next steps in her treatment plan.    2. Takotsubo cardiomyopathy.  She reports that her breathing is currently stable, although her stress levels remain high. She has not used the nebulizer but has used her albuterol rescue inhaler a couple of times. She is advised to continue monitoring her symptoms and use the inhaler as needed.    Patient may  follow up in 6 months or sooner as necessary.     Geovanna Leal is a 70 y.o. female    History of Present Illness  The patient presents for evaluation of respiratory issues.    She reports satisfactory respiratory function, although she experiences high stress levels. She has not required the use of a nebulizer but has utilized an inhaler on several occasions. She continues her regimen of Trelegy and albuterol rescue inhaler. She does not require any medication refills at this time. She has a nebulizer machine at home and uses oxygen at night at a rate of 2 liters. She has not needed steroids or prednisone recently, as she prefers to avoid them due to their hypertensive effects. She was previously prescribed lisinopril, which she discontinued due to excessive blood pressure reduction. She has abstained from smoking for the past month. She reports no recent episodes of fever, chills, nausea, or vomiting. She experiences chest pain when she is anxious. She has a CT scan scheduled for 06/30/2025.    She was diagnosed with Takotsubo cardiomyopathy and was hospitalized for it. She had a chest x-ray on 04/21/2025 because she was having shortness of breath and high blood pressure. She needed oxygen because she could not breathe right. She is doing better now.    SOCIAL HISTORY  She does not smoke.    MEDICATIONS  Trelegy, albuterol      Inhaler Regimen:  Trelegy  Albuterol    Remainder of review of systems negative except as described in HPI.    Review of Systems    The following portions of the patient's history were reviewed and updated as appropriate: allergies, current medications, past family history, past medical history, past social history, past surgical history and problem list.     Vitals: Blood pressure 146/70, pulse 65, temperature 97.5 °F (36.4 °C), weight 71.2 kg (157 lb), SpO2 95%., RA, Body mass index is 26.13 kg/m².  Physical Exam  Vitals and nursing note reviewed.   Constitutional:       General: She  is not in acute distress.     Appearance: She is well-developed.   HENT:      Head: Normocephalic and atraumatic.     Eyes:      Conjunctiva/sclera: Conjunctivae normal.       Cardiovascular:      Rate and Rhythm: Normal rate and regular rhythm.      Heart sounds: No murmur heard.  Pulmonary:      Effort: Pulmonary effort is normal. No respiratory distress.      Breath sounds: Normal breath sounds.   Abdominal:      Palpations: Abdomen is soft.      Tenderness: There is no abdominal tenderness.     Musculoskeletal:         General: No swelling.      Cervical back: Neck supple.     Skin:     General: Skin is warm and dry.      Capillary Refill: Capillary refill takes less than 2 seconds.     Neurological:      Mental Status: She is alert.     Psychiatric:         Mood and Affect: Mood normal.      Lab Results: I personally reviewed relevant lab results.  Lab Results   Component Value Date    WBC 5.68 04/24/2025    WBC 4.69 12/10/2015    HGB 10.6 (L) 04/24/2025    HGB 15.3 12/10/2015     04/24/2025     12/10/2015     Lab Results   Component Value Date    CREATININE 1.01 04/25/2025    CREATININE 0.90 12/10/2015        Imaging and other studies: Results Review Statement: I reviewed radiology reports from this admission including: CT chest.  3/24/2025  Radiology findings:  LUNGS: Improving consolidation in the right upper lobe with residual groundglass opacity in some areas with volume loss and collapse of several bullae.  Severe emphysema with several large bullae. Redemonstration of left upper lobe scar.  AIRWAYS: No significant filling defects.  PLEURA: Decrease in right pleural effusion, trace. Persistent trace left pleural effusion.  HEART/GREAT VESSELS: Normal heart size. Mild coronary artery calcification indicating atherosclerotic heart disease. Ascending aorta at upper limit of normal in size at 3.9 cm.  MEDIASTINUM AND KAVITHA: Moderate subcarinal lymphadenopathy, stable since June 2024.  CHEST WALL  AND LOWER NECK: Biopsy clip right breast.  UPPER ABDOMEN: Cholelithiasis. Partially imaged right renal stent. 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  OSSEOUS STRUCTURES: Mild degenerative disease in the spine. Stable dense calcification in the lower spinal canal since at least 2022 (303/102). ACDF.    Pulmonary Function Testing: Results Review Statement: I reviewed radiology reports from this admission includin2019.  FEV1/FVC Ratio: 62 %  Forced Vital Capacity: 2.41 L    74 % predicted  FEV1: 1.49 L60 % predicted    Juan Gupta MD  [facility]

## 2025-05-27 NOTE — ASSESSMENT & PLAN NOTE
-  continue with Trelegy   -  not appropriate to zack watt at this [facility]  -  evaluating alternate options               -  consider triple vs laba/lama  -  quit smoking in 2007

## 2025-05-31 ENCOUNTER — APPOINTMENT (EMERGENCY)
Dept: CT IMAGING | Facility: HOSPITAL | Age: 70
End: 2025-05-31
Payer: MEDICARE

## 2025-05-31 ENCOUNTER — APPOINTMENT (EMERGENCY)
Dept: RADIOLOGY | Facility: HOSPITAL | Age: 70
End: 2025-05-31
Payer: MEDICARE

## 2025-05-31 ENCOUNTER — HOSPITAL ENCOUNTER (EMERGENCY)
Facility: HOSPITAL | Age: 70
Discharge: HOME | End: 2025-05-31
Attending: EMERGENCY MEDICINE | Admitting: EMERGENCY MEDICINE
Payer: MEDICARE

## 2025-05-31 VITALS
OXYGEN SATURATION: 94 % | BODY MASS INDEX: 25.99 KG/M2 | RESPIRATION RATE: 19 BRPM | DIASTOLIC BLOOD PRESSURE: 63 MMHG | HEIGHT: 65 IN | TEMPERATURE: 97.5 F | WEIGHT: 156 LBS | HEART RATE: 66 BPM | SYSTOLIC BLOOD PRESSURE: 142 MMHG

## 2025-05-31 DIAGNOSIS — D72.819 LEUKOPENIA: ICD-10-CM

## 2025-05-31 DIAGNOSIS — R53.83 FATIGUE: Primary | ICD-10-CM

## 2025-05-31 DIAGNOSIS — R93.89 ABNORMAL CT SCAN: ICD-10-CM

## 2025-05-31 DIAGNOSIS — N39.0 UTI (URINARY TRACT INFECTION): ICD-10-CM

## 2025-05-31 DIAGNOSIS — R79.89 ELEVATED SERUM CREATININE: ICD-10-CM

## 2025-05-31 LAB
ALBUMIN SERPL BCG-MCNC: 4.2 G/DL (ref 3.5–5)
ALP SERPL-CCNC: 81 U/L (ref 34–104)
ALT SERPL W P-5'-P-CCNC: 18 U/L (ref 7–52)
ANION GAP SERPL CALCULATED.3IONS-SCNC: 7 MMOL/L (ref 4–13)
APTT PPP: 25 SECONDS (ref 23–34)
AST SERPL W P-5'-P-CCNC: 23 U/L (ref 13–39)
BACTERIA UR QL AUTO: ABNORMAL /HPF
BASOPHILS # BLD AUTO: 0 THOUSANDS/ÂΜL (ref 0–0.1)
BASOPHILS NFR BLD AUTO: 0 % (ref 0–1)
BILIRUB SERPL-MCNC: 0.52 MG/DL (ref 0.2–1)
BILIRUB UR QL STRIP: NEGATIVE
BNP SERPL-MCNC: 37 PG/ML (ref 0–100)
BUN SERPL-MCNC: 22 MG/DL (ref 5–25)
CALCIUM SERPL-MCNC: 9.5 MG/DL (ref 8.4–10.2)
CARDIAC TROPONIN I PNL SERPL HS: 5 NG/L (ref ?–50)
CHLORIDE SERPL-SCNC: 105 MMOL/L (ref 96–108)
CLARITY UR: CLEAR
CO2 SERPL-SCNC: 25 MMOL/L (ref 21–32)
COLOR UR: YELLOW
CREAT SERPL-MCNC: 1.35 MG/DL (ref 0.6–1.3)
D DIMER PPP FEU-MCNC: 0.91 UG/ML FEU
EOSINOPHIL # BLD AUTO: 0.18 THOUSAND/ÂΜL (ref 0–0.61)
EOSINOPHIL NFR BLD AUTO: 9 % (ref 0–6)
ERYTHROCYTE [DISTWIDTH] IN BLOOD BY AUTOMATED COUNT: 13.9 % (ref 11.6–15.1)
FLUAV AG UPPER RESP QL IA.RAPID: NEGATIVE
FLUBV AG UPPER RESP QL IA.RAPID: NEGATIVE
GFR SERPL CREATININE-BSD FRML MDRD: 39 ML/MIN/1.73SQ M
GLUCOSE SERPL-MCNC: 112 MG/DL (ref 65–140)
GLUCOSE UR STRIP-MCNC: NEGATIVE MG/DL
HCT VFR BLD AUTO: 43 % (ref 34.8–46.1)
HGB BLD-MCNC: 13.7 G/DL (ref 11.5–15.4)
HGB UR QL STRIP.AUTO: NEGATIVE
IMM GRANULOCYTES # BLD AUTO: 0.01 THOUSAND/UL (ref 0–0.2)
IMM GRANULOCYTES NFR BLD AUTO: 1 % (ref 0–2)
INR PPP: 0.97 (ref 0.85–1.19)
KETONES UR STRIP-MCNC: ABNORMAL MG/DL
LACTATE SERPL-SCNC: 1.7 MMOL/L (ref 0.5–2)
LEUKOCYTE ESTERASE UR QL STRIP: ABNORMAL
LIPASE SERPL-CCNC: 55 U/L (ref 11–82)
LYMPHOCYTES # BLD AUTO: 0.59 THOUSANDS/ÂΜL (ref 0.6–4.47)
LYMPHOCYTES NFR BLD AUTO: 28 % (ref 14–44)
MAGNESIUM SERPL-MCNC: 1.9 MG/DL (ref 1.9–2.7)
MCH RBC QN AUTO: 30.5 PG (ref 26.8–34.3)
MCHC RBC AUTO-ENTMCNC: 31.9 G/DL (ref 31.4–37.4)
MCV RBC AUTO: 96 FL (ref 82–98)
MONOCYTES # BLD AUTO: 0.32 THOUSAND/ÂΜL (ref 0.17–1.22)
MONOCYTES NFR BLD AUTO: 15 % (ref 4–12)
NEUTROPHILS # BLD AUTO: 1.03 THOUSANDS/ÂΜL (ref 1.85–7.62)
NEUTS SEG NFR BLD AUTO: 47 % (ref 43–75)
NITRITE UR QL STRIP: NEGATIVE
NON-SQ EPI CELLS URNS QL MICRO: ABNORMAL /HPF
NRBC BLD AUTO-RTO: 0 /100 WBCS
PH UR STRIP.AUTO: 6 [PH]
PLATELET # BLD AUTO: 205 THOUSANDS/UL (ref 149–390)
PMV BLD AUTO: 8.8 FL (ref 8.9–12.7)
POTASSIUM SERPL-SCNC: 3.9 MMOL/L (ref 3.5–5.3)
PROCALCITONIN SERPL-MCNC: 0.26 NG/ML
PROT SERPL-MCNC: 7.1 G/DL (ref 6.4–8.4)
PROT UR STRIP-MCNC: ABNORMAL MG/DL
PROTHROMBIN TIME: 13.4 SECONDS (ref 12.3–15)
RBC # BLD AUTO: 4.49 MILLION/UL (ref 3.81–5.12)
RBC #/AREA URNS AUTO: ABNORMAL /HPF
SARS-COV+SARS-COV-2 AG RESP QL IA.RAPID: NEGATIVE
SODIUM SERPL-SCNC: 137 MMOL/L (ref 135–147)
SP GR UR STRIP.AUTO: 1.01 (ref 1–1.03)
UROBILINOGEN UR STRIP-ACNC: <2 MG/DL
WBC # BLD AUTO: 2.13 THOUSAND/UL (ref 4.31–10.16)
WBC #/AREA URNS AUTO: ABNORMAL /HPF

## 2025-05-31 PROCEDURE — 87040 BLOOD CULTURE FOR BACTERIA: CPT

## 2025-05-31 PROCEDURE — 87811 SARS-COV-2 COVID19 W/OPTIC: CPT

## 2025-05-31 PROCEDURE — 99285 EMERGENCY DEPT VISIT HI MDM: CPT

## 2025-05-31 PROCEDURE — 70450 CT HEAD/BRAIN W/O DYE: CPT

## 2025-05-31 PROCEDURE — 36415 COLL VENOUS BLD VENIPUNCTURE: CPT

## 2025-05-31 PROCEDURE — 96365 THER/PROPH/DIAG IV INF INIT: CPT

## 2025-05-31 PROCEDURE — 83690 ASSAY OF LIPASE: CPT

## 2025-05-31 PROCEDURE — 83605 ASSAY OF LACTIC ACID: CPT

## 2025-05-31 PROCEDURE — 83880 ASSAY OF NATRIURETIC PEPTIDE: CPT

## 2025-05-31 PROCEDURE — 74177 CT ABD & PELVIS W/CONTRAST: CPT

## 2025-05-31 PROCEDURE — 71275 CT ANGIOGRAPHY CHEST: CPT

## 2025-05-31 PROCEDURE — 84484 ASSAY OF TROPONIN QUANT: CPT

## 2025-05-31 PROCEDURE — 71046 X-RAY EXAM CHEST 2 VIEWS: CPT

## 2025-05-31 PROCEDURE — 96361 HYDRATE IV INFUSION ADD-ON: CPT

## 2025-05-31 PROCEDURE — 93005 ELECTROCARDIOGRAM TRACING: CPT

## 2025-05-31 PROCEDURE — 85379 FIBRIN DEGRADATION QUANT: CPT

## 2025-05-31 PROCEDURE — 87804 INFLUENZA ASSAY W/OPTIC: CPT

## 2025-05-31 PROCEDURE — 85610 PROTHROMBIN TIME: CPT

## 2025-05-31 PROCEDURE — 84145 PROCALCITONIN (PCT): CPT

## 2025-05-31 PROCEDURE — 83735 ASSAY OF MAGNESIUM: CPT

## 2025-05-31 PROCEDURE — 80053 COMPREHEN METABOLIC PANEL: CPT

## 2025-05-31 PROCEDURE — 85025 COMPLETE CBC W/AUTO DIFF WBC: CPT

## 2025-05-31 PROCEDURE — 81001 URINALYSIS AUTO W/SCOPE: CPT

## 2025-05-31 PROCEDURE — 85730 THROMBOPLASTIN TIME PARTIAL: CPT

## 2025-05-31 RX ORDER — CEFEPIME HYDROCHLORIDE 2 G/50ML
2000 INJECTION, SOLUTION INTRAVENOUS ONCE
Status: COMPLETED | OUTPATIENT
Start: 2025-05-31 | End: 2025-05-31

## 2025-05-31 RX ORDER — CEFUROXIME AXETIL 500 MG/1
500 TABLET ORAL EVERY 12 HOURS SCHEDULED
Qty: 14 TABLET | Refills: 0 | Status: SHIPPED | OUTPATIENT
Start: 2025-05-31 | End: 2025-06-03

## 2025-05-31 RX ADMIN — CEFEPIME HYDROCHLORIDE 2000 MG: 2 INJECTION, SOLUTION INTRAVENOUS at 13:50

## 2025-05-31 RX ADMIN — IOHEXOL 80 ML: 350 INJECTION, SOLUTION INTRAVENOUS at 13:25

## 2025-05-31 RX ADMIN — SODIUM CHLORIDE 1000 ML: 0.9 INJECTION, SOLUTION INTRAVENOUS at 11:40

## 2025-05-31 NOTE — DISCHARGE INSTRUCTIONS
Follow-up with your primary care doctor.  Have repeat blood work in 1 week.  Return for worsening symptoms.  We will call you if we need a change in antibiotic.   Radiology reports shows improving chest ct from before. Recommends repeat chest CT in 3 months.   A pancreatic cyst was seen again, please follow up. Without appropriate follow up diagnosis such as but not limited to cancer can be missed.

## 2025-05-31 NOTE — ED PROVIDER NOTES
Time reflects when diagnosis was documented in both MDM as applicable and the Disposition within this note       Time User Action Codes Description Comment    5/31/2025  3:14 PM JaunchristianoLeelee Add [R53.83] Fatigue     5/31/2025  3:15 PM Leelee Mulligan Add [D72.819] Leukopenia     5/31/2025  3:15 PM MalindanoraLeelee Add [R79.89] Elevated serum creatinine     5/31/2025  3:17 PM JaunchristianoLeelee Add [N39.0] UTI (urinary tract infection)     5/31/2025  3:20 PM Leelee Mulligan Add [R93.89] Abnormal CT scan           ED Disposition       ED Disposition   Discharge    Condition   Stable    Date/Time   Sat May 31, 2025  3:14 PM    Comment   Geovanna Leal discharge to home/self care.                   Assessment & Plan       Medical Decision Making  Differential diagnosis including PE, pneumonia, kidney stone, acute cholecystitis  Patient presenting with generalized bodyaches since Thursday.  Reports that she has a mild headache.  Has no focal neurodeficits. Plan to check septic workup , D-dimer as well as as moderate risk.   Noted to have leukopenia today. Noted soft blood pressure in presence of decreased PO intake. Patient's BP improved with IV fluids. Patient has slight increase to creatinine in presence of decreased PO intake. IV fluids provided. UA concerning for UTI, dose of IV abx provided. As DDimer was slightly elevated, a CT PE a/p ordered. No new findings on CT PE a/p. Reviewed with patient lung findings are improving. Reviewed recommendation for repeat CT scan in three months. Discussed incidental finding of pancreatic cyst which patient is aware of. Discussed without appropriate follow up diagnosis such as but not limited to cancer can be missed. Patient reports feeling improved. Offered admission and declines. Patient stable for discharge. Discussed return precautions to ED. Aware to recheck blood work in one week. Discussed follow up with PCP.   Reviewed reasons to return to ed.  Patient  verbalized understanding of diagnosis and agreement with discharge plan of care as well as understanding of reasons to return to ed.      Amount and/or Complexity of Data Reviewed  Labs: ordered.  Radiology: ordered.    Risk  Prescription drug management.             Medications   sodium chloride 0.9 % bolus 1,000 mL (0 mL Intravenous Stopped 5/31/25 1240)   cefepime (MAXIPIME) IVPB (premix in dextrose) 2,000 mg 50 mL (0 mg Intravenous Stopped 5/31/25 1420)   iohexol (OMNIPAQUE) 350 MG/ML injection (MULTI-DOSE) 80 mL (80 mL Intravenous Given 5/31/25 1325)       ED Risk Strat Scores   HEART Risk Score      Flowsheet Row Most Recent Value   Heart Score Risk Calculator    History 0 Filed at: 05/31/2025 1757   ECG 1 Filed at: 05/31/2025 1757   Age 2 Filed at: 05/31/2025 1757   Risk Factors 2 Filed at: 05/31/2025 1757   Troponin 0 Filed at: 05/31/2025 1757   HEART Score 5 Filed at: 05/31/2025 1757          HEART Risk Score      Flowsheet Row Most Recent Value   Heart Score Risk Calculator    History 0 Filed at: 05/31/2025 1757   ECG 1 Filed at: 05/31/2025 1757   Age 2 Filed at: 05/31/2025 1757   Risk Factors 2 Filed at: 05/31/2025 1757   Troponin 0 Filed at: 05/31/2025 1757   HEART Score 5 Filed at: 05/31/2025 1757                      No data recorded        SBIRT 22yo+      Flowsheet Row Most Recent Value   Initial Alcohol Screen: US AUDIT-C     1. How often do you have a drink containing alcohol? 1 Filed at: 05/31/2025 1115   2. How many drinks containing alcohol do you have on a typical day you are drinking?  0 Filed at: 05/31/2025 1115   3a. Male UNDER 65: How often do you have five or more drinks on one occasion? 0 Filed at: 05/31/2025 1115   3b. FEMALE Any Age, or MALE 65+: How often do you have 4 or more drinks on one occassion? 0 Filed at: 05/31/2025 1115   Audit-C Score 1 Filed at: 05/31/2025 1115   BERNADINE: How many times in the past year have you...    Used an illegal drug or used a prescription medication for  non-medical reasons? Never Filed at: 05/31/2025 1115            Wells' Criteria for PE      Flowsheet Row Most Recent Value   Wells' Criteria for PE    Clinical signs and symptoms of DVT 0 Filed at: 05/31/2025 1125   PE is primary diagnosis or equally likely 0 Filed at: 05/31/2025 1125   HR >100 0 Filed at: 05/31/2025 1125   Immobilization at least 3 days or Surgery in the previous 4 weeks 1.5 Filed at: 05/31/2025 1125   Previous, objectively diagnosed PE or DVT 0 Filed at: 05/31/2025 1125   Hemoptysis 0 Filed at: 05/31/2025 1125   Malignancy with treatment within 6 months or palliative 0 Filed at: 05/31/2025 1125   Wells' Criteria Total 1.5 Filed at: 05/31/2025 1125                        History of Present Illness       Chief Complaint   Patient presents with    Weakness - Generalized     Pt states that started wednesday generalized weakness, ha, body aches and ect....       Past Medical History[1]   Past Surgical History[2]   Family History[3]   Social History[4]   E-Cigarette/Vaping    E-Cigarette Use Never User       E-Cigarette/Vaping Substances    Nicotine No     THC No     CBD No     Flavoring No     Other No     Unknown No       I have reviewed and agree with the history as documented.     Patient is a 70-year-old female past medical history of Takotsubo's, kidney stone, gallstones arriving for evaluation of generalized bodyaches.  Patient states that earlier in the week she had hypertension, and that resolved.  Patient states that she then developed generalized bodyaches and just feels generally unwell.  Patient reporting shortness of breath.  Patient has nausea but no vomiting.  Patient denies known fever.  Patient reporting a mild headache upon arrival but no sudden onset.  No focal neurodeficits.  Patient reports muscle aches in bilateral shoulders, and legs.  Patient denies CVA tenderness, urinary symptoms. Patient notes decrease PO intake since Thursday.         Review of Systems   Constitutional:   Positive for activity change and fatigue. Negative for fever.   HENT: Negative.     Eyes: Negative.    Respiratory:  Positive for shortness of breath. Negative for cough and chest tightness.    Cardiovascular: Negative.  Negative for chest pain.   Gastrointestinal: Negative.    Endocrine: Negative.    Genitourinary: Negative.    Musculoskeletal: Negative.    Skin: Negative.    Allergic/Immunologic: Negative.    Neurological: Negative.    Hematological: Negative.    Psychiatric/Behavioral: Negative.     All other systems reviewed and are negative.          Objective       ED Triage Vitals   Temperature Pulse Blood Pressure Respirations SpO2 Patient Position - Orthostatic VS   05/31/25 1110 05/31/25 1110 05/31/25 1110 05/31/25 1110 05/31/25 1110 05/31/25 1145   97.5 °F (36.4 °C) 70 91/50 18 95 % Sitting      Temp Source Heart Rate Source BP Location FiO2 (%) Pain Score    05/31/25 1110 05/31/25 1110 05/31/25 1145 -- --    Temporal Monitor Left arm        Vitals      Date and Time Temp Pulse SpO2 Resp BP Pain Score FACES Pain Rating User   05/31/25 1500 -- 66 94 % 19 142/63 -- --    05/31/25 1430 -- 66 94 % 21 136/63 -- --    05/31/25 1357 -- 61 93 % 16 143/64 -- --    05/31/25 1300 -- 64 95 % 20 142/63 -- --    05/31/25 1200 -- 60 94 % 16 -- -- --    05/31/25 1145 -- 61 94 % 20 107/53 -- --    05/31/25 1110 97.5 °F (36.4 °C) 70 95 % 18 91/50 -- -- LD            Physical Exam  Vitals and nursing note reviewed.   Constitutional:       Appearance: Normal appearance. She is normal weight.   HENT:      Head: Normocephalic.      Right Ear: External ear normal.      Left Ear: External ear normal.      Nose: Nose normal.      Mouth/Throat:      Mouth: Mucous membranes are moist.     Eyes:      Extraocular Movements: Extraocular movements intact.      Conjunctiva/sclera: Conjunctivae normal.      Pupils: Pupils are equal, round, and reactive to light.       Cardiovascular:      Rate and Rhythm: Normal rate and  regular rhythm.      Pulses: Normal pulses.      Heart sounds: Normal heart sounds.   Pulmonary:      Effort: Pulmonary effort is normal.      Breath sounds: Normal breath sounds.   Abdominal:      General: Abdomen is flat. There is no distension.      Palpations: Abdomen is soft. There is no mass.      Tenderness: There is no abdominal tenderness. There is no right CVA tenderness, left CVA tenderness, guarding or rebound.      Hernia: No hernia is present.     Musculoskeletal:         General: Normal range of motion.      Cervical back: Normal range of motion and neck supple.     Skin:     General: Skin is warm.      Capillary Refill: Capillary refill takes less than 2 seconds.     Neurological:      General: No focal deficit present.      Mental Status: She is alert and oriented to person, place, and time. Mental status is at baseline.      GCS: GCS eye subscore is 4. GCS verbal subscore is 5. GCS motor subscore is 6.      Cranial Nerves: Cranial nerves 2-12 are intact.      Sensory: Sensation is intact.      Motor: Motor function is intact.      Coordination: Coordination is intact.      Gait: Gait is intact.      Comments: 5/5 upper extremity strength, no numbness or tingling   5/5 lower extremity strength, no numbness or tingling    Psychiatric:         Mood and Affect: Mood normal.         Behavior: Behavior normal.         Thought Content: Thought content normal.         Judgment: Judgment normal.         Results Reviewed       Procedure Component Value Units Date/Time    Blood culture #1 [[id number]] Collected: 05/31/25 1148    Lab Status: Preliminary result Specimen: Blood from Hand, Right Updated: 05/31/25 1627     Blood Culture Received in Microbiology Lab. Culture in Progress.    Blood culture #2 [[id number]] Collected: 05/31/25 1124    Lab Status: Preliminary result Specimen: Blood from Arm, Right Updated: 05/31/25 1601     Blood Culture Received in Microbiology Lab. Culture in Progress.    Urine  Microscopic [[id number]]  (Abnormal) Collected: 05/31/25 1314    Lab Status: Final result Specimen: Urine, Clean Catch Updated: 05/31/25 1334     RBC, UA None Seen /hpf      WBC, UA 4-10 /hpf      Epithelial Cells Occasional /hpf      Bacteria, UA Occasional /hpf     UA w Reflex to Microscopic w Reflex to Culture [[id number]]  (Abnormal) Collected: 05/31/25 1314    Lab Status: Final result Specimen: Urine, Clean Catch Updated: 05/31/25 1334     Color, UA Yellow     Clarity, UA Clear     Specific Gravity, UA 1.015     pH, UA 6.0     Leukocytes, UA Moderate     Nitrite, UA Negative     Protein, UA Trace mg/dl      Glucose, UA Negative mg/dl      Ketones, UA Trace mg/dl      Urobilinogen, UA <2.0 mg/dl      Bilirubin, UA Negative     Occult Blood, UA Negative    B-Type Natriuretic Peptide(BNP) [[id number]]  (Normal) Collected: 05/31/25 1124    Lab Status: Final result Specimen: Blood from Arm, Right Updated: 05/31/25 1209     BNP 37 pg/mL     HS Troponin 0hr (reflex protocol) [[id number]]  (Normal) Collected: 05/31/25 1124    Lab Status: Final result Specimen: Blood from Arm, Right Updated: 05/31/25 1159     hs TnI 0hr 5 ng/L     Procalcitonin [[id number]]  (Abnormal) Collected: 05/31/25 1124    Lab Status: Final result Specimen: Blood from Arm, Right Updated: 05/31/25 1158     Procalcitonin 0.26 ng/ml     FLU/COVID Rapid Antigen (30 min. TAT) - Preferred screening test in ED [[id number]]  (Normal) Collected: 05/31/25 1124    Lab Status: Final result Specimen: Nares from Nose Updated: 05/31/25 1153     SARS COV Rapid Antigen Negative     Influenza A Rapid Antigen Negative     Influenza B Rapid Antigen Negative    Narrative:      This test has been performed using the [employer] Elif 2 FLU+SARS Antigen test under the Emergency Use Authorization (EUA). This test has been validated by the  and verified by the performing laboratory. The Elif uses lateral flow immunofluorescent sandwich assay to detect SARS-COV,  Influenza A and Influenza B Antigen.     The [employer] Elif 2 SARS Antigen test does not differentiate between SARS-CoV and SARS-CoV-2.     Negative results are presumptive and may be confirmed with a molecular assay, if necessary, for patient management. Negative results do not rule out SARS-CoV-2 or influenza infection and should not be used as the sole basis for treatment or patient management decisions. A negative test result may occur if the level of antigen in a sample is below the limit of detection of this test.     Positive results are indicative of the presence of viral antigens, but do not rule out bacterial infection or co-infection with other viruses.     All test results should be used as an adjunct to clinical observations and other information available to the provider.    FOR PEDIATRIC PATIENTS - copy/paste COVID Guidelines URL to browser: [email or web address]    Comprehensive metabolic panel [[id number]]  (Abnormal) Collected: 05/31/25 1124    Lab Status: Final result Specimen: Blood from Arm, Right Updated: 05/31/25 1151     Sodium 137 mmol/L      Potassium 3.9 mmol/L      Chloride 105 mmol/L      CO2 25 mmol/L      ANION GAP 7 mmol/L      BUN 22 mg/dL      Creatinine 1.35 mg/dL      Glucose 112 mg/dL      Calcium 9.5 mg/dL      AST 23 U/L      ALT 18 U/L      Alkaline Phosphatase 81 U/L      Total Protein 7.1 g/dL      Albumin 4.2 g/dL      Total Bilirubin 0.52 mg/dL      eGFR 39 ml/min/1.73sq m     Narrative:      National Kidney Disease Foundation guidelines for Chronic Kidney Disease (CKD):     Stage 1 with normal or high GFR (GFR > 90 mL/min/1.73 square meters)    Stage 2 Mild CKD (GFR = 60-89 mL/min/1.73 square meters)    Stage 3A Moderate CKD (GFR = 45-59 mL/min/1.73 square meters)    Stage 3B Moderate CKD (GFR = 30-44 mL/min/1.73 square meters)    Stage 4 Severe CKD (GFR = 15-29 mL/min/1.73 square meters)    Stage 5 End Stage CKD (GFR <15  mL/min/1.73 square meters)  Note: GFR calculation is accurate only with a steady state creatinine    Lactic acid [[id number]]  (Normal) Collected: 05/31/25 1124    Lab Status: Final result Specimen: Blood from Arm, Right Updated: 05/31/25 1151     LACTIC ACID 1.7 mmol/L     Narrative:      Result may be elevated if tourniquet was used during collection.    Lipase [[id number]]  (Normal) Collected: 05/31/25 1124    Lab Status: Final result Specimen: Blood from Arm, Right Updated: 05/31/25 1151     Lipase 55 u/L     Magnesium [[id number]]  (Normal) Collected: 05/31/25 1124    Lab Status: Final result Specimen: Blood from Arm, Right Updated: 05/31/25 1151     Magnesium 1.9 mg/dL     D-dimer, quantitative [[id number]]  (Abnormal) Collected: 05/31/25 1124    Lab Status: Final result Specimen: Blood from Arm, Right Updated: 05/31/25 1150     D-Dimer, Quant 0.91 ug/ml FEU     Narrative:      In the evaluation for possible pulmonary embolism, in the appropriate (Well's Score of 4 or less) patient, the age adjusted d-dimer cutoff for this patient can be calculated as:    Age x 0.01 (in ug/mL) for Age-adjusted D-dimer exclusion threshold for a patient over 50 years.    Protime-INR [[id number]]  (Normal) Collected: 05/31/25 1124    Lab Status: Final result Specimen: Blood from Arm, Right Updated: 05/31/25 1148     Protime 13.4 seconds      INR 0.97    Narrative:      INR Therapeutic Range    Indication                                             INR Range      Atrial Fibrillation                                               2.0-3.0  Hypercoagulable State                                    2.0.2.3  Left Ventricular Asist Device                            2.0-3.0  Mechanical Heart Valve                                  -    Aortic(with afib, MI, embolism, HF, LA enlargement,    and/or coagulopathy)                                     2.0-3.0 (2.5-3.5)     Mitral                                                              2.5-3.5  Prosthetic/Bioprosthetic Heart Valve               2.0-3.0  Venous thromboembolism (VTE: VT, PE        2.0-3.0    APTT [[id number]]  (Normal) Collected: 05/31/25 1124    Lab Status: Final result Specimen: Blood from Arm, Right Updated: 05/31/25 1148     PTT 25 seconds     CBC and differential [[id number]]  (Abnormal) Collected: 05/31/25 1124    Lab Status: Final result Specimen: Blood from Arm, Right Updated: 05/31/25 1134     WBC 2.13 Thousand/uL      RBC 4.49 Million/uL      Hemoglobin 13.7 g/dL      Hematocrit 43.0 %      MCV 96 fL      MCH 30.5 pg      MCHC 31.9 g/dL      RDW 13.9 %      MPV 8.8 fL      Platelets 205 Thousands/uL      nRBC 0 /100 WBCs      Segmented % 47 %      Immature Grans % 1 %      Lymphocytes % 28 %      Monocytes % 15 %      Eosinophils Relative 9 %      Basophils Relative 0 %      Absolute Neutrophils 1.03 Thousands/µL      Absolute Immature Grans 0.01 Thousand/uL      Absolute Lymphocytes 0.59 Thousands/µL      Absolute Monocytes 0.32 Thousand/µL      Eosinophils Absolute 0.18 Thousand/µL      Basophils Absolute 0.00 Thousands/µL             CT head without contrast   Final Interpretation by Fabi Posada MD (05/31 3964)      Stable findings status post suboccipital decompressive craniotomy.   Stable microangiopathic changes.                  Workstation performed: [id number]         CT pe study w abdomen pelvis w contrast   Final Interpretation by Jay Jay Garcia MD (05/31 8760)      1.  Continued interval improvement in the multifocal cavitary consolidations throughout the right upper lobe. This again is consistent with improving infectious etiologies. There is unchanged mediastinal/hilar lymphadenopathy, favoring reactive changes.    An additional contrast-enhanced chest CT is recommended in 3 months.   2.  Unchanged subcentimeter pancreatic cystic lesions. A follow-up gadolinium enhanced MRI of the abdomen with MRCP is recommended for further evaluation in 2  years.      3.  Please refer to the report body for description of other incidental, chronic and/or benign findings.                  Computerized Assisted Algorithm (CAA) may have aided analysis of applicable images.      Workstation performed: [id number]         XR chest pa & lateral   Final Interpretation by Adore Steven MD (05/31 1619)      Emphysema with redemonstration of cystic lesion in the right upper lobe with no definite acute disease. See subsequent chest CT.            Workstation performed: [id number]             ECG 12 Lead Documentation Only    Date/Time: 5/31/2025 11:19 AM    Performed by: ANTONIETA Vaca  Authorized by: ANTONIETA Vaca    Indications / Diagnosis:  General fatigue  ECG reviewed by me, the ED Provider: yes    Patient location:  ED  Interpretation:     Interpretation: normal    Rate:     ECG rate:  70    ECG rate assessment: normal    Rhythm:     Rhythm: sinus rhythm    Ectopy:     Ectopy: none    QRS:     QRS axis:  Normal  Conduction:     Conduction: normal    ST segments:     ST segments:  Non-specific  T waves:     T waves: normal        ED Medication and Procedure Management   Prior to Admission Medications   Prescriptions Last Dose Informant Patient Reported? Taking?   Trelegy Ellipta 100-62.5-25 MCG/ACT inhaler   No No   Sig: INHALE 1 PUFF BY MOUTH ONCE DAILY RINSE MOUTH AFTER USE   albuterol (2.5 mg/3 mL) 0.083 % nebulizer solution   No No   Sig: Take 3 mL (2.5 mg total) by nebulization every 6 (six) hours as needed for wheezing or shortness of breath   albuterol (ProAir HFA) 90 mcg/act inhaler   No No   Sig: Inhale 2 puffs every 4 (four) hours as needed for wheezing or shortness of breath   atorvastatin (LIPITOR) 80 mg tablet   No No   Sig: Take 1 tablet by mouth once daily   busPIRone (BUSPAR) 15 mg tablet   No No   Sig: Take 1 tablet (15 mg total) by mouth 2 (two) times a day   calcium carbonate (OS-FLASH) 600 MG tablet  Self Yes No   Sig: Take 600 mg by  mouth in the morning.   carvedilol (COREG) 12.5 mg tablet   No No   Sig: Take 1 tablet by mouth twice daily   cholecalciferol (VITAMIN D3) 1,000 units tablet  Self Yes No   Sig: Take 1,000 Units by mouth in the morning.   clopidogrel (PLAVIX) 75 mg tablet   No No   Sig: Take 1 tablet by mouth once daily   escitalopram (LEXAPRO) 20 mg tablet   No No   Sig: Take 1/2 (one-half) tablet by mouth once daily   gabapentin (NEURONTIN) 400 mg capsule   No No   Sig: Take 2 capsules (800 mg total) by mouth 3 (three) times a day   isosorbide mononitrate (IMDUR) 30 mg 24 hr tablet   No No   Sig: Take 1 tablet by mouth once daily   pantoprazole (PROTONIX) 40 mg tablet   No No   Sig: TAKE 1 TABLET BY MOUTH ONCE DAILY BEFORE BREAKFAST   polyethylene glycol (MIRALAX) 17 g packet   No No   Sig: Take 17 g by mouth daily   vitamin B-12 (VITAMIN B-12) 1,000 mcg tablet  Self No No   Sig: Take 1 tablet (1,000 mcg total) by mouth daily      Facility-Administered Medications: None     Discharge Medication List as of 5/31/2025  3:21 PM        START taking these medications    Details   cefuroxime (CEFTIN) 500 mg tablet Take 1 tablet (500 mg total) by mouth every 12 (twelve) hours for 7 days, Starting Sat 5/31/2025, Until Sat 6/7/2025, Normal           CONTINUE these medications which have NOT CHANGED    Details   albuterol (2.5 mg/3 mL) 0.083 % nebulizer solution Take 3 mL (2.5 mg total) by nebulization every 6 (six) hours as needed for wheezing or shortness of breath, Starting Thu 5/1/2025, Normal      albuterol (ProAir HFA) 90 mcg/act inhaler Inhale 2 puffs every 4 (four) hours as needed for wheezing or shortness of breath, Starting Thu 5/1/2025, Normal      atorvastatin (LIPITOR) 80 mg tablet Take 1 tablet by mouth once daily, Starting Mon 4/21/2025, Normal      busPIRone (BUSPAR) 15 mg tablet Take 1 tablet (15 mg total) by mouth 2 (two) times a day, Starting Tue 5/13/2025, Normal      calcium carbonate (OS-FLASH) 600 MG tablet Take 600 mg  by mouth in the morning., Historical Med      carvedilol (COREG) 12.5 mg tablet Take 1 tablet by mouth twice daily, Starting Tue 2/25/2025, Normal      cholecalciferol (VITAMIN D3) 1,000 units tablet Take 1,000 Units by mouth in the morning., Historical Med      clopidogrel (PLAVIX) 75 mg tablet Take 1 tablet by mouth once daily, Starting Mon 4/21/2025, Normal      escitalopram (LEXAPRO) 20 mg tablet Take 1/2 (one-half) tablet by mouth once daily, Normal      gabapentin (NEURONTIN) 400 mg capsule Take 2 capsules (800 mg total) by mouth 3 (three) times a day, Starting Mon 3/10/2025, Normal      isosorbide mononitrate (IMDUR) 30 mg 24 hr tablet Take 1 tablet by mouth once daily, Starting Tue 2/25/2025, Normal      pantoprazole (PROTONIX) 40 mg tablet TAKE 1 TABLET BY MOUTH ONCE DAILY BEFORE BREAKFAST, Normal      polyethylene glycol (MIRALAX) 17 g packet Take 17 g by mouth daily, Starting Tue 2/25/2025, Normal      Trelegy Ellipta 100-62.5-25 MCG/ACT inhaler INHALE 1 PUFF BY MOUTH ONCE DAILY RINSE MOUTH AFTER USE, Starting Mon 4/21/2025, Normal      vitamin B-12 (VITAMIN B-12) 1,000 mcg tablet Take 1 tablet (1,000 mcg total) by mouth daily, Starting Tue 6/4/2024, No Print           Outpatient Discharge Orders   Basic metabolic panel   Standing Status: Future Standing Exp. Date: 07/31/26     CBC and differential   Standing Status: Future Standing Exp. Date: 07/31/26     ED SEPSIS DOCUMENTATION   Time reflects when diagnosis was documented in both MDM as applicable and the Disposition within this note       Time User Action Codes Description Comment    5/31/2025  3:14 PM Leelee Mulligan Add [R53.83] Fatigue     5/31/2025  3:15 PM Leelee Mulligan [D72.819] Leukopenia     5/31/2025  3:15 PM Leelee Mulligan [R79.89] Elevated serum creatinine     5/31/2025  3:17 PM Leelee Mulligan [N39.0] UTI (urinary tract infection)     5/31/2025  3:20 PM Leelee Mulligan Add [R93.89] Abnormal CT scan                       [1]   Past Medical History:  Diagnosis Date    Anxiety     Arnold-Chiari malformation (HCC)     Breast lump     last assessed: Jan 22, 2013     Chronic kidney disease     stage 3    COPD (chronic obstructive pulmonary disease) (HCC)     Depression     Dysphagia 08/29/2022    Facial twitching 07/21/2022    GERD (gastroesophageal reflux disease)     Gout     last assessed: Nov 26, 2012     Hair loss     last assessed: Dec 15, 2016     History of transfusion     Hyperlipidemia     Kidney stone     Mitral valve disorder     Myocardial infarct, old     GER (obstructive sleep apnea)     Pneumonia     Stroke (HCC)     SVT (supraventricular tachycardia) (HCC)    [2]   Past Surgical History:  Procedure Laterality Date    BREAST BIOPSY Right 02/28/2017    US CORE BIOPSY--BENIGN    CARDIAC CATHETERIZATION      CATARACT EXTRACTION Bilateral 11/09/2023    CERVICAL DISCECTOMY      Spinal     CERVICAL LAMINECTOMY      C1 with chiari decompression     COLONOSCOPY      5 yrs - onset: May 31, 2011     CRANIOTOMY      INTRACAPSULAR CATARACT EXTRACTION Right 11/08/2023    INTRACAPSULAR CATARACT EXTRACTION Left 11/15/2023    POPLITEAL SYNOVIAL CYST EXCISION      WI CYSTO/URETERO W/LITHOTRIPSY &INDWELL STENT INSRT Right 4/7/2025    Procedure: CYSTOSCOPY URETEROSCOPY WITH EXCHANGE STENT URETERAL;  Surgeon: Aaron Coyle MD;  Location: [facility];  Service: Urology    WI CYSTOURETHROSCOPY W/URETERAL CATHETERIZATION Right 2/24/2025    Procedure: CYSTOSCOPY URETEROSCOPY WITH INSERTION RIGHT 6X26 STENT URETERAL;  Surgeon: Aaron Coyle MD;  Location: [facility];  Service: Urology    TUBAL LIGATION      US GUIDED BREAST BIOPSY RIGHT COMPLETE Right 02/28/2017   [3]   Family History  Problem Relation Name Age of Onset    Stroke Mother Althea     Other Father Dad         blood clot in vein & CABG     Heart disease Father Dad     Prostate cancer Father Dad     Hearing loss Father Dad     Hypertension Father Dad     No Known Problems  Sister MARIAH     No Known Problems Sister NIK     No Known Problems Daughter VIVIANA     No Known Problems Daughter MAGALIE     Breast cancer Maternal Grandmother Migue     No Known Problems Maternal Grandfather      No Known Problems Paternal Grandmother      No Known Problems Paternal Grandfather      Alcohol abuse Brother Pepito     Throat cancer Brother Pepito     Cancer Brother Pepito         Throat tongue    Hearing loss Brother Pepito     Kidney failure Brother          kidney failure d/t NSIADs on dialysis    Hearing loss Brother Juan     Crohn's disease Maternal Aunt STEFANIA     No Known Problems Maternal Aunt SAMANTHA     No Known Problems Maternal Aunt EMILIANO     Colon cancer Paternal Aunt FOREIGN     No Known Problems Paternal Aunt MIGUE     No Known Problems Paternal Aunt RIAZ     No Known Problems Paternal Aunt KASEY     Colon cancer Paternal Aunt ERAN     Cancer Paternal Aunt ERAN         Colon cancer   [4]   Social History  Tobacco Use    Smoking status: Former     Current packs/day: 0.00     Average packs/day: 1.5 packs/day for 39.0 years (58.5 ttl pk-yrs)     Types: Cigarettes     Start date:      Quit date: 2008     Years since quittin.4    Smokeless tobacco: Never    Tobacco comments:     Patient quit   Vaping Use    Vaping status: Never Used   Substance Use Topics    Alcohol use: Yes     Alcohol/week: 7.0 standard drinks of alcohol     Types: 7 Cans of beer per week     Comment: socially    Drug use: Never        ANTONIETA Vaca  23

## 2025-06-01 LAB
ATRIAL RATE: 70 BPM
P AXIS: 71 DEGREES
PR INTERVAL: 140 MS
QRS AXIS: 69 DEGREES
QRSD INTERVAL: 74 MS
QT INTERVAL: 402 MS
QTC INTERVAL: 434 MS
T WAVE AXIS: 47 DEGREES
VENTRICULAR RATE: 70 BPM

## 2025-06-01 PROCEDURE — 93010 ELECTROCARDIOGRAM REPORT: CPT | Performed by: INTERNAL MEDICINE

## 2025-06-02 ENCOUNTER — VBI (OUTPATIENT)
Dept: FAMILY MEDICINE CLINIC | Facility: HOSPITAL | Age: 70
End: 2025-06-02

## 2025-06-02 NOTE — TELEPHONE ENCOUNTER
06/02/25 10:43 AM    Patient contacted post ED visit, first outreach attempt made. Message was left for patient to return a call to the [facility] Department at [facility]: Phone [phone].    Thank you.  [facility]ie[facility]  [facility]

## 2025-06-03 ENCOUNTER — OFFICE VISIT (OUTPATIENT)
Dept: FAMILY MEDICINE CLINIC | Facility: HOSPITAL | Age: 70
End: 2025-06-03
Payer: MEDICARE

## 2025-06-03 ENCOUNTER — NURSE TRIAGE (OUTPATIENT)
Age: 70
End: 2025-06-03

## 2025-06-03 VITALS
OXYGEN SATURATION: 99 % | WEIGHT: 160 LBS | DIASTOLIC BLOOD PRESSURE: 70 MMHG | BODY MASS INDEX: 26.66 KG/M2 | HEART RATE: 56 BPM | SYSTOLIC BLOOD PRESSURE: 115 MMHG | TEMPERATURE: 96 F | HEIGHT: 65 IN

## 2025-06-03 DIAGNOSIS — M54.50 LUMBAR BACK PAIN: Primary | ICD-10-CM

## 2025-06-03 DIAGNOSIS — N39.0 UTI (URINARY TRACT INFECTION): ICD-10-CM

## 2025-06-03 DIAGNOSIS — D72.819 LEUKOPENIA, UNSPECIFIED TYPE: ICD-10-CM

## 2025-06-03 PROCEDURE — 99214 OFFICE O/P EST MOD 30 MIN: CPT

## 2025-06-03 PROCEDURE — G2211 COMPLEX E/M VISIT ADD ON: HCPCS

## 2025-06-03 RX ORDER — CEPHALEXIN 500 MG/1
500 CAPSULE ORAL 2 TIMES DAILY
Qty: 10 CAPSULE | Refills: 0 | Status: SHIPPED | OUTPATIENT
Start: 2025-06-03 | End: 2025-06-08

## 2025-06-03 NOTE — TELEPHONE ENCOUNTER
"REASON FOR CONVERSATION: Medication Reaction    SYMPTOMS: Patient developed rash after taking Cefuroxime Axetil 500mg after being seen in Emergency department and treated for UTI. Medication started Sunday 6/1/25 and rash occurred Monday 6/2/25. Rash scattered arms,back,abdomen, legs back and front. Severely itchy.    OTHER HEALTH INFORMATION: Has ED follow appointment June 5th.    PROTOCOL DISPOSITION: See Today in Office    CARE ADVICE PROVIDED: Advised patient to be seen in office today to be evaluated. Patient understands reasons to call back if symptoms worsen.    PRACTICE FOLLOW-UP: Forwarded to PCP for review.       RN couldn't reschedule Thursday appointment so made a [facility] appointment today for reaction.                Reason for Disposition   Hives or itching    Answer Assessment - Initial Assessment Questions  1. APPEARANCE of RASH: \"Describe the rash.\" (e.g., spots, blisters, raised areas, skin peeling, scaly)      Spots but large as well.  2. SIZE: \"How big are the spots?\" (e.g., tip of pen, eraser, coin; inches, centimeters)      Sots but big large areas  3. LOCATION: \"Where is the rash located?\"      Arms and legs back and front, back and stomach itchy  4. COLOR: \"What color is the rash?\" (Note: It is difficult to assess rash color in people with darker-colored skin. When this situation occurs, simply ask the caller to describe what they see.)      Bright red rash  5. ONSET: \"When did the rash begin?\"      Started yesterday 6/2  6. FEVER: \"Do you have a fever?\" If Yes, ask: \"What is your temperature, how was it measured, and when did it start?\"      denies  7. ITCHING: \"Does the rash itch?\" If Yes, ask: \"How bad is the itch?\" (Scale 1-10; or mild, moderate, severe)      7/10  8. CAUSE: \"What do you think is causing the rash?\"      New abx  9. NEW MEDICINES: \"What new medicines are you taking?\" (e.g., name of antibiotic) \"When did you start taking this medication?\".      6/1  10. OTHER SYMPTOMS: \"Do you " "have any other symptoms?\" (e.g., sore throat, fever, joint pain)        denies    Protocols used: Rash - Widespread On Drugs-Adult-[place]    "

## 2025-06-03 NOTE — PROGRESS NOTES
"Name: Geovanna Leal      : 1955      MRN: [id number]  Encounter Provider: Oliver Thacker MD  Encounter Date: 6/3/2025   Encounter department: [facility] [address]  :  Assessment & Plan  UTI (urinary tract infection)  Noted on recent ED visit, was treated with Ceftin for day and a half and reported an itchy erythematous rash on her forearms and back, denied any shortness of breath or swelling in her throat or mouth.  Has tolerated Keflex in the past.  Asymptomatic.  Denies dysuria, polyuria or flank pain.  Will switch treatment to Keflex.  Orders:    cephalexin (KEFLEX) 500 mg capsule; Take 1 capsule (500 mg total) by mouth in the morning and 1 capsule (500 mg total) before bedtime. Do all this for 5 days.    Lumbar back pain  Chronic nonradiating lumbar back pain refractory to PT, lumbar x-ray showed arthritis in disc narrowing at L5/S1, advised to continue treatment with NSAIDs and will refer to pain and spine management for further evaluation and treatment  Orders:    Ambulatory referral to Spine & Pain Management; Future    Leukopenia, unspecified type  Noted during recent ED visit, afebrile, isolated leukopenia, advised to complete pending CBC approximately 1 week from ED visit on        Estimated Creatinine Clearance: 37.6 mL/min (A) (by C-G formula based on SCr of 1.35 mg/dL (H)).         History of Present Illness   HPI  Patient presents with concern for rash after taking Ceftin for UTI    Review of Systems   Constitutional:  Negative for chills and fever.   Genitourinary:  Negative for dysuria, flank pain, frequency and hematuria.   Musculoskeletal:  Positive for back pain (chronic).       Objective   /70   Pulse 56   Temp (!) 96 °F (35.6 °C)   Ht 5' 5\" (1.651 m)   Wt 72.6 kg (160 lb)   SpO2 99%   BMI 26.63 kg/m²      Physical Exam  Constitutional:       General: She is not in acute distress.     Appearance: Normal appearance. She is not ill-appearing, " toxic-appearing or diaphoretic.   HENT:      Head: Normocephalic.     Eyes:      Conjunctiva/sclera: Conjunctivae normal.       Cardiovascular:      Rate and Rhythm: Normal rate and regular rhythm.      Heart sounds: Normal heart sounds. No murmur heard.  Pulmonary:      Effort: Pulmonary effort is normal. No respiratory distress.      Breath sounds: Normal breath sounds.   Abdominal:      Tenderness: There is no right CVA tenderness or left CVA tenderness.     Musculoskeletal:      Comments: No lumbar spinal or paraspinal tenderness     Neurological:      Mental Status: She is alert and oriented to person, place, and time.     Psychiatric:         Mood and Affect: Mood normal.         Behavior: Behavior normal.

## 2025-06-03 NOTE — TELEPHONE ENCOUNTER
06/03/25 10:35 AM    Patient contacted post ED visit, [facility] department spoke with patient/caregiver and outreach was successful.    Thank you.  [facility]iega  [facility]

## 2025-06-05 ENCOUNTER — OFFICE VISIT (OUTPATIENT)
Dept: FAMILY MEDICINE CLINIC | Facility: HOSPITAL | Age: 70
End: 2025-06-05
Payer: MEDICARE

## 2025-06-05 ENCOUNTER — TELEPHONE (OUTPATIENT)
Age: 70
End: 2025-06-05

## 2025-06-05 ENCOUNTER — HOSPITAL ENCOUNTER (OUTPATIENT)
Dept: NON INVASIVE DIAGNOSTICS | Facility: HOSPITAL | Age: 70
Discharge: HOME | End: 2025-06-05
Attending: PSYCHIATRY & NEUROLOGY
Payer: MEDICARE

## 2025-06-05 ENCOUNTER — APPOINTMENT (OUTPATIENT)
Dept: LAB | Facility: HOSPITAL | Age: 70
End: 2025-06-05
Payer: MEDICARE

## 2025-06-05 VITALS
OXYGEN SATURATION: 96 % | HEART RATE: 62 BPM | DIASTOLIC BLOOD PRESSURE: 57 MMHG | WEIGHT: 159 LBS | SYSTOLIC BLOOD PRESSURE: 109 MMHG | BODY MASS INDEX: 26.46 KG/M2

## 2025-06-05 DIAGNOSIS — N30.00 ACUTE CYSTITIS WITHOUT HEMATURIA: Primary | ICD-10-CM

## 2025-06-05 DIAGNOSIS — I51.81 TAKOTSUBO CARDIOMYOPATHY: ICD-10-CM

## 2025-06-05 DIAGNOSIS — I10 ESSENTIAL HYPERTENSION: Chronic | ICD-10-CM

## 2025-06-05 DIAGNOSIS — D72.819 LEUKOPENIA: ICD-10-CM

## 2025-06-05 DIAGNOSIS — I67.2 INTRACRANIAL ATHEROSCLEROSIS: ICD-10-CM

## 2025-06-05 DIAGNOSIS — R79.89 ELEVATED SERUM CREATININE: ICD-10-CM

## 2025-06-05 LAB
ALBUMIN SERPL BCG-MCNC: 3.9 G/DL (ref 3.5–5)
ALP SERPL-CCNC: 90 U/L (ref 34–104)
ALT SERPL W P-5'-P-CCNC: 12 U/L (ref 7–52)
ANION GAP SERPL CALCULATED.3IONS-SCNC: 6 MMOL/L (ref 4–13)
AST SERPL W P-5'-P-CCNC: 12 U/L (ref 13–39)
BACTERIA BLD CULT: NORMAL
BACTERIA BLD CULT: NORMAL
BASOPHILS # BLD AUTO: 0.02 THOUSANDS/ÂΜL (ref 0–0.1)
BASOPHILS NFR BLD AUTO: 1 % (ref 0–1)
BILIRUB SERPL-MCNC: 0.83 MG/DL (ref 0.2–1)
BUN SERPL-MCNC: 18 MG/DL (ref 5–25)
CALCIUM SERPL-MCNC: 9.2 MG/DL (ref 8.4–10.2)
CHLORIDE SERPL-SCNC: 104 MMOL/L (ref 96–108)
CHOLEST SERPL-MCNC: 142 MG/DL (ref ?–200)
CO2 SERPL-SCNC: 27 MMOL/L (ref 21–32)
CREAT SERPL-MCNC: 1.08 MG/DL (ref 0.6–1.3)
EOSINOPHIL # BLD AUTO: 0.28 THOUSAND/ÂΜL (ref 0–0.61)
EOSINOPHIL NFR BLD AUTO: 7 % (ref 0–6)
ERYTHROCYTE [DISTWIDTH] IN BLOOD BY AUTOMATED COUNT: 13.8 % (ref 11.6–15.1)
EST. AVERAGE GLUCOSE BLD GHB EST-MCNC: 123 MG/DL
GFR SERPL CREATININE-BSD FRML MDRD: 51 ML/MIN/1.73SQ M
GLUCOSE P FAST SERPL-MCNC: 103 MG/DL (ref 65–99)
HBA1C MFR BLD: 5.9 %
HCT VFR BLD AUTO: 36.9 % (ref 34.8–46.1)
HDLC SERPL-MCNC: 40 MG/DL
HGB BLD-MCNC: 12.2 G/DL (ref 11.5–15.4)
IMM GRANULOCYTES # BLD AUTO: 0.01 THOUSAND/UL (ref 0–0.2)
IMM GRANULOCYTES NFR BLD AUTO: 0 % (ref 0–2)
LDLC SERPL CALC-MCNC: 70 MG/DL (ref 0–100)
LYMPHOCYTES # BLD AUTO: 1.15 THOUSANDS/ÂΜL (ref 0.6–4.47)
LYMPHOCYTES NFR BLD AUTO: 29 % (ref 14–44)
MCH RBC QN AUTO: 31 PG (ref 26.8–34.3)
MCHC RBC AUTO-ENTMCNC: 33.1 G/DL (ref 31.4–37.4)
MCV RBC AUTO: 94 FL (ref 82–98)
MONOCYTES # BLD AUTO: 0.31 THOUSAND/ÂΜL (ref 0.17–1.22)
MONOCYTES NFR BLD AUTO: 8 % (ref 4–12)
NEUTROPHILS # BLD AUTO: 2.18 THOUSANDS/ÂΜL (ref 1.85–7.62)
NEUTS SEG NFR BLD AUTO: 55 % (ref 43–75)
NONHDLC SERPL-MCNC: 102 MG/DL
NRBC BLD AUTO-RTO: 0 /100 WBCS
PLATELET # BLD AUTO: 276 THOUSANDS/UL (ref 149–390)
PMV BLD AUTO: 9.5 FL (ref 8.9–12.7)
POTASSIUM SERPL-SCNC: 4.1 MMOL/L (ref 3.5–5.3)
PROT SERPL-MCNC: 6.3 G/DL (ref 6.4–8.4)
RBC # BLD AUTO: 3.93 MILLION/UL (ref 3.81–5.12)
SODIUM SERPL-SCNC: 137 MMOL/L (ref 135–147)
T4 FREE SERPL-MCNC: 0.65 NG/DL (ref 0.61–1.12)
TRIGL SERPL-MCNC: 158 MG/DL (ref ?–150)
TSH SERPL DL<=0.05 MIU/L-ACNC: 7.42 UIU/ML (ref 0.45–4.5)
VIT B12 SERPL-MCNC: 923 PG/ML (ref 180–914)
WBC # BLD AUTO: 3.95 THOUSAND/UL (ref 4.31–10.16)

## 2025-06-05 PROCEDURE — 93880 EXTRACRANIAL BILAT STUDY: CPT | Performed by: SURGERY

## 2025-06-05 PROCEDURE — 93880 EXTRACRANIAL BILAT STUDY: CPT

## 2025-06-05 PROCEDURE — G2211 COMPLEX E/M VISIT ADD ON: HCPCS | Performed by: NURSE PRACTITIONER

## 2025-06-05 PROCEDURE — 99213 OFFICE O/P EST LOW 20 MIN: CPT | Performed by: NURSE PRACTITIONER

## 2025-06-05 NOTE — ASSESSMENT & PLAN NOTE
History of hypertension as well as orthostatic hypotension.  Blood pressure with excellent control today on Coreg 12.5 mg twice daily and Imdur 30 mg p.o. daily.  Changing positions slowly.  Followed by cardiology, see Takotsubo cardiomyopathy A/P
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
## 2025-06-05 NOTE — ASSESSMENT & PLAN NOTE
Seen in the ER 5/31/2025 and found to have acute cystitis.  Was put on Ceftin and developed pruritic rash thus seen on 6/3/2025 and changed to Keflex.  Has been tolerating medication change without any complication.  No fever/chills/change in appetite.  Bowel and bladder back to baseline.  Sleeping well at night.    - Continue current treatment.  Optimize nutrition hydration.  Ensure bowels are moving regularly.

## 2025-06-05 NOTE — TELEPHONE ENCOUNTER
Received a call from Jacinda with [facility] to verify patient is seen at our offices to recertify for the home O2.   Advised she is a current patient and was last seen 5/23/2025 by Dr. Gupta.  Jacinda stated they will send request for current documentation for the home O2 recertification.

## 2025-06-06 ENCOUNTER — RESULTS FOLLOW-UP (OUTPATIENT)
Dept: FAMILY MEDICINE CLINIC | Facility: HOSPITAL | Age: 70
End: 2025-06-06

## 2025-06-11 NOTE — TELEPHONE ENCOUNTER
[facility] called to confirm if documentation was received on patient office visit notes for 5/23/25. Andrea confirm that documentation was sent via [employer]. She requested for information to be faxed back or sent through [employer]. Andrea states if there any question for a M.A to please reach her at [phone]. Please advise

## 2025-06-14 DIAGNOSIS — F33.0 MILD EPISODE OF RECURRENT MAJOR DEPRESSIVE DISORDER (HCC): ICD-10-CM

## 2025-06-15 ENCOUNTER — HOSPITAL ENCOUNTER (INPATIENT)
Facility: HOSPITAL | Age: 70
LOS: 2 days | Discharge: HOME | DRG: 189 | End: 2025-06-17
Attending: EMERGENCY MEDICINE | Admitting: STUDENT IN AN ORGANIZED HEALTH CARE EDUCATION/TRAINING PROGRAM
Payer: MEDICARE

## 2025-06-15 ENCOUNTER — APPOINTMENT (EMERGENCY)
Dept: RADIOLOGY | Facility: HOSPITAL | Age: 70
DRG: 189 | End: 2025-06-15
Payer: MEDICARE

## 2025-06-15 DIAGNOSIS — J94.8 PLEURAL SCARRING: ICD-10-CM

## 2025-06-15 DIAGNOSIS — J44.1 COPD EXACERBATION (HCC): ICD-10-CM

## 2025-06-15 DIAGNOSIS — R09.02 HYPOXIA: ICD-10-CM

## 2025-06-15 DIAGNOSIS — I50.9 CHF (CONGESTIVE HEART FAILURE) (HCC): Primary | ICD-10-CM

## 2025-06-15 DIAGNOSIS — I51.81 TAKOTSUBO CARDIOMYOPATHY: ICD-10-CM

## 2025-06-15 LAB
2HR DELTA HS TROPONIN: -1 NG/L
ALBUMIN SERPL BCG-MCNC: 3.9 G/DL (ref 3.5–5)
ALP SERPL-CCNC: 89 U/L (ref 34–104)
ALT SERPL W P-5'-P-CCNC: 11 U/L (ref 7–52)
ANION GAP SERPL CALCULATED.3IONS-SCNC: 9 MMOL/L (ref 4–13)
AST SERPL W P-5'-P-CCNC: 13 U/L (ref 13–39)
BASE EXCESS BLDA CALC-SCNC: -1 MMOL/L (ref -2–3)
BASOPHILS # BLD AUTO: 0.01 THOUSANDS/ÂΜL (ref 0–0.1)
BASOPHILS NFR BLD AUTO: 0 % (ref 0–1)
BILIRUB SERPL-MCNC: 1.16 MG/DL (ref 0.2–1)
BNP SERPL-MCNC: 386 PG/ML (ref 0–100)
BUN SERPL-MCNC: 13 MG/DL (ref 5–25)
CA-I BLD-SCNC: 1.16 MMOL/L (ref 1.12–1.32)
CALCIUM SERPL-MCNC: 9 MG/DL (ref 8.4–10.2)
CARDIAC TROPONIN I PNL SERPL HS: 5 NG/L (ref ?–50)
CARDIAC TROPONIN I PNL SERPL HS: 6 NG/L (ref ?–50)
CHLORIDE SERPL-SCNC: 105 MMOL/L (ref 96–108)
CO2 SERPL-SCNC: 24 MMOL/L (ref 21–32)
CREAT SERPL-MCNC: 1.11 MG/DL (ref 0.6–1.3)
D DIMER PPP FEU-MCNC: 0.74 UG/ML FEU
EOSINOPHIL # BLD AUTO: 0.03 THOUSAND/ÂΜL (ref 0–0.61)
EOSINOPHIL NFR BLD AUTO: 1 % (ref 0–6)
ERYTHROCYTE [DISTWIDTH] IN BLOOD BY AUTOMATED COUNT: 16.5 % (ref 11.6–15.1)
FLUAV RNA RESP QL NAA+PROBE: NEGATIVE
FLUBV RNA RESP QL NAA+PROBE: NEGATIVE
GFR SERPL CREATININE-BSD FRML MDRD: 49 ML/MIN/1.73SQ M
GLUCOSE SERPL-MCNC: 87 MG/DL (ref 65–140)
GLUCOSE SERPL-MCNC: 91 MG/DL (ref 65–140)
HCO3 BLDA-SCNC: 23.4 MMOL/L (ref 24–30)
HCT VFR BLD AUTO: 36.2 % (ref 34.8–46.1)
HCT VFR BLD CALC: 36 % (ref 34.8–46.1)
HGB BLD-MCNC: 11.8 G/DL (ref 11.5–15.4)
HGB BLDA-MCNC: 12.2 G/DL (ref 11.5–15.4)
IMM GRANULOCYTES # BLD AUTO: 0.04 THOUSAND/UL (ref 0–0.2)
IMM GRANULOCYTES NFR BLD AUTO: 1 % (ref 0–2)
LYMPHOCYTES # BLD AUTO: 1.26 THOUSANDS/ÂΜL (ref 0.6–4.47)
LYMPHOCYTES NFR BLD AUTO: 21 % (ref 14–44)
MAGNESIUM SERPL-MCNC: 1.9 MG/DL (ref 1.9–2.7)
MCH RBC QN AUTO: 31.7 PG (ref 26.8–34.3)
MCHC RBC AUTO-ENTMCNC: 32.6 G/DL (ref 31.4–37.4)
MCV RBC AUTO: 97 FL (ref 82–98)
MONOCYTES # BLD AUTO: 0.67 THOUSAND/ÂΜL (ref 0.17–1.22)
MONOCYTES NFR BLD AUTO: 11 % (ref 4–12)
NEUTROPHILS # BLD AUTO: 4.09 THOUSANDS/ÂΜL (ref 1.85–7.62)
NEUTS SEG NFR BLD AUTO: 66 % (ref 43–75)
NRBC BLD AUTO-RTO: 0 /100 WBCS
PCO2 BLD: 24 MMOL/L (ref 21–32)
PCO2 BLD: 36 MM HG (ref 42–50)
PH BLD: 7.42 [PH] (ref 7.3–7.4)
PLATELET # BLD AUTO: 391 THOUSANDS/UL (ref 149–390)
PMV BLD AUTO: 8.8 FL (ref 8.9–12.7)
PO2 BLD: 16 MM HG (ref 35–45)
POTASSIUM BLD-SCNC: 3.8 MMOL/L (ref 3.5–5.3)
POTASSIUM SERPL-SCNC: 3.8 MMOL/L (ref 3.5–5.3)
PROCALCITONIN SERPL-MCNC: 0.07 NG/ML
PROT SERPL-MCNC: 6.8 G/DL (ref 6.4–8.4)
RBC # BLD AUTO: 3.72 MILLION/UL (ref 3.81–5.12)
RSV RNA RESP QL NAA+PROBE: NEGATIVE
SAO2 % BLD FROM PO2: 23 % (ref 60–85)
SARS-COV-2 RNA RESP QL NAA+PROBE: NEGATIVE
SODIUM BLD-SCNC: 139 MMOL/L (ref 136–145)
SODIUM SERPL-SCNC: 138 MMOL/L (ref 135–147)
SPECIMEN SOURCE: ABNORMAL
WBC # BLD AUTO: 6.1 THOUSAND/UL (ref 4.31–10.16)

## 2025-06-15 PROCEDURE — 85025 COMPLETE CBC W/AUTO DIFF WBC: CPT | Performed by: EMERGENCY MEDICINE

## 2025-06-15 PROCEDURE — 82803 BLOOD GASES ANY COMBINATION: CPT

## 2025-06-15 PROCEDURE — 94664 DEMO&/EVAL PT USE INHALER: CPT

## 2025-06-15 PROCEDURE — 99223 1ST HOSP IP/OBS HIGH 75: CPT | Performed by: STUDENT IN AN ORGANIZED HEALTH CARE EDUCATION/TRAINING PROGRAM

## 2025-06-15 PROCEDURE — 94640 AIRWAY INHALATION TREATMENT: CPT

## 2025-06-15 PROCEDURE — 96374 THER/PROPH/DIAG INJ IV PUSH: CPT

## 2025-06-15 PROCEDURE — 93005 ELECTROCARDIOGRAM TRACING: CPT

## 2025-06-15 PROCEDURE — 71045 X-RAY EXAM CHEST 1 VIEW: CPT

## 2025-06-15 PROCEDURE — 80053 COMPREHEN METABOLIC PANEL: CPT | Performed by: EMERGENCY MEDICINE

## 2025-06-15 PROCEDURE — 84484 ASSAY OF TROPONIN QUANT: CPT | Performed by: EMERGENCY MEDICINE

## 2025-06-15 PROCEDURE — 0241U HB NFCT DS VIR RESP RNA 4 TRGT: CPT | Performed by: EMERGENCY MEDICINE

## 2025-06-15 PROCEDURE — 84145 PROCALCITONIN (PCT): CPT | Performed by: EMERGENCY MEDICINE

## 2025-06-15 PROCEDURE — 85014 HEMATOCRIT: CPT

## 2025-06-15 PROCEDURE — 99285 EMERGENCY DEPT VISIT HI MDM: CPT

## 2025-06-15 PROCEDURE — 83735 ASSAY OF MAGNESIUM: CPT | Performed by: EMERGENCY MEDICINE

## 2025-06-15 PROCEDURE — 84132 ASSAY OF SERUM POTASSIUM: CPT

## 2025-06-15 PROCEDURE — 36415 COLL VENOUS BLD VENIPUNCTURE: CPT | Performed by: EMERGENCY MEDICINE

## 2025-06-15 PROCEDURE — 84295 ASSAY OF SERUM SODIUM: CPT

## 2025-06-15 PROCEDURE — 99291 CRITICAL CARE FIRST HOUR: CPT | Performed by: EMERGENCY MEDICINE

## 2025-06-15 PROCEDURE — 85379 FIBRIN DEGRADATION QUANT: CPT | Performed by: EMERGENCY MEDICINE

## 2025-06-15 PROCEDURE — 94760 N-INVAS EAR/PLS OXIMETRY 1: CPT

## 2025-06-15 PROCEDURE — 83880 ASSAY OF NATRIURETIC PEPTIDE: CPT | Performed by: EMERGENCY MEDICINE

## 2025-06-15 PROCEDURE — 82330 ASSAY OF CALCIUM: CPT

## 2025-06-15 PROCEDURE — 82947 ASSAY GLUCOSE BLOOD QUANT: CPT

## 2025-06-15 RX ORDER — ONDANSETRON 2 MG/ML
4 INJECTION INTRAMUSCULAR; INTRAVENOUS EVERY 4 HOURS PRN
Status: DISCONTINUED | OUTPATIENT
Start: 2025-06-15 | End: 2025-06-17 | Stop reason: HOSPADM

## 2025-06-15 RX ORDER — FUROSEMIDE 10 MG/ML
40 INJECTION INTRAMUSCULAR; INTRAVENOUS
Status: DISCONTINUED | OUTPATIENT
Start: 2025-06-16 | End: 2025-06-16

## 2025-06-15 RX ORDER — ACETAMINOPHEN 325 MG/1
650 TABLET ORAL EVERY 6 HOURS PRN
Status: DISCONTINUED | OUTPATIENT
Start: 2025-06-15 | End: 2025-06-17 | Stop reason: HOSPADM

## 2025-06-15 RX ORDER — BUSPIRONE HYDROCHLORIDE 15 MG/1
15 TABLET ORAL 2 TIMES DAILY
Status: DISCONTINUED | OUTPATIENT
Start: 2025-06-15 | End: 2025-06-17 | Stop reason: HOSPADM

## 2025-06-15 RX ORDER — BENZONATATE 100 MG/1
100 CAPSULE ORAL 3 TIMES DAILY PRN
Status: DISCONTINUED | OUTPATIENT
Start: 2025-06-15 | End: 2025-06-17 | Stop reason: HOSPADM

## 2025-06-15 RX ORDER — FUROSEMIDE 10 MG/ML
40 INJECTION INTRAMUSCULAR; INTRAVENOUS ONCE
Status: COMPLETED | OUTPATIENT
Start: 2025-06-15 | End: 2025-06-15

## 2025-06-15 RX ORDER — POLYETHYLENE GLYCOL 3350 17 G/17G
17 POWDER, FOR SOLUTION ORAL DAILY PRN
Status: DISCONTINUED | OUTPATIENT
Start: 2025-06-15 | End: 2025-06-17 | Stop reason: HOSPADM

## 2025-06-15 RX ORDER — GABAPENTIN 400 MG/1
800 CAPSULE ORAL 2 TIMES DAILY
Status: DISCONTINUED | OUTPATIENT
Start: 2025-06-15 | End: 2025-06-17 | Stop reason: HOSPADM

## 2025-06-15 RX ORDER — CARVEDILOL 12.5 MG/1
12.5 TABLET ORAL 2 TIMES DAILY
Status: DISCONTINUED | OUTPATIENT
Start: 2025-06-15 | End: 2025-06-16

## 2025-06-15 RX ORDER — CLOPIDOGREL BISULFATE 75 MG/1
75 TABLET ORAL DAILY
Status: DISCONTINUED | OUTPATIENT
Start: 2025-06-16 | End: 2025-06-17 | Stop reason: HOSPADM

## 2025-06-15 RX ORDER — METHYLPREDNISOLONE SODIUM SUCCINATE 40 MG/ML
40 INJECTION, POWDER, LYOPHILIZED, FOR SOLUTION INTRAMUSCULAR; INTRAVENOUS EVERY 8 HOURS SCHEDULED
Status: DISCONTINUED | OUTPATIENT
Start: 2025-06-15 | End: 2025-06-16

## 2025-06-15 RX ORDER — ENOXAPARIN SODIUM 100 MG/ML
40 INJECTION SUBCUTANEOUS DAILY
Status: DISCONTINUED | OUTPATIENT
Start: 2025-06-16 | End: 2025-06-17 | Stop reason: HOSPADM

## 2025-06-15 RX ORDER — MAGNESIUM HYDROXIDE/ALUMINUM HYDROXICE/SIMETHICONE 120; 1200; 1200 MG/30ML; MG/30ML; MG/30ML
30 SUSPENSION ORAL EVERY 6 HOURS PRN
Status: DISCONTINUED | OUTPATIENT
Start: 2025-06-15 | End: 2025-06-17 | Stop reason: HOSPADM

## 2025-06-15 RX ORDER — LEVALBUTEROL INHALATION SOLUTION 0.63 MG/3ML
0.63 SOLUTION RESPIRATORY (INHALATION)
Status: DISCONTINUED | OUTPATIENT
Start: 2025-06-15 | End: 2025-06-17 | Stop reason: HOSPADM

## 2025-06-15 RX ORDER — ESCITALOPRAM OXALATE 10 MG/1
10 TABLET ORAL DAILY
Status: DISCONTINUED | OUTPATIENT
Start: 2025-06-16 | End: 2025-06-17 | Stop reason: HOSPADM

## 2025-06-15 RX ORDER — ALBUTEROL SULFATE 0.83 MG/ML
2.5 SOLUTION RESPIRATORY (INHALATION) ONCE
Status: COMPLETED | OUTPATIENT
Start: 2025-06-15 | End: 2025-06-15

## 2025-06-15 RX ORDER — PANTOPRAZOLE SODIUM 40 MG/1
40 TABLET, DELAYED RELEASE ORAL
Status: DISCONTINUED | OUTPATIENT
Start: 2025-06-16 | End: 2025-06-17 | Stop reason: HOSPADM

## 2025-06-15 RX ORDER — GUAIFENESIN 600 MG/1
600 TABLET, EXTENDED RELEASE ORAL EVERY 12 HOURS SCHEDULED
Status: DISCONTINUED | OUTPATIENT
Start: 2025-06-15 | End: 2025-06-17 | Stop reason: HOSPADM

## 2025-06-15 RX ORDER — ISOSORBIDE MONONITRATE 30 MG/1
30 TABLET, EXTENDED RELEASE ORAL DAILY
Status: DISCONTINUED | OUTPATIENT
Start: 2025-06-16 | End: 2025-06-17 | Stop reason: HOSPADM

## 2025-06-15 RX ORDER — ATORVASTATIN CALCIUM 40 MG/1
80 TABLET, FILM COATED ORAL
Status: DISCONTINUED | OUTPATIENT
Start: 2025-06-16 | End: 2025-06-17 | Stop reason: HOSPADM

## 2025-06-15 RX ORDER — FLUTICASONE FUROATE AND VILANTEROL 100; 25 UG/1; UG/1
1 POWDER RESPIRATORY (INHALATION) DAILY
Status: DISCONTINUED | OUTPATIENT
Start: 2025-06-16 | End: 2025-06-17 | Stop reason: HOSPADM

## 2025-06-15 RX ORDER — IPRATROPIUM BROMIDE AND ALBUTEROL SULFATE 2.5; .5 MG/3ML; MG/3ML
3 SOLUTION RESPIRATORY (INHALATION) ONCE
Status: COMPLETED | OUTPATIENT
Start: 2025-06-15 | End: 2025-06-15

## 2025-06-15 RX ADMIN — METHYLPREDNISOLONE SODIUM SUCCINATE 40 MG: 40 INJECTION, POWDER, FOR SOLUTION INTRAMUSCULAR; INTRAVENOUS at 21:29

## 2025-06-15 RX ADMIN — CARVEDILOL 12.5 MG: 12.5 TABLET, FILM COATED ORAL at 21:28

## 2025-06-15 RX ADMIN — GUAIFENESIN 600 MG: 600 TABLET, EXTENDED RELEASE ORAL at 21:29

## 2025-06-15 RX ADMIN — IPRATROPIUM BROMIDE 0.5 MG: 0.5 SOLUTION RESPIRATORY (INHALATION) at 19:49

## 2025-06-15 RX ADMIN — IPRATROPIUM BROMIDE AND ALBUTEROL SULFATE 3 ML: 2.5; .5 SOLUTION RESPIRATORY (INHALATION) at 17:39

## 2025-06-15 RX ADMIN — GABAPENTIN 800 MG: 400 CAPSULE ORAL at 21:27

## 2025-06-15 RX ADMIN — LEVALBUTEROL HYDROCHLORIDE 0.63 MG: 0.63 SOLUTION RESPIRATORY (INHALATION) at 19:49

## 2025-06-15 RX ADMIN — BUSPIRONE HYDROCHLORIDE 15 MG: 15 TABLET ORAL at 21:27

## 2025-06-15 RX ADMIN — IPRATROPIUM BROMIDE 0.5 MG: 0.5 SOLUTION RESPIRATORY (INHALATION) at 13:52

## 2025-06-15 RX ADMIN — ALBUTEROL SULFATE 2.5 MG: 2.5 SOLUTION RESPIRATORY (INHALATION) at 13:52

## 2025-06-15 RX ADMIN — FUROSEMIDE 40 MG: 10 INJECTION, SOLUTION INTRAVENOUS at 15:50

## 2025-06-15 NOTE — ASSESSMENT & PLAN NOTE
Continue Coreg 12.5 twice daily  Continue Imdur 30 mg daily  Echo done on 4/24/2025 showed an LVEF of 55% with normal wall motion  Chest x-ray showing no acute pathology  Troponins negative  EKG showing no ischemic changes    Patient with good urine response to Lasix 40    Continue Lasix 40 IV twice daily   Cardiac low salt diet with fluid restriction   cardiology consulted recommendations appreciated

## 2025-06-15 NOTE — ASSESSMENT & PLAN NOTE
Follow with [facility]  Potential exacerbation  COVID flu RSV negative  Steroids tid  Xopenex atrovent  Pulm consulted recommendations appreciate

## 2025-06-15 NOTE — ED PROVIDER NOTES
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 BIOPSY RIGHT COMPLETE Right 02/28/2017   [3]   Family History  Problem Relation Name Age of Onset    Stroke Mother Althea     Other Father Aidan         blood clot in vein & CABG     Heart disease Father Aidan     Prostate cancer Father Aidan     Hearing loss Father Aidan     Hypertension Father Aidan     Anxiety disorder Father Aidan     No Known Problems Sister MARIAH     No Known Problems Sister NIK     No Known Problems Daughter VIVIANA     No Known Problems Daughter MAGALIE     Breast cancer Maternal Grandmother Evelyn     No Known Problems Maternal Grandfather      No Known Problems Paternal Grandmother      No Known Problems Paternal Grandfather      Alcohol abuse Brother Pepito     Throat cancer Brother Pepito     Cancer Brother Pepito         Throat tongue    Hearing loss Brother Pepito     Kidney failure Brother          kidney failure d/t NSIADs on dialysis    Hearing loss Brother Juan     Crohn's disease Maternal Aunt STEFANIA     Dementia Maternal Aunt STEFANIA     No Known Problems Maternal Aunt SAMANTHA     No Known Problems Maternal Aunt EMILIANO     Colon cancer Paternal Aunt FOREIGN     No Known Problems Paternal Aunt EVELYN     No Known Problems Paternal Aunt RIAZ     No Known Problems Paternal Aunt KASEY     Colon cancer Paternal Aunt ERAN     Cancer Paternal Aunt ERAN         Colon cancer    Hearing loss Brother Ujan     [4]   Social History  Tobacco Use    Smoking status: Former     Current packs/day: 0.00     Average packs/day: 1.5 packs/day for 39.0 years (58.5 ttl pk-yrs)     Types: Cigarettes     Start date:      Quit date: 2008     Years since quittin.4    Smokeless tobacco: Never    Tobacco comments:     Patient quit   Vaping Use    Vaping status: Never Used   Substance Use Topics    Alcohol use: Yes     Alcohol/week: 7.0 standard drinks of alcohol     Types: 7 Cans of beer per week     Comment: socially    Drug use: Never        Oliver Zaidi DO  06/15/25 1952

## 2025-06-15 NOTE — RESPIRATORY THERAPY NOTE
RT Protocol Note  Geovanna Leal 70 y.o. female MRN: [id number]  Unit/Bed#: -01 Encounter: [id number]    Assessment    Principal Problem:    Acute respiratory failure with hypoxia (HCC)  Active Problems:    Essential hypertension    Takotsubo cardiomyopathy    COPD (chronic obstructive pulmonary disease) ([facility])      Home Pulmonary Medications:  Proair/albuterol/trelegy   06/15/25 1820   Respiratory Protocol   Protocol Initiated? Yes   Protocol Selection Respiratory   Language Barrier? No   Medical & Social History Reviewed? Yes   Diagnostic Studies Reviewed? Yes   Physical Assessment Performed? Yes   Home Devices/Therapy Home O2  (at night)   Respiratory Plan Mild Distress pathway   Respiratory Assessment   Assessment Type Assess only   General Appearance Awake;Alert   Respiratory Pattern Normal   Chest Assessment Chest expansion symmetrical   Bilateral Breath Sounds Diminished   Cough Non-productive   Resp Comments physician ordered neb tx's TID/will continue with current treatment plan   O2 Device NC       Home Devices/Therapy: (P) Home O2 (at night)    Past Medical History[1]  Social History[1]    Subjective         Objective    Physical Exam:   Assessment Type: (P) Assess only  General Appearance: (P) Awake, Alert  Respiratory Pattern: (P) Normal  Chest Assessment: (P) Chest expansion symmetrical  Bilateral Breath Sounds: (P) Diminished  Cough: (P) Non-productive  O2 Device: (P) NC    Vitals:  Blood pressure (!) 172/81, pulse 61, temperature 98.3 °F (36.8 °C), resp. rate 18, SpO2 93%.          Imaging and other studies:     O2 Device: (P) NC     Plan    Respiratory Plan: (P) Mild Distress pathway        Resp Comments: (P) physician ordered neb tx's TID/will continue with current treatment plan        [1]   Past Medical History:  Diagnosis Date    Allergic     Codeine    Anxiety     Arnold-Chiari malformation (HCC)     Breast lump     last assessed: Jan 22, 2013     Chronic kidney disease     stage 3     COPD (chronic obstructive pulmonary disease) (HCC)     Coronary artery disease     Depression     Dysphagia 2022    Facial twitching 2022    GERD (gastroesophageal reflux disease)     Gout     last assessed: 2012     Hair loss     last assessed: Dec 15, 2016     History of transfusion     Hyperlipidemia     Hypertension     Kidney stone     Mitral valve disorder     Myocardial infarct, old     GER (obstructive sleep apnea)     Pneumonia     Stroke (HCC)     SVT (supraventricular tachycardia) ([facility])    [1]   Social History  Socioeconomic History    Marital status: /Civil Union   Occupational History    Occupation: working full time    Tobacco Use    Smoking status: Former     Current packs/day: 0.00     Average packs/day: 1.5 packs/day for 39.0 years (58.5 ttl pk-yrs)     Types: Cigarettes     Start date:      Quit date: 2008     Years since quittin.4    Smokeless tobacco: Never    Tobacco comments:     Patient quit   Vaping Use    Vaping status: Never Used   Substance and Sexual Activity    Alcohol use: Yes     Alcohol/week: 7.0 standard drinks of alcohol     Types: 7 Cans of beer per week     Comment: socially    Drug use: Never    Sexual activity: Not Currently     Partners: Male     Social Drivers of Health     Financial Resource Strain: Low Risk  (2023)    Overall Financial Resource Strain (CARDIA)     Difficulty of Paying Living Expenses: Not hard at all   Food Insecurity: No Food Insecurity (2025)    Nursing - Inadequate Food Risk Classification     Worried About Running Out of Food in the Last Year: Never true     Ran Out of Food in the Last Year: Never true     Ran Out of Food in the Last Year: Never true   Transportation Needs: No Transportation Needs (2025)    PRAPARE - Transportation     Lack of Transportation (Medical): No     Lack of Transportation (Non-Medical): No   Intimate Partner Violence: Unknown (2025)    Nursing IPS     Physically Hurt by  Someone: No     Hurt or Threatened by Someone: No   Housing Stability: Low Risk  (5/13/2025)    Housing Stability Vital Sign     Unable to Pay for Housing in the Last Year: No     Number of Times Moved in the Last Year: 0     Homeless in the Last Year: No

## 2025-06-15 NOTE — ED NOTES
Patient feeling better after neb treatment.  Trial off O2, but after several minutes patient again feels short of breath and O2 sat drops to 83%.  O2 placed again at 2L and saturation quickly returns to >90%, patient resting comfortably.     Barbara Garcia RN  06/15/25 0595

## 2025-06-15 NOTE — H&P
H&P - Hospitalist   Name: Geovanna Leal 70 y.o. female I MRN: [id number]  Unit/Bed#: ED 12 I Date of Admission: 6/15/2025   Date of Service: 6/15/2025 I Hospital Day: 0     Assessment & Plan  Acute respiratory failure with hypoxia (HCC)  Patient presenting with shortness of breath with exertion over the past couple days    Patient uses oxygen at night with 2 L  Patient on 2 L nasal cannula on admission desatted 80 with exertion despite Lasix  Wean as tolerated  See management below  Essential hypertension  Continue coreg and imdur  Takotsubo cardiomyopathy  Continue Coreg 12.5 twice daily  Continue Imdur 30 mg daily  Echo done on 4/24/2025 showed an LVEF of 55% with normal wall motion  Chest x-ray showing no acute pathology  Troponins negative  EKG showing no ischemic changes    Patient with good urine response to Lasix 40    Continue Lasix 40 IV twice daily   Cardiac low salt diet with fluid restriction   cardiology consulted recommendations appreciated  COPD (chronic obstructive pulmonary disease) (HCC)  Follow with [facility]  Potential exacerbation  COVID flu RSV negative  Steroids tid  Xopenex atrovent  Pulm consulted recommendations appreciate       VTE Pharmacologic Prophylaxis: VTE Score: 7 High Risk (Score >/= 5) - Pharmacological DVT Prophylaxis Ordered: enoxaparin (Lovenox). Sequential Compression Devices Ordered.  Code Status: Prior full code      Anticipated Length of Stay: Patient will be admitted on an inpatient basis with an anticipated length of stay of greater than 2 midnights secondary to sob.    History of Present Illness   Chief Complaint: sob    Geovanna Leal is a 70 y.o. female with a PMH of Takotsubo's cardiomyopathy, COPD, hypertension who presents with shortness of breath that has been gradually worsening over the past few days.  Was requiring 2 L continuously throughout the day which she only wears at night.  Despite inhaler regimens patient was still requiring shortness of  breath with minimal exertion.  In the ED patient received Lasix had good diuresis however continued to desat..    Review of Systems   All other systems reviewed and are negative.      Historical Information   Past Medical History[1]  Past Surgical History[2]  Social History[3]  E-Cigarette/Vaping    E-Cigarette Use Never User      E-Cigarette/Vaping Substances    Nicotine No     THC No     CBD No     Flavoring No     Other No     Unknown No      Family History[4]  Social History:  Marital Status: /Civil Union   Occupation:   Patient Pre-hospital Living Situation: Home  Patient Pre-hospital Level of Mobility: walks  Patient Pre-hospital Diet Restrictions: none    Meds/Allergies   I have reviewed home medications using recent Epic encounter.  Prior to Admission medications    Medication Sig Start Date End Date Taking? Authorizing Provider   albuterol (2.5 mg/3 mL) 0.083 % nebulizer solution Take 3 mL (2.5 mg total) by nebulization every 6 (six) hours as needed for wheezing or shortness of breath 5/1/25   ANTONIETA Azevedo   albuterol (ProAir HFA) 90 mcg/act inhaler Inhale 2 puffs every 4 (four) hours as needed for wheezing or shortness of breath 5/1/25   ANTONIETA Azevedo   atorvastatin (LIPITOR) 80 mg tablet Take 1 tablet by mouth once daily 4/21/25   Binta Eduardo MD   busPIRone (BUSPAR) 15 mg tablet Take 1 tablet (15 mg total) by mouth 2 (two) times a day 5/13/25   Leelee Garcia DO   calcium carbonate (OS-FLASH) 600 MG tablet Take 600 mg by mouth in the morning.    Historical Provider, MD   carvedilol (COREG) 12.5 mg tablet Take 1 tablet by mouth twice daily 2/25/25   Binta Eduardo MD   cholecalciferol (VITAMIN D3) 1,000 units tablet Take 1,000 Units by mouth in the morning.    Historical Provider, MD   clopidogrel (PLAVIX) 75 mg tablet Take 1 tablet by mouth once daily 4/21/25   Leelee Garcia DO   escitalopram (LEXAPRO) 20 mg tablet Take 1/2 (one-half) tablet by mouth once daily  4/21/25   Leeele Garcia DO   gabapentin (NEURONTIN) 400 mg capsule Take 2 capsules (800 mg total) by mouth 3 (three) times a day  Patient taking differently: Take 800 mg by mouth 2 (two) times a day 3/10/25   Giselle Perkins DO   isosorbide mononitrate (IMDUR) 30 mg 24 hr tablet Take 1 tablet by mouth once daily 2/25/25   Binta Eduardo MD   pantoprazole (PROTONIX) 40 mg tablet TAKE 1 TABLET BY MOUTH ONCE DAILY BEFORE BREAKFAST 4/21/25   Leelee Garcia DO   polyethylene glycol (MIRALAX) 17 g packet Take 17 g by mouth daily 2/25/25   ANTONIETA Godinez Ellipta 100-62.5-25 MCG/ACT inhaler INHALE 1 PUFF BY MOUTH ONCE DAILY RINSE MOUTH AFTER USE 4/21/25   Leelee Garcia DO   vitamin B-12 (VITAMIN B-12) 1,000 mcg tablet Take 1 tablet (1,000 mcg total) by mouth daily 6/4/24   Leelee Garcia DO   dicyclomine (BENTYL) 10 mg capsule TAKE 1 CAPSULE BY MOUTH 4 TIMES DAILY BEFORE MEAL(S) AND AT BEDTIME 3/12/22 1/17/23  Leelee Garcia DO   famotidine (PEPCID) 40 MG tablet Take 1 tablet (40 mg total) by mouth daily at bedtime 6/30/22 1/17/23  Cassidy Swenson MD   fluticasone (FLONASE) 50 mcg/act nasal spray 2 sprays into each nostril daily 12/5/19 1/17/23  Leelee Garcia DO   multivitamin (THERAGRAN) TABS Take 1 tablet by mouth daily  1/17/23  Historical Provider, MD     Allergies   Allergen Reactions    Lisinopril Dizziness    Codeine Itching    Ceftin [Cefuroxime] Rash    Medical Tape Rash       Objective :  Temp:  [97.5 °F (36.4 °C)] 97.5 °F (36.4 °C)  HR:  [60-74] 62  BP: (148-183)/() 148/67  Resp:  [17-24] 21  SpO2:  [87 %-100 %] 95 %  O2 Device: Nasal cannula  Nasal Cannula O2 Flow Rate (L/min):  [2 L/min] 2 L/min    Physical Exam  Vitals and nursing note reviewed.   Constitutional:       General: She is not in acute distress.     Appearance: She is ill-appearing.   HENT:      Head: Normocephalic and atraumatic.     Cardiovascular:      Rate  and Rhythm: Normal rate and regular rhythm.      Pulses: Normal pulses.      Heart sounds: Normal heart sounds.   Pulmonary:      Effort: Pulmonary effort is normal.      Breath sounds: Normal breath sounds.      Comments: 2L NC  Abdominal:      General: Abdomen is flat. Bowel sounds are normal.      Palpations: Abdomen is soft.     Musculoskeletal:      Right lower leg: No edema.      Left lower leg: No edema.     Skin:     General: Skin is warm.     Neurological:      General: No focal deficit present.      Mental Status: She is alert and oriented to person, place, and time.          Lines/Drains:            Lab Results: I have reviewed the following results:  Results from last 7 days   Lab Units 06/15/25  1350   WBC Thousand/uL 6.10   HEMOGLOBIN g/dL 11.8   HEMATOCRIT % 36.2   PLATELETS Thousands/uL 391*   SEGS PCT % 66   LYMPHO PCT % 21   MONO PCT % 11   EOS PCT % 1     Results from last 7 days   Lab Units 06/15/25  1350   SODIUM mmol/L 138   POTASSIUM mmol/L 3.8   CHLORIDE mmol/L 105   CO2 mmol/L 24   BUN mg/dL 13   CREATININE mg/dL 1.11   ANION GAP mmol/L 9   CALCIUM mg/dL 9.0   ALBUMIN g/dL 3.9   TOTAL BILIRUBIN mg/dL 1.16*   ALK PHOS U/L 89   ALT U/L 11   AST U/L 13   GLUCOSE RANDOM mg/dL 87             Lab Results   Component Value Date    HGBA1C 5.9 (H) 06/05/2025    HGBA1C 5.4 12/07/2024    HGBA1C 6.0 (H) 05/04/2024     Results from last 7 days   Lab Units 06/15/25  1350   PROCALCITONIN ng/ml 0.07       Imaging Results Review: I personally reviewed the following image studies in PACS and associated radiology reports: chest xray. My interpretation of the radiology images/reports is: WNL.  Other Study Results Review: EKG was reviewed.  EKG was personally reviewed and my interpretation is: Personally Reviewed. No EKG obtained..    Administrative Statements   I have spent a total time of 80 minutes in caring for this patient on the day of the visit/encounter including Diagnostic results, Prognosis, Risks and  benefits of tx options, Instructions for management, Patient and family education, Importance of tx compliance, Risk factor reductions, Impressions, Counseling / Coordination of care, Documenting in the medical record, Reviewing/placing orders in the medical record (including tests, medications, and/or procedures), Obtaining or reviewing history  , and Communicating with other healthcare professionals .    ** Please Note: This note has been constructed using a voice recognition system. **         [1]   Past Medical History:  Diagnosis Date    Allergic     Codeine    Anxiety     Arnold-Chiari malformation (HCC)     Breast lump     last assessed: Jan 22, 2013     Chronic kidney disease     stage 3    COPD (chronic obstructive pulmonary disease) (HCC)     Coronary artery disease     Depression     Dysphagia 08/29/2022    Facial twitching 07/21/2022    GERD (gastroesophageal reflux disease)     Gout     last assessed: Nov 26, 2012     Hair loss     last assessed: Dec 15, 2016     History of transfusion     Hyperlipidemia     Hypertension     Kidney stone     Mitral valve disorder     Myocardial infarct, old     GER (obstructive sleep apnea)     Pneumonia     Stroke (HCC)     SVT (supraventricular tachycardia) ([facility])    [2]   Past Surgical History:  Procedure Laterality Date    BREAST BIOPSY Right 02/28/2017    US CORE BIOPSY--BENIGN    CARDIAC CATHETERIZATION      CATARACT EXTRACTION Bilateral 11/09/2023    CERVICAL DISCECTOMY      Spinal     CERVICAL LAMINECTOMY      C1 with chiari decompression     COLONOSCOPY      5 yrs - onset: May 31, 2011     CRANIOTOMY      INTRACAPSULAR CATARACT EXTRACTION Right 11/08/2023    INTRACAPSULAR CATARACT EXTRACTION Left 11/15/2023    POPLITEAL SYNOVIAL CYST EXCISION      DE CYSTO/URETERO W/LITHOTRIPSY &INDWELL STENT INSRT Right 4/7/2025    Procedure: CYSTOSCOPY URETEROSCOPY WITH EXCHANGE STENT URETERAL;  Surgeon: Aaron Coyle MD;  Location: [facility];  Service: Urology    DE  CYSTOURETHROSCOPY W/URETERAL CATHETERIZATION Right 2025    Procedure: CYSTOSCOPY URETEROSCOPY WITH INSERTION RIGHT 6X26 STENT URETERAL;  Surgeon: Aaron Coyle MD;  Location: [facility];  Service: Urology    TUBAL LIGATION      US GUIDED BREAST BIOPSY RIGHT COMPLETE Right 2017   [3]   Social History  Tobacco Use    Smoking status: Former     Current packs/day: 0.00     Average packs/day: 1.5 packs/day for 39.0 years (58.5 ttl pk-yrs)     Types: Cigarettes     Start date:      Quit date: 2008     Years since quittin.4    Smokeless tobacco: Never    Tobacco comments:     Patient quit   Vaping Use    Vaping status: Never Used   Substance and Sexual Activity    Alcohol use: Yes     Alcohol/week: 7.0 standard drinks of alcohol     Types: 7 Cans of beer per week     Comment: socially    Drug use: Never    Sexual activity: Not Currently     Partners: Male   [4]   Family History  Problem Relation Name Age of Onset    Stroke Mother Althea     Other Father Aidan         blood clot in vein & CABG     Heart disease Father Aidan     Prostate cancer Father Aidan     Hearing loss Father Aidan     Hypertension Father Aidan     Anxiety disorder Father Aidan     No Known Problems Sister MARIAH     No Known Problems Sister NIK     No Known Problems Daughter VIVIANA     No Known Problems Daughter MAGALIE     Breast cancer Maternal Grandmother Evelyn     No Known Problems Maternal Grandfather      No Known Problems Paternal Grandmother      No Known Problems Paternal Grandfather      Alcohol abuse Brother Pepito     Throat cancer Brother Pepito     Cancer Brother Pepito         Throat tongue    Hearing loss Brother Pepito     Kidney failure Brother          kidney failure d/t NSIADs on dialysis    Hearing loss Brother Juan     Crohn's disease Maternal Aunt STEFANIA     Dementia Maternal Aunt STEFANIA     No Known Problems Maternal Aunt SAMANTHA     No Known Problems Maternal Aunt EMILIANO     Colon cancer Paternal  Aunt FOREGIN     No Known Problems Paternal Aunt MIGUE     No Known Problems Paternal Aunt RIAZ     No Known Problems Paternal Aunt KASEY     Colon cancer Paternal Aunt ERAN     Cancer Paternal Aunt ERAN         Colon cancer    Hearing loss Brother Juan

## 2025-06-15 NOTE — ASSESSMENT & PLAN NOTE
Patient presenting with shortness of breath with exertion over the past couple days    Patient uses oxygen at night with 2 L  Patient on 2 L nasal cannula on admission desatted 80 with exertion despite Lasix  Wean as tolerated  See management below

## 2025-06-16 ENCOUNTER — HOSPITAL ENCOUNTER (OUTPATIENT)
Dept: NON INVASIVE DIAGNOSTICS | Facility: HOSPITAL | Age: 70
Discharge: HOME | End: 2025-06-16
Attending: PHYSICIAN ASSISTANT

## 2025-06-16 PROBLEM — J40 TRACHEOBRONCHITIS: Status: ACTIVE | Noted: 2025-06-16

## 2025-06-16 LAB
ALBUMIN SERPL BCG-MCNC: 4 G/DL (ref 3.5–5)
ALP SERPL-CCNC: 86 U/L (ref 34–104)
ALT SERPL W P-5'-P-CCNC: 9 U/L (ref 7–52)
ANION GAP SERPL CALCULATED.3IONS-SCNC: 10 MMOL/L (ref 4–13)
AST SERPL W P-5'-P-CCNC: 11 U/L (ref 13–39)
ATRIAL RATE: 60 BPM
ATRIAL RATE: 60 BPM
BASOPHILS # BLD AUTO: 0 THOUSANDS/ÂΜL (ref 0–0.1)
BASOPHILS NFR BLD AUTO: 0 % (ref 0–1)
BILIRUB SERPL-MCNC: 1.04 MG/DL (ref 0.2–1)
BUN SERPL-MCNC: 16 MG/DL (ref 5–25)
CALCIUM SERPL-MCNC: 9 MG/DL (ref 8.4–10.2)
CHLORIDE SERPL-SCNC: 104 MMOL/L (ref 96–108)
CO2 SERPL-SCNC: 23 MMOL/L (ref 21–32)
CREAT SERPL-MCNC: 1.04 MG/DL (ref 0.6–1.3)
EOSINOPHIL # BLD AUTO: 0 THOUSAND/ÂΜL (ref 0–0.61)
EOSINOPHIL NFR BLD AUTO: 0 % (ref 0–6)
ERYTHROCYTE [DISTWIDTH] IN BLOOD BY AUTOMATED COUNT: 16.2 % (ref 11.6–15.1)
GFR SERPL CREATININE-BSD FRML MDRD: 53 ML/MIN/1.73SQ M
GLUCOSE SERPL-MCNC: 180 MG/DL (ref 65–140)
HCT VFR BLD AUTO: 36.1 % (ref 34.8–46.1)
HGB BLD-MCNC: 11.6 G/DL (ref 11.5–15.4)
IMM GRANULOCYTES # BLD AUTO: 0.03 THOUSAND/UL (ref 0–0.2)
IMM GRANULOCYTES NFR BLD AUTO: 1 % (ref 0–2)
LYMPHOCYTES # BLD AUTO: 0.76 THOUSANDS/ÂΜL (ref 0.6–4.47)
LYMPHOCYTES NFR BLD AUTO: 20 % (ref 14–44)
MAGNESIUM SERPL-MCNC: 2 MG/DL (ref 1.9–2.7)
MCH RBC QN AUTO: 30.7 PG (ref 26.8–34.3)
MCHC RBC AUTO-ENTMCNC: 32.1 G/DL (ref 31.4–37.4)
MCV RBC AUTO: 96 FL (ref 82–98)
MONOCYTES # BLD AUTO: 0.11 THOUSAND/ÂΜL (ref 0.17–1.22)
MONOCYTES NFR BLD AUTO: 3 % (ref 4–12)
NEUTROPHILS # BLD AUTO: 2.9 THOUSANDS/ÂΜL (ref 1.85–7.62)
NEUTS SEG NFR BLD AUTO: 76 % (ref 43–75)
NRBC BLD AUTO-RTO: 0 /100 WBCS
P AXIS: 45 DEGREES
P AXIS: 57 DEGREES
PLATELET # BLD AUTO: 351 THOUSANDS/UL (ref 149–390)
PMV BLD AUTO: 8.6 FL (ref 8.9–12.7)
POTASSIUM SERPL-SCNC: 4.2 MMOL/L (ref 3.5–5.3)
PR INTERVAL: 142 MS
PR INTERVAL: 148 MS
PROT SERPL-MCNC: 7 G/DL (ref 6.4–8.4)
QRS AXIS: 47 DEGREES
QRS AXIS: 56 DEGREES
QRSD INTERVAL: 74 MS
QRSD INTERVAL: 80 MS
QT INTERVAL: 468 MS
QT INTERVAL: 484 MS
QTC INTERVAL: 468 MS
QTC INTERVAL: 484 MS
RBC # BLD AUTO: 3.78 MILLION/UL (ref 3.81–5.12)
SODIUM SERPL-SCNC: 137 MMOL/L (ref 135–147)
T WAVE AXIS: 10 DEGREES
T WAVE AXIS: 41 DEGREES
VENTRICULAR RATE: 60 BPM
VENTRICULAR RATE: 60 BPM
WBC # BLD AUTO: 3.8 THOUSAND/UL (ref 4.31–10.16)

## 2025-06-16 PROCEDURE — 85025 COMPLETE CBC W/AUTO DIFF WBC: CPT | Performed by: STUDENT IN AN ORGANIZED HEALTH CARE EDUCATION/TRAINING PROGRAM

## 2025-06-16 PROCEDURE — 99222 1ST HOSP IP/OBS MODERATE 55: CPT | Performed by: INTERNAL MEDICINE

## 2025-06-16 PROCEDURE — 94760 N-INVAS EAR/PLS OXIMETRY 1: CPT

## 2025-06-16 PROCEDURE — 99223 1ST HOSP IP/OBS HIGH 75: CPT | Performed by: INTERNAL MEDICINE

## 2025-06-16 PROCEDURE — 94640 AIRWAY INHALATION TREATMENT: CPT

## 2025-06-16 PROCEDURE — 99232 SBSQ HOSP IP/OBS MODERATE 35: CPT | Performed by: INTERNAL MEDICINE

## 2025-06-16 PROCEDURE — 93010 ELECTROCARDIOGRAM REPORT: CPT | Performed by: INTERNAL MEDICINE

## 2025-06-16 PROCEDURE — 83735 ASSAY OF MAGNESIUM: CPT | Performed by: STUDENT IN AN ORGANIZED HEALTH CARE EDUCATION/TRAINING PROGRAM

## 2025-06-16 PROCEDURE — 80053 COMPREHEN METABOLIC PANEL: CPT | Performed by: STUDENT IN AN ORGANIZED HEALTH CARE EDUCATION/TRAINING PROGRAM

## 2025-06-16 RX ORDER — AZITHROMYCIN 500 MG/1
500 TABLET, FILM COATED ORAL EVERY 24 HOURS
Status: DISCONTINUED | OUTPATIENT
Start: 2025-06-16 | End: 2025-06-17 | Stop reason: HOSPADM

## 2025-06-16 RX ORDER — ESCITALOPRAM OXALATE 20 MG/1
TABLET ORAL
Qty: 30 TABLET | Refills: 2 | Status: SHIPPED | OUTPATIENT
Start: 2025-06-16

## 2025-06-16 RX ORDER — CARVEDILOL 12.5 MG/1
12.5 TABLET ORAL ONCE
Status: COMPLETED | OUTPATIENT
Start: 2025-06-16 | End: 2025-06-16

## 2025-06-16 RX ORDER — METHYLPREDNISOLONE SODIUM SUCCINATE 40 MG/ML
40 INJECTION, POWDER, LYOPHILIZED, FOR SOLUTION INTRAMUSCULAR; INTRAVENOUS EVERY 12 HOURS SCHEDULED
Status: DISCONTINUED | OUTPATIENT
Start: 2025-06-16 | End: 2025-06-17

## 2025-06-16 RX ORDER — CARVEDILOL 12.5 MG/1
25 TABLET ORAL 2 TIMES DAILY
Status: DISCONTINUED | OUTPATIENT
Start: 2025-06-16 | End: 2025-06-17 | Stop reason: HOSPADM

## 2025-06-16 RX ADMIN — FUROSEMIDE 40 MG: 10 INJECTION, SOLUTION INTRAVENOUS at 09:05

## 2025-06-16 RX ADMIN — UMECLIDINIUM 1 PUFF: 62.5 AEROSOL, POWDER ORAL at 09:19

## 2025-06-16 RX ADMIN — IPRATROPIUM BROMIDE 0.5 MG: 0.5 SOLUTION RESPIRATORY (INHALATION) at 08:44

## 2025-06-16 RX ADMIN — CLOPIDOGREL 75 MG: 75 TABLET ORAL at 09:05

## 2025-06-16 RX ADMIN — GABAPENTIN 800 MG: 400 CAPSULE ORAL at 20:46

## 2025-06-16 RX ADMIN — CARVEDILOL 12.5 MG: 12.5 TABLET, FILM COATED ORAL at 09:05

## 2025-06-16 RX ADMIN — GUAIFENESIN 600 MG: 600 TABLET, EXTENDED RELEASE ORAL at 20:46

## 2025-06-16 RX ADMIN — ISOSORBIDE MONONITRATE 30 MG: 30 TABLET, EXTENDED RELEASE ORAL at 09:05

## 2025-06-16 RX ADMIN — METHYLPREDNISOLONE SODIUM SUCCINATE 40 MG: 40 INJECTION, POWDER, FOR SOLUTION INTRAMUSCULAR; INTRAVENOUS at 04:59

## 2025-06-16 RX ADMIN — LEVALBUTEROL HYDROCHLORIDE 0.63 MG: 0.63 SOLUTION RESPIRATORY (INHALATION) at 14:06

## 2025-06-16 RX ADMIN — IPRATROPIUM BROMIDE 0.5 MG: 0.5 SOLUTION RESPIRATORY (INHALATION) at 14:06

## 2025-06-16 RX ADMIN — LEVALBUTEROL HYDROCHLORIDE 0.63 MG: 0.63 SOLUTION RESPIRATORY (INHALATION) at 20:18

## 2025-06-16 RX ADMIN — PANTOPRAZOLE SODIUM 40 MG: 40 TABLET, DELAYED RELEASE ORAL at 04:58

## 2025-06-16 RX ADMIN — CARVEDILOL 12.5 MG: 12.5 TABLET, FILM COATED ORAL at 11:22

## 2025-06-16 RX ADMIN — ESCITALOPRAM OXALATE 10 MG: 10 TABLET ORAL at 09:05

## 2025-06-16 RX ADMIN — METHYLPREDNISOLONE SODIUM SUCCINATE 40 MG: 40 INJECTION, POWDER, FOR SOLUTION INTRAMUSCULAR; INTRAVENOUS at 20:46

## 2025-06-16 RX ADMIN — LEVALBUTEROL HYDROCHLORIDE 0.63 MG: 0.63 SOLUTION RESPIRATORY (INHALATION) at 08:44

## 2025-06-16 RX ADMIN — FLUTICASONE FUROATE AND VILANTEROL TRIFENATATE 1 PUFF: 100; 25 POWDER RESPIRATORY (INHALATION) at 09:19

## 2025-06-16 RX ADMIN — ENOXAPARIN SODIUM 40 MG: 40 INJECTION SUBCUTANEOUS at 09:18

## 2025-06-16 RX ADMIN — GABAPENTIN 800 MG: 400 CAPSULE ORAL at 09:05

## 2025-06-16 RX ADMIN — BUSPIRONE HYDROCHLORIDE 15 MG: 15 TABLET ORAL at 09:05

## 2025-06-16 RX ADMIN — BUSPIRONE HYDROCHLORIDE 15 MG: 15 TABLET ORAL at 17:33

## 2025-06-16 RX ADMIN — AZITHROMYCIN DIHYDRATE 500 MG: 500 TABLET ORAL at 11:22

## 2025-06-16 RX ADMIN — CARVEDILOL 25 MG: 12.5 TABLET, FILM COATED ORAL at 17:33

## 2025-06-16 RX ADMIN — ATORVASTATIN CALCIUM 80 MG: 40 TABLET, FILM COATED ORAL at 17:33

## 2025-06-16 RX ADMIN — IPRATROPIUM BROMIDE 0.5 MG: 0.5 SOLUTION RESPIRATORY (INHALATION) at 20:18

## 2025-06-16 RX ADMIN — GUAIFENESIN 600 MG: 600 TABLET, EXTENDED RELEASE ORAL at 09:05
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
## 2025-06-16 NOTE — ASSESSMENT & PLAN NOTE
- Currently on 2 L 97%, home O2 requirements 2 L nightly  -Continue sats greater than 88%  -Pulmonary toileting: Deep breathing cough out of bed as tolerated incentive spirometry  -Will evaluate oxygen requirements prior to discharge

## 2025-06-16 NOTE — ASSESSMENT & PLAN NOTE
-4/2019-FEV1 60%  - Inpatient: Decrease IV Solu-Medrol 40 mg twice daily  -Hopefully can transition to prednisone tomorrow  -Currently on Breo/Anoro, Xopenex/Atrovent 3 times daily  - Home regimen: Trelegy 100/62.5/25 MCG 1 puff daily, albuterol nebulizer every 6 as needed, Proventil every 6 as needed

## 2025-06-16 NOTE — ASSESSMENT & PLAN NOTE
"Geovanna has been on 2 L of O2 overnight since her previous admission  Patient reports in the past 2 weeks she has noticed increased swelling in her extremities, she notes that she did not \"feel right\" on on 6/14  She took her blood pressure and noted it to be 190's systolic.  At that time she was questioning whether or not she had forgotten to take her regularly scheduled antihypertensives.  She also noted increased dyspnea with exertion, with reported O2 sat as low as 70s on home oximeter.  She continued with increased dyspnea, and hypertension prompting her to seek evaluation in the emergency department on 6/15    Chest x-ray: Widened mediastinum -mild pulmonary edema  , troponin negative  Initially treated with Lasix 40 mg IV  - not previously on any diuretics, received 2 doses with patient being -2.1 L overnight.  Patients weight is stable from 3 months ago.    Patient reports feeling \" much better\".  No lower extremity edema noted, lungs diminished, currently on room air with O2 sat of greater than 95%, examines euvolemic  "
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
## 2025-06-16 NOTE — ASSESSMENT & PLAN NOTE
Outpatient regimen consists of carvedilol 12.5 mg twice daily, Imdur 30 mg once daily.  Patient initially hypertensive upon arrival, has improved some with diuresis.

## 2025-06-16 NOTE — PLAN OF CARE
Problem: Potential for Falls  Goal: Patient will remain free of falls  Description: INTERVENTIONS:  - Educate patient/family on patient safety including physical limitations  - Instruct patient to call for assistance with activity   - Consider consulting OT/PT to assist with strengthening/mobility based on AM PAC & JH-HLM score  - Consult OT/PT to assist with strengthening/mobility   - Keep Call bell within reach  - Keep bed low and locked with side rails adjusted as appropriate  - Keep care items and personal belongings within reach  - Initiate and maintain comfort rounds  - Make Fall Risk Sign visible to staff  - Apply yellow socks and bracelet for high fall risk patients  - Consider moving patient to room near nurses station  Outcome: Progressing     Problem: PAIN - ADULT  Goal: Verbalizes/displays adequate comfort level or baseline comfort level  Description: Interventions:  - Encourage patient to monitor pain and request assistance  - Assess pain using appropriate pain scale  - Administer analgesics as ordered based on type and severity of pain and evaluate response  - Implement non-pharmacological measures as appropriate and evaluate response  - Consider cultural and social influences on pain and pain management  - Notify physician/advanced practitioner if interventions unsuccessful or patient reports new pain  - Educate patient/family on pain management process including their role and importance of  reporting pain   - Provide non-pharmacologic/complimentary pain relief interventions  Outcome: Progressing

## 2025-06-16 NOTE — CONSULTS
Consultation - Pulmonology   Name: Geovanna Leal 70 y.o. female I MRN: [id number]  Unit/Bed#: -01 I Date of Admission: 6/15/2025   Date of Service: 6/16/2025 I Hospital Day: 1   Inpatient consult to Pulmonology  Consult performed by: ANTONIETA Ching  Consult ordered by: Farhana Moser MD        Physician Requesting Evaluation: Gurvinder Alonso MD   Reason for Evaluation / Principal Problem: copd    Assessment & Plan  Acute respiratory failure with hypoxia (HCC)  - Currently on 2 L 97%, home O2 requirements 2 L nightly  -Continue sats greater than 88%  -Pulmonary toileting: Deep breathing cough out of bed as tolerated incentive spirometry  -Will evaluate oxygen requirements prior to discharge  Takotsubo cardiomyopathy  - Cardiology following  COPD (chronic obstructive pulmonary disease) (HCC)  -4/2019-FEV1 60%  - Inpatient: Decrease IV Solu-Medrol 40 mg twice daily  -Hopefully can transition to prednisone tomorrow  -Currently on Breo/Anoro, Xopenex/Atrovent 3 times daily  - Home regimen: Trelegy 100/62.5/25 MCG 1 puff daily, albuterol nebulizer every 6 as needed, Proventil every 6 as needed    Tracheobronchitis  -Procalcitonin negative x 1  -Will start a course of azithromycin x 3 days      I have discussed the above management plan in detail with the primary service.     History of Present Illness   Geovanna Leal is a 70 y.o. female who presents with past medical history of cardiomyopathy, COPD hypertension.  Patient reports that she has been short of breath over the past few days.  Patient reports that overall this is worsened with ambulating.  Patient was noted to be hypoxic in the emergency department at 87%.  Patient was administered IV Solu-Medrol and Lasix upon ED admission.     Pulmonary was consulted for COPD exacerbation.  Today upon examination patient had noted aeration throughout lung fields.  Patient reports that overall she does feel better since admission.  Patient would like to  wean off steroids as quickly as possible as this does affect her blood pressure.  Patient currently denying any fevers, chills, night sweats, pleuritic chest pain, or palpitations.     From a pulmonary standpoint, patient follows with Dr. Gupta for her moderate COPD.  Patient reports an approximate 1 pack/day 35-year smoking history.  Patient reports she quit smoking 17 years ago.  Patient completed PFT testing in 2019 which showed moderate obstruction at 60%.  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  Patient currently maintained on Trelegy 100/62.5/25 MCG 1 puff daily, albuterol nebulizer every 6 as needed, Proventil every 6 as needed.  Patient uses 2 L nightly.  Patient reports history of GERD in which she is maintained on Protonix.  Patient denies any symptoms of GER seasonal allergies or postnasal drip.  Patient denies any recent acute exposures to dust, mold, asbestos, or silica.    Review of Systems   Constitutional:  Negative for chills and fever.   HENT:  Negative for ear pain and sore throat.    Eyes:  Negative for pain and visual disturbance.   Respiratory:  Negative for apnea, cough, choking, chest tightness, shortness of breath, wheezing and stridor.    Cardiovascular:  Negative for chest pain and palpitations.   Gastrointestinal:  Negative for abdominal pain and vomiting.   Genitourinary:  Negative for dysuria and hematuria.   Musculoskeletal:  Negative for arthralgias and back pain.   Skin:  Negative for color change and rash.   Neurological:  Negative for seizures and syncope.   All other systems reviewed and are negative.      Historical Information   Medical History Review: I have reviewed the patient's PMH, PSH, Social History, Family History, Meds, and Allergies   Tobacco History: quite 17   Occupational History: na  Family History:Family History[1]    Objective :  Temp:  [97.4 °F (36.3 °C)-98.3 °F (36.8 °C)] 97.4 °F (36.3 °C)  HR:  [60-79] 74  BP:  (148-183)/() 164/80  Resp:  [17-24] 18  SpO2:  [87 %-100 %] 98 %  O2 Device: Nasal cannula  Nasal Cannula O2 Flow Rate (L/min):  [2 L/min-3 L/min] 3 L/min    Physical Exam  Constitutional:       General: She is not in acute distress.     Appearance: Normal appearance. She is normal weight. She is not ill-appearing.   HENT:      Head: Normocephalic and atraumatic.      Nose: Nose normal. No congestion or rhinorrhea.      Mouth/Throat:      Mouth: Mucous membranes are dry.      Pharynx: Oropharynx is clear. No oropharyngeal exudate or posterior oropharyngeal erythema.     Cardiovascular:      Rate and Rhythm: Normal rate and regular rhythm.      Pulses: Normal pulses.      Heart sounds: No murmur heard.     No friction rub. No gallop.   Pulmonary:      Effort: Pulmonary effort is normal. No respiratory distress.      Breath sounds: No stridor. Wheezing present. No rhonchi or rales.   Chest:      Chest wall: No tenderness.   Abdominal:      General: Abdomen is flat. Bowel sounds are normal.      Palpations: Abdomen is soft.     Musculoskeletal:         General: No swelling or tenderness. Normal range of motion.      Cervical back: Normal range of motion. No rigidity or tenderness.     Skin:     General: Skin is warm and dry.      Coloration: Skin is not jaundiced or pale.     Neurological:      General: No focal deficit present.      Mental Status: She is alert and oriented to person, place, and time. Mental status is at baseline.     Psychiatric:         Mood and Affect: Mood normal.         Behavior: Behavior normal.           Lab Results: I have reviewed the following results:  .     06/15/25  1336 06/15/25  1350 06/15/25  1350 06/15/25  1549 06/16/25  0457   WBC  --  6.10   < >  --  3.80*   HGB 12.2 11.8   < >  --  11.6   HCT 36 36.2   < >  --  36.1   PLT  --  391*   < >  --  351   SODIUM  --  138   < >  --  137   K  --  3.8   < >  --  4.2   CL  --  105   < >  --  104   CO2 24 24   < >  --  23   BUN  --  13    < >  --  16   CREATININE  --  1.11   < >  --  1.04   GLUC  --  87   < >  --  180*   CAIONIZED 1.16  --   --   --   --    MG  --  1.9   < >  --  2.0   AST  --  13   < >  --  11*   ALT  --  11   < >  --  9   ALB  --  3.9   < >  --  4.0   TBILI  --  1.16*   < >  --  1.04*   ALKPHOS  --  89   < >  --  86   HSTNI0  --  6  --   --   --    HSTNI2  --   --   --  5  --    BNP  --  386*  --   --   --     < > = values in this interval not displayed.     ABG: No new results in last 24 hours.    Imaging Results Review: I personally reviewed the following image studies/reports in PACS and discussed pertinent findings with Radiology: chest xray. My interpretation of the radiology images/reports is:  .  IMPRESSION:     No focal consolidation, pleural effusion, or pneumothorax.    Other Study Results Review: EKG was reviewed.   PFT Results Reviewed: reviewed      Pulmonary Function Test Report  Geovanna Leal 64 y.o. female MRN: [id number]     Date of Testin2019     Date of Interpretation: 2019     Requesting Physician: Leelee Garcia     Reason for Testing: Dyspnea on exertion     Reference set for interpretation: NHANES III     Procedure: The patient was taken to pulmonary function testing laboratory.  The patient demonstrated good effort and cooperation.  The results of this test meet ATS standards for acceptability and repeatability.  Data set appears appropriate for interpretation.     Post bronchodilator testing performed after the administration of 2.5mg albuterol in 3cc normal saline administered via nebulizer per bronchodilator protocol.     Results:  FEV1/FVC Ratio: 62 %  Forced Vital Capacity: 2.41 L    74 % predicted  FEV1: 1.49 L60 % predicted     Interpretation:     Moderate obstructive airflow defect      No significant response to the administration to bronchodilator per ATS Standards             [1]   Family History  Problem Relation Name Age of Onset    Stroke Mother Althea     Other Father Aidan          blood clot in vein & CABG     Heart disease Father Aidan     Prostate cancer Father Aidan     Hearing loss Father Aidan     Hypertension Father Aidan     Anxiety disorder Father Aidan     No Known Problems Sister MARIAH     No Known Problems Sister NIK     No Known Problems Daughter VIVIANA     No Known Problems Daughter MAGALIE     Breast cancer Maternal Grandmother Migue     No Known Problems Maternal Grandfather      No Known Problems Paternal Grandmother      No Known Problems Paternal Grandfather      Alcohol abuse Brother Pepito     Throat cancer Brother Pepito     Cancer Brother Pepito         Throat tongue    Hearing loss Brother Pepito     Kidney failure Brother          kidney failure d/t NSIADs on dialysis    Hearing loss Brother Juan     Crohn's disease Maternal Aunt STEFANIA     Dementia Maternal Aunt STEFANIA     No Known Problems Maternal Aunt SAMANTHA     No Known Problems Maternal Aunt EMILIANO     Colon cancer Paternal Aunt FOREIGN     No Known Problems Paternal Aunt MIGUE     No Known Problems Paternal Aunt RIAZ     No Known Problems Paternal Aunt KASEY     Colon cancer Paternal Aunt ERAN     Cancer Paternal Aunt ERAN         Colon cancer    Hearing loss Brother Juan

## 2025-06-17 VITALS
WEIGHT: 158 LBS | SYSTOLIC BLOOD PRESSURE: 167 MMHG | DIASTOLIC BLOOD PRESSURE: 81 MMHG | TEMPERATURE: 98.2 F | RESPIRATION RATE: 20 BRPM | OXYGEN SATURATION: 95 % | HEIGHT: 65 IN | HEART RATE: 77 BPM | BODY MASS INDEX: 26.33 KG/M2

## 2025-06-17 LAB
ALBUMIN SERPL BCG-MCNC: 4 G/DL (ref 3.5–5)
ALP SERPL-CCNC: 82 U/L (ref 34–104)
ALT SERPL W P-5'-P-CCNC: 9 U/L (ref 7–52)
ANION GAP SERPL CALCULATED.3IONS-SCNC: 11 MMOL/L (ref 4–13)
AST SERPL W P-5'-P-CCNC: 9 U/L (ref 13–39)
BASOPHILS # BLD AUTO: 0 THOUSANDS/ÂΜL (ref 0–0.1)
BASOPHILS NFR BLD AUTO: 0 % (ref 0–1)
BILIRUB SERPL-MCNC: 0.65 MG/DL (ref 0.2–1)
BUN SERPL-MCNC: 25 MG/DL (ref 5–25)
CALCIUM SERPL-MCNC: 9.2 MG/DL (ref 8.4–10.2)
CHLORIDE SERPL-SCNC: 103 MMOL/L (ref 96–108)
CO2 SERPL-SCNC: 21 MMOL/L (ref 21–32)
CREAT SERPL-MCNC: 1.12 MG/DL (ref 0.6–1.3)
EOSINOPHIL # BLD AUTO: 0 THOUSAND/ÂΜL (ref 0–0.61)
EOSINOPHIL NFR BLD AUTO: 0 % (ref 0–6)
ERYTHROCYTE [DISTWIDTH] IN BLOOD BY AUTOMATED COUNT: 16.6 % (ref 11.6–15.1)
GFR SERPL CREATININE-BSD FRML MDRD: 49 ML/MIN/1.73SQ M
GLUCOSE SERPL-MCNC: 235 MG/DL (ref 65–140)
HCT VFR BLD AUTO: 35.3 % (ref 34.8–46.1)
HGB BLD-MCNC: 11.2 G/DL (ref 11.5–15.4)
IMM GRANULOCYTES # BLD AUTO: 0.07 THOUSAND/UL (ref 0–0.2)
IMM GRANULOCYTES NFR BLD AUTO: 1 % (ref 0–2)
LYMPHOCYTES # BLD AUTO: 0.83 THOUSANDS/ÂΜL (ref 0.6–4.47)
LYMPHOCYTES NFR BLD AUTO: 9 % (ref 14–44)
MCH RBC QN AUTO: 30.4 PG (ref 26.8–34.3)
MCHC RBC AUTO-ENTMCNC: 31.7 G/DL (ref 31.4–37.4)
MCV RBC AUTO: 96 FL (ref 82–98)
MONOCYTES # BLD AUTO: 0.23 THOUSAND/ÂΜL (ref 0.17–1.22)
MONOCYTES NFR BLD AUTO: 2 % (ref 4–12)
NEUTROPHILS # BLD AUTO: 8.6 THOUSANDS/ÂΜL (ref 1.85–7.62)
NEUTS SEG NFR BLD AUTO: 88 % (ref 43–75)
NRBC BLD AUTO-RTO: 0 /100 WBCS
PLATELET # BLD AUTO: 373 THOUSANDS/UL (ref 149–390)
PMV BLD AUTO: 8.8 FL (ref 8.9–12.7)
POTASSIUM SERPL-SCNC: 3.8 MMOL/L (ref 3.5–5.3)
PROT SERPL-MCNC: 6.9 G/DL (ref 6.4–8.4)
RBC # BLD AUTO: 3.69 MILLION/UL (ref 3.81–5.12)
SODIUM SERPL-SCNC: 135 MMOL/L (ref 135–147)
WBC # BLD AUTO: 9.73 THOUSAND/UL (ref 4.31–10.16)

## 2025-06-17 PROCEDURE — 80053 COMPREHEN METABOLIC PANEL: CPT | Performed by: INTERNAL MEDICINE

## 2025-06-17 PROCEDURE — 99239 HOSP IP/OBS DSCHRG MGMT >30: CPT | Performed by: INTERNAL MEDICINE

## 2025-06-17 PROCEDURE — 94760 N-INVAS EAR/PLS OXIMETRY 1: CPT

## 2025-06-17 PROCEDURE — 94640 AIRWAY INHALATION TREATMENT: CPT

## 2025-06-17 PROCEDURE — 85025 COMPLETE CBC W/AUTO DIFF WBC: CPT | Performed by: INTERNAL MEDICINE

## 2025-06-17 PROCEDURE — 99232 SBSQ HOSP IP/OBS MODERATE 35: CPT | Performed by: INTERNAL MEDICINE

## 2025-06-17 RX ORDER — PREDNISONE 20 MG/1
TABLET ORAL
Qty: 15 TABLET | Refills: 0 | Status: SHIPPED | OUTPATIENT
Start: 2025-06-18 | End: 2025-06-27 | Stop reason: ALTCHOICE

## 2025-06-17 RX ORDER — PREDNISONE 20 MG/1
40 TABLET ORAL DAILY
Status: DISCONTINUED | OUTPATIENT
Start: 2025-06-18 | End: 2025-06-17 | Stop reason: HOSPADM

## 2025-06-17 RX ORDER — CARVEDILOL 25 MG/1
25 TABLET ORAL 2 TIMES DAILY
Qty: 60 TABLET | Refills: 0 | Status: SHIPPED | OUTPATIENT
Start: 2025-06-17 | End: 2025-07-17

## 2025-06-17 RX ORDER — TORSEMIDE 20 MG/1
20 TABLET ORAL DAILY
Status: DISCONTINUED | OUTPATIENT
Start: 2025-06-18 | End: 2025-06-17 | Stop reason: HOSPADM

## 2025-06-17 RX ORDER — TORSEMIDE 20 MG/1
20 TABLET ORAL DAILY PRN
Qty: 30 TABLET | Refills: 0 | Status: SHIPPED | OUTPATIENT
Start: 2025-06-18

## 2025-06-17 RX ADMIN — BUSPIRONE HYDROCHLORIDE 15 MG: 15 TABLET ORAL at 09:09

## 2025-06-17 RX ADMIN — GABAPENTIN 800 MG: 400 CAPSULE ORAL at 09:09

## 2025-06-17 RX ADMIN — METHYLPREDNISOLONE SODIUM SUCCINATE 40 MG: 40 INJECTION, POWDER, FOR SOLUTION INTRAMUSCULAR; INTRAVENOUS at 09:09

## 2025-06-17 RX ADMIN — CLOPIDOGREL 75 MG: 75 TABLET ORAL at 09:09

## 2025-06-17 RX ADMIN — PANTOPRAZOLE SODIUM 40 MG: 40 TABLET, DELAYED RELEASE ORAL at 04:52

## 2025-06-17 RX ADMIN — UMECLIDINIUM 1 PUFF: 62.5 AEROSOL, POWDER ORAL at 09:12

## 2025-06-17 RX ADMIN — LEVALBUTEROL HYDROCHLORIDE 0.63 MG: 0.63 SOLUTION RESPIRATORY (INHALATION) at 07:59

## 2025-06-17 RX ADMIN — GUAIFENESIN 600 MG: 600 TABLET, EXTENDED RELEASE ORAL at 09:10

## 2025-06-17 RX ADMIN — AZITHROMYCIN DIHYDRATE 500 MG: 500 TABLET ORAL at 09:10

## 2025-06-17 RX ADMIN — ENOXAPARIN SODIUM 40 MG: 40 INJECTION SUBCUTANEOUS at 09:09

## 2025-06-17 RX ADMIN — ISOSORBIDE MONONITRATE 30 MG: 30 TABLET, EXTENDED RELEASE ORAL at 09:09

## 2025-06-17 RX ADMIN — CARVEDILOL 25 MG: 12.5 TABLET, FILM COATED ORAL at 09:12

## 2025-06-17 RX ADMIN — ESCITALOPRAM OXALATE 10 MG: 10 TABLET ORAL at 09:10

## 2025-06-17 RX ADMIN — IPRATROPIUM BROMIDE 0.5 MG: 0.5 SOLUTION RESPIRATORY (INHALATION) at 07:59

## 2025-06-17 RX ADMIN — FLUTICASONE FUROATE AND VILANTEROL TRIFENATATE 1 PUFF: 100; 25 POWDER RESPIRATORY (INHALATION) at 09:12

## 2025-06-17 NOTE — PROGRESS NOTES
"Progress Note - Pulmonology   Name: Geovanna Leal 70 y.o. female I MRN: [id number]  Unit/Bed#: -01 I Date of Admission: 6/15/2025   Date of Service: 6/17/2025 I Hospital Day: 2     Assessment & Plan  Acute respiratory failure with hypoxia ([facility])  - Currently on 2 L 97%, home O2 requirements 2 L nightly  -Continue sats greater than 88%  -Pulmonary toileting: Deep breathing cough out of bed as tolerated incentive spirometry  -Will evaluate oxygen requirements prior to discharge  Takotsubo cardiomyopathy  - Cardiology following  COPD (chronic obstructive pulmonary disease) ([facility])  -4/2019-FEV1 60%  - Inpatient: Transition to prednisone 40 mg decrease by 10 mg every 3 days  -Currently on Breo/Anoro, Xopenex/Atrovent 3 times daily  - Home regimen: Trelegy 100/62.5/25 MCG 1 puff daily, albuterol nebulizer every 6 as needed, Proventil every 6 as needed    Tracheobronchitis  -Procalcitonin negative x 1  -Will start a course of azithromycin x 3 days        -Outpatient follow-up per discharge instructions  - Pulmonary will sign off    24 Hour Events : na  Subjective : \"I am feeling better\"    Objective :  Temp:  [97.1 °F (36.2 °C)-98.2 °F (36.8 °C)] 98.2 °F (36.8 °C)  HR:  [65-77] 77  BP: (106-167)/(51-81) 167/81  Resp:  [15-20] 20  SpO2:  [86 %-95 %] 95 %  O2 Device: Nasal cannula    Physical Exam  Constitutional:       General: She is not in acute distress.     Appearance: Normal appearance. She is normal weight. She is not ill-appearing.   HENT:      Head: Normocephalic and atraumatic.      Nose: Nose normal. No congestion or rhinorrhea.      Mouth/Throat:      Mouth: Mucous membranes are dry.      Pharynx: Oropharynx is clear. No oropharyngeal exudate or posterior oropharyngeal erythema.     Cardiovascular:      Rate and Rhythm: Normal rate and regular rhythm.      Pulses: Normal pulses.      Heart sounds: No murmur heard.     No friction rub. No gallop.   Pulmonary:      Effort: Pulmonary effort is normal. No " respiratory distress.      Breath sounds: No stridor. No wheezing, rhonchi or rales.      Comments: Some scattered wheezing  Chest:      Chest wall: No tenderness.   Abdominal:      General: Abdomen is flat. Bowel sounds are normal.      Palpations: Abdomen is soft.     Musculoskeletal:         General: No swelling or tenderness. Normal range of motion.      Cervical back: Normal range of motion. No rigidity or tenderness.     Skin:     General: Skin is warm and dry.      Coloration: Skin is not jaundiced or pale.     Neurological:      General: No focal deficit present.      Mental Status: She is alert and oriented to person, place, and time. Mental status is at baseline.     Psychiatric:         Mood and Affect: Mood normal.         Behavior: Behavior normal.             Lab Results: I have reviewed the following results:   .     06/17/25  0235   WBC 9.73   HGB 11.2*   HCT 35.3      SODIUM 135   K 3.8      CO2 21   BUN 25   CREATININE 1.12   GLUC 235*   AST 9*   ALT 9   ALB 4.0   TBILI 0.65   ALKPHOS 82     ABG: No new results in last 24 hours.    Imaging Results Review: I personally reviewed the following image studies/reports in PACS and discussed pertinent findings with Radiology: chest xray. My interpretation of the radiology images/reports is:  .  Other Study Results Review: EKG was reviewed.   PFT Results Reviewed: reviewed

## 2025-06-17 NOTE — ASSESSMENT & PLAN NOTE
-4/2019-FEV1 60%  - Inpatient: Transition to prednisone 40 mg decrease by 10 mg every 3 days  -Currently on Breo/Anoro, Xopenex/Atrovent 3 times daily  - Home regimen: Trelegy 100/62.5/25 MCG 1 puff daily, albuterol nebulizer every 6 as needed, Proventil every 6 as needed
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
## 2025-06-17 NOTE — PLAN OF CARE
Problem: Potential for Falls  Goal: Patient will remain free of falls  Description: INTERVENTIONS:  - Educate patient/family on patient safety including physical limitations  - Instruct patient to call for assistance with activity   - Consider consulting OT/PT to assist with strengthening/mobility based on AM PAC & JH-HLM score  - Consult OT/PT to assist with strengthening/mobility   - Keep Call bell within reach  - Keep bed low and locked with side rails adjusted as appropriate  - Keep care items and personal belongings within reach  - Initiate and maintain comfort rounds  - Make Fall Risk Sign visible to staff  - Offer Toileting every 2 Hours, in advance of need  - Initiate/Maintain bed/chair alarm  - Obtain necessary fall risk management equipment  - Apply yellow socks and bracelet for high fall risk patients  - Consider moving patient to room near nurses station  Outcome: Progressing     Problem: PAIN - ADULT  Goal: Verbalizes/displays adequate comfort level or baseline comfort level  Description: Interventions:  - Encourage patient to monitor pain and request assistance  - Assess pain using appropriate pain scale  - Administer analgesics as ordered based on type and severity of pain and evaluate response  - Implement non-pharmacological measures as appropriate and evaluate response  - Consider cultural and social influences on pain and pain management  - Notify physician/advanced practitioner if interventions unsuccessful or patient reports new pain  - Educate patient/family on pain management process including their role and importance of  reporting pain   - Provide non-pharmacologic/complimentary pain relief interventions  Outcome: Progressing     Problem: INFECTION - ADULT  Goal: Absence or prevention of progression during hospitalization  Description: INTERVENTIONS:  - Assess and monitor for signs and symptoms of infection  - Monitor lab/diagnostic results  - Monitor all insertion sites, i.e. indwelling  lines, tubes, and drains  - Monitor endotracheal if appropriate and nasal secretions for changes in amount and color  - [place] appropriate cooling/warming therapies per order  - Administer medications as ordered  - Instruct and encourage patient and family to use good hand hygiene technique  - Identify and instruct in appropriate isolation precautions for identified infection/condition  Outcome: Progressing  Goal: Absence of fever/infection during neutropenic period  Description: INTERVENTIONS:  - Monitor WBC  - Perform strict hand hygiene  - Limit to healthy visitors only  - No plants, dried, fresh or silk flowers with soriano in patient room  Outcome: Progressing     Problem: SAFETY ADULT  Goal: Patient will remain free of falls  Description: INTERVENTIONS:  - Educate patient/family on patient safety including physical limitations  - Instruct patient to call for assistance with activity   - Consider consulting OT/PT to assist with strengthening/mobility based on AM PAC & JH-HLM score  - Consult OT/PT to assist with strengthening/mobility   - Keep Call bell within reach  - Keep bed low and locked with side rails adjusted as appropriate  - Keep care items and personal belongings within reach  - Initiate and maintain comfort rounds  - Make Fall Risk Sign visible to staff  - Offer Toileting every 2 Hours, in advance of need  - Initiate/Maintain bed/chair alarm  - Obtain necessary fall risk management equipment  - Apply yellow socks and bracelet for high fall risk patients  - Consider moving patient to room near nurses station  Outcome: Progressing  Goal: Maintain or return to baseline ADL function  Description: INTERVENTIONS:  -  Assess patient's ability to carry out ADLs; assess patient's baseline for ADL function and identify physical deficits which impact ability to perform ADLs (bathing, care of mouth/teeth, toileting, grooming, dressing, etc.)  - Assess/evaluate cause of self-care deficits   - Assess range of  motion  - Assess patient's mobility; develop plan if impaired  - Assess patient's need for assistive devices and provide as appropriate  - Encourage maximum independence but intervene and supervise when necessary  - Involve family in performance of ADLs  - Assess for home care needs following discharge   - Consider OT consult to assist with ADL evaluation and planning for discharge  - Provide patient education as appropriate  - Monitor functional capacity and physical performance, use of AM PAC & JH-HLM   - Monitor gait, balance and fatigue with ambulation    Outcome: Progressing  Goal: Maintains/Returns to pre admission functional level  Description: INTERVENTIONS:  - Perform AM-PAC 6 Click Basic Mobility/ Daily Activity assessment daily.  - Set and communicate daily mobility goal to care team and patient/family/caregiver.   - Collaborate with rehabilitation services on mobility goals if consulted  - Perform Range of Motion 3 times a day.  - Reposition patient every 2 hours.  - Dangle patient 3 times a day  - Stand patient 3 times a day  - Ambulate patient 3 times a day  - Out of bed to chair 3 times a day   - Out of bed for meals 3 times a day  - Out of bed for toileting  - Record patient progress and toleration of activity level   Outcome: Progressing     Problem: DISCHARGE PLANNING  Goal: Discharge to home or other facility with appropriate resources  Description: INTERVENTIONS:  - Identify barriers to discharge w/patient and caregiver  - Arrange for needed discharge resources and transportation as appropriate  - Identify discharge learning needs (meds, wound care, etc.)  - Arrange for interpretive services to assist at discharge as needed  - Refer to Case Management Department for coordinating discharge planning if the patient needs post-hospital services based on physician/advanced practitioner order or complex needs related to functional status, cognitive ability, or social support system  Outcome:  Progressing     Problem: Knowledge Deficit  Goal: Patient/family/caregiver demonstrates understanding of disease process, treatment plan, medications, and discharge instructions  Description: Complete learning assessment and assess knowledge base.  Interventions:  - Provide teaching at level of understanding  - Provide teaching via preferred learning methods  Outcome: Progressing

## 2025-06-17 NOTE — NURSING NOTE
Performed ambulatory pulse ox test with pt. Pt's oxygen saturation remained above 90% on room air while ambulating.

## 2025-06-17 NOTE — ASSESSMENT & PLAN NOTE
"Geovanna has been on 2 L of O2 overnight since her previous admission  Patient reports in the past 2 weeks she has noticed increased swelling in her extremities, she notes that she did not \"feel right\" on on 6/14  She took her blood pressure and noted it to be 190's systolic.  At that time she was questioning whether or not she had forgotten to take her regularly scheduled antihypertensives.  She also noted increased dyspnea with exertion, with reported O2 sat as low as 70s on home oximeter.  She continued with increased dyspnea, and hypertension prompting her to seek evaluation in the emergency department on 6/15    Chest x-ray: Widened mediastinum -mild pulmonary edema  , troponin negative  Initially treated with Lasix 40 mg IV  Patient reports feeling \" much better\".  No lower extremity edema noted, lungs diminished, currently on room air with O2 sat of greater than 95%, examines euvolemic  "

## 2025-06-17 NOTE — ASSESSMENT & PLAN NOTE
-Procalcitonin negative x 1  -Will start a course of azithromycin x 3 days        -Outpatient follow-up per discharge instructions  - Pulmonary will sign off

## 2025-06-17 NOTE — ASSESSMENT & PLAN NOTE
Outpatient regimen consists of carvedilol 12.5 mg twice daily, Imdur 30 mg once daily.  Patient initially hypertensive upon arrival, has improved some with diuresis. -Carvedilol increased to 25 mg yesterday.

## 2025-06-17 NOTE — ASSESSMENT & PLAN NOTE
Patient presenting with shortness of breath with exertion over the past couple days    Patient uses oxygen at night with 2 L  Patient is weaned off oxygen and saturating well on room air  Patient had pulse ox with ambulation done and did not require home O2 at discharge  See management below

## 2025-06-17 NOTE — ASSESSMENT & PLAN NOTE
Follow with [facility]  Potential exacerbation  COVID flu RSV negative  Steroids tid  Xopenex atrovent  Pulm consulted recommendations appreciate   Patient will be discharged on tapering dose of prednisone and is cleared by pulmonary for discharge with outpatient follow-up

## 2025-06-17 NOTE — ASSESSMENT & PLAN NOTE
Continue Coreg 25 mg twice daily  Continue Imdur 30 mg daily  Echo done on 4/24/2025 showed an LVEF of 55% with normal wall motion  Chest x-ray showing no acute pathology  Troponins negative  EKG showing no ischemic changes    Patient with good urine response to Lasix 40    Continue Lasix 40 IV twice daily   Cardiac low salt diet with fluid restriction   cardiology consulted recommendations appreciated  Will be increased Coreg to 25 mg twice daily and ordered as needed torsemide 20 mg daily and outpatient follow-up.

## 2025-06-17 NOTE — DISCHARGE SUMMARY
Discharge Summary - Hospitalist   Name: Geovanna Leal 70 y.o. female I MRN: [id number]  Unit/Bed#: -01 I Date of Admission: 6/15/2025   Date of Service: 6/17/2025 I Hospital Day: 2     Assessment & Plan  Acute respiratory failure with hypoxia (HCC)  Patient presenting with shortness of breath with exertion over the past couple days    Patient uses oxygen at night with 2 L  Patient is weaned off oxygen and saturating well on room air  Patient had pulse ox with ambulation done and did not require home O2 at discharge  See management below  Essential hypertension  Continue coreg and imdur  Takotsubo cardiomyopathy  Continue Coreg 25 mg twice daily  Continue Imdur 30 mg daily  Echo done on 4/24/2025 showed an LVEF of 55% with normal wall motion  Chest x-ray showing no acute pathology  Troponins negative  EKG showing no ischemic changes    Patient with good urine response to Lasix 40    Continue Lasix 40 IV twice daily   Cardiac low salt diet with fluid restriction   cardiology consulted recommendations appreciated  Will be increased Coreg to 25 mg twice daily and ordered as needed torsemide 20 mg daily and outpatient follow-up.  COPD (chronic obstructive pulmonary disease) (HCC)  Follow with [facility]  Potential exacerbation  COVID flu RSV negative  Steroids tid  Xopenex atrovent  Pulm consulted recommendations appreciate   Patient will be discharged on tapering dose of prednisone and is cleared by pulmonary for discharge with outpatient follow-up  Tracheobronchitis  See above   Hospital Course:     Geovanna Leal is a 70 y.o. female patient who originally presented to the hospital on   Admission Orders (From admission, onward)       Ordered        06/15/25 1706  INPATIENT ADMISSION  Once                         due to acute shortness of breath.  Patient was started on IV steroids and IV Lasix.  Patient was seen by cardiology and pulmonary and was diuresed with IV Lasix and as per cardiology patient  can be switched to as needed torsemide 20 mg daily and Coreg was increased.  Pulmonary recommended patient can be discharged on tapering dose of prednisone and outpatient follow-up with pulmonary and cardiology.  Patient did not require home O2 at discharge.  Patient is hemodynamically stable for discharge    On Exam-  Chest-bilateral air entry, clear to auscultation  Abdomen-soft, nontender  Heart-S1 S2 regular  Extremities-no pedal edema or calf tenderness  Neuro-alert awake oriented x3.  No focal deficits    Please see above list of diagnoses and related plan for additional information.   Follow-up with PCP, pulmonary and cardiology as outpatient    CONSULTING PROVIDERS   IP CONSULT TO CARDIOLOGY  IP CONSULT TO PULMONOLOGY    PROCEDURES PERFORMED  * No surgery found *    RADIOLOGY RESULTS  XR chest portable  Result Date: 6/15/2025  Narrative: XR CHEST PORTABLE INDICATION: sob. COMPARISON: Chest radiograph May 31, 2025 FINDINGS: Unchanged upper lung predominant scarring. No focal consolidation. No pneumothorax or pleural effusion. Normal cardiomediastinal silhouette. Cervical spinal fixation hardware. Normal upper abdomen.     Impression: No focal consolidation, pleural effusion, or pneumothorax. Workstation performed: [id number]     VAS carotid complete study  Result Date: 6/5/2025  Narrative: [facility] . JODY GIBBS is a 70 year old F who was referred by SUE JENKINS.  Indications: Yearly surveillance of carotid artery disease.  Patient is asymptomatic. Operative History:  no reported cardiovascular surgeries Risk Factors: The patient reports hypertension, hyperlipidemia and smoking: quit >10yrs ago.  Clinical: Right BP: 124/68, Left BP: 122/68 FINDINGS: Main                               Segment Impression  PSV  EDV  Ratio  Direction of Flow Right                                                                                  Prox CCA                                        56   15                              Mid CCA                                         50   14                             Dist CCA                                        46   13                             Prox. ICA                             1 - 49%   37   11   0.74                      Mid. ICA                                        65   22   1.29                      Dist. ICA                                       63   27   1.26                      Ext Carotid                                     57    8                             Prox Vert.                                      93   30                 Antegrade    Right Subclavian Artery                        170    0                          Left                                                                                   Prox CCA                                        59   21                             Mid CCA                                         66   27                             Dist CCA                                        69   29                             Prox. ICA                             1 - 49%  116   37   1.75                      Mid. ICA                                        85   37   1.29                      Dist. ICA                                      112   33   1.70                      Ext Carotid                                     49    5                             Prox Vert                                       47   19                 Antegrade    Left Subclavian Artery                         122    0                          CONCLUSION:   RIGHT:  There is <50% stenosis noted in the internal carotid artery.  Plaque is heterogenous and irregular.  Vertebral artery flow is antegrade.  There is no significant subclavian artery disease.  LEFT:  There is <50% stenosis noted in the internal carotid artery.  Plaque is heterogenous and irregular.  Vertebral artery flow is antegrade.  There is no significant subclavian artery disease.      Compared to previous study on 4/28/2023, there is no significant change. Recommend repeat duplex in 2 years to check on plaque progression   SIGNATURE Signed by VIKTROIA RG MD, RPVI on 2025-06-05 21:16:42 [email or web address]#Images    XR chest pa & lateral  Result Date: 5/31/2025  Narrative: XR CHEST PA AND LATERAL INDICATION: short of breath. COMPARISON: CXR 4/21/2025, 2/16/2025, subsequent chest CT 5/31/2025. FINDINGS: Emphysema. Redemonstration of cystic lesion in the right upper lung. No definite acute disease. No pneumothorax or pleural effusion. Normal cardiomediastinal silhouette. Bones are unremarkable for age. ACDF. Normal upper abdomen.     Impression: Emphysema with redemonstration of cystic lesion in the right upper lobe with no definite acute disease. See subsequent chest CT. Workstation performed: [id number]     CT pe study w abdomen pelvis w contrast  Result Date: 5/31/2025  Narrative: CT PULMONARY ANGIOGRAM OF THE CHEST AND CT ABDOMEN AND PELVIS WITH INTRAVENOUS CONTRAST INDICATION: Generalized bodyaches, leukopenia, prior history of obstructive kidney stone, pneumonia. COMPARISON: CT 3/24/2025 and CT 2/24/2025 TECHNIQUE: CT examination of the chest, abdomen and pelvis was performed. Thin section CT angiographic technique was used in the chest in order to evaluate for pulmonary embolus and coronal 3D MIP postprocessing was performed on the acquisition scanner. Multiplanar 2D reformatted images were created from the source data. This examination, like all CT scans performed in the [facility], was performed utilizing techniques to minimize radiation dose exposure, including the use of iterative reconstruction and automated exposure control. Radiation dose length product (DLP) for this visit: 822.05 mGy-cm. IV Contrast: 80 mL of iohexol (OMNIPAQUE) Enteric Contrast: Not administered. FINDINGS:  CHEST PULMONARY ARTERIAL TREE: No pulmonary embolus. No abnormal dilation. LUNGS: There is advanced centrilobular and paraseptal emphysema throughout both lungs. There is advanced right and moderate left apical pleural-parenchymal scarring, with numerous cystic/cavitary thick walled spaces throughout the right upper lobe. These  have slightly improved/decreased in size and wall thickness since the prior CT. There are persistent additional more focal nodular opacities within the inferior right upper lobe and juxtapleural left lower lobed 5 mm opacity (4/87). There is no evidence  of a new lobar consolidation. PLEURA: Unremarkable. HEART/AORTA: The heart is not grossly enlarged, with mild to moderate coronary artery calcifications. The pericardium is unremarkable. No thoracic aortic aneurysm. MEDIASTINUM AND KAVITHA: Unchanged right paratracheal, right hilar and subcarinal lymphadenopathy. This has not significantly changed since at least 12/12/2025. However, these have increased since 6/22/2024-prior to the appearance of the pulmonary consolidations. CHEST WALL AND LOWER NECK: Unremarkable. ABDOMEN LIVER/BILIARY TREE: Borderline low-attenuation throughout the hepatic parenchyma, which could suggest mild diffuse hepatic steatosis. No suspicious focal lesions. GALLBLADDER: Cholelithiasis without findings of acute cholecystitis. SPLEEN: Unremarkable. PANCREAS: No convincing change in the multiple tiny hypodense foci, the largest of which in the head measures 8 mm (3/63). This has not significantly changed since at least 1/12/2023. No main duct involvement or focal atrophy. ADRENAL GLANDS: Unremarkable. KIDNEYS/URETERS: Mild multifocal bilateral renal cortical scarring. Simple 1 cm left renal upper pole cyst. No persistent hydronephrosis. STOMACH AND BOWEL: The stomach and small bowel are unremarkable. There are numerous colonic diverticula, without evidence of acute inflammation. APPENDIX: No findings to suggest  appendicitis. ABDOMINOPELVIC CAVITY: No ascites. No pneumoperitoneum. No lymphadenopathy. VESSELS: Moderate diffuse atherosclerotic changes/calcifications throughout the aorta and central branch vessels. The IVC is grossly unremarkable. PELVIS REPRODUCTIVE ORGANS: Unremarkable for patient's age. URINARY BLADDER: Unremarkable. ABDOMINAL WALL/INGUINAL REGIONS: Unremarkable. BONES: No acute fracture or suspicious osseous lesion. Moderate lower lumbar spondylosis and bilateral hip osteoarthritis.     Impression: 1.  Continued interval improvement in the multifocal cavitary consolidations throughout the right upper lobe. This again is consistent with improving infectious etiologies. There is unchanged mediastinal/hilar lymphadenopathy, favoring reactive changes. An additional contrast-enhanced chest CT is recommended in 3 months. 2.  Unchanged subcentimeter pancreatic cystic lesions. A follow-up gadolinium enhanced MRI of the abdomen with MRCP is recommended for further evaluation in 2 years. 3.  Please refer to the report body for description of other incidental, chronic and/or benign findings. Computerized Assisted Algorithm (CAA) may have aided analysis of applicable images. Workstation performed: [id number]     CT head without contrast  Result Date: 5/31/2025  Narrative: CT BRAIN - WITHOUT CONTRAST INDICATION:   generalized bodyaches, leukopenia, prior history of obstructive kidney stone, pneumonia. COMPARISON: CT brain September 20, 2022, MRI brain 7/13/2024 TECHNIQUE:  CT examination of the brain was performed.  Multiplanar 2D reformatted images were created from the source data. Radiation dose length product (DLP) for this visit:  891.61 mGy-cm. .  This examination, like all CT scans performed in the [facility], was performed utilizing techniques to minimize radiation dose exposure, including the use of iterative reconstruction and automated exposure control. IMAGE QUALITY:  Diagnostic. FINDINGS:  Moderate artifact from hearing aids. PARENCHYMA: Decreased attenuation is noted in periventricular and subcortical white matter demonstrating an appearance that is statistically most likely to represent mild microangiopathic change. Patient is status post suboccipital decompressive craniotomy. No CT signs of acute infarction.  No intracranial mass, mass effect or midline shift.  No acute parenchymal hemorrhage. VENTRICLES AND EXTRA-AXIAL SPACES:  Normal for the patient's age. VISUALIZED ORBITS: Bilateral lens replacement surgery.  No acute abnormality involving the visualized orbits. PARANASAL SINUSES: Normal visualized paranasal sinuses. CALVARIUM AND EXTRACRANIAL SOFT TISSUES: Normal. Anterior cervical fusion at C5-6.     Impression: Stable findings status post suboccipital decompressive craniotomy. Stable microangiopathic changes. Workstation performed: [id number]       LABS  Results from last 7 days   Lab Units 06/17/25  0235 06/16/25  0457 06/15/25  1350 06/15/25  1336   WBC Thousand/uL 9.73 3.80* 6.10  --    HEMOGLOBIN g/dL 11.2* 11.6 11.8  --    I STAT HEMOGLOBIN g/dl  --   --   --  12.2   HEMATOCRIT % 35.3 36.1 36.2  --    HEMATOCRIT, ISTAT %  --   --   --  36   MCV fL 96 96 97  --    PLATELETS Thousands/uL 373 351 391*  --      Results from last 7 days   Lab Units 06/17/25  0235 06/16/25  0457 06/15/25  1350 06/15/25  1336   SODIUM mmol/L 135 137 138  --    POTASSIUM mmol/L 3.8 4.2 3.8  --    CHLORIDE mmol/L 103 104 105  --    CO2 mmol/L 21 23 24  --    CO2, I-STAT mmol/L  --   --   --  24   BUN mg/dL 25 16 13  --    CREATININE mg/dL 1.12 1.04 1.11  --    CALCIUM mg/dL 9.2 9.0 9.0  --    ALBUMIN g/dL 4.0 4.0 3.9  --    TOTAL BILIRUBIN mg/dL 0.65 1.04* 1.16*  --    ALK PHOS U/L 82 86 89  --    ALT U/L 9 9 11  --    AST U/L 9* 11* 13  --    EGFR ml/min/1.73sq m 49 53 49  --    GLUCOSE RANDOM mg/dL 235* 180* 87  --      Results from last 7 days   Lab Units 06/15/25  1549 06/15/25  1350   HS TNI 0HR ng/L  --  6  "  HS TNI 2HR ng/L 5  --           Results from last 7 days   Lab Units 06/15/25  1350   D-DIMER QUANTITATIVE ug/ml FEU 0.74*                 Results from last 7 days   Lab Units 06/15/25  1350   PROCALCITONIN ng/ml 0.07           Cultures:         Invalid input(s): \"URIBILINOGEN\"        Results from last 7 days   Lab Units 06/15/25  1350   INFLUENZA A PCR  Negative       Condition at Discharge:  good      Discharge instructions/Information to patient and family:   See after visit summary for information provided to patient and family.      Provisions for Follow-Up Care:  See after visit summary for information related to follow-up care and any pertinent home health orders.      Disposition:     Home       Discharge Statement:  I spent 40 minutes discharging the patient. This time was spent on the day of discharge. I had direct contact with the patient on the day of discharge. Greater than 50% of the total time was spent examining patient, answering all patient questions, arranging and discussing plan of care with patient as well as directly providing post-discharge instructions.  Additional time then spent on discharge activities.    Discharge Medications:  See after visit summary for reconciled discharge medications provided to patient and family.      ** Please Note: This note has been constructed using a voice recognition system **  "

## 2025-06-18 ENCOUNTER — TRANSITIONAL CARE MANAGEMENT (OUTPATIENT)
Dept: FAMILY MEDICINE CLINIC | Facility: HOSPITAL | Age: 70
End: 2025-06-18

## 2025-06-24 ENCOUNTER — TELEPHONE (OUTPATIENT)
Age: 70
End: 2025-06-24

## 2025-06-24 NOTE — TELEPHONE ENCOUNTER
Henrik from [employer] called regarding the medical record request that they sent to the office on 6/16. Did not see anything in media or mention or receiving documentation. Confirmed fax number Brendon sent to [phone] informed that was incorrect provided correct fax for [place] [phone] and central Fax as well [phone]. She will fax over the documentation right away.

## 2025-06-26 ENCOUNTER — OFFICE VISIT (OUTPATIENT)
Dept: CARDIOLOGY CLINIC | Facility: CLINIC | Age: 70
End: 2025-06-26
Payer: MEDICARE

## 2025-06-26 VITALS
HEART RATE: 63 BPM | DIASTOLIC BLOOD PRESSURE: 68 MMHG | BODY MASS INDEX: 26.49 KG/M2 | WEIGHT: 159 LBS | HEIGHT: 65 IN | SYSTOLIC BLOOD PRESSURE: 130 MMHG

## 2025-06-26 DIAGNOSIS — I25.10 CORONARY ARTERY DISEASE INVOLVING NATIVE CORONARY ARTERY OF NATIVE HEART WITHOUT ANGINA PECTORIS: ICD-10-CM

## 2025-06-26 DIAGNOSIS — I51.81 TAKOTSUBO CARDIOMYOPATHY: Primary | ICD-10-CM

## 2025-06-26 PROCEDURE — 99214 OFFICE O/P EST MOD 30 MIN: CPT | Performed by: PHYSICIAN ASSISTANT

## 2025-06-26 NOTE — PROGRESS NOTES
Cardiology Office Follow Up  Geovanna Leal  1955  [id number]      ASSESSMENT:  Chronic diastolic CHF  Hypertension  Hx of Takotsubo cardiomyopathy x 2 ( & ) with recovered EF   Symptomatic hypotension with lisinopril  COPD on 2L O2 PRN  Hyperlipidemia    PLAN:  Cardiac rehab recommended  NM stress test upcoming (rescheduled with her latest admission) to rule out ischemic substrate  BP better stable; Coreg increased to 25mg BID with Imdur 30mg QD  Advised to check daily, report avg BP >140/90 to our office  Continue Lipitor 80mg QD and Plavix 75mg QD  On Torsemide 20mg QD PRN for wt gain, chf sx  RTO in 3 months or sooner if needed    Interval History/ HPI:   Geovanna Leal is a 70 year old female with PMHx as above presents for hospital follow up.   Noticed increasing SOB x 2 weeks, prompting arrival to [facility].   Found significantly hypertensive with evidence of pulmonary edema on CXR  Responded well to IV diuretics; Coreg increased to 25mg BID. BP stabilized.   Discharge for outpatient follow up today.   BP improved today. Some intermittent shortness of breath with gardening in the heat yesterday, overall much improved. Has O2 for PRN use which helps.   Has home scale and will start taking daily weights.   NO chest pain or discomfort, LE edema, orthopnea, PND.      Vitals:  130/68  63  159lbs    Past Medical History[1]  Social History     Socioeconomic History    Marital status: /Civil Union     Spouse name: Not on file    Number of children: Not on file    Years of education: Not on file    Highest education level: Not on file   Occupational History    Occupation: working full time    Tobacco Use    Smoking status: Former     Current packs/day: 0.00     Average packs/day: 1.5 packs/day for 39.0 years (58.5 ttl pk-yrs)     Types: Cigarettes     Start date:      Quit date: 2008     Years since quittin.4    Smokeless tobacco: Never    Tobacco comments:     Patient quit   Vaping Use     Vaping status: Never Used   Substance and Sexual Activity    Alcohol use: Yes     Alcohol/week: 7.0 standard drinks of alcohol     Types: 7 Cans of beer per week     Comment: socially    Drug use: Never    Sexual activity: Not Currently     Partners: Male   Other Topics Concern    Not on file   Social History Narrative    Not on file     Social Drivers of Health     Financial Resource Strain: Low Risk  (5/5/2023)    Overall Financial Resource Strain (CARDIA)     Difficulty of Paying Living Expenses: Not hard at all   Food Insecurity: No Food Insecurity (6/15/2025)    Nursing - Inadequate Food Risk Classification     Worried About Running Out of Food in the Last Year: Never true     Ran Out of Food in the Last Year: Never true     Ran Out of Food in the Last Year: Never true   Transportation Needs: No Transportation Needs (6/15/2025)    Nursing - Transportation Risk Classification     Lack of Transportation: Not on file     Lack of Transportation: No   Physical Activity: Not on file   Stress: Not on file   Social Connections: Not on file   Intimate Partner Violence: Unknown (6/15/2025)    Nursing IPS     Feels Physically and Emotionally Safe: Not on file     Physically Hurt by Someone: Not on file     Humiliated or Emotionally Abused by Someone: Not on file     Physically Hurt by Someone: No     Hurt or Threatened by Someone: No   Housing Stability: Unknown (6/15/2025)    Nursing: Inadequate Housing Risk Classification     Has Housing: Not on file     Worried About Losing Housing: Not on file     Unable to Get Utilities: Not on file     Unable to Pay for Housing in the Last Year: No     Has Housing: No      Family History[2]  Past Surgical History[3]  Current Medications[4]    Review of Systems:  Review of Systems   Constitutional:  Negative for appetite change, chills, diaphoresis, fatigue and fever.   Respiratory:  Positive for shortness of breath. Negative for cough and chest tightness.    Cardiovascular:   Negative for chest pain, palpitations and leg swelling.   Gastrointestinal:  Negative for diarrhea, nausea and vomiting.   Endocrine: Negative for cold intolerance and heat intolerance.   Genitourinary:  Negative for difficulty urinating, dysuria and enuresis.   Musculoskeletal:  Negative for arthralgias, back pain and gait problem.   Allergic/Immunologic: Negative for environmental allergies and food allergies.   Neurological:  Negative for dizziness, facial asymmetry and headaches.   Hematological:  Negative for adenopathy. Does not bruise/bleed easily.   Psychiatric/Behavioral:  Negative for agitation, behavioral problems and confusion.      Physical Exam:  Physical Exam  Constitutional:       Appearance: She is well-developed.   HENT:      Right Ear: External ear normal.      Left Ear: External ear normal.     Eyes:      Pupils: Pupils are equal, round, and reactive to light.       Cardiovascular:      Rate and Rhythm: Normal rate and regular rhythm.      Heart sounds: Normal heart sounds. No murmur heard.     No friction rub. No gallop.   Pulmonary:      Effort: Pulmonary effort is normal.      Breath sounds: Normal breath sounds.   Abdominal:      Palpations: Abdomen is soft.     Musculoskeletal:         General: Normal range of motion.      Cervical back: Normal range of motion.     Skin:     General: Skin is warm and dry.     Neurological:      Mental Status: She is alert and oriented to person, place, and time.      Deep Tendon Reflexes: Reflexes are normal and symmetric.     Psychiatric:         Behavior: Behavior normal.         Thought Content: Thought content normal.         Judgment: Judgment normal.       This note was completed in part utilizing [employer] Software.  Grammatical errors, random word insertions, spelling mistakes, and incomplete sentences can be an occasional consequence of this system secondary to software limitations, ambient noise, and hardware issues.  If you have any  questions or concerns about the content, text, or information contained within the body of this dictation, please contact the provider for clarification.          [1]   Past Medical History:  Diagnosis Date    Allergic     Codeine    Anxiety     Arnold-Chiari malformation (HCC)     Breast lump     last assessed: Jan 22, 2013     Chronic kidney disease     stage 3    COPD (chronic obstructive pulmonary disease) (HCC)     Coronary artery disease     Depression     Dysphagia 08/29/2022    Facial twitching 07/21/2022    GERD (gastroesophageal reflux disease)     Gout     last assessed: Nov 26, 2012     Hair loss     last assessed: Dec 15, 2016     History of transfusion     Hyperlipidemia     Hypertension     Kidney stone     Mitral valve disorder     Myocardial infarct, old     GER (obstructive sleep apnea)     Pneumonia     Stroke (HCC)     SVT (supraventricular tachycardia) (HCC)    [2]   Family History  Problem Relation Name Age of Onset    Stroke Mother Althea     Other Father Aidan         blood clot in vein & CABG     Heart disease Father Aidan     Prostate cancer Father Aidan     Hearing loss Father Aidan     Hypertension Father Aidan     Anxiety disorder Father Aidan     No Known Problems Sister MARIAH     No Known Problems Sister NIK     No Known Problems Daughter VIVIANA     No Known Problems Daughter MAGALIE     Breast cancer Maternal Grandmother Evelyn     No Known Problems Maternal Grandfather      No Known Problems Paternal Grandmother      No Known Problems Paternal Grandfather      Alcohol abuse Brother Pepito     Throat cancer Brother Pepito     Cancer Brother Pepito         Throat tongue    Hearing loss Brother Pepito     Kidney failure Brother          kidney failure d/t NSIADs on dialysis    Hearing loss Brother Juan     Crohn's disease Maternal Aunt STEFANIA     Dementia Maternal Aunt STEFANIA     No Known Problems Maternal Aunt SAMANTHA     No Known Problems Maternal Aunt EMILIANO     Colon cancer  Paternal Aunt FOREIGN     No Known Problems Paternal Aunt MIGUE     No Known Problems Paternal Aunt RIAZ     No Known Problems Paternal Aunt KASEY     Colon cancer Paternal Aunt ERAN     Cancer Paternal Aunt ERAN         Colon cancer    Hearing loss Brother Juan    [3]   Past Surgical History:  Procedure Laterality Date    BREAST BIOPSY Right 02/28/2017    US CORE BIOPSY--BENIGN    CARDIAC CATHETERIZATION      CATARACT EXTRACTION Bilateral 11/09/2023    CERVICAL DISCECTOMY      Spinal     CERVICAL LAMINECTOMY      C1 with chiari decompression     COLONOSCOPY      5 yrs - onset: May 31, 2011     CRANIOTOMY      INTRACAPSULAR CATARACT EXTRACTION Right 11/08/2023    INTRACAPSULAR CATARACT EXTRACTION Left 11/15/2023    POPLITEAL SYNOVIAL CYST EXCISION      OK CYSTO/URETERO W/LITHOTRIPSY &INDWELL STENT INSRT Right 4/7/2025    Procedure: CYSTOSCOPY URETEROSCOPY WITH EXCHANGE STENT URETERAL;  Surgeon: Aaron Coyle MD;  Location: [facility];  Service: Urology    OK CYSTOURETHROSCOPY W/URETERAL CATHETERIZATION Right 2/24/2025    Procedure: CYSTOSCOPY URETEROSCOPY WITH INSERTION RIGHT 6X26 STENT URETERAL;  Surgeon: Aaron Coyle MD;  Location: [facility];  Service: Urology    TUBAL LIGATION      US GUIDED BREAST BIOPSY RIGHT COMPLETE Right 02/28/2017   [4]   Current Outpatient Medications:     albuterol (2.5 mg/3 mL) 0.083 % nebulizer solution, Take 3 mL (2.5 mg total) by nebulization every 6 (six) hours as needed for wheezing or shortness of breath, Disp: 1080 mL, Rfl: 3    albuterol (ProAir HFA) 90 mcg/act inhaler, Inhale 2 puffs every 4 (four) hours as needed for wheezing or shortness of breath, Disp: 8.5 g, Rfl: 1    atorvastatin (LIPITOR) 80 mg tablet, Take 1 tablet by mouth once daily, Disp: 90 tablet, Rfl: 0    busPIRone (BUSPAR) 15 mg tablet, Take 1 tablet (15 mg total) by mouth 2 (two) times a day, Disp: 60 tablet, Rfl: 2    calcium carbonate (OS-FLASH) 600 MG tablet, Take 600 mg by mouth in the morning., Disp: ,  Rfl:     carvedilol (COREG) 25 mg tablet, Take 1 tablet (25 mg total) by mouth 2 (two) times a day, Disp: 60 tablet, Rfl: 0    cholecalciferol (VITAMIN D3) 1,000 units tablet, Take 1,000 Units by mouth in the morning., Disp: , Rfl:     clopidogrel (PLAVIX) 75 mg tablet, Take 1 tablet by mouth once daily, Disp: 90 tablet, Rfl: 0    escitalopram (LEXAPRO) 20 mg tablet, Take 1/2 (one-half) tablet by mouth once daily, Disp: 30 tablet, Rfl: 2    gabapentin (NEURONTIN) 400 mg capsule, Take 2 capsules (800 mg total) by mouth 3 (three) times a day, Disp: 360 capsule, Rfl: 2    isosorbide mononitrate (IMDUR) 30 mg 24 hr tablet, Take 1 tablet by mouth once daily, Disp: 90 tablet, Rfl: 1    pantoprazole (PROTONIX) 40 mg tablet, TAKE 1 TABLET BY MOUTH ONCE DAILY BEFORE BREAKFAST, Disp: 90 tablet, Rfl: 0    polyethylene glycol (MIRALAX) 17 g packet, Take 17 g by mouth daily, Disp: 20 each, Rfl: 0    torsemide (DEMADEX) 20 mg tablet, Take 1 tablet (20 mg total) by mouth daily as needed (Leg swelling and weight gain of greater than 3 to 5 pounds) Do not start before June 18, 2025., Disp: 30 tablet, Rfl: 0    Trelegy Ellipta 100-62.5-25 MCG/ACT inhaler, INHALE 1 PUFF BY MOUTH ONCE DAILY RINSE MOUTH AFTER USE, Disp: 180 each, Rfl: 0    vitamin B-12 (VITAMIN B-12) 1,000 mcg tablet, Take 1 tablet (1,000 mcg total) by mouth daily, Disp: , Rfl:     predniSONE 20 mg tablet, Take 2 tablets (40 mg total) by mouth daily for 3 days, THEN 1.5 tablets (30 mg total) daily for 3 days, THEN 1 tablet (20 mg total) daily for 3 days, THEN 0.5 tablets (10 mg total) daily for 3 days. Do not start before June 18, 2025. (Patient not taking: Reported on 6/26/2025), Disp: 15 tablet, Rfl: 0

## 2025-06-27 ENCOUNTER — OFFICE VISIT (OUTPATIENT)
Dept: FAMILY MEDICINE CLINIC | Facility: HOSPITAL | Age: 70
End: 2025-06-27
Payer: MEDICARE

## 2025-06-27 VITALS
WEIGHT: 159.4 LBS | OXYGEN SATURATION: 96 % | BODY MASS INDEX: 26.56 KG/M2 | HEART RATE: 62 BPM | SYSTOLIC BLOOD PRESSURE: 110 MMHG | HEIGHT: 65 IN | DIASTOLIC BLOOD PRESSURE: 74 MMHG

## 2025-06-27 DIAGNOSIS — F33.0 MILD EPISODE OF RECURRENT MAJOR DEPRESSIVE DISORDER (HCC): ICD-10-CM

## 2025-06-27 DIAGNOSIS — I51.81 TAKOTSUBO CARDIOMYOPATHY: ICD-10-CM

## 2025-06-27 DIAGNOSIS — G89.4 CHRONIC PAIN SYNDROME: ICD-10-CM

## 2025-06-27 DIAGNOSIS — J96.01 ACUTE RESPIRATORY FAILURE WITH HYPOXIA (HCC): ICD-10-CM

## 2025-06-27 DIAGNOSIS — R06.02 SHORTNESS OF BREATH: ICD-10-CM

## 2025-06-27 DIAGNOSIS — Z76.89 ENCOUNTER FOR SUPPORT AND COORDINATION OF TRANSITION OF CARE: Primary | ICD-10-CM

## 2025-06-27 PROBLEM — N30.00 ACUTE CYSTITIS WITHOUT HEMATURIA: Status: RESOLVED | Noted: 2025-06-05 | Resolved: 2025-06-27

## 2025-06-27 PROBLEM — E87.20 LACTIC ACIDOSIS: Status: RESOLVED | Noted: 2025-02-25 | Resolved: 2025-06-27

## 2025-06-27 PROCEDURE — 99495 TRANSJ CARE MGMT MOD F2F 14D: CPT | Performed by: STUDENT IN AN ORGANIZED HEALTH CARE EDUCATION/TRAINING PROGRAM

## 2025-06-27 RX ORDER — GABAPENTIN 400 MG/1
800 CAPSULE ORAL 2 TIMES DAILY
Qty: 360 CAPSULE | Refills: 1 | Status: SHIPPED | OUTPATIENT
Start: 2025-06-27

## 2025-06-27 NOTE — ASSESSMENT & PLAN NOTE
Seen by cardio for f/u yesterday - counseled on CHF red flags & wt gain  Sent home from hospital on higher coreg dose  Will be increased Coreg to 25 mg twice daily and ordered as needed torsemide 20 mg daily and outpatient follow-up.   Call Cardio or PCP when down to last 10 pills for COREG   RCT 3 months for them.

## 2025-06-27 NOTE — PATIENT INSTRUCTIONS
Call Cardio or PCP when down to last 10 pills for COREG     Ask Dr. Garcia to switch lexapro to 10 mg pill, to not have to split.

## 2025-06-27 NOTE — PROGRESS NOTES
Transition of Care Visit:  Name: Geovanna Leal      : 1955      MRN: [id number]  Encounter Provider: Maria E Morales DO  Encounter Date: 2025   Encounter department: [facility] [address]    Assessment & Plan  Encounter for support and coordination of transition of care  Reviewed meds & doses & upcoming appts.        Takotsubo cardiomyopathy  Seen by cardio for f/u yesterday - counseled on CHF red flags & wt gain  Sent home from hospital on higher coreg dose  Will be increased Coreg to 25 mg twice daily and ordered as needed torsemide 20 mg daily and outpatient follow-up.   Call Cardio or PCP when down to last 10 pills for COREG   RCT 3 months for them.        Shortness of breath  Some days feels SOB, or tight, some while being home, severe heat wave going on also. Feels worse while exerting self or outdoors.   Using overnight O2 - 2 lpm, nc.   Using trelegy each morning.   Used albuterol neb 1-2x since being home. Not often.   Has used albuterol inhaler some, keeps in purse.        Chronic pain syndrome  Dose corrected in chart, said tid, but only ever bid.   No refills due right now.   Orders:  •  gabapentin (NEURONTIN) 400 mg capsule; Take 2 capsules (800 mg total) by mouth 2 (two) times a day    Mild episode of recurrent major depressive disorder (HCC)  Ask Dr. Garcia to switch lexapro to 10 mg pill, to not have to split.        Acute respiratory failure with hypoxia (HCC)  Greatly improved since adm. Done steroids.   Has all meds.        Return if symptoms worsen or fail to improve - scheduled with PCP next month.       History of Present Illness     Transitional Care Management Review:   Geovanna Leal is a 70 y.o. female here for TCM follow up.     During the TCM phone call patient stated:  TCM Call (since 2025)     Date and time call was made  2025  8:51 AM    Hospital care reviewed  Records reviewed    Patient was hospitialized at  [facility]  "   Date of Admission  06/15/25    Date of discharge  06/17/25    Diagnosis  Acute respiratory failure with hypoxia    Disposition  Home    Were the patients medications reviewed and updated  Yes    Current Symptoms  None      TCM Call (since 6/13/2025)     Post hospital issues  None    Patients specialists  Pulmonary; Cardiology    Did you obtain your prescribed medications  Yes    Do you need help managing your prescriptions or medications  No    Is transportation to your appointment needed  No    I have advised the patient to call PCP with any new or worsening symptoms  patrick cabello ma    Living Arrangements  Spouse or Significiant other    Support System  Spouse    The type of support provided  Emotional; Financial; Physical    Do you have social support  Yes, as much as I need    Are you recieving home care services  No      HPI  \"Due to acute shortness of breath. Patient was started on IV steroids and IV Lasix. Patient was seen by cardiology and pulmonary and was diuresed with IV Lasix and as per cardiology patient can be switched to as needed torsemide 20 mg daily and Coreg was increased. Pulmonary recommended patient can be discharged on tapering dose of prednisone and outpatient follow-up with pulmonary and cardiology. Patient did not require home O2 at discharge. Patient is hemodynamically stable for discharge \"    Home weight 159 same as here.   Not used demadex yet.     Wt Readings from Last 3 Encounters:   06/27/25 72.3 kg (159 lb 6.4 oz)   06/26/25 72.1 kg (159 lb)   06/16/25 71.7 kg (158 lb)     Temp Readings from Last 3 Encounters:   06/17/25 98.2 °F (36.8 °C) (Oral)   06/03/25 (!) 96 °F (35.6 °C)   05/31/25 97.5 °F (36.4 °C) (Temporal)     BP Readings from Last 3 Encounters:   06/27/25 110/74   06/26/25 130/68   06/17/25 167/81     Pulse Readings from Last 3 Encounters:   06/27/25 62   06/26/25 63   06/17/25 77     Review of Systems   Constitutional:  Negative for chills and fever.   HENT:  " "Negative for sore throat and trouble swallowing.    Respiratory:  Positive for cough (occas, mild, chronic), chest tightness (with heat) and shortness of breath. Negative for wheezing.    Cardiovascular:  Negative for chest pain, palpitations and leg swelling (not since d/c).   Gastrointestinal:  Positive for constipation (on & off, uses prn miralax). Negative for diarrhea.   Neurological:  Negative for dizziness, light-headedness and headaches.     Objective   /74   Pulse 62   Ht 5' 5\" (1.651 m)   Wt 72.3 kg (159 lb 6.4 oz)   SpO2 96%   BMI 26.53 kg/m²     Physical Exam  Vitals and nursing note reviewed.   Constitutional:       General: She is not in acute distress.     Appearance: Normal appearance. She is well-developed and normal weight. She is not ill-appearing.   HENT:      Head: Normocephalic and atraumatic.     Eyes:      General: No scleral icterus.        Right eye: No discharge.         Left eye: No discharge.      Conjunctiva/sclera: Conjunctivae normal.       Cardiovascular:      Rate and Rhythm: Normal rate and regular rhythm.      Pulses: Normal pulses.      Heart sounds: Normal heart sounds. No murmur heard.  Pulmonary:      Effort: Pulmonary effort is normal. No respiratory distress.      Breath sounds: Normal breath sounds. No wheezing, rhonchi or rales.     Musculoskeletal:      Cervical back: Normal range of motion and neck supple. No rigidity.      Right lower leg: No edema.      Left lower leg: No edema.     Skin:     General: Skin is warm and dry.      Capillary Refill: Capillary refill takes less than 2 seconds.     Neurological:      Mental Status: She is alert and oriented to person, place, and time.      Gait: Gait normal.     Psychiatric:         Mood and Affect: Mood normal.         Behavior: Behavior normal.         Thought Content: Thought content normal.         Judgment: Judgment normal.         Medications have been reviewed by provider in current encounter      "

## 2025-06-30 ENCOUNTER — HOSPITAL ENCOUNTER (OUTPATIENT)
Dept: CT IMAGING | Facility: HOSPITAL | Age: 70
Discharge: HOME | End: 2025-06-30
Attending: PHYSICIAN ASSISTANT
Payer: MEDICARE

## 2025-06-30 DIAGNOSIS — R93.89 ABNORMAL CHEST CT: ICD-10-CM

## 2025-06-30 PROCEDURE — 71250 CT THORAX DX C-: CPT

## 2025-07-06 DIAGNOSIS — R13.10 DYSPHAGIA, UNSPECIFIED TYPE: Chronic | ICD-10-CM

## 2025-07-06 DIAGNOSIS — E78.5 DYSLIPIDEMIA: ICD-10-CM

## 2025-07-07 RX ORDER — PANTOPRAZOLE SODIUM 40 MG/1
TABLET, DELAYED RELEASE ORAL
Qty: 90 TABLET | Refills: 0 | Status: SHIPPED | OUTPATIENT
Start: 2025-07-07

## 2025-07-08 RX ORDER — ATORVASTATIN CALCIUM 80 MG/1
80 TABLET, FILM COATED ORAL DAILY
Qty: 90 TABLET | Refills: 1 | Status: SHIPPED | OUTPATIENT
Start: 2025-07-08

## 2025-07-14 ENCOUNTER — TELEPHONE (OUTPATIENT)
Age: 70
End: 2025-07-14

## 2025-07-14 ENCOUNTER — RESULTS FOLLOW-UP (OUTPATIENT)
Age: 70
End: 2025-07-14

## 2025-07-15 ENCOUNTER — TELEPHONE (OUTPATIENT)
Age: 70
End: 2025-07-15

## 2025-07-17 ENCOUNTER — HOSPITAL ENCOUNTER (OUTPATIENT)
Dept: MAMMOGRAPHY | Facility: CLINIC | Age: 70
Discharge: HOME | End: 2025-07-17
Attending: INTERNAL MEDICINE
Payer: MEDICARE

## 2025-07-17 VITALS — HEIGHT: 65 IN | WEIGHT: 159 LBS | BODY MASS INDEX: 26.49 KG/M2

## 2025-07-17 DIAGNOSIS — Z12.31 ENCOUNTER FOR SCREENING MAMMOGRAM FOR BREAST CANCER: ICD-10-CM

## 2025-07-17 PROCEDURE — 77067 SCR MAMMO BI INCL CAD: CPT

## 2025-07-17 PROCEDURE — 77063 BREAST TOMOSYNTHESIS BI: CPT

## 2025-07-19 DIAGNOSIS — I25.10 CORONARY ARTERY DISEASE INVOLVING NATIVE CORONARY ARTERY OF NATIVE HEART WITHOUT ANGINA PECTORIS: ICD-10-CM

## 2025-07-20 DIAGNOSIS — I20.9 ANGINAL PAIN (HCC): ICD-10-CM

## 2025-07-21 ENCOUNTER — CLINICAL SUPPORT (OUTPATIENT)
Dept: PULMONOLOGY | Facility: HOSPITAL | Age: 70
End: 2025-07-21
Attending: INTERNAL MEDICINE
Payer: MEDICARE

## 2025-07-21 DIAGNOSIS — J44.1 COPD EXACERBATION (HCC): Primary | ICD-10-CM

## 2025-07-21 PROCEDURE — G0239 OTH RESP PROC, GROUP: HCPCS

## 2025-07-21 RX ORDER — CLOPIDOGREL BISULFATE 75 MG/1
75 TABLET ORAL DAILY
Qty: 90 TABLET | Refills: 1 | Status: SHIPPED | OUTPATIENT
Start: 2025-07-21
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
## 2025-07-22 RX ORDER — ISOSORBIDE MONONITRATE 30 MG/1
30 TABLET, EXTENDED RELEASE ORAL DAILY
Qty: 90 TABLET | Refills: 1 | Status: SHIPPED | OUTPATIENT
Start: 2025-07-22

## 2025-07-23 ENCOUNTER — CLINICAL SUPPORT (OUTPATIENT)
Dept: PULMONOLOGY | Facility: HOSPITAL | Age: 70
End: 2025-07-23
Attending: INTERNAL MEDICINE
Payer: MEDICARE

## 2025-07-23 DIAGNOSIS — J44.1 COPD EXACERBATION (HCC): Primary | ICD-10-CM

## 2025-07-23 PROCEDURE — G0239 OTH RESP PROC, GROUP: HCPCS

## 2025-07-29 ENCOUNTER — CLINICAL SUPPORT (OUTPATIENT)
Dept: PULMONOLOGY | Facility: HOSPITAL | Age: 70
End: 2025-07-29
Attending: INTERNAL MEDICINE
Payer: MEDICARE

## 2025-07-29 DIAGNOSIS — J44.1 COPD EXACERBATION (HCC): Primary | ICD-10-CM

## 2025-07-29 PROCEDURE — G0239 OTH RESP PROC, GROUP: HCPCS

## 2025-08-01 ENCOUNTER — OFFICE VISIT (OUTPATIENT)
Dept: PODIATRY | Facility: CLINIC | Age: 70
End: 2025-08-01
Payer: MEDICARE

## 2025-08-04 ENCOUNTER — OFFICE VISIT (OUTPATIENT)
Dept: FAMILY MEDICINE CLINIC | Facility: HOSPITAL | Age: 70
End: 2025-08-04
Payer: MEDICARE

## 2025-08-04 VITALS
DIASTOLIC BLOOD PRESSURE: 77 MMHG | HEART RATE: 63 BPM | SYSTOLIC BLOOD PRESSURE: 141 MMHG | OXYGEN SATURATION: 98 % | BODY MASS INDEX: 26.96 KG/M2 | TEMPERATURE: 97 F | HEIGHT: 65 IN | WEIGHT: 161.8 LBS

## 2025-08-04 DIAGNOSIS — J96.01 ACUTE RESPIRATORY FAILURE WITH HYPOXIA (HCC): Primary | ICD-10-CM

## 2025-08-04 DIAGNOSIS — J43.8 OTHER EMPHYSEMA (HCC): ICD-10-CM

## 2025-08-04 DIAGNOSIS — R73.01 IFG (IMPAIRED FASTING GLUCOSE): ICD-10-CM

## 2025-08-04 DIAGNOSIS — I10 ESSENTIAL HYPERTENSION: Chronic | ICD-10-CM

## 2025-08-04 DIAGNOSIS — E78.5 DYSLIPIDEMIA: Chronic | ICD-10-CM

## 2025-08-04 DIAGNOSIS — F33.0 MILD EPISODE OF RECURRENT MAJOR DEPRESSIVE DISORDER (HCC): ICD-10-CM

## 2025-08-04 DIAGNOSIS — I51.81 TAKOTSUBO CARDIOMYOPATHY: ICD-10-CM

## 2025-08-04 DIAGNOSIS — R94.6 THYROID FUNCTION STUDY ABNORMALITY: ICD-10-CM

## 2025-08-04 PROBLEM — J40 TRACHEOBRONCHITIS: Status: RESOLVED | Noted: 2025-06-16 | Resolved: 2025-08-04

## 2025-08-04 PROCEDURE — 99214 OFFICE O/P EST MOD 30 MIN: CPT | Performed by: INTERNAL MEDICINE

## 2025-08-04 PROCEDURE — G2211 COMPLEX E/M VISIT ADD ON: HCPCS | Performed by: INTERNAL MEDICINE

## 2025-08-04 RX ORDER — CARVEDILOL 25 MG/1
25 TABLET ORAL 2 TIMES DAILY
Qty: 180 TABLET | Refills: 2 | Status: SHIPPED | OUTPATIENT
Start: 2025-08-04 | End: 2025-08-04 | Stop reason: SDUPTHER

## 2025-08-04 RX ORDER — TORSEMIDE 20 MG/1
20 TABLET ORAL DAILY PRN
Start: 2025-08-04

## 2025-08-04 RX ORDER — CARVEDILOL 25 MG/1
25 TABLET ORAL 2 TIMES DAILY
Qty: 180 TABLET | Refills: 2 | Status: SHIPPED | OUTPATIENT
Start: 2025-08-04 | End: 2026-05-01

## 2025-08-04 RX ORDER — ESCITALOPRAM OXALATE 20 MG/1
20 TABLET ORAL DAILY
Qty: 30 TABLET | Refills: 2 | Status: SHIPPED | OUTPATIENT
Start: 2025-08-04

## 2025-08-05 DIAGNOSIS — J44.9 CHRONIC OBSTRUCTIVE PULMONARY DISEASE, UNSPECIFIED COPD TYPE (HCC): ICD-10-CM

## 2025-08-06 RX ORDER — FLUTICASONE FUROATE, UMECLIDINIUM BROMIDE AND VILANTEROL TRIFENATATE 100; 62.5; 25 UG/1; UG/1; UG/1
POWDER RESPIRATORY (INHALATION)
Qty: 180 EACH | Refills: 0 | Status: SHIPPED | OUTPATIENT
Start: 2025-08-06

## 2025-08-11 ENCOUNTER — CLINICAL SUPPORT (OUTPATIENT)
Dept: PULMONOLOGY | Facility: HOSPITAL | Age: 70
End: 2025-08-11
Attending: INTERNAL MEDICINE
Payer: MEDICARE

## 2025-08-13 ENCOUNTER — CLINICAL SUPPORT (OUTPATIENT)
Dept: PULMONOLOGY | Facility: HOSPITAL | Age: 70
End: 2025-08-13
Attending: INTERNAL MEDICINE
Payer: MEDICARE

## 2025-08-15 ENCOUNTER — TELEPHONE (OUTPATIENT)
Dept: PODIATRY | Facility: CLINIC | Age: 70
End: 2025-08-15

## 2025-08-15 ENCOUNTER — TELEPHONE (OUTPATIENT)
Age: 70
End: 2025-08-15

## 2025-08-15 ENCOUNTER — OFFICE VISIT (OUTPATIENT)
Age: 70
End: 2025-08-15
Payer: MEDICARE

## 2025-08-15 PROBLEM — J96.11 CHRONIC RESPIRATORY FAILURE WITH HYPOXIA (HCC): Status: ACTIVE | Noted: 2025-02-12

## 2025-08-18 ENCOUNTER — CLINICAL SUPPORT (OUTPATIENT)
Dept: PULMONOLOGY | Facility: HOSPITAL | Age: 70
End: 2025-08-18
Attending: INTERNAL MEDICINE
Payer: MEDICARE

## 2025-08-18 DIAGNOSIS — J44.1 COPD EXACERBATION (HCC): Primary | ICD-10-CM

## 2025-08-18 PROCEDURE — G0239 OTH RESP PROC, GROUP: HCPCS

## (undated) DEVICE — CHLORHEXIDINE 4PCT 4 OZ

## (undated) DEVICE — PACK CUSTOM GU CYSTO PACK RF

## (undated) DEVICE — PREMIUM DRY TRAY LF: Brand: MEDLINE INDUSTRIES, INC.

## (undated) DEVICE — GUIDEWIRE STRGHT TIP 0.035 IN  SOLO PLUS

## (undated) DEVICE — SYRINGE 10ML LL

## (undated) DEVICE — 3M™ TEGADERM™ TRANSPARENT FILM DRESSING FRAME STYLE, 1624W, 2-3/8 IN X 2-3/4 IN (6 CM X 7 CM), 100/CT 4CT/CASE: Brand: 3M™ TEGADERM™

## (undated) DEVICE — PACK TUR

## (undated) DEVICE — GLOVE SRG BIOGEL 7

## (undated) DEVICE — CATH URETERAL 5FR X 70 CM FLEX TIP POLYUR BARD

## (undated) DEVICE — SPECIMEN CONTAINER STERILE PEEL PACK